# Patient Record
Sex: FEMALE | Race: WHITE | NOT HISPANIC OR LATINO | Employment: FULL TIME | URBAN - METROPOLITAN AREA
[De-identification: names, ages, dates, MRNs, and addresses within clinical notes are randomized per-mention and may not be internally consistent; named-entity substitution may affect disease eponyms.]

---

## 2017-01-10 ENCOUNTER — TELEPHONE (OUTPATIENT)
Dept: OBSTETRICS AND GYNECOLOGY | Facility: CLINIC | Age: 52
End: 2017-01-10

## 2017-01-10 RX ORDER — LEVONORGESTREL / ETHINYL ESTRADIOL AND ETHINYL ESTRADIOL 150-30(84)
KIT ORAL
Qty: 91 EACH | Refills: 0 | Status: SHIPPED | OUTPATIENT
Start: 2017-01-10 | End: 2017-02-14 | Stop reason: SDUPTHER

## 2017-01-10 NOTE — TELEPHONE ENCOUNTER
----- Message from Joann Henderson sent at 1/9/2017  1:28 PM EST -----  Patient needs a refill on her prescription Amethea birth control.  Patient phone number:  [phone] done

## 2017-02-14 ENCOUNTER — OFFICE VISIT (OUTPATIENT)
Dept: OBSTETRICS AND GYNECOLOGY | Facility: CLINIC | Age: 52
End: 2017-02-14

## 2017-02-14 VITALS
BODY MASS INDEX: 17.59 KG/M2 | HEART RATE: 77 BPM | DIASTOLIC BLOOD PRESSURE: 90 MMHG | HEIGHT: 65 IN | WEIGHT: 105.6 LBS | SYSTOLIC BLOOD PRESSURE: 157 MMHG

## 2017-02-14 DIAGNOSIS — Z12.12 SCREENING FOR RECTAL CANCER: Primary | ICD-10-CM

## 2017-02-14 DIAGNOSIS — Z12.31 VISIT FOR SCREENING MAMMOGRAM: ICD-10-CM

## 2017-02-14 DIAGNOSIS — Z30.41 ENCOUNTER FOR SURVEILLANCE OF CONTRACEPTIVE PILLS: ICD-10-CM

## 2017-02-14 DIAGNOSIS — Z01.419 WELL WOMAN EXAM WITH ROUTINE GYNECOLOGICAL EXAM: ICD-10-CM

## 2017-02-14 DIAGNOSIS — Z12.4 ROUTINE CERVICAL SMEAR: ICD-10-CM

## 2017-02-14 LAB
DEVELOPER EXPIRATION DATE: NORMAL
DEVELOPER LOT NUMBER: NORMAL
EXPIRATION DATE: NORMAL
FECAL OCCULT BLOOD SCREEN, POC: NEGATIVE
Lab: NORMAL
NEGATIVE CONTROL: NEGATIVE
POSITIVE CONTROL: POSITIVE

## 2017-02-14 PROCEDURE — G0328 FECAL BLOOD SCRN IMMUNOASSAY: HCPCS | Performed by: OBSTETRICS & GYNECOLOGY

## 2017-02-14 PROCEDURE — 99396 PREV VISIT EST AGE 40-64: CPT | Performed by: OBSTETRICS & GYNECOLOGY

## 2017-02-14 RX ORDER — LEVONORGESTREL / ETHINYL ESTRADIOL AND ETHINYL ESTRADIOL 150-30(84)
KIT ORAL
Qty: 91 EACH | Refills: 2 | Status: SHIPPED | OUTPATIENT
Start: 2017-02-14 | End: 2018-02-27 | Stop reason: SDUPTHER

## 2017-02-14 NOTE — PROGRESS NOTES
Subjective   Denise Guajardo is a 51 y.o. female     CC: Annual and pap    Last annual 2016  Last pap 2014  Last mammogram 2016  Last colonoscopy never      History of Present Illness: reports no particular gyn concerns or problems.  Is on OC's and doing well.  Recently .  Was a little early today for her mammogram and it has been rescheduled for the 1st part of March.   Dealing with issues currently related to her father's accidental death and her mother's cognitive dysfunction related to head trauma: so will schedule her screening colonoscopy after her next visit.  Will check gonadotropins at end of this extended interval pack of OC's.    The following portions of the patient's history were reviewed and updated as appropriate: allergies, current medications, past family history, past medical history, past social history, past surgical history and problem list.    Review of Systems   Constitutional: Negative for fatigue.   HENT: Negative for congestion.    Eyes: Negative for visual disturbance.   Respiratory: Negative for chest tightness and shortness of breath.    Cardiovascular: Negative for chest pain and palpitations.   Gastrointestinal: Negative for abdominal pain and blood in stool.   Endocrine: Negative for cold intolerance.   Genitourinary: Negative for difficulty urinating, dysuria, frequency, genital sores, hematuria, menstrual problem, pelvic pain, urgency, vaginal bleeding, vaginal discharge and vaginal pain.   Musculoskeletal: Negative for back pain.   Skin: Negative for color change and rash.   Neurological: Negative for headaches.   Psychiatric/Behavioral: Negative for sleep disturbance.       History reviewed. No pertinent past medical history.  Past Surgical History   Procedure Laterality Date   • Breast lumpectomy       OB History      Para Term  AB TAB SAB Ectopic Multiple Living    2 2        2        Menstrual History:  OB History       Para Term  AB TAB SAB Ectopic Multiple Living    2 2        2         No LMP recorded. Patient is postmenopausal.       History reviewed. No pertinent family history.  History   Smoking Status   • Never Smoker   Smokeless Tobacco   • Not on file     History   Alcohol Use   • Yes       Objective   Physical Exam   Constitutional: She is oriented to person, place, and time. She appears well-developed and well-nourished.   HENT:   Head: Normocephalic and atraumatic.   Right Ear: External ear normal.   Left Ear: External ear normal.   Nose: Nose normal.   Eyes: EOM are normal. Pupils are equal, round, and reactive to light.   Neck: Normal range of motion. Neck supple. No thyromegaly present.   Cardiovascular: Normal rate, regular rhythm and normal heart sounds.    No murmur heard.  Pulmonary/Chest: Effort normal and breath sounds normal. She has no wheezes. She has no rales. She exhibits no tenderness. Right breast exhibits no inverted nipple, no mass, no nipple discharge, no skin change and no tenderness. Left breast exhibits no inverted nipple, no mass, no nipple discharge, no skin change and no tenderness. There is no breast swelling.   Abdominal: Soft. Bowel sounds are normal. She exhibits no distension and no mass. There is no tenderness. Hernia confirmed negative in the right inguinal area and confirmed negative in the left inguinal area.   Genitourinary: Uterus normal. Rectal exam shows no mass, no tenderness and guaiac negative stool. No breast tenderness. Pelvic exam was performed with patient supine. There is no rash, tenderness or lesion on the right labia. There is no rash, tenderness or lesion on the left labia. Cervix exhibits no motion tenderness, no discharge and no friability. Right adnexum displays no mass and no tenderness. Left adnexum displays no mass and no tenderness. No erythema, tenderness or bleeding in the vagina. No vaginal discharge found.   Musculoskeletal: Normal range of motion. She  exhibits no edema.   Neurological: She is alert and oriented to person, place, and time.   Skin: Skin is warm and dry. No rash noted.   Psychiatric: She has a normal mood and affect. Judgment normal.   Nursing note and vitals reviewed.        Assessment/Plan   Denise was seen today for annual exam.    Diagnoses and all orders for this visit:    Screening for rectal cancer  -     POC Occult Blood Stool    Well woman exam with routine gynecological exam: normal gyn WWE  Encounter for surveillance of contraceptive pills: will check gonadotropins at end of placebo week.  -     FSH & LH; Future    Routine cervical smear  -     IGP, Aptima HPV, Rfx 16 / 18,45    Visit for screening mammogram    Other orders  -     Levonorgest-Eth Estrad 91-Day 0.15-0.03 &0.01 MG tablet; One daiy            Will continue with annual WWE's, annual mammography, SBE's, and daily calcium + D

## 2017-02-16 LAB
CYTOLOGIST CVX/VAG CYTO: NORMAL
CYTOLOGY CVX/VAG DOC THIN PREP: NORMAL
DX ICD CODE: NORMAL
HIV 1 & 2 AB SER-IMP: NORMAL
HPV I/H RISK 4 DNA CVX QL PROBE+SIG AMP: NEGATIVE
OTHER STN SPEC: NORMAL
PATH REPORT.FINAL DX SPEC: NORMAL
STAT OF ADQ CVX/VAG CYTO-IMP: NORMAL

## 2017-02-19 ENCOUNTER — RESULTS ENCOUNTER (OUTPATIENT)
Dept: OBSTETRICS AND GYNECOLOGY | Facility: CLINIC | Age: 52
End: 2017-02-19

## 2017-02-19 DIAGNOSIS — Z30.41 ENCOUNTER FOR SURVEILLANCE OF CONTRACEPTIVE PILLS: ICD-10-CM

## 2017-03-07 ENCOUNTER — APPOINTMENT (OUTPATIENT)
Dept: WOMENS IMAGING | Facility: HOSPITAL | Age: 52
End: 2017-03-07

## 2017-03-07 PROCEDURE — 77067 SCR MAMMO BI INCL CAD: CPT | Performed by: RADIOLOGY

## 2017-03-07 PROCEDURE — G0202 SCR MAMMO BI INCL CAD: HCPCS | Performed by: RADIOLOGY

## 2017-04-15 LAB
FSH SERPL-ACNC: 5.6 MIU/ML
LH SERPL-ACNC: 2.5 MIU/ML

## 2017-04-17 ENCOUNTER — TELEPHONE (OUTPATIENT)
Dept: OBSTETRICS AND GYNECOLOGY | Facility: CLINIC | Age: 52
End: 2017-04-17

## 2017-06-14 ENCOUNTER — TELEPHONE (OUTPATIENT)
Dept: OBSTETRICS AND GYNECOLOGY | Facility: CLINIC | Age: 52
End: 2017-06-14

## 2017-06-14 NOTE — TELEPHONE ENCOUNTER
----- Message from Joann Hill sent at 6/14/2017 11:50 AM EDT -----  Contact: pt called   Pt would like a call back to discuss a recommendation for a new doctor. Please call [phone].      joann     I believe she would like seeing Dr. Gay.

## 2018-02-27 ENCOUNTER — APPOINTMENT (OUTPATIENT)
Dept: WOMENS IMAGING | Facility: HOSPITAL | Age: 53
End: 2018-02-27

## 2018-02-27 ENCOUNTER — OFFICE VISIT (OUTPATIENT)
Dept: OBSTETRICS AND GYNECOLOGY | Facility: CLINIC | Age: 53
End: 2018-02-27

## 2018-02-27 ENCOUNTER — PROCEDURE VISIT (OUTPATIENT)
Dept: OBSTETRICS AND GYNECOLOGY | Facility: CLINIC | Age: 53
End: 2018-02-27

## 2018-02-27 VITALS
SYSTOLIC BLOOD PRESSURE: 134 MMHG | DIASTOLIC BLOOD PRESSURE: 84 MMHG | BODY MASS INDEX: 17.33 KG/M2 | WEIGHT: 104 LBS | HEIGHT: 65 IN | HEART RATE: 73 BPM

## 2018-02-27 DIAGNOSIS — Z01.419 ENCOUNTER FOR GYNECOLOGICAL EXAMINATION WITHOUT ABNORMAL FINDING: Primary | ICD-10-CM

## 2018-02-27 DIAGNOSIS — Z12.31 VISIT FOR SCREENING MAMMOGRAM: Primary | ICD-10-CM

## 2018-02-27 DIAGNOSIS — Z30.41 ENCOUNTER FOR SURVEILLANCE OF CONTRACEPTIVE PILLS: ICD-10-CM

## 2018-02-27 PROCEDURE — 99396 PREV VISIT EST AGE 40-64: CPT | Performed by: OBSTETRICS & GYNECOLOGY

## 2018-02-27 PROCEDURE — 77067 SCR MAMMO BI INCL CAD: CPT | Performed by: RADIOLOGY

## 2018-02-27 PROCEDURE — 77067 SCR MAMMO BI INCL CAD: CPT | Performed by: OBSTETRICS & GYNECOLOGY

## 2018-02-27 RX ORDER — LEVONORGESTREL / ETHINYL ESTRADIOL AND ETHINYL ESTRADIOL 150-30(84)
KIT ORAL
Qty: 91 EACH | Refills: 3 | Status: SHIPPED | OUTPATIENT
Start: 2018-02-27 | End: 2019-03-12

## 2018-02-27 NOTE — PROGRESS NOTES
"GYN Annual Exam     CC- Here for annual exam.     Denise Guajardo is a 52 y.o. female who presents for annual well woman exam. Periods are absent due to Menopause.     OB History      Para Term  AB Living    2 2    2    SAB TAB Ectopic Multiple Live Births                  Current contraception: post menopausal status  History of abnormal Pap smear: no  Family history of uterine, colon or ovarian cancer: yes - paternal aunt, ovarian cancer   History of abnormal mammogram: yes - negative breast biopsy   Family history of breast cancer: no  Last Pap : 2017 NL HPV-  Last Mammo: today  Last Colonoscopy: never    History reviewed. No pertinent past medical history.    Past Surgical History:   Procedure Laterality Date   • BREAST BIOPSY     • TUBAL ABDOMINAL LIGATION     • WISDOM TOOTH EXTRACTION           Current Outpatient Prescriptions:   •  Levonorgest-Eth Estrad 91-Day 0.15-0.03 &0.01 MG tablet, One daiy, Disp: 91 each, Rfl: 2    No Known Allergies    Social History   Substance Use Topics   • Smoking status: Never Smoker   • Smokeless tobacco: Never Used   • Alcohol use Yes      Comment: Socially       Family History   Problem Relation Age of Onset   • Ovarian cancer Maternal Aunt    • Breast cancer Neg Hx    • Uterine cancer Neg Hx    • Colon cancer Neg Hx    • Deep vein thrombosis Neg Hx    • Pulmonary embolism Neg Hx        Review of Systems   Constitutional: Negative for chills and fever.   Gastrointestinal: Negative for abdominal pain.   Genitourinary: Negative for dysuria, pelvic pain, vaginal bleeding and vaginal discharge.   All other systems reviewed and are negative.      /84  Pulse 73  Ht 165.1 cm (65\")  Wt 47.2 kg (104 lb)  Breastfeeding? No  BMI 17.31 kg/m2    Physical Exam   Constitutional: She is oriented to person, place, and time. She appears well-developed and well-nourished. No distress.   HENT:   Head: Normocephalic and atraumatic.   Eyes: Conjunctivae are normal.   Neck: " Normal range of motion. Neck supple. No thyromegaly present.   Cardiovascular: Normal rate and regular rhythm.    No murmur heard.  Pulmonary/Chest: Effort normal and breath sounds normal. Right breast exhibits no inverted nipple, no mass and no nipple discharge. Left breast exhibits no inverted nipple, no mass and no nipple discharge.   Abdominal: Soft. Bowel sounds are normal. She exhibits no distension. There is no tenderness.   Genitourinary: Vagina normal and uterus normal. Pelvic exam was performed with patient supine. There is no lesion on the right labia. There is no lesion on the left labia. Uterus is not enlarged (anteverted ), not fixed and not tender. Cervix exhibits no motion tenderness. Right adnexum displays no mass and no tenderness. Left adnexum displays no mass and no tenderness. No bleeding in the vagina. No vaginal discharge found.   Musculoskeletal: She exhibits no edema.   Lymphadenopathy:        Right: No inguinal adenopathy present.        Left: No inguinal adenopathy present.   Neurological: She is alert and oriented to person, place, and time.   Skin: No rash noted.   Psychiatric: She has a normal mood and affect. Her behavior is normal.          Assessment     1) GYN annual well woman exam.   2) OCP - discussed normal FSH, LH last year  Recheck next year or trial off medication (used to suppress endometriosis)      Plan     1) Breast Health - Clinical breast exam & mammogram yearly, Self breast awareness monthly  2) Pap - up to date  3) Smoking status- non-smoker   4) Colon health - screening colonoscopy recommended if not up to date  5) Bone health - Weight bearing exercise, dietary calcium recommendations and vitamin D reviewed.   6) Seat belts & Sunscreen recommended  7) Follow up prn and one year      Rashad Fierro MD   2/27/2018  8:53 AM

## 2019-03-12 ENCOUNTER — PROCEDURE VISIT (OUTPATIENT)
Dept: OBSTETRICS AND GYNECOLOGY | Facility: CLINIC | Age: 54
End: 2019-03-12

## 2019-03-12 ENCOUNTER — OFFICE VISIT (OUTPATIENT)
Dept: OBSTETRICS AND GYNECOLOGY | Facility: CLINIC | Age: 54
End: 2019-03-12

## 2019-03-12 ENCOUNTER — APPOINTMENT (OUTPATIENT)
Dept: WOMENS IMAGING | Facility: HOSPITAL | Age: 54
End: 2019-03-12

## 2019-03-12 VITALS
WEIGHT: 106 LBS | HEIGHT: 65 IN | HEART RATE: 75 BPM | SYSTOLIC BLOOD PRESSURE: 135 MMHG | DIASTOLIC BLOOD PRESSURE: 90 MMHG | BODY MASS INDEX: 17.66 KG/M2

## 2019-03-12 DIAGNOSIS — Z12.31 VISIT FOR SCREENING MAMMOGRAM: Primary | ICD-10-CM

## 2019-03-12 DIAGNOSIS — N94.89 ADNEXAL MASS: ICD-10-CM

## 2019-03-12 DIAGNOSIS — Z01.419 ENCOUNTER FOR GYNECOLOGICAL EXAMINATION WITHOUT ABNORMAL FINDING: Primary | ICD-10-CM

## 2019-03-12 DIAGNOSIS — N95.1 MENOPAUSAL SYMPTOMS: ICD-10-CM

## 2019-03-12 PROCEDURE — 77067 SCR MAMMO BI INCL CAD: CPT | Performed by: OBSTETRICS & GYNECOLOGY

## 2019-03-12 PROCEDURE — 77067 SCR MAMMO BI INCL CAD: CPT | Performed by: RADIOLOGY

## 2019-03-12 PROCEDURE — 99396 PREV VISIT EST AGE 40-64: CPT | Performed by: OBSTETRICS & GYNECOLOGY

## 2019-03-12 RX ORDER — CLOBETASOL PROPIONATE 0.46 MG/ML
SOLUTION TOPICAL
Refills: 5 | COMMUNITY
Start: 2019-03-05 | End: 2020-01-01

## 2019-03-12 RX ORDER — FLUOROURACIL 50 MG/G
CREAM TOPICAL
Refills: 0 | COMMUNITY
Start: 2019-03-05 | End: 2020-01-01

## 2019-03-12 NOTE — PROGRESS NOTES
"GYN Annual Exam     CC- Here for annual exam.     Denise Guajardo is a 53 y.o. female who presents for annual well woman exam. Periods are absent due Menopause. Pt c/o hot flashes.     OB History      Para Term  AB Living    2 2       2    SAB TAB Ectopic Molar Multiple Live Births                         Current contraception: post menopausal status  History of abnormal Pap smear: yes - no treatment required  Family history of uterine, colon or ovarian cancer: yes - aunt with ovarian cancer  History of abnormal mammogram: no  Family history of breast cancer: no  Last Pap : 2017 NL HPV neg   Last mammogram: today  Last colonoscopy: never  Last DEXA: never    History reviewed. No pertinent past medical history.    Past Surgical History:   Procedure Laterality Date   • BREAST BIOPSY     • TUBAL ABDOMINAL LIGATION     • WISDOM TOOTH EXTRACTION           Current Outpatient Medications:   •  clobetasol (TEMOVATE) 0.05 % external solution, APPLY TO AFFECTED AREAS ON THE SCALP BID PRF FLARES, Disp: , Rfl: 5  •  fluorouracil (EFUDEX) 5 % cream, APPLY TO LEFT LEG  AND LEFT SHOULDER BID FOR 3 TO 4  WEEKS AS TOLERATED, Disp: , Rfl: 0    No Known Allergies    Social History     Tobacco Use   • Smoking status: Never Smoker   • Smokeless tobacco: Never Used   Substance Use Topics   • Alcohol use: Yes     Comment: Socially   • Drug use: No       Family History   Problem Relation Age of Onset   • Ovarian cancer Maternal Aunt    • Breast cancer Neg Hx    • Uterine cancer Neg Hx    • Colon cancer Neg Hx    • Deep vein thrombosis Neg Hx    • Pulmonary embolism Neg Hx        Review of Systems   Constitutional: Negative for chills and fever.   Gastrointestinal: Negative for abdominal pain.   Genitourinary: Negative for dysuria, pelvic pain, vaginal bleeding and vaginal discharge.   All other systems reviewed and are negative.      /90   Pulse 75   Ht 165.1 cm (65\")   Wt 48.1 kg (106 lb)   Breastfeeding? No   " BMI 17.64 kg/m²     Physical Exam   Constitutional: She is oriented to person, place, and time. She appears well-developed and well-nourished. No distress. She is not obese.  HENT:   Head: Normocephalic and atraumatic.   Eyes: Conjunctivae are normal. Right eye exhibits no discharge. Left eye exhibits no discharge.   Neck: Normal range of motion. Neck supple. No thyromegaly present.   Cardiovascular: Normal rate, regular rhythm and normal heart sounds.   No murmur heard.  Pulmonary/Chest: Effort normal and breath sounds normal. No respiratory distress. Right breast exhibits no inverted nipple, no mass and no nipple discharge. Left breast exhibits no inverted nipple, no mass and no nipple discharge.   Abdominal: Soft. Bowel sounds are normal. She exhibits no distension. There is no tenderness.   Genitourinary: Rectum normal and cervix normal. Rectal exam shows no mass and anal tone normal. Pelvic exam was performed with patient supine. There is no lesion or Bartholin's cyst on the right labia. There is no lesion or Bartholin's cyst on the left labia. Uterus is anteverted. Uterus is not deviated, enlarged, fixed or exhibiting a mass.   Cervix is not parous. Cervix does not exhibit motion tenderness. Right adnexum displays a mass (3 cm ). Right adnexum displays no tenderness and no fullness. Left adnexum is non-palpable.Vagina exhibits loss of rugae. No bleeding in the vagina. No vaginal discharge found.   Musculoskeletal: Normal range of motion. She exhibits no edema.   Lymphadenopathy:     She has no cervical adenopathy.        Right: No inguinal adenopathy present.        Left: No inguinal adenopathy present.   Neurological: She is alert and oriented to person, place, and time.   Skin: Skin is warm and dry. No rash noted.   Psychiatric: She has a normal mood and affect. Her behavior is normal. Judgment and thought content normal.          Assessment     1) GYN annual well woman exam.   2) Menopausal symptoms   To  continue with OTC supplements   3) Right sided more noticeable   Check ultrasound.   4) Screen for osteoporosis  Risk factor under 127#      Plan     1) Breast Health - Clinical breast exam & mammogram yearly, Self breast awareness monthly  2) Pap - Up to date   3) Smoking status- non-smoker   4) Colon health - screening colonoscopy recommended if not up to date  5) Bone health - Weight bearing exercise, dietary calcium recommendations and vitamin D reviewed.   6) Activity recommends - Adult 150-300 min/week of multi-component physical activities that include balance training, aerobic and physical strengthening.  Disabled or ill adults should still try to fulfill these requirements, with modifications based on their conditions.   7) Follow up prn and one year      Rashad Fierro MD   3/12/2019  1:28 PM

## 2019-03-27 ENCOUNTER — PROCEDURE VISIT (OUTPATIENT)
Dept: OBSTETRICS AND GYNECOLOGY | Facility: CLINIC | Age: 54
End: 2019-03-27

## 2019-03-27 DIAGNOSIS — N94.9 ADNEXAL FULLNESS: Primary | ICD-10-CM

## 2019-03-27 PROCEDURE — 76830 TRANSVAGINAL US NON-OB: CPT | Performed by: OBSTETRICS & GYNECOLOGY

## 2019-03-29 ENCOUNTER — TELEPHONE (OUTPATIENT)
Dept: OBSTETRICS AND GYNECOLOGY | Facility: CLINIC | Age: 54
End: 2019-03-29

## 2019-03-29 NOTE — TELEPHONE ENCOUNTER
Brielle,     Her GYN ultrasound was normal. Could not see an ovarian cyst or mass to concern with. She is good to just return for annual exams.     Thanks  Dr. Fierro

## 2019-06-06 ENCOUNTER — TELEPHONE (OUTPATIENT)
Dept: OBSTETRICS AND GYNECOLOGY | Facility: CLINIC | Age: 54
End: 2019-06-06

## 2019-06-06 NOTE — TELEPHONE ENCOUNTER
Called back and we talked  Using OTC supplements to aid hot flashes without results   Discussed alternatives    Using medications known to reduce hot flashes  Considering hormone therapy   Risks of hormone therapy reviewed and currently not willing to take on those risks     Will consider over time and call back/come in if wants to discuss in more detail.     Rashad Fierro MD  6/6/2019  4:40 PM    Call returned and no answer at work and left voice mail on cell     Rashad Fierro MD  6/6/2019  4:29 PM    Pt returned your call. Pt can be reached at her work phone number [phone] or can get her at her cell at [phone].

## 2019-06-06 NOTE — TELEPHONE ENCOUNTER
Call number as requested   Not in there.   Left message to return call to office.     Rashad Fierro MD  6/6/2019  11:58 AM    Pt called, requesting a call back to discuss her recent visit. Pt states that she was seen and had dicussed her hot flashes. Pt's hot flashes are not getting better and would like to discuss the possibility of getting some sort of hormone medication that may help with this. Please advise    Pt can be reached at [phone]

## 2020-01-01 ENCOUNTER — APPOINTMENT (OUTPATIENT)
Dept: CT IMAGING | Facility: HOSPITAL | Age: 55
End: 2020-01-01

## 2020-01-01 ENCOUNTER — TELEPHONE (OUTPATIENT)
Dept: ONCOLOGY | Facility: CLINIC | Age: 55
End: 2020-01-01

## 2020-01-01 ENCOUNTER — APPOINTMENT (OUTPATIENT)
Dept: OCCUPATIONAL THERAPY | Facility: HOSPITAL | Age: 55
End: 2020-01-01

## 2020-01-01 ENCOUNTER — APPOINTMENT (OUTPATIENT)
Dept: MRI IMAGING | Facility: HOSPITAL | Age: 55
End: 2020-01-01

## 2020-01-01 ENCOUNTER — APPOINTMENT (OUTPATIENT)
Dept: PHYSICAL THERAPY | Facility: HOSPITAL | Age: 55
End: 2020-01-01

## 2020-01-01 ENCOUNTER — TELEPHONE (OUTPATIENT)
Dept: NEUROSURGERY | Facility: CLINIC | Age: 55
End: 2020-01-01

## 2020-01-01 ENCOUNTER — DOCUMENTATION (OUTPATIENT)
Dept: ONCOLOGY | Facility: CLINIC | Age: 55
End: 2020-01-01

## 2020-01-01 ENCOUNTER — LAB (OUTPATIENT)
Dept: LAB | Facility: HOSPITAL | Age: 55
End: 2020-01-01

## 2020-01-01 ENCOUNTER — HOSPITAL ENCOUNTER (OUTPATIENT)
Dept: PHYSICAL THERAPY | Facility: HOSPITAL | Age: 55
Setting detail: THERAPIES SERIES
Discharge: HOME | End: 2020-10-05

## 2020-01-01 ENCOUNTER — OFFICE VISIT (OUTPATIENT)
Dept: OBSTETRICS AND GYNECOLOGY | Facility: CLINIC | Age: 55
End: 2020-01-01

## 2020-01-01 ENCOUNTER — RADIATION ONCOLOGY WEEKLY ASSESSMENT (OUTPATIENT)
Dept: RADIATION ONCOLOGY | Facility: HOSPITAL | Age: 55
End: 2020-01-01

## 2020-01-01 ENCOUNTER — HOSPITAL ENCOUNTER (OUTPATIENT)
Dept: OCCUPATIONAL THERAPY | Facility: HOSPITAL | Age: 55
Setting detail: THERAPIES SERIES
Discharge: HOME | End: 2020-09-17

## 2020-01-01 ENCOUNTER — APPOINTMENT (OUTPATIENT)
Dept: RADIATION ONCOLOGY | Facility: HOSPITAL | Age: 55
End: 2020-01-01

## 2020-01-01 ENCOUNTER — HOSPITAL ENCOUNTER (OUTPATIENT)
Dept: PHYSICAL THERAPY | Facility: HOSPITAL | Age: 55
Setting detail: THERAPIES SERIES
Discharge: HOME | End: 2020-08-24

## 2020-01-01 ENCOUNTER — INFUSION (OUTPATIENT)
Dept: ONCOLOGY | Facility: HOSPITAL | Age: 55
End: 2020-01-01

## 2020-01-01 ENCOUNTER — HOSPITAL ENCOUNTER (OUTPATIENT)
Dept: MRI IMAGING | Facility: HOSPITAL | Age: 55
Discharge: HOME | End: 2020-10-21
Admitting: INTERNAL MEDICINE

## 2020-01-01 ENCOUNTER — HOSPITAL ENCOUNTER (OUTPATIENT)
Dept: OCCUPATIONAL THERAPY | Facility: HOSPITAL | Age: 55
Setting detail: THERAPIES SERIES
Discharge: HOME | End: 2020-08-19

## 2020-01-01 ENCOUNTER — HOSPITAL ENCOUNTER (INPATIENT)
Facility: HOSPITAL | Age: 55
LOS: 29 days | Discharge: HOME | End: 2020-08-07
Attending: PHYSICAL MEDICINE & REHABILITATION | Admitting: PHYSICAL MEDICINE & REHABILITATION

## 2020-01-01 ENCOUNTER — MEDICATION THERAPY MANAGEMENT (OUTPATIENT)
Dept: PHARMACY | Facility: HOSPITAL | Age: 55
End: 2020-01-01

## 2020-01-01 ENCOUNTER — HOSPITAL ENCOUNTER (OUTPATIENT)
Dept: OCCUPATIONAL THERAPY | Facility: HOSPITAL | Age: 55
Setting detail: THERAPIES SERIES
Discharge: HOME | End: 2020-08-13

## 2020-01-01 ENCOUNTER — OFFICE VISIT (OUTPATIENT)
Dept: ONCOLOGY | Facility: CLINIC | Age: 55
End: 2020-01-01

## 2020-01-01 ENCOUNTER — HOSPITAL ENCOUNTER (OUTPATIENT)
Dept: PHYSICAL THERAPY | Facility: HOSPITAL | Age: 55
Setting detail: THERAPIES SERIES
Discharge: HOME | End: 2020-08-17

## 2020-01-01 ENCOUNTER — PROCEDURE VISIT (OUTPATIENT)
Dept: OBSTETRICS AND GYNECOLOGY | Facility: CLINIC | Age: 55
End: 2020-01-01

## 2020-01-01 ENCOUNTER — DOCUMENTATION (OUTPATIENT)
Dept: RADIATION ONCOLOGY | Facility: HOSPITAL | Age: 55
End: 2020-01-01

## 2020-01-01 ENCOUNTER — HOSPITAL ENCOUNTER (OUTPATIENT)
Dept: PHYSICAL THERAPY | Facility: HOSPITAL | Age: 55
Setting detail: THERAPIES SERIES
Discharge: HOME | End: 2020-09-28

## 2020-01-01 ENCOUNTER — HOSPITAL ENCOUNTER (OUTPATIENT)
Dept: OCCUPATIONAL THERAPY | Facility: HOSPITAL | Age: 55
Setting detail: THERAPIES SERIES
Discharge: HOME | End: 2020-08-10

## 2020-01-01 ENCOUNTER — HOSPITAL ENCOUNTER (INPATIENT)
Facility: HOSPITAL | Age: 55
LOS: 13 days | End: 2020-07-09
Attending: HOSPITALIST | Admitting: NEUROLOGICAL SURGERY

## 2020-01-01 ENCOUNTER — HOSPITAL ENCOUNTER (OUTPATIENT)
Dept: PHYSICAL THERAPY | Facility: HOSPITAL | Age: 55
Setting detail: THERAPIES SERIES
Discharge: HOME | End: 2020-09-21

## 2020-01-01 ENCOUNTER — HOSPITAL ENCOUNTER (OUTPATIENT)
Dept: PHYSICAL THERAPY | Facility: HOSPITAL | Age: 55
Setting detail: THERAPIES SERIES
Discharge: HOME | End: 2020-09-03

## 2020-01-01 ENCOUNTER — CLINICAL SUPPORT (OUTPATIENT)
Dept: ONCOLOGY | Facility: HOSPITAL | Age: 55
End: 2020-01-01

## 2020-01-01 ENCOUNTER — HOSPITAL ENCOUNTER (OUTPATIENT)
Dept: PHYSICAL THERAPY | Facility: HOSPITAL | Age: 55
Setting detail: THERAPIES SERIES
Discharge: HOME | End: 2020-08-31

## 2020-01-01 ENCOUNTER — APPOINTMENT (OUTPATIENT)
Dept: CARDIOLOGY | Facility: HOSPITAL | Age: 55
End: 2020-01-01

## 2020-01-01 ENCOUNTER — HOSPITAL ENCOUNTER (OUTPATIENT)
Dept: PHYSICAL THERAPY | Facility: HOSPITAL | Age: 55
Setting detail: THERAPIES SERIES
Discharge: HOME | End: 2020-08-20

## 2020-01-01 ENCOUNTER — HOSPITAL ENCOUNTER (OUTPATIENT)
Dept: OCCUPATIONAL THERAPY | Facility: HOSPITAL | Age: 55
Setting detail: THERAPIES SERIES
Discharge: HOME | End: 2020-08-24

## 2020-01-01 ENCOUNTER — HOSPITAL ENCOUNTER (OUTPATIENT)
Dept: OCCUPATIONAL THERAPY | Facility: HOSPITAL | Age: 55
Setting detail: THERAPIES SERIES
Discharge: HOME | End: 2020-09-10

## 2020-01-01 ENCOUNTER — APPOINTMENT (OUTPATIENT)
Dept: WOMENS IMAGING | Facility: HOSPITAL | Age: 55
End: 2020-01-01

## 2020-01-01 ENCOUNTER — ANESTHESIA EVENT (OUTPATIENT)
Dept: PERIOP | Facility: HOSPITAL | Age: 55
End: 2020-01-01

## 2020-01-01 ENCOUNTER — HOSPITAL ENCOUNTER (OUTPATIENT)
Dept: MRI IMAGING | Facility: HOSPITAL | Age: 55
Discharge: HOME | End: 2020-09-08
Admitting: NEUROLOGICAL SURGERY

## 2020-01-01 ENCOUNTER — TELEPHONE (OUTPATIENT)
Dept: ONCOLOGY | Facility: HOSPITAL | Age: 55
End: 2020-01-01

## 2020-01-01 ENCOUNTER — HOSPITAL ENCOUNTER (OUTPATIENT)
Dept: PHYSICAL THERAPY | Facility: HOSPITAL | Age: 55
Setting detail: THERAPIES SERIES
Discharge: HOME | End: 2020-09-09

## 2020-01-01 ENCOUNTER — APPOINTMENT (OUTPATIENT)
Dept: ONCOLOGY | Facility: HOSPITAL | Age: 55
End: 2020-01-01

## 2020-01-01 ENCOUNTER — HOSPITAL ENCOUNTER (OUTPATIENT)
Dept: PHYSICAL THERAPY | Facility: HOSPITAL | Age: 55
Setting detail: THERAPIES SERIES
End: 2020-01-01

## 2020-01-01 ENCOUNTER — ANESTHESIA (OUTPATIENT)
Dept: PERIOP | Facility: HOSPITAL | Age: 55
End: 2020-01-01

## 2020-01-01 ENCOUNTER — HOSPITAL ENCOUNTER (OUTPATIENT)
Dept: PHYSICAL THERAPY | Facility: HOSPITAL | Age: 55
Setting detail: THERAPIES SERIES
Discharge: HOME | End: 2020-10-12

## 2020-01-01 ENCOUNTER — HOSPITAL ENCOUNTER (OUTPATIENT)
Dept: PHYSICAL THERAPY | Facility: HOSPITAL | Age: 55
Setting detail: THERAPIES SERIES
Discharge: HOME | End: 2020-09-14

## 2020-01-01 ENCOUNTER — APPOINTMENT (OUTPATIENT)
Dept: GENERAL RADIOLOGY | Facility: HOSPITAL | Age: 55
End: 2020-01-01

## 2020-01-01 ENCOUNTER — HOSPITAL ENCOUNTER (OUTPATIENT)
Dept: OCCUPATIONAL THERAPY | Facility: HOSPITAL | Age: 55
Setting detail: THERAPIES SERIES
Discharge: HOME | End: 2020-09-21

## 2020-01-01 ENCOUNTER — TRANSCRIBE ORDERS (OUTPATIENT)
Dept: PHYSICAL MEDICINE AND REHAB | Facility: HOSPITAL | Age: 55
End: 2020-01-01

## 2020-01-01 ENCOUNTER — OFFICE VISIT (OUTPATIENT)
Dept: NEUROSURGERY | Facility: CLINIC | Age: 55
End: 2020-01-01

## 2020-01-01 ENCOUNTER — APPOINTMENT (OUTPATIENT)
Dept: LAB | Facility: HOSPITAL | Age: 55
End: 2020-01-01

## 2020-01-01 ENCOUNTER — HOSPITAL ENCOUNTER (EMERGENCY)
Facility: HOSPITAL | Age: 55
Discharge: ANOTHER HOSPITAL | End: 2020-06-26
Admitting: EMERGENCY MEDICINE

## 2020-01-01 ENCOUNTER — HOSPITAL ENCOUNTER (OUTPATIENT)
Dept: PHYSICAL THERAPY | Facility: HOSPITAL | Age: 55
Setting detail: THERAPIES SERIES
Discharge: HOME | End: 2020-08-27

## 2020-01-01 ENCOUNTER — HOSPITAL ENCOUNTER (OUTPATIENT)
Dept: OCCUPATIONAL THERAPY | Facility: HOSPITAL | Age: 55
Setting detail: THERAPIES SERIES
Discharge: HOME | End: 2020-08-31

## 2020-01-01 VITALS
OXYGEN SATURATION: 99 % | DIASTOLIC BLOOD PRESSURE: 81 MMHG | SYSTOLIC BLOOD PRESSURE: 121 MMHG | TEMPERATURE: 97 F | HEART RATE: 72 BPM | BODY MASS INDEX: 16.75 KG/M2 | RESPIRATION RATE: 18 BRPM | HEIGHT: 65 IN | WEIGHT: 100.53 LBS

## 2020-01-01 VITALS
HEART RATE: 80 BPM | DIASTOLIC BLOOD PRESSURE: 72 MMHG | RESPIRATION RATE: 17 BRPM | WEIGHT: 108.03 LBS | BODY MASS INDEX: 18 KG/M2 | HEIGHT: 65 IN | TEMPERATURE: 98 F | SYSTOLIC BLOOD PRESSURE: 143 MMHG | OXYGEN SATURATION: 98 %

## 2020-01-01 VITALS
HEIGHT: 65 IN | HEART RATE: 94 BPM | SYSTOLIC BLOOD PRESSURE: 128 MMHG | DIASTOLIC BLOOD PRESSURE: 91 MMHG | TEMPERATURE: 98.3 F | BODY MASS INDEX: 18 KG/M2 | WEIGHT: 108.03 LBS | RESPIRATION RATE: 16 BRPM | OXYGEN SATURATION: 96 %

## 2020-01-01 VITALS
OXYGEN SATURATION: 98 % | HEIGHT: 65 IN | HEART RATE: 85 BPM | BODY MASS INDEX: 17.31 KG/M2 | TEMPERATURE: 97.9 F | DIASTOLIC BLOOD PRESSURE: 82 MMHG | RESPIRATION RATE: 19 BRPM | SYSTOLIC BLOOD PRESSURE: 117 MMHG

## 2020-01-01 VITALS
RESPIRATION RATE: 19 BRPM | OXYGEN SATURATION: 97 % | TEMPERATURE: 97.5 F | HEART RATE: 103 BPM | SYSTOLIC BLOOD PRESSURE: 122 MMHG | HEIGHT: 65 IN | BODY MASS INDEX: 17.31 KG/M2 | DIASTOLIC BLOOD PRESSURE: 87 MMHG

## 2020-01-01 VITALS
DIASTOLIC BLOOD PRESSURE: 88 MMHG | HEIGHT: 65 IN | HEART RATE: 86 BPM | TEMPERATURE: 97.7 F | OXYGEN SATURATION: 99 % | RESPIRATION RATE: 16 BRPM | WEIGHT: 99 LBS | BODY MASS INDEX: 16.5 KG/M2 | SYSTOLIC BLOOD PRESSURE: 143 MMHG

## 2020-01-01 VITALS — HEART RATE: 87 BPM | DIASTOLIC BLOOD PRESSURE: 78 MMHG | TEMPERATURE: 98.4 F | SYSTOLIC BLOOD PRESSURE: 117 MMHG

## 2020-01-01 VITALS
HEART RATE: 76 BPM | SYSTOLIC BLOOD PRESSURE: 135 MMHG | WEIGHT: 108 LBS | DIASTOLIC BLOOD PRESSURE: 90 MMHG | HEIGHT: 65 IN | BODY MASS INDEX: 17.99 KG/M2

## 2020-01-01 VITALS
RESPIRATION RATE: 19 BRPM | BODY MASS INDEX: 17.31 KG/M2 | DIASTOLIC BLOOD PRESSURE: 91 MMHG | OXYGEN SATURATION: 99 % | TEMPERATURE: 96.9 F | SYSTOLIC BLOOD PRESSURE: 124 MMHG | HEART RATE: 93 BPM | HEIGHT: 65 IN

## 2020-01-01 VITALS
BODY MASS INDEX: 17.31 KG/M2 | SYSTOLIC BLOOD PRESSURE: 123 MMHG | TEMPERATURE: 97.7 F | RESPIRATION RATE: 16 BRPM | HEART RATE: 94 BPM | OXYGEN SATURATION: 100 % | HEIGHT: 65 IN | DIASTOLIC BLOOD PRESSURE: 87 MMHG

## 2020-01-01 VITALS
HEIGHT: 65 IN | TEMPERATURE: 97.6 F | HEART RATE: 90 BPM | SYSTOLIC BLOOD PRESSURE: 111 MMHG | OXYGEN SATURATION: 99 % | DIASTOLIC BLOOD PRESSURE: 60 MMHG | RESPIRATION RATE: 16 BRPM | BODY MASS INDEX: 17.31 KG/M2

## 2020-01-01 VITALS — HEART RATE: 91 BPM | DIASTOLIC BLOOD PRESSURE: 85 MMHG | SYSTOLIC BLOOD PRESSURE: 133 MMHG

## 2020-01-01 VITALS
WEIGHT: 104 LBS | TEMPERATURE: 97 F | HEIGHT: 65 IN | DIASTOLIC BLOOD PRESSURE: 85 MMHG | OXYGEN SATURATION: 97 % | SYSTOLIC BLOOD PRESSURE: 120 MMHG | BODY MASS INDEX: 17.33 KG/M2 | RESPIRATION RATE: 19 BRPM | HEART RATE: 91 BPM

## 2020-01-01 DIAGNOSIS — G81.94 LEFT HEMIPARESIS (HCC): ICD-10-CM

## 2020-01-01 DIAGNOSIS — C71.9 GLIOBLASTOMA MULTIFORME (HCC): ICD-10-CM

## 2020-01-01 DIAGNOSIS — C71.9 GLIOBLASTOMA MULTIFORME (HCC): Primary | ICD-10-CM

## 2020-01-01 DIAGNOSIS — R26.89 FUNCTIONAL GAIT ABNORMALITY: ICD-10-CM

## 2020-01-01 DIAGNOSIS — R53.1 ACUTE LEFT-SIDED WEAKNESS: ICD-10-CM

## 2020-01-01 DIAGNOSIS — I62.9 INTRACRANIAL HEMORRHAGE (HCC): ICD-10-CM

## 2020-01-01 DIAGNOSIS — T14.8XXA HEMATOMA: ICD-10-CM

## 2020-01-01 DIAGNOSIS — R13.10 DYSPHAGIA, UNSPECIFIED TYPE: ICD-10-CM

## 2020-01-01 DIAGNOSIS — R29.898 WEAKNESS OF RIGHT ARM: ICD-10-CM

## 2020-01-01 DIAGNOSIS — B37.0 ORAL CANDIDA: ICD-10-CM

## 2020-01-01 DIAGNOSIS — R42 DIZZINESS: Primary | ICD-10-CM

## 2020-01-01 DIAGNOSIS — C71.9 GLIOBLASTOMA (HCC): Primary | ICD-10-CM

## 2020-01-01 DIAGNOSIS — R20.2 PARESTHESIA OF LEFT UPPER AND LOWER EXTREMITY: ICD-10-CM

## 2020-01-01 DIAGNOSIS — D49.6 BRAIN TUMOR (HCC): Primary | ICD-10-CM

## 2020-01-01 DIAGNOSIS — Z79.899 HIGH RISK MEDICATION USE: ICD-10-CM

## 2020-01-01 DIAGNOSIS — T38.0X5A STEROID-INDUCED HYPERGLYCEMIA: ICD-10-CM

## 2020-01-01 DIAGNOSIS — G93.6 VASOGENIC BRAIN EDEMA (HCC): ICD-10-CM

## 2020-01-01 DIAGNOSIS — T45.1X5A CHEMOTHERAPY-INDUCED NAUSEA: ICD-10-CM

## 2020-01-01 DIAGNOSIS — F32.9 REACTIVE DEPRESSION: ICD-10-CM

## 2020-01-01 DIAGNOSIS — G81.02: ICD-10-CM

## 2020-01-01 DIAGNOSIS — D49.6 BRAIN TUMOR (HCC): ICD-10-CM

## 2020-01-01 DIAGNOSIS — Z79.899 HIGH RISK MEDICATION USE: Primary | ICD-10-CM

## 2020-01-01 DIAGNOSIS — Z01.419 ENCOUNTER FOR GYNECOLOGICAL EXAMINATION WITHOUT ABNORMAL FINDING: Primary | ICD-10-CM

## 2020-01-01 DIAGNOSIS — G93.89 BRAIN MASS: ICD-10-CM

## 2020-01-01 DIAGNOSIS — Z12.31 VISIT FOR SCREENING MAMMOGRAM: Primary | ICD-10-CM

## 2020-01-01 DIAGNOSIS — R73.9 STEROID-INDUCED HYPERGLYCEMIA: ICD-10-CM

## 2020-01-01 DIAGNOSIS — R11.0 CHEMOTHERAPY-INDUCED NAUSEA: ICD-10-CM

## 2020-01-01 LAB
ABO GROUP BLD: NORMAL
ALBUMIN SERPL-MCNC: 3.5 G/DL (ref 3.5–5.2)
ALBUMIN SERPL-MCNC: 3.7 G/DL (ref 3.5–5.2)
ALBUMIN SERPL-MCNC: 3.8 G/DL (ref 3.5–5.2)
ALBUMIN SERPL-MCNC: 3.9 G/DL (ref 3.5–5.2)
ALBUMIN SERPL-MCNC: 4 G/DL (ref 3.5–5.2)
ALBUMIN SERPL-MCNC: 4.1 G/DL (ref 3.5–5.2)
ALBUMIN SERPL-MCNC: 4.1 G/DL (ref 3.5–5.2)
ALBUMIN SERPL-MCNC: 4.3 G/DL (ref 3.5–5.2)
ALBUMIN SERPL-MCNC: 4.8 G/DL (ref 3.5–5.2)
ALBUMIN/GLOB SERPL: 1.3 G/DL
ALBUMIN/GLOB SERPL: 1.8 G/DL
ALBUMIN/GLOB SERPL: 1.8 G/DL (ref 1.1–2.4)
ALBUMIN/GLOB SERPL: 2 G/DL (ref 1.1–2.4)
ALBUMIN/GLOB SERPL: 2.1 G/DL (ref 1.1–2.4)
ALBUMIN/GLOB SERPL: 2.2 G/DL (ref 1.1–2.4)
ALP SERPL-CCNC: 56 U/L (ref 38–116)
ALP SERPL-CCNC: 57 U/L (ref 38–116)
ALP SERPL-CCNC: 57 U/L (ref 38–116)
ALP SERPL-CCNC: 59 U/L (ref 38–116)
ALP SERPL-CCNC: 60 U/L (ref 38–116)
ALP SERPL-CCNC: 60 U/L (ref 38–116)
ALP SERPL-CCNC: 61 U/L (ref 38–116)
ALP SERPL-CCNC: 62 U/L (ref 38–116)
ALP SERPL-CCNC: 63 U/L (ref 38–116)
ALP SERPL-CCNC: 64 U/L (ref 38–116)
ALP SERPL-CCNC: 68 U/L (ref 38–116)
ALP SERPL-CCNC: 72 U/L (ref 39–117)
ALP SERPL-CCNC: 74 U/L (ref 39–117)
ALP SERPL-CCNC: 81 U/L (ref 39–117)
ALT SERPL W P-5'-P-CCNC: 23 U/L (ref 1–33)
ALT SERPL W P-5'-P-CCNC: 24 U/L (ref 0–33)
ALT SERPL W P-5'-P-CCNC: 27 U/L (ref 0–33)
ALT SERPL W P-5'-P-CCNC: 29 U/L (ref 0–33)
ALT SERPL W P-5'-P-CCNC: 29 U/L (ref 0–33)
ALT SERPL W P-5'-P-CCNC: 35 U/L (ref 0–33)
ALT SERPL W P-5'-P-CCNC: 48 U/L (ref 0–33)
ALT SERPL W P-5'-P-CCNC: 50 U/L (ref 0–33)
ALT SERPL W P-5'-P-CCNC: 53 U/L (ref 0–33)
ALT SERPL W P-5'-P-CCNC: 53 U/L (ref 0–33)
ALT SERPL W P-5'-P-CCNC: 61 U/L (ref 1–33)
ALT SERPL W P-5'-P-CCNC: 66 U/L (ref 0–33)
ALT SERPL W P-5'-P-CCNC: 68 U/L (ref 1–33)
ALT SERPL W P-5'-P-CCNC: 72 U/L (ref 0–33)
ANION GAP SERPL CALCULATED.3IONS-SCNC: 10 MMOL/L (ref 5–15)
ANION GAP SERPL CALCULATED.3IONS-SCNC: 10 MMOL/L (ref 5–15)
ANION GAP SERPL CALCULATED.3IONS-SCNC: 10.1 MMOL/L (ref 5–15)
ANION GAP SERPL CALCULATED.3IONS-SCNC: 10.3 MMOL/L (ref 5–15)
ANION GAP SERPL CALCULATED.3IONS-SCNC: 10.3 MMOL/L (ref 5–15)
ANION GAP SERPL CALCULATED.3IONS-SCNC: 10.4 MMOL/L (ref 5–15)
ANION GAP SERPL CALCULATED.3IONS-SCNC: 11 MMOL/L (ref 5–15)
ANION GAP SERPL CALCULATED.3IONS-SCNC: 11 MMOL/L (ref 5–15)
ANION GAP SERPL CALCULATED.3IONS-SCNC: 11.2 MMOL/L (ref 5–15)
ANION GAP SERPL CALCULATED.3IONS-SCNC: 11.3 MMOL/L (ref 5–15)
ANION GAP SERPL CALCULATED.3IONS-SCNC: 11.4 MMOL/L (ref 5–15)
ANION GAP SERPL CALCULATED.3IONS-SCNC: 11.5 MMOL/L (ref 5–15)
ANION GAP SERPL CALCULATED.3IONS-SCNC: 11.6 MMOL/L (ref 5–15)
ANION GAP SERPL CALCULATED.3IONS-SCNC: 12.4 MMOL/L (ref 5–15)
ANION GAP SERPL CALCULATED.3IONS-SCNC: 13.1 MMOL/L (ref 5–15)
ANION GAP SERPL CALCULATED.3IONS-SCNC: 13.4 MMOL/L (ref 5–15)
ANION GAP SERPL CALCULATED.3IONS-SCNC: 14.1 MMOL/L (ref 5–15)
ANION GAP SERPL CALCULATED.3IONS-SCNC: 14.1 MMOL/L (ref 5–15)
ANION GAP SERPL CALCULATED.3IONS-SCNC: 16.6 MMOL/L (ref 5–15)
ANION GAP SERPL CALCULATED.3IONS-SCNC: 4.8 MMOL/L (ref 5–15)
ANION GAP SERPL CALCULATED.3IONS-SCNC: 5.4 MMOL/L (ref 5–15)
ANION GAP SERPL CALCULATED.3IONS-SCNC: 7.7 MMOL/L (ref 5–15)
ANION GAP SERPL CALCULATED.3IONS-SCNC: 7.9 MMOL/L (ref 5–15)
ANION GAP SERPL CALCULATED.3IONS-SCNC: 8 MMOL/L (ref 5–15)
ANION GAP SERPL CALCULATED.3IONS-SCNC: 8.1 MMOL/L (ref 5–15)
ANION GAP SERPL CALCULATED.3IONS-SCNC: 8.5 MMOL/L (ref 5–15)
ANION GAP SERPL CALCULATED.3IONS-SCNC: 8.8 MMOL/L (ref 5–15)
ANION GAP SERPL CALCULATED.3IONS-SCNC: 8.9 MMOL/L (ref 5–15)
ANION GAP SERPL CALCULATED.3IONS-SCNC: 9 MMOL/L (ref 5–15)
ANION GAP SERPL CALCULATED.3IONS-SCNC: 9 MMOL/L (ref 5–15)
ANION GAP SERPL CALCULATED.3IONS-SCNC: 9.2 MMOL/L (ref 5–15)
ANION GAP SERPL CALCULATED.3IONS-SCNC: 9.7 MMOL/L (ref 5–15)
ANION GAP SERPL CALCULATED.3IONS-SCNC: 9.9 MMOL/L (ref 5–15)
ANION GAP SERPL CALCULATED.3IONS-SCNC: 9.9 MMOL/L (ref 5–15)
AORTIC DIMENSIONLESS INDEX: 0.8 (DI)
APPEARANCE CSF: ABNORMAL
APTT PPP: 23.3 SECONDS (ref 22.7–35.4)
APTT PPP: 24.3 SECONDS (ref 24–31)
AST SERPL-CCNC: 15 U/L (ref 0–32)
AST SERPL-CCNC: 16 U/L (ref 0–32)
AST SERPL-CCNC: 16 U/L (ref 0–32)
AST SERPL-CCNC: 17 U/L (ref 0–32)
AST SERPL-CCNC: 18 U/L (ref 0–32)
AST SERPL-CCNC: 19 U/L (ref 0–32)
AST SERPL-CCNC: 21 U/L (ref 0–32)
AST SERPL-CCNC: 22 U/L (ref 0–32)
AST SERPL-CCNC: 22 U/L (ref 1–32)
AST SERPL-CCNC: 23 U/L (ref 1–32)
AST SERPL-CCNC: 28 U/L (ref 0–32)
AST SERPL-CCNC: 28 U/L (ref 1–32)
BACTERIA SPEC AEROBE CULT: NO GROWTH
BACTERIA SPEC AEROBE CULT: NORMAL
BACTERIA SPEC ANAEROBE CULT: NORMAL
BACTERIA SPEC ANAEROBE CULT: NORMAL
BACTERIA UR QL AUTO: ABNORMAL /HPF
BACTERIA UR QL AUTO: ABNORMAL /HPF
BASOPHILS # BLD AUTO: 0 10*3/MM3 (ref 0–0.2)
BASOPHILS # BLD AUTO: 0.01 10*3/MM3 (ref 0–0.2)
BASOPHILS # BLD AUTO: 0.02 10*3/MM3 (ref 0–0.2)
BASOPHILS # BLD AUTO: 0.03 10*3/MM3 (ref 0–0.2)
BASOPHILS # BLD AUTO: 0.04 10*3/MM3 (ref 0–0.2)
BASOPHILS # BLD AUTO: 0.05 10*3/MM3 (ref 0–0.2)
BASOPHILS NFR BLD AUTO: 0 % (ref 0–1.5)
BASOPHILS NFR BLD AUTO: 0 % (ref 0–1.5)
BASOPHILS NFR BLD AUTO: 0.1 % (ref 0–1.5)
BASOPHILS NFR BLD AUTO: 0.2 % (ref 0–1.5)
BASOPHILS NFR BLD AUTO: 0.3 % (ref 0–1.5)
BASOPHILS NFR BLD AUTO: 0.4 % (ref 0–1.5)
BASOPHILS NFR BLD AUTO: 0.6 % (ref 0–1.5)
BH CV ECHO MEAS - AO MAX PG: 6 MMHG
BH CV ECHO MEAS - AO MEAN PG: 3 MMHG
BH CV ECHO MEAS - AO V2 MAX: 118 CM/SEC
BH CV ECHO MEAS - AO V2 VTI: 24 CM
BH CV ECHO MEAS - BSA(HAYCOCK): 1.5 M^2
BH CV ECHO MEAS - BSA: 1.5 M^2
BH CV ECHO MEAS - BZI_BMI: 17.9 KILOGRAMS/M^2
BH CV ECHO MEAS - BZI_METRIC_HEIGHT: 162.6 CM
BH CV ECHO MEAS - BZI_METRIC_WEIGHT: 47.2 KG
BH CV ECHO MEAS - EF(MOD-BP): 68 %
BH CV ECHO MEAS - IVSD: 0.9 CM
BH CV ECHO MEAS - LAT PEAK E' VEL: 11 CM/SEC
BH CV ECHO MEAS - LV MAX PG: 4 MMHG
BH CV ECHO MEAS - LV MEAN PG: 2 MMHG
BH CV ECHO MEAS - LV V1 MAX: 104 CM/SEC
BH CV ECHO MEAS - LV V1 VTI: 20 CM
BH CV ECHO MEAS - LVIDD: 3.5 CM
BH CV ECHO MEAS - LVIDS: 2.2 CM
BH CV ECHO MEAS - LVOT DIAM: 2 CM
BH CV ECHO MEAS - LVPWD: 1 CM
BH CV ECHO MEAS - MED PEAK E' VEL: 8 CM/SEC
BH CV ECHO MEAS - MV A DUR: 129 SEC
BH CV ECHO MEAS - MV A MAX VEL: 61 CM/SEC
BH CV ECHO MEAS - MV DEC TIME: 224 MSEC
BH CV ECHO MEAS - MV E MAX VEL: 65 CM/SEC
BH CV ECHO MEAS - MV E/A: 1.1
BH CV ECHO MEAS - MV MAX PG: 2 MMHG
BH CV ECHO MEAS - MV MEAN PG: 1 MMHG
BH CV ECHO MEAS - MV P1/2T: 64 MSEC
BH CV ECHO MEAS - MV V2 VTI: 17 CM
BH CV ECHO MEAS - RAP SYSTOLE: 3 MMHG
BH CV ECHO MEAS - TAPSE (>1.6): 2.5 CM2
BH CV ECHO MEASUREMENTS AVERAGE E/E' RATIO: 6.84
BH CV VAS BP RIGHT ARM: NORMAL MMHG
BH CV XLRA - RV BASE: 2.9 CM
BH CV XLRA - RV LENGTH: 5.4 CM
BH CV XLRA - RV MID: 1.8 CM
BH CV XLRA - TDI S': 13 CM/SEC
BILIRUB CONJ SERPL-MCNC: <0.2 MG/DL (ref 0–0.3)
BILIRUB INDIRECT SERPL-MCNC: ABNORMAL MG/DL
BILIRUB SERPL-MCNC: 0.4 MG/DL (ref 0.2–1.2)
BILIRUB SERPL-MCNC: 0.5 MG/DL (ref 0.2–1.2)
BILIRUB SERPL-MCNC: 0.5 MG/DL (ref 0.2–1.2)
BILIRUB SERPL-MCNC: 0.6 MG/DL (ref 0.2–1.2)
BILIRUB SERPL-MCNC: 0.6 MG/DL (ref 0–1.2)
BILIRUB SERPL-MCNC: 0.6 MG/DL (ref 0–1.2)
BILIRUB SERPL-MCNC: 0.7 MG/DL (ref 0.2–1.2)
BILIRUB SERPL-MCNC: 0.8 MG/DL (ref 0.2–1.2)
BILIRUB SERPL-MCNC: 0.9 MG/DL (ref 0.2–1.2)
BILIRUB UR QL STRIP: NEGATIVE
BILIRUB UR QL STRIP: NEGATIVE
BLD GP AB SCN SERPL QL: NEGATIVE
BUN BLD-MCNC: 20 MG/DL (ref 6–20)
BUN BLD-MCNC: 21 MG/DL (ref 6–20)
BUN BLD-MCNC: ABNORMAL MG/DL
BUN SERPL-MCNC: 17 MG/DL (ref 6–20)
BUN SERPL-MCNC: 17 MG/DL (ref 6–20)
BUN SERPL-MCNC: 18 MG/DL (ref 6–20)
BUN SERPL-MCNC: 19 MG/DL (ref 6–20)
BUN SERPL-MCNC: 20 MG/DL (ref 6–20)
BUN SERPL-MCNC: 20 MG/DL (ref 6–20)
BUN SERPL-MCNC: 22 MG/DL (ref 6–20)
BUN SERPL-MCNC: 23 MG/DL (ref 6–20)
BUN SERPL-MCNC: 23 MG/DL (ref 6–20)
BUN SERPL-MCNC: 24 MG/DL (ref 6–20)
BUN SERPL-MCNC: 25 MG/DL (ref 6–20)
BUN SERPL-MCNC: 26 MG/DL (ref 6–20)
BUN SERPL-MCNC: 26 MG/DL (ref 6–20)
BUN SERPL-MCNC: 27 MG/DL (ref 6–20)
BUN SERPL-MCNC: 28 MG/DL (ref 6–20)
BUN SERPL-MCNC: 29 MG/DL (ref 6–20)
BUN SERPL-MCNC: 29 MG/DL (ref 6–20)
BUN SERPL-MCNC: 30 MG/DL (ref 6–20)
BUN SERPL-MCNC: 30 MG/DL (ref 6–20)
BUN SERPL-MCNC: 31 MG/DL (ref 6–20)
BUN/CREAT SERPL: 27.8 (ref 7–25)
BUN/CREAT SERPL: 29 (ref 7.3–30)
BUN/CREAT SERPL: 29.8 (ref 7–25)
BUN/CREAT SERPL: 31.5 (ref 7–25)
BUN/CREAT SERPL: 32.3 (ref 7–25)
BUN/CREAT SERPL: 35.2 (ref 7–25)
BUN/CREAT SERPL: 35.3 (ref 7–25)
BUN/CREAT SERPL: 38.8 (ref 7–25)
BUN/CREAT SERPL: 40.7 (ref 7–25)
BUN/CREAT SERPL: 41.3 (ref 7–25)
BUN/CREAT SERPL: 44.9 (ref 7.3–30)
BUN/CREAT SERPL: 47.3 (ref 7–25)
BUN/CREAT SERPL: 48.4 (ref 7–25)
BUN/CREAT SERPL: 50.8 (ref 7–25)
BUN/CREAT SERPL: 50.8 (ref 7–25)
BUN/CREAT SERPL: 50.9 (ref 7–25)
BUN/CREAT SERPL: 51.7 (ref 7–25)
BUN/CREAT SERPL: 52 (ref 7.3–30)
BUN/CREAT SERPL: 52.3 (ref 7–25)
BUN/CREAT SERPL: 53.5 (ref 7–25)
BUN/CREAT SERPL: 53.7 (ref 7.3–30)
BUN/CREAT SERPL: 56 (ref 7–25)
BUN/CREAT SERPL: 58.5 (ref 7–25)
BUN/CREAT SERPL: 58.5 (ref 7–25)
BUN/CREAT SERPL: 59.2 (ref 7.3–30)
BUN/CREAT SERPL: 59.6 (ref 7–25)
BUN/CREAT SERPL: 60 (ref 7.3–30)
BUN/CREAT SERPL: 61 (ref 7.3–30)
BUN/CREAT SERPL: 64.9 (ref 7–25)
BUN/CREAT SERPL: 68.6 (ref 7.3–30)
BUN/CREAT SERPL: 69.2 (ref 7.3–30)
BUN/CREAT SERPL: 72.5 (ref 7.3–30)
BUN/CREAT SERPL: 93.3 (ref 7.3–30)
BUN/CREAT SERPL: ABNORMAL
C DIFF TOX GENS STL QL NAA+PROBE: NEGATIVE
CALCIUM SPEC-SCNC: 7.4 MG/DL (ref 8.6–10.5)
CALCIUM SPEC-SCNC: 8.6 MG/DL (ref 8.6–10.5)
CALCIUM SPEC-SCNC: 8.8 MG/DL (ref 8.5–10.2)
CALCIUM SPEC-SCNC: 8.8 MG/DL (ref 8.5–10.2)
CALCIUM SPEC-SCNC: 8.8 MG/DL (ref 8.6–10.5)
CALCIUM SPEC-SCNC: 8.8 MG/DL (ref 8.6–10.5)
CALCIUM SPEC-SCNC: 8.9 MG/DL (ref 8.5–10.2)
CALCIUM SPEC-SCNC: 8.9 MG/DL (ref 8.6–10.5)
CALCIUM SPEC-SCNC: 9 MG/DL (ref 8.5–10.2)
CALCIUM SPEC-SCNC: 9 MG/DL (ref 8.5–10.2)
CALCIUM SPEC-SCNC: 9 MG/DL (ref 8.6–10.5)
CALCIUM SPEC-SCNC: 9.1 MG/DL (ref 8.5–10.2)
CALCIUM SPEC-SCNC: 9.1 MG/DL (ref 8.5–10.2)
CALCIUM SPEC-SCNC: 9.1 MG/DL (ref 8.6–10.5)
CALCIUM SPEC-SCNC: 9.2 MG/DL (ref 8.5–10.2)
CALCIUM SPEC-SCNC: 9.2 MG/DL (ref 8.5–10.2)
CALCIUM SPEC-SCNC: 9.2 MG/DL (ref 8.6–10.5)
CALCIUM SPEC-SCNC: 9.3 MG/DL (ref 8.6–10.5)
CALCIUM SPEC-SCNC: 9.3 MG/DL (ref 8.6–10.5)
CALCIUM SPEC-SCNC: 9.4 MG/DL (ref 8.5–10.2)
CALCIUM SPEC-SCNC: 9.4 MG/DL (ref 8.6–10.5)
CALCIUM SPEC-SCNC: 9.5 MG/DL (ref 8.5–10.2)
CALCIUM SPEC-SCNC: 9.5 MG/DL (ref 8.6–10.5)
CALCIUM SPEC-SCNC: 9.6 MG/DL (ref 8.6–10.5)
CEA SERPL-MCNC: 1.53 NG/ML
CHLORIDE SERPL-SCNC: 100 MMOL/L (ref 98–107)
CHLORIDE SERPL-SCNC: 100 MMOL/L (ref 98–107)
CHLORIDE SERPL-SCNC: 101 MMOL/L (ref 98–107)
CHLORIDE SERPL-SCNC: 101 MMOL/L (ref 98–107)
CHLORIDE SERPL-SCNC: 102 MMOL/L (ref 98–107)
CHLORIDE SERPL-SCNC: 103 MMOL/L (ref 98–107)
CHLORIDE SERPL-SCNC: 109 MMOL/L (ref 98–107)
CHLORIDE SERPL-SCNC: 95 MMOL/L (ref 98–107)
CHLORIDE SERPL-SCNC: 95 MMOL/L (ref 98–107)
CHLORIDE SERPL-SCNC: 96 MMOL/L (ref 98–107)
CHLORIDE SERPL-SCNC: 97 MMOL/L (ref 98–107)
CHLORIDE SERPL-SCNC: 98 MMOL/L (ref 98–107)
CHLORIDE SERPL-SCNC: 99 MMOL/L (ref 98–107)
CLARITY UR: ABNORMAL
CLARITY UR: CLEAR
CO2 SERPL-SCNC: 21.6 MMOL/L (ref 22–29)
CO2 SERPL-SCNC: 23.9 MMOL/L (ref 22–29)
CO2 SERPL-SCNC: 24.2 MMOL/L (ref 22–29)
CO2 SERPL-SCNC: 24.4 MMOL/L (ref 22–29)
CO2 SERPL-SCNC: 25.3 MMOL/L (ref 22–29)
CO2 SERPL-SCNC: 25.3 MMOL/L (ref 22–29)
CO2 SERPL-SCNC: 26.6 MMOL/L (ref 22–29)
CO2 SERPL-SCNC: 26.7 MMOL/L (ref 22–29)
CO2 SERPL-SCNC: 26.8 MMOL/L (ref 22–29)
CO2 SERPL-SCNC: 26.9 MMOL/L (ref 22–29)
CO2 SERPL-SCNC: 27.6 MMOL/L (ref 22–29)
CO2 SERPL-SCNC: 27.6 MMOL/L (ref 22–29)
CO2 SERPL-SCNC: 27.9 MMOL/L (ref 22–29)
CO2 SERPL-SCNC: 28 MMOL/L (ref 22–29)
CO2 SERPL-SCNC: 28.7 MMOL/L (ref 22–29)
CO2 SERPL-SCNC: 28.8 MMOL/L (ref 22–29)
CO2 SERPL-SCNC: 29 MMOL/L (ref 22–29)
CO2 SERPL-SCNC: 29 MMOL/L (ref 22–29)
CO2 SERPL-SCNC: 29.1 MMOL/L (ref 22–29)
CO2 SERPL-SCNC: 29.4 MMOL/L (ref 22–29)
CO2 SERPL-SCNC: 29.9 MMOL/L (ref 22–29)
CO2 SERPL-SCNC: 30 MMOL/L (ref 22–29)
CO2 SERPL-SCNC: 30.5 MMOL/L (ref 22–29)
CO2 SERPL-SCNC: 30.5 MMOL/L (ref 22–29)
CO2 SERPL-SCNC: 30.6 MMOL/L (ref 22–29)
CO2 SERPL-SCNC: 30.7 MMOL/L (ref 22–29)
CO2 SERPL-SCNC: 30.9 MMOL/L (ref 22–29)
CO2 SERPL-SCNC: 31 MMOL/L (ref 22–29)
CO2 SERPL-SCNC: 31.1 MMOL/L (ref 22–29)
CO2 SERPL-SCNC: 31.1 MMOL/L (ref 22–29)
CO2 SERPL-SCNC: 31.2 MMOL/L (ref 22–29)
CO2 SERPL-SCNC: 33.1 MMOL/L (ref 22–29)
COLOR CSF: COLORLESS
COLOR UR: YELLOW
COLOR UR: YELLOW
CREAT BLD-MCNC: 0.62 MG/DL (ref 0.57–1)
CREAT BLD-MCNC: 0.65 MG/DL (ref 0.57–1)
CREAT SERPL-MCNC: 0.3 MG/DL (ref 0.6–1.1)
CREAT SERPL-MCNC: 0.35 MG/DL (ref 0.6–1.1)
CREAT SERPL-MCNC: 0.37 MG/DL (ref 0.57–1)
CREAT SERPL-MCNC: 0.39 MG/DL (ref 0.6–1.1)
CREAT SERPL-MCNC: 0.4 MG/DL (ref 0.6–1.1)
CREAT SERPL-MCNC: 0.4 MG/DL (ref 0.6–1.1)
CREAT SERPL-MCNC: 0.41 MG/DL (ref 0.6–1.1)
CREAT SERPL-MCNC: 0.41 MG/DL (ref 0.6–1.1)
CREAT SERPL-MCNC: 0.43 MG/DL (ref 0.57–1)
CREAT SERPL-MCNC: 0.44 MG/DL (ref 0.57–1)
CREAT SERPL-MCNC: 0.46 MG/DL (ref 0.57–1)
CREAT SERPL-MCNC: 0.47 MG/DL (ref 0.57–1)
CREAT SERPL-MCNC: 0.49 MG/DL (ref 0.57–1)
CREAT SERPL-MCNC: 0.49 MG/DL (ref 0.6–1.1)
CREAT SERPL-MCNC: 0.49 MG/DL (ref 0.6–1.1)
CREAT SERPL-MCNC: 0.5 MG/DL (ref 0.57–1)
CREAT SERPL-MCNC: 0.5 MG/DL (ref 0.6–1.1)
CREAT SERPL-MCNC: 0.51 MG/DL (ref 0.57–1)
CREAT SERPL-MCNC: 0.53 MG/DL (ref 0.57–1)
CREAT SERPL-MCNC: 0.53 MG/DL (ref 0.57–1)
CREAT SERPL-MCNC: 0.54 MG/DL (ref 0.57–1)
CREAT SERPL-MCNC: 0.55 MG/DL (ref 0.57–1)
CREAT SERPL-MCNC: 0.55 MG/DL (ref 0.57–1)
CREAT SERPL-MCNC: 0.57 MG/DL (ref 0.57–1)
CREAT SERPL-MCNC: 0.59 MG/DL (ref 0.57–1)
CREAT SERPL-MCNC: 0.6 MG/DL (ref 0.57–1)
CREAT SERPL-MCNC: 0.61 MG/DL (ref 0.57–1)
CREAT SERPL-MCNC: 0.62 MG/DL (ref 0.57–1)
CREAT SERPL-MCNC: 0.69 MG/DL (ref 0.6–1.1)
CREAT SERPL-MCNC: 0.72 MG/DL (ref 0.57–1)
CYTO UR: NORMAL
CYTOLOGIST CVX/VAG CYTO: NORMAL
CYTOLOGY CVX/VAG DOC CYTO: NORMAL
CYTOLOGY CVX/VAG DOC THIN PREP: NORMAL
DEPRECATED RDW RBC AUTO: 36.3 FL (ref 37–54)
DEPRECATED RDW RBC AUTO: 37.2 FL (ref 37–54)
DEPRECATED RDW RBC AUTO: 37.2 FL (ref 37–54)
DEPRECATED RDW RBC AUTO: 37.5 FL (ref 37–54)
DEPRECATED RDW RBC AUTO: 38 FL (ref 37–54)
DEPRECATED RDW RBC AUTO: 38.3 FL (ref 37–54)
DEPRECATED RDW RBC AUTO: 38.5 FL (ref 37–54)
DEPRECATED RDW RBC AUTO: 38.5 FL (ref 37–54)
DEPRECATED RDW RBC AUTO: 38.6 FL (ref 37–54)
DEPRECATED RDW RBC AUTO: 38.7 FL (ref 37–54)
DEPRECATED RDW RBC AUTO: 38.9 FL (ref 37–54)
DEPRECATED RDW RBC AUTO: 38.9 FL (ref 37–54)
DEPRECATED RDW RBC AUTO: 39.1 FL (ref 37–54)
DEPRECATED RDW RBC AUTO: 39.4 FL (ref 37–54)
DEPRECATED RDW RBC AUTO: 39.7 FL (ref 37–54)
DEPRECATED RDW RBC AUTO: 40 FL (ref 37–54)
DEPRECATED RDW RBC AUTO: 40.3 FL (ref 37–54)
DEPRECATED RDW RBC AUTO: 40.4 FL (ref 37–54)
DEPRECATED RDW RBC AUTO: 40.8 FL (ref 37–54)
DEPRECATED RDW RBC AUTO: 41.1 FL (ref 37–54)
DEPRECATED RDW RBC AUTO: 42.1 FL (ref 37–54)
DEPRECATED RDW RBC AUTO: 44.8 FL (ref 37–54)
DEPRECATED RDW RBC AUTO: 46.2 FL (ref 37–54)
DEPRECATED RDW RBC AUTO: 46.5 FL (ref 37–54)
DEPRECATED RDW RBC AUTO: 47.1 FL (ref 37–54)
DEPRECATED RDW RBC AUTO: 47.3 FL (ref 37–54)
DEPRECATED RDW RBC AUTO: 48.3 FL (ref 37–54)
DEPRECATED RDW RBC AUTO: 48.8 FL (ref 37–54)
DEPRECATED RDW RBC AUTO: 49.1 FL (ref 37–54)
DEPRECATED RDW RBC AUTO: 51.2 FL (ref 37–54)
DEPRECATED RDW RBC AUTO: 56 FL (ref 37–54)
DEPRECATED RDW RBC AUTO: 56.3 FL (ref 37–54)
DEPRECATED RDW RBC AUTO: 57.5 FL (ref 37–54)
DEPRECATED RDW RBC AUTO: 58.6 FL (ref 37–54)
DEPRECATED RDW RBC AUTO: 67.1 FL (ref 37–54)
DX ICD CODE: NORMAL
DX PRELIMINARY: NORMAL
EOSINOPHIL # BLD AUTO: 0 10*3/MM3 (ref 0–0.4)
EOSINOPHIL # BLD AUTO: 0.01 10*3/MM3 (ref 0–0.4)
EOSINOPHIL # BLD AUTO: 0.04 10*3/MM3 (ref 0–0.4)
EOSINOPHIL # BLD AUTO: 0.05 10*3/MM3 (ref 0–0.4)
EOSINOPHIL # BLD AUTO: 0.05 10*3/MM3 (ref 0–0.4)
EOSINOPHIL # BLD AUTO: 0.07 10*3/MM3 (ref 0–0.4)
EOSINOPHIL # BLD AUTO: 0.11 10*3/MM3 (ref 0–0.4)
EOSINOPHIL # BLD AUTO: 0.12 10*3/MM3 (ref 0–0.4)
EOSINOPHIL # BLD AUTO: 0.16 10*3/MM3 (ref 0–0.4)
EOSINOPHIL # BLD AUTO: 0.18 10*3/MM3 (ref 0–0.4)
EOSINOPHIL # BLD AUTO: 0.2 10*3/MM3 (ref 0–0.4)
EOSINOPHIL NFR BLD AUTO: 0 % (ref 0.3–6.2)
EOSINOPHIL NFR BLD AUTO: 0.1 % (ref 0.3–6.2)
EOSINOPHIL NFR BLD AUTO: 0.2 % (ref 0.3–6.2)
EOSINOPHIL NFR BLD AUTO: 0.4 % (ref 0.3–6.2)
EOSINOPHIL NFR BLD AUTO: 0.8 % (ref 0.3–6.2)
EOSINOPHIL NFR BLD AUTO: 0.9 % (ref 0.3–6.2)
EOSINOPHIL NFR BLD AUTO: 1 % (ref 0.3–6.2)
EOSINOPHIL NFR BLD AUTO: 1.4 % (ref 0.3–6.2)
EOSINOPHIL NFR BLD AUTO: 1.6 % (ref 0.3–6.2)
EOSINOPHIL NFR BLD AUTO: 2.1 % (ref 0.3–6.2)
ERYTHROCYTE [DISTWIDTH] IN BLOOD BY AUTOMATED COUNT: 11.6 % (ref 12.3–15.4)
ERYTHROCYTE [DISTWIDTH] IN BLOOD BY AUTOMATED COUNT: 11.6 % (ref 12.3–15.4)
ERYTHROCYTE [DISTWIDTH] IN BLOOD BY AUTOMATED COUNT: 11.8 % (ref 12.3–15.4)
ERYTHROCYTE [DISTWIDTH] IN BLOOD BY AUTOMATED COUNT: 11.8 % (ref 12.3–15.4)
ERYTHROCYTE [DISTWIDTH] IN BLOOD BY AUTOMATED COUNT: 11.9 % (ref 12.3–15.4)
ERYTHROCYTE [DISTWIDTH] IN BLOOD BY AUTOMATED COUNT: 11.9 % (ref 12.3–15.4)
ERYTHROCYTE [DISTWIDTH] IN BLOOD BY AUTOMATED COUNT: 12.1 % (ref 12.3–15.4)
ERYTHROCYTE [DISTWIDTH] IN BLOOD BY AUTOMATED COUNT: 12.2 % (ref 12.3–15.4)
ERYTHROCYTE [DISTWIDTH] IN BLOOD BY AUTOMATED COUNT: 12.3 % (ref 12.3–15.4)
ERYTHROCYTE [DISTWIDTH] IN BLOOD BY AUTOMATED COUNT: 12.4 % (ref 12.3–15.4)
ERYTHROCYTE [DISTWIDTH] IN BLOOD BY AUTOMATED COUNT: 12.5 % (ref 12.3–15.4)
ERYTHROCYTE [DISTWIDTH] IN BLOOD BY AUTOMATED COUNT: 12.8 % (ref 12.3–15.4)
ERYTHROCYTE [DISTWIDTH] IN BLOOD BY AUTOMATED COUNT: 12.8 % (ref 12.3–15.4)
ERYTHROCYTE [DISTWIDTH] IN BLOOD BY AUTOMATED COUNT: 12.9 % (ref 12.3–15.4)
ERYTHROCYTE [DISTWIDTH] IN BLOOD BY AUTOMATED COUNT: 13 % (ref 12.3–15.4)
ERYTHROCYTE [DISTWIDTH] IN BLOOD BY AUTOMATED COUNT: 13.2 % (ref 12.3–15.4)
ERYTHROCYTE [DISTWIDTH] IN BLOOD BY AUTOMATED COUNT: 13.6 % (ref 12.3–15.4)
ERYTHROCYTE [DISTWIDTH] IN BLOOD BY AUTOMATED COUNT: 13.7 % (ref 12.3–15.4)
ERYTHROCYTE [DISTWIDTH] IN BLOOD BY AUTOMATED COUNT: 13.7 % (ref 12.3–15.4)
ERYTHROCYTE [DISTWIDTH] IN BLOOD BY AUTOMATED COUNT: 14 % (ref 12.3–15.4)
ERYTHROCYTE [DISTWIDTH] IN BLOOD BY AUTOMATED COUNT: 14.1 % (ref 12.3–15.4)
ERYTHROCYTE [DISTWIDTH] IN BLOOD BY AUTOMATED COUNT: 14.3 % (ref 12.3–15.4)
ERYTHROCYTE [DISTWIDTH] IN BLOOD BY AUTOMATED COUNT: 14.7 % (ref 12.3–15.4)
ERYTHROCYTE [DISTWIDTH] IN BLOOD BY AUTOMATED COUNT: 14.7 % (ref 12.3–15.4)
ERYTHROCYTE [DISTWIDTH] IN BLOOD BY AUTOMATED COUNT: 15 % (ref 12.3–15.4)
ERYTHROCYTE [DISTWIDTH] IN BLOOD BY AUTOMATED COUNT: 15.1 % (ref 12.3–15.4)
ERYTHROCYTE [DISTWIDTH] IN BLOOD BY AUTOMATED COUNT: 15.1 % (ref 12.3–15.4)
ERYTHROCYTE [DISTWIDTH] IN BLOOD BY AUTOMATED COUNT: 15.4 % (ref 12.3–15.4)
ERYTHROCYTE [DISTWIDTH] IN BLOOD BY AUTOMATED COUNT: 17.9 % (ref 12.3–15.4)
GFR SERPL CREATININE-BSD FRML MDRD: 100 ML/MIN/1.73
GFR SERPL CREATININE-BSD FRML MDRD: 100 ML/MIN/1.73
GFR SERPL CREATININE-BSD FRML MDRD: 102 ML/MIN/1.73
GFR SERPL CREATININE-BSD FRML MDRD: 104 ML/MIN/1.73
GFR SERPL CREATININE-BSD FRML MDRD: 106 ML/MIN/1.73
GFR SERPL CREATININE-BSD FRML MDRD: 110 ML/MIN/1.73
GFR SERPL CREATININE-BSD FRML MDRD: 115 ML/MIN/1.73
GFR SERPL CREATININE-BSD FRML MDRD: 115 ML/MIN/1.73
GFR SERPL CREATININE-BSD FRML MDRD: 117 ML/MIN/1.73
GFR SERPL CREATININE-BSD FRML MDRD: 118 ML/MIN/1.73
GFR SERPL CREATININE-BSD FRML MDRD: 118 ML/MIN/1.73
GFR SERPL CREATININE-BSD FRML MDRD: 120 ML/MIN/1.73
GFR SERPL CREATININE-BSD FRML MDRD: 120 ML/MIN/1.73
GFR SERPL CREATININE-BSD FRML MDRD: 125 ML/MIN/1.73
GFR SERPL CREATININE-BSD FRML MDRD: 128 ML/MIN/1.73
GFR SERPL CREATININE-BSD FRML MDRD: 129 ML/MIN/1.73
GFR SERPL CREATININE-BSD FRML MDRD: 131 ML/MIN/1.73
GFR SERPL CREATININE-BSD FRML MDRD: 131 ML/MIN/1.73
GFR SERPL CREATININE-BSD FRML MDRD: 132 ML/MIN/1.73
GFR SERPL CREATININE-BSD FRML MDRD: 138 ML/MIN/1.73
GFR SERPL CREATININE-BSD FRML MDRD: 142 ML/MIN/1.73
GFR SERPL CREATININE-BSD FRML MDRD: 149 ML/MIN/1.73
GFR SERPL CREATININE-BSD FRML MDRD: 84 ML/MIN/1.73
GFR SERPL CREATININE-BSD FRML MDRD: 88 ML/MIN/1.73
GFR SERPL CREATININE-BSD FRML MDRD: 95 ML/MIN/1.73
GFR SERPL CREATININE-BSD FRML MDRD: >150 ML/MIN/1.73
GLOBULIN UR ELPH-MCNC: 1.8 GM/DL (ref 1.8–3.5)
GLOBULIN UR ELPH-MCNC: 1.9 GM/DL (ref 1.8–3.5)
GLOBULIN UR ELPH-MCNC: 2 GM/DL (ref 1.8–3.5)
GLOBULIN UR ELPH-MCNC: 2.1 GM/DL (ref 1.8–3.5)
GLOBULIN UR ELPH-MCNC: 2.2 GM/DL (ref 1.8–3.5)
GLOBULIN UR ELPH-MCNC: 2.2 GM/DL (ref 1.8–3.5)
GLOBULIN UR ELPH-MCNC: 2.3 GM/DL (ref 1.8–3.5)
GLOBULIN UR ELPH-MCNC: 2.6 GM/DL
GLOBULIN UR ELPH-MCNC: 2.8 GM/DL
GLUCOSE BLD-MCNC: 113 MG/DL (ref 65–99)
GLUCOSE BLD-MCNC: 135 MG/DL (ref 65–99)
GLUCOSE BLDC GLUCOMTR-MCNC: 100 MG/DL (ref 70–130)
GLUCOSE BLDC GLUCOMTR-MCNC: 103 MG/DL (ref 70–130)
GLUCOSE BLDC GLUCOMTR-MCNC: 103 MG/DL (ref 70–130)
GLUCOSE BLDC GLUCOMTR-MCNC: 104 MG/DL (ref 70–130)
GLUCOSE BLDC GLUCOMTR-MCNC: 104 MG/DL (ref 70–130)
GLUCOSE BLDC GLUCOMTR-MCNC: 105 MG/DL (ref 70–130)
GLUCOSE BLDC GLUCOMTR-MCNC: 107 MG/DL (ref 70–130)
GLUCOSE BLDC GLUCOMTR-MCNC: 108 MG/DL (ref 70–130)
GLUCOSE BLDC GLUCOMTR-MCNC: 109 MG/DL (ref 70–130)
GLUCOSE BLDC GLUCOMTR-MCNC: 109 MG/DL (ref 70–130)
GLUCOSE BLDC GLUCOMTR-MCNC: 110 MG/DL (ref 70–130)
GLUCOSE BLDC GLUCOMTR-MCNC: 111 MG/DL (ref 70–130)
GLUCOSE BLDC GLUCOMTR-MCNC: 113 MG/DL (ref 70–130)
GLUCOSE BLDC GLUCOMTR-MCNC: 115 MG/DL (ref 70–130)
GLUCOSE BLDC GLUCOMTR-MCNC: 116 MG/DL (ref 70–130)
GLUCOSE BLDC GLUCOMTR-MCNC: 117 MG/DL (ref 70–130)
GLUCOSE BLDC GLUCOMTR-MCNC: 118 MG/DL (ref 70–130)
GLUCOSE BLDC GLUCOMTR-MCNC: 119 MG/DL (ref 70–130)
GLUCOSE BLDC GLUCOMTR-MCNC: 120 MG/DL (ref 70–130)
GLUCOSE BLDC GLUCOMTR-MCNC: 120 MG/DL (ref 70–130)
GLUCOSE BLDC GLUCOMTR-MCNC: 122 MG/DL (ref 70–130)
GLUCOSE BLDC GLUCOMTR-MCNC: 123 MG/DL (ref 70–130)
GLUCOSE BLDC GLUCOMTR-MCNC: 124 MG/DL (ref 70–130)
GLUCOSE BLDC GLUCOMTR-MCNC: 125 MG/DL (ref 70–130)
GLUCOSE BLDC GLUCOMTR-MCNC: 126 MG/DL (ref 70–130)
GLUCOSE BLDC GLUCOMTR-MCNC: 127 MG/DL (ref 70–130)
GLUCOSE BLDC GLUCOMTR-MCNC: 128 MG/DL (ref 70–130)
GLUCOSE BLDC GLUCOMTR-MCNC: 129 MG/DL (ref 70–130)
GLUCOSE BLDC GLUCOMTR-MCNC: 129 MG/DL (ref 70–130)
GLUCOSE BLDC GLUCOMTR-MCNC: 131 MG/DL (ref 70–130)
GLUCOSE BLDC GLUCOMTR-MCNC: 131 MG/DL (ref 70–130)
GLUCOSE BLDC GLUCOMTR-MCNC: 132 MG/DL (ref 70–130)
GLUCOSE BLDC GLUCOMTR-MCNC: 133 MG/DL (ref 70–130)
GLUCOSE BLDC GLUCOMTR-MCNC: 134 MG/DL (ref 70–130)
GLUCOSE BLDC GLUCOMTR-MCNC: 135 MG/DL (ref 70–130)
GLUCOSE BLDC GLUCOMTR-MCNC: 136 MG/DL (ref 70–130)
GLUCOSE BLDC GLUCOMTR-MCNC: 139 MG/DL (ref 70–130)
GLUCOSE BLDC GLUCOMTR-MCNC: 140 MG/DL (ref 70–130)
GLUCOSE BLDC GLUCOMTR-MCNC: 141 MG/DL (ref 70–130)
GLUCOSE BLDC GLUCOMTR-MCNC: 141 MG/DL (ref 70–130)
GLUCOSE BLDC GLUCOMTR-MCNC: 142 MG/DL (ref 70–130)
GLUCOSE BLDC GLUCOMTR-MCNC: 144 MG/DL (ref 70–130)
GLUCOSE BLDC GLUCOMTR-MCNC: 144 MG/DL (ref 70–130)
GLUCOSE BLDC GLUCOMTR-MCNC: 145 MG/DL (ref 70–130)
GLUCOSE BLDC GLUCOMTR-MCNC: 148 MG/DL (ref 70–130)
GLUCOSE BLDC GLUCOMTR-MCNC: 152 MG/DL (ref 70–130)
GLUCOSE BLDC GLUCOMTR-MCNC: 153 MG/DL (ref 70–130)
GLUCOSE BLDC GLUCOMTR-MCNC: 156 MG/DL (ref 70–130)
GLUCOSE BLDC GLUCOMTR-MCNC: 157 MG/DL (ref 70–130)
GLUCOSE BLDC GLUCOMTR-MCNC: 159 MG/DL (ref 70–130)
GLUCOSE BLDC GLUCOMTR-MCNC: 159 MG/DL (ref 70–130)
GLUCOSE BLDC GLUCOMTR-MCNC: 160 MG/DL (ref 70–130)
GLUCOSE BLDC GLUCOMTR-MCNC: 163 MG/DL (ref 70–130)
GLUCOSE BLDC GLUCOMTR-MCNC: 165 MG/DL (ref 70–130)
GLUCOSE BLDC GLUCOMTR-MCNC: 179 MG/DL (ref 70–130)
GLUCOSE BLDC GLUCOMTR-MCNC: 180 MG/DL (ref 70–130)
GLUCOSE BLDC GLUCOMTR-MCNC: 187 MG/DL (ref 70–130)
GLUCOSE BLDC GLUCOMTR-MCNC: 191 MG/DL (ref 70–130)
GLUCOSE BLDC GLUCOMTR-MCNC: 50 MG/DL (ref 70–130)
GLUCOSE BLDC GLUCOMTR-MCNC: 55 MG/DL (ref 70–130)
GLUCOSE BLDC GLUCOMTR-MCNC: 91 MG/DL (ref 70–130)
GLUCOSE BLDC GLUCOMTR-MCNC: 98 MG/DL (ref 70–130)
GLUCOSE BLDC GLUCOMTR-MCNC: 99 MG/DL (ref 70–130)
GLUCOSE CSF-MCNC: 72 MG/DL (ref 40–70)
GLUCOSE SERPL-MCNC: 100 MG/DL (ref 65–99)
GLUCOSE SERPL-MCNC: 106 MG/DL (ref 65–99)
GLUCOSE SERPL-MCNC: 111 MG/DL (ref 74–124)
GLUCOSE SERPL-MCNC: 116 MG/DL (ref 74–124)
GLUCOSE SERPL-MCNC: 116 MG/DL (ref 74–124)
GLUCOSE SERPL-MCNC: 118 MG/DL (ref 65–99)
GLUCOSE SERPL-MCNC: 120 MG/DL (ref 65–99)
GLUCOSE SERPL-MCNC: 121 MG/DL (ref 74–124)
GLUCOSE SERPL-MCNC: 128 MG/DL (ref 65–99)
GLUCOSE SERPL-MCNC: 129 MG/DL (ref 74–124)
GLUCOSE SERPL-MCNC: 129 MG/DL (ref 74–124)
GLUCOSE SERPL-MCNC: 132 MG/DL (ref 65–99)
GLUCOSE SERPL-MCNC: 135 MG/DL (ref 65–99)
GLUCOSE SERPL-MCNC: 135 MG/DL (ref 74–124)
GLUCOSE SERPL-MCNC: 138 MG/DL (ref 65–99)
GLUCOSE SERPL-MCNC: 138 MG/DL (ref 65–99)
GLUCOSE SERPL-MCNC: 142 MG/DL (ref 65–99)
GLUCOSE SERPL-MCNC: 143 MG/DL (ref 65–99)
GLUCOSE SERPL-MCNC: 144 MG/DL (ref 65–99)
GLUCOSE SERPL-MCNC: 146 MG/DL (ref 74–124)
GLUCOSE SERPL-MCNC: 146 MG/DL (ref 74–124)
GLUCOSE SERPL-MCNC: 154 MG/DL (ref 65–99)
GLUCOSE SERPL-MCNC: 157 MG/DL (ref 74–124)
GLUCOSE SERPL-MCNC: 169 MG/DL (ref 65–99)
GLUCOSE SERPL-MCNC: 172 MG/DL (ref 65–99)
GLUCOSE SERPL-MCNC: 84 MG/DL (ref 65–99)
GLUCOSE SERPL-MCNC: 90 MG/DL (ref 65–99)
GLUCOSE SERPL-MCNC: 94 MG/DL (ref 74–124)
GLUCOSE UR STRIP-MCNC: NEGATIVE MG/DL
GLUCOSE UR STRIP-MCNC: NEGATIVE MG/DL
GRAM STN SPEC: NORMAL
HCT VFR BLD AUTO: 31.4 % (ref 34–46.6)
HCT VFR BLD AUTO: 34.8 % (ref 34–46.6)
HCT VFR BLD AUTO: 35.7 % (ref 34–46.6)
HCT VFR BLD AUTO: 36.1 % (ref 34–46.6)
HCT VFR BLD AUTO: 36.8 % (ref 34–46.6)
HCT VFR BLD AUTO: 36.9 % (ref 34–46.6)
HCT VFR BLD AUTO: 37.3 % (ref 34–46.6)
HCT VFR BLD AUTO: 37.3 % (ref 34–46.6)
HCT VFR BLD AUTO: 37.4 % (ref 34–46.6)
HCT VFR BLD AUTO: 38.4 % (ref 34–46.6)
HCT VFR BLD AUTO: 39.1 % (ref 34–46.6)
HCT VFR BLD AUTO: 39.5 % (ref 34–46.6)
HCT VFR BLD AUTO: 39.7 % (ref 34–46.6)
HCT VFR BLD AUTO: 40.2 % (ref 34–46.6)
HCT VFR BLD AUTO: 40.5 % (ref 34–46.6)
HCT VFR BLD AUTO: 40.8 % (ref 34–46.6)
HCT VFR BLD AUTO: 41 % (ref 34–46.6)
HCT VFR BLD AUTO: 41.4 % (ref 34–46.6)
HCT VFR BLD AUTO: 42.6 % (ref 34–46.6)
HCT VFR BLD AUTO: 42.7 % (ref 34–46.6)
HCT VFR BLD AUTO: 42.7 % (ref 34–46.6)
HCT VFR BLD AUTO: 42.8 % (ref 34–46.6)
HCT VFR BLD AUTO: 42.9 % (ref 34–46.6)
HCT VFR BLD AUTO: 43.1 % (ref 34–46.6)
HCT VFR BLD AUTO: 43.5 % (ref 34–46.6)
HCT VFR BLD AUTO: 43.5 % (ref 34–46.6)
HCT VFR BLD AUTO: 43.8 % (ref 34–46.6)
HCT VFR BLD AUTO: 44 % (ref 34–46.6)
HCT VFR BLD AUTO: 44.3 % (ref 34–46.6)
HCT VFR BLD AUTO: 44.5 % (ref 34–46.6)
HCT VFR BLD AUTO: 45 % (ref 34–46.6)
HCT VFR BLD AUTO: 45 % (ref 34–46.6)
HCT VFR BLD AUTO: 45.4 % (ref 34–46.6)
HCT VFR BLD AUTO: 45.9 % (ref 34–46.6)
HCT VFR BLD AUTO: 46.4 % (ref 34–46.6)
HGB BLD-MCNC: 11.2 G/DL (ref 12–15.9)
HGB BLD-MCNC: 12.2 G/DL (ref 12–15.9)
HGB BLD-MCNC: 12.2 G/DL (ref 12–15.9)
HGB BLD-MCNC: 12.3 G/DL (ref 12–15.9)
HGB BLD-MCNC: 13 G/DL (ref 12–15.9)
HGB BLD-MCNC: 13.1 G/DL (ref 12–15.9)
HGB BLD-MCNC: 13.2 G/DL (ref 12–15.9)
HGB BLD-MCNC: 13.2 G/DL (ref 12–15.9)
HGB BLD-MCNC: 13.3 G/DL (ref 12–15.9)
HGB BLD-MCNC: 13.5 G/DL (ref 12–15.9)
HGB BLD-MCNC: 13.9 G/DL (ref 12–15.9)
HGB BLD-MCNC: 13.9 G/DL (ref 12–15.9)
HGB BLD-MCNC: 14 G/DL (ref 12–15.9)
HGB BLD-MCNC: 14.1 G/DL (ref 12–15.9)
HGB BLD-MCNC: 14.2 G/DL (ref 12–15.9)
HGB BLD-MCNC: 14.3 G/DL (ref 12–15.9)
HGB BLD-MCNC: 14.3 G/DL (ref 12–15.9)
HGB BLD-MCNC: 14.5 G/DL (ref 12–15.9)
HGB BLD-MCNC: 14.6 G/DL (ref 12–15.9)
HGB BLD-MCNC: 14.7 G/DL (ref 12–15.9)
HGB BLD-MCNC: 14.8 G/DL (ref 12–15.9)
HGB BLD-MCNC: 14.9 G/DL (ref 12–15.9)
HGB BLD-MCNC: 15.1 G/DL (ref 12–15.9)
HGB BLD-MCNC: 15.1 G/DL (ref 12–15.9)
HGB BLD-MCNC: 15.2 G/DL (ref 12–15.9)
HGB BLD-MCNC: 15.2 G/DL (ref 12–15.9)
HGB BLD-MCNC: 15.5 G/DL (ref 12–15.9)
HGB BLD-MCNC: 15.6 G/DL (ref 12–15.9)
HGB BLD-MCNC: 15.7 G/DL (ref 12–15.9)
HGB BLD-MCNC: 15.7 G/DL (ref 12–15.9)
HGB BLD-MCNC: 15.8 G/DL (ref 12–15.9)
HGB BLD-MCNC: 16 G/DL (ref 12–15.9)
HGB BLD-MCNC: 16.1 G/DL (ref 12–15.9)
HGB UR QL STRIP.AUTO: ABNORMAL
HGB UR QL STRIP.AUTO: NEGATIVE
HIV 1 & 2 AB SER-IMP: NORMAL
HIV1+2 AB SER QL: NORMAL
HOLD SPECIMEN: NORMAL
HPV I/H RISK 1 DNA CVX QL PROBE+SIG AMP: NEGATIVE
HYALINE CASTS UR QL AUTO: ABNORMAL /LPF
HYALINE CASTS UR QL AUTO: ABNORMAL /LPF
IMM GRANULOCYTES # BLD AUTO: 0.02 10*3/MM3 (ref 0–0.05)
IMM GRANULOCYTES # BLD AUTO: 0.03 10*3/MM3 (ref 0–0.05)
IMM GRANULOCYTES # BLD AUTO: 0.04 10*3/MM3 (ref 0–0.05)
IMM GRANULOCYTES # BLD AUTO: 0.06 10*3/MM3 (ref 0–0.05)
IMM GRANULOCYTES # BLD AUTO: 0.07 10*3/MM3 (ref 0–0.05)
IMM GRANULOCYTES # BLD AUTO: 0.09 10*3/MM3 (ref 0–0.05)
IMM GRANULOCYTES # BLD AUTO: 0.1 10*3/MM3 (ref 0–0.05)
IMM GRANULOCYTES # BLD AUTO: 0.13 10*3/MM3 (ref 0–0.05)
IMM GRANULOCYTES # BLD AUTO: 0.14 10*3/MM3 (ref 0–0.05)
IMM GRANULOCYTES # BLD AUTO: 0.16 10*3/MM3 (ref 0–0.05)
IMM GRANULOCYTES # BLD AUTO: 0.21 10*3/MM3 (ref 0–0.05)
IMM GRANULOCYTES # BLD AUTO: 0.22 10*3/MM3 (ref 0–0.05)
IMM GRANULOCYTES # BLD AUTO: 0.24 10*3/MM3 (ref 0–0.05)
IMM GRANULOCYTES # BLD AUTO: 0.25 10*3/MM3 (ref 0–0.05)
IMM GRANULOCYTES # BLD AUTO: 0.25 10*3/MM3 (ref 0–0.05)
IMM GRANULOCYTES # BLD AUTO: 0.26 10*3/MM3 (ref 0–0.05)
IMM GRANULOCYTES # BLD AUTO: 0.29 10*3/MM3 (ref 0–0.05)
IMM GRANULOCYTES # BLD AUTO: 0.34 10*3/MM3 (ref 0–0.05)
IMM GRANULOCYTES # BLD AUTO: 0.34 10*3/MM3 (ref 0–0.05)
IMM GRANULOCYTES # BLD AUTO: 0.36 10*3/MM3 (ref 0–0.05)
IMM GRANULOCYTES # BLD AUTO: 0.37 10*3/MM3 (ref 0–0.05)
IMM GRANULOCYTES # BLD AUTO: 0.39 10*3/MM3 (ref 0–0.05)
IMM GRANULOCYTES # BLD AUTO: 0.41 10*3/MM3 (ref 0–0.05)
IMM GRANULOCYTES # BLD AUTO: 0.49 10*3/MM3 (ref 0–0.05)
IMM GRANULOCYTES NFR BLD AUTO: 0.2 % (ref 0–0.5)
IMM GRANULOCYTES NFR BLD AUTO: 0.3 % (ref 0–0.5)
IMM GRANULOCYTES NFR BLD AUTO: 0.4 % (ref 0–0.5)
IMM GRANULOCYTES NFR BLD AUTO: 0.4 % (ref 0–0.5)
IMM GRANULOCYTES NFR BLD AUTO: 0.5 % (ref 0–0.5)
IMM GRANULOCYTES NFR BLD AUTO: 0.6 % (ref 0–0.5)
IMM GRANULOCYTES NFR BLD AUTO: 0.7 % (ref 0–0.5)
IMM GRANULOCYTES NFR BLD AUTO: 0.7 % (ref 0–0.5)
IMM GRANULOCYTES NFR BLD AUTO: 0.8 % (ref 0–0.5)
IMM GRANULOCYTES NFR BLD AUTO: 0.8 % (ref 0–0.5)
IMM GRANULOCYTES NFR BLD AUTO: 0.9 % (ref 0–0.5)
IMM GRANULOCYTES NFR BLD AUTO: 1 % (ref 0–0.5)
IMM GRANULOCYTES NFR BLD AUTO: 1 % (ref 0–0.5)
IMM GRANULOCYTES NFR BLD AUTO: 1.1 % (ref 0–0.5)
IMM GRANULOCYTES NFR BLD AUTO: 1.2 % (ref 0–0.5)
IMM GRANULOCYTES NFR BLD AUTO: 1.4 % (ref 0–0.5)
IMM GRANULOCYTES NFR BLD AUTO: 1.6 % (ref 0–0.5)
IMM GRANULOCYTES NFR BLD AUTO: 1.6 % (ref 0–0.5)
IMM GRANULOCYTES NFR BLD AUTO: 2.6 % (ref 0–0.5)
IMM GRANULOCYTES NFR BLD AUTO: 2.6 % (ref 0–0.5)
IMM GRANULOCYTES NFR BLD AUTO: 2.9 % (ref 0–0.5)
IMM GRANULOCYTES NFR BLD AUTO: 3.8 % (ref 0–0.5)
IMM GRANULOCYTES NFR BLD AUTO: 3.9 % (ref 0–0.5)
IMM GRANULOCYTES NFR BLD AUTO: 4.1 % (ref 0–0.5)
IMM GRANULOCYTES NFR BLD AUTO: 4.1 % (ref 0–0.5)
IMM GRANULOCYTES NFR BLD AUTO: 4.5 % (ref 0–0.5)
IMM GRANULOCYTES NFR BLD AUTO: 4.7 % (ref 0–0.5)
IMM GRANULOCYTES NFR BLD AUTO: 5.7 % (ref 0–0.5)
IMM GRANULOCYTES NFR BLD AUTO: 6.3 % (ref 0–0.5)
IMM GRANULOCYTES NFR BLD AUTO: 6.6 % (ref 0–0.5)
IMM GRANULOCYTES NFR BLD AUTO: 8.4 % (ref 0–0.5)
INR PPP: 0.96 (ref 0.9–1.1)
INR PPP: 1.02 (ref 0.9–1.1)
KETONES UR QL STRIP: NEGATIVE
KETONES UR QL STRIP: NEGATIVE
LAB AP CASE REPORT: NORMAL
LEFT ATRIUM VOLUME INDEX: 17 ML/M2
LEUKOCYTE ESTERASE UR QL STRIP.AUTO: ABNORMAL
LEUKOCYTE ESTERASE UR QL STRIP.AUTO: ABNORMAL
LYMPHOCYTES # BLD AUTO: 0.2 10*3/MM3 (ref 0.7–3.1)
LYMPHOCYTES # BLD AUTO: 0.27 10*3/MM3 (ref 0.7–3.1)
LYMPHOCYTES # BLD AUTO: 0.28 10*3/MM3 (ref 0.7–3.1)
LYMPHOCYTES # BLD AUTO: 0.28 10*3/MM3 (ref 0.7–3.1)
LYMPHOCYTES # BLD AUTO: 0.29 10*3/MM3 (ref 0.7–3.1)
LYMPHOCYTES # BLD AUTO: 0.29 10*3/MM3 (ref 0.7–3.1)
LYMPHOCYTES # BLD AUTO: 0.32 10*3/MM3 (ref 0.7–3.1)
LYMPHOCYTES # BLD AUTO: 0.36 10*3/MM3 (ref 0.7–3.1)
LYMPHOCYTES # BLD AUTO: 0.42 10*3/MM3 (ref 0.7–3.1)
LYMPHOCYTES # BLD AUTO: 0.45 10*3/MM3 (ref 0.7–3.1)
LYMPHOCYTES # BLD AUTO: 0.45 10*3/MM3 (ref 0.7–3.1)
LYMPHOCYTES # BLD AUTO: 0.46 10*3/MM3 (ref 0.7–3.1)
LYMPHOCYTES # BLD AUTO: 0.47 10*3/MM3 (ref 0.7–3.1)
LYMPHOCYTES # BLD AUTO: 0.48 10*3/MM3 (ref 0.7–3.1)
LYMPHOCYTES # BLD AUTO: 0.48 10*3/MM3 (ref 0.7–3.1)
LYMPHOCYTES # BLD AUTO: 0.54 10*3/MM3 (ref 0.7–3.1)
LYMPHOCYTES # BLD AUTO: 0.59 10*3/MM3 (ref 0.7–3.1)
LYMPHOCYTES # BLD AUTO: 0.59 10*3/MM3 (ref 0.7–3.1)
LYMPHOCYTES # BLD AUTO: 0.6 10*3/MM3 (ref 0.7–3.1)
LYMPHOCYTES # BLD AUTO: 0.61 10*3/MM3 (ref 0.7–3.1)
LYMPHOCYTES # BLD AUTO: 0.63 10*3/MM3 (ref 0.7–3.1)
LYMPHOCYTES # BLD AUTO: 0.68 10*3/MM3 (ref 0.7–3.1)
LYMPHOCYTES # BLD AUTO: 0.87 10*3/MM3 (ref 0.7–3.1)
LYMPHOCYTES # BLD AUTO: 0.87 10*3/MM3 (ref 0.7–3.1)
LYMPHOCYTES # BLD AUTO: 0.89 10*3/MM3 (ref 0.7–3.1)
LYMPHOCYTES # BLD AUTO: 0.93 10*3/MM3 (ref 0.7–3.1)
LYMPHOCYTES # BLD AUTO: 0.95 10*3/MM3 (ref 0.7–3.1)
LYMPHOCYTES # BLD AUTO: 0.95 10*3/MM3 (ref 0.7–3.1)
LYMPHOCYTES # BLD AUTO: 0.99 10*3/MM3 (ref 0.7–3.1)
LYMPHOCYTES # BLD AUTO: 1.03 10*3/MM3 (ref 0.7–3.1)
LYMPHOCYTES # BLD AUTO: 1.04 10*3/MM3 (ref 0.7–3.1)
LYMPHOCYTES # BLD AUTO: 1.06 10*3/MM3 (ref 0.7–3.1)
LYMPHOCYTES # BLD AUTO: 1.09 10*3/MM3 (ref 0.7–3.1)
LYMPHOCYTES # BLD AUTO: 1.1 10*3/MM3 (ref 0.7–3.1)
LYMPHOCYTES # BLD AUTO: 1.36 10*3/MM3 (ref 0.7–3.1)
LYMPHOCYTES NFR BLD AUTO: 10.5 % (ref 19.6–45.3)
LYMPHOCYTES NFR BLD AUTO: 11 % (ref 19.6–45.3)
LYMPHOCYTES NFR BLD AUTO: 17.2 % (ref 19.6–45.3)
LYMPHOCYTES NFR BLD AUTO: 2 % (ref 19.6–45.3)
LYMPHOCYTES NFR BLD AUTO: 2.6 % (ref 19.6–45.3)
LYMPHOCYTES NFR BLD AUTO: 2.8 % (ref 19.6–45.3)
LYMPHOCYTES NFR BLD AUTO: 2.9 % (ref 19.6–45.3)
LYMPHOCYTES NFR BLD AUTO: 3.5 % (ref 19.6–45.3)
LYMPHOCYTES NFR BLD AUTO: 4 % (ref 19.6–45.3)
LYMPHOCYTES NFR BLD AUTO: 4.1 % (ref 19.6–45.3)
LYMPHOCYTES NFR BLD AUTO: 4.2 % (ref 19.6–45.3)
LYMPHOCYTES NFR BLD AUTO: 4.3 % (ref 19.6–45.3)
LYMPHOCYTES NFR BLD AUTO: 4.4 % (ref 19.6–45.3)
LYMPHOCYTES NFR BLD AUTO: 4.5 % (ref 19.6–45.3)
LYMPHOCYTES NFR BLD AUTO: 5.1 % (ref 19.6–45.3)
LYMPHOCYTES NFR BLD AUTO: 5.4 % (ref 19.6–45.3)
LYMPHOCYTES NFR BLD AUTO: 5.4 % (ref 19.6–45.3)
LYMPHOCYTES NFR BLD AUTO: 6 % (ref 19.6–45.3)
LYMPHOCYTES NFR BLD AUTO: 6.3 % (ref 19.6–45.3)
LYMPHOCYTES NFR BLD AUTO: 6.4 % (ref 19.6–45.3)
LYMPHOCYTES NFR BLD AUTO: 6.4 % (ref 19.6–45.3)
LYMPHOCYTES NFR BLD AUTO: 6.5 % (ref 19.6–45.3)
LYMPHOCYTES NFR BLD AUTO: 6.6 % (ref 19.6–45.3)
LYMPHOCYTES NFR BLD AUTO: 6.7 % (ref 19.6–45.3)
LYMPHOCYTES NFR BLD AUTO: 6.8 % (ref 19.6–45.3)
LYMPHOCYTES NFR BLD AUTO: 6.9 % (ref 19.6–45.3)
LYMPHOCYTES NFR BLD AUTO: 7.2 % (ref 19.6–45.3)
LYMPHOCYTES NFR BLD AUTO: 7.6 % (ref 19.6–45.3)
LYMPHOCYTES NFR BLD AUTO: 7.8 % (ref 19.6–45.3)
LYMPHOCYTES NFR BLD AUTO: 8 % (ref 19.6–45.3)
LYMPHOCYTES NFR BLD AUTO: 8.2 % (ref 19.6–45.3)
LYMPHOCYTES NFR BLD AUTO: 8.4 % (ref 19.6–45.3)
LYMPHOCYTES NFR BLD AUTO: 8.9 % (ref 19.6–45.3)
LYMPHOCYTES NFR BLD AUTO: 9.5 % (ref 19.6–45.3)
LYMPHOCYTES NFR BLD AUTO: 9.6 % (ref 19.6–45.3)
Lab: NORMAL
MAGNESIUM SERPL-MCNC: 2.1 MG/DL (ref 1.6–2.6)
MAGNESIUM SERPL-MCNC: 2.3 MG/DL (ref 1.6–2.6)
MAGNESIUM SERPL-MCNC: 2.4 MG/DL (ref 1.6–2.6)
MAXIMAL PREDICTED HEART RATE: 166 BPM
MCH RBC QN AUTO: 30 PG (ref 26.6–33)
MCH RBC QN AUTO: 30.2 PG (ref 26.6–33)
MCH RBC QN AUTO: 30.6 PG (ref 26.6–33)
MCH RBC QN AUTO: 30.7 PG (ref 26.6–33)
MCH RBC QN AUTO: 30.8 PG (ref 26.6–33)
MCH RBC QN AUTO: 31 PG (ref 26.6–33)
MCH RBC QN AUTO: 31.1 PG (ref 26.6–33)
MCH RBC QN AUTO: 31.1 PG (ref 26.6–33)
MCH RBC QN AUTO: 31.2 PG (ref 26.6–33)
MCH RBC QN AUTO: 31.3 PG (ref 26.6–33)
MCH RBC QN AUTO: 31.4 PG (ref 26.6–33)
MCH RBC QN AUTO: 31.4 PG (ref 26.6–33)
MCH RBC QN AUTO: 31.5 PG (ref 26.6–33)
MCH RBC QN AUTO: 31.5 PG (ref 26.6–33)
MCH RBC QN AUTO: 31.6 PG (ref 26.6–33)
MCH RBC QN AUTO: 31.8 PG (ref 26.6–33)
MCH RBC QN AUTO: 32.4 PG (ref 26.6–33)
MCH RBC QN AUTO: 32.6 PG (ref 26.6–33)
MCH RBC QN AUTO: 32.6 PG (ref 26.6–33)
MCH RBC QN AUTO: 33.2 PG (ref 26.6–33)
MCH RBC QN AUTO: 35.5 PG (ref 26.6–33)
MCH RBC QN AUTO: 35.7 PG (ref 26.6–33)
MCH RBC QN AUTO: 36.1 PG (ref 26.6–33)
MCH RBC QN AUTO: 36.1 PG (ref 26.6–33)
MCH RBC QN AUTO: 37.2 PG (ref 26.6–33)
MCHC RBC AUTO-ENTMCNC: 33.5 G/DL (ref 31.5–35.7)
MCHC RBC AUTO-ENTMCNC: 33.6 G/DL (ref 31.5–35.7)
MCHC RBC AUTO-ENTMCNC: 33.8 G/DL (ref 31.5–35.7)
MCHC RBC AUTO-ENTMCNC: 33.8 G/DL (ref 31.5–35.7)
MCHC RBC AUTO-ENTMCNC: 33.9 G/DL (ref 31.5–35.7)
MCHC RBC AUTO-ENTMCNC: 34 G/DL (ref 31.5–35.7)
MCHC RBC AUTO-ENTMCNC: 34.1 G/DL (ref 31.5–35.7)
MCHC RBC AUTO-ENTMCNC: 34.1 G/DL (ref 31.5–35.7)
MCHC RBC AUTO-ENTMCNC: 34.2 G/DL (ref 31.5–35.7)
MCHC RBC AUTO-ENTMCNC: 34.3 G/DL (ref 31.5–35.7)
MCHC RBC AUTO-ENTMCNC: 34.7 G/DL (ref 31.5–35.7)
MCHC RBC AUTO-ENTMCNC: 34.8 G/DL (ref 31.5–35.7)
MCHC RBC AUTO-ENTMCNC: 34.9 G/DL (ref 31.5–35.7)
MCHC RBC AUTO-ENTMCNC: 34.9 G/DL (ref 31.5–35.7)
MCHC RBC AUTO-ENTMCNC: 35 G/DL (ref 31.5–35.7)
MCHC RBC AUTO-ENTMCNC: 35.1 G/DL (ref 31.5–35.7)
MCHC RBC AUTO-ENTMCNC: 35.2 G/DL (ref 31.5–35.7)
MCHC RBC AUTO-ENTMCNC: 35.2 G/DL (ref 31.5–35.7)
MCHC RBC AUTO-ENTMCNC: 35.3 G/DL (ref 31.5–35.7)
MCHC RBC AUTO-ENTMCNC: 35.4 G/DL (ref 31.5–35.7)
MCHC RBC AUTO-ENTMCNC: 35.5 G/DL (ref 31.5–35.7)
MCHC RBC AUTO-ENTMCNC: 35.6 G/DL (ref 31.5–35.7)
MCHC RBC AUTO-ENTMCNC: 35.7 G/DL (ref 31.5–35.7)
MCHC RBC AUTO-ENTMCNC: 35.7 G/DL (ref 31.5–35.7)
MCHC RBC AUTO-ENTMCNC: 35.8 G/DL (ref 31.5–35.7)
MCHC RBC AUTO-ENTMCNC: 35.8 G/DL (ref 31.5–35.7)
MCHC RBC AUTO-ENTMCNC: 36 G/DL (ref 31.5–35.7)
MCV RBC AUTO: 100.5 FL (ref 79–97)
MCV RBC AUTO: 103 FL (ref 79–97)
MCV RBC AUTO: 104.3 FL (ref 79–97)
MCV RBC AUTO: 104.4 FL (ref 79–97)
MCV RBC AUTO: 105.9 FL (ref 79–97)
MCV RBC AUTO: 85.9 FL (ref 79–97)
MCV RBC AUTO: 86.6 FL (ref 79–97)
MCV RBC AUTO: 86.8 FL (ref 79–97)
MCV RBC AUTO: 87.2 FL (ref 79–97)
MCV RBC AUTO: 87.3 FL (ref 79–97)
MCV RBC AUTO: 87.4 FL (ref 79–97)
MCV RBC AUTO: 87.5 FL (ref 79–97)
MCV RBC AUTO: 87.7 FL (ref 79–97)
MCV RBC AUTO: 87.9 FL (ref 79–97)
MCV RBC AUTO: 87.9 FL (ref 79–97)
MCV RBC AUTO: 88 FL (ref 79–97)
MCV RBC AUTO: 88.1 FL (ref 79–97)
MCV RBC AUTO: 88.4 FL (ref 79–97)
MCV RBC AUTO: 88.9 FL (ref 79–97)
MCV RBC AUTO: 89.3 FL (ref 79–97)
MCV RBC AUTO: 89.3 FL (ref 79–97)
MCV RBC AUTO: 89.4 FL (ref 79–97)
MCV RBC AUTO: 92.2 FL (ref 79–97)
MCV RBC AUTO: 92.4 FL (ref 79–97)
MCV RBC AUTO: 92.5 FL (ref 79–97)
MCV RBC AUTO: 92.9 FL (ref 79–97)
MCV RBC AUTO: 93.5 FL (ref 79–97)
MCV RBC AUTO: 93.6 FL (ref 79–97)
MCV RBC AUTO: 93.9 FL (ref 79–97)
MCV RBC AUTO: 94 FL (ref 79–97)
MCV RBC AUTO: 95.8 FL (ref 79–97)
METHOD: ABNORMAL
MONOCYTES # BLD AUTO: 0.12 10*3/MM3 (ref 0.1–0.9)
MONOCYTES # BLD AUTO: 0.22 10*3/MM3 (ref 0.1–0.9)
MONOCYTES # BLD AUTO: 0.23 10*3/MM3 (ref 0.1–0.9)
MONOCYTES # BLD AUTO: 0.23 10*3/MM3 (ref 0.1–0.9)
MONOCYTES # BLD AUTO: 0.25 10*3/MM3 (ref 0.1–0.9)
MONOCYTES # BLD AUTO: 0.3 10*3/MM3 (ref 0.1–0.9)
MONOCYTES # BLD AUTO: 0.34 10*3/MM3 (ref 0.1–0.9)
MONOCYTES # BLD AUTO: 0.37 10*3/MM3 (ref 0.1–0.9)
MONOCYTES # BLD AUTO: 0.41 10*3/MM3 (ref 0.1–0.9)
MONOCYTES # BLD AUTO: 0.49 10*3/MM3 (ref 0.1–0.9)
MONOCYTES # BLD AUTO: 0.49 10*3/MM3 (ref 0.1–0.9)
MONOCYTES # BLD AUTO: 0.5 10*3/MM3 (ref 0.1–0.9)
MONOCYTES # BLD AUTO: 0.5 10*3/MM3 (ref 0.1–0.9)
MONOCYTES # BLD AUTO: 0.51 10*3/MM3 (ref 0.1–0.9)
MONOCYTES # BLD AUTO: 0.54 10*3/MM3 (ref 0.1–0.9)
MONOCYTES # BLD AUTO: 0.55 10*3/MM3 (ref 0.1–0.9)
MONOCYTES # BLD AUTO: 0.58 10*3/MM3 (ref 0.1–0.9)
MONOCYTES # BLD AUTO: 0.59 10*3/MM3 (ref 0.1–0.9)
MONOCYTES # BLD AUTO: 0.6 10*3/MM3 (ref 0.1–0.9)
MONOCYTES # BLD AUTO: 0.69 10*3/MM3 (ref 0.1–0.9)
MONOCYTES # BLD AUTO: 0.7 10*3/MM3 (ref 0.1–0.9)
MONOCYTES # BLD AUTO: 0.75 10*3/MM3 (ref 0.1–0.9)
MONOCYTES # BLD AUTO: 0.76 10*3/MM3 (ref 0.1–0.9)
MONOCYTES # BLD AUTO: 0.77 10*3/MM3 (ref 0.1–0.9)
MONOCYTES # BLD AUTO: 0.84 10*3/MM3 (ref 0.1–0.9)
MONOCYTES # BLD AUTO: 0.93 10*3/MM3 (ref 0.1–0.9)
MONOCYTES # BLD AUTO: 0.94 10*3/MM3 (ref 0.1–0.9)
MONOCYTES # BLD AUTO: 0.97 10*3/MM3 (ref 0.1–0.9)
MONOCYTES # BLD AUTO: 0.98 10*3/MM3 (ref 0.1–0.9)
MONOCYTES # BLD AUTO: 1.25 10*3/MM3 (ref 0.1–0.9)
MONOCYTES # BLD AUTO: 1.46 10*3/MM3 (ref 0.1–0.9)
MONOCYTES # BLD AUTO: 1.47 10*3/MM3 (ref 0.1–0.9)
MONOCYTES # BLD AUTO: 1.8 10*3/MM3 (ref 0.1–0.9)
MONOCYTES NFR BLD AUTO: 10.6 % (ref 5–12)
MONOCYTES NFR BLD AUTO: 11.3 % (ref 5–12)
MONOCYTES NFR BLD AUTO: 2.5 % (ref 5–12)
MONOCYTES NFR BLD AUTO: 2.7 % (ref 5–12)
MONOCYTES NFR BLD AUTO: 3.2 % (ref 5–12)
MONOCYTES NFR BLD AUTO: 3.4 % (ref 5–12)
MONOCYTES NFR BLD AUTO: 3.6 % (ref 5–12)
MONOCYTES NFR BLD AUTO: 3.7 % (ref 5–12)
MONOCYTES NFR BLD AUTO: 3.9 % (ref 5–12)
MONOCYTES NFR BLD AUTO: 4 % (ref 5–12)
MONOCYTES NFR BLD AUTO: 4 % (ref 5–12)
MONOCYTES NFR BLD AUTO: 4.1 % (ref 5–12)
MONOCYTES NFR BLD AUTO: 4.3 % (ref 5–12)
MONOCYTES NFR BLD AUTO: 4.3 % (ref 5–12)
MONOCYTES NFR BLD AUTO: 4.8 % (ref 5–12)
MONOCYTES NFR BLD AUTO: 5.2 % (ref 5–12)
MONOCYTES NFR BLD AUTO: 5.2 % (ref 5–12)
MONOCYTES NFR BLD AUTO: 5.3 % (ref 5–12)
MONOCYTES NFR BLD AUTO: 5.3 % (ref 5–12)
MONOCYTES NFR BLD AUTO: 5.4 % (ref 5–12)
MONOCYTES NFR BLD AUTO: 5.5 % (ref 5–12)
MONOCYTES NFR BLD AUTO: 5.7 % (ref 5–12)
MONOCYTES NFR BLD AUTO: 6 % (ref 5–12)
MONOCYTES NFR BLD AUTO: 6.4 % (ref 5–12)
MONOCYTES NFR BLD AUTO: 6.7 % (ref 5–12)
MONOCYTES NFR BLD AUTO: 6.9 % (ref 5–12)
MONOCYTES NFR BLD AUTO: 7.2 % (ref 5–12)
MONOCYTES NFR BLD AUTO: 7.3 % (ref 5–12)
MONOCYTES NFR BLD AUTO: 7.4 % (ref 5–12)
MONOCYTES NFR BLD AUTO: 7.5 % (ref 5–12)
MONOCYTES NFR BLD AUTO: 7.8 % (ref 5–12)
MONOCYTES NFR BLD AUTO: 7.9 % (ref 5–12)
MONOCYTES NFR BLD AUTO: 7.9 % (ref 5–12)
MONOCYTES NFR BLD AUTO: 8.2 % (ref 5–12)
MONOCYTES NFR BLD AUTO: 9.5 % (ref 5–12)
NEUTROPHILS # BLD AUTO: 4.9 10*3/MM3 (ref 1.7–7)
NEUTROPHILS # BLD AUTO: 8.05 10*3/MM3 (ref 1.7–7)
NEUTROPHILS NFR BLD AUTO: 10.14 10*3/MM3 (ref 1.7–7)
NEUTROPHILS NFR BLD AUTO: 10.35 10*3/MM3 (ref 1.7–7)
NEUTROPHILS NFR BLD AUTO: 10.52 10*3/MM3 (ref 1.7–7)
NEUTROPHILS NFR BLD AUTO: 10.93 10*3/MM3 (ref 1.7–7)
NEUTROPHILS NFR BLD AUTO: 11.04 10*3/MM3 (ref 1.7–7)
NEUTROPHILS NFR BLD AUTO: 11.42 10*3/MM3 (ref 1.7–7)
NEUTROPHILS NFR BLD AUTO: 11.81 10*3/MM3 (ref 1.7–7)
NEUTROPHILS NFR BLD AUTO: 12.08 10*3/MM3 (ref 1.7–7)
NEUTROPHILS NFR BLD AUTO: 12.99 10*3/MM3 (ref 1.7–7)
NEUTROPHILS NFR BLD AUTO: 13.25 10*3/MM3 (ref 1.7–7)
NEUTROPHILS NFR BLD AUTO: 13.39 10*3/MM3 (ref 1.7–7)
NEUTROPHILS NFR BLD AUTO: 14.1 10*3/MM3 (ref 1.7–7)
NEUTROPHILS NFR BLD AUTO: 2.38 10*3/MM3 (ref 1.7–7)
NEUTROPHILS NFR BLD AUTO: 2.71 10*3/MM3 (ref 1.7–7)
NEUTROPHILS NFR BLD AUTO: 21.68 10*3/MM3 (ref 1.7–7)
NEUTROPHILS NFR BLD AUTO: 4.23 10*3/MM3 (ref 1.7–7)
NEUTROPHILS NFR BLD AUTO: 4.78 10*3/MM3 (ref 1.7–7)
NEUTROPHILS NFR BLD AUTO: 5.38 10*3/MM3 (ref 1.7–7)
NEUTROPHILS NFR BLD AUTO: 5.74 10*3/MM3 (ref 1.7–7)
NEUTROPHILS NFR BLD AUTO: 6.15 10*3/MM3 (ref 1.7–7)
NEUTROPHILS NFR BLD AUTO: 7.37 10*3/MM3 (ref 1.7–7)
NEUTROPHILS NFR BLD AUTO: 7.48 10*3/MM3 (ref 1.7–7)
NEUTROPHILS NFR BLD AUTO: 7.59 10*3/MM3 (ref 1.7–7)
NEUTROPHILS NFR BLD AUTO: 7.72 10*3/MM3 (ref 1.7–7)
NEUTROPHILS NFR BLD AUTO: 7.9 10*3/MM3 (ref 1.7–7)
NEUTROPHILS NFR BLD AUTO: 73.9 % (ref 42.7–76)
NEUTROPHILS NFR BLD AUTO: 77.3 % (ref 42.7–76)
NEUTROPHILS NFR BLD AUTO: 78 % (ref 42.7–76)
NEUTROPHILS NFR BLD AUTO: 78.1 % (ref 42.7–76)
NEUTROPHILS NFR BLD AUTO: 79.2 % (ref 42.7–76)
NEUTROPHILS NFR BLD AUTO: 8.17 10*3/MM3 (ref 1.7–7)
NEUTROPHILS NFR BLD AUTO: 8.76 10*3/MM3 (ref 1.7–7)
NEUTROPHILS NFR BLD AUTO: 8.88 10*3/MM3 (ref 1.7–7)
NEUTROPHILS NFR BLD AUTO: 8.91 10*3/MM3 (ref 1.7–7)
NEUTROPHILS NFR BLD AUTO: 80.3 % (ref 42.7–76)
NEUTROPHILS NFR BLD AUTO: 80.3 % (ref 42.7–76)
NEUTROPHILS NFR BLD AUTO: 81.4 % (ref 42.7–76)
NEUTROPHILS NFR BLD AUTO: 81.5 % (ref 42.7–76)
NEUTROPHILS NFR BLD AUTO: 82.2 % (ref 42.7–76)
NEUTROPHILS NFR BLD AUTO: 83.3 % (ref 42.7–76)
NEUTROPHILS NFR BLD AUTO: 83.5 % (ref 42.7–76)
NEUTROPHILS NFR BLD AUTO: 83.9 % (ref 42.7–76)
NEUTROPHILS NFR BLD AUTO: 84.5 % (ref 42.7–76)
NEUTROPHILS NFR BLD AUTO: 84.5 % (ref 42.7–76)
NEUTROPHILS NFR BLD AUTO: 85.2 % (ref 42.7–76)
NEUTROPHILS NFR BLD AUTO: 85.4 % (ref 42.7–76)
NEUTROPHILS NFR BLD AUTO: 85.8 % (ref 42.7–76)
NEUTROPHILS NFR BLD AUTO: 85.8 % (ref 42.7–76)
NEUTROPHILS NFR BLD AUTO: 86 % (ref 42.7–76)
NEUTROPHILS NFR BLD AUTO: 86.2 % (ref 42.7–76)
NEUTROPHILS NFR BLD AUTO: 86.5 % (ref 42.7–76)
NEUTROPHILS NFR BLD AUTO: 87 % (ref 42.7–76)
NEUTROPHILS NFR BLD AUTO: 87 % (ref 42.7–76)
NEUTROPHILS NFR BLD AUTO: 87.1 % (ref 42.7–76)
NEUTROPHILS NFR BLD AUTO: 87.7 % (ref 42.7–76)
NEUTROPHILS NFR BLD AUTO: 88.6 % (ref 42.7–76)
NEUTROPHILS NFR BLD AUTO: 88.6 % (ref 42.7–76)
NEUTROPHILS NFR BLD AUTO: 88.9 % (ref 42.7–76)
NEUTROPHILS NFR BLD AUTO: 89.2 % (ref 42.7–76)
NEUTROPHILS NFR BLD AUTO: 89.6 % (ref 42.7–76)
NEUTROPHILS NFR BLD AUTO: 9.02 10*3/MM3 (ref 1.7–7)
NEUTROPHILS NFR BLD AUTO: 9.13 10*3/MM3 (ref 1.7–7)
NEUTROPHILS NFR BLD AUTO: 9.74 10*3/MM3 (ref 1.7–7)
NEUTROPHILS NFR BLD AUTO: 9.81 10*3/MM3 (ref 1.7–7)
NEUTROPHILS NFR BLD AUTO: 90.2 % (ref 42.7–76)
NEUTROPHILS NFR BLD AUTO: 91.8 % (ref 42.7–76)
NEUTROPHILS NFR BLD AUTO: 91.9 % (ref 42.7–76)
NEUTROPHILS NFR BLD AUTO: 92.6 % (ref 42.7–76)
NITRITE UR QL STRIP: NEGATIVE
NITRITE UR QL STRIP: NEGATIVE
NRBC BLD AUTO-RTO: 0 /100 WBC (ref 0–0.2)
NRBC BLD AUTO-RTO: 0.1 /100 WBC (ref 0–0.2)
NRBC BLD AUTO-RTO: 0.2 /100 WBC (ref 0–0.2)
NRBC BLD AUTO-RTO: 0.4 /100 WBC (ref 0–0.2)
NRBC BLD AUTO-RTO: 0.5 /100 WBC (ref 0–0.2)
NRBC BLD AUTO-RTO: 0.6 /100 WBC (ref 0–0.2)
NRBC BLD AUTO-RTO: 1 /100 WBC (ref 0–0.2)
NRBC BLD AUTO-RTO: 1.5 /100 WBC (ref 0–0.2)
NRBC BLD AUTO-RTO: 2 /100 WBC (ref 0–0.2)
NRBC BLD AUTO-RTO: 2.6 /100 WBC (ref 0–0.2)
NUC CELL # CSF MANUAL: 1 /MM3 (ref 0–5)
OTHER STN SPEC: NORMAL
PATH REPORT.ADDENDUM SPEC: NORMAL
PATH REPORT.FINAL DX SPEC: NORMAL
PATH REPORT.GROSS SPEC: NORMAL
PH UR STRIP.AUTO: 6 [PH] (ref 5–8)
PH UR STRIP.AUTO: 7 [PH] (ref 5–8)
PHOSPHATE SERPL-MCNC: 4.4 MG/DL (ref 2.5–4.5)
PLATELET # BLD AUTO: 101 10*3/MM3 (ref 140–450)
PLATELET # BLD AUTO: 107 10*3/MM3 (ref 140–450)
PLATELET # BLD AUTO: 115 10*3/MM3 (ref 140–450)
PLATELET # BLD AUTO: 116 10*3/MM3 (ref 140–450)
PLATELET # BLD AUTO: 131 10*3/MM3 (ref 140–450)
PLATELET # BLD AUTO: 132 10*3/MM3 (ref 140–450)
PLATELET # BLD AUTO: 151 10*3/MM3 (ref 140–450)
PLATELET # BLD AUTO: 165 10*3/MM3 (ref 140–450)
PLATELET # BLD AUTO: 166 10*3/MM3 (ref 140–450)
PLATELET # BLD AUTO: 183 10*3/MM3 (ref 140–450)
PLATELET # BLD AUTO: 190 10*3/MM3 (ref 140–450)
PLATELET # BLD AUTO: 212 10*3/MM3 (ref 140–450)
PLATELET # BLD AUTO: 217 10*3/MM3 (ref 140–450)
PLATELET # BLD AUTO: 222 10*3/MM3 (ref 140–450)
PLATELET # BLD AUTO: 222 10*3/MM3 (ref 140–450)
PLATELET # BLD AUTO: 228 10*3/MM3 (ref 140–450)
PLATELET # BLD AUTO: 231 10*3/MM3 (ref 140–450)
PLATELET # BLD AUTO: 232 10*3/MM3 (ref 140–450)
PLATELET # BLD AUTO: 236 10*3/MM3 (ref 140–450)
PLATELET # BLD AUTO: 244 10*3/MM3 (ref 140–450)
PLATELET # BLD AUTO: 245 10*3/MM3 (ref 140–450)
PLATELET # BLD AUTO: 248 10*3/MM3 (ref 140–450)
PLATELET # BLD AUTO: 249 10*3/MM3 (ref 140–450)
PLATELET # BLD AUTO: 255 10*3/MM3 (ref 140–450)
PLATELET # BLD AUTO: 255 10*3/MM3 (ref 140–450)
PLATELET # BLD AUTO: 256 10*3/MM3 (ref 140–450)
PLATELET # BLD AUTO: 257 10*3/MM3 (ref 140–450)
PLATELET # BLD AUTO: 260 10*3/MM3 (ref 140–450)
PLATELET # BLD AUTO: 261 10*3/MM3 (ref 140–450)
PLATELET # BLD AUTO: 263 10*3/MM3 (ref 140–450)
PLATELET # BLD AUTO: 264 10*3/MM3 (ref 140–450)
PLATELET # BLD AUTO: 273 10*3/MM3 (ref 140–450)
PLATELET # BLD AUTO: 307 10*3/MM3 (ref 140–450)
PLATELET # BLD AUTO: 331 10*3/MM3 (ref 140–450)
PLATELET # BLD AUTO: 93 10*3/MM3 (ref 140–450)
PMV BLD AUTO: 10.1 FL (ref 6–12)
PMV BLD AUTO: 10.7 FL (ref 6–12)
PMV BLD AUTO: 10.8 FL (ref 6–12)
PMV BLD AUTO: 7.6 FL (ref 6–12)
PMV BLD AUTO: 8.1 FL (ref 6–12)
PMV BLD AUTO: 8.4 FL (ref 6–12)
PMV BLD AUTO: 8.5 FL (ref 6–12)
PMV BLD AUTO: 8.6 FL (ref 6–12)
PMV BLD AUTO: 8.6 FL (ref 6–12)
PMV BLD AUTO: 8.7 FL (ref 6–12)
PMV BLD AUTO: 8.9 FL (ref 6–12)
PMV BLD AUTO: 8.9 FL (ref 6–12)
PMV BLD AUTO: 9 FL (ref 6–12)
PMV BLD AUTO: 9.1 FL (ref 6–12)
PMV BLD AUTO: 9.2 FL (ref 6–12)
PMV BLD AUTO: 9.3 FL (ref 6–12)
PMV BLD AUTO: 9.4 FL (ref 6–12)
PMV BLD AUTO: 9.4 FL (ref 6–12)
PMV BLD AUTO: 9.6 FL (ref 6–12)
PMV BLD AUTO: 9.7 FL (ref 6–12)
PMV BLD AUTO: 9.9 FL (ref 6–12)
PMV BLD AUTO: 9.9 FL (ref 6–12)
POTASSIUM BLD-SCNC: 3.9 MMOL/L (ref 3.5–5.2)
POTASSIUM BLD-SCNC: 3.9 MMOL/L (ref 3.5–5.2)
POTASSIUM SERPL-SCNC: 3.2 MMOL/L (ref 3.5–5.2)
POTASSIUM SERPL-SCNC: 3.3 MMOL/L (ref 3.5–4.7)
POTASSIUM SERPL-SCNC: 3.5 MMOL/L (ref 3.5–4.7)
POTASSIUM SERPL-SCNC: 3.7 MMOL/L (ref 3.5–5.2)
POTASSIUM SERPL-SCNC: 3.7 MMOL/L (ref 3.5–5.2)
POTASSIUM SERPL-SCNC: 3.8 MMOL/L (ref 3.5–4.7)
POTASSIUM SERPL-SCNC: 3.8 MMOL/L (ref 3.5–5.2)
POTASSIUM SERPL-SCNC: 3.8 MMOL/L (ref 3.5–5.2)
POTASSIUM SERPL-SCNC: 3.9 MMOL/L (ref 3.5–4.7)
POTASSIUM SERPL-SCNC: 3.9 MMOL/L (ref 3.5–5.2)
POTASSIUM SERPL-SCNC: 3.9 MMOL/L (ref 3.5–5.2)
POTASSIUM SERPL-SCNC: 4 MMOL/L (ref 3.5–4.7)
POTASSIUM SERPL-SCNC: 4 MMOL/L (ref 3.5–5.2)
POTASSIUM SERPL-SCNC: 4.1 MMOL/L (ref 3.5–4.7)
POTASSIUM SERPL-SCNC: 4.1 MMOL/L (ref 3.5–5.2)
POTASSIUM SERPL-SCNC: 4.1 MMOL/L (ref 3.5–5.2)
POTASSIUM SERPL-SCNC: 4.2 MMOL/L (ref 3.5–4.7)
POTASSIUM SERPL-SCNC: 4.2 MMOL/L (ref 3.5–5.2)
POTASSIUM SERPL-SCNC: 4.3 MMOL/L (ref 3.5–4.7)
POTASSIUM SERPL-SCNC: 4.3 MMOL/L (ref 3.5–5.2)
POTASSIUM SERPL-SCNC: 4.4 MMOL/L (ref 3.5–5.2)
POTASSIUM SERPL-SCNC: 4.5 MMOL/L (ref 3.5–4.7)
POTASSIUM SERPL-SCNC: 4.6 MMOL/L (ref 3.5–4.7)
POTASSIUM SERPL-SCNC: 4.8 MMOL/L (ref 3.5–4.7)
PROT CSF-MCNC: 39.1 MG/DL (ref 15–45)
PROT SERPL-MCNC: 5.7 G/DL (ref 6.3–8)
PROT SERPL-MCNC: 5.7 G/DL (ref 6.3–8)
PROT SERPL-MCNC: 5.8 G/DL (ref 6.3–8)
PROT SERPL-MCNC: 5.8 G/DL (ref 6.3–8)
PROT SERPL-MCNC: 6 G/DL (ref 6.3–8)
PROT SERPL-MCNC: 6 G/DL (ref 6.3–8)
PROT SERPL-MCNC: 6.1 G/DL (ref 6–8.5)
PROT SERPL-MCNC: 6.2 G/DL (ref 6.3–8)
PROT SERPL-MCNC: 6.3 G/DL (ref 6.3–8)
PROT SERPL-MCNC: 6.4 G/DL (ref 6.3–8)
PROT SERPL-MCNC: 6.5 G/DL (ref 6–8.5)
PROT SERPL-MCNC: 7.4 G/DL (ref 6–8.5)
PROT UR QL STRIP: NEGATIVE
PROT UR QL STRIP: NEGATIVE
PROTHROMBIN TIME: 10.1 SECONDS (ref 9.6–11.7)
PROTHROMBIN TIME: 13.3 SECONDS (ref 11.7–14.2)
RBC # BLD AUTO: 3.01 10*6/MM3 (ref 3.77–5.28)
RBC # BLD AUTO: 3.38 10*6/MM3 (ref 3.77–5.28)
RBC # BLD AUTO: 3.41 10*6/MM3 (ref 3.77–5.28)
RBC # BLD AUTO: 3.42 10*6/MM3 (ref 3.77–5.28)
RBC # BLD AUTO: 3.66 10*6/MM3 (ref 3.77–5.28)
RBC # BLD AUTO: 3.97 10*6/MM3 (ref 3.77–5.28)
RBC # BLD AUTO: 4.23 10*6/MM3 (ref 3.77–5.28)
RBC # BLD AUTO: 4.23 10*6/MM3 (ref 3.77–5.28)
RBC # BLD AUTO: 4.24 10*6/MM3 (ref 3.77–5.28)
RBC # BLD AUTO: 4.27 10*6/MM3 (ref 3.77–5.28)
RBC # BLD AUTO: 4.3 10*6/MM3 (ref 3.77–5.28)
RBC # BLD AUTO: 4.39 10*6/MM3 (ref 3.77–5.28)
RBC # BLD AUTO: 4.52 10*6/MM3 (ref 3.77–5.28)
RBC # BLD AUTO: 4.54 10*6/MM3 (ref 3.77–5.28)
RBC # BLD AUTO: 4.55 10*6/MM3 (ref 3.77–5.28)
RBC # BLD AUTO: 4.57 10*6/MM3 (ref 3.77–5.28)
RBC # BLD AUTO: 4.59 10*6/MM3 (ref 3.77–5.28)
RBC # BLD AUTO: 4.59 10*6/MM3 (ref 3.77–5.28)
RBC # BLD AUTO: 4.62 10*6/MM3 (ref 3.77–5.28)
RBC # BLD AUTO: 4.63 10*6/MM3 (ref 3.77–5.28)
RBC # BLD AUTO: 4.63 10*6/MM3 (ref 3.77–5.28)
RBC # BLD AUTO: 4.72 10*6/MM3 (ref 3.77–5.28)
RBC # BLD AUTO: 4.82 10*6/MM3 (ref 3.77–5.28)
RBC # BLD AUTO: 4.83 10*6/MM3 (ref 3.77–5.28)
RBC # BLD AUTO: 4.85 10*6/MM3 (ref 3.77–5.28)
RBC # BLD AUTO: 4.87 10*6/MM3 (ref 3.77–5.28)
RBC # BLD AUTO: 4.92 10*6/MM3 (ref 3.77–5.28)
RBC # BLD AUTO: 4.96 10*6/MM3 (ref 3.77–5.28)
RBC # BLD AUTO: 4.98 10*6/MM3 (ref 3.77–5.28)
RBC # BLD AUTO: 5.04 10*6/MM3 (ref 3.77–5.28)
RBC # BLD AUTO: 5.04 10*6/MM3 (ref 3.77–5.28)
RBC # BLD AUTO: 5.06 10*6/MM3 (ref 3.77–5.28)
RBC # BLD AUTO: 5.14 10*6/MM3 (ref 3.77–5.28)
RBC # BLD AUTO: 5.15 10*6/MM3 (ref 3.77–5.28)
RBC # BLD AUTO: 5.3 10*6/MM3 (ref 3.77–5.28)
RBC # CSF MANUAL: 59 /MM3 (ref 0–0)
RBC # UR: ABNORMAL /HPF
RBC # UR: ABNORMAL /HPF
REF LAB TEST METHOD: ABNORMAL
REF LAB TEST METHOD: ABNORMAL
RH BLD: POSITIVE
SARS-COV-2 N GENE NPH QL NAA+PROBE: NOT DETECTED
SODIUM BLD-SCNC: 136 MMOL/L (ref 136–145)
SODIUM BLD-SCNC: 141 MMOL/L (ref 136–145)
SODIUM SERPL-SCNC: 132 MMOL/L (ref 136–145)
SODIUM SERPL-SCNC: 133 MMOL/L (ref 136–145)
SODIUM SERPL-SCNC: 135 MMOL/L (ref 136–145)
SODIUM SERPL-SCNC: 135 MMOL/L (ref 136–145)
SODIUM SERPL-SCNC: 136 MMOL/L (ref 134–145)
SODIUM SERPL-SCNC: 136 MMOL/L (ref 134–145)
SODIUM SERPL-SCNC: 136 MMOL/L (ref 136–145)
SODIUM SERPL-SCNC: 137 MMOL/L (ref 134–145)
SODIUM SERPL-SCNC: 137 MMOL/L (ref 136–145)
SODIUM SERPL-SCNC: 138 MMOL/L (ref 134–145)
SODIUM SERPL-SCNC: 138 MMOL/L (ref 134–145)
SODIUM SERPL-SCNC: 138 MMOL/L (ref 136–145)
SODIUM SERPL-SCNC: 139 MMOL/L (ref 134–145)
SODIUM SERPL-SCNC: 140 MMOL/L (ref 134–145)
SODIUM SERPL-SCNC: 140 MMOL/L (ref 136–145)
SODIUM SERPL-SCNC: 141 MMOL/L (ref 134–145)
SODIUM SERPL-SCNC: 141 MMOL/L (ref 136–145)
SP GR UR STRIP: 1.01 (ref 1–1.03)
SP GR UR STRIP: 1.02 (ref 1–1.03)
SQUAMOUS #/AREA URNS HPF: ABNORMAL /HPF
SQUAMOUS #/AREA URNS HPF: ABNORMAL /HPF
STAT OF ADQ CVX/VAG CYTO-IMP: NORMAL
STRESS TARGET HR: 141 BPM
T&S EXPIRATION DATE: NORMAL
TROPONIN T SERPL-MCNC: <0.01 NG/ML (ref 0–0.03)
TSH SERPL DL<=0.05 MIU/L-ACNC: 0.89 UIU/ML (ref 0.27–4.2)
TUBE # CSF: ABNORMAL
UROBILINOGEN UR QL STRIP: ABNORMAL
UROBILINOGEN UR QL STRIP: ABNORMAL
WBC # BLD AUTO: 10 10*3/MM3 (ref 3.4–10.8)
WBC # BLD AUTO: 10.02 10*3/MM3 (ref 3.4–10.8)
WBC # BLD AUTO: 10.02 10*3/MM3 (ref 3.4–10.8)
WBC # BLD AUTO: 10.23 10*3/MM3 (ref 3.4–10.8)
WBC # BLD AUTO: 10.48 10*3/MM3 (ref 3.4–10.8)
WBC # BLD AUTO: 11.43 10*3/MM3 (ref 3.4–10.8)
WBC # BLD AUTO: 11.79 10*3/MM3 (ref 3.4–10.8)
WBC # BLD AUTO: 11.85 10*3/MM3 (ref 3.4–10.8)
WBC # BLD AUTO: 11.9 10*3/MM3 (ref 3.4–10.8)
WBC # BLD AUTO: 12.33 10*3/MM3 (ref 3.4–10.8)
WBC # BLD AUTO: 12.98 10*3/MM3 (ref 3.4–10.8)
WBC # BLD AUTO: 13.03 10*3/MM3 (ref 3.4–10.8)
WBC # BLD AUTO: 13.08 10*3/MM3 (ref 3.4–10.8)
WBC # BLD AUTO: 13.48 10*3/MM3 (ref 3.4–10.8)
WBC # BLD AUTO: 14.13 10*3/MM3 (ref 3.4–10.8)
WBC # BLD AUTO: 14.18 10*3/MM3 (ref 3.4–10.8)
WBC # BLD AUTO: 15.32 10*3/MM3 (ref 3.4–10.8)
WBC # BLD AUTO: 15.84 10*3/MM3 (ref 3.4–10.8)
WBC # BLD AUTO: 16.41 10*3/MM3 (ref 3.4–10.8)
WBC # BLD AUTO: 24.69 10*3/MM3 (ref 3.4–10.8)
WBC # BLD AUTO: 3.08 10*3/MM3 (ref 3.4–10.8)
WBC # BLD AUTO: 3.33 10*3/MM3 (ref 3.4–10.8)
WBC # BLD AUTO: 5.42 10*3/MM3 (ref 3.4–10.8)
WBC # BLD AUTO: 5.61 10*3/MM3 (ref 3.4–10.8)
WBC # BLD AUTO: 6.27 10*3/MM3 (ref 3.4–10.8)
WBC # BLD AUTO: 7.14 10*3/MM3 (ref 3.4–10.8)
WBC # BLD AUTO: 7.16 10*3/MM3 (ref 3.4–10.8)
WBC # BLD AUTO: 8.29 10*3/MM3 (ref 3.4–10.8)
WBC # BLD AUTO: 8.89 10*3/MM3 (ref 3.4–10.8)
WBC # BLD AUTO: 9.45 10*3/MM3 (ref 3.4–10.8)
WBC # BLD AUTO: 9.46 10*3/MM3 (ref 3.4–10.8)
WBC # BLD AUTO: 9.62 10*3/MM3 (ref 3.4–10.8)
WBC # BLD AUTO: 9.9 10*3/MM3 (ref 3.4–10.8)
WBC NRBC COR # BLD: 6.7 10*3/MM3 (ref 3.4–10.8)
WBC NRBC COR # BLD: 8.77 10*3/MM3 (ref 3.4–10.8)
WBC UR QL AUTO: ABNORMAL /HPF
WBC UR QL AUTO: ABNORMAL /HPF

## 2020-01-01 PROCEDURE — 88331 PATH CONSLTJ SURG 1 BLK 1SPC: CPT | Performed by: NEUROLOGICAL SURGERY

## 2020-01-01 PROCEDURE — 03HY32Z INSERTION OF MONITORING DEVICE INTO UPPER ARTERY, PERCUTANEOUS APPROACH: ICD-10-PCS | Performed by: ANESTHESIOLOGY

## 2020-01-01 PROCEDURE — 97130 THER IVNTJ EA ADDL 15 MIN: CPT

## 2020-01-01 PROCEDURE — 36415 COLL VENOUS BLD VENIPUNCTURE: CPT

## 2020-01-01 PROCEDURE — 97112 NEUROMUSCULAR REEDUCATION: CPT | Performed by: PHYSICAL THERAPIST

## 2020-01-01 PROCEDURE — 97535 SELF CARE MNGMENT TRAINING: CPT

## 2020-01-01 PROCEDURE — 99024 POSTOP FOLLOW-UP VISIT: CPT | Performed by: NEUROLOGICAL SURGERY

## 2020-01-01 PROCEDURE — 82962 GLUCOSE BLOOD TEST: CPT

## 2020-01-01 PROCEDURE — 80048 BASIC METABOLIC PNL TOTAL CA: CPT | Performed by: INTERNAL MEDICINE

## 2020-01-01 PROCEDURE — 77336 RADIATION PHYSICS CONSULT: CPT | Performed by: RADIOLOGY

## 2020-01-01 PROCEDURE — 77386 CHG INTENSITY MODULATED RADIATION TX DLVR COMPLEX: CPT | Performed by: RADIOLOGY

## 2020-01-01 PROCEDURE — 92610 EVALUATE SWALLOWING FUNCTION: CPT

## 2020-01-01 PROCEDURE — 99253 IP/OBS CNSLTJ NEW/EST LOW 45: CPT | Performed by: NEUROLOGICAL SURGERY

## 2020-01-01 PROCEDURE — 97110 THERAPEUTIC EXERCISES: CPT

## 2020-01-01 PROCEDURE — 80048 BASIC METABOLIC PNL TOTAL CA: CPT | Performed by: PHYSICAL MEDICINE & REHABILITATION

## 2020-01-01 PROCEDURE — 92526 ORAL FUNCTION THERAPY: CPT

## 2020-01-01 PROCEDURE — 85025 COMPLETE CBC W/AUTO DIFF WBC: CPT | Performed by: PHYSICAL MEDICINE & REHABILITATION

## 2020-01-01 PROCEDURE — 85610 PROTHROMBIN TIME: CPT | Performed by: NEUROLOGICAL SURGERY

## 2020-01-01 PROCEDURE — 80053 COMPREHEN METABOLIC PANEL: CPT

## 2020-01-01 PROCEDURE — 63710000001 ONDANSETRON PER 8 MG: Performed by: INTERNAL MEDICINE

## 2020-01-01 PROCEDURE — 80076 HEPATIC FUNCTION PANEL: CPT | Performed by: INTERNAL MEDICINE

## 2020-01-01 PROCEDURE — 99255 IP/OBS CONSLTJ NEW/EST HI 80: CPT | Performed by: INTERNAL MEDICINE

## 2020-01-01 PROCEDURE — 86850 RBC ANTIBODY SCREEN: CPT | Performed by: NURSE PRACTITIONER

## 2020-01-01 PROCEDURE — 25010000002 DEXAMETHASONE PER 1 MG: Performed by: NEUROLOGICAL SURGERY

## 2020-01-01 PROCEDURE — 97112 NEUROMUSCULAR REEDUCATION: CPT

## 2020-01-01 PROCEDURE — 63710000001 DEXAMETHASONE PER 0.25 MG: Performed by: INTERNAL MEDICINE

## 2020-01-01 PROCEDURE — 77386: CPT | Performed by: RADIOLOGY

## 2020-01-01 PROCEDURE — 25010000002 TEMOZOLOMIDE PER 5 MG: Performed by: INTERNAL MEDICINE

## 2020-01-01 PROCEDURE — 63710000001 INSULIN REGULAR HUMAN PER 5 UNITS: Performed by: INTERNAL MEDICINE

## 2020-01-01 PROCEDURE — 97110 THERAPEUTIC EXERCISES: CPT | Performed by: PHYSICAL THERAPIST

## 2020-01-01 PROCEDURE — 70450 CT HEAD/BRAIN W/O DYE: CPT

## 2020-01-01 PROCEDURE — 94799 UNLISTED PULMONARY SVC/PX: CPT

## 2020-01-01 PROCEDURE — 87075 CULTR BACTERIA EXCEPT BLOOD: CPT | Performed by: NEUROLOGICAL SURGERY

## 2020-01-01 PROCEDURE — 97535 SELF CARE MNGMENT TRAINING: CPT | Performed by: OCCUPATIONAL THERAPIST

## 2020-01-01 PROCEDURE — 93306 TTE W/DOPPLER COMPLETE: CPT | Performed by: INTERNAL MEDICINE

## 2020-01-01 PROCEDURE — G0463 HOSPITAL OUTPT CLINIC VISIT: HCPCS

## 2020-01-01 PROCEDURE — 25010000002 DEXAMETHASONE PER 1 MG: Performed by: INTERNAL MEDICINE

## 2020-01-01 PROCEDURE — 25010000002 PROPOFOL 10 MG/ML EMULSION: Performed by: ANESTHESIOLOGY

## 2020-01-01 PROCEDURE — 82945 GLUCOSE OTHER FLUID: CPT | Performed by: NEUROLOGICAL SURGERY

## 2020-01-01 PROCEDURE — 85025 COMPLETE CBC W/AUTO DIFF WBC: CPT | Performed by: NEUROLOGICAL SURGERY

## 2020-01-01 PROCEDURE — 97530 THERAPEUTIC ACTIVITIES: CPT

## 2020-01-01 PROCEDURE — 25010000002 GEMCITABINE 1 GM/26.3ML SOLUTION 26.3 ML VIAL: Performed by: INTERNAL MEDICINE

## 2020-01-01 PROCEDURE — 85025 COMPLETE CBC W/AUTO DIFF WBC: CPT

## 2020-01-01 PROCEDURE — 93005 ELECTROCARDIOGRAM TRACING: CPT

## 2020-01-01 PROCEDURE — 77014 CHG CT GUIDANCE RADIATION THERAPY FLDS PLACEMENT: CPT | Performed by: RADIOLOGY

## 2020-01-01 PROCEDURE — 25010000002 GADOTERIDOL PER 1 ML: Performed by: NURSE PRACTITIONER

## 2020-01-01 PROCEDURE — 80048 BASIC METABOLIC PNL TOTAL CA: CPT | Performed by: HOSPITALIST

## 2020-01-01 PROCEDURE — 92507 TX SP LANG VOICE COMM INDIV: CPT

## 2020-01-01 PROCEDURE — 74230 X-RAY XM SWLNG FUNCJ C+: CPT

## 2020-01-01 PROCEDURE — 97162 PT EVAL MOD COMPLEX 30 MIN: CPT

## 2020-01-01 PROCEDURE — 77427 RADIATION TX MANAGEMENT X5: CPT | Performed by: RADIOLOGY

## 2020-01-01 PROCEDURE — 99214 OFFICE O/P EST MOD 30 MIN: CPT | Performed by: INTERNAL MEDICINE

## 2020-01-01 PROCEDURE — 87205 SMEAR GRAM STAIN: CPT | Performed by: NEUROLOGICAL SURGERY

## 2020-01-01 PROCEDURE — 25010000002 NEOSTIGMINE PER 0.5 MG: Performed by: NURSE ANESTHETIST, CERTIFIED REGISTERED

## 2020-01-01 PROCEDURE — 99233 SBSQ HOSP IP/OBS HIGH 50: CPT | Performed by: INTERNAL MEDICINE

## 2020-01-01 PROCEDURE — 81001 URINALYSIS AUTO W/SCOPE: CPT | Performed by: NURSE PRACTITIONER

## 2020-01-01 PROCEDURE — 97129 THER IVNTJ 1ST 15 MIN: CPT

## 2020-01-01 PROCEDURE — 99232 SBSQ HOSP IP/OBS MODERATE 35: CPT | Performed by: INTERNAL MEDICINE

## 2020-01-01 PROCEDURE — 71260 CT THORAX DX C+: CPT

## 2020-01-01 PROCEDURE — 63710000001 INSULIN REGULAR HUMAN PER 5 UNITS: Performed by: PHYSICAL MEDICINE & REHABILITATION

## 2020-01-01 PROCEDURE — 25010000002 DEXAMETHASONE SODIUM PHOSPHATE 100 MG/10ML SOLUTION: Performed by: INTERNAL MEDICINE

## 2020-01-01 PROCEDURE — 80048 BASIC METABOLIC PNL TOTAL CA: CPT | Performed by: NEUROLOGICAL SURGERY

## 2020-01-01 PROCEDURE — A9579 GAD-BASE MR CONTRAST NOS,1ML: HCPCS | Performed by: NURSE PRACTITIONER

## 2020-01-01 PROCEDURE — 99231 SBSQ HOSP IP/OBS SF/LOW 25: CPT | Performed by: INTERNAL MEDICINE

## 2020-01-01 PROCEDURE — 99215 OFFICE O/P EST HI 40 MIN: CPT | Performed by: INTERNAL MEDICINE

## 2020-01-01 PROCEDURE — 61510 CRNEC TREPH EXC BRN TUM STTL: CPT | Performed by: SPECIALIST/TECHNOLOGIST, OTHER

## 2020-01-01 PROCEDURE — 69990 MICROSURGERY ADD-ON: CPT | Performed by: SPECIALIST/TECHNOLOGIST, OTHER

## 2020-01-01 PROCEDURE — 87086 URINE CULTURE/COLONY COUNT: CPT | Performed by: PHYSICAL MEDICINE & REHABILITATION

## 2020-01-01 PROCEDURE — 25010000002 IOPAMIDOL 61 % SOLUTION: Performed by: HOSPITALIST

## 2020-01-01 PROCEDURE — 25010000003 CEFAZOLIN IN DEXTROSE 2-4 GM/100ML-% SOLUTION: Performed by: ANESTHESIOLOGY

## 2020-01-01 PROCEDURE — 99396 PREV VISIT EST AGE 40-64: CPT | Performed by: OBSTETRICS & GYNECOLOGY

## 2020-01-01 PROCEDURE — 97166 OT EVAL MOD COMPLEX 45 MIN: CPT

## 2020-01-01 PROCEDURE — 96374 THER/PROPH/DIAG INJ IV PUSH: CPT

## 2020-01-01 PROCEDURE — 99231 SBSQ HOSP IP/OBS SF/LOW 25: CPT | Performed by: NEUROLOGICAL SURGERY

## 2020-01-01 PROCEDURE — 63710000001 METHYLPREDNISOLONE 4 MG TABLET THERAPY PACK 21 EACH DISP PACK: Performed by: PHYSICAL MEDICINE & REHABILITATION

## 2020-01-01 PROCEDURE — 77263 THER RADIOLOGY TX PLNG CPLX: CPT | Performed by: RADIOLOGY

## 2020-01-01 PROCEDURE — 0 DIATRIZOATE MEGLUMINE & SODIUM PER 1 ML: Performed by: HOSPITALIST

## 2020-01-01 PROCEDURE — 77067 SCR MAMMO BI INCL CAD: CPT | Performed by: OBSTETRICS & GYNECOLOGY

## 2020-01-01 PROCEDURE — 96413 CHEMO IV INFUSION 1 HR: CPT

## 2020-01-01 PROCEDURE — 85025 COMPLETE CBC W/AUTO DIFF WBC: CPT | Performed by: INTERNAL MEDICINE

## 2020-01-01 PROCEDURE — 25010000002 ONDANSETRON PER 1 MG: Performed by: NEUROLOGICAL SURGERY

## 2020-01-01 PROCEDURE — 0 IOPAMIDOL PER 1 ML: Performed by: HOSPITALIST

## 2020-01-01 PROCEDURE — 99284 EMERGENCY DEPT VISIT MOD MDM: CPT

## 2020-01-01 PROCEDURE — C1729 CATH, DRAINAGE: HCPCS | Performed by: NEUROLOGICAL SURGERY

## 2020-01-01 PROCEDURE — 25010000003 CEFTRIAXONE PER 250 MG: Performed by: INTERNAL MEDICINE

## 2020-01-01 PROCEDURE — 25010000002 FENTANYL CITRATE (PF) 100 MCG/2ML SOLUTION: Performed by: ANESTHESIOLOGY

## 2020-01-01 PROCEDURE — 63710000001 METHYLPREDNISOLONE 4 MG TABLET THERAPY PACK 21 EACH DISP PACK: Performed by: NURSE PRACTITIONER

## 2020-01-01 PROCEDURE — 92611 MOTION FLUOROSCOPY/SWALLOW: CPT

## 2020-01-01 PROCEDURE — 25010000002 GADOTERIDOL PER 1 ML: Performed by: INTERNAL MEDICINE

## 2020-01-01 PROCEDURE — A9579 GAD-BASE MR CONTRAST NOS,1ML: HCPCS | Performed by: NEUROLOGICAL SURGERY

## 2020-01-01 PROCEDURE — 80069 RENAL FUNCTION PANEL: CPT | Performed by: INTERNAL MEDICINE

## 2020-01-01 PROCEDURE — 87070 CULTURE OTHR SPECIMN AEROBIC: CPT | Performed by: NEUROLOGICAL SURGERY

## 2020-01-01 PROCEDURE — 99024 POSTOP FOLLOW-UP VISIT: CPT | Performed by: NURSE PRACTITIONER

## 2020-01-01 PROCEDURE — 77338 DESIGN MLC DEVICE FOR IMRT: CPT | Performed by: RADIOLOGY

## 2020-01-01 PROCEDURE — 86901 BLOOD TYPING SEROLOGIC RH(D): CPT | Performed by: NURSE PRACTITIONER

## 2020-01-01 PROCEDURE — C1713 ANCHOR/SCREW BN/BN,TIS/BN: HCPCS | Performed by: NEUROLOGICAL SURGERY

## 2020-01-01 PROCEDURE — 84443 ASSAY THYROID STIM HORMONE: CPT | Performed by: NURSE PRACTITIONER

## 2020-01-01 PROCEDURE — 25010000003 CEFAZOLIN IN DEXTROSE 2-4 GM/100ML-% SOLUTION: Performed by: NEUROLOGICAL SURGERY

## 2020-01-01 PROCEDURE — 25010000002 DEXAMETHASONE PER 1 MG: Performed by: HOSPITALIST

## 2020-01-01 PROCEDURE — 97112 NEUROMUSCULAR REEDUCATION: CPT | Performed by: OCCUPATIONAL THERAPIST

## 2020-01-01 PROCEDURE — 99231 SBSQ HOSP IP/OBS SF/LOW 25: CPT | Performed by: NURSE PRACTITIONER

## 2020-01-01 PROCEDURE — 97530 THERAPEUTIC ACTIVITIES: CPT | Performed by: PHYSICAL THERAPIST

## 2020-01-01 PROCEDURE — 25010000002 GADOTERIDOL PER 1 ML: Performed by: NEUROLOGICAL SURGERY

## 2020-01-01 PROCEDURE — 77300 RADIATION THERAPY DOSE PLAN: CPT | Performed by: RADIOLOGY

## 2020-01-01 PROCEDURE — 70553 MRI BRAIN STEM W/O & W/DYE: CPT

## 2020-01-01 PROCEDURE — 99214 OFFICE O/P EST MOD 30 MIN: CPT | Performed by: NURSE PRACTITIONER

## 2020-01-01 PROCEDURE — 84132 ASSAY OF SERUM POTASSIUM: CPT | Performed by: HOSPITALIST

## 2020-01-01 PROCEDURE — 87015 SPECIMEN INFECT AGNT CONCNTJ: CPT | Performed by: NEUROLOGICAL SURGERY

## 2020-01-01 PROCEDURE — A9577 INJ MULTIHANCE: HCPCS | Performed by: PHYSICAL MEDICINE & REHABILITATION

## 2020-01-01 PROCEDURE — 25010000002 LEVETIRACETAM IN NACL 0.82% 500 MG/100ML SOLUTION: Performed by: NURSE PRACTITIONER

## 2020-01-01 PROCEDURE — 25010000002 DEXAMETHASONE PER 1 MG: Performed by: ANESTHESIOLOGY

## 2020-01-01 PROCEDURE — 84157 ASSAY OF PROTEIN OTHER: CPT | Performed by: NEUROLOGICAL SURGERY

## 2020-01-01 PROCEDURE — 97129 THER IVNTJ 1ST 15 MIN: CPT | Performed by: SPEECH-LANGUAGE PATHOLOGIST

## 2020-01-01 PROCEDURE — 25010000002 VANCOMYCIN 1 G RECONSTITUTED SOLUTION 1 EACH VIAL: Performed by: NEUROLOGICAL SURGERY

## 2020-01-01 PROCEDURE — 82378 CARCINOEMBRYONIC ANTIGEN: CPT | Performed by: HOSPITALIST

## 2020-01-01 PROCEDURE — 25010000002 HYDROMORPHONE PER 4 MG: Performed by: NEUROLOGICAL SURGERY

## 2020-01-01 PROCEDURE — 25010000002 HYDROMORPHONE PER 4 MG: Performed by: NURSE ANESTHETIST, CERTIFIED REGISTERED

## 2020-01-01 PROCEDURE — 80053 COMPREHEN METABOLIC PANEL: CPT | Performed by: NURSE PRACTITIONER

## 2020-01-01 PROCEDURE — C9803 HOPD COVID-19 SPEC COLLECT: HCPCS | Performed by: NURSE PRACTITIONER

## 2020-01-01 PROCEDURE — 87635 SARS-COV-2 COVID-19 AMP PRB: CPT | Performed by: NURSE PRACTITIONER

## 2020-01-01 PROCEDURE — 85730 THROMBOPLASTIN TIME PARTIAL: CPT | Performed by: NEUROLOGICAL SURGERY

## 2020-01-01 PROCEDURE — 97163 PT EVAL HIGH COMPLEX 45 MIN: CPT

## 2020-01-01 PROCEDURE — 85610 PROTHROMBIN TIME: CPT | Performed by: NURSE PRACTITIONER

## 2020-01-01 PROCEDURE — A9579 GAD-BASE MR CONTRAST NOS,1ML: HCPCS | Performed by: INTERNAL MEDICINE

## 2020-01-01 PROCEDURE — 77063 BREAST TOMOSYNTHESIS BI: CPT | Performed by: OBSTETRICS & GYNECOLOGY

## 2020-01-01 PROCEDURE — 61510 CRNEC TREPH EXC BRN TUM STTL: CPT | Performed by: NEUROLOGICAL SURGERY

## 2020-01-01 PROCEDURE — 93306 TTE W/DOPPLER COMPLETE: CPT

## 2020-01-01 PROCEDURE — 77334 RADIATION TREATMENT AID(S): CPT | Performed by: RADIOLOGY

## 2020-01-01 PROCEDURE — 81001 URINALYSIS AUTO W/SCOPE: CPT | Performed by: PHYSICAL MEDICINE & REHABILITATION

## 2020-01-01 PROCEDURE — 83735 ASSAY OF MAGNESIUM: CPT | Performed by: HOSPITALIST

## 2020-01-01 PROCEDURE — 0 GADOBENATE DIMEGLUMINE 529 MG/ML SOLUTION: Performed by: PHYSICAL MEDICINE & REHABILITATION

## 2020-01-01 PROCEDURE — 25810000003 SODIUM CHLORIDE 0.9 % WITH KCL 20 MEQ 20-0.9 MEQ/L-% SOLUTION: Performed by: NEUROLOGICAL SURGERY

## 2020-01-01 PROCEDURE — 25010000002 MIDAZOLAM PER 1 MG: Performed by: ANESTHESIOLOGY

## 2020-01-01 PROCEDURE — 83735 ASSAY OF MAGNESIUM: CPT | Performed by: INTERNAL MEDICINE

## 2020-01-01 PROCEDURE — 88307 TISSUE EXAM BY PATHOLOGIST: CPT | Performed by: NEUROLOGICAL SURGERY

## 2020-01-01 PROCEDURE — 97116 GAIT TRAINING THERAPY: CPT | Performed by: PHYSICAL THERAPIST

## 2020-01-01 PROCEDURE — 99253 IP/OBS CNSLTJ NEW/EST LOW 45: CPT | Performed by: RADIOLOGY

## 2020-01-01 PROCEDURE — 97130 THER IVNTJ EA ADDL 15 MIN: CPT | Performed by: SPEECH-LANGUAGE PATHOLOGIST

## 2020-01-01 PROCEDURE — 70496 CT ANGIOGRAPHY HEAD: CPT

## 2020-01-01 PROCEDURE — 96375 TX/PRO/DX INJ NEW DRUG ADDON: CPT

## 2020-01-01 PROCEDURE — 77063 BREAST TOMOSYNTHESIS BI: CPT | Performed by: RADIOLOGY

## 2020-01-01 PROCEDURE — 83735 ASSAY OF MAGNESIUM: CPT | Performed by: NURSE PRACTITIONER

## 2020-01-01 PROCEDURE — 92523 SPEECH SOUND LANG COMPREHEN: CPT

## 2020-01-01 PROCEDURE — 25010000002 ONDANSETRON PER 1 MG: Performed by: HOSPITALIST

## 2020-01-01 PROCEDURE — 25010000002 DEXAMETHASONE SODIUM PHOSPHATE 10 MG/ML SOLUTION: Performed by: NURSE PRACTITIONER

## 2020-01-01 PROCEDURE — 80053 COMPREHEN METABOLIC PANEL: CPT | Performed by: INTERNAL MEDICINE

## 2020-01-01 PROCEDURE — 88334 PATH CONSLTJ SURG CYTO XM EA: CPT | Performed by: NEUROLOGICAL SURGERY

## 2020-01-01 PROCEDURE — G0432 EIA HIV-1/HIV-2 SCREEN: HCPCS | Performed by: INTERNAL MEDICINE

## 2020-01-01 PROCEDURE — 74177 CT ABD & PELVIS W/CONTRAST: CPT

## 2020-01-01 PROCEDURE — 93005 ELECTROCARDIOGRAM TRACING: CPT | Performed by: NURSE PRACTITIONER

## 2020-01-01 PROCEDURE — 85025 COMPLETE CBC W/AUTO DIFF WBC: CPT | Performed by: HOSPITALIST

## 2020-01-01 PROCEDURE — 77067 SCR MAMMO BI INCL CAD: CPT | Performed by: RADIOLOGY

## 2020-01-01 PROCEDURE — 85025 COMPLETE CBC W/AUTO DIFF WBC: CPT | Performed by: NURSE PRACTITIONER

## 2020-01-01 PROCEDURE — 99443 PR PHYS/QHP TELEPHONE EVALUATION 21-30 MIN: CPT | Performed by: NURSE PRACTITIONER

## 2020-01-01 PROCEDURE — 87040 BLOOD CULTURE FOR BACTERIA: CPT | Performed by: INTERNAL MEDICINE

## 2020-01-01 PROCEDURE — 99225 PR SBSQ OBSERVATION CARE/DAY 25 MINUTES: CPT | Performed by: RADIOLOGY

## 2020-01-01 PROCEDURE — 97166 OT EVAL MOD COMPLEX 45 MIN: CPT | Performed by: OCCUPATIONAL THERAPIST

## 2020-01-01 PROCEDURE — 77301 RADIOTHERAPY DOSE PLAN IMRT: CPT | Performed by: RADIOLOGY

## 2020-01-01 PROCEDURE — 69990 MICROSURGERY ADD-ON: CPT | Performed by: NEUROLOGICAL SURGERY

## 2020-01-01 PROCEDURE — 00B70ZX EXCISION OF CEREBRAL HEMISPHERE, OPEN APPROACH, DIAGNOSTIC: ICD-10-PCS | Performed by: NEUROLOGICAL SURGERY

## 2020-01-01 PROCEDURE — 89050 BODY FLUID CELL COUNT: CPT | Performed by: NEUROLOGICAL SURGERY

## 2020-01-01 PROCEDURE — 86900 BLOOD TYPING SEROLOGIC ABO: CPT | Performed by: NURSE PRACTITIONER

## 2020-01-01 PROCEDURE — 84484 ASSAY OF TROPONIN QUANT: CPT | Performed by: NURSE PRACTITIONER

## 2020-01-01 PROCEDURE — 87493 C DIFF AMPLIFIED PROBE: CPT | Performed by: NURSE PRACTITIONER

## 2020-01-01 PROCEDURE — 85730 THROMBOPLASTIN TIME PARTIAL: CPT | Performed by: NURSE PRACTITIONER

## 2020-01-01 DEVICE — PLT CRANI LEVELONE LEVELONE LP STR TI 2HL LNG: Type: IMPLANTABLE DEVICE | Site: BRAIN | Status: FUNCTIONAL

## 2020-01-01 DEVICE — DURAGEN® PLUS DURAL REGENERATION MATRIX, 2 IN X 2 IN (5 CM X 5 CM)
Type: IMPLANTABLE DEVICE | Site: BRAIN | Status: FUNCTIONAL
Brand: DURAGEN® PLUS

## 2020-01-01 DEVICE — FLOSEAL HEMOSTATIC MATRIX, 5ML
Type: IMPLANTABLE DEVICE | Site: BRAIN | Status: FUNCTIONAL
Brand: FLOSEAL HEMOSTATIC MATRIX

## 2020-01-01 DEVICE — ABSORBABLE HEMOSTAT (OXIDIZED REGENERATED CELLULOSE, U.S.P.)
Type: IMPLANTABLE DEVICE | Site: BRAIN | Status: FUNCTIONAL
Brand: SURGICEL FIBRILLAR

## 2020-01-01 DEVICE — SSC BONE WAX
Type: IMPLANTABLE DEVICE | Site: BRAIN | Status: FUNCTIONAL
Brand: SSC BONE WAX

## 2020-01-01 DEVICE — SCRW CRS/DRV DRL FREE MICRO TI 1.5X4MM: Type: IMPLANTABLE DEVICE | Site: CRANIAL | Status: FUNCTIONAL

## 2020-01-01 DEVICE — CATH 46420 EDM LUMBAR 80CM/OPEN TIP: Type: IMPLANTABLE DEVICE | Site: SPINE LUMBAR | Status: FUNCTIONAL

## 2020-01-01 RX ORDER — AMITRIPTYLINE HYDROCHLORIDE 50 MG/1
50 TABLET, FILM COATED ORAL NIGHTLY PRN
Status: DISCONTINUED | OUTPATIENT
Start: 2020-01-01 | End: 2020-01-01

## 2020-01-01 RX ORDER — METHYLPREDNISOLONE 4 MG/1
4 TABLET ORAL
Status: COMPLETED | OUTPATIENT
Start: 2020-01-01 | End: 2020-01-01

## 2020-01-01 RX ORDER — SODIUM CHLORIDE 9 MG/ML
250 INJECTION, SOLUTION INTRAVENOUS ONCE
Status: CANCELLED | OUTPATIENT
Start: 2020-01-01

## 2020-01-01 RX ORDER — ACETAMINOPHEN 650 MG/1
650 SUPPOSITORY RECTAL EVERY 4 HOURS PRN
Status: DISCONTINUED | OUTPATIENT
Start: 2020-01-01 | End: 2020-01-01

## 2020-01-01 RX ORDER — ACETAMINOPHEN 160 MG/5ML
650 SOLUTION ORAL EVERY 4 HOURS PRN
Status: DISCONTINUED | OUTPATIENT
Start: 2020-01-01 | End: 2020-01-01

## 2020-01-01 RX ORDER — CEFTRIAXONE 2 G/50ML
2 INJECTION, SOLUTION INTRAVENOUS EVERY 12 HOURS
Status: DISCONTINUED | OUTPATIENT
Start: 2020-01-01 | End: 2020-01-01

## 2020-01-01 RX ORDER — CLOTRIMAZOLE 10 MG/1
10 LOZENGE ORAL; TOPICAL 3 TIMES DAILY
Qty: 90 TABLET | Refills: 0 | Status: SHIPPED | OUTPATIENT
Start: 2020-01-01 | End: 2020-01-01 | Stop reason: SDUPTHER

## 2020-01-01 RX ORDER — DEXTROSE MONOHYDRATE 25 G/50ML
25 INJECTION, SOLUTION INTRAVENOUS
Status: DISCONTINUED | OUTPATIENT
Start: 2020-01-01 | End: 2020-01-01

## 2020-01-01 RX ORDER — DEXAMETHASONE 4 MG/1
4 TABLET ORAL EVERY 12 HOURS SCHEDULED
Qty: 60 TABLET | Refills: 3 | Status: SHIPPED | OUTPATIENT
Start: 2020-01-01 | End: 2021-01-01 | Stop reason: SDUPTHER

## 2020-01-01 RX ORDER — ONDANSETRON 2 MG/ML
4 INJECTION INTRAMUSCULAR; INTRAVENOUS EVERY 6 HOURS PRN
Status: DISCONTINUED | OUTPATIENT
Start: 2020-01-01 | End: 2020-01-01

## 2020-01-01 RX ORDER — TEMOZOLOMIDE 20 MG/1
20 CAPSULE ORAL DAILY
Qty: 5 CAPSULE | Refills: 6 | Status: SHIPPED | OUTPATIENT
Start: 2020-01-01 | End: 2020-01-01

## 2020-01-01 RX ORDER — DEXTROSE MONOHYDRATE 25 G/50ML
25 INJECTION, SOLUTION INTRAVENOUS
Status: CANCELLED | OUTPATIENT
Start: 2020-01-01

## 2020-01-01 RX ORDER — SULFAMETHOXAZOLE AND TRIMETHOPRIM 800; 160 MG/1; MG/1
TABLET ORAL
Qty: 15 TABLET | Refills: 6 | Status: SHIPPED | OUTPATIENT
Start: 2020-01-01 | End: 2020-01-01 | Stop reason: SDUPTHER

## 2020-01-01 RX ORDER — ACETAMINOPHEN 160 MG/5ML
650 SOLUTION ORAL EVERY 4 HOURS PRN
Status: DISCONTINUED | OUTPATIENT
Start: 2020-01-01 | End: 2020-01-01 | Stop reason: HOSPADM

## 2020-01-01 RX ORDER — LIDOCAINE HYDROCHLORIDE 10 MG/ML
0.5 INJECTION, SOLUTION EPIDURAL; INFILTRATION; INTRACAUDAL; PERINEURAL ONCE AS NEEDED
Status: DISCONTINUED | OUTPATIENT
Start: 2020-01-01 | End: 2020-01-01 | Stop reason: HOSPADM

## 2020-01-01 RX ORDER — UREA 10 %
1 LOTION (ML) TOPICAL NIGHTLY
Qty: 90 TABLET | Refills: 0 | Status: SHIPPED | OUTPATIENT
Start: 2020-01-01 | End: 2020-01-01

## 2020-01-01 RX ORDER — LEVETIRACETAM 500 MG/1
500 TABLET ORAL EVERY 12 HOURS SCHEDULED
Status: DISCONTINUED | OUTPATIENT
Start: 2020-01-01 | End: 2020-01-01 | Stop reason: HOSPADM

## 2020-01-01 RX ORDER — NYSTATIN 100000 U/G
CREAM TOPICAL EVERY 12 HOURS SCHEDULED
Status: DISCONTINUED | OUTPATIENT
Start: 2020-01-01 | End: 2020-01-01

## 2020-01-01 RX ORDER — METHYLPREDNISOLONE 4 MG/1
4 TABLET ORAL
Status: DISCONTINUED | OUTPATIENT
Start: 2020-01-01 | End: 2020-01-01 | Stop reason: HOSPADM

## 2020-01-01 RX ORDER — CEPHALEXIN 500 MG/1
500 CAPSULE ORAL EVERY 6 HOURS SCHEDULED
Status: DISCONTINUED | OUTPATIENT
Start: 2020-01-01 | End: 2020-01-01

## 2020-01-01 RX ORDER — CEFAZOLIN SODIUM 2 G/100ML
INJECTION, SOLUTION INTRAVENOUS AS NEEDED
Status: DISCONTINUED | OUTPATIENT
Start: 2020-01-01 | End: 2020-01-01 | Stop reason: SURG

## 2020-01-01 RX ORDER — METHYLPREDNISOLONE 4 MG/1
4 TABLET ORAL
Status: CANCELLED | OUTPATIENT
Start: 2020-01-01 | End: 2020-01-01

## 2020-01-01 RX ORDER — LEVETIRACETAM 100 MG/ML
500 SOLUTION ORAL EVERY 12 HOURS SCHEDULED
Status: DISCONTINUED | OUTPATIENT
Start: 2020-01-01 | End: 2020-01-01 | Stop reason: HOSPADM

## 2020-01-01 RX ORDER — LEVETIRACETAM 500 MG/1
500 TABLET ORAL EVERY 12 HOURS SCHEDULED
Qty: 180 TABLET | Refills: 0 | Status: SHIPPED | OUTPATIENT
Start: 2020-01-01 | End: 2020-01-01 | Stop reason: SDUPTHER

## 2020-01-01 RX ORDER — POTASSIUM CHLORIDE 1.5 G/1.77G
40 POWDER, FOR SOLUTION ORAL AS NEEDED
Status: CANCELLED | OUTPATIENT
Start: 2020-01-01

## 2020-01-01 RX ORDER — ONDANSETRON 4 MG/1
4 TABLET, FILM COATED ORAL EVERY 6 HOURS PRN
Status: DISCONTINUED | OUTPATIENT
Start: 2020-01-01 | End: 2020-01-01

## 2020-01-01 RX ORDER — ONDANSETRON HYDROCHLORIDE 8 MG/1
8 TABLET, FILM COATED ORAL EVERY 8 HOURS PRN
Qty: 30 TABLET | Refills: 1 | Status: SHIPPED | OUTPATIENT
Start: 2020-01-01 | End: 2021-01-01 | Stop reason: SDUPTHER

## 2020-01-01 RX ORDER — NALOXONE HCL 0.4 MG/ML
0.2 VIAL (ML) INJECTION AS NEEDED
Status: DISCONTINUED | OUTPATIENT
Start: 2020-01-01 | End: 2020-01-01 | Stop reason: HOSPADM

## 2020-01-01 RX ORDER — ONDANSETRON 4 MG/1
4 TABLET, FILM COATED ORAL EVERY 6 HOURS PRN
Status: CANCELLED | OUTPATIENT
Start: 2020-01-01

## 2020-01-01 RX ORDER — CLOTRIMAZOLE 10 MG/1
10 LOZENGE ORAL; TOPICAL 3 TIMES DAILY
Qty: 90 TABLET | Refills: 6 | Status: SHIPPED | OUTPATIENT
Start: 2020-01-01 | End: 2021-01-01

## 2020-01-01 RX ORDER — PROPOFOL 10 MG/ML
VIAL (ML) INTRAVENOUS AS NEEDED
Status: DISCONTINUED | OUTPATIENT
Start: 2020-01-01 | End: 2020-01-01 | Stop reason: SURG

## 2020-01-01 RX ORDER — ACETAMINOPHEN 325 MG/1
650 TABLET ORAL EVERY 4 HOURS PRN
Status: DISCONTINUED | OUTPATIENT
Start: 2020-01-01 | End: 2020-01-01

## 2020-01-01 RX ORDER — EPHEDRINE SULFATE 50 MG/ML
5 INJECTION, SOLUTION INTRAVENOUS ONCE AS NEEDED
Status: DISCONTINUED | OUTPATIENT
Start: 2020-01-01 | End: 2020-01-01 | Stop reason: HOSPADM

## 2020-01-01 RX ORDER — FAMOTIDINE 10 MG/ML
20 INJECTION, SOLUTION INTRAVENOUS EVERY 12 HOURS SCHEDULED
Status: CANCELLED | OUTPATIENT
Start: 2020-01-01

## 2020-01-01 RX ORDER — SULFAMETHOXAZOLE AND TRIMETHOPRIM 800; 160 MG/1; MG/1
TABLET ORAL
Qty: 15 TABLET | Refills: 6 | Status: SHIPPED | OUTPATIENT
Start: 2020-01-01 | End: 2020-01-01

## 2020-01-01 RX ORDER — POTASSIUM CHLORIDE 750 MG/1
40 CAPSULE, EXTENDED RELEASE ORAL AS NEEDED
Status: CANCELLED | OUTPATIENT
Start: 2020-01-01

## 2020-01-01 RX ORDER — POTASSIUM CHLORIDE 750 MG/1
20 TABLET, EXTENDED RELEASE ORAL DAILY
Qty: 30 TABLET | Refills: 1 | Status: SHIPPED | OUTPATIENT
Start: 2020-01-01 | End: 2020-01-01 | Stop reason: SDUPTHER

## 2020-01-01 RX ORDER — SODIUM CHLORIDE AND POTASSIUM CHLORIDE 150; 900 MG/100ML; MG/100ML
100 INJECTION, SOLUTION INTRAVENOUS CONTINUOUS
Status: DISCONTINUED | OUTPATIENT
Start: 2020-01-01 | End: 2020-01-01

## 2020-01-01 RX ORDER — OLANZAPINE 2.5 MG/1
2.5 TABLET ORAL NIGHTLY
Qty: 30 TABLET | Refills: 6 | Status: SHIPPED | OUTPATIENT
Start: 2020-01-01 | End: 2020-01-01 | Stop reason: SDUPTHER

## 2020-01-01 RX ORDER — HYDROMORPHONE HYDROCHLORIDE 1 MG/ML
0.5 INJECTION, SOLUTION INTRAMUSCULAR; INTRAVENOUS; SUBCUTANEOUS
Status: DISCONTINUED | OUTPATIENT
Start: 2020-01-01 | End: 2020-01-01

## 2020-01-01 RX ORDER — LABETALOL HYDROCHLORIDE 5 MG/ML
5 INJECTION, SOLUTION INTRAVENOUS
Status: DISCONTINUED | OUTPATIENT
Start: 2020-01-01 | End: 2020-01-01 | Stop reason: HOSPADM

## 2020-01-01 RX ORDER — HYDROCODONE BITARTRATE AND ACETAMINOPHEN 5; 325 MG/1; MG/1
1 TABLET ORAL EVERY 4 HOURS PRN
Status: CANCELLED | OUTPATIENT
Start: 2020-01-01 | End: 2020-01-01

## 2020-01-01 RX ORDER — METRONIDAZOLE 500 MG/1
500 TABLET ORAL EVERY 8 HOURS SCHEDULED
Status: DISCONTINUED | OUTPATIENT
Start: 2020-01-01 | End: 2020-01-01

## 2020-01-01 RX ORDER — SODIUM CHLORIDE 9 MG/ML
250 INJECTION, SOLUTION INTRAVENOUS ONCE
Status: CANCELLED | OUTPATIENT
Start: 2021-01-01

## 2020-01-01 RX ORDER — DEXAMETHASONE SODIUM PHOSPHATE 4 MG/ML
1 INJECTION, SOLUTION INTRA-ARTICULAR; INTRALESIONAL; INTRAMUSCULAR; INTRAVENOUS; SOFT TISSUE EVERY 6 HOURS
Status: DISCONTINUED | OUTPATIENT
Start: 2020-01-01 | End: 2020-01-01

## 2020-01-01 RX ORDER — METHYLPREDNISOLONE 4 MG/1
8 TABLET ORAL
Status: COMPLETED | OUTPATIENT
Start: 2020-01-01 | End: 2020-01-01

## 2020-01-01 RX ORDER — CEFAZOLIN SODIUM 2 G/100ML
2 INJECTION, SOLUTION INTRAVENOUS EVERY 8 HOURS
Status: COMPLETED | OUTPATIENT
Start: 2020-01-01 | End: 2020-01-01

## 2020-01-01 RX ORDER — DEXAMETHASONE SODIUM PHOSPHATE 10 MG/ML
10 INJECTION, SOLUTION INTRAMUSCULAR; INTRAVENOUS ONCE
Status: COMPLETED | OUTPATIENT
Start: 2020-01-01 | End: 2020-01-01

## 2020-01-01 RX ORDER — DEXAMETHASONE 4 MG/1
4 TABLET ORAL EVERY 12 HOURS SCHEDULED
Status: DISCONTINUED | OUTPATIENT
Start: 2020-01-01 | End: 2020-01-01 | Stop reason: HOSPADM

## 2020-01-01 RX ORDER — ONDANSETRON 4 MG/1
8 TABLET, FILM COATED ORAL EVERY 8 HOURS PRN
Status: DISCONTINUED | OUTPATIENT
Start: 2020-01-01 | End: 2020-01-01

## 2020-01-01 RX ORDER — ERGOCALCIFEROL (VITAMIN D2) 10 MCG
TABLET ORAL DAILY
COMMUNITY
End: 2021-01-01

## 2020-01-01 RX ORDER — DEXAMETHASONE SODIUM PHOSPHATE 10 MG/ML
INJECTION INTRAMUSCULAR; INTRAVENOUS AS NEEDED
Status: DISCONTINUED | OUTPATIENT
Start: 2020-01-01 | End: 2020-01-01 | Stop reason: SURG

## 2020-01-01 RX ORDER — TEMOZOLOMIDE 100 MG/1
200 CAPSULE ORAL DAILY
Qty: 10 CAPSULE | Refills: 6 | Status: SHIPPED | OUTPATIENT
Start: 2020-01-01 | End: 2020-01-01

## 2020-01-01 RX ORDER — LORAZEPAM 2 MG/ML
1 INJECTION INTRAMUSCULAR EVERY 6 HOURS PRN
Status: DISCONTINUED | OUTPATIENT
Start: 2020-01-01 | End: 2020-01-01

## 2020-01-01 RX ORDER — METHOCARBAMOL 500 MG/1
500 TABLET, FILM COATED ORAL EVERY 6 HOURS PRN
Status: DISCONTINUED | OUTPATIENT
Start: 2020-01-01 | End: 2020-01-01

## 2020-01-01 RX ORDER — AMOXICILLIN 250 MG
2 CAPSULE ORAL 2 TIMES DAILY PRN
Status: DISCONTINUED | OUTPATIENT
Start: 2020-01-01 | End: 2020-01-01 | Stop reason: HOSPADM

## 2020-01-01 RX ORDER — MIDAZOLAM HYDROCHLORIDE 1 MG/ML
1 INJECTION INTRAMUSCULAR; INTRAVENOUS
Status: DISCONTINUED | OUTPATIENT
Start: 2020-01-01 | End: 2020-01-01 | Stop reason: HOSPADM

## 2020-01-01 RX ORDER — ONDANSETRON 4 MG/1
4 TABLET, FILM COATED ORAL EVERY 6 HOURS PRN
Status: DISCONTINUED | OUTPATIENT
Start: 2020-01-01 | End: 2020-01-01 | Stop reason: HOSPADM

## 2020-01-01 RX ORDER — PROMETHAZINE HYDROCHLORIDE 25 MG/1
25 TABLET ORAL ONCE AS NEEDED
Status: DISCONTINUED | OUTPATIENT
Start: 2020-01-01 | End: 2020-01-01 | Stop reason: HOSPADM

## 2020-01-01 RX ORDER — HYDROCODONE BITARTRATE AND ACETAMINOPHEN 7.5; 325 MG/1; MG/1
1 TABLET ORAL ONCE AS NEEDED
Status: DISCONTINUED | OUTPATIENT
Start: 2020-01-01 | End: 2020-01-01 | Stop reason: HOSPADM

## 2020-01-01 RX ORDER — SODIUM CHLORIDE 9 MG/ML
250 INJECTION, SOLUTION INTRAVENOUS ONCE
Status: COMPLETED | OUTPATIENT
Start: 2020-01-01 | End: 2020-01-01

## 2020-01-01 RX ORDER — OLANZAPINE 2.5 MG/1
2.5 TABLET ORAL NIGHTLY
Qty: 30 TABLET | Refills: 6 | Status: SHIPPED | OUTPATIENT
Start: 2020-01-01 | End: 2021-01-01

## 2020-01-01 RX ORDER — SODIUM CHLORIDE 0.9 % (FLUSH) 0.9 %
10 SYRINGE (ML) INJECTION AS NEEDED
Status: DISCONTINUED | OUTPATIENT
Start: 2020-01-01 | End: 2020-01-01 | Stop reason: HOSPADM

## 2020-01-01 RX ORDER — SODIUM CHLORIDE 0.9 % (FLUSH) 0.9 %
10 SYRINGE (ML) INJECTION AS NEEDED
Status: DISCONTINUED | OUTPATIENT
Start: 2020-01-01 | End: 2020-01-01

## 2020-01-01 RX ORDER — CEPHALEXIN 500 MG/1
500 CAPSULE ORAL 2 TIMES DAILY
Status: ON HOLD | COMMUNITY
End: 2020-01-01

## 2020-01-01 RX ORDER — TEMOZOLOMIDE 100 MG/1
100 CAPSULE ORAL DAILY
Qty: 30 CAPSULE | Refills: 1 | Status: SHIPPED | OUTPATIENT
Start: 2020-01-01 | End: 2020-01-01 | Stop reason: SDUPTHER

## 2020-01-01 RX ORDER — POTASSIUM CHLORIDE 1.5 G/1.77G
40 POWDER, FOR SOLUTION ORAL AS NEEDED
Status: DISCONTINUED | OUTPATIENT
Start: 2020-01-01 | End: 2020-01-01 | Stop reason: HOSPADM

## 2020-01-01 RX ORDER — PANTOPRAZOLE SODIUM 40 MG/1
40 TABLET, DELAYED RELEASE ORAL
Status: DISCONTINUED | OUTPATIENT
Start: 2020-01-01 | End: 2020-01-01 | Stop reason: HOSPADM

## 2020-01-01 RX ORDER — HYDROMORPHONE HYDROCHLORIDE 1 MG/ML
0.5 INJECTION, SOLUTION INTRAMUSCULAR; INTRAVENOUS; SUBCUTANEOUS
Status: DISCONTINUED | OUTPATIENT
Start: 2020-01-01 | End: 2020-01-01 | Stop reason: HOSPADM

## 2020-01-01 RX ORDER — FAMOTIDINE 10 MG/ML
20 INJECTION, SOLUTION INTRAVENOUS EVERY 12 HOURS SCHEDULED
Status: DISCONTINUED | OUTPATIENT
Start: 2020-01-01 | End: 2020-01-01 | Stop reason: HOSPADM

## 2020-01-01 RX ORDER — ACETAMINOPHEN 325 MG/1
650 TABLET ORAL ONCE AS NEEDED
Status: DISCONTINUED | OUTPATIENT
Start: 2020-01-01 | End: 2020-01-01 | Stop reason: HOSPADM

## 2020-01-01 RX ORDER — POTASSIUM CHLORIDE 750 MG/1
20 TABLET, EXTENDED RELEASE ORAL DAILY
Qty: 30 TABLET | Refills: 1 | Status: SHIPPED | OUTPATIENT
Start: 2020-01-01 | End: 2021-01-01

## 2020-01-01 RX ORDER — SODIUM CHLORIDE 0.9 % (FLUSH) 0.9 %
10 SYRINGE (ML) INJECTION EVERY 12 HOURS SCHEDULED
Status: CANCELLED | OUTPATIENT
Start: 2020-01-01

## 2020-01-01 RX ORDER — NYSTATIN 100000 U/G
CREAM TOPICAL 2 TIMES DAILY PRN
Status: DISCONTINUED | OUTPATIENT
Start: 2020-01-01 | End: 2020-01-01

## 2020-01-01 RX ORDER — AMOXICILLIN 250 MG
2 CAPSULE ORAL NIGHTLY
Status: DISCONTINUED | OUTPATIENT
Start: 2020-01-01 | End: 2020-01-01

## 2020-01-01 RX ORDER — LEVETIRACETAM 500 MG/1
500 TABLET ORAL EVERY 12 HOURS SCHEDULED
Qty: 180 TABLET | Refills: 3 | Status: SHIPPED | OUTPATIENT
Start: 2020-01-01 | End: 2020-01-01 | Stop reason: SDUPTHER

## 2020-01-01 RX ORDER — AMOXICILLIN 250 MG
2 CAPSULE ORAL 2 TIMES DAILY PRN
Start: 2020-01-01 | End: 2020-01-01

## 2020-01-01 RX ORDER — POTASSIUM CHLORIDE 750 MG/1
40 CAPSULE, EXTENDED RELEASE ORAL AS NEEDED
Status: DISCONTINUED | OUTPATIENT
Start: 2020-01-01 | End: 2020-01-01 | Stop reason: HOSPADM

## 2020-01-01 RX ORDER — FAMOTIDINE 40 MG/1
40 TABLET, FILM COATED ORAL 2 TIMES DAILY
Qty: 60 TABLET | Refills: 3 | Status: SHIPPED | OUTPATIENT
Start: 2020-01-01

## 2020-01-01 RX ORDER — SODIUM CHLORIDE 9 MG/ML
INJECTION, SOLUTION INTRAVENOUS AS NEEDED
Status: DISCONTINUED | OUTPATIENT
Start: 2020-01-01 | End: 2020-01-01 | Stop reason: HOSPADM

## 2020-01-01 RX ORDER — HYDRALAZINE HYDROCHLORIDE 20 MG/ML
5 INJECTION INTRAMUSCULAR; INTRAVENOUS
Status: DISCONTINUED | OUTPATIENT
Start: 2020-01-01 | End: 2020-01-01 | Stop reason: HOSPADM

## 2020-01-01 RX ORDER — ACETAMINOPHEN 650 MG/1
650 SUPPOSITORY RECTAL EVERY 4 HOURS PRN
Status: DISCONTINUED | OUTPATIENT
Start: 2020-01-01 | End: 2020-01-01 | Stop reason: HOSPADM

## 2020-01-01 RX ORDER — PROMETHAZINE HYDROCHLORIDE 25 MG/1
25 SUPPOSITORY RECTAL ONCE AS NEEDED
Status: DISCONTINUED | OUTPATIENT
Start: 2020-01-01 | End: 2020-01-01 | Stop reason: HOSPADM

## 2020-01-01 RX ORDER — ESMOLOL HYDROCHLORIDE 10 MG/ML
INJECTION INTRAVENOUS AS NEEDED
Status: DISCONTINUED | OUTPATIENT
Start: 2020-01-01 | End: 2020-01-01 | Stop reason: SURG

## 2020-01-01 RX ORDER — SODIUM CHLORIDE 0.9 % (FLUSH) 0.9 %
10 SYRINGE (ML) INJECTION AS NEEDED
Status: CANCELLED | OUTPATIENT
Start: 2020-01-01

## 2020-01-01 RX ORDER — POTASSIUM CHLORIDE 7.45 MG/ML
10 INJECTION INTRAVENOUS
Status: DISCONTINUED | OUTPATIENT
Start: 2020-01-01 | End: 2020-01-01

## 2020-01-01 RX ORDER — TEMOZOLOMIDE 5 MG/1
10 CAPSULE ORAL DAILY
Qty: 60 CAPSULE | Refills: 1 | Status: SHIPPED | OUTPATIENT
Start: 2020-01-01 | End: 2020-01-01 | Stop reason: SDUPTHER

## 2020-01-01 RX ORDER — ROCURONIUM BROMIDE 10 MG/ML
INJECTION, SOLUTION INTRAVENOUS AS NEEDED
Status: DISCONTINUED | OUTPATIENT
Start: 2020-01-01 | End: 2020-01-01 | Stop reason: SURG

## 2020-01-01 RX ORDER — CLOTRIMAZOLE 10 MG/1
10 LOZENGE ORAL; TOPICAL 3 TIMES DAILY
Status: DISCONTINUED | OUTPATIENT
Start: 2020-01-01 | End: 2020-01-01 | Stop reason: HOSPADM

## 2020-01-01 RX ORDER — ONDANSETRON 2 MG/ML
4 INJECTION INTRAMUSCULAR; INTRAVENOUS ONCE AS NEEDED
Status: DISCONTINUED | OUTPATIENT
Start: 2020-01-01 | End: 2020-01-01 | Stop reason: HOSPADM

## 2020-01-01 RX ORDER — DEXTROSE MONOHYDRATE 25 G/50ML
25 INJECTION, SOLUTION INTRAVENOUS
Status: DISCONTINUED | OUTPATIENT
Start: 2020-01-01 | End: 2020-01-01 | Stop reason: HOSPADM

## 2020-01-01 RX ORDER — FLUMAZENIL 0.1 MG/ML
0.2 INJECTION INTRAVENOUS AS NEEDED
Status: DISCONTINUED | OUTPATIENT
Start: 2020-01-01 | End: 2020-01-01 | Stop reason: HOSPADM

## 2020-01-01 RX ORDER — LEVETIRACETAM 500 MG/1
500 TABLET ORAL 2 TIMES DAILY
Status: DISCONTINUED | OUTPATIENT
Start: 2020-01-01 | End: 2020-01-01

## 2020-01-01 RX ORDER — FENTANYL CITRATE 50 UG/ML
INJECTION, SOLUTION INTRAMUSCULAR; INTRAVENOUS AS NEEDED
Status: DISCONTINUED | OUTPATIENT
Start: 2020-01-01 | End: 2020-01-01 | Stop reason: SURG

## 2020-01-01 RX ORDER — TEMOZOLOMIDE 5 MG/1
5 CAPSULE ORAL DAILY
Qty: 5 CAPSULE | Refills: 6 | Status: SHIPPED | OUTPATIENT
Start: 2020-01-01 | End: 2020-01-01

## 2020-01-01 RX ORDER — SODIUM CHLORIDE 0.9 % (FLUSH) 0.9 %
3 SYRINGE (ML) INJECTION EVERY 12 HOURS SCHEDULED
Status: DISCONTINUED | OUTPATIENT
Start: 2020-01-01 | End: 2020-01-01 | Stop reason: HOSPADM

## 2020-01-01 RX ORDER — FAMOTIDINE 20 MG/1
20 TABLET, FILM COATED ORAL 2 TIMES DAILY
Status: DISCONTINUED | OUTPATIENT
Start: 2020-01-01 | End: 2020-01-01

## 2020-01-01 RX ORDER — ACETAMINOPHEN 325 MG/1
650 TABLET ORAL EVERY 4 HOURS PRN
Status: DISCONTINUED | OUTPATIENT
Start: 2020-01-01 | End: 2020-01-01 | Stop reason: HOSPADM

## 2020-01-01 RX ORDER — DEXAMETHASONE 4 MG/1
4 TABLET ORAL EVERY 8 HOURS SCHEDULED
Status: DISCONTINUED | OUTPATIENT
Start: 2020-01-01 | End: 2020-01-01

## 2020-01-01 RX ORDER — PANTOPRAZOLE SODIUM 40 MG/1
40 TABLET, DELAYED RELEASE ORAL DAILY
Qty: 90 TABLET | Refills: 3 | Status: SHIPPED | OUTPATIENT
Start: 2020-01-01 | End: 2020-01-01 | Stop reason: SDUPTHER

## 2020-01-01 RX ORDER — OXYCODONE AND ACETAMINOPHEN 7.5; 325 MG/1; MG/1
1 TABLET ORAL ONCE AS NEEDED
Status: DISCONTINUED | OUTPATIENT
Start: 2020-01-01 | End: 2020-01-01 | Stop reason: HOSPADM

## 2020-01-01 RX ORDER — DEXAMETHASONE SODIUM PHOSPHATE 4 MG/ML
4 INJECTION, SOLUTION INTRA-ARTICULAR; INTRALESIONAL; INTRAMUSCULAR; INTRAVENOUS; SOFT TISSUE ONCE
Status: COMPLETED | OUTPATIENT
Start: 2020-01-01 | End: 2020-01-01

## 2020-01-01 RX ORDER — SODIUM CHLORIDE 0.9 % (FLUSH) 0.9 %
3-10 SYRINGE (ML) INJECTION AS NEEDED
Status: DISCONTINUED | OUTPATIENT
Start: 2020-01-01 | End: 2020-01-01 | Stop reason: HOSPADM

## 2020-01-01 RX ORDER — ONDANSETRON 4 MG/1
8 TABLET, FILM COATED ORAL EVERY 8 HOURS PRN
Status: DISCONTINUED | OUTPATIENT
Start: 2020-01-01 | End: 2020-01-01 | Stop reason: HOSPADM

## 2020-01-01 RX ORDER — HYDROCODONE BITARTRATE AND ACETAMINOPHEN 5; 325 MG/1; MG/1
1 TABLET ORAL EVERY 4 HOURS PRN
Status: DISCONTINUED | OUTPATIENT
Start: 2020-01-01 | End: 2020-01-01

## 2020-01-01 RX ORDER — ACETAMINOPHEN 325 MG/1
650 TABLET ORAL EVERY 4 HOURS PRN
Status: CANCELLED | OUTPATIENT
Start: 2020-01-01

## 2020-01-01 RX ORDER — SODIUM CHLORIDE, SODIUM ACETATE ANHYDROUS, SODIUM GLUCONATE, POTASSIUM CHLORIDE, AND MAGNESIUM CHLORIDE 526; 222; 502; 37; 30 MG/100ML; MG/100ML; MG/100ML; MG/100ML; MG/100ML
IRRIGANT IRRIGATION AS NEEDED
Status: DISCONTINUED | OUTPATIENT
Start: 2020-01-01 | End: 2020-01-01 | Stop reason: HOSPADM

## 2020-01-01 RX ORDER — AMITRIPTYLINE HYDROCHLORIDE 50 MG/1
50 TABLET, FILM COATED ORAL NIGHTLY PRN
Status: CANCELLED | OUTPATIENT
Start: 2020-01-01

## 2020-01-01 RX ORDER — LIDOCAINE HYDROCHLORIDE 20 MG/ML
INJECTION, SOLUTION INFILTRATION; PERINEURAL AS NEEDED
Status: DISCONTINUED | OUTPATIENT
Start: 2020-01-01 | End: 2020-01-01 | Stop reason: SURG

## 2020-01-01 RX ORDER — ACETAMINOPHEN 160 MG/5ML
650 SOLUTION ORAL EVERY 4 HOURS PRN
Status: CANCELLED | OUTPATIENT
Start: 2020-01-01

## 2020-01-01 RX ORDER — UREA 10 %
1 LOTION (ML) TOPICAL NIGHTLY
Status: DISCONTINUED | OUTPATIENT
Start: 2020-01-01 | End: 2020-01-01 | Stop reason: HOSPADM

## 2020-01-01 RX ORDER — POLYETHYLENE GLYCOL 3350 17 G/17G
17 POWDER, FOR SOLUTION ORAL DAILY PRN
Start: 2020-01-01 | End: 2020-01-01

## 2020-01-01 RX ORDER — SULFAMETHOXAZOLE AND TRIMETHOPRIM 800; 160 MG/1; MG/1
1 TABLET ORAL 3 TIMES WEEKLY
Status: DISCONTINUED | OUTPATIENT
Start: 2020-01-01 | End: 2020-01-01 | Stop reason: HOSPADM

## 2020-01-01 RX ORDER — NICOTINE POLACRILEX 4 MG
15 LOZENGE BUCCAL
Status: DISCONTINUED | OUTPATIENT
Start: 2020-01-01 | End: 2020-01-01

## 2020-01-01 RX ORDER — SULFAMETHOXAZOLE AND TRIMETHOPRIM 800; 160 MG/1; MG/1
TABLET ORAL
Qty: 15 TABLET | Refills: 1 | Status: SHIPPED | OUTPATIENT
Start: 2020-01-01 | End: 2020-01-01 | Stop reason: SDUPTHER

## 2020-01-01 RX ORDER — ONDANSETRON HYDROCHLORIDE 8 MG/1
8 TABLET, FILM COATED ORAL EVERY 8 HOURS PRN
Qty: 30 TABLET | Refills: 1 | Status: SHIPPED | OUTPATIENT
Start: 2020-01-01 | End: 2020-01-01 | Stop reason: SDUPTHER

## 2020-01-01 RX ORDER — SODIUM CHLORIDE 0.9 % (FLUSH) 0.9 %
10 SYRINGE (ML) INJECTION EVERY 12 HOURS SCHEDULED
Status: DISCONTINUED | OUTPATIENT
Start: 2020-01-01 | End: 2020-01-01 | Stop reason: HOSPADM

## 2020-01-01 RX ORDER — CLOTRIMAZOLE 10 MG/1
10 LOZENGE ORAL; TOPICAL 3 TIMES DAILY
Qty: 90 TABLET | Refills: 6 | Status: SHIPPED | OUTPATIENT
Start: 2020-01-01 | End: 2020-01-01 | Stop reason: SDUPTHER

## 2020-01-01 RX ORDER — DIPHENHYDRAMINE HCL 25 MG
25 CAPSULE ORAL
Status: DISCONTINUED | OUTPATIENT
Start: 2020-01-01 | End: 2020-01-01 | Stop reason: HOSPADM

## 2020-01-01 RX ORDER — LEVETIRACETAM 100 MG/ML
500 SOLUTION ORAL EVERY 12 HOURS SCHEDULED
Status: DISCONTINUED | OUTPATIENT
Start: 2020-01-01 | End: 2020-01-01

## 2020-01-01 RX ORDER — ONDANSETRON 2 MG/ML
4 INJECTION INTRAMUSCULAR; INTRAVENOUS EVERY 6 HOURS PRN
Status: CANCELLED | OUTPATIENT
Start: 2020-01-01

## 2020-01-01 RX ORDER — TEMOZOLOMIDE 5 MG/1
10 CAPSULE ORAL NIGHTLY
Status: DISCONTINUED | OUTPATIENT
Start: 2020-01-01 | End: 2020-01-01 | Stop reason: HOSPADM

## 2020-01-01 RX ORDER — NICOTINE POLACRILEX 4 MG
15 LOZENGE BUCCAL
Status: CANCELLED | OUTPATIENT
Start: 2020-01-01

## 2020-01-01 RX ORDER — NALOXONE HCL 0.4 MG/ML
0.4 VIAL (ML) INJECTION
Status: CANCELLED | OUTPATIENT
Start: 2020-01-01

## 2020-01-01 RX ORDER — DEXAMETHASONE SODIUM PHOSPHATE 4 MG/ML
4 INJECTION, SOLUTION INTRA-ARTICULAR; INTRALESIONAL; INTRAMUSCULAR; INTRAVENOUS; SOFT TISSUE EVERY 6 HOURS
Status: DISCONTINUED | OUTPATIENT
Start: 2020-01-01 | End: 2020-01-01

## 2020-01-01 RX ORDER — FENTANYL CITRATE 50 UG/ML
50 INJECTION, SOLUTION INTRAMUSCULAR; INTRAVENOUS
Status: DISCONTINUED | OUTPATIENT
Start: 2020-01-01 | End: 2020-01-01 | Stop reason: HOSPADM

## 2020-01-01 RX ORDER — HYDROMORPHONE HCL 110MG/55ML
PATIENT CONTROLLED ANALGESIA SYRINGE INTRAVENOUS AS NEEDED
Status: DISCONTINUED | OUTPATIENT
Start: 2020-01-01 | End: 2020-01-01 | Stop reason: SURG

## 2020-01-01 RX ORDER — GLYCOPYRROLATE 0.2 MG/ML
INJECTION INTRAMUSCULAR; INTRAVENOUS AS NEEDED
Status: DISCONTINUED | OUTPATIENT
Start: 2020-01-01 | End: 2020-01-01 | Stop reason: SURG

## 2020-01-01 RX ORDER — ONDANSETRON 2 MG/ML
4 INJECTION INTRAMUSCULAR; INTRAVENOUS EVERY 6 HOURS PRN
Status: DISCONTINUED | OUTPATIENT
Start: 2020-01-01 | End: 2020-01-01 | Stop reason: HOSPADM

## 2020-01-01 RX ORDER — NICOTINE POLACRILEX 4 MG
15 LOZENGE BUCCAL
Status: DISCONTINUED | OUTPATIENT
Start: 2020-01-01 | End: 2020-01-01 | Stop reason: HOSPADM

## 2020-01-01 RX ORDER — LEVETIRACETAM 5 MG/ML
500 INJECTION INTRAVASCULAR EVERY 12 HOURS SCHEDULED
Status: DISCONTINUED | OUTPATIENT
Start: 2020-01-01 | End: 2020-01-01

## 2020-01-01 RX ORDER — NALOXONE HCL 0.4 MG/ML
0.4 VIAL (ML) INJECTION
Status: DISCONTINUED | OUTPATIENT
Start: 2020-01-01 | End: 2020-01-01 | Stop reason: HOSPADM

## 2020-01-01 RX ORDER — LEVETIRACETAM 500 MG/1
500 TABLET ORAL EVERY 12 HOURS SCHEDULED
Qty: 180 TABLET | Refills: 3 | Status: SHIPPED | OUTPATIENT
Start: 2020-01-01

## 2020-01-01 RX ORDER — METHYLPREDNISOLONE 4 MG/1
4 TABLET ORAL
Status: ACTIVE | OUTPATIENT
Start: 2020-01-01 | End: 2020-01-01

## 2020-01-01 RX ORDER — TEMOZOLOMIDE 5 MG/1
CAPSULE ORAL
Qty: 24 CAPSULE | OUTPATIENT
Start: 2020-01-01

## 2020-01-01 RX ORDER — DEXAMETHASONE SODIUM PHOSPHATE 4 MG/ML
2 INJECTION, SOLUTION INTRA-ARTICULAR; INTRALESIONAL; INTRAMUSCULAR; INTRAVENOUS; SOFT TISSUE EVERY 6 HOURS
Status: DISCONTINUED | OUTPATIENT
Start: 2020-01-01 | End: 2020-01-01

## 2020-01-01 RX ORDER — POTASSIUM CHLORIDE 750 MG/1
40 CAPSULE, EXTENDED RELEASE ORAL AS NEEDED
Status: DISCONTINUED | OUTPATIENT
Start: 2020-01-01 | End: 2020-01-01

## 2020-01-01 RX ORDER — PANTOPRAZOLE SODIUM 40 MG/1
40 TABLET, DELAYED RELEASE ORAL DAILY
Qty: 90 TABLET | Refills: 3 | Status: SHIPPED | OUTPATIENT
Start: 2020-01-01 | End: 2020-01-01

## 2020-01-01 RX ORDER — POTASSIUM CHLORIDE 1.5 G/1.77G
40 POWDER, FOR SOLUTION ORAL AS NEEDED
Status: DISCONTINUED | OUTPATIENT
Start: 2020-01-01 | End: 2020-01-01

## 2020-01-01 RX ORDER — SODIUM CHLORIDE 0.9 % (FLUSH) 0.9 %
10 SYRINGE (ML) INJECTION EVERY 12 HOURS SCHEDULED
Status: DISCONTINUED | OUTPATIENT
Start: 2020-01-01 | End: 2020-01-01

## 2020-01-01 RX ORDER — POLYETHYLENE GLYCOL 3350 17 G/17G
17 POWDER, FOR SOLUTION ORAL ONCE
Status: COMPLETED | OUTPATIENT
Start: 2020-01-01 | End: 2020-01-01

## 2020-01-01 RX ORDER — HYDROMORPHONE HYDROCHLORIDE 1 MG/ML
0.5 INJECTION, SOLUTION INTRAMUSCULAR; INTRAVENOUS; SUBCUTANEOUS
Status: CANCELLED | OUTPATIENT
Start: 2020-01-01 | End: 2020-01-01

## 2020-01-01 RX ORDER — DEXMEDETOMIDINE HYDROCHLORIDE 100 UG/ML
INJECTION, SOLUTION INTRAVENOUS AS NEEDED
Status: DISCONTINUED | OUTPATIENT
Start: 2020-01-01 | End: 2020-01-01 | Stop reason: SURG

## 2020-01-01 RX ORDER — SODIUM CHLORIDE, SODIUM LACTATE, POTASSIUM CHLORIDE, CALCIUM CHLORIDE 600; 310; 30; 20 MG/100ML; MG/100ML; MG/100ML; MG/100ML
9 INJECTION, SOLUTION INTRAVENOUS CONTINUOUS
Status: DISCONTINUED | OUTPATIENT
Start: 2020-01-01 | End: 2020-01-01

## 2020-01-01 RX ORDER — TEMOZOLOMIDE 100 MG/1
CAPSULE ORAL
Qty: 12 CAPSULE | OUTPATIENT
Start: 2020-01-01

## 2020-01-01 RX ORDER — PANTOPRAZOLE SODIUM 40 MG/1
40 TABLET, DELAYED RELEASE ORAL DAILY
Qty: 90 TABLET | Refills: 0 | Status: SHIPPED | OUTPATIENT
Start: 2020-01-01 | End: 2020-01-01 | Stop reason: SDUPTHER

## 2020-01-01 RX ORDER — POTASSIUM CHLORIDE 7.45 MG/ML
10 INJECTION INTRAVENOUS
Status: CANCELLED | OUTPATIENT
Start: 2020-01-01

## 2020-01-01 RX ORDER — PROMETHAZINE HYDROCHLORIDE 25 MG/ML
6.25 INJECTION, SOLUTION INTRAMUSCULAR; INTRAVENOUS
Status: DISCONTINUED | OUTPATIENT
Start: 2020-01-01 | End: 2020-01-01 | Stop reason: HOSPADM

## 2020-01-01 RX ORDER — FAMOTIDINE 10 MG/ML
20 INJECTION, SOLUTION INTRAVENOUS EVERY 12 HOURS SCHEDULED
Status: DISCONTINUED | OUTPATIENT
Start: 2020-01-01 | End: 2020-01-01

## 2020-01-01 RX ORDER — UREA 10 %
2 LOTION (ML) TOPICAL NIGHTLY
Status: COMPLETED | OUTPATIENT
Start: 2020-01-01 | End: 2020-01-01

## 2020-01-01 RX ORDER — DEXAMETHASONE 4 MG/1
4 TABLET ORAL EVERY 6 HOURS SCHEDULED
Status: DISCONTINUED | OUTPATIENT
Start: 2020-01-01 | End: 2020-01-01

## 2020-01-01 RX ORDER — METHOCARBAMOL 500 MG/1
500 TABLET, FILM COATED ORAL EVERY 6 HOURS PRN
Status: CANCELLED | OUTPATIENT
Start: 2020-01-01

## 2020-01-01 RX ORDER — AMITRIPTYLINE HYDROCHLORIDE 50 MG/1
50 TABLET, FILM COATED ORAL NIGHTLY PRN
Status: DISCONTINUED | OUTPATIENT
Start: 2020-01-01 | End: 2020-01-01 | Stop reason: HOSPADM

## 2020-01-01 RX ORDER — DEXAMETHASONE 4 MG/1
4 TABLET ORAL EVERY 12 HOURS SCHEDULED
Qty: 60 TABLET | Refills: 1 | Status: SHIPPED | OUTPATIENT
Start: 2020-01-01 | End: 2020-01-01 | Stop reason: SDUPTHER

## 2020-01-01 RX ORDER — NALOXONE HCL 0.4 MG/ML
0.4 VIAL (ML) INJECTION
Status: DISCONTINUED | OUTPATIENT
Start: 2020-01-01 | End: 2020-01-01

## 2020-01-01 RX ORDER — TEMOZOLOMIDE 100 MG/1
100 CAPSULE ORAL NIGHTLY
Status: DISCONTINUED | OUTPATIENT
Start: 2020-01-01 | End: 2020-01-01 | Stop reason: HOSPADM

## 2020-01-01 RX ORDER — DEXAMETHASONE 2 MG/1
2 TABLET ORAL EVERY 6 HOURS SCHEDULED
Status: DISCONTINUED | OUTPATIENT
Start: 2020-01-01 | End: 2020-01-01

## 2020-01-01 RX ORDER — ACETAMINOPHEN 650 MG/1
650 SUPPOSITORY RECTAL EVERY 4 HOURS PRN
Status: CANCELLED | OUTPATIENT
Start: 2020-01-01

## 2020-01-01 RX ORDER — HYDROXYZINE PAMOATE 25 MG/1
25 CAPSULE ORAL 3 TIMES DAILY PRN
Status: DISCONTINUED | OUTPATIENT
Start: 2020-01-01 | End: 2020-01-01

## 2020-01-01 RX ORDER — METHOCARBAMOL 500 MG/1
500 TABLET, FILM COATED ORAL EVERY 6 HOURS PRN
Status: DISCONTINUED | OUTPATIENT
Start: 2020-01-01 | End: 2020-01-01 | Stop reason: HOSPADM

## 2020-01-01 RX ORDER — HYDROCODONE BITARTRATE AND ACETAMINOPHEN 5; 325 MG/1; MG/1
TABLET ORAL
COMMUNITY
Start: 2020-01-01 | End: 2020-01-01

## 2020-01-01 RX ORDER — DIPHENHYDRAMINE HYDROCHLORIDE 50 MG/ML
12.5 INJECTION INTRAMUSCULAR; INTRAVENOUS
Status: DISCONTINUED | OUTPATIENT
Start: 2020-01-01 | End: 2020-01-01 | Stop reason: HOSPADM

## 2020-01-01 RX ORDER — FAMOTIDINE 10 MG/ML
20 INJECTION, SOLUTION INTRAVENOUS ONCE
Status: COMPLETED | OUTPATIENT
Start: 2020-01-01 | End: 2020-01-01

## 2020-01-01 RX ORDER — DEXAMETHASONE 4 MG/1
4 TABLET ORAL EVERY 12 HOURS SCHEDULED
Qty: 60 TABLET | Refills: 3 | Status: SHIPPED | OUTPATIENT
Start: 2020-01-01 | End: 2020-01-01 | Stop reason: SDUPTHER

## 2020-01-01 RX ORDER — PROMETHAZINE HYDROCHLORIDE 25 MG/ML
12.5 INJECTION, SOLUTION INTRAMUSCULAR; INTRAVENOUS ONCE AS NEEDED
Status: DISCONTINUED | OUTPATIENT
Start: 2020-01-01 | End: 2020-01-01 | Stop reason: HOSPADM

## 2020-01-01 RX ORDER — KETOCONAZOLE 20 MG/G
CREAM TOPICAL 2 TIMES DAILY
Qty: 60 G | Refills: 3 | Status: SHIPPED | OUTPATIENT
Start: 2020-01-01 | End: 2021-01-01

## 2020-01-01 RX ORDER — LORAZEPAM 1 MG/1
1 TABLET ORAL EVERY 6 HOURS PRN
Status: DISCONTINUED | OUTPATIENT
Start: 2020-01-01 | End: 2020-01-01

## 2020-01-01 RX ORDER — LEVETIRACETAM 100 MG/ML
500 SOLUTION ORAL EVERY 12 HOURS SCHEDULED
Status: CANCELLED | OUTPATIENT
Start: 2020-01-01

## 2020-01-01 RX ORDER — HYDROCODONE BITARTRATE AND ACETAMINOPHEN 5; 325 MG/1; MG/1
1 TABLET ORAL EVERY 4 HOURS PRN
Status: DISCONTINUED | OUTPATIENT
Start: 2020-01-01 | End: 2020-01-01 | Stop reason: HOSPADM

## 2020-01-01 RX ORDER — POTASSIUM CHLORIDE 7.45 MG/ML
10 INJECTION INTRAVENOUS
Status: DISCONTINUED | OUTPATIENT
Start: 2020-01-01 | End: 2020-01-01 | Stop reason: HOSPADM

## 2020-01-01 RX ADMIN — FAMOTIDINE 20 MG: 20 TABLET, FILM COATED ORAL at 08:58

## 2020-01-01 RX ADMIN — CLOTRIMAZOLE 10 MG: 10 LOZENGE ORAL; TOPICAL at 16:04

## 2020-01-01 RX ADMIN — SODIUM CHLORIDE 5 MG/HR: 9 INJECTION, SOLUTION INTRAVENOUS at 18:44

## 2020-01-01 RX ADMIN — LORAZEPAM 1 MG: 1 TABLET ORAL at 11:47

## 2020-01-01 RX ADMIN — METRONIDAZOLE 500 MG: 500 INJECTION, SOLUTION INTRAVENOUS at 22:21

## 2020-01-01 RX ADMIN — CEFAZOLIN SODIUM 2 G: 2 INJECTION, SOLUTION INTRAVENOUS at 09:21

## 2020-01-01 RX ADMIN — DEXAMETHASONE SODIUM PHOSPHATE 1 MG: 4 INJECTION, SOLUTION INTRAMUSCULAR; INTRAVENOUS at 04:20

## 2020-01-01 RX ADMIN — LEVETIRACETAM 500 MG: 5 INJECTION INTRAVENOUS at 20:36

## 2020-01-01 RX ADMIN — SODIUM CHLORIDE 10 MG/HR: 9 INJECTION, SOLUTION INTRAVENOUS at 23:59

## 2020-01-01 RX ADMIN — ONDANSETRON HYDROCHLORIDE 4 MG: 2 SOLUTION INTRAMUSCULAR; INTRAVENOUS at 23:23

## 2020-01-01 RX ADMIN — LEVETIRACETAM 500 MG: 100 SOLUTION ORAL at 21:57

## 2020-01-01 RX ADMIN — TEMOZOLOMIDE 100 MG: 100 CAPSULE ORAL at 21:00

## 2020-01-01 RX ADMIN — DEXAMETHASONE 4 MG: 4 TABLET ORAL at 14:34

## 2020-01-01 RX ADMIN — LORAZEPAM 1 MG: 1 TABLET ORAL at 09:24

## 2020-01-01 RX ADMIN — CLOTRIMAZOLE 10 MG: 10 LOZENGE ORAL; TOPICAL at 08:37

## 2020-01-01 RX ADMIN — DEXAMETHASONE 4 MG: 4 TABLET ORAL at 17:27

## 2020-01-01 RX ADMIN — NYSTATIN: 100000 CREAM TOPICAL at 08:48

## 2020-01-01 RX ADMIN — ACETAMINOPHEN 650 MG: 325 TABLET, FILM COATED ORAL at 17:15

## 2020-01-01 RX ADMIN — DEXAMETHASONE SODIUM PHOSPHATE 1 MG: 4 INJECTION, SOLUTION INTRAMUSCULAR; INTRAVENOUS at 21:57

## 2020-01-01 RX ADMIN — TEMOZOLOMIDE 10 MG: 5 CAPSULE ORAL at 21:07

## 2020-01-01 RX ADMIN — SODIUM CHLORIDE, PRESERVATIVE FREE 10 ML: 5 INJECTION INTRAVENOUS at 20:13

## 2020-01-01 RX ADMIN — DEXAMETHASONE SODIUM PHOSPHATE 4 MG: 4 INJECTION, SOLUTION INTRAMUSCULAR; INTRAVENOUS at 11:48

## 2020-01-01 RX ADMIN — GADOTERIDOL 10 ML: 279.3 INJECTION, SOLUTION INTRAVENOUS at 12:00

## 2020-01-01 RX ADMIN — INSULIN HUMAN 2 UNITS: 100 INJECTION, SOLUTION PARENTERAL at 00:30

## 2020-01-01 RX ADMIN — Medication 2 MG: at 20:05

## 2020-01-01 RX ADMIN — METRONIDAZOLE 500 MG: 500 INJECTION, SOLUTION INTRAVENOUS at 05:15

## 2020-01-01 RX ADMIN — NYSTATIN 500000 UNITS: 500000 SUSPENSION ORAL at 22:29

## 2020-01-01 RX ADMIN — ACETAMINOPHEN 650 MG: 325 TABLET, FILM COATED ORAL at 07:27

## 2020-01-01 RX ADMIN — NYSTATIN 500000 UNITS: 500000 SUSPENSION ORAL at 19:52

## 2020-01-01 RX ADMIN — CLOTRIMAZOLE 10 MG: 10 LOZENGE ORAL; TOPICAL at 16:16

## 2020-01-01 RX ADMIN — CLOTRIMAZOLE 10 MG: 10 LOZENGE ORAL; TOPICAL at 20:57

## 2020-01-01 RX ADMIN — LEVETIRACETAM 500 MG: 100 SOLUTION ORAL at 20:24

## 2020-01-01 RX ADMIN — NYSTATIN 500000 UNITS: 500000 SUSPENSION ORAL at 12:38

## 2020-01-01 RX ADMIN — LEVETIRACETAM 500 MG: 100 SOLUTION ORAL at 09:10

## 2020-01-01 RX ADMIN — DEXAMETHASONE 4 MG: 4 TABLET ORAL at 19:52

## 2020-01-01 RX ADMIN — HYDROCODONE BITARTRATE AND ACETAMINOPHEN 1 TABLET: 5; 325 TABLET ORAL at 09:11

## 2020-01-01 RX ADMIN — LEVETIRACETAM 500 MG: 100 SOLUTION ORAL at 08:14

## 2020-01-01 RX ADMIN — Medication 2 MG: at 19:50

## 2020-01-01 RX ADMIN — DEXAMETHASONE 4 MG: 4 TABLET ORAL at 00:29

## 2020-01-01 RX ADMIN — DEXAMETHASONE 4 MG: 4 TABLET ORAL at 07:17

## 2020-01-01 RX ADMIN — FAMOTIDINE 20 MG: 10 INJECTION INTRAVENOUS at 09:13

## 2020-01-01 RX ADMIN — CLOTRIMAZOLE 10 MG: 10 LOZENGE ORAL; TOPICAL at 20:19

## 2020-01-01 RX ADMIN — FAMOTIDINE 20 MG: 20 TABLET, FILM COATED ORAL at 08:14

## 2020-01-01 RX ADMIN — Medication 2 MG: at 22:30

## 2020-01-01 RX ADMIN — GLYCOPYRROLATE 0.4 MG: 0.2 INJECTION INTRAMUSCULAR; INTRAVENOUS at 12:41

## 2020-01-01 RX ADMIN — ACETAMINOPHEN 650 MG: 325 TABLET, FILM COATED ORAL at 22:06

## 2020-01-01 RX ADMIN — DEXAMETHASONE 4 MG: 4 TABLET ORAL at 16:54

## 2020-01-01 RX ADMIN — DEXAMETHASONE SODIUM PHOSPHATE 4 MG: 4 INJECTION, SOLUTION INTRAMUSCULAR; INTRAVENOUS at 06:12

## 2020-01-01 RX ADMIN — TEMOZOLOMIDE 10 MG: 5 CAPSULE ORAL at 22:16

## 2020-01-01 RX ADMIN — DEXAMETHASONE 4 MG: 4 TABLET ORAL at 17:55

## 2020-01-01 RX ADMIN — AMITRIPTYLINE HYDROCHLORIDE 50 MG: 50 TABLET, FILM COATED ORAL at 20:42

## 2020-01-01 RX ADMIN — DEXAMETHASONE 4 MG: 4 TABLET ORAL at 06:21

## 2020-01-01 RX ADMIN — METHYLPREDNISOLONE 4 MG: 4 TABLET ORAL at 18:36

## 2020-01-01 RX ADMIN — ACETAMINOPHEN ORAL SOLUTION 650 MG: 325 SOLUTION ORAL at 09:40

## 2020-01-01 RX ADMIN — DEXAMETHASONE SODIUM PHOSPHATE 1 MG: 4 INJECTION, SOLUTION INTRAMUSCULAR; INTRAVENOUS at 13:54

## 2020-01-01 RX ADMIN — NEOSTIGMINE METHYLSULFATE 2 MG: 1 INJECTION INTRAMUSCULAR; INTRAVENOUS; SUBCUTANEOUS at 12:41

## 2020-01-01 RX ADMIN — NYSTATIN: 100000 CREAM TOPICAL at 22:04

## 2020-01-01 RX ADMIN — POTASSIUM CHLORIDE 40 MEQ: 1.5 POWDER, FOR SOLUTION ORAL at 06:05

## 2020-01-01 RX ADMIN — SODIUM CHLORIDE, PRESERVATIVE FREE 10 ML: 5 INJECTION INTRAVENOUS at 21:42

## 2020-01-01 RX ADMIN — TEMOZOLOMIDE 100 MG: 100 CAPSULE ORAL at 21:40

## 2020-01-01 RX ADMIN — NYSTATIN 1 APPLICATION: 100000 CREAM TOPICAL at 08:59

## 2020-01-01 RX ADMIN — NYSTATIN: 100000 CREAM TOPICAL at 20:00

## 2020-01-01 RX ADMIN — FAMOTIDINE 20 MG: 20 TABLET, FILM COATED ORAL at 21:37

## 2020-01-01 RX ADMIN — FAMOTIDINE 20 MG: 10 INJECTION INTRAVENOUS at 20:43

## 2020-01-01 RX ADMIN — METRONIDAZOLE 500 MG: 500 INJECTION, SOLUTION INTRAVENOUS at 15:00

## 2020-01-01 RX ADMIN — LEVETIRACETAM 500 MG: 500 TABLET, FILM COATED ORAL at 22:10

## 2020-01-01 RX ADMIN — FAMOTIDINE 20 MG: 20 TABLET, FILM COATED ORAL at 09:11

## 2020-01-01 RX ADMIN — DEXAMETHASONE 4 MG: 4 TABLET ORAL at 23:54

## 2020-01-01 RX ADMIN — NYSTATIN 500000 UNITS: 500000 SUSPENSION ORAL at 14:29

## 2020-01-01 RX ADMIN — CEFTRIAXONE SODIUM 2 G: 2 INJECTION, SOLUTION INTRAVENOUS at 14:20

## 2020-01-01 RX ADMIN — SODIUM CHLORIDE 900 MG: 900 INJECTION, SOLUTION INTRAVENOUS at 09:22

## 2020-01-01 RX ADMIN — LEVETIRACETAM 500 MG: 500 TABLET, FILM COATED ORAL at 21:56

## 2020-01-01 RX ADMIN — CLOTRIMAZOLE 10 MG: 10 LOZENGE ORAL; TOPICAL at 10:27

## 2020-01-01 RX ADMIN — CLOTRIMAZOLE 10 MG: 10 LOZENGE ORAL; TOPICAL at 21:12

## 2020-01-01 RX ADMIN — CLOTRIMAZOLE 10 MG: 10 LOZENGE ORAL; TOPICAL at 16:54

## 2020-01-01 RX ADMIN — DEXAMETHASONE 4 MG: 4 TABLET ORAL at 11:44

## 2020-01-01 RX ADMIN — DEXAMETHASONE 4 MG: 4 TABLET ORAL at 06:10

## 2020-01-01 RX ADMIN — NYSTATIN 500000 UNITS: 500000 SUSPENSION ORAL at 18:30

## 2020-01-01 RX ADMIN — DEXAMETHASONE 4 MG: 4 TABLET ORAL at 17:35

## 2020-01-01 RX ADMIN — ONDANSETRON HYDROCHLORIDE 8 MG: 4 TABLET, FILM COATED ORAL at 21:05

## 2020-01-01 RX ADMIN — SODIUM CHLORIDE, PRESERVATIVE FREE 10 ML: 5 INJECTION INTRAVENOUS at 20:36

## 2020-01-01 RX ADMIN — MIDAZOLAM 1 MG: 1 INJECTION INTRAMUSCULAR; INTRAVENOUS at 08:53

## 2020-01-01 RX ADMIN — DEXAMETHASONE SODIUM PHOSPHATE 4 MG: 10 INJECTION INTRAMUSCULAR; INTRAVENOUS at 10:28

## 2020-01-01 RX ADMIN — ROCURONIUM BROMIDE 10 MG: 10 INJECTION INTRAVENOUS at 11:15

## 2020-01-01 RX ADMIN — FAMOTIDINE 20 MG: 10 INJECTION INTRAVENOUS at 21:57

## 2020-01-01 RX ADMIN — Medication 2 MG: at 22:34

## 2020-01-01 RX ADMIN — NYSTATIN 500000 UNITS: 500000 SUSPENSION ORAL at 09:23

## 2020-01-01 RX ADMIN — METRONIDAZOLE 500 MG: 500 INJECTION, SOLUTION INTRAVENOUS at 06:24

## 2020-01-01 RX ADMIN — CLOTRIMAZOLE 10 MG: 10 LOZENGE ORAL; TOPICAL at 08:43

## 2020-01-01 RX ADMIN — SODIUM CHLORIDE 250 ML: 9 INJECTION, SOLUTION INTRAVENOUS at 10:16

## 2020-01-01 RX ADMIN — CEFTRIAXONE SODIUM 2 G: 2 INJECTION, SOLUTION INTRAVENOUS at 15:01

## 2020-01-01 RX ADMIN — LEVETIRACETAM 500 MG: 500 TABLET, FILM COATED ORAL at 21:38

## 2020-01-01 RX ADMIN — ONDANSETRON HYDROCHLORIDE 8 MG: 4 TABLET, FILM COATED ORAL at 20:09

## 2020-01-01 RX ADMIN — CLOTRIMAZOLE 10 MG: 10 LOZENGE ORAL; TOPICAL at 20:41

## 2020-01-01 RX ADMIN — LEVETIRACETAM 500 MG: 500 TABLET, FILM COATED ORAL at 07:34

## 2020-01-01 RX ADMIN — FAMOTIDINE 20 MG: 20 TABLET, FILM COATED ORAL at 08:40

## 2020-01-01 RX ADMIN — ONDANSETRON HYDROCHLORIDE 8 MG: 4 TABLET, FILM COATED ORAL at 21:37

## 2020-01-01 RX ADMIN — METRONIDAZOLE 500 MG: 500 TABLET ORAL at 21:42

## 2020-01-01 RX ADMIN — DEXAMETHASONE 4 MG: 4 TABLET ORAL at 05:26

## 2020-01-01 RX ADMIN — DEXAMETHASONE 4 MG: 4 TABLET ORAL at 17:22

## 2020-01-01 RX ADMIN — TEMOZOLOMIDE 10 MG: 5 CAPSULE ORAL at 21:57

## 2020-01-01 RX ADMIN — ESMOLOL HYDROCHLORIDE 40 MG: 10 INJECTION, SOLUTION INTRAVENOUS at 10:37

## 2020-01-01 RX ADMIN — DEXAMETHASONE SODIUM PHOSPHATE 1 MG: 4 INJECTION, SOLUTION INTRAMUSCULAR; INTRAVENOUS at 09:02

## 2020-01-01 RX ADMIN — CEFAZOLIN SODIUM 2 G: 2 INJECTION, SOLUTION INTRAVENOUS at 01:24

## 2020-01-01 RX ADMIN — LEVETIRACETAM 500 MG: 500 TABLET, FILM COATED ORAL at 08:03

## 2020-01-01 RX ADMIN — SODIUM CHLORIDE 10 MG/HR: 9 INJECTION, SOLUTION INTRAVENOUS at 04:19

## 2020-01-01 RX ADMIN — FAMOTIDINE 20 MG: 20 TABLET, FILM COATED ORAL at 22:10

## 2020-01-01 RX ADMIN — CLOTRIMAZOLE 10 MG: 10 LOZENGE ORAL; TOPICAL at 20:50

## 2020-01-01 RX ADMIN — DEXAMETHASONE 4 MG: 4 TABLET ORAL at 17:30

## 2020-01-01 RX ADMIN — CLOTRIMAZOLE 10 MG: 10 LOZENGE ORAL; TOPICAL at 19:50

## 2020-01-01 RX ADMIN — SULFAMETHOXAZOLE AND TRIMETHOPRIM 160 MG: 800; 160 TABLET ORAL at 08:36

## 2020-01-01 RX ADMIN — FAMOTIDINE 20 MG: 20 TABLET, FILM COATED ORAL at 20:47

## 2020-01-01 RX ADMIN — DEXAMETHASONE 4 MG: 4 TABLET ORAL at 06:02

## 2020-01-01 RX ADMIN — DEXAMETHASONE SODIUM PHOSPHATE 2 MG: 4 INJECTION, SOLUTION INTRAMUSCULAR; INTRAVENOUS at 13:15

## 2020-01-01 RX ADMIN — METHOCARBAMOL TABLETS 500 MG: 500 TABLET, COATED ORAL at 12:34

## 2020-01-01 RX ADMIN — DEXAMETHASONE 4 MG: 4 TABLET ORAL at 11:43

## 2020-01-01 RX ADMIN — SODIUM CHLORIDE 5 MG/HR: 9 INJECTION, SOLUTION INTRAVENOUS at 13:31

## 2020-01-01 RX ADMIN — FAMOTIDINE 20 MG: 20 TABLET, FILM COATED ORAL at 09:23

## 2020-01-01 RX ADMIN — CEFTRIAXONE SODIUM 2 G: 2 INJECTION, SOLUTION INTRAVENOUS at 13:15

## 2020-01-01 RX ADMIN — CLOTRIMAZOLE 10 MG: 10 LOZENGE ORAL; TOPICAL at 21:55

## 2020-01-01 RX ADMIN — DEXAMETHASONE SODIUM PHOSPHATE 2 MG: 4 INJECTION, SOLUTION INTRAMUSCULAR; INTRAVENOUS at 01:24

## 2020-01-01 RX ADMIN — LEVETIRACETAM 500 MG: 500 TABLET, FILM COATED ORAL at 07:40

## 2020-01-01 RX ADMIN — FAMOTIDINE 20 MG: 20 TABLET, FILM COATED ORAL at 09:10

## 2020-01-01 RX ADMIN — FAMOTIDINE 20 MG: 20 TABLET, FILM COATED ORAL at 20:50

## 2020-01-01 RX ADMIN — GADOTERIDOL 10 ML: 279.3 INJECTION, SOLUTION INTRAVENOUS at 11:27

## 2020-01-01 RX ADMIN — CLOTRIMAZOLE 10 MG: 10 LOZENGE ORAL; TOPICAL at 08:58

## 2020-01-01 RX ADMIN — NYSTATIN 500000 UNITS: 500000 SUSPENSION ORAL at 12:25

## 2020-01-01 RX ADMIN — FAMOTIDINE 20 MG: 20 TABLET, FILM COATED ORAL at 20:22

## 2020-01-01 RX ADMIN — FAMOTIDINE 20 MG: 20 TABLET, FILM COATED ORAL at 19:50

## 2020-01-01 RX ADMIN — NYSTATIN: 100000 CREAM TOPICAL at 09:24

## 2020-01-01 RX ADMIN — DEXAMETHASONE SODIUM PHOSPHATE 2 MG: 4 INJECTION, SOLUTION INTRAMUSCULAR; INTRAVENOUS at 18:44

## 2020-01-01 RX ADMIN — Medication 1 MG: at 21:37

## 2020-01-01 RX ADMIN — NYSTATIN: 100000 CREAM TOPICAL at 20:19

## 2020-01-01 RX ADMIN — BARIUM SULFATE 50 ML: 400 SUSPENSION ORAL at 09:05

## 2020-01-01 RX ADMIN — CLOTRIMAZOLE 10 MG: 10 LOZENGE ORAL; TOPICAL at 22:02

## 2020-01-01 RX ADMIN — DOCUSATE SODIUM 50MG AND SENNOSIDES 8.6MG 2 TABLET: 8.6; 5 TABLET, FILM COATED ORAL at 19:52

## 2020-01-01 RX ADMIN — CLOTRIMAZOLE 10 MG: 10 LOZENGE ORAL; TOPICAL at 08:33

## 2020-01-01 RX ADMIN — METHYLPREDNISOLONE 4 MG: 4 TABLET ORAL at 11:55

## 2020-01-01 RX ADMIN — NYSTATIN: 100000 CREAM TOPICAL at 08:38

## 2020-01-01 RX ADMIN — CEFTRIAXONE SODIUM 2 G: 2 INJECTION, SOLUTION INTRAVENOUS at 02:09

## 2020-01-01 RX ADMIN — SODIUM CHLORIDE, PRESERVATIVE FREE 10 ML: 5 INJECTION INTRAVENOUS at 09:16

## 2020-01-01 RX ADMIN — DEXAMETHASONE 4 MG: 4 TABLET ORAL at 17:32

## 2020-01-01 RX ADMIN — DEXAMETHASONE 2 MG: 2 TABLET ORAL at 17:45

## 2020-01-01 RX ADMIN — CEFAZOLIN SODIUM 2 G: 2 INJECTION, SOLUTION INTRAVENOUS at 17:16

## 2020-01-01 RX ADMIN — LEVETIRACETAM 500 MG: 500 TABLET, FILM COATED ORAL at 07:58

## 2020-01-01 RX ADMIN — SODIUM CHLORIDE, PRESERVATIVE FREE 10 ML: 5 INJECTION INTRAVENOUS at 08:30

## 2020-01-01 RX ADMIN — LEVETIRACETAM 500 MG: 100 SOLUTION ORAL at 22:51

## 2020-01-01 RX ADMIN — FAMOTIDINE 20 MG: 20 TABLET, FILM COATED ORAL at 21:55

## 2020-01-01 RX ADMIN — FAMOTIDINE 20 MG: 10 INJECTION INTRAVENOUS at 09:43

## 2020-01-01 RX ADMIN — TEMOZOLOMIDE 100 MG: 100 CAPSULE ORAL at 22:10

## 2020-01-01 RX ADMIN — SULFAMETHOXAZOLE AND TRIMETHOPRIM 160 MG: 800; 160 TABLET ORAL at 07:54

## 2020-01-01 RX ADMIN — TEMOZOLOMIDE 100 MG: 100 CAPSULE ORAL at 22:50

## 2020-01-01 RX ADMIN — LEVETIRACETAM 500 MG: 100 SOLUTION ORAL at 22:03

## 2020-01-01 RX ADMIN — LEVETIRACETAM 500 MG: 100 SOLUTION ORAL at 22:34

## 2020-01-01 RX ADMIN — CEFTRIAXONE SODIUM 2 G: 2 INJECTION, SOLUTION INTRAVENOUS at 01:33

## 2020-01-01 RX ADMIN — DEXAMETHASONE SODIUM PHOSPHATE 10 MG: 10 INJECTION, SOLUTION INTRAMUSCULAR; INTRAVENOUS at 12:48

## 2020-01-01 RX ADMIN — DEXAMETHASONE 4 MG: 4 TABLET ORAL at 05:41

## 2020-01-01 RX ADMIN — NYSTATIN 500000 UNITS: 500000 SUSPENSION ORAL at 18:01

## 2020-01-01 RX ADMIN — IOPAMIDOL 95 ML: 755 INJECTION, SOLUTION INTRAVENOUS at 12:15

## 2020-01-01 RX ADMIN — CLOTRIMAZOLE 10 MG: 10 LOZENGE ORAL; TOPICAL at 08:21

## 2020-01-01 RX ADMIN — DEXAMETHASONE SODIUM PHOSPHATE 4 MG: 4 INJECTION, SOLUTION INTRAMUSCULAR; INTRAVENOUS at 17:55

## 2020-01-01 RX ADMIN — LEVETIRACETAM 500 MG: 500 TABLET, FILM COATED ORAL at 20:41

## 2020-01-01 RX ADMIN — LEVETIRACETAM 500 MG: 500 TABLET, FILM COATED ORAL at 08:33

## 2020-01-01 RX ADMIN — LEVETIRACETAM 500 MG: 100 SOLUTION ORAL at 07:30

## 2020-01-01 RX ADMIN — Medication 2 MG: at 21:32

## 2020-01-01 RX ADMIN — DEXAMETHASONE 4 MG: 4 TABLET ORAL at 06:08

## 2020-01-01 RX ADMIN — CLOTRIMAZOLE 10 MG: 10 LOZENGE ORAL; TOPICAL at 16:05

## 2020-01-01 RX ADMIN — SODIUM CHLORIDE, PRESERVATIVE FREE 10 ML: 5 INJECTION INTRAVENOUS at 21:57

## 2020-01-01 RX ADMIN — DEXAMETHASONE SODIUM PHOSPHATE 2 MG: 4 INJECTION, SOLUTION INTRAMUSCULAR; INTRAVENOUS at 19:32

## 2020-01-01 RX ADMIN — ONDANSETRON HYDROCHLORIDE 8 MG: 4 TABLET, FILM COATED ORAL at 20:44

## 2020-01-01 RX ADMIN — CLOTRIMAZOLE 10 MG: 10 LOZENGE ORAL; TOPICAL at 08:48

## 2020-01-01 RX ADMIN — FAMOTIDINE 20 MG: 20 TABLET, FILM COATED ORAL at 09:22

## 2020-01-01 RX ADMIN — METHYLPREDNISOLONE 4 MG: 4 TABLET ORAL at 18:27

## 2020-01-01 RX ADMIN — SODIUM CHLORIDE 4 MG/HR: 9 INJECTION, SOLUTION INTRAVENOUS at 18:17

## 2020-01-01 RX ADMIN — POLYETHYLENE GLYCOL 3350 17 G: 17 POWDER, FOR SOLUTION ORAL at 17:52

## 2020-01-01 RX ADMIN — DEXAMETHASONE 4 MG: 4 TABLET ORAL at 05:44

## 2020-01-01 RX ADMIN — METRONIDAZOLE 500 MG: 500 INJECTION, SOLUTION INTRAVENOUS at 13:15

## 2020-01-01 RX ADMIN — INSULIN HUMAN 2 UNITS: 100 INJECTION, SOLUTION PARENTERAL at 11:36

## 2020-01-01 RX ADMIN — FAMOTIDINE 20 MG: 20 TABLET, FILM COATED ORAL at 08:33

## 2020-01-01 RX ADMIN — FAMOTIDINE 20 MG: 20 TABLET, FILM COATED ORAL at 20:34

## 2020-01-01 RX ADMIN — LEVETIRACETAM 500 MG: 100 SOLUTION ORAL at 20:46

## 2020-01-01 RX ADMIN — DEXAMETHASONE 4 MG: 4 TABLET ORAL at 18:01

## 2020-01-01 RX ADMIN — NYSTATIN: 100000 CREAM TOPICAL at 21:33

## 2020-01-01 RX ADMIN — FAMOTIDINE 20 MG: 20 TABLET, FILM COATED ORAL at 08:22

## 2020-01-01 RX ADMIN — CLOTRIMAZOLE 10 MG: 10 LOZENGE ORAL; TOPICAL at 16:48

## 2020-01-01 RX ADMIN — LEVETIRACETAM 500 MG: 100 SOLUTION ORAL at 22:59

## 2020-01-01 RX ADMIN — INSULIN HUMAN 2 UNITS: 100 INJECTION, SOLUTION PARENTERAL at 17:26

## 2020-01-01 RX ADMIN — SODIUM CHLORIDE, PRESERVATIVE FREE 10 ML: 5 INJECTION INTRAVENOUS at 08:47

## 2020-01-01 RX ADMIN — PROPOFOL 150 MG: 10 INJECTION, EMULSION INTRAVENOUS at 10:17

## 2020-01-01 RX ADMIN — LEVETIRACETAM 500 MG: 500 TABLET, FILM COATED ORAL at 22:02

## 2020-01-01 RX ADMIN — FAMOTIDINE 20 MG: 20 TABLET, FILM COATED ORAL at 21:56

## 2020-01-01 RX ADMIN — CLOTRIMAZOLE 10 MG: 10 LOZENGE ORAL; TOPICAL at 15:58

## 2020-01-01 RX ADMIN — SODIUM CHLORIDE, PRESERVATIVE FREE 10 ML: 5 INJECTION INTRAVENOUS at 20:07

## 2020-01-01 RX ADMIN — LEVETIRACETAM 500 MG: 100 SOLUTION ORAL at 09:22

## 2020-01-01 RX ADMIN — TEMOZOLOMIDE 100 MG: 100 CAPSULE ORAL at 22:11

## 2020-01-01 RX ADMIN — Medication 1 MG: at 22:10

## 2020-01-01 RX ADMIN — CEFAZOLIN SODIUM 2 G: 2 INJECTION, SOLUTION INTRAVENOUS at 01:16

## 2020-01-01 RX ADMIN — DEXAMETHASONE 4 MG: 4 TABLET ORAL at 17:42

## 2020-01-01 RX ADMIN — LEVETIRACETAM 500 MG: 500 TABLET, FILM COATED ORAL at 20:34

## 2020-01-01 RX ADMIN — INSULIN HUMAN 2 UNITS: 100 INJECTION, SOLUTION PARENTERAL at 12:34

## 2020-01-01 RX ADMIN — ONDANSETRON HYDROCHLORIDE 8 MG: 4 TABLET, FILM COATED ORAL at 20:35

## 2020-01-01 RX ADMIN — TEMOZOLOMIDE 10 MG: 5 CAPSULE ORAL at 22:11

## 2020-01-01 RX ADMIN — SODIUM CHLORIDE, PRESERVATIVE FREE 10 ML: 5 INJECTION INTRAVENOUS at 22:30

## 2020-01-01 RX ADMIN — DEXAMETHASONE 4 MG: 4 TABLET ORAL at 06:26

## 2020-01-01 RX ADMIN — ROCURONIUM BROMIDE 5 MG: 10 INJECTION INTRAVENOUS at 12:22

## 2020-01-01 RX ADMIN — ACETAMINOPHEN 650 MG: 325 TABLET, FILM COATED ORAL at 04:20

## 2020-01-01 RX ADMIN — DEXAMETHASONE 4 MG: 4 TABLET ORAL at 05:56

## 2020-01-01 RX ADMIN — CLOTRIMAZOLE 10 MG: 10 LOZENGE ORAL; TOPICAL at 07:35

## 2020-01-01 RX ADMIN — FAMOTIDINE 20 MG: 20 TABLET, FILM COATED ORAL at 21:10

## 2020-01-01 RX ADMIN — TEMOZOLOMIDE 10 MG: 5 CAPSULE ORAL at 21:43

## 2020-01-01 RX ADMIN — DEXAMETHASONE 4 MG: 4 TABLET ORAL at 05:47

## 2020-01-01 RX ADMIN — FAMOTIDINE 20 MG: 20 TABLET, FILM COATED ORAL at 20:57

## 2020-01-01 RX ADMIN — METHYLPREDNISOLONE 4 MG: 4 TABLET ORAL at 06:54

## 2020-01-01 RX ADMIN — METHYLPREDNISOLONE 8 MG: 4 TABLET ORAL at 12:34

## 2020-01-01 RX ADMIN — TEMOZOLOMIDE 100 MG: 100 CAPSULE ORAL at 21:06

## 2020-01-01 RX ADMIN — HYDROCODONE BITARTRATE AND ACETAMINOPHEN 1 TABLET: 5; 325 TABLET ORAL at 22:26

## 2020-01-01 RX ADMIN — SODIUM CHLORIDE, PRESERVATIVE FREE 10 ML: 5 INJECTION INTRAVENOUS at 20:42

## 2020-01-01 RX ADMIN — FAMOTIDINE 20 MG: 20 TABLET, FILM COATED ORAL at 22:30

## 2020-01-01 RX ADMIN — LEVETIRACETAM 500 MG: 100 SOLUTION ORAL at 19:50

## 2020-01-01 RX ADMIN — Medication 1 MG: at 21:31

## 2020-01-01 RX ADMIN — NYSTATIN: 100000 CREAM TOPICAL at 20:05

## 2020-01-01 RX ADMIN — DOCUSATE SODIUM 50MG AND SENNOSIDES 8.6MG 2 TABLET: 8.6; 5 TABLET, FILM COATED ORAL at 20:24

## 2020-01-01 RX ADMIN — NYSTATIN 500000 UNITS: 500000 SUSPENSION ORAL at 17:26

## 2020-01-01 RX ADMIN — ACETAMINOPHEN ORAL SOLUTION 650 MG: 325 SOLUTION ORAL at 20:24

## 2020-01-01 RX ADMIN — FENTANYL CITRATE 50 MCG: 50 INJECTION INTRAMUSCULAR; INTRAVENOUS at 10:36

## 2020-01-01 RX ADMIN — TEMOZOLOMIDE 10 MG: 5 CAPSULE ORAL at 22:53

## 2020-01-01 RX ADMIN — POTASSIUM CHLORIDE AND SODIUM CHLORIDE 100 ML/HR: 900; 150 INJECTION, SOLUTION INTRAVENOUS at 06:23

## 2020-01-01 RX ADMIN — NYSTATIN: 100000 CREAM TOPICAL at 22:33

## 2020-01-01 RX ADMIN — CLOTRIMAZOLE 10 MG: 10 LOZENGE ORAL; TOPICAL at 20:22

## 2020-01-01 RX ADMIN — TEMOZOLOMIDE 10 MG: 5 CAPSULE ORAL at 22:05

## 2020-01-01 RX ADMIN — NYSTATIN: 100000 CREAM TOPICAL at 10:06

## 2020-01-01 RX ADMIN — LEVETIRACETAM 500 MG: 100 SOLUTION ORAL at 09:23

## 2020-01-01 RX ADMIN — FAMOTIDINE 20 MG: 20 TABLET, FILM COATED ORAL at 20:05

## 2020-01-01 RX ADMIN — CLOTRIMAZOLE 10 MG: 10 LOZENGE ORAL; TOPICAL at 21:56

## 2020-01-01 RX ADMIN — LEVETIRACETAM 500 MG: 500 TABLET, FILM COATED ORAL at 08:40

## 2020-01-01 RX ADMIN — METHYLPREDNISOLONE 4 MG: 4 TABLET ORAL at 12:34

## 2020-01-01 RX ADMIN — DEXAMETHASONE SODIUM PHOSPHATE 2 MG: 4 INJECTION, SOLUTION INTRAMUSCULAR; INTRAVENOUS at 08:17

## 2020-01-01 RX ADMIN — SODIUM CHLORIDE, PRESERVATIVE FREE 10 ML: 5 INJECTION INTRAVENOUS at 09:03

## 2020-01-01 RX ADMIN — TEMOZOLOMIDE 100 MG: 100 CAPSULE ORAL at 22:27

## 2020-01-01 RX ADMIN — SODIUM CHLORIDE, POTASSIUM CHLORIDE, SODIUM LACTATE AND CALCIUM CHLORIDE 9 ML/HR: 600; 310; 30; 20 INJECTION, SOLUTION INTRAVENOUS at 08:16

## 2020-01-01 RX ADMIN — POTASSIUM CHLORIDE AND SODIUM CHLORIDE 100 ML/HR: 900; 150 INJECTION, SOLUTION INTRAVENOUS at 17:45

## 2020-01-01 RX ADMIN — SODIUM CHLORIDE, PRESERVATIVE FREE 10 ML: 5 INJECTION INTRAVENOUS at 08:00

## 2020-01-01 RX ADMIN — LEVETIRACETAM 500 MG: 100 SOLUTION ORAL at 21:38

## 2020-01-01 RX ADMIN — INSULIN HUMAN 2 UNITS: 100 INJECTION, SOLUTION PARENTERAL at 00:53

## 2020-01-01 RX ADMIN — FAMOTIDINE 20 MG: 20 TABLET, FILM COATED ORAL at 21:31

## 2020-01-01 RX ADMIN — LEVETIRACETAM 500 MG: 100 SOLUTION ORAL at 22:43

## 2020-01-01 RX ADMIN — LEVETIRACETAM 500 MG: 100 SOLUTION ORAL at 09:11

## 2020-01-01 RX ADMIN — CEFTRIAXONE SODIUM 2 G: 2 INJECTION, SOLUTION INTRAVENOUS at 14:55

## 2020-01-01 RX ADMIN — FENTANYL CITRATE 50 MCG: 50 INJECTION INTRAMUSCULAR; INTRAVENOUS at 10:15

## 2020-01-01 RX ADMIN — METHYLPREDNISOLONE 4 MG: 4 TABLET ORAL at 17:20

## 2020-01-01 RX ADMIN — SODIUM CHLORIDE, POTASSIUM CHLORIDE, SODIUM LACTATE AND CALCIUM CHLORIDE: 600; 310; 30; 20 INJECTION, SOLUTION INTRAVENOUS at 12:27

## 2020-01-01 RX ADMIN — CLOTRIMAZOLE 10 MG: 10 LOZENGE ORAL; TOPICAL at 09:01

## 2020-01-01 RX ADMIN — Medication 1 MG: at 20:46

## 2020-01-01 RX ADMIN — SODIUM CHLORIDE 5 MG/HR: 9 INJECTION, SOLUTION INTRAVENOUS at 19:20

## 2020-01-01 RX ADMIN — ACETAMINOPHEN 650 MG: 325 TABLET, FILM COATED ORAL at 09:24

## 2020-01-01 RX ADMIN — NYSTATIN: 100000 CREAM TOPICAL at 07:58

## 2020-01-01 RX ADMIN — CLOTRIMAZOLE 10 MG: 10 LOZENGE ORAL; TOPICAL at 07:54

## 2020-01-01 RX ADMIN — FAMOTIDINE 20 MG: 20 TABLET, FILM COATED ORAL at 07:47

## 2020-01-01 RX ADMIN — CEFAZOLIN SODIUM 2 G: 2 INJECTION, SOLUTION INTRAVENOUS at 10:35

## 2020-01-01 RX ADMIN — CLOTRIMAZOLE 10 MG: 10 LOZENGE ORAL; TOPICAL at 21:32

## 2020-01-01 RX ADMIN — DEXAMETHASONE SODIUM PHOSPHATE 1 MG: 4 INJECTION, SOLUTION INTRAMUSCULAR; INTRAVENOUS at 00:53

## 2020-01-01 RX ADMIN — METHYLPREDNISOLONE 4 MG: 4 TABLET ORAL at 07:12

## 2020-01-01 RX ADMIN — FAMOTIDINE 20 MG: 20 TABLET, FILM COATED ORAL at 20:19

## 2020-01-01 RX ADMIN — DEXAMETHASONE 4 MG: 4 TABLET ORAL at 16:48

## 2020-01-01 RX ADMIN — CLOTRIMAZOLE 10 MG: 10 LOZENGE ORAL; TOPICAL at 15:21

## 2020-01-01 RX ADMIN — CLOTRIMAZOLE 10 MG: 10 LOZENGE ORAL; TOPICAL at 17:27

## 2020-01-01 RX ADMIN — LORAZEPAM 1 MG: 1 TABLET ORAL at 16:36

## 2020-01-01 RX ADMIN — LEVETIRACETAM 500 MG: 500 TABLET, FILM COATED ORAL at 08:22

## 2020-01-01 RX ADMIN — Medication 1 MG: at 20:41

## 2020-01-01 RX ADMIN — FAMOTIDINE 20 MG: 10 INJECTION INTRAVENOUS at 08:53

## 2020-01-01 RX ADMIN — CLOTRIMAZOLE 10 MG: 10 LOZENGE ORAL; TOPICAL at 07:40

## 2020-01-01 RX ADMIN — DEXAMETHASONE SODIUM PHOSPHATE 4 MG: 4 INJECTION, SOLUTION INTRAMUSCULAR; INTRAVENOUS at 12:36

## 2020-01-01 RX ADMIN — SODIUM CHLORIDE, PRESERVATIVE FREE 10 ML: 5 INJECTION INTRAVENOUS at 23:43

## 2020-01-01 RX ADMIN — Medication 1 MG: at 20:34

## 2020-01-01 RX ADMIN — NYSTATIN 1 APPLICATION: 100000 CREAM TOPICAL at 09:23

## 2020-01-01 RX ADMIN — NYSTATIN: 100000 CREAM TOPICAL at 21:31

## 2020-01-01 RX ADMIN — IOPAMIDOL 85 ML: 612 INJECTION, SOLUTION INTRAVENOUS at 13:00

## 2020-01-01 RX ADMIN — DEXAMETHASONE 4 MG: 4 TABLET ORAL at 20:05

## 2020-01-01 RX ADMIN — PANTOPRAZOLE SODIUM 40 MG: 40 TABLET, DELAYED RELEASE ORAL at 12:25

## 2020-01-01 RX ADMIN — METHYLPREDNISOLONE 4 MG: 4 TABLET ORAL at 08:22

## 2020-01-01 RX ADMIN — METHYLPREDNISOLONE 4 MG: 4 TABLET ORAL at 05:44

## 2020-01-01 RX ADMIN — LEVETIRACETAM 500 MG: 500 TABLET, FILM COATED ORAL at 08:43

## 2020-01-01 RX ADMIN — ONDANSETRON HYDROCHLORIDE 8 MG: 4 TABLET, FILM COATED ORAL at 20:08

## 2020-01-01 RX ADMIN — TEMOZOLOMIDE 100 MG: 100 CAPSULE ORAL at 22:00

## 2020-01-01 RX ADMIN — LEVETIRACETAM 500 MG: 100 SOLUTION ORAL at 20:22

## 2020-01-01 RX ADMIN — FAMOTIDINE 20 MG: 20 TABLET, FILM COATED ORAL at 23:05

## 2020-01-01 RX ADMIN — DEXAMETHASONE 4 MG: 4 TABLET ORAL at 17:08

## 2020-01-01 RX ADMIN — Medication 1 MG: at 21:12

## 2020-01-01 RX ADMIN — LEVETIRACETAM 500 MG: 500 TABLET, FILM COATED ORAL at 08:21

## 2020-01-01 RX ADMIN — HYDROMORPHONE HYDROCHLORIDE 0.2 MG: 2 INJECTION, SOLUTION INTRAMUSCULAR; INTRAVENOUS; SUBCUTANEOUS at 12:27

## 2020-01-01 RX ADMIN — CLOTRIMAZOLE 10 MG: 10 LOZENGE ORAL; TOPICAL at 21:41

## 2020-01-01 RX ADMIN — NYSTATIN: 100000 CREAM TOPICAL at 22:53

## 2020-01-01 RX ADMIN — DEXAMETHASONE 4 MG: 4 TABLET ORAL at 06:13

## 2020-01-01 RX ADMIN — LEVETIRACETAM 500 MG: 500 TABLET, FILM COATED ORAL at 08:07

## 2020-01-01 RX ADMIN — LEVETIRACETAM 500 MG: 500 TABLET, FILM COATED ORAL at 21:31

## 2020-01-01 RX ADMIN — TEMOZOLOMIDE 10 MG: 5 CAPSULE ORAL at 22:00

## 2020-01-01 RX ADMIN — ACETAMINOPHEN 650 MG: 325 TABLET, FILM COATED ORAL at 21:57

## 2020-01-01 RX ADMIN — DEXAMETHASONE 4 MG: 4 TABLET ORAL at 06:32

## 2020-01-01 RX ADMIN — FAMOTIDINE 20 MG: 20 TABLET, FILM COATED ORAL at 22:02

## 2020-01-01 RX ADMIN — FAMOTIDINE 20 MG: 20 TABLET, FILM COATED ORAL at 20:41

## 2020-01-01 RX ADMIN — DEXAMETHASONE 4 MG: 4 TABLET ORAL at 13:25

## 2020-01-01 RX ADMIN — SODIUM CHLORIDE, PRESERVATIVE FREE 10 ML: 5 INJECTION INTRAVENOUS at 08:17

## 2020-01-01 RX ADMIN — DEXAMETHASONE SODIUM PHOSPHATE 1 MG: 4 INJECTION, SOLUTION INTRAMUSCULAR; INTRAVENOUS at 22:17

## 2020-01-01 RX ADMIN — CLOTRIMAZOLE 10 MG: 10 LOZENGE ORAL; TOPICAL at 17:25

## 2020-01-01 RX ADMIN — LEVETIRACETAM 500 MG: 100 SOLUTION ORAL at 08:38

## 2020-01-01 RX ADMIN — LEVETIRACETAM 500 MG: 100 SOLUTION ORAL at 20:01

## 2020-01-01 RX ADMIN — CLOTRIMAZOLE 10 MG: 10 LOZENGE ORAL; TOPICAL at 22:11

## 2020-01-01 RX ADMIN — INSULIN HUMAN 2 UNITS: 100 INJECTION, SOLUTION PARENTERAL at 11:24

## 2020-01-01 RX ADMIN — LEVETIRACETAM 500 MG: 100 SOLUTION ORAL at 19:52

## 2020-01-01 RX ADMIN — DEXAMETHASONE 4 MG: 4 TABLET ORAL at 05:59

## 2020-01-01 RX ADMIN — TEMOZOLOMIDE 100 MG: 100 CAPSULE ORAL at 22:02

## 2020-01-01 RX ADMIN — Medication 1 MG: at 01:11

## 2020-01-01 RX ADMIN — NYSTATIN: 100000 CREAM TOPICAL at 20:23

## 2020-01-01 RX ADMIN — SODIUM CHLORIDE, PRESERVATIVE FREE 10 ML: 5 INJECTION INTRAVENOUS at 21:09

## 2020-01-01 RX ADMIN — TEMOZOLOMIDE 100 MG: 100 CAPSULE ORAL at 21:56

## 2020-01-01 RX ADMIN — CLOTRIMAZOLE 10 MG: 10 LOZENGE ORAL; TOPICAL at 18:01

## 2020-01-01 RX ADMIN — SODIUM CHLORIDE, PRESERVATIVE FREE 10 ML: 5 INJECTION INTRAVENOUS at 20:01

## 2020-01-01 RX ADMIN — FAMOTIDINE 20 MG: 20 TABLET, FILM COATED ORAL at 21:38

## 2020-01-01 RX ADMIN — LIDOCAINE HYDROCHLORIDE 60 MG: 20 INJECTION, SOLUTION INFILTRATION; PERINEURAL at 10:17

## 2020-01-01 RX ADMIN — HYDROCODONE BITARTRATE AND ACETAMINOPHEN 1 TABLET: 5; 325 TABLET ORAL at 10:04

## 2020-01-01 RX ADMIN — LEVETIRACETAM 500 MG: 100 SOLUTION ORAL at 08:58

## 2020-01-01 RX ADMIN — DEXAMETHASONE SODIUM PHOSPHATE 4 MG: 4 INJECTION, SOLUTION INTRAMUSCULAR; INTRAVENOUS at 23:28

## 2020-01-01 RX ADMIN — FAMOTIDINE 20 MG: 20 TABLET, FILM COATED ORAL at 07:30

## 2020-01-01 RX ADMIN — INSULIN HUMAN 2 UNITS: 100 INJECTION, SOLUTION PARENTERAL at 21:01

## 2020-01-01 RX ADMIN — LEVETIRACETAM 500 MG: 100 SOLUTION ORAL at 20:05

## 2020-01-01 RX ADMIN — SODIUM CHLORIDE, PRESERVATIVE FREE 10 ML: 5 INJECTION INTRAVENOUS at 22:19

## 2020-01-01 RX ADMIN — FAMOTIDINE 20 MG: 20 TABLET, FILM COATED ORAL at 22:29

## 2020-01-01 RX ADMIN — ONDANSETRON HYDROCHLORIDE 8 MG: 4 TABLET, FILM COATED ORAL at 21:32

## 2020-01-01 RX ADMIN — LEVETIRACETAM 500 MG: 5 INJECTION INTRAVENOUS at 08:47

## 2020-01-01 RX ADMIN — CLOTRIMAZOLE 10 MG: 10 LOZENGE ORAL; TOPICAL at 17:06

## 2020-01-01 RX ADMIN — NYSTATIN 500000 UNITS: 500000 SUSPENSION ORAL at 09:22

## 2020-01-01 RX ADMIN — CLOTRIMAZOLE 10 MG: 10 LOZENGE ORAL; TOPICAL at 08:07

## 2020-01-01 RX ADMIN — CLOTRIMAZOLE 10 MG: 10 LOZENGE ORAL; TOPICAL at 21:02

## 2020-01-01 RX ADMIN — ONDANSETRON HYDROCHLORIDE 8 MG: 4 TABLET, FILM COATED ORAL at 21:31

## 2020-01-01 RX ADMIN — TEMOZOLOMIDE 110 MG: 100 CAPSULE ORAL at 21:15

## 2020-01-01 RX ADMIN — SODIUM CHLORIDE, PRESERVATIVE FREE 10 ML: 5 INJECTION INTRAVENOUS at 08:23

## 2020-01-01 RX ADMIN — CLOTRIMAZOLE 10 MG: 10 LOZENGE ORAL; TOPICAL at 15:00

## 2020-01-01 RX ADMIN — DEXAMETHASONE SODIUM PHOSPHATE 1 MG: 4 INJECTION, SOLUTION INTRAMUSCULAR; INTRAVENOUS at 15:00

## 2020-01-01 RX ADMIN — LEVETIRACETAM 500 MG: 100 SOLUTION ORAL at 12:22

## 2020-01-01 RX ADMIN — DEXAMETHASONE SODIUM PHOSPHATE 1 MG: 4 INJECTION, SOLUTION INTRAMUSCULAR; INTRAVENOUS at 06:34

## 2020-01-01 RX ADMIN — LEVETIRACETAM 500 MG: 5 INJECTION INTRAVENOUS at 08:33

## 2020-01-01 RX ADMIN — SODIUM CHLORIDE 10 MG/HR: 9 INJECTION, SOLUTION INTRAVENOUS at 09:21

## 2020-01-01 RX ADMIN — LEVETIRACETAM 500 MG: 100 SOLUTION ORAL at 09:40

## 2020-01-01 RX ADMIN — DIATRIZOATE MEGLUMINE AND DIATRIZOATE SODIUM 30 ML: 600; 100 SOLUTION ORAL; RECTAL at 09:24

## 2020-01-01 RX ADMIN — INSULIN HUMAN 2 UNITS: 100 INJECTION, SOLUTION PARENTERAL at 17:54

## 2020-01-01 RX ADMIN — FAMOTIDINE 20 MG: 20 TABLET, FILM COATED ORAL at 08:03

## 2020-01-01 RX ADMIN — TEMOZOLOMIDE 10 MG: 5 CAPSULE ORAL at 21:23

## 2020-01-01 RX ADMIN — LEVETIRACETAM 500 MG: 100 SOLUTION ORAL at 20:43

## 2020-01-01 RX ADMIN — TEMOZOLOMIDE 10 MG: 5 CAPSULE ORAL at 22:28

## 2020-01-01 RX ADMIN — LEVETIRACETAM 500 MG: 500 TABLET, FILM COATED ORAL at 20:57

## 2020-01-01 RX ADMIN — LEVETIRACETAM 500 MG: 100 SOLUTION ORAL at 09:12

## 2020-01-01 RX ADMIN — CLOTRIMAZOLE 10 MG: 10 LOZENGE ORAL; TOPICAL at 20:47

## 2020-01-01 RX ADMIN — LEVETIRACETAM 500 MG: 100 SOLUTION ORAL at 08:20

## 2020-01-01 RX ADMIN — DEXAMETHASONE 4 MG: 4 TABLET ORAL at 17:57

## 2020-01-01 RX ADMIN — LEVETIRACETAM 500 MG: 500 TABLET, FILM COATED ORAL at 07:47

## 2020-01-01 RX ADMIN — INSULIN HUMAN 2 UNITS: 100 INJECTION, SOLUTION PARENTERAL at 12:11

## 2020-01-01 RX ADMIN — CEFAZOLIN SODIUM 2 G: 2 INJECTION, SOLUTION INTRAVENOUS at 09:39

## 2020-01-01 RX ADMIN — SODIUM CHLORIDE 250 ML: 9 INJECTION, SOLUTION INTRAVENOUS at 12:36

## 2020-01-01 RX ADMIN — DEXAMETHASONE 4 MG: 4 TABLET ORAL at 05:57

## 2020-01-01 RX ADMIN — FAMOTIDINE 20 MG: 20 TABLET, FILM COATED ORAL at 09:01

## 2020-01-01 RX ADMIN — LEVETIRACETAM 500 MG: 100 SOLUTION ORAL at 09:06

## 2020-01-01 RX ADMIN — Medication 1 MG: at 19:59

## 2020-01-01 RX ADMIN — NYSTATIN 500000 UNITS: 500000 SUSPENSION ORAL at 20:05

## 2020-01-01 RX ADMIN — LEVETIRACETAM 500 MG: 500 TABLET, FILM COATED ORAL at 21:32

## 2020-01-01 RX ADMIN — HYDROMORPHONE HYDROCHLORIDE 0.3 MG: 2 INJECTION, SOLUTION INTRAMUSCULAR; INTRAVENOUS; SUBCUTANEOUS at 12:19

## 2020-01-01 RX ADMIN — TEMOZOLOMIDE 10 MG: 5 CAPSULE ORAL at 20:59

## 2020-01-01 RX ADMIN — DEXAMETHASONE 4 MG: 4 TABLET ORAL at 06:22

## 2020-01-01 RX ADMIN — POTASSIUM CHLORIDE 40 MEQ: 1.5 POWDER, FOR SOLUTION ORAL at 11:37

## 2020-01-01 RX ADMIN — LEVETIRACETAM 500 MG: 100 SOLUTION ORAL at 22:29

## 2020-01-01 RX ADMIN — SODIUM CHLORIDE 250 ML: 9 INJECTION, SOLUTION INTRAVENOUS at 09:06

## 2020-01-01 RX ADMIN — FAMOTIDINE 20 MG: 20 TABLET, FILM COATED ORAL at 07:39

## 2020-01-01 RX ADMIN — NYSTATIN 500000 UNITS: 500000 SUSPENSION ORAL at 22:51

## 2020-01-01 RX ADMIN — CLOTRIMAZOLE 10 MG: 10 LOZENGE ORAL; TOPICAL at 15:59

## 2020-01-01 RX ADMIN — SODIUM CHLORIDE, PRESERVATIVE FREE 10 ML: 5 INJECTION INTRAVENOUS at 08:43

## 2020-01-01 RX ADMIN — NYSTATIN: 100000 CREAM TOPICAL at 08:15

## 2020-01-01 RX ADMIN — CLOTRIMAZOLE 10 MG: 10 LOZENGE ORAL; TOPICAL at 17:30

## 2020-01-01 RX ADMIN — METRONIDAZOLE 500 MG: 500 TABLET ORAL at 14:35

## 2020-01-01 RX ADMIN — LEVETIRACETAM 500 MG: 100 SOLUTION ORAL at 20:02

## 2020-01-01 RX ADMIN — Medication 1 MG: at 20:50

## 2020-01-01 RX ADMIN — ACETAMINOPHEN 650 MG: 325 TABLET, FILM COATED ORAL at 06:54

## 2020-01-01 RX ADMIN — FAMOTIDINE 20 MG: 20 TABLET, FILM COATED ORAL at 07:34

## 2020-01-01 RX ADMIN — FAMOTIDINE 20 MG: 20 TABLET, FILM COATED ORAL at 21:02

## 2020-01-01 RX ADMIN — Medication 1 MG: at 21:55

## 2020-01-01 RX ADMIN — Medication 2 MG: at 22:03

## 2020-01-01 RX ADMIN — CLOTRIMAZOLE 10 MG: 10 LOZENGE ORAL; TOPICAL at 09:19

## 2020-01-01 RX ADMIN — NYSTATIN 500000 UNITS: 500000 SUSPENSION ORAL at 09:34

## 2020-01-01 RX ADMIN — SODIUM CHLORIDE 10 MG/HR: 9 INJECTION, SOLUTION INTRAVENOUS at 16:10

## 2020-01-01 RX ADMIN — HYDROMORPHONE HYDROCHLORIDE 0.5 MG: 1 INJECTION, SOLUTION INTRAMUSCULAR; INTRAVENOUS; SUBCUTANEOUS at 18:22

## 2020-01-01 RX ADMIN — SODIUM CHLORIDE 900 MG: 900 INJECTION, SOLUTION INTRAVENOUS at 10:36

## 2020-01-01 RX ADMIN — FAMOTIDINE 20 MG: 20 TABLET, FILM COATED ORAL at 21:12

## 2020-01-01 RX ADMIN — TEMOZOLOMIDE 10 MG: 5 CAPSULE ORAL at 22:09

## 2020-01-01 RX ADMIN — NYSTATIN 1 APPLICATION: 100000 CREAM TOPICAL at 20:19

## 2020-01-01 RX ADMIN — LEVETIRACETAM 500 MG: 100 SOLUTION ORAL at 11:09

## 2020-01-01 RX ADMIN — FAMOTIDINE 20 MG: 20 TABLET, FILM COATED ORAL at 08:36

## 2020-01-01 RX ADMIN — SODIUM CHLORIDE 900 MG: 900 INJECTION, SOLUTION INTRAVENOUS at 12:52

## 2020-01-01 RX ADMIN — METHYLPREDNISOLONE 4 MG: 4 TABLET ORAL at 09:23

## 2020-01-01 RX ADMIN — SODIUM CHLORIDE, PRESERVATIVE FREE 10 ML: 5 INJECTION INTRAVENOUS at 09:36

## 2020-01-01 RX ADMIN — FAMOTIDINE 20 MG: 20 TABLET, FILM COATED ORAL at 08:43

## 2020-01-01 RX ADMIN — CLOTRIMAZOLE 10 MG: 10 LOZENGE ORAL; TOPICAL at 20:34

## 2020-01-01 RX ADMIN — FAMOTIDINE 20 MG: 20 TABLET, FILM COATED ORAL at 19:52

## 2020-01-01 RX ADMIN — FAMOTIDINE 20 MG: 20 TABLET, FILM COATED ORAL at 20:24

## 2020-01-01 RX ADMIN — LEVETIRACETAM 500 MG: 500 TABLET, FILM COATED ORAL at 08:36

## 2020-01-01 RX ADMIN — DOCUSATE SODIUM 50MG AND SENNOSIDES 8.6MG 2 TABLET: 8.6; 5 TABLET, FILM COATED ORAL at 22:34

## 2020-01-01 RX ADMIN — CLOTRIMAZOLE 10 MG: 10 LOZENGE ORAL; TOPICAL at 21:37

## 2020-01-01 RX ADMIN — LEVETIRACETAM 500 MG: 500 TABLET, FILM COATED ORAL at 09:19

## 2020-01-01 RX ADMIN — SODIUM CHLORIDE 5 MG/HR: 9 INJECTION, SOLUTION INTRAVENOUS at 08:33

## 2020-01-01 RX ADMIN — SODIUM CHLORIDE 5 MG/HR: 9 INJECTION, SOLUTION INTRAVENOUS at 13:10

## 2020-01-01 RX ADMIN — LEVETIRACETAM 500 MG: 100 SOLUTION ORAL at 09:34

## 2020-01-01 RX ADMIN — DEXAMETHASONE 4 MG: 4 TABLET ORAL at 17:43

## 2020-01-01 RX ADMIN — LEVETIRACETAM 500 MG: 500 TABLET, FILM COATED ORAL at 21:37

## 2020-01-01 RX ADMIN — NYSTATIN: 100000 CREAM TOPICAL at 07:31

## 2020-01-01 RX ADMIN — GADOTERIDOL 15 ML: 279.3 INJECTION, SOLUTION INTRAVENOUS at 09:24

## 2020-01-01 RX ADMIN — FAMOTIDINE 20 MG: 20 TABLET, FILM COATED ORAL at 08:38

## 2020-01-01 RX ADMIN — CLOTRIMAZOLE 10 MG: 10 LOZENGE ORAL; TOPICAL at 08:38

## 2020-01-01 RX ADMIN — LEVETIRACETAM 500 MG: 500 TABLET, FILM COATED ORAL at 21:02

## 2020-01-01 RX ADMIN — FAMOTIDINE 20 MG: 20 TABLET, FILM COATED ORAL at 22:06

## 2020-01-01 RX ADMIN — LEVETIRACETAM 500 MG: 500 TABLET, FILM COATED ORAL at 21:54

## 2020-01-01 RX ADMIN — DEXAMETHASONE 4 MG: 4 TABLET ORAL at 07:01

## 2020-01-01 RX ADMIN — METHYLPREDNISOLONE 8 MG: 4 TABLET ORAL at 20:13

## 2020-01-01 RX ADMIN — DEXAMETHASONE SODIUM PHOSPHATE 1 MG: 4 INJECTION, SOLUTION INTRAMUSCULAR; INTRAVENOUS at 09:40

## 2020-01-01 RX ADMIN — Medication 1 MG: at 20:19

## 2020-01-01 RX ADMIN — SODIUM CHLORIDE, PRESERVATIVE FREE 10 ML: 5 INJECTION INTRAVENOUS at 22:20

## 2020-01-01 RX ADMIN — METHYLPREDNISOLONE 8 MG: 4 TABLET ORAL at 20:43

## 2020-01-01 RX ADMIN — ONDANSETRON HYDROCHLORIDE 8 MG: 4 TABLET, FILM COATED ORAL at 19:58

## 2020-01-01 RX ADMIN — DEXAMETHASONE SODIUM PHOSPHATE 2 MG: 4 INJECTION, SOLUTION INTRAMUSCULAR; INTRAVENOUS at 00:30

## 2020-01-01 RX ADMIN — DEXAMETHASONE SODIUM PHOSPHATE 12 MG: 10 INJECTION, SOLUTION INTRAMUSCULAR; INTRAVENOUS at 10:16

## 2020-01-01 RX ADMIN — LEVETIRACETAM 500 MG: 500 TABLET, FILM COATED ORAL at 21:12

## 2020-01-01 RX ADMIN — ACETAMINOPHEN 650 MG: 325 TABLET, FILM COATED ORAL at 08:29

## 2020-01-01 RX ADMIN — LEVETIRACETAM 500 MG: 500 TABLET, FILM COATED ORAL at 09:01

## 2020-01-01 RX ADMIN — SODIUM CHLORIDE, PRESERVATIVE FREE 10 ML: 5 INJECTION INTRAVENOUS at 23:28

## 2020-01-01 RX ADMIN — SODIUM CHLORIDE, PRESERVATIVE FREE 10 ML: 5 INJECTION INTRAVENOUS at 21:38

## 2020-01-01 RX ADMIN — Medication 2 MG: at 20:22

## 2020-01-01 RX ADMIN — ONDANSETRON HYDROCHLORIDE 8 MG: 4 TABLET, FILM COATED ORAL at 22:01

## 2020-01-01 RX ADMIN — CLOTRIMAZOLE 10 MG: 10 LOZENGE ORAL; TOPICAL at 07:48

## 2020-01-01 RX ADMIN — ONDANSETRON HYDROCHLORIDE 8 MG: 4 TABLET, FILM COATED ORAL at 21:12

## 2020-01-01 RX ADMIN — NYSTATIN: 100000 CREAM TOPICAL at 09:19

## 2020-01-01 RX ADMIN — CLOTRIMAZOLE 10 MG: 10 LOZENGE ORAL; TOPICAL at 07:58

## 2020-01-01 RX ADMIN — ONDANSETRON HYDROCHLORIDE 8 MG: 4 TABLET, FILM COATED ORAL at 21:58

## 2020-01-01 RX ADMIN — BARIUM SULFATE 4 ML: 980 POWDER, FOR SUSPENSION ORAL at 09:05

## 2020-01-01 RX ADMIN — FAMOTIDINE 20 MG: 20 TABLET, FILM COATED ORAL at 22:34

## 2020-01-01 RX ADMIN — DEXMEDETOMIDINE HYDROCHLORIDE 8 MCG: 100 INJECTION, SOLUTION, CONCENTRATE INTRAVENOUS at 12:25

## 2020-01-01 RX ADMIN — DEXAMETHASONE 4 MG: 4 TABLET ORAL at 06:30

## 2020-01-01 RX ADMIN — ROCURONIUM BROMIDE 40 MG: 10 INJECTION INTRAVENOUS at 10:18

## 2020-01-01 RX ADMIN — CLOTRIMAZOLE 10 MG: 10 LOZENGE ORAL; TOPICAL at 08:40

## 2020-01-01 RX ADMIN — Medication 2 MG: at 22:51

## 2020-01-01 RX ADMIN — SULFAMETHOXAZOLE AND TRIMETHOPRIM 160 MG: 800; 160 TABLET ORAL at 17:35

## 2020-01-01 RX ADMIN — CLOTRIMAZOLE 10 MG: 10 LOZENGE ORAL; TOPICAL at 17:42

## 2020-01-01 RX ADMIN — FAMOTIDINE 20 MG: 20 TABLET, FILM COATED ORAL at 21:32

## 2020-01-01 RX ADMIN — DEXAMETHASONE SODIUM PHOSPHATE 12 MG: 10 INJECTION, SOLUTION INTRAMUSCULAR; INTRAVENOUS at 09:06

## 2020-01-01 RX ADMIN — NYSTATIN 1 APPLICATION: 100000 CREAM TOPICAL at 08:33

## 2020-01-01 RX ADMIN — LEVETIRACETAM 500 MG: 100 SOLUTION ORAL at 08:48

## 2020-01-01 RX ADMIN — BARIUM SULFATE 55 ML: 0.81 POWDER, FOR SUSPENSION ORAL at 09:04

## 2020-01-01 RX ADMIN — METRONIDAZOLE 500 MG: 500 TABLET ORAL at 06:05

## 2020-01-01 RX ADMIN — METRONIDAZOLE 500 MG: 500 INJECTION, SOLUTION INTRAVENOUS at 14:55

## 2020-01-01 RX ADMIN — LEVETIRACETAM 500 MG: 100 SOLUTION ORAL at 08:16

## 2020-01-01 RX ADMIN — METHYLPREDNISOLONE 4 MG: 4 TABLET ORAL at 22:29

## 2020-01-01 RX ADMIN — NYSTATIN 1 APPLICATION: 100000 CREAM TOPICAL at 20:47

## 2020-01-01 RX ADMIN — NYSTATIN 500000 UNITS: 500000 SUSPENSION ORAL at 12:08

## 2020-01-01 RX ADMIN — SODIUM CHLORIDE, PRESERVATIVE FREE 10 ML: 5 INJECTION INTRAVENOUS at 09:09

## 2020-01-01 RX ADMIN — LEVETIRACETAM 500 MG: 100 SOLUTION ORAL at 20:13

## 2020-01-01 RX ADMIN — Medication 2 MG: at 19:52

## 2020-01-01 RX ADMIN — DEXAMETHASONE SODIUM PHOSPHATE 4 MG: 4 INJECTION, SOLUTION INTRAMUSCULAR; INTRAVENOUS at 11:47

## 2020-01-01 RX ADMIN — DEXAMETHASONE SODIUM PHOSPHATE 2 MG: 4 INJECTION, SOLUTION INTRAMUSCULAR; INTRAVENOUS at 14:55

## 2020-01-01 RX ADMIN — NYSTATIN 500000 UNITS: 500000 SUSPENSION ORAL at 19:34

## 2020-01-01 RX ADMIN — NYSTATIN: 100000 CREAM TOPICAL at 19:54

## 2020-01-01 RX ADMIN — HYDROMORPHONE HYDROCHLORIDE 0.2 MG: 2 INJECTION, SOLUTION INTRAMUSCULAR; INTRAVENOUS; SUBCUTANEOUS at 11:42

## 2020-01-01 RX ADMIN — NYSTATIN: 100000 CREAM TOPICAL at 20:25

## 2020-01-01 RX ADMIN — LEVETIRACETAM 500 MG: 100 SOLUTION ORAL at 21:10

## 2020-01-01 RX ADMIN — SODIUM CHLORIDE, PRESERVATIVE FREE 10 ML: 5 INJECTION INTRAVENOUS at 09:51

## 2020-01-01 RX ADMIN — SODIUM CHLORIDE 15 MG/HR: 9 INJECTION, SOLUTION INTRAVENOUS at 01:34

## 2020-01-01 RX ADMIN — HYDROCODONE BITARTRATE AND ACETAMINOPHEN 1 TABLET: 5; 325 TABLET ORAL at 08:34

## 2020-01-01 RX ADMIN — METHYLPREDNISOLONE 4 MG: 4 TABLET ORAL at 21:09

## 2020-01-01 RX ADMIN — FAMOTIDINE 20 MG: 20 TABLET, FILM COATED ORAL at 07:58

## 2020-01-01 RX ADMIN — DEXAMETHASONE SODIUM PHOSPHATE 2 MG: 4 INJECTION, SOLUTION INTRAMUSCULAR; INTRAVENOUS at 08:33

## 2020-01-01 RX ADMIN — DEXAMETHASONE 4 MG: 4 TABLET ORAL at 17:25

## 2020-01-01 RX ADMIN — NYSTATIN: 100000 CREAM TOPICAL at 20:42

## 2020-01-01 RX ADMIN — FAMOTIDINE 20 MG: 20 TABLET, FILM COATED ORAL at 22:20

## 2020-01-01 RX ADMIN — FAMOTIDINE 20 MG: 20 TABLET, FILM COATED ORAL at 08:07

## 2020-01-01 RX ADMIN — METHYLPREDNISOLONE 4 MG: 4 TABLET ORAL at 12:55

## 2020-01-01 RX ADMIN — ACETAMINOPHEN 650 MG: 325 TABLET, FILM COATED ORAL at 14:35

## 2020-01-01 RX ADMIN — POLYETHYLENE GLYCOL 3350 17 G: 17 POWDER, FOR SOLUTION ORAL at 15:59

## 2020-01-01 RX ADMIN — DEXAMETHASONE 4 MG: 4 TABLET ORAL at 06:27

## 2020-01-01 RX ADMIN — FAMOTIDINE 20 MG: 20 TABLET, FILM COATED ORAL at 08:48

## 2020-01-01 RX ADMIN — SODIUM CHLORIDE 7.5 MG/HR: 9 INJECTION, SOLUTION INTRAVENOUS at 11:47

## 2020-01-01 RX ADMIN — ROCURONIUM BROMIDE 10 MG: 10 INJECTION INTRAVENOUS at 11:42

## 2020-01-01 RX ADMIN — METHYLPREDNISOLONE 4 MG: 4 TABLET ORAL at 14:40

## 2020-01-01 RX ADMIN — CLOTRIMAZOLE 10 MG: 10 LOZENGE ORAL; TOPICAL at 08:05

## 2020-01-01 RX ADMIN — METRONIDAZOLE 500 MG: 500 INJECTION, SOLUTION INTRAVENOUS at 22:11

## 2020-01-01 RX ADMIN — DEXAMETHASONE SODIUM PHOSPHATE 4 MG: 4 INJECTION, SOLUTION INTRAMUSCULAR; INTRAVENOUS at 05:16

## 2020-01-01 RX ADMIN — DEXMEDETOMIDINE HYDROCHLORIDE 12 MCG: 100 INJECTION, SOLUTION, CONCENTRATE INTRAVENOUS at 11:48

## 2020-01-01 RX ADMIN — CLOTRIMAZOLE 10 MG: 10 LOZENGE ORAL; TOPICAL at 21:38

## 2020-01-01 RX ADMIN — DEXAMETHASONE SODIUM PHOSPHATE 4 MG: 4 INJECTION, SOLUTION INTRAMUSCULAR; INTRAVENOUS at 17:18

## 2020-01-01 RX ADMIN — HYDROMORPHONE HYDROCHLORIDE 0.3 MG: 2 INJECTION, SOLUTION INTRAMUSCULAR; INTRAVENOUS; SUBCUTANEOUS at 11:50

## 2020-01-01 RX ADMIN — DEXAMETHASONE SODIUM PHOSPHATE 2 MG: 4 INJECTION, SOLUTION INTRAMUSCULAR; INTRAVENOUS at 14:26

## 2020-01-01 RX ADMIN — SODIUM CHLORIDE, PRESERVATIVE FREE 10 ML: 5 INJECTION INTRAVENOUS at 09:42

## 2020-01-01 RX ADMIN — ESMOLOL HYDROCHLORIDE 20 MG: 10 INJECTION, SOLUTION INTRAVENOUS at 11:50

## 2020-01-01 RX ADMIN — METHYLPREDNISOLONE 4 MG: 4 TABLET ORAL at 18:14

## 2020-01-01 RX ADMIN — LORAZEPAM 1 MG: 1 TABLET ORAL at 22:29

## 2020-01-01 RX ADMIN — FAMOTIDINE 20 MG: 10 INJECTION INTRAVENOUS at 11:09

## 2020-01-01 RX ADMIN — CEFTRIAXONE SODIUM 2 G: 2 INJECTION, SOLUTION INTRAVENOUS at 02:08

## 2020-01-01 RX ADMIN — ACETAMINOPHEN ORAL SOLUTION 650 MG: 325 SOLUTION ORAL at 21:10

## 2020-01-01 RX ADMIN — LEVETIRACETAM 500 MG: 500 TABLET, FILM COATED ORAL at 20:50

## 2020-01-01 RX ADMIN — LEVETIRACETAM 500 MG: 100 SOLUTION ORAL at 08:22

## 2020-01-01 RX ADMIN — NYSTATIN 500000 UNITS: 500000 SUSPENSION ORAL at 12:23

## 2020-01-01 RX ADMIN — NYSTATIN 500000 UNITS: 500000 SUSPENSION ORAL at 08:14

## 2020-01-01 RX ADMIN — INSULIN HUMAN 2 UNITS: 100 INJECTION, SOLUTION PARENTERAL at 18:30

## 2020-01-01 RX ADMIN — FAMOTIDINE 20 MG: 20 TABLET, FILM COATED ORAL at 09:19

## 2020-01-01 RX ADMIN — TEMOZOLOMIDE 10 MG: 5 CAPSULE ORAL at 22:10

## 2020-01-01 RX ADMIN — DEXAMETHASONE 4 MG: 4 TABLET ORAL at 23:47

## 2020-01-01 RX ADMIN — DEXAMETHASONE SODIUM PHOSPHATE 1 MG: 4 INJECTION, SOLUTION INTRAMUSCULAR; INTRAVENOUS at 18:52

## 2020-01-01 RX ADMIN — DEXAMETHASONE 4 MG: 4 TABLET ORAL at 17:59

## 2020-01-01 RX ADMIN — FAMOTIDINE 20 MG: 20 TABLET, FILM COATED ORAL at 08:21

## 2020-01-01 RX ADMIN — TEMOZOLOMIDE 100 MG: 100 CAPSULE ORAL at 22:08

## 2020-01-01 RX ADMIN — DEXAMETHASONE SODIUM PHOSPHATE 4 MG: 4 INJECTION, SOLUTION INTRAMUSCULAR; INTRAVENOUS at 23:43

## 2020-01-01 RX ADMIN — Medication 1 MG: at 23:35

## 2020-01-01 RX ADMIN — DEXAMETHASONE SODIUM PHOSPHATE 2 MG: 4 INJECTION, SOLUTION INTRAMUSCULAR; INTRAVENOUS at 01:15

## 2020-01-01 RX ADMIN — GADOBENATE DIMEGLUMINE 10 ML: 529 INJECTION, SOLUTION INTRAVENOUS at 17:36

## 2020-01-01 RX ADMIN — LEVETIRACETAM 500 MG: 500 TABLET, FILM COATED ORAL at 20:19

## 2020-01-01 RX ADMIN — DEXAMETHASONE SODIUM PHOSPHATE 1 MG: 4 INJECTION, SOLUTION INTRAMUSCULAR; INTRAVENOUS at 04:34

## 2020-01-01 RX ADMIN — FAMOTIDINE 20 MG: 20 TABLET, FILM COATED ORAL at 09:34

## 2020-01-01 RX ADMIN — Medication 2 MG: at 20:24

## 2020-01-01 RX ADMIN — DEXAMETHASONE 4 MG: 4 TABLET ORAL at 17:44

## 2020-01-01 RX ADMIN — CLOTRIMAZOLE 10 MG: 10 LOZENGE ORAL; TOPICAL at 17:22

## 2020-01-01 RX ADMIN — ONDANSETRON HYDROCHLORIDE 4 MG: 2 SOLUTION INTRAMUSCULAR; INTRAVENOUS at 12:36

## 2020-01-01 RX ADMIN — NYSTATIN: 100000 CREAM TOPICAL at 08:20

## 2020-01-01 RX ADMIN — SODIUM CHLORIDE, PRESERVATIVE FREE 10 ML: 5 INJECTION INTRAVENOUS at 23:47

## 2020-01-01 RX ADMIN — INSULIN HUMAN 2 UNITS: 100 INJECTION, SOLUTION PARENTERAL at 13:52

## 2020-01-01 RX ADMIN — DEXAMETHASONE SODIUM PHOSPHATE 2 MG: 4 INJECTION, SOLUTION INTRAMUSCULAR; INTRAVENOUS at 06:32

## 2020-01-01 RX ADMIN — FAMOTIDINE 20 MG: 10 INJECTION INTRAVENOUS at 08:22

## 2020-01-01 RX ADMIN — DEXAMETHASONE 4 MG: 4 TABLET ORAL at 05:28

## 2020-01-01 RX ADMIN — FAMOTIDINE 20 MG: 10 INJECTION INTRAVENOUS at 22:14

## 2020-01-01 RX ADMIN — HYDROMORPHONE HYDROCHLORIDE 0.5 MG: 1 INJECTION, SOLUTION INTRAMUSCULAR; INTRAVENOUS; SUBCUTANEOUS at 23:23

## 2020-01-01 RX ADMIN — TEMOZOLOMIDE 100 MG: 100 CAPSULE ORAL at 21:21

## 2020-01-01 RX ADMIN — FAMOTIDINE 20 MG: 20 TABLET, FILM COATED ORAL at 08:20

## 2020-01-01 RX ADMIN — FAMOTIDINE 20 MG: 20 TABLET, FILM COATED ORAL at 07:54

## 2020-01-01 RX ADMIN — DEXAMETHASONE 4 MG: 4 TABLET ORAL at 17:54

## 2020-01-01 RX ADMIN — CLOTRIMAZOLE 10 MG: 10 LOZENGE ORAL; TOPICAL at 17:35

## 2020-01-01 RX ADMIN — LEVETIRACETAM 500 MG: 500 TABLET, FILM COATED ORAL at 07:54

## 2020-01-01 RX ADMIN — FAMOTIDINE 20 MG: 20 TABLET, FILM COATED ORAL at 09:06

## 2020-01-01 RX ADMIN — DEXAMETHASONE 4 MG: 4 TABLET ORAL at 18:24

## 2020-01-01 RX ADMIN — PROPOFOL 50 MG: 10 INJECTION, EMULSION INTRAVENOUS at 11:48

## 2020-01-01 RX ADMIN — CLOTRIMAZOLE 10 MG: 10 LOZENGE ORAL; TOPICAL at 16:51

## 2020-01-01 RX ADMIN — CLOTRIMAZOLE 10 MG: 10 LOZENGE ORAL; TOPICAL at 07:31

## 2020-01-01 RX ADMIN — CEFAZOLIN SODIUM 2 G: 2 INJECTION, SOLUTION INTRAVENOUS at 17:45

## 2020-01-01 RX ADMIN — ONDANSETRON HYDROCHLORIDE 8 MG: 4 TABLET, FILM COATED ORAL at 21:33

## 2020-06-09 NOTE — PROGRESS NOTES
"GYN Annual Exam     CC- Here for annual exam.     Denise Guajardo is a 54 y.o. female who presents for annual well woman exam. Periods are absent due to Menopause.     OB History        2    Para   2    Term                AB        Living   2       SAB        TAB        Ectopic        Molar        Multiple        Live Births                    Current contraception: post menopausal status  History of abnormal Pap smear: yes - no treatment required  Family history of uterine, colon or ovarian cancer: yes - aunt with ovarian cancer  History of abnormal mammogram: no  Family history of breast cancer: no  Last Pap : 2017 NL HPV neg  Last mammogram: today  Last colonoscopy: never  Last DEXA: never  Parental Hip Fracture: No    No past medical history on file.    Past Surgical History:   Procedure Laterality Date   • BREAST BIOPSY     • TUBAL ABDOMINAL LIGATION     • WISDOM TOOTH EXTRACTION         No current outpatient medications on file.    No Known Allergies    Social History     Tobacco Use   • Smoking status: Never Smoker   • Smokeless tobacco: Never Used   Substance Use Topics   • Alcohol use: Yes     Comment: Socially   • Drug use: No       Family History   Problem Relation Age of Onset   • Ovarian cancer Maternal Aunt    • Breast cancer Neg Hx    • Uterine cancer Neg Hx    • Colon cancer Neg Hx    • Deep vein thrombosis Neg Hx    • Pulmonary embolism Neg Hx        Review of Systems   Constitutional: Negative for chills, fever and unexpected weight loss.   Gastrointestinal: Negative for abdominal pain.   Genitourinary: Negative for dysuria, pelvic pain, vaginal bleeding and vaginal discharge.   All other systems reviewed and are negative.      /90   Pulse 76   Ht 165.1 cm (65\")   Wt 49 kg (108 lb)   Breastfeeding No   BMI 17.97 kg/m²     Physical Exam   Constitutional: She is oriented to person, place, and time. She appears well-developed and well-nourished. No distress. She is not " obese.  HENT:   Head: Normocephalic and atraumatic.   Eyes: Conjunctivae are normal. Right eye exhibits no discharge. Left eye exhibits no discharge.   Neck: Normal range of motion. Neck supple. No thyromegaly present.   Cardiovascular: Normal rate, regular rhythm and normal heart sounds.   No murmur heard.  Pulmonary/Chest: Effort normal and breath sounds normal. No respiratory distress. Right breast exhibits no inverted nipple, no mass and no nipple discharge. Left breast exhibits no inverted nipple, no mass and no nipple discharge.   Abdominal: Soft. Bowel sounds are normal. She exhibits no distension. There is no tenderness.   Genitourinary: Rectum normal and cervix normal. Rectal exam shows no mass and anal tone normal. Pelvic exam was performed with patient supine. There is no lesion or Bartholin's cyst on the right labia. There is no lesion or Bartholin's cyst on the left labia. Uterus is anteverted. Uterus is not deviated, enlarged, fixed or exhibiting a mass. Cervix does not exhibit motion tenderness or friability. Right adnexum is non-palpable.Left adnexum is non-palpable.Vagina exhibits loss of rugae. No bleeding in the vagina. No vaginal discharge found.   Musculoskeletal: Normal range of motion. She exhibits no edema.   Lymphadenopathy:     She has no cervical adenopathy.        Right: No inguinal adenopathy present.        Left: No inguinal adenopathy present.   Neurological: She is alert and oriented to person, place, and time.   Skin: Skin is warm and dry. No rash noted.   Psychiatric: She has a normal mood and affect. Her behavior is normal. Judgment and thought content normal.          Assessment     1) GYN annual well woman exam.        Plan     1) Breast Health - Clinical breast exam & mammogram and ACS, Self breast awareness monthly  2) Pap - updated today   3) Smoking status- non-smoker   4) Colon health - screening colonoscopy recommended   5) Bone health - Weight bearing exercise, dietary  calcium recommendations and vitamin D reviewed.   FRAX 12% and 2.6%   6) Activity recommends - Adult 150-300 min/week of multi-component physical activities that include balance training, aerobic and physical strengthening.    Avoidance of distracted driving - texts/phone calls while driving.   7) Follow up prn and one year      Rashad Fierro MD   6/9/2020  14:48

## 2020-06-26 PROBLEM — D49.6 BRAIN TUMOR (HCC): Status: ACTIVE | Noted: 2020-01-01

## 2020-06-26 NOTE — ED NOTES
Called [facility] Hospitalist group for [place] about transferring the patient. No doctor was given from the hospitalist group, because [facility] answering service does not have a list of the doctors.     Kathleen Tay  06/26/20 1249

## 2020-06-26 NOTE — ED PROVIDER NOTES
Subjective   Patient is a 54-year-old white female with history of vertigo who presents today with complaints of dizziness, feeling off balance, left arm and leg paresthesia.  She reports her symptoms started 1 week ago.  She reports her symptoms are worse when she stands or moves around.  She states she feels off balance.  She does not report a room spinning.  She states she has a history of vertigo but this feels different.  She states over the last couple of days she has developed some intermittent numbness and tingling to her left arm and leg.  She denies any weakness in any of her extremities.  She does report a mild frontal headache.  She denies any visual changes or speech difficulties.  She denies thunderclap onset.  States it is not the worst headache of her life.  She denies any sinus congestion or drainage.  She denies any fever or chills.  Denies any chest pain palpitations or shortness of breath.  She denies any nausea or vomiting.  Patient initially presented to the urgent care center but was transferred here to the ED for further evaluation.          Review of Systems   Constitutional: Negative for chills and fever.   HENT: Negative for congestion, postnasal drip and sinus pressure.    Eyes: Negative for visual disturbance.   Respiratory: Negative for shortness of breath.    Cardiovascular: Negative for chest pain and palpitations.   Gastrointestinal: Negative for nausea and vomiting.   Genitourinary: Negative for dysuria, frequency and urgency.   Musculoskeletal: Positive for neck pain. Negative for back pain and neck stiffness.   Skin: Negative for rash.   Neurological: Positive for dizziness, numbness and headaches. Negative for tremors, syncope, facial asymmetry, speech difficulty, weakness and light-headedness.       Past Medical History:   Diagnosis Date   • Vertigo        No Known Allergies    Past Surgical History:   Procedure Laterality Date   • BREAST BIOPSY     • DENTAL PROCEDURE     •  TUBAL ABDOMINAL LIGATION     • WISDOM TOOTH EXTRACTION         Family History   Problem Relation Age of Onset   • Ovarian cancer Maternal Aunt    • Breast cancer Neg Hx    • Uterine cancer Neg Hx    • Colon cancer Neg Hx    • Deep vein thrombosis Neg Hx    • Pulmonary embolism Neg Hx        Social History     Socioeconomic History   • Marital status:      Spouse name: Not on file   • Number of children: Not on file   • Years of education: Not on file   • Highest education level: Not on file   Tobacco Use   • Smoking status: Never Smoker   • Smokeless tobacco: Never Used   Substance and Sexual Activity   • Alcohol use: Yes     Comment: Socially   • Drug use: No   • Sexual activity: Yes     Partners: Male     Birth control/protection: Post-menopausal           Objective   Physical Exam  Vital signs and triage nurse note reviewed.  Constitutional: Awake, alert; well-developed and well-nourished. No acute distress is noted.  HEENT: Normocephalic, atraumatic; pupils are PERRL with intact EOM; oropharynx is pink and moist without exudate or erythema.  No drooling or pooling of oral secretions.  Neck: Supple, full range of motion without pain; no cervical lymphadenopathy. Normal phonation.  Cardiovascular: Regular rate and rhythm, normal S1-S2.  No murmur noted.  Pulmonary: Respiratory effort regular nonlabored, breath sounds clear to auscultation all fields.  Abdomen: Soft, nontender, nondistended with normoactive bowel sounds; no rebound or guarding.  Musculoskeletal: Independent range of motion of all extremities with no palpable tenderness or edema.  Neuro: Alert oriented x3, speech is clear and appropriate, GCS 15.  No focal sensorimotor deficits noted. No facial asymmetry. Muscle strength 5/5 bilateral.  Sensation intact bilaterally. DTRs are 2+ bilaterally.  Cranial nerves 2-12 grossly intact. Normal coordination.  No gaze, deviation, or nystagmus.  Follows commands.  Skin: Flesh tone, warm, dry, intact; no  erythematous or petechial rash or lesion.    Procedures           ED Course  ED Course as of Jun 26 1327   Fri Jun 26, 2020   1236 MRI Brain With & Without Contrast [JG]   1236 MRI Brain With & Without Contrast [JG]   1310 MCH: 30.6 [MD]      ED Course User Index  [JG] Parish Wiggins MD  [MD] Jordyn Mitchell APRN      Labs Reviewed   COMPREHENSIVE METABOLIC PANEL - Abnormal; Notable for the following components:       Result Value    Glucose 113 (*)     All other components within normal limits    Narrative:     GFR Normal >60  Chronic Kidney Disease <60  Kidney Failure <15     URINALYSIS W/ CULTURE IF INDICATED - Abnormal; Notable for the following components:    Leuk Esterase, UA Small (1+) (*)     All other components within normal limits   CBC WITH AUTO DIFFERENTIAL - Abnormal; Notable for the following components:    Lymphocyte % 17.2 (*)     All other components within normal limits   URINALYSIS, MICROSCOPIC ONLY - Abnormal; Notable for the following components:    WBC, UA 3-5 (*)     Squamous Epithelial Cells, UA 3-6 (*)     All other components within normal limits   TROPONIN (IN-HOUSE) - Normal    Narrative:     Troponin T Reference Range:  <= 0.03 ng/mL-   Negative for AMI  >0.03 ng/mL-     Abnormal for myocardial necrosis.  Clinicians would have to utilize clinical acumen, EKG, Troponin and serial changes to determine if it is an Acute Myocardial Infarction or myocardial injury due to an underlying chronic condition.       Results may be falsely decreased if patient taking Biotin.     PROTIME-INR - Normal   APTT - Normal   MAGNESIUM - Normal   TSH - Normal   BUN - Normal   CBC AND DIFFERENTIAL    Narrative:     The following orders were created for panel order CBC & Differential.  Procedure                               Abnormality         Status                     ---------                               -----------         ------                     CBC Auto Differential[[id number]]        Abnormal             Final result                 Please view results for these tests on the individual orders.   EXTRA TUBES    Narrative:     The following orders were created for panel order Extra Tubes.  Procedure                               Abnormality         Status                     ---------                               -----------         ------                     [facility][[id number]]                                   Final result                 Please view results for these tests on the individual orders.   [facility]     Mri Brain With & Without Contrast    Result Date: 6/26/2020   IMPRESSION:  1.. There are two separate peripherally enhancing masses within the brain, the largest extending 2.6 cm craniocaudally from the right lateral thalamic margin into the right side of the midbrain and right side of the chilango. The second lesion measures about 7 mm located within the central chilango on the right. There is surrounding the vasogenic edema. FINDINGS are worrisome for metastatic disease of unknown primary origin. Primary glioma not excluded. Abscesses are thought less likely, internal signal characteristics. Neurosurgical consultation is advised.  Electronically Signed By-Dr. Kourtney Collier MD On:6/26/2020 12:04 PM This report was finalized on [id number] by Dr. Kourtney Collier MD.    Medications   sodium chloride 0.9 % flush 10 mL (has no administration in time range)   gadoteridol (PROHANCE) injection 15 mL (10 mL Intravenous Given 6/26/20 1127)   dexamethasone sodium phosphate injection 10 mg (10 mg Intravenous Given 6/26/20 1248)                                            MDM  Number of Diagnoses or Management Options  Brain mass:   Dizziness:   Paresthesia of left upper and lower extremity:   Diagnosis management comments: Comorbidities: Vertigo  Differentials: CVA, ICH, tumor/mass, MS, cervical radiculopathy, lumbar radiculopathy, vertigo;this list is not all inclusive and does not constitute the  entirety of considered causes  Discussion with provider:  Radiology interpretation: X-rays reviewed by me and interpreted by radiologist: As above  Lab interpretation: Labs viewed by me significant for: As above    Patient is placed on continuous cardiac monitor.  She had IV established.  She had labs, EKG and MRI of the brain obtained.    Upon review of MRI, patient was discussed with on-call neurosurgeon Dr. Licona's assistant.  Recommends 1 dose of IV Decadron and transfer to [facility] for further evaluation/consultation.  He would like the patient admitted to the hospitalist group at [facility].  The patient was then discussed with Dr. Wu, hospitalist at [facility], who agreed to admit.    Patient was given Decadron 10 mg IV.    Diagnosis and treatment plan discussed with patient.  Patient agreeable to plan.     Patient is transferred to [facility] by EMS for further neurosurgical evaluation/consultation.             Amount and/or Complexity of Data Reviewed  Clinical lab tests: reviewed and ordered  Tests in the radiology section of CPT®: reviewed and ordered  Tests in the medicine section of CPT®: reviewed    Patient Progress  Patient progress: stable      Final diagnoses:   Dizziness   Paresthesia of left upper and lower extremity   Brain mass            Jordyn Mitchell, APRN  06/26/20 0965

## 2020-07-01 PROBLEM — I62.9 INTRACRANIAL HEMORRHAGE (HCC): Status: ACTIVE | Noted: 2020-01-01

## 2020-07-01 PROBLEM — G06.0 BRAIN ABSCESS: Status: ACTIVE | Noted: 2020-01-01

## 2020-07-01 NOTE — BRIEF OP NOTE
CRANIOTOMY FOR TUMOR STEREOTACTIC  Progress Note    Denise Guajardo  7/1/2020    Pre-op Diagnosis:   Brain tumor (CMS/HCC) [D49.6]       Post-Op Diagnosis Codes:     * Brain tumor (CMS/HCC) [D49.6]    Procedure/CPT® Codes:      Procedure(s):  Right-sided craniotomy for excisional biopsy and, placement of lumbar drain    Surgeon(s):  Shai Licona MD    Anesthesia: General    Staff:   Circulator: Lisa Brewer RN; Eryn Bowers RN  Scrub Person: Diana Morales  Assistant: Ramandeep Kennedy CSA  Orientee: Carina Acosta RN    Estimated Blood Loss: 50 mL    Urine Voided: 500 mL    Specimens:                Specimens     ID Source Type Tests Collected By Collected At Frozen?      1 Lumbar Puncture Cerebrospinal Fluid · GLUCOSE, CSF  · PROTEIN, CSF  · CELL COUNT WITH DIFFERENTIAL, CSF  · CULTURE, CSF   Shai Licoan MD 7/1/20 1048      Description: CSF FOR GLUCOSE, PROTEIN, CELL COUNT, AND CULTURE    2 Brain Wound · ANAEROBIC CULTURE  · WOUND CULTURE   Shai Licona MD 7/1/20 1159      Description: RIGHT BRAINSTEM MASS CULTURE    3 Brain Wound · ANAEROBIC CULTURE  · WOUND CULTURE   Shai Licona MD 7/1/20 1209      Description: RIGHT BRAINSTEM MASS CULTURE #2    A Brain Tissue · TISSUE PATHOLOGY EXAM   Shai Licona MD 7/1/20 1158 Yes     Description: RIGHT BRAINSTEM MASS FOR FROZEN     B Brain Tissue · TISSUE PATHOLOGY EXAM   Shai Licona MD 7/1/20 1212 No     Description: RIGHT BRAINSTEM MASS                Drains:   Closed/Suction Drain Right Scalp Bulb (Active)       Urethral Catheter Silicone 12 Fr. (Active)       Lumbar Drain (Active)       Findings: Abnormal tissue in the cerebral peduncle appear to be more consistent with abscess than metastatic tumor.    Complications: None      Shai Licona MD     Date: 7/1/2020  Time: 12:53

## 2020-07-01 NOTE — OP NOTE
Preoperative diagnosis: Mass in the right cerebral peduncle with mild left hemiparesis    Postoperative diagnosis: Same    Procedure performed: Right temporal craniotomy with excisional biopsy of mass in the right cerebral peduncle and placement of a lumbar drain    Surgeon: Shai Licona M.D.    Asst.: Ramandeep Kennedy CFA who was instrumental in helping with hemostasis, visualization of neural structures and retraction of neural structures    Estimated blood loss, crystalloid, colloid, blood: Please see anesthesia record    Material to lab:   The biopsy was sent for frozen section and then some further material was removed as well as the suction material and sent for permanent section in formalin.  We also sent 2 separate cultures.    Drains: 1 drain in the subtemporal space    Complications: None    Indications for the procedure: This is a patient who presented with some left-sided symptoms.  Imaging showed abnormalities as previously discussed and we elected to proceed with an excisional biopsy of the larger lesion.    Operative summary:  The patient was brought into the operating room and placed under general endotracheal anesthesia using intravenous and inhalational agents.  The patient was then positioned on the operating table in the left lateral decubitus position.  All pressure points were padded including peripheral points of entrapment.  Her back was then prepped with ChloraPrep and then a 14-gauge Touhy needle was placed into the subarachnoid space and then the lumbar drain catheter was placed into the subarachnoid space with good return of CSF.  I did not suture the drain in place because I only intended to leave it for the duration of the case.  20 cc of CSF were drained out through the needle and another 60 out to the drain.  The drain was then turned off.  Once this was done the patient was secured in the Holland headrest in reference to the Stealth navigation system.  The right temporal region was  shaved free of hair and then the navigation was used to plan out the incision.  At first I had planned to make a curvilinear incision but then I felt that just doing a fairly linear incision just in front of the ear would be sufficient to allow a craniotomy large enough to get to the abnormality.  This turned out to be true.  This was injected with 10 cc of 1% Xylocaine with epinephrine and then she was draped with Ioban, towels, half sheets and a split sheet.  An incision was made in the above location.  This carried down to the temporalis fascia.  It was not necessary to take the superficial temporal artery.  The temporalis fascia and temporalis muscle were opened and stripped off of the outer table of the skull all the way down to the zygomatic arch.  Once this was done a bur hole was placed just above the zygomatic arch and then a round bone flap was created and set aside.  The dura was then opened hinging it inferiorly and held back with a traction suture.  Following this the remainder the operation was done under the high-power magnification the operating microscope using microsurgical technique microsurgical instrumentation.  The CSF removal had allowed significant brain relaxation and we were able to elevate the inferior temporal lobe off of the floor of the temporal fossa back to the tentorial notch.  Using navigation system we are able to isolate the area where the abnormality was located although it really did not appear to be that abnormal on the surface which I expected it to be.  Ultimately the surface was coagulated of very small vessels several larger vessels were left alone.    Once this was done the area in question was incised with a 11 blade and then this immediately revealed abnormal tissue.  The tissue was biopsied and sent for frozen section which returned necrosis and inflammatory tissue more consistent with an abscess there was no evidence of any metastatic or cancerous tissue.  I then  dissected a bit further up within the confines of the tumor itself in order to open it up further and this did reveal what it appeared to be purulent material.  Cultures were sent after the initial incision and then after the secondary incision.  I then proceeded to remove as much of the abnormal tissue as I felt I could without traumatizing the cerebral peduncle any further.  Once this was done bleeding was controlled with the bipolar cautery thrombin Gelfoam and FloSeal and then the cavity was packed with thrombin and Gelfoam.  The area was copiously irrigated with Phisiosol solution and then the retractor was removed.  The dura was tacked back into place and then DuraGen was used to coat the dura.  Adherus was used to seal it and then the bone flap was back into place using the KLS system a drain was placed in the subtemporal space and then the incision and muscle were closed in layers.  The patient was taken to the recovery room in stable condition.  There were no apparent complications and the sponge, instrument and needle counts were correct at the end of the procedure.

## 2020-07-01 NOTE — PLAN OF CARE
Pt s/p crani for brain abscess. Left sided weakness, facial droop, dysphagia. BP to stay less than 120, cardene gtt hanging. IVF continued. SLE in the AM. Medicated for pain with IV pain medication. GEGE with small amount of sanguinous drainage. IV abx continued. CT in the AM. No acute s/s of distress. Will continue to monitor.      Problem: Patient Care Overview  Goal: Plan of Care Review  Outcome: Ongoing (interventions implemented as appropriate)  Goal: Individualization and Mutuality  Outcome: Ongoing (interventions implemented as appropriate)  Goal: Discharge Needs Assessment  Outcome: Ongoing (interventions implemented as appropriate)  Goal: Interprofessional Rounds/Family Conf  Outcome: Ongoing (interventions implemented as appropriate)     Problem: Pain, Acute (Adult)  Goal: Identify Related Risk Factors and Signs and Symptoms  Outcome: Ongoing (interventions implemented as appropriate)  Goal: Acceptable Pain Control/Comfort Level  Outcome: Ongoing (interventions implemented as appropriate)     Problem: Craniotomy/Craniectomy/Cranioplasty (Adult)  Goal: Signs and Symptoms of Listed Potential Problems Will be Absent, Minimized or Managed (Craniotomy/Craniectomy/Cranioplasty)  Outcome: Ongoing (interventions implemented as appropriate)  Goal: Anesthesia/Sedation Recovery  Outcome: Ongoing (interventions implemented as appropriate)     Problem: Infection, Risk/Actual (Adult)  Goal: Identify Related Risk Factors and Signs and Symptoms  Outcome: Ongoing (interventions implemented as appropriate)  Goal: Infection Prevention/Resolution  Outcome: Ongoing (interventions implemented as appropriate)     Problem: Skin Injury Risk (Adult)  Goal: Identify Related Risk Factors and Signs and Symptoms  Outcome: Ongoing (interventions implemented as appropriate)  Goal: Skin Health and Integrity  Outcome: Ongoing (interventions implemented as appropriate)     Problem: Fall Risk (Adult)  Goal: Identify Related Risk Factors and  Signs and Symptoms  Outcome: Ongoing (interventions implemented as appropriate)  Goal: Absence of Fall  Outcome: Ongoing (interventions implemented as appropriate)

## 2020-07-01 NOTE — CONSULTS
[facility]  [phone]  Chandler Betancourt MD      Subjective   LOS: 5 days     Thank you for this consultation.  54-year-old female with new onset left leg tingling and mild headache.  CT/MRI showed brain lesions which could be metastatic versus brain abscesses.  She is now status post craniotomy by Dr. Olson.  Preoperatively outside of the tingling and numbness no other neurological symptoms.  Postoperatively left-sided weakness and left facial palsy.  Postoperative CT head shows a small bleed.  Patient denies any medical problems previously.  She is not on any medications.  She has had extensive dental work 3 years ago for infections.  Proximately 7 to 10 days ago she had a tooth extraction for broken tooth but not for an infection.  Recent echo has not shown any vegetations though only transthoracic.  No PFO was reported.  ID is following the case.   at the bedside assists with history.  He denies any fever chills or rigors.  Patient is a never smoker.    Denise IRELAND Bennett  reports that she drinks alcohol.,  reports that she has never smoked. She has never used smokeless tobacco.     Past Hx:  has a past medical history of Vertigo.  Surg Hx:  has a past surgical history that includes Breast biopsy; [place] tooth extraction; Tubal ligation; and Dental surgery.  FH: family history includes Ovarian cancer in her maternal aunt.  SH:  reports that she has never smoked. She has never used smokeless tobacco. She reports that she drinks alcohol. She reports that she does not use drugs.    Medications Prior to Admission   Medication Sig Dispense Refill Last Dose   • Calcium Carbonate (Caltrate 600) 1500 (600 Ca) MG tablet Take 600 mg by mouth Daily.   6/26/2020 at Unknown time   • cephalexin (KEFLEX) 500 MG capsule Take 500 mg by mouth 2 (Two) Times a Day.   6/26/2020 at Unknown time   • Nutritional Supplements (ESTROVEN PM PO) Take  by mouth.   6/26/2020 at Unknown time   • Omega-3 Fatty Acids (FISH OIL)  1000 MG capsule capsule Take  by mouth Daily With Breakfast.   2020 at Unknown time   • meclizine (ANTIVERT) 12.5 MG tablet Take 12.5 mg by mouth 3 (Three) Times a Day As Needed for Dizziness.        No Known Allergies    Review of Systems   Constitutional: Negative for chills and fever.   HENT: Negative for congestion, postnasal drip and trouble swallowing.    Respiratory: Negative for cough, shortness of breath and wheezing.    Cardiovascular: Negative for chest pain and leg swelling.   Gastrointestinal: Negative for abdominal pain, diarrhea, nausea and vomiting.   Endocrine: Negative for cold intolerance and heat intolerance.   Genitourinary: Negative for frequency and urgency.   Musculoskeletal: Negative for arthralgias and back pain.   Skin: Negative for pallor and rash.   Neurological: Positive for facial asymmetry (new), weakness (focal left new), numbness (LLE) and headaches (mild). Negative for seizures.   Psychiatric/Behavioral: Negative for confusion. The patient is not nervous/anxious.      Vital Signs past 24hrs  BP range: BP: (106-172)/() 116/66  Pulse range: Heart Rate:  [50-85] 85  Resp rate range: Resp:  [12-16] 16  Temp range: Temp (24hrs), Av °F (36.7 °C), Min:97.6 °F (36.4 °C), Max:98.3 °F (36.8 °C)    Oxygen range: SpO2:  [97 %-100 %] 98 %; Flow (L/min):  [2-4] 2;   Device (Oxygen Therapy): room air  41.5 kg (91 lb 7.9 oz); Body mass index is 15.24 kg/m².  I/O this shift:  In: 1100 [I.V.:1100]  Out: 610 [Urine:500; Other:60; Blood:50]    Adult female laying in bed no acute distress.  Obvious left facial palsy.  She is normal built and nutrition.  Pupils equal and reactive to light.  Left facial droop.  JVP not elevated trachea midline thyroid not enlarged.  Lungs reveal bilateral air entry clear to auscultation no rales rhonchi wheeze.  Percussion note resonant chest expansion equal no chest wall deformity or tenderness.  Heart examination S1-S2 present rhythm regular no murmurs.   No edema lower extremities.  Abdomen is soft nontender bowel sounds present no liver spleen enlargement.  No peripheral cyanosis clubbing.  Left hemiplegia noted.    Results Review:    I have reviewed the laboratory and imaging data from current admission. My annotations are as noted in assessment and plan.    Medication Review:  I have reviewed the current MAR. My annotations are as noted in assessment and plan.    Plan   PCCM Problems  Craniotomy for resection brain lesions, 7/1/2020  Postoperative small hemorrhage with left hemiparesis  Recent dental work.        Plan of Treatment  Await results of brain biopsies.    Postoperative small hemorrhage.  Goal blood pressure less than 120 therefore on Cardene.  Neurosurgery is aware.    Keep n.p.o. for now and closely monitor neuro status.    Antibiotics as per ID for suspected CNS infection.    Part of this note may be an electronic transcription/translation of spoken language to printed text using the Dragon Dictation System.

## 2020-07-01 NOTE — ANESTHESIA PROCEDURE NOTES
Arterial Line      Patient reassessed immediately prior to procedure    Patient location during procedure: OR  Start time: 7/1/2020 10:22 AM  Stop Time:7/1/2020 10:26 AM       Line placed for hemodynamic monitoring.  Performed By   Anesthesiologist: Shay Kumar MD  Preanesthetic Checklist  Completed: patient identified, site marked, surgical consent, pre-op evaluation, timeout performed, IV checked, risks and benefits discussed and monitors and equipment checked  Arterial Line Prep   Sterile Tech: cap, mask, gloves and sterile barriers  Prep: ChloraPrep  Patient monitoring: blood pressure monitoring, continuous pulse oximetry and EKG  Arterial Line Procedure   Laterality:left  Location:  radial artery  Catheter size: 20 G   Guidance: landmark technique and palpation technique  Number of attempts: 1  Successful placement: yes  Post Assessment   Dressing Type: occlusive dressing applied, secured with tape and wrist guard applied.   Complications no  Circ/Move/Sens Assessment: unchanged.   Patient Tolerance: patient tolerated the procedure well with no apparent complications

## 2020-07-01 NOTE — ANESTHESIA PROCEDURE NOTES
Airway  Urgency: elective    Date/Time: 7/1/2020 10:20 AM  Airway not difficult    General Information and Staff    Patient location during procedure: OR  Anesthesiologist: Shay Kumar MD    Indications and Patient Condition  Indications for airway management: airway protection    Preoxygenated: yes  Mask difficulty assessment: 1 - vent by mask    Final Airway Details  Final airway type: endotracheal airway      Successful airway: ETT  Cuffed: yes   Successful intubation technique: direct laryngoscopy  Endotracheal tube insertion site: oral  Blade: Fatimah  Blade size: 3  ETT size (mm): 7.0  Cormack-Lehane Classification: grade I - full view of glottis  Placement verified by: chest auscultation and capnometry   Measured from: lips  ETT/EBT  to lips (cm): 21  Number of attempts at approach: 1  Assessment: lips, teeth, and gum same as pre-op and atraumatic intubation

## 2020-07-01 NOTE — PROGRESS NOTES
"Adult Nutrition  Assessment/PES    Patient Name:  Denise Guajardo  YOB: 1965  MRN: [id number]  Admit Date:  6/26/2020    Assessment Date:  7/1/2020    Comments:  Nutrition follow up.  Eating well, 100% x 2 meals.  NPO for OR today for R sided craniotomy for excisional biopsy and possible placement of lumbar drain.  Weakness has improved some per chart.    RD to continue to follow.    Reason for Assessment     Row Name 07/01/20 1012          Reason for Assessment    Reason For Assessment  follow-up protocol         Nutrition/Diet History     Row Name 07/01/20 1012          Nutrition/Diet History    Typical Food/Fluid Intake  eating well, 100% x 2 meals per chart PO data         Anthropometrics     Row Name 07/01/20 1013 07/01/20 0500       Anthropometrics    Height  165 cm (64.96\")  --    Weight  --  41.5 kg (91 lb 7.9 oz)       Admit Weight    Admit Weight  -- 91# 7/1  --       Ideal Body Weight (IBW)    Ideal Body Weight (IBW) (kg)  57.2  --       Usual Body Weight (UBW)    Weight Loss Time Frame  104# 2 days ago, 92# yesterday  --       Body Mass Index (BMI)    BMI Assessment  BMI less than 16: protein-energy malnutrition grade III 15.19  --        Labs/Tests/Procedures/Meds     Row Name 07/01/20 1014          Labs/Procedures/Meds    Lab Results Reviewed  reviewed, pertinent     Lab Results Comments  Gluc, BUN        Diagnostic Tests/Procedures    Diagnostic Test/Procedure Reviewed  reviewed, pertinent     Diagnostic Test/Procedures Comments  R sided craniotomy today for excisional biopsy and possible placement of lumbar drain        Medications    Pertinent Medications Reviewed  reviewed, pertinent     Pertinent Medications Comments  decadron, IVFs         Physical Findings     Row Name 07/01/20 1015          Physical Findings    Overall Physical Appearance  underweight     Skin  -- B=21, intact         Estimated/Assessed Needs     Row Name 07/01/20 1013          Calculation Measurements    Height  " "165 cm (64.96\")         Nutrition Prescription Ordered     Row Name 07/01/20 1015          Nutrition Prescription PO    Current PO Diet  NPO;Sips/ice chips         Evaluation of Received Nutrient/Fluid Intake     Row Name 07/01/20 1015          PO Evaluation    Number of Meals  2     % PO Intake  100         Problem/Interventions:    Intervention Goal     Row Name 07/01/20 1015          Intervention Goal    General  Maintain nutrition;Reduce/improve symptoms;Disease management/therapy     PO  Initiate feeding     Weight  Appropriate weight gain         Nutrition Intervention     Row Name 07/01/20 1015          Nutrition Intervention    RD/Tech Action  Care plan reviewd;Follow Tx progress;Await begin PO         Education/Evaluation     Row Name 07/01/20 1015          Education    Education  Will Instruct as appropriate        Monitor/Evaluation    Monitor  Per protocol;I&O;Pertinent labs;Weight;Symptoms     Education Follow-up  Reinforce PRN           Electronically signed by:  Tara Eubanks RD  07/01/20 10:16  "

## 2020-07-01 NOTE — PAYOR COMM NOTE
"Denise Guajardo (54 y.o. Female)     PLEASE SEE ATTACHED CONTINUED STAY REVIEW.   REF#[id number]    PLEASE CALL   OR  [phone] WITH INPT AUTH AND DAYS APPROVED.    THANK YOU    JOYCE AGUSTIN LPN CCP        Date of Birth Social Security Number Address Home Phone MRN    1965  [address]  [place] [address] [phone] [id number]    [employer] Mar[employer] Status          T[employer]        Admission Date Admission Type Admitting Provider Attending Provider Department, Room/Bed    6/26/20 Urgent Bill Wu MD Broaddus, Emmett J., MD [facility] MAIN OR, [facility] Main OR/MAIN OR    Discharge Date Discharge Disposition Discharge Destination                       Attending Provider:  Pranay Williamson MD    Allergies:  No Known Allergies    Isolation:  None   Infection:  None   Code Status:  CPR    Ht:  165 cm (64.96\")   Wt:  41.5 kg (91 lb 7.9 oz)    Admission Cmt:  None   Principal Problem:  Brain tumor (CMS/HCC) [D49.6]                 Active Insurance as of 6/26/2020     Primary Coverage     Payor Plan Insurance Group Employer/Plan Group    ANTHEM BLUE CROSS ANTHEM BLUE CROSS BLUE SHIELD PPO [id number]     Payor Plan Address Payor Plan Phone Number Payor Plan Fax Number Effective Dates    [address] [phone]  5/1/2020 - None Entered    [place] [address]       Subscriber Name Subscriber Birth Date Member ID       DENISE GUAJARDO 1965 [id number]                 Emergency Contacts      (Rel.) Home Phone Work Phone Mobile Phone    Malick Guajardo (Spouse) [phone] -- [phone]    Shay Henry (Son) [phone] -- --    Jhonatan Henry (Son) [phone] -- --            Oxygen Therapy (last day)     Date/Time   SpO2   Device (Oxygen Therapy)   Flow (L/min)   Oxygen Concentration (%)   ETCO2 (mmHg)    07/01/20 0846   99   room air   --   --   --    07/01/20 0812   99   room air   --   --   --    07/01/20 0709   99   room air   --   --   " "--    20 0300   97   room air   --   --   --    20 0055   --   room air   --   --   --    20 2320   100   room air   --   --   --    20   --   room air   --   --   --    20 1953   100   room air   --   --   --    20 1432   --   room air   --   --   --    20 1335   99   room air   --   --   --    20 0911   --   room air   --   --   --    20 0721   99   room air   --   --   --    20 0314   99   room air   --   --   --    20 0032   99   room air   --   --   --    20 0015   --   room air   --   --   --              Intake & Output (last day)        0701 -  0700  0701 -  0700    P.O. 840     I.V. (mL/kg)  100 (2.4)    Total Intake(mL/kg) 840 (20.2) 100 (2.4)    Other  60    Total Output  60    Net +840 +40          Urine Unmeasured Occurrence 10 x     Stool Unmeasured Occurrence 4 x         Operative/Procedure Notes (last 24 hours) (Notes from 20 1150 through 20 115)    No notes of this type exist for this encounter.            Physician Progress Notes (last 24 hours) (Notes from 20 1150 through 20 115)      Pranay Williamson MD at 20 1808          DAILY PROGRESS NOTE  [facility]    Patient Identification:  Name: Denise Guajardo  Age: 54 y.o.  Sex: female  :  1965  MRN: [id number]         Primary Care Physician: Paz Mera MD      Subjective  No new complaints.    Objective:  General Appearance:  Comfortable, well-appearing, in no acute distress and not in pain.    Vital signs: (most recent): Blood pressure 135/80, pulse 71, temperature 97.8 °F (36.6 °C), temperature source Oral, resp. rate 16, height 165 cm (64.96\"), weight 42 kg (92 lb 9.5 oz), SpO2 99 %, not currently breastfeeding.    Lungs:  Normal effort and normal respiratory rate.  Breath sounds clear to auscultation.    Heart: Normal rate.  Regular rhythm.    Extremities: There is no dependent edema.  "   Neurological: Patient is alert and oriented to person, place and time.  (Gait presently normal).    Skin:  Warm and dry.                Vital signs in last 24 hours:  Temp:  [97.5 °F (36.4 °C)-98.1 °F (36.7 °C)] 97.8 °F (36.6 °C)  Heart Rate:  [55-83] 71  Resp:  [14-18] 16  BP: (119-152)/(80-96) 135/80    Intake/Output:    Intake/Output Summary (Last 24 hours) at 6/30/2020 1808  Last data filed at 6/30/2020 1300  Gross per 24 hour   Intake 480 ml   Output --   Net 480 ml         Results from last 7 days   Lab Units 06/27/20  0534 06/26/20  1053   WBC 10*3/mm3 8.77 6.70   HEMOGLOBIN g/dL 14.5 15.2   PLATELETS 10*3/mm3 222 212     Results from last 7 days   Lab Units 06/27/20  0534 06/26/20  1053   SODIUM mmol/L 136 141   POTASSIUM mmol/L 3.9 3.9   CHLORIDE mmol/L 101 102   CO2 mmol/L 21.6* 28.0   BUN mg/dL 21* 20   CREATININE mg/dL 0.65 0.62   GLUCOSE mg/dL 135* 113*   Estimated Creatinine Clearance: 65.6 mL/min (by C-G formula based on SCr of 0.65 mg/dL).  Results from last 7 days   Lab Units 06/27/20  0534 06/26/20  1053   CALCIUM mg/dL 9.5 9.6   ALBUMIN g/dL  --  4.80   MAGNESIUM mg/dL  --  2.1     Results from last 7 days   Lab Units 06/26/20  1053   ALBUMIN g/dL 4.80   BILIRUBIN mg/dL 0.8   ALK PHOS U/L 72   AST (SGOT) U/L 22   ALT (SGPT) U/L 23       Assessment:  Brain tumor (CMS/HCC): Etiology unknown.  Work-up in progress.  Infectious disease, neurosurgical input greatly appreciated.      Plan:  OR tomorrow     Pranay Williamson MD  6/30/2020  18:08      Electronically signed by Pranay Williamson MD at 06/30/20 1809     Shai Licona MD at 06/30/20 1206          [facility] NEUROSURGERY PROGRESS NOTE    PATIENT IDENTIFICATION:   Name:  Denise Guajardo      MRN:  [id number]     54 y.o.  female               CC\: multiple brain masses      Subjective     Interval History: States she feels very anxious about surgery.  Denies any headache.  Feels that her weakness is better.    ROS:  HEENT: No headache, no  vision changes  GI: No nausea, vomiting, no swallow difficulties  Neuro: Left-sided weakness better    Objective     Vital signs in last 24 hours:  Temp:  [97.5 °F (36.4 °C)-98.1 °F (36.7 °C)] 97.5 °F (36.4 °C)  Heart Rate:  [55-83] 62  Resp:  [14-18] 16  BP: (119-136)/(83-95) 136/93      Intake/Output this shift:  I/O this shift:  In: 240 [P.O.:240]  Out: -       Intake/Output last 3 shifts:  I/O last 3 completed shifts:  In: 930 [P.O.:930]  Out: -     LABS:  Blood cx- NGTD  HIV- non-reactive    IMAGING STUDIES:  No new imaging    I personally viewed and interpreted the patient's chart.    Meds reviewed/changed: Yes    Current Facility-Administered Medications:   •  acetaminophen (TYLENOL) tablet 650 mg, 650 mg, Oral, Q4H PRN, 650 mg at 06/28/20 0727 **OR** acetaminophen (TYLENOL) 160 MG/5ML solution 650 mg, 650 mg, Oral, Q4H PRN **OR** acetaminophen (TYLENOL) suppository 650 mg, 650 mg, Rectal, Q4H PRN, Bill Wu MD  •  dexamethasone (DECADRON) tablet 4 mg, 4 mg, Oral, Q6H, Shay Horner MD, 4 mg at 06/30/20 1144  •  diatrizoate meglumine-sodium (GASTROGRAFIN) 66-10 % solution 30 mL, 30 mL, Oral, Once, Bill Wu MD  •  LORazepam (ATIVAN) injection 1 mg, 1 mg, Intravenous, Q6H PRN, Bill Wu MD  •  LORazepam (ATIVAN) tablet 1 mg, 1 mg, Oral, Q6H PRN, Bill Wu MD, 1 mg at 06/28/20 2229  •  ondansetron (ZOFRAN) tablet 4 mg, 4 mg, Oral, Q6H PRN **OR** ondansetron (ZOFRAN) injection 4 mg, 4 mg, Intravenous, Q6H PRN, Bill Wu MD  •  sodium chloride 0.9 % flush 10 mL, 10 mL, Intravenous, Q12H, Bill Wu MD, 10 mL at 06/30/20 0909  •  sodium chloride 0.9 % flush 10 mL, 10 mL, Intravenous, PRN, Bill Wu MD      Physical Exam:    General:   Awake, alert, oriented x3. Speech clear with no aphasia; does appear somewhat anxious    CN III IV VI: Extraocular movements are full , PERRL   CN VII: Facial movements are symmetric. No weakness.    Motor: No drift   Station and Gait: Normal gait  "and station.    Coordination: Finger-to-nose. Rhomberg negative.  Extremities:   No calf swelling    Assessment/Plan     ASSESSMENT:      Brain tumor (CMS/HCC)      PLAN: Patient feels that weakness is better.  She is anticipating excisional biopsy of right sided mass.  She is quite anxious and has many questions regarding surgery and postop expectations. She states that her questions were answered to her satisfaction and had no questions at the time of my departure.  She denies any significant headache.  She has no drift on exam today.  No focal neurologic deficits.  Will obtain COVID test, type and screen, n.p.o. after midnight.  Offered to give her some melatonin to help with sleep, but she declined.    This part is from Dr. Licona.  I talked to the patient and her  and son yesterday afternoon.  I again reviewed the risk and complications of surgery including the chance of a left hemiparesis.  Other risk such as infection, bleeding, CSF leak, anesthesia and other neurologic deficits are possible but much lower.  She does request to proceed.    I discussed the patients findings and my recommendations with patient, nursing staff and Dr. Licona       LOS: 4 days       Joy Bautista, APRN  6/30/2020  12:06    \"Dictated utilizing Dragon dictation\".        Electronically signed by Shai Licona MD at 07/01/20 0713          Consult Notes (last 24 hours) (Notes from 06/30/20 1150 through 07/01/20 1150)      Godfrey Urrutia at 07/01/20 0609        Responded to reabel for prayer before surgery, pt present with , we prayed together    Electronically signed by Godfrey Urrutia at 07/01/20 0764       "

## 2020-07-01 NOTE — ANESTHESIA POSTPROCEDURE EVALUATION
Patient: Denise Guajardo    Procedure Summary     Date:  07/01/20 Room / Location:  [facility] OR 21 / [facility] MAIN OR    Anesthesia Start:  1010 Anesthesia Stop:  1322    Procedure:  Right-sided craniotomy for excisional biopsy and, placement of lumbar drain (Right Head) Diagnosis:       Brain tumor (CMS/HCC)      (Brain tumor (CMS/HCC) [D49.6])    Surgeon:  Shai Licona MD Provider:  Shay Kumar MD    Anesthesia Type:  general ASA Status:  2          Anesthesia Type: general    Vitals  Vitals Value Taken Time   /62 7/1/2020  1:50 PM   Temp 36.4 °C (97.6 °F) 7/1/2020  1:16 PM   Pulse 69 7/1/2020  1:53 PM   Resp 16 7/1/2020  1:45 PM   SpO2 100 % 7/1/2020  1:53 PM   Vitals shown include unvalidated device data.        Post Anesthesia Care and Evaluation    Patient location during evaluation: PACU  Pain management: adequate  Airway patency: patent  Anesthetic complications: No anesthetic complications    Cardiovascular status: acceptable  Respiratory status: acceptable  Hydration status: acceptable    Comments: --------------------            07/01/20               1350     --------------------   BP:       106/62     Pulse:      67       Resp:                Temp:                SpO2:      100%     --------------------

## 2020-07-01 NOTE — PROGRESS NOTES
ID note for brain lesions    Subjective: Seen in recovery after I&D and evacuation of a brain abscess today.  She has a right-sided drain in place and is going to be going to the ICU.  She denies any significant pain.  No fevers or chills or night sweats.    Objective: Afebrile, no acute distress, pulmonary effort normal, slow to respond but eventually does so appropriately.  She has a right sided cranial incision which appears clean and dry as well as a right sided CNS drain    6/29 BCx NGTD  7/1 CSF culture no growth to date  7/1 operative brain cultures 2/2 no growth    Assessment and plan   1.  Brain abscess    Operative note reviewed and findings are more consistent with bacterial brain abscess than malignancy which is great.  I will start her on ceftriaxone 2 g IV every 12 hours and metronidazole 500 mg IV every 8 hours.  Follow-up pending cultures.  Likely 6 weeks IV antibiotics.  Favors odontogenic source.    May be candidate for 16 S PCR testing if cultures noninformative

## 2020-07-01 NOTE — ANESTHESIA PREPROCEDURE EVALUATION
Anesthesia Evaluation     Patient summary reviewed and Nursing notes reviewed                Airway   Mallampati: II  Dental      Pulmonary - negative pulmonary ROS   Cardiovascular - negative cardio ROS    ECG reviewed  Rhythm: regular  Rate: normal        Neuro/Psych  (+) dizziness/light headedness,     GI/Hepatic/Renal/Endo - negative ROS     Musculoskeletal (-) negative ROS    Abdominal    Substance History - negative use     OB/GYN negative ob/gyn ROS         Other                        Anesthesia Plan    ASA 2     general   ( art line optional so will not place pre op but only if needed intra op)  intravenous induction     Anesthetic plan, all risks, benefits, and alternatives have been provided, discussed and informed consent has been obtained with: patient.

## 2020-07-01 NOTE — PLAN OF CARE
Problem: Patient Care Overview  Goal: Plan of Care Review  Outcome: Ongoing (interventions implemented as appropriate)  Flowsheets (Taken 7/1/2020 2766)  Progress: no change  Plan of Care Reviewed With: patient  Outcome Summary: Pt has been npo except sips with med past mn as ordered for right - sided craniotomy for excisional biopsy and possible placement of lumbar drain, signed informed consent on the chart , vss, no distress noticed or reported, I will continue to monitor  Goal: Individualization and Mutuality  Outcome: Ongoing (interventions implemented as appropriate)  Goal: Discharge Needs Assessment  Outcome: Ongoing (interventions implemented as appropriate)  Goal: Interprofessional Rounds/Family Conf  Outcome: Ongoing (interventions implemented as appropriate)     Problem: Pain, Acute (Adult)  Goal: Identify Related Risk Factors and Signs and Symptoms  Outcome: Ongoing (interventions implemented as appropriate)  Goal: Acceptable Pain Control/Comfort Level  Outcome: Ongoing (interventions implemented as appropriate)     Problem: Craniotomy/Craniectomy/Cranioplasty (Adult)  Goal: Signs and Symptoms of Listed Potential Problems Will be Absent, Minimized or Managed (Craniotomy/Craniectomy/Cranioplasty)  Outcome: Ongoing (interventions implemented as appropriate)  Goal: Anesthesia/Sedation Recovery  Outcome: Ongoing (interventions implemented as appropriate)     Problem: Infection, Risk/Actual (Adult)  Goal: Identify Related Risk Factors and Signs and Symptoms  Outcome: Ongoing (interventions implemented as appropriate)  Goal: Infection Prevention/Resolution  Outcome: Ongoing (interventions implemented as appropriate)

## 2020-07-01 NOTE — PROGRESS NOTES
"DAILY PROGRESS NOTE  [facility]    Patient Identification:  Name: Denise Guajardo  Age: 54 y.o.  Sex: female  :  1965  MRN: [id number]         Primary Care Physician: Paz Mera MD      Subjective  Patient sleeping on entering the room.  On awakening she complains of a headache.    Objective:  General Appearance:  Comfortable, well-appearing, in no acute distress and not in pain (Patient sleeping comfortably on entering the room.).    Vital signs: (most recent): Blood pressure 110/60, pulse 98, temperature 97.6 °F (36.4 °C), temperature source Oral, resp. rate 16, height 165 cm (64.96\"), weight 41.5 kg (91 lb 7.9 oz), SpO2 99 %, not currently breastfeeding.    Neurological: (Left facial droop present.).    Skin:  Warm and dry.                Vital signs in last 24 hours:  Temp:  [97.6 °F (36.4 °C)-98.3 °F (36.8 °C)] 97.6 °F (36.4 °C)  Heart Rate:  [50-98] 98  Resp:  [12-16] 16  BP: (106-172)/() 110/60  Arterial Line BP: ()/(43-72) 117/54    Intake/Output:    Intake/Output Summary (Last 24 hours) at 2020 1816  Last data filed at 2020 1250  Gross per 24 hour   Intake 1100 ml   Output 610 ml   Net 490 ml         Results from last 7 days   Lab Units 20  0534 20  1053   WBC 10*3/mm3 24.69* 8.77 6.70   HEMOGLOBIN g/dL 14.3 14.5 15.2   PLATELETS 10*3/mm3 331 222 212     Results from last 7 days   Lab Units 20  0534 20  1053   SODIUM mmol/L 137 136 141   POTASSIUM mmol/L 3.7 3.9 3.9   CHLORIDE mmol/L 99 101 102   CO2 mmol/L 23.9 21.6* 28.0   BUN mg/dL 20 21* 20   CREATININE mg/dL 0.72 0.65 0.62   GLUCOSE mg/dL 172* 135* 113*   Estimated Creatinine Clearance: 58.5 mL/min (by C-G formula based on SCr of 0.72 mg/dL).  Results from last 7 days   Lab Units 20  1656 20  0534 20  1053   CALCIUM mg/dL 8.9 9.5 9.6   ALBUMIN g/dL  --   --  4.80   MAGNESIUM mg/dL  --   --  2.1     Results from last 7 days   Lab Units " 06/26/20  1053   ALBUMIN g/dL 4.80   BILIRUBIN mg/dL 0.8   ALK PHOS U/L 72   AST (SGOT) U/L 22   ALT (SGPT) U/L 23       Assessment:    Brain abscess    Intracranial hemorrhage (CMS/HCC)      Plan:  Infectious disease, neurosurgical management greatly appreciated.  Will follow peripherally while in ICU.    Pranay Williamson MD  7/1/2020  18:16

## 2020-07-02 NOTE — PAYOR COMM NOTE
"Denise Guajardo (54 y.o. Female)   ATTN: CONTINUED STAY CLINICALS FOR REVIEW REF# [id number]  PLEASE REPLY, UR DEPT: ELA CRENSHAW RN, [phone],  [phone]        Date of Birth Social Security Number Address Home Phone MRN    1965  [id number] [facility] [place] [facility] [phone] [id number]    Nondenominational Marital Status          Orthodoxy        Admission Date Admission Type Admitting Provider Attending Provider Department, Room/Bed    6/26/20 Urgent Bill Wu MD Masden, Troy Andrew, MD [facility] INTENSIVE CARE, I374/1    Discharge Date Discharge Disposition Discharge Destination                       Attending Provider:  Jd Regan MD    Allergies:  No Known Allergies    Isolation:  None   Infection:  None   Code Status:  CPR    Ht:  165 cm (64.96\")   Wt:  49 kg (108 lb 0.4 oz)    Admission Cmt:  None   Principal Problem:  Brain abscess [G06.0]                 Active Insurance as of 6/26/2020     Primary Coverage     Payor Plan Insurance Group Employer/Plan Group    [employer]HEM BLUE CROSS AN[employer]M BLUE CROSS BLUE SHIELD PPO [id number]     Payor Plan Address Payor Plan Phone Number Payor Plan Fax Number Effective Dates    [address] [address] [phone]  5/1/2020 - None Entered    [place] [address]       Subscriber Name Subscriber Birth Date Member ID       DENISE GUAJARDO 1965 [id number]                 Emergency Contacts      (Rel.) Home Phone Work Phone Mobile Phone    BennettMalick (Spouse) [phone] -- [phone]    Shay Henry (Son) [phone] -- --    Jhonatan Henry (Son) [phone] -- --              Facility-Administered Medications as of 7/2/2020   Medication Dose Route Frequency Provider Last Rate Last Dose   • acetaminophen (TYLENOL) tablet 650 mg  650 mg Oral Q4H PRN Shai Licona MD   650 mg at 06/28/20 0727    Or   • acetaminophen (TYLENOL) 160 MG/5ML solution 650 mg  650 mg Oral Q4H PRN Shai Licona MD        Or "   • acetaminophen (TYLENOL) suppository 650 mg  650 mg Rectal Q4H PRN Shai Licona MD       • ceFAZolin in dextrose (ANCEF) IVPB solution 2 g  2 g Intravenous Q8H Shai Licona MD   2 g at 07/02/20 0921   • cefTRIAXone (ROCEPHIN) IVPB 2 g/50ml dextrose (premix)  2 g Intravenous Q12H Yosvany Arroyo  mL/hr at 07/02/20 0209 2 g at 07/02/20 0209   • dexamethasone (DECADRON) injection 2 mg  2 mg Intravenous Q6H Juan Abad MD   2 mg at 07/02/20 0632   • [COMPLETED] dexamethasone sodium phosphate injection 10 mg  10 mg Intravenous Once Jordyn Mitchell APRN   10 mg at 06/26/20 1248   • [COMPLETED] diatrizoate meglumine-sodium (GASTROGRAFIN) 66-10 % solution 30 mL  30 mL Oral Once Bill Wu MD   30 mL at 06/27/20 0924   • [COMPLETED] famotidine (PEPCID) injection 20 mg  20 mg Intravenous Once Ramyundo Clayton MD   20 mg at 07/01/20 0853   • [COMPLETED] gadoteridol (PROHANCE) injection 15 mL  15 mL Intravenous Once in imaging Jordyn Mitchell APRN   10 mL at 06/26/20 1127   • HYDROcodone-acetaminophen (NORCO) 5-325 MG per tablet 1 tablet  1 tablet Oral Q4H PRN Shai Licona MD       • HYDROmorphone (DILAUDID) injection 0.5 mg  0.5 mg Intravenous Q2H PRN Shai Licona MD   0.5 mg at 07/01/20 2323    And   • naloxone (NARCAN) injection 0.4 mg  0.4 mg Intravenous Q5 Min PRN Shai Licona MD       • [COMPLETED] iopamidol (ISOVUE-300) 61 % injection 100 mL  100 mL Intravenous Once in imaging Pranay Williamson MD   85 mL at 06/27/20 1300   • [COMPLETED] iopamidol (ISOVUE-370) 76 % injection 100 mL  100 mL Intravenous Once in imaging Pranay Williamson MD   95 mL at 06/28/20 1215   • levETIRAcetam in NaCl 0.82% (KEPPRA) IVPB 500 mg  500 mg Intravenous Q12H Joy Bautista APRN   500 mg at 07/02/20 0847   • metroNIDAZOLE (FLAGYL) 500 mg/100mL IVPB  500 mg Intravenous Q8H Yosvany Arroyo MD   500 mg at 07/02/20 0624   • niCARdipine (CARDENE) 25 mg in 250 mL NS  (0.1 mg/mL) infusion kit  5-15 mg/hr Intravenous Titrated Shai Licona  mL/hr at 07/02/20 0921 10 mg/hr at 07/02/20 0921   • ondansetron (ZOFRAN) tablet 4 mg  4 mg Oral Q6H PRN Shai Licona MD        Or   • ondansetron (ZOFRAN) injection 4 mg  4 mg Intravenous Q6H PRN Shai Licona MD   4 mg at 07/01/20 2323   • ondansetron (ZOFRAN) tablet 4 mg  4 mg Oral Q6H PRN Shai Licona MD        Or   • ondansetron (ZOFRAN) injection 4 mg  4 mg Intravenous Q6H PRN Shai Licona MD       • sodium chloride 0.9 % flush 10 mL  10 mL Intravenous Q12H Shai Licona MD   10 mL at 07/02/20 0847   • sodium chloride 0.9 % flush 10 mL  10 mL Intravenous PRN Shai Licona MD         Orders (last 48 hrs)      Start     Ordered    07/03/20 0500  CT Head Without Contrast  1 Time Imaging      07/02/20 0834    07/02/20 1200  POC Glucose Finger TID AC  3 Times Daily Before Meals      07/02/20 1125    07/02/20 0900  levETIRAcetam (KEPPRA) tablet 500 mg  2 Times Daily,   Status:  Discontinued      07/02/20 0747    07/02/20 0900  levETIRAcetam in NaCl 0.82% (KEPPRA) IVPB 500 mg  Every 12 Hours Scheduled      07/02/20 0750    07/02/20 0900  PT Consult: Eval & Treat Post Surgery Care  Once      07/02/20 0859    07/02/20 0900  OT Consult: Eval & Treat  Once      07/02/20 0859    07/02/20 0859  Remove Drains / Tubes  Once     Comments:  Redress incision and drain site    07/02/20 0859    07/02/20 0834  Remove Drains / Tubes  Once,   Status:  Canceled     Comments:  GEGE- place firm dry dressing    07/02/20 0834    07/02/20 0600  Protime-INR  Morning Draw      07/01/20 1638    07/02/20 0600  aPTT  Morning Draw      07/01/20 1638    07/02/20 0600  CT Head Without Contrast  1 Time Imaging      07/01/20 1638    07/02/20 0600  CBC Auto Differential  PROCEDURE ONCE      07/02/20 0002    07/02/20 0100  dexamethasone (DECADRON) injection 2 mg  Every 6 Hours      07/02/20 0032    07/01/20 1800  dexamethasone (DECADRON) tablet  2 mg  Every 6 Hours Scheduled,   Status:  Discontinued      07/01/20 1638    07/01/20 1800  Incentive Spirometry  Every 2 Hours While Awake      07/01/20 1638    07/01/20 1800  ceFAZolin in dextrose (ANCEF) IVPB solution 2 g  Every 8 Hours      07/01/20 1638    07/01/20 1730  sodium chloride 0.9 % with KCl 20 mEq/L infusion  Continuous,   Status:  Discontinued      07/01/20 1638    07/01/20 1700  Vital Signs and Neurochecks  Every Hour      07/01/20 1638    07/01/20 1642  NPO Diet  Diet Effective Now     Comments:  Should remain in effect until a complete SLP evaluation occurs and a superceding MD order is received.    07/01/20 1641    07/01/20 1642  SLP Consult: Eval & Treat RN Dysphagia Screen Failed  Once      07/01/20 1641    07/01/20 1641  NPO Diet  Diet Effective Now,   Status:  Canceled     Comments:  Should remain in effect until a complete SLP evaluation occurs and a superceding MD order is received.    07/01/20 1641    07/01/20 1641  SLP Consult: Eval & Treat RN Dysphagia Screen Failed  Once      07/01/20 1641    07/01/20 1639  CBC & Differential  Daily      07/01/20 1638    07/01/20 1639  Basic Metabolic Panel  Daily      07/01/20 1638    07/01/20 1639  CBC Auto Differential  PROCEDURE ONCE      07/01/20 1638    07/01/20 1638  Assess Need for Indwelling Urinary Catheter - Follow Removal Protocol  Continuous     Comments:  Indwelling Urinary Catheter Removal Criteria  Discontinue Indwelling Urinary Catheter Unless One of the Following is Present:  Urinary Retention or Obstruction  Chronic Urinary Catheter Use  End of Life  Critical Illness with Strict I/O   Tract or Abdominal Surgery  Stage 3/4 Sacral / Perineal Wound  Required Activity Restriction: Trauma  Required Activity Restriction: Spine Surgery  If Patient is Being Followed by Urology Contact Them PRIOR to Removal  Do Not Remove Indwelling Urinary Catheter Order is Present with a CLINICAL REASON to Maintain the Catheter. Provider is Required to  Include a Clinical Reason to Maintain a Urinary Catheter    Patient Admitted With Indwelling Urinary Catheter (Not Placed at [facility] Facility)  Assess for Continued Need & Document Medical Necessity  If Infection is Suspected, Contact the Provider        07/01/20 1637    07/01/20 1638  Urinary Catheter Care  Every Shift      07/01/20 1637    07/01/20 1638  Code Status and Medical Interventions:  Continuous      07/01/20 1638    07/01/20 1638  Cardiac Monitoring  Continuous      07/01/20 1638    07/01/20 1638  Arterial line monitoring for blood pressure management.  Every Shift      07/01/20 1638    07/01/20 1638  Head of bed  Until Discontinued      07/01/20 1638    07/01/20 1638  Empty drain  Every Shift      07/01/20 1638    07/01/20 1638  Oxygen Therapy- Nasal Cannula; Titrate for SPO2: equal to or greater than, 92%, per policy  Continuous      07/01/20 1638    07/01/20 1638  Continuous Pulse Oximetry  Continuous      07/01/20 1638    07/01/20 1638  Inpatient Consult to Intensivist  Once     Specialty:  Intensive Care  Provider:  Chandler Betancourt MD    07/01/20 1638    07/01/20 1638  HYDROmorphone (DILAUDID) injection 0.5 mg  Every 2 Hours PRN      07/01/20 1638    07/01/20 1638  naloxone (NARCAN) injection 0.4 mg  Every 5 Minutes PRN      07/01/20 1638    07/01/20 1638  ondansetron (ZOFRAN) tablet 4 mg  Every 6 Hours PRN      07/01/20 1638    07/01/20 1638  ondansetron (ZOFRAN) injection 4 mg  Every 6 Hours PRN      07/01/20 1638    07/01/20 1638  Place Sequential Compression Device  Once      07/01/20 1638    07/01/20 1638  Maintain Sequential Compression Device  Continuous      07/01/20 1638    07/01/20 1638  Diet Clear Liquid  Diet Effective Now,   Status:  Canceled      07/01/20 1638    07/01/20 1638  Advance diet as tolerated  Until Discontinued      07/01/20 1638    07/01/20 1637  HYDROcodone-acetaminophen (NORCO) 5-325 MG per tablet 1 tablet  Every 4 Hours PRN      07/01/20 1638    07/01/20 1632  POC Glucose  Once  Once      07/01/20 1619    07/01/20 1553  CT Head Without Contrast  1 Time Imaging      07/01/20 1555    07/01/20 1412  cefTRIAXone (ROCEPHIN) IVPB 2 g/50ml dextrose (premix)  Every 12 Hours      07/01/20 1410    07/01/20 1412  metroNIDAZOLE (FLAGYL) 500 mg/100mL IVPB  Every 8 Hours      07/01/20 1410    07/01/20 1329  niCARdipine (CARDENE) 25 mg in 250 mL NS (0.1 mg/mL) infusion kit  Titrated      07/01/20 1327    07/01/20 1326  niCARdipine (CARDENE) 50 mg in sodium chloride 0.9 % 250 mL (0.2 mg/mL) IVPB  Titrated,   Status:  Discontinued      07/01/20 1324    07/01/20 1306  Vital signs every 5 minutes for 15 minutes, every 15 minutes thereafter.  Once,   Status:  Canceled      07/01/20 1305    07/01/20 1306  Call Anesthesiologist for additional IV Fluid bolus for Hypotension/Tachycardia  Until Discontinued,   Status:  Canceled      07/01/20 1305    07/01/20 1306  Notify Anesthesia of Any Acute Changes in Patient Condition  Until Discontinued,   Status:  Canceled      07/01/20 1305    07/01/20 1306  Notify Anesthesia for Unrelieved Pain  Until Discontinued,   Status:  Canceled      07/01/20 1305    07/01/20 1306  Pulse Oximetry, Continuous  Continuous,   Status:  Canceled      07/01/20 1305    07/01/20 1306  POC Glucose Once  Once,   Status:  Canceled     Comments:  Post op glucose check on all diabetic patients...Notify Anesthesia if blood sugar is less than 80 mg/dL or greater than 220 mg/dL.      07/01/20 1305    07/01/20 1306  Once DC criteria to floor met, follow surgeon's orders.  Until Discontinued,   Status:  Canceled      07/01/20 1305    07/01/20 1306  Discharge patient from PACU when discharge criteria is met.  Until Discontinued,   Status:  Canceled      07/01/20 1305    07/01/20 1305  acetaminophen (TYLENOL) tablet 650 mg  Once As Needed,   Status:  Discontinued      07/01/20 1305    07/01/20 1305  acetaminophen (TYLENOL) suppository 650 mg  Once As Needed,   Status:  Discontinued      07/01/20  1305    07/01/20 1305  promethazine (PHENERGAN) injection 6.25 mg  Every 10 Minutes PRN,   Status:  Discontinued      07/01/20 1305    07/01/20 1305  promethazine (PHENERGAN) injection 12.5 mg  Once As Needed,   Status:  Discontinued      07/01/20 1305    07/01/20 1305  promethazine (PHENERGAN) suppository 25 mg  Once As Needed,   Status:  Discontinued      07/01/20 1305    07/01/20 1305  promethazine (PHENERGAN) tablet 25 mg  Once As Needed,   Status:  Discontinued      07/01/20 1305    07/01/20 1305  HYDROcodone-acetaminophen (NORCO) 7.5-325 MG per tablet 1 tablet  Once As Needed,   Status:  Discontinued      07/01/20 1305    07/01/20 1305  HYDROmorphone (DILAUDID) injection 0.5 mg  Every 5 Minutes PRN,   Status:  Discontinued      07/01/20 1305    07/01/20 1305  oxyCODONE-acetaminophen (PERCOCET) 7.5-325 MG per tablet 1 tablet  Once As Needed,   Status:  Discontinued      07/01/20 1305    07/01/20 1305  fentaNYL citrate (PF) (SUBLIMAZE) injection 50 mcg  Every 5 Minutes PRN,   Status:  Discontinued      07/01/20 1305    07/01/20 1305  labetalol (NORMODYNE,TRANDATE) injection 5 mg  Every 5 Minutes PRN,   Status:  Discontinued      07/01/20 1305    07/01/20 1305  hydrALAZINE (APRESOLINE) injection 5 mg  Every 10 Minutes PRN,   Status:  Discontinued      07/01/20 1305    07/01/20 1305  ePHEDrine injection 5 mg  Once As Needed,   Status:  Discontinued      07/01/20 1305    07/01/20 1305  naloxone (NARCAN) injection 0.2 mg  As Needed,   Status:  Discontinued      07/01/20 1305    07/01/20 1305  flumazenil (ROMAZICON) injection 0.2 mg  As Needed,   Status:  Discontinued      07/01/20 1305    07/01/20 1305  ondansetron (ZOFRAN) injection 4 mg  Once As Needed,   Status:  Discontinued      07/01/20 1305    07/01/20 1305  diphenhydrAMINE (BENADRYL) injection 12.5 mg  Every 15 Minutes PRN,   Status:  Discontinued      07/01/20 1305    07/01/20 1305  diphenhydrAMINE (BENADRYL) capsule 25 mg  Every 30 Minutes PRN,   Status:   Discontinued      07/01/20 1305    07/01/20 1305  Oxygen Therapy- Nasal Cannula; Titrate for SPO2: per policy  Continuous PRN,   Status:  Canceled      07/01/20 1305    07/01/20 1210  Anaerobic Culture - Wound, Brain      07/01/20 1210    07/01/20 1210  Wound Culture - Wound, Brain      07/01/20 1210    07/01/20 1200  Anaerobic Culture - Wound, Brain      07/01/20 1200    07/01/20 1200  Wound Culture - Wound, Brain      07/01/20 1200    07/01/20 1159  Tissue Pathology Exam      07/01/20 1159    07/01/20 1150  Physiosol Irrigation solution  As Needed,   Status:  Discontinued      07/01/20 1150    07/01/20 1149  gelatin absorbable (GELFOAM) sponge  As Needed,   Status:  Discontinued      07/01/20 1149    07/01/20 1146  sodium chloride 1,000 mL with vancomycin 1,000 mg irrigation  As Needed,   Status:  Discontinued      07/01/20 1148    07/01/20 1145  thrombin (THROMBIN-JMI) spray kit  As Needed,   Status:  Discontinued      07/01/20 1146    07/01/20 1139  Insert Indwelling Urinary Catheter  Once,   Status:  Canceled      07/01/20 1138    07/01/20 1139  sodium chloride 0.9 % solution  As Needed,   Status:  Discontinued      07/01/20 1139    07/01/20 1116  sodium chloride 30 mL, bupivacaine-EPINEPHrine PF 30 mL mixture  As Needed,   Status:  Discontinued      07/01/20 1149    07/01/20 1058  Cell Count, CSF - Cerebrospinal Fluid, Lumbar Puncture  PROCEDURE ONCE      07/01/20 1057    07/01/20 1057  Glucose, CSF - Cerebrospinal Fluid, Spine, Lumbar      07/01/20 1057    07/01/20 1057  Protein, CSF - Cerebrospinal Fluid, Spine, Lumbar      07/01/20 1057    07/01/20 1057  Cell Count With Differential, CSF      07/01/20 1057    07/01/20 1057  Culture, CSF - Cerebrospinal Fluid, Spine, Lumbar      07/01/20 1057    07/01/20 0900  sodium chloride 0.9 % flush 3 mL  Every 12 Hours Scheduled,   Status:  Discontinued      07/01/20 0842    07/01/20 0844  lactated ringers infusion  Continuous,   Status:  Discontinued      07/01/20  0842    07/01/20 0844  famotidine (PEPCID) injection 20 mg  Once      07/01/20 0842    07/01/20 0843  POC Urine Pregnancy Test  Once,   Status:  Canceled      07/01/20 0842    07/01/20 0843  Insert Peripheral IV  Once,   Status:  Canceled      07/01/20 0842    07/01/20 0843  Saline Lock & Maintain IV Access  Continuous,   Status:  Canceled      07/01/20 0842    07/01/20 0843  May take Beta Blocker from home with sip of water.  Once,   Status:  Canceled     Comments:  Only applicable to patients on home Beta Blocker    07/01/20 0842    07/01/20 0843  Pulse Oximetry, Continuous  Continuous,   Status:  Canceled      07/01/20 0842    07/01/20 0843  POC Glucose Once  Once,   Status:  Canceled     Comments:  For all diabetic patients. Notify Anesthesiologist for blood sugar greater than 180 or less than 60..      07/01/20 0842    07/01/20 0842  Vital Signs - Per Anesthesia Protocol  As Needed,   Status:  Canceled      07/01/20 0842    07/01/20 0842  Oxygen Therapy- Nasal Cannula; Titrate for SPO2: Per Policy  Continuous PRN,   Status:  Canceled      07/01/20 0842    07/01/20 0842  Pulse Oximetry, Continuous  Continuous PRN,   Status:  Canceled      07/01/20 0842    07/01/20 0842  POC Glucose PRN  As Needed,   Status:  Canceled     Comments:  For all diabetic patients. Notify Anesthesiologist for blood sugar > 180.      07/01/20 0842    07/01/20 0842  sodium chloride 0.9 % flush 3-10 mL  As Needed,   Status:  Discontinued      07/01/20 0842    07/01/20 0842  lidocaine PF 1% (XYLOCAINE) injection 0.5 mL  Once As Needed,   Status:  Discontinued      07/01/20 0842    07/01/20 0842  fentaNYL citrate (PF) (SUBLIMAZE) injection 50 mcg  Every 10 Minutes PRN,   Status:  Discontinued      07/01/20 0842    07/01/20 0842  midazolam (VERSED) injection 1 mg  Every 10 Minutes PRN,   Status:  Discontinued      07/01/20 0842    07/01/20 0001  NPO Diet  Diet Effective Midnight,   Status:  Canceled      06/29/20 0734    07/01/20 0001  NPO  Diet NPO Except: Sips With Meds  Diet Effective Midnight,   Status:  Canceled      06/30/20 1230    06/30/20 1922  Patient May Shower  Once,   Status:  Canceled      06/30/20 1921    06/30/20 1616  Obtain Informed Consent  Once      06/30/20 1615    06/30/20 1231  COVID PRE-OP / PRE-PROCEDURE SCREENING ORDER (NO ISOLATION) - Swab, Nasopharynx  Once      06/30/20 1230    06/30/20 1231  COVID-19,BH RAIN IN-HOUSE, NP SWAB IN TRANSPORT MEDIA 8-12 HR TAT - Swab, Nasopharynx  PROCEDURE ONCE      06/30/20 1230    06/30/20 1210  Type & Screen  Once      06/30/20 1230    06/28/20 1800  dexamethasone (DECADRON) tablet 4 mg  Every 6 Hours Scheduled,   Status:  Discontinued      06/28/20 1324    06/26/20 2100  sodium chloride 0.9 % flush 10 mL  Every 12 Hours Scheduled      06/26/20 1608    06/26/20 1700  diatrizoate meglumine-sodium (GASTROGRAFIN) 66-10 % solution 30 mL  Once,   Status:  Discontinued      06/26/20 1612    06/26/20 1609  LORazepam (ATIVAN) tablet 1 mg  Every 6 Hours PRN,   Status:  Discontinued      06/26/20 1609    06/26/20 1609  LORazepam (ATIVAN) injection 1 mg  Every 6 Hours PRN,   Status:  Discontinued      06/26/20 1609    06/26/20 1608  ondansetron (ZOFRAN) tablet 4 mg  Every 6 Hours PRN      06/26/20 1608    06/26/20 1608  ondansetron (ZOFRAN) injection 4 mg  Every 6 Hours PRN      06/26/20 1608    06/26/20 1607  acetaminophen (TYLENOL) tablet 650 mg  Every 4 Hours PRN      06/26/20 1608    06/26/20 1607  acetaminophen (TYLENOL) 160 MG/5ML solution 650 mg  Every 4 Hours PRN      06/26/20 1608    06/26/20 1607  acetaminophen (TYLENOL) suppository 650 mg  Every 4 Hours PRN      06/26/20 1608    06/26/20 1604  sodium chloride 0.9 % flush 10 mL  As Needed      06/26/20 1608                 Operative/Procedure Notes (last 48 hours) (Notes from 06/30/20 1156 through 07/02/20 1156)      Shai Licona MD at 07/01/20 1116          CRANIOTOMY FOR TUMOR STEREOTACTIC  Progress Note    Denise IRELAND  Bennett  7/1/2020    Pre-op Diagnosis:   Brain tumor (CMS/HCC) [D49.6]       Post-Op Diagnosis Codes:     * Brain tumor (CMS/HCC) [D49.6]    Procedure/CPT® Codes:      Procedure(s):  Right-sided craniotomy for excisional biopsy and, placement of lumbar drain    Surgeon(s):  Shai Licona MD    Anesthesia: General    Staff:   Circulator: Lisa Brewer RN; Eryn Bowers RN  Scrub Person: Diana Morales  Assistant: Ramandeep Kennedy CSA  Orientee: Carina Acosta RN    Estimated Blood Loss: 50 mL    Urine Voided: 500 mL    Specimens:                Specimens     ID Source Type Tests Collected By Collected At Frozen?      1 Lumbar Puncture Cerebrospinal Fluid · GLUCOSE, CSF  · PROTEIN, CSF  · CELL COUNT WITH DIFFERENTIAL, CSF  · CULTURE, CSF   Shai Licona MD 7/1/20 1048      Description: CSF FOR GLUCOSE, PROTEIN, CELL COUNT, AND CULTURE    2 Brain Wound · ANAEROBIC CULTURE  · WOUND CULTURE   Shai Licona MD 7/1/20 1159      Description: RIGHT BRAINSTEM MASS CULTURE    3 Brain Wound · ANAEROBIC CULTURE  · WOUND CULTURE   Shai Licona MD 7/1/20 1209      Description: RIGHT BRAINSTEM MASS CULTURE #2    A Brain Tissue · TISSUE PATHOLOGY EXAM   Shai Licona MD 7/1/20 1158 Yes     Description: RIGHT BRAINSTEM MASS FOR FROZEN     B Brain Tissue · TISSUE PATHOLOGY EXAM   Shai Licona MD 7/1/20 1212 No     Description: RIGHT BRAINSTEM MASS                Drains:   Closed/Suction Drain Right Scalp Bulb (Active)       Urethral Catheter Silicone 12 Fr. (Active)       Lumbar Drain (Active)       Findings: Abnormal tissue in the cerebral peduncle appear to be more consistent with abscess than metastatic tumor.    Complications: None      Shai Licona MD     Date: 7/1/2020  Time: 12:53        Electronically signed by Shai Licona MD at 07/01/20 1254     Shai Licona MD at 07/01/20 1116        Preoperative diagnosis: Mass in the right cerebral peduncle with mild  left hemiparesis    Postoperative diagnosis: Same    Procedure performed: Right temporal craniotomy with excisional biopsy of mass in the right cerebral peduncle and placement of a lumbar drain    Surgeon: Shai Licona M.D.    Asst.: Ramandeep Kennedy CFA who was instrumental in helping with hemostasis, visualization of neural structures and retraction of neural structures    Estimated blood loss, crystalloid, colloid, blood: Please see anesthesia record    Material to lab:   The biopsy was sent for frozen section and then some further material was removed as well as the suction material and sent for permanent section in formalin.  We also sent 2 separate cultures.    Drains: 1 drain in the subtemporal space    Complications: None    Indications for the procedure: This is a patient who presented with some left-sided symptoms.  Imaging showed abnormalities as previously discussed and we elected to proceed with an excisional biopsy of the larger lesion.    Operative summary:  The patient was brought into the operating room and placed under general endotracheal anesthesia using intravenous and inhalational agents.  The patient was then positioned on the operating table in the left lateral decubitus position.  All pressure points were padded including peripheral points of entrapment.  Her back was then prepped with ChloraPrep and then a 14-gauge Touhy needle was placed into the subarachnoid space and then the lumbar drain catheter was placed into the subarachnoid space with good return of CSF.  I did not suture the drain in place because I only intended to leave it for the duration of the case.  20 cc of CSF were drained out through the needle and another 60 out to the drain.  The drain was then turned off.  Once this was done the patient was secured in the [place] headrest in reference to the Stealth navigation system.  The right temporal region was shaved free of hair and then the navigation was used to plan out the  incision.  At first I had planned to make a curvilinear incision but then I felt that just doing a fairly linear incision just in front of the ear would be sufficient to allow a craniotomy large enough to get to the abnormality.  This turned out to be true.  This was injected with 10 cc of 1% Xylocaine with epinephrine and then she was draped with Ioban, towels, half sheets and a split sheet.  An incision was made in the above location.  This carried down to the temporalis fascia.  It was not necessary to take the superficial temporal artery.  The temporalis fascia and temporalis muscle were opened and stripped off of the outer table of the skull all the way down to the zygomatic arch.  Once this was done a bur hole was placed just above the zygomatic arch and then a round bone flap was created and set aside.  The dura was then opened hinging it inferiorly and held back with a traction suture.  Following this the remainder the operation was done under the high-power magnification the operating microscope using microsurgical technique microsurgical instrumentation.  The CSF removal had allowed significant brain relaxation and we were able to elevate the inferior temporal lobe off of the floor of the temporal fossa back to the tentorial notch.  Using navigation system we are able to isolate the area where the abnormality was located although it really did not appear to be that abnormal on the surface which I expected it to be.  Ultimately the surface was coagulated of very small vessels several larger vessels were left alone.    Once this was done the area in question was incised with a 11 blade and then this immediately revealed abnormal tissue.  The tissue was biopsied and sent for frozen section which returned necrosis and inflammatory tissue more consistent with an abscess there was no evidence of any metastatic or cancerous tissue.  I then dissected a bit further up within the confines of the tumor itself in  order to open it up further and this did reveal what it appeared to be purulent material.  Cultures were sent after the initial incision and then after the secondary incision.  I then proceeded to remove as much of the abnormal tissue as I felt I could without traumatizing the cerebral peduncle any further.  Once this was done bleeding was controlled with the bipolar cautery thrombin Gelfoam and FloSeal and then the cavity was packed with thrombin and Gelfoam.  The area was copiously irrigated with Phisiosol solution and then the retractor was removed.  The dura was tacked back into place and then DuraGen was used to coat the dura.  Adherus was used to seal it and then the bone flap was back into place using the KLS system a drain was placed in the subtemporal space and then the incision and muscle were closed in layers.  The patient was taken to the recovery room in stable condition.  There were no apparent complications and the sponge, instrument and needle counts were correct at the end of the procedure.     Electronically signed by Shai Licona MD at 07/01/20 1303          Physician Progress Notes (last 48 hours) (Notes from 06/30/20 1157 through 07/02/20 1157)      Yosvany Arroyo MD at 07/02/20 1033        ID note for brain abscess  Subjective: She is little drowsy today.  Discussed with nursing and has been stable.  No fevers.  Tolerating the antibiotics okay.  CT scan did show intracranial hemorrhage for which hopefully will resolve with just supportive care  Objective: Sickly she is not quite her perky self today.  She is not having fevers.  Her abdomen is soft and nontender skin is warm and dry.  She has a right-sided cranial drain in place    Microbiology  6/29 BCx NGTD  7/1 CSF culture no growth to date  7/1 operative brain cultures 2/2 no growth    Assessment and plan  1.  Brain abscess status post drainage 7/1/2020  2.  Intracranial hemorrhage    Operative cultures so far no growth to  date.  Discussed with microbiology lab and we can hold them for 7 days.  Plan 6 weeks of IV ceftriaxone 2 g IV every 12 hours and metronidazole 500 mg every 8 hours.  We are currently given the metronidazole IV but this can be switched over to p.o. when she is tolerating p.o.  If her microbiology cultures remain negative then I think sending it for PCR testing would be beneficial    Okay for PICC line from my perspective    Electronically signed by Yosvany Arroyo MD at 07/02/20 1038     Shai Licona MD at 07/02/20 0846             LOS: 6 days   Patient Care Team:  Paz Mera MD as PCP - General (Family Medicine)    Chief Complaint: Postop craniotomy    Subjective     This patient has developed a fairly significant left hemiparesis postoperatively.  She has a little movement in her leg but not much movement in her arm or her face.    Interval History:     History taken from: patient chart family    Objective      She has decreased movement on the left.  Her incision looks okay and she has had minimal output from her drain.    Vital Signs  Temp:  [97.6 °F (36.4 °C)-100 °F (37.8 °C)] 100 °F (37.8 °C)  Heart Rate:  [] 117  Resp:  [16] 16  BP: ()/(59-86) 136/78  Arterial Line BP: ()/(43-88) 106/68       Results Review:     I reviewed the patient's new clinical results.  I reviewed her CT done this morning.  This shows some bleeding in the area of resection which is stable from the postoperative scan.  There is a little area of hemorrhage in the right temporal lobe as well which is of no significance.      Assessment/Plan       Brain abscess    Intracranial hemorrhage (CMS/HCC)      I told the patient about the CT and I also talked to her  on the phone.  I think that most of the weakness is due to bleeding into the operative bed of the abscess.  This is not entirely unexpected as I was concerned about doing too much coagulation during surgery and causing a stroke in the  "brainstem.  I do believe that most of the weakness is due to pressure and as the blood clot reabsorbs I think she will regain movement on the left side.  I do not know whether it will be complete or not but I am quite confident she will improve.  I do think we should check another CT tomorrow but I think we can get her drain out.      Shai Licona MD  20  08:46          Electronically signed by Shai Licona MD at 20 0901     Pranay Williamson MD at 20 1816          DAILY PROGRESS NOTE  [facility]    Patient Identification:  Name: Denise Guajardo  Age: 54 y.o.  Sex: female  :  1965  MRN: [id number]         Primary Care Physician: Paz Mera MD      Subjective  Patient sleeping on entering the room.  On awakening she complains of a headache.    Objective:  General Appearance:  Comfortable, well-appearing, in no acute distress and not in pain (Patient sleeping comfortably on entering the room.).    Vital signs: (most recent): Blood pressure 110/60, pulse 98, temperature 97.6 °F (36.4 °C), temperature source Oral, resp. rate 16, height 165 cm (64.96\"), weight 41.5 kg (91 lb 7.9 oz), SpO2 99 %, not currently breastfeeding.    Neurological: (Left facial droop present.).    Skin:  Warm and dry.                Vital signs in last 24 hours:  Temp:  [97.6 °F (36.4 °C)-98.3 °F (36.8 °C)] 97.6 °F (36.4 °C)  Heart Rate:  [50-98] 98  Resp:  [12-16] 16  BP: (106-172)/() 110/60  Arterial Line BP: ()/(43-72) 117/54    Intake/Output:    Intake/Output Summary (Last 24 hours) at 2020  Last data filed at 2020 1250  Gross per 24 hour   Intake 1100 ml   Output 610 ml   Net 490 ml         Results from last 7 days   Lab Units 20  1656 20  0534 20  1053   WBC 10*3/mm3 24.69* 8.77 6.70   HEMOGLOBIN g/dL 14.3 14.5 15.2   PLATELETS 10*3/mm3 331 222 212     Results from last 7 days   Lab Units 20  1656 20  0534 20  1053   SODIUM " mmol/L 137 136 141   POTASSIUM mmol/L 3.7 3.9 3.9   CHLORIDE mmol/L 99 101 102   CO2 mmol/L 23.9 21.6* 28.0   BUN mg/dL 20 21* 20   CREATININE mg/dL 0.72 0.65 0.62   GLUCOSE mg/dL 172* 135* 113*   Estimated Creatinine Clearance: 58.5 mL/min (by C-G formula based on SCr of 0.72 mg/dL).  Results from last 7 days   Lab Units 07/01/20  1656 06/27/20  0534 06/26/20  1053   CALCIUM mg/dL 8.9 9.5 9.6   ALBUMIN g/dL  --   --  4.80   MAGNESIUM mg/dL  --   --  2.1     Results from last 7 days   Lab Units 06/26/20  1053   ALBUMIN g/dL 4.80   BILIRUBIN mg/dL 0.8   ALK PHOS U/L 72   AST (SGOT) U/L 22   ALT (SGPT) U/L 23       Assessment:    Brain abscess    Intracranial hemorrhage (CMS/HCC)      Plan:  Infectious disease, neurosurgical management greatly appreciated.  Will follow peripherally while in ICU.    Pranay Williamson MD  7/1/2020  18:16      Electronically signed by Pranay Williamson MD at 07/01/20 1819     Yosvany Arroyo MD at 07/01/20 1337        ID note for brain lesions    Subjective: Seen in recovery after I&D and evacuation of a brain abscess today.  She has a right-sided drain in place and is going to be going to the ICU.  She denies any significant pain.  No fevers or chills or night sweats.    Objective: Afebrile, no acute distress, pulmonary effort normal, slow to respond but eventually does so appropriately.  She has a right sided cranial incision which appears clean and dry as well as a right sided CNS drain    6/29 BCx NGTD  7/1 CSF culture no growth to date  7/1 operative brain cultures 2/2 no growth    Assessment and plan   1.  Brain abscess    Operative note reviewed and findings are more consistent with bacterial brain abscess than malignancy which is great.  I will start her on ceftriaxone 2 g IV every 12 hours and metronidazole 500 mg IV every 8 hours.  Follow-up pending cultures.  Likely 6 weeks IV antibiotics.  Favors odontogenic source.    May be candidate for 16 S PCR testing if  "cultures noninformative    Electronically signed by Yosvany Arroyo MD at 20 1412     Pranay Williamson MD at 20 1808          DAILY PROGRESS NOTE  [facility]    Patient Identification:  Name: Denise Guajardo  Age: 54 y.o.  Sex: female  :  1965  MRN: [id number]         Primary Care Physician: Paz Mera MD      Subjective  No new complaints.    Objective:  General Appearance:  Comfortable, well-appearing, in no acute distress and not in pain.    Vital signs: (most recent): Blood pressure 135/80, pulse 71, temperature 97.8 °F (36.6 °C), temperature source Oral, resp. rate 16, height 165 cm (64.96\"), weight 42 kg (92 lb 9.5 oz), SpO2 99 %, not currently breastfeeding.    Lungs:  Normal effort and normal respiratory rate.  Breath sounds clear to auscultation.    Heart: Normal rate.  Regular rhythm.    Extremities: There is no dependent edema.    Neurological: Patient is alert and oriented to person, place and time.  (Gait presently normal).    Skin:  Warm and dry.                Vital signs in last 24 hours:  Temp:  [97.5 °F (36.4 °C)-98.1 °F (36.7 °C)] 97.8 °F (36.6 °C)  Heart Rate:  [55-83] 71  Resp:  [14-18] 16  BP: (119-152)/(80-96) 135/80    Intake/Output:    Intake/Output Summary (Last 24 hours) at 2020 1808  Last data filed at 2020 1300  Gross per 24 hour   Intake 480 ml   Output --   Net 480 ml         Results from last 7 days   Lab Units 20  0534 20  1053   WBC 10*3/mm3 8.77 6.70   HEMOGLOBIN g/dL 14.5 15.2   PLATELETS 10*3/mm3 222 212     Results from last 7 days   Lab Units 20  0534 20  1053   SODIUM mmol/L 136 141   POTASSIUM mmol/L 3.9 3.9   CHLORIDE mmol/L 101 102   CO2 mmol/L 21.6* 28.0   BUN mg/dL 21* 20   CREATININE mg/dL 0.65 0.62   GLUCOSE mg/dL 135* 113*   Estimated Creatinine Clearance: 65.6 mL/min (by C-G formula based on SCr of 0.65 mg/dL).  Results from last 7 days   Lab Units 20  0534 20  1053 "   CALCIUM mg/dL 9.5 9.6   ALBUMIN g/dL  --  4.80   MAGNESIUM mg/dL  --  2.1     Results from last 7 days   Lab Units 06/26/20  1053   ALBUMIN g/dL 4.80   BILIRUBIN mg/dL 0.8   ALK PHOS U/L 72   AST (SGOT) U/L 22   ALT (SGPT) U/L 23       Assessment:  Brain tumor (CMS/HCC): Etiology unknown.  Work-up in progress.  Infectious disease, neurosurgical input greatly appreciated.      Plan:  OR tomorrow     Pranay Williamson MD  6/30/2020  18:08      Electronically signed by Pranay Williamson MD at 06/30/20 1019     LivanShai belle MD at 06/30/20 1206          [facility] NEUROSURGERY PROGRESS NOTE    PATIENT IDENTIFICATION:   Name:  Denise Guajardo      MRN:  [id number]     54 y.o.  female               CC\: multiple brain masses      Subjective     Interval History: States she feels very anxious about surgery.  Denies any headache.  Feels that her weakness is better.    ROS:  HEENT: No headache, no vision changes  GI: No nausea, vomiting, no swallow difficulties  Neuro: Left-sided weakness better    Objective     Vital signs in last 24 hours:  Temp:  [97.5 °F (36.4 °C)-98.1 °F (36.7 °C)] 97.5 °F (36.4 °C)  Heart Rate:  [55-83] 62  Resp:  [14-18] 16  BP: (119-136)/(83-95) 136/93      Intake/Output this shift:  I/O this shift:  In: 240 [P.O.:240]  Out: -       Intake/Output last 3 shifts:  I/O last 3 completed shifts:  In: 930 [P.O.:930]  Out: -     LABS:  Blood cx- NGTD  HIV- non-reactive    IMAGING STUDIES:  No new imaging    I personally viewed and interpreted the patient's chart.    Meds reviewed/changed: Yes    Current Facility-Administered Medications:   •  acetaminophen (TYLENOL) tablet 650 mg, 650 mg, Oral, Q4H PRN, 650 mg at 06/28/20 0727 **OR** acetaminophen (TYLENOL) 160 MG/5ML solution 650 mg, 650 mg, Oral, Q4H PRN **OR** acetaminophen (TYLENOL) suppository 650 mg, 650 mg, Rectal, Q4H PRN, Bill Wu MD  •  dexamethasone (DECADRON) tablet 4 mg, 4 mg, Oral, Q6H, Shay Horner MD, 4 mg at 06/30/20 1144  •   diatrizoate meglumine-sodium (GASTROGRAFIN) 66-10 % solution 30 mL, 30 mL, Oral, Once, Bill Wu MD  •  LORazepam (ATIVAN) injection 1 mg, 1 mg, Intravenous, Q6H PRN, Bill Wu MD  •  LORazepam (ATIVAN) tablet 1 mg, 1 mg, Oral, Q6H PRN, Bill Wu MD, 1 mg at 06/28/20 2229  •  ondansetron (ZOFRAN) tablet 4 mg, 4 mg, Oral, Q6H PRN **OR** ondansetron (ZOFRAN) injection 4 mg, 4 mg, Intravenous, Q6H PRN, Bill Wu MD  •  sodium chloride 0.9 % flush 10 mL, 10 mL, Intravenous, Q12H, Bill Wu MD, 10 mL at 06/30/20 0909  •  sodium chloride 0.9 % flush 10 mL, 10 mL, Intravenous, PRN, Bill Wu MD      Physical Exam:    General:   Awake, alert, oriented x3. Speech clear with no aphasia; does appear somewhat anxious    CN III IV VI: Extraocular movements are full , PERRL   CN VII: Facial movements are symmetric. No weakness.    Motor: No drift   Station and Gait: Normal gait and station.    Coordination: Finger-to-nose. Rhomberg negative.  Extremities:   No calf swelling    Assessment/Plan     ASSESSMENT:      Brain tumor (CMS/HCC)      PLAN: Patient feels that weakness is better.  She is anticipating excisional biopsy of right sided mass.  She is quite anxious and has many questions regarding surgery and postop expectations. She states that her questions were answered to her satisfaction and had no questions at the time of my departure.  She denies any significant headache.  She has no drift on exam today.  No focal neurologic deficits.  Will obtain COVID test, type and screen, n.p.o. after midnight.  Offered to give her some melatonin to help with sleep, but she declined.    This part is from Dr. Licona.  I talked to the patient and her  and son yesterday afternoon.  I again reviewed the risk and complications of surgery including the chance of a left hemiparesis.  Other risk such as infection, bleeding, CSF leak, anesthesia and other neurologic deficits are possible but much lower.  She  "does request to proceed.    I discussed the patients findings and my recommendations with patient, nursing staff and Dr. Licona       LOS: 4 days       Joy Bautista, APRN  6/30/2020  12:06    \"Dictated utilizing Dragon dictation\".        Electronically signed by Shai Licona MD at 07/01/20 0713          Consult Notes (last 48 hours) (Notes from 06/30/20 1157 through 07/02/20 1157)      Chandler Betancourt MD at 07/01/20 1651      Consult Orders    1. Inpatient Consult to Intensivist [[id number]] ordered by Shai Licona MD at 07/01/20 1306                    [facility]  [phone]  Chandler Betancourt MD      Subjective   LOS: 5 days     Thank you for this consultation.  54-year-old female with new onset left leg tingling and mild headache.  CT/MRI showed brain lesions which could be metastatic versus brain abscesses.  She is now status post craniotomy by Dr. Olson.  Preoperatively outside of the tingling and numbness no other neurological symptoms.  Postoperatively left-sided weakness and left facial palsy.  Postoperative CT head shows a small bleed.  Patient denies any medical problems previously.  She is not on any medications.  She has had extensive dental work 3 years ago for infections.  Proximately 7 to 10 days ago she had a tooth extraction for broken tooth but not for an infection.  Recent echo has not shown any vegetations though only transthoracic.  No PFO was reported.  ID is following the case.   at the bedside assists with history.  He denies any fever chills or rigors.  Patient is a never smoker.    Denise Guajardo  reports that she drinks alcohol.,  reports that she has never smoked. She has never used smokeless tobacco.  Objective   Past Hx:  has a past medical history of Vertigo.  Surg Hx:  has a past surgical history that includes Breast biopsy; [place] tooth extraction; Tubal ligation; and Dental surgery.  FH: family history includes Ovarian cancer in her maternal aunt.  SH:  " reports that she has never smoked. She has never used smokeless tobacco. She reports that she drinks alcohol. She reports that she does not use drugs.    Medications Prior to Admission   Medication Sig Dispense Refill Last Dose   • Calcium Carbonate (Caltrate 600) 1500 (600 Ca) MG tablet Take 600 mg by mouth Daily.   2020 at Unknown time   • cephalexin (KEFLEX) 500 MG capsule Take 500 mg by mouth 2 (Two) Times a Day.   2020 at Unknown time   • Nutritional Supplements (ESTROVEN PM PO) Take  by mouth.   2020 at Unknown time   • Omega-3 Fatty Acids (FISH OIL) 1000 MG capsule capsule Take  by mouth Daily With Breakfast.   2020 at Unknown time   • meclizine (ANTIVERT) 12.5 MG tablet Take 12.5 mg by mouth 3 (Three) Times a Day As Needed for Dizziness.        No Known Allergies    Review of Systems   Constitutional: Negative for chills and fever.   HENT: Negative for congestion, postnasal drip and trouble swallowing.    Respiratory: Negative for cough, shortness of breath and wheezing.    Cardiovascular: Negative for chest pain and leg swelling.   Gastrointestinal: Negative for abdominal pain, diarrhea, nausea and vomiting.   Endocrine: Negative for cold intolerance and heat intolerance.   Genitourinary: Negative for frequency and urgency.   Musculoskeletal: Negative for arthralgias and back pain.   Skin: Negative for pallor and rash.   Neurological: Positive for facial asymmetry (new), weakness (focal left new), numbness (LLE) and headaches (mild). Negative for seizures.   Psychiatric/Behavioral: Negative for confusion. The patient is not nervous/anxious.      Vital Signs past 24hrs  BP range: BP: (106-172)/() 116/66  Pulse range: Heart Rate:  [50-85] 85  Resp rate range: Resp:  [12-16] 16  Temp range: Temp (24hrs), Av °F (36.7 °C), Min:97.6 °F (36.4 °C), Max:98.3 °F (36.8 °C)    Oxygen range: SpO2:  [97 %-100 %] 98 %; Flow (L/min):  [2-4] 2;   Device (Oxygen Therapy): room air  41.5 kg (91  lb 7.9 oz); Body mass index is 15.24 kg/m².  I/O this shift:  In: 1100 [I.V.:1100]  Out: 610 [Urine:500; Other:60; Blood:50]    Adult female laying in bed no acute distress.  Obvious left facial palsy.  She is normal built and nutrition.  Pupils equal and reactive to light.  Left facial droop.  JVP not elevated trachea midline thyroid not enlarged.  Lungs reveal bilateral air entry clear to auscultation no rales rhonchi wheeze.  Percussion note resonant chest expansion equal no chest wall deformity or tenderness.  Heart examination S1-S2 present rhythm regular no murmurs.  No edema lower extremities.  Abdomen is soft nontender bowel sounds present no liver spleen enlargement.  No peripheral cyanosis clubbing.  Left hemiplegia noted.    Results Review:    I have reviewed the laboratory and imaging data from current admission. My annotations are as noted in assessment and plan.    Medication Review:  I have reviewed the current MAR. My annotations are as noted in assessment and plan.    Plan   PCCM Problems  Craniotomy for resection brain lesions, 7/1/2020  Postoperative small hemorrhage with left hemiparesis  Recent dental work.        Plan of Treatment  Await results of brain biopsies.    Postoperative small hemorrhage.  Goal blood pressure less than 120 therefore on Cardene.  Neurosurgery is aware.    Keep n.p.o. for now and closely monitor neuro status.    Antibiotics as per ID for suspected CNS infection.    Part of this note may be an electronic transcription/translation of spoken language to printed text using the Dragon Dictation System.      Electronically signed by Chandler Betancourt MD at 07/01/20 6000     Godfrey Urrutia at 07/01/20 0690        Responded to req for prayer before surgery, pt present with , we prayed together    Electronically signed by Godfrey Urrutia at 07/01/20 8634

## 2020-07-02 NOTE — PLAN OF CARE
Problem: Patient Care Overview  Goal: Plan of Care Review  Outcome: Ongoing (interventions implemented as appropriate)  Flowsheets (Taken 7/2/2020 9108)  Plan of Care Reviewed With: patient  Outcome Summary: Clinical swallow eval completed. Pt given small ice chips with ~4 swallows noted with each. Swallow appeared delayed and weak, suspect pharyngeal residuals. Do not feel pt is ready for PO diet. ST to follow for improvement in alert status and strength. Recommend NPO. Cortrak ordered for nutrition and meds. Pt safe for small ice chips sparingly after oral care when pt is fully alert. ST to follow for reeval. Recommend acute inpatient rehab at [place].

## 2020-07-02 NOTE — PLAN OF CARE
Problem: Patient Care Overview  Goal: Plan of Care Review  Outcome: Ongoing (interventions implemented as appropriate)  Flowsheets (Taken 7/2/2020 1203)  Outcome Summary: pt is s/p R crani for removal of abscess, presents with L hemiparesis, inc tone L side, impaired sitting balance, impaired functional mobility and activity tolerance, she will benefit from PT to address, anticipate that pt will be a good acute rehab candidate, pt was independent prior to admission, very active, lives with spouse     ..Patient was wearing a face mask during this therapy encounter. Therapist used appropriate personal protective equipment including mask and gloves.  Mask used was standard procedure mask. Appropriate PPE was worn during the entire therapy session. Hand hygiene was completed before and after therapy session. Patient is not in enhanced droplet precautions.

## 2020-07-02 NOTE — THERAPY EVALUATION
Acute Care - Occupational Therapy Initial Evaluation  [facility]     Patient Name: Denise Guajardo  : 1965  MRN: [id number]  Today's Date: 2020             Admit Date: 2020       ICD-10-CM ICD-9-CM   1. Brain tumor (CMS/HCC) D49.6 239.6     Patient Active Problem List   Diagnosis   • Brain abscess   • Intracranial hemorrhage (CMS/HCC)     Past Medical History:   Diagnosis Date   • Vertigo      Past Surgical History:   Procedure Laterality Date   • BREAST BIOPSY     • DENTAL PROCEDURE     • TUBAL ABDOMINAL LIGATION     • WISDOM TOOTH EXTRACTION            OT ASSESSMENT FLOWSHEET (last 12 hours)      Occupational Therapy Evaluation     Row Name 20 1617                   OT Evaluation Time/Intention    Subjective Information  complains of;pain  -RB        Document Type  evaluation  -RB        Mode of Treatment  individual therapy;occupational therapy  -RB        Patient Effort  good  -RB        Comment  Pt with slurred speech and difficult to understand. LUE a-line.   -RB           General Information    Patient Profile Reviewed?  yes  -RB        Patient Observations  alert;cooperative;agree to therapy  -RB        Prior Level of Function  independent: Pt works full time and ran [employer]  -RB        Equipment Currently Used at Home  none  -RB        Barriers to Rehab  medically complex  -RB           Relationship/Environment    Lives With  spouse  -RB        Family Caregiver if Needed  spouse  -RB           Resource/Environmental Concerns    Current Living Arrangements  home/apartment/condo  -RB        Resource/Environmental Concerns  none  -RB           Cognitive Assessment/Intervention- PT/OT    Orientation Status (Cognition)  person;situation;oriented to  -RB        Cognitive Assessment/Intervention Comment  Lethargic, difficult to understand, slurred speech.  -RB           Safety Issues, Functional Mobility    Safety Issues Affecting Function (Mobility)  problem solving;safety precaution  awareness;safety precautions follow-through/compliance;sequencing abilities  -RB        Impairments Affecting Function (Mobility)  balance;cognition;coordination;endurance/activity tolerance;grasp;motor control;motor planning;muscle tone abnormal;pain;postural/trunk control;range of motion (ROM);sensation/sensory awareness;strength;visual/perceptual  -RB           Bed Mobility Assessment/Treatment    Bed Mobility Assessment/Treatment  bed mobility (all) activities  -RB        [place] Level (Bed Mobility)  moderate assist (50% patient effort);2 person assist  -RB        Bed Mobility, Safety Issues  decreased use of arms for pushing/pulling;decreased use of legs for bridging/pushing;impaired trunk control for bed mobility  -RB        Assistive Device (Bed Mobility)  bed rails;draw sheet  -RB           ADL Assessment/Intervention    BADL Assessment/Intervention  grooming  -RB           Grooming Assessment/Training    [place] Level (Grooming)  oral care regimen;set up  -RB        Grooming Position  supine  -RB        Comment (Grooming)  Use of RUE  -RB           BADL Safety/Performance    Impairments, BADL Safety/Performance  balance;cognition;endurance/activity tolerance;grasp/prehension;coordination;visual/perceptual;trunk/postural control;strength;motor control;motor planning;muscle tone abnormality;pain;range of motion;sensory awareness  -RB        Cognitive Impairments, BADL Safety/Performance  attention;insight into deficits/self awareness;judgment;problem solving/reasoning;safety precaution awareness;safety precaution follow-through;sequencing abilities  -RB        Skilled BADL Treatment/Intervention  adaptive equipment training;BADL process/adaptation training;cognitive/safety deficit modifications;compensatory training;energy conservation;environmental modifications;hand-over-hand training/cues;jakub/one-hand technique;sensory/visual deficit compensatory training  -RB        Progress in BADL Status   level of supervision required  -RB           General ROM    GENERAL ROM COMMENTS  RUE WFL. LUE no active ROM. Flexor tone developing.  -RB           MMT (Manual Muscle Testing)    General MMT Comments  RUE 4+/5, 0/5 LUE  -RB           Motor Assessment/Interventions    Additional Documentation  Therapeutic Exercise (Group);Therapeutic Exercise Interventions (Group)  -RB           Therapeutic Exercise    Upper Extremity (Therapeutic Exercise)  bicep curl, left;tricep extension, left  -RB        Upper Extremity Range of Motion (Therapeutic Exercise)  shoulder flexion/extension, left;shoulder abduction/adduction, left;shoulder horizontal abduction/adduction, left;shoulder internal/external rotation, left;elbow flexion/extension, left;forearm supination/pronation, left;wrist flexion/extension, left  -RB        Hand (Therapeutic Exercise)  thumb finger opposition, left;finger flexion/extension, left  -RB        Exercise Type (Therapeutic Exercise)  PROM (passive range of motion)  -RB        Position (Therapeutic Exercise)  supine  -RB        Sets/Reps (Therapeutic Exercise)  1 X 10  -RB        Expected Outcome (Therapeutic Exercise)  facilitate normal movement patterns;increase active range of motion  -RB           Static Sitting Balance    Level of [place] (Unsupported Sitting, Static Balance)  moderate assist, 50 to 74% patient effort;maximal assist, 25 to 49% patient effort  -RB        Sitting Position (Unsupported Sitting, Static Balance)  sitting on edge of bed  -RB        Time Able to Maintain Position (Unsupported Sitting, Static Balance)  more than 5 minutes  -RB        Comment (Unsupported Sitting, Static Balance)  Mod A to begin. Pt required cues to lean to R side to adjust balance to midline. Pt highly fatigued after x3 min and Max A provided for sitting balance.  -RB           Sensory Assessment/Intervention    Sensory General Assessment  light touch sensation deficits identified LUE/LLE  -RB            Positioning and Restraints    Pre-Treatment Position  in bed  -RB        Post Treatment Position  bed  -RB        In Bed  notified nsg;supine;call light within reach;encouraged to call for assist;exit alarm on;LUE elevated  -RB           Pain Scale: Numbers Pre/Post-Treatment    Pain Scale: Numbers, Pretreatment  5/10  -RB        Pain Scale: Numbers, Post-Treatment  5/10  -RB        Pain Location  head  -RB        Pre/Post Treatment Pain Comment  RN aware of headache  -RB           Wound 07/01/20 1156 Right head Incision    Wound - Properties Group Date first assessed: 07/01/20  -KB Time first assessed: 1156  -KB Present on Hospital Admission: N  -KB Side: Right  -KB Location: head  -KB Primary Wound Type: Incision  -KB       Wound 07/01/20 1116 back Incision    Wound - Properties Group Date first assessed: 07/01/20  -SL Time first assessed: 1116  -SL Present on Hospital Admission: N  -SL Location: back  -SL Primary Wound Type: Incision  -SL       Coping    Observed Emotional State  accepting;calm;cooperative  -RB        Verbalized Emotional State  acceptance  -RB           Plan of Care Review    Plan of Care Reviewed With  patient;spouse  -RB           Clinical Impression (OT)    Criteria for Skilled Therapeutic Interventions Met (OT Eval)  yes;treatment indicated  -RB        Rehab Potential (OT Eval)  good, to achieve stated therapy goals  -RB        Therapy Frequency (OT Eval)  5 times/wk  -RB        Care Plan Review (OT)  patient/other agree to care plan  -RB        Anticipated Discharge Disposition (OT)  inpatient rehabilitation facility  -RB           Vital Signs    Pre SpO2 (%)  95  -RB        Intra SpO2 (%)  94  -RB        Post SpO2 (%)  96  -RB           Planned OT Interventions    Planned Therapy Interventions (OT Eval)  activity tolerance training;adaptive equipment training;BADL retraining;cognitive/visual perception retraining;edema control/reduction;functional balance retraining;IADL  retraining;transfer/mobility retraining;strengthening exercise;ROM/therapeutic exercise;patient/caregiver education/training;passive ROM/stretching;occupation/activity based interventions;neuromuscular control/coordination retraining;orthotic fabrication/fitting/training;manual therapy/joint mobilization  -RB           OT Goals    Bed Mobility Goal Selection (OT)  bed mobility, OT goal 1  -RB        Transfer Goal Selection (OT)  transfer, OT goal 1  -RB        Dressing Goal Selection (OT)  dressing, OT goal 1  -RB        Toileting Goal Selection (OT)  toileting, OT goal 1  -RB        Grooming Goal Selection (OT)  grooming, OT goal 1  -RB        ROM Goal Selection (OT)  ROM, OT goal 1  -RB        Additional Documentation  Grooming Goal Selection (OT) (Row);ROM Goal Selection (OT) (Row)  -RB           Bed Mobility Goal 1 (OT)    Activity/Assistive Device (Bed Mobility Goal 1, OT)  bed mobility activities, all  -RB        [place] Level/Cues Needed (Bed Mobility Goal 1, OT)  minimum assist (75% or more patient effort)  -RB        Time Frame (Bed Mobility Goal 1, OT)  short term goal (STG);2 weeks  -RB        Progress/Outcomes (Bed Mobility Goal 1, OT)  continuing progress toward goal  -RB           Transfer Goal 1 (OT)    Activity/Assistive Device (Transfer Goal 1, OT)  transfers, all  -RB        [place] Level/Cues Needed (Transfer Goal 1, OT)  moderate assist (50-74% patient effort)  -RB        Time Frame (Transfer Goal 1, OT)  short term goal (STG);2 weeks  -RB        Progress/Outcome (Transfer Goal 1, OT)  continuing progress toward goal  -RB           Dressing Goal 1 (OT)    Activity/Assistive Device (Dressing Goal 1, OT)  dressing skills, all  -RB        [place]/Cues Needed (Dressing Goal 1, OT)  minimum assist (75% or more patient effort)  -RB        Time Frame (Dressing Goal 1, OT)  short term goal (STG);2 weeks  -RB        Progress/Outcome (Dressing Goal 1, OT)  continuing progress toward goal   -RB           Toileting Goal 1 (OT)    Activity/Device (Toileting Goal 1, OT)  toileting skills, all  -RB        [place] Level/Cues Needed (Toileting Goal 1, OT)  moderate assist (50-74% patient effort)  -RB        Time Frame (Toileting Goal 1, OT)  short term goal (STG);2 weeks  -RB        Progress/Outcome (Toileting Goal 1, OT)  continuing progress toward goal  -RB           Grooming Goal 1 (OT)    Activity/Device (Grooming Goal 1, OT)  grooming skills, all  -RB        [place] (Grooming Goal 1, OT)  set-up required  -RB        Time Frame (Grooming Goal 1, OT)  short term goal (STG);2 weeks  -RB        Progress/Outcome (Grooming Goal 1, OT)  continuing progress toward goal  -RB           ROM Goal 1 (OT)    ROM Goal 1 (OT)  Pt and caregiver to demo AROM/AAROM/PROM exercises and positioning to increase pt's ROM and functional use of extremity.   -RB        Time Frame (ROM Goal 1, OT)  short term goal (STG);2 weeks  -RB        Progress/Outcome (ROM Goal 1, OT)  continuing progress toward goal  -RB          User Key  (r) = Recorded By, (t) = Taken By, (c) = Cosigned By    Initials Name Effective Dates    KB Carina Acosta, SARMAD 06/16/16 -     Eryn Alaniz RN 06/16/16 -     Cathi Riggs, THAO 04/02/20 -          Occupational Therapy Education                 Title: PT OT SLP Therapies (In Progress)     Topic: Occupational Therapy (Not Started)     Point: ADL training (Not Started)     Description:   Instruct learner(s) on proper safety adaptation and remediation techniques during self care or transfers.   Instruct in proper use of assistive devices.              Learner Progress:   Not documented in this visit.          Point: Home exercise program (Not Started)     Description:   Instruct learner(s) on appropriate technique for monitoring, assisting and/or progressing therapeutic exercises/activities.              Learner Progress:   Not documented in this visit.          Point:  Precautions (Not Started)     Description:   Instruct learner(s) on prescribed precautions during self-care and functional transfers.              Learner Progress:   Not documented in this visit.          Point: Body mechanics (Not Started)     Description:   Instruct learner(s) on proper positioning and spine alignment during self-care, functional mobility activities and/or exercises.              Learner Progress:   Not documented in this visit.                              OT Recommendation and Plan  Outcome Summary/Treatment Plan (OT)  Anticipated Discharge Disposition (OT): inpatient rehabilitation facility  Planned Therapy Interventions (OT Eval): activity tolerance training, adaptive equipment training, BADL retraining, cognitive/visual perception retraining, edema control/reduction, functional balance retraining, IADL retraining, transfer/mobility retraining, strengthening exercise, ROM/therapeutic exercise, patient/caregiver education/training, passive ROM/stretching, occupation/activity based interventions, neuromuscular control/coordination retraining, orthotic fabrication/fitting/training, manual therapy/joint mobilization  Therapy Frequency (OT Eval): 5 times/wk  Plan of Care Review  Plan of Care Reviewed With: patient, spouse  Plan of Care Reviewed With: patient, spouse    Outcome Measures     Row Name 07/02/20 1500             How much help from another is currently needed...    Putting on and taking off regular lower body clothing?  1  -RB      Bathing (including washing, rinsing, and drying)  1  -RB      Toileting (which includes using toilet bed pan or urinal)  1  -RB      Putting on and taking off regular upper body clothing  2  -RB      Taking care of personal grooming (such as brushing teeth)  2  -RB      Eating meals  2  -RB      AM-PAC 6 Clicks Score (OT)  9  -RB         Modified [place] Scale    Modified [place] Scale  5 - Severe disability.  Bedridden, incontinent, and requiring constant  nursing care and attention.  -RB         Functional Assessment    Outcome Measure Options  AM-PAC 6 Clicks Daily Activity (OT)  -RB        User Key  (r) = Recorded By, (t) = Taken By, (c) = Cosigned By    Initials Name Provider Type    Cathi Riggs OT Occupational Therapist          Time Calculation:   Time Calculation- OT     Row Name 07/02/20 1511             Time Calculation- OT    OT Start Time  1430  -RB      OT Stop Time  1500  -RB      OT Time Calculation (min)  30 min  -RB      Total Timed Code Minutes- OT  23 minute(s)  -RB      OT Received On  07/02/20  -RB      OT - Next Appointment  07/03/20  -RB      OT Goal Re-Cert Due Date  07/16/20  -RB        User Key  (r) = Recorded By, (t) = Taken By, (c) = Cosigned By    Initials Name Provider Type    Cathi Riggs OT Occupational Therapist        Therapy Charges for Today     Code Description Service Date Service Provider Modifiers Qty    67464125056 HC OT EVAL MOD COMPLEXITY 2 7/2/2020 Cathi Harris OT GO 1    52908440235 HC OT SELF CARE/MGMT/TRAIN EA 15 MIN 7/2/2020 Cathi Harris OT GO 1    47216519939 HC OT THERAPEUTIC ACT EA 15 MIN 7/2/2020 Cathi Harris OT GO 1               Cathi Harris OT  7/2/2020

## 2020-07-02 NOTE — THERAPY EVALUATION
Acute Care - Speech Language Pathology   Swallow Initial Evaluation [facility]     Patient Name: Denise Gaujardo  : 1965  MRN: [id number]  Today's Date: 2020               Admit Date: 2020    Visit Dx:     ICD-10-CM ICD-9-CM   1. Brain tumor (CMS/HCC) D49.6 239.6     Patient Active Problem List   Diagnosis   • Brain abscess   • Intracranial hemorrhage (CMS/HCC)     Past Medical History:   Diagnosis Date   • Vertigo      Past Surgical History:   Procedure Laterality Date   • BREAST BIOPSY     • DENTAL PROCEDURE     • TUBAL ABDOMINAL LIGATION     • WISDOM TOOTH EXTRACTION          SWALLOW EVALUATION (last 72 hours)      SLP Adult Swallow Evaluation     Row Name 20 1300                   Rehab Evaluation    Document Type  evaluation  -AW        Subjective Information  complains of;pain  -AW        Patient Observations  cooperative;agree to therapy;lethargic  -AW        Patient/Family Observations  Pt upright in bed, son present.  -AW        Patient Effort  adequate  -AW        Symptoms Noted During/After Treatment  fatigue  -AW           General Information    Patient Profile Reviewed  yes  -AW        Pertinent History Of Current Problem  Pt s/p crani on  for tumor (vs infection) with small hemorrhage and L jakub post op.  -AW        Current Method of Nutrition  NPO  -AW        Precautions/Limitations, Hearing  WFL;for purposes of eval  -AW        Prior Level of Function-Communication  WFL;other (see comments) worked FT in office  -AW        Prior Level of Function-Swallowing  no diet consistency restrictions  -AW        Plans/Goals Discussed with  patient;family;agreed upon  -AW        Barriers to Rehab  none identified  -AW        Patient's Goals for Discharge  patient did not state  -AW        Family Goals for Discharge  patient able to return to all previous activities/roles  -AW           Pain Assessment    Additional Documentation  Pain Scale: Numbers Pre/Post-Treatment (Group)  -AW            Pain Scale: Numbers Pre/Post-Treatment    Pain Scale: Numbers, Pretreatment  8/10  -AW        Pain Scale: Numbers, Post-Treatment  8/10  -AW        Pain Location  head  -AW        Pre/Post Treatment Pain Comment  RN aware  -AW           Oral Motor and Function    Dentition Assessment  natural, present and adequate  -AW        Mucosal Quality  moist, healthy;sticky  -AW        Volitional Swallow  unable to elicit  -AW        Volitional Cough  unable to elicit  -AW           Oral Musculature and Cranial Nerve Assessment    Oral Labial or Buccal Impairment, Detail, Cranial Nerve VII (Facial):  left labial droop  -AW        Lingual Impairment, Detail. Cranial Nerves IX, XII (Glossopharyngeal and Hypoglossal)  reduced lingual ROM;reduced strength  -AW        Vocal Impairment, Detail. Cranial Nerve X (Vagus)  vocal quality abnormality (see comments);impaired throat clear/cough (see comments);other (see comments) voice weak  -AW           General Eating/Swallowing Observations    Respiratory Support Currently in Use  room air  -AW        Eating/Swallowing Skills  fed by SLP  -AW        Positioning During Eating  upright in bed  -AW        Utensils Used  spoon  -AW        Consistencies Trialed  -- ice chips  -AW        Pre SpO2 (%)  96  -AW        Post SpO2 (%)  95  -AW           Clinical Swallow Eval    Pharyngeal Phase  suspected pharyngeal impairment  -AW        Clinical Swallow Evaluation Summary  Pt given small ice chips with ~4 swallows noted with each. Swallow appeared delayed and weak, suspect pharyngeal residuals. Do not feel pt is ready for PO diet. ST to follow for improvement in alert status and strength. Recommend NPO. Cortrak ordered for nutrition and meds. Pt safe for small ice chips sparingly after oral care when pt is fully alert.  -AW           Clinical Impression    SLP Swallowing Diagnosis  suspected pharyngeal dysfunction  -AW        Functional Impact  risk of aspiration/pneumonia  -AW         Rehab Potential/Prognosis, Swallowing  good, to achieve stated therapy goals  -AW        Swallow Criteria for Skilled Therapeutic Interventions Met  demonstrates skilled criteria  -AW           Recommendations    Therapy Frequency (Swallow)  PRN  -AW        Predicted Duration Therapy Intervention (Days)  until discharge  -AW        SLP Diet Recommendation  NPO;ice chips between meals after oral care, with supervision;temporary alternate methods of nutrition/hydration  -AW        Recommended Diagnostics  reassess via clinical swallow evaluation  -AW        Recommended Precautions and Strategies  upright posture during/after eating  -AW        SLP Rec. for Method of Medication Administration  meds via alternate route  -AW        Monitor for Signs of Aspiration  yes  -AW        Anticipated Dischage Disposition (SLP)  unknown;anticipate therapy at next level of care  -AW           Swallow Goals (SLP)    Oral Nutrition/Hydration Goal Selection (SLP)  oral nutrition/hydration, SLP goal 1  -AW           Oral Nutrition/Hydration Goal 1 (SLP)    Oral Nutrition/Hydration Goal 1, SLP  Pt will safely tolerate the least restrictive PO diet.  -AW        Time Frame (Oral Nutrition/Hydration Goal 1, SLP)  by discharge  -AW          User Key  (r) = Recorded By, (t) = Taken By, (c) = Cosigned By    Initials Name Effective Dates    Yarely Martinez, MS CCC-SLP 06/08/18 -           EDUCATION  The patient has been educated in the following areas:   Dysphagia (Swallowing Impairment) Oral Care/Hydration NPO rationale.    SLP Recommendation and Plan  SLP Swallowing Diagnosis: suspected pharyngeal dysfunction  SLP Diet Recommendation: NPO, ice chips between meals after oral care, with supervision, temporary alternate methods of nutrition/hydration  Recommended Precautions and Strategies: upright posture during/after eating  SLP Rec. for Method of Medication Administration: meds via alternate route     Monitor for Signs of Aspiration:  yes  Recommended Diagnostics: reassess via clinical swallow evaluation  Swallow Criteria for Skilled Therapeutic Interventions Met: demonstrates skilled criteria  Anticipated Dischage Disposition (SLP): unknown, anticipate therapy at next level of care  Rehab Potential/Prognosis, Swallowing: good, to achieve stated therapy goals  Therapy Frequency (Swallow): PRN  Predicted Duration Therapy Intervention (Days): until discharge       Plan of Care Reviewed With: patient  Outcome Summary: Clinical swallow eval completed. Pt given small ice chips with ~4 swallows noted with each. Swallow appeared delayed and weak, suspect pharyngeal residuals. Do not feel pt is ready for PO diet. ST to follow for improvement in alert status and strength. Recommend NPO. Cortrak ordered for nutrition and meds. Pt safe for small ice chips sparingly after oral care when pt is fully alert.    SLP GOALS     Row Name 07/02/20 1300             Oral Nutrition/Hydration Goal 1 (SLP)    Oral Nutrition/Hydration Goal 1, SLP  Pt will safely tolerate the least restrictive PO diet.  -AW      Time Frame (Oral Nutrition/Hydration Goal 1, SLP)  by discharge  -AW        User Key  (r) = Recorded By, (t) = Taken By, (c) = Cosigned By    Initials Name Provider Type    Yarely Martinez MS CCC-SLP Speech and Language Pathologist           SLP Outcome Measures (last 72 hours)      SLP Outcome Measures     Row Name 07/02/20 1400             SLP Outcome Measures    Outcome Measure Used?  Adult NOMS  -AW         Adult FCM Scores    FCM Chosen  Swallowing  -AW      Swallowing FCM Score  1  -AW        User Key  (r) = Recorded By, (t) = Taken By, (c) = Cosigned By    Initials Name Effective Dates    Yarely Martinez MS CCC-SLP 06/08/18 -            Time Calculation:   Time Calculation- SLP     Row Name 07/02/20 1407             Time Calculation- SLP    SLP Start Time  1300  -AW      SLP Received On  07/02/20  -AW        User Key  (r) = Recorded By, (t) = Taken By, (c) =  Cosigned By    Initials Name Provider Type    AW Yarely Ruiz, MS CCC-SLP Speech and Language Pathologist          Therapy Charges for Today     Code Description Service Date Service Provider Modifiers Qty    86575143579 HC ST EVAL ORAL PHARYNG SWALLOW 3 7/2/2020 Yarely Ruiz, MS CCC-SLP GN 1               Yarely Ruiz MS CCC-SLP  7/2/2020

## 2020-07-02 NOTE — CONSULTS
Adult Nutrition  Assessment/PES    Patient Name:  Denise Guajardo  YOB: 1965  MRN: [id number]  Admit Date:  6/26/2020    Assessment Date:  7/2/2020    Comments:  Nutrition Consult for TF. S/P Craniotomy for resection brain lesions. L hemiparesis.  Glucoses up with decadron. Per MD, will likely not be on it much longer.      Awaiting swallow eval.  If fails, will place cortak and start pt on Fibersource HN - Goal is 55cc/hr.  Free water 30cc q 4 hrs.     Will follow clinical course, and support nutritionally.    Reason for Assessment     Row Name 07/02/20 1220          Reason for Assessment    Reason For Assessment  physician consult;TF/PN     Diagnosis  surgery/postoperative complications;neurologic conditions 7/1 Craniotomy for resection brain lesions, small hemorrhage post op, L hemiparesis         Nutrition/Diet History     Row Name 07/02/20 1222          Nutrition/Diet History    Factors Affecting Nutritional Intake  difficulty/impaired swallowing;chewing difficulties/inability to chew food recent dental work         Anthropometrics     Row Name 07/02/20 0452          Anthropometrics    Weight  49 kg (108 lb 0.4 oz)         Labs/Tests/Procedures/Meds     Row Name 07/02/20 1222          Labs/Procedures/Meds    Lab Results Reviewed  reviewed, pertinent     Lab Results Comments  Glu, Na+, wbc        Diagnostic Tests/Procedures    Diagnostic Test/Procedure Reviewed  reviewed, pertinent     Diagnostic Test/Procedures Comments  SLP eval pending        Medications    Pertinent Medications Reviewed  reviewed, pertinent     Pertinent Medications Comments  Abx, rocephin, keppra, flagyl, cardene         Physical Findings     Row Name 07/02/20 1223          Physical Findings    Overall Physical Appearance  underweight, L hemiparesis     Tubes  other (see comments) drain     Skin  other (see comments);surgical incision incisions. B=15           Nutrition Prescription Ordered     Row Name 07/02/20 6940           Nutrition Prescription PO    Current PO Diet  NPO         Problem/Interventions:    Intervention Goal     Row Name 07/02/20 1226          Intervention Goal    General  Maintain nutrition;Nutrition support treatment;Reduce/improve symptoms;Meet nutritional needs for age/condition;Disease management/therapy;Improved nutrition related lab(s)     TF/PN  Inititiate TF/PN;Tolerate TF at goal     Transition  TF to PO     Weight  Appropriate weight gain         Nutrition Intervention     Row Name 07/02/20 1226          Nutrition Intervention    RD/Tech Action  Care plan reviewd;Follow Tx progress;Recommend/ordered     Recommended/Ordered  EN         Nutrition Prescription     Row Name 07/02/20 1226          Nutrition Prescription EN    Enteral Prescription  Enteral begin/change;Enteral to supply     Product  Fibersource HN     TF Delivery Method  Continuous     Continuous TF Goal Rate (mL/hr)  55 mL/hr     Water flush (mL)   30 mL     Water Flush Frequency  Every 4 hours     New EN Prescription Ordered?  -- pending swallow eval.        EN to Supply    Kcal/Day  1584 Kcal/Day     Protein (gm/day)  71.28 gm/day     Meet Estimated Kcal Need (%)  100 %     Meet Estimated Protein Need (%)  125 %     TF Free H2O (mL)  1069.2 mL     Total Free H2O (mL/day)  1249 mL/day         Education/Evaluation     Row Name 07/02/20 1228          Education    Education  Will Instruct as appropriate        Monitor/Evaluation    Monitor  Skin status;Per protocol;I&O;Symptoms;Pertinent labs;TF delivery/tolerance;Weight           Electronically signed by:  Jaqueline Haas RD  07/02/20 12:33

## 2020-07-02 NOTE — PROGRESS NOTES
ID note for brain abscess  Subjective: She is little drowsy today.  Discussed with nursing and has been stable.  No fevers.  Tolerating the antibiotics okay.  CT scan did show intracranial hemorrhage for which hopefully will resolve with just supportive care  Objective: Sickly she is not quite her perky self today.  She is not having fevers.  Her abdomen is soft and nontender skin is warm and dry.  She has a right-sided cranial drain in place    Microbiology  6/29 BCx NGTD  7/1 CSF culture no growth to date  7/1 operative brain cultures 2/2 no growth    Assessment and plan  1.  Brain abscess status post drainage 7/1/2020  2.  Intracranial hemorrhage    Operative cultures so far no growth to date.  Discussed with microbiology lab and we can hold them for 7 days.  Plan 6 weeks of IV ceftriaxone 2 g IV every 12 hours and metronidazole 500 mg every 8 hours.  We are currently given the metronidazole IV but this can be switched over to p.o. when she is tolerating p.o.  If her microbiology cultures remain negative then I think sending it for PCR testing would be beneficial    Okay for PICC line from my perspective

## 2020-07-02 NOTE — PLAN OF CARE
Problem: Patient Care Overview  Goal: Plan of Care Review  Note:   Pt presents to [facility] 2/2 to craniotomy for resection brain lesions and a postoperative small hemorrhage with left hemiparesis (s/p 7/1/2020). Prior to admission, pt was completely independent with ADL's, mobility, worked full time and ran [employer]. Unfortunately, pt s/p brain surgery and has residual left hemiparesis limiting her independence with ADL's, mobility and impaired activity tolerance. Pt transferred to seated EOB with Mod A x2. Once sitting at EOB, pt with a strong L lateral lean requiring Mod-Max A to remain at midline. Pt engaged in a dynamic balance reaching activity requiring the same level of assist. Pt with LUE flexor tone developing and OT provided exercises and education to pt and her spouse. Pt is highly motivated to improve her functional performance and OT recommends a d/c to acute rehab when medically appropriate.     Patient was not wearing a face mask during this therapy encounter. Therapist used appropriate personal protective equipment including mask, eye cover and gloves.  Mask used was standard procedure mask. Appropriate PPE was worn during the entire therapy session. Hand hygiene was completed before and after therapy session. Patient is not in enhanced droplet precautions.

## 2020-07-02 NOTE — PROGRESS NOTES
LOS: 6 days   Patient Care Team:  Paz Mera MD as PCP - General (Family Medicine)    Chief Complaint: Postop craniotomy    Subjective     This patient has developed a fairly significant left hemiparesis postoperatively.  She has a little movement in her leg but not much movement in her arm or her face.    Interval History:     History taken from: patient chart family    Objective      She has decreased movement on the left.  Her incision looks okay and she has had minimal output from her drain.    Vital Signs  Temp:  [97.6 °F (36.4 °C)-100 °F (37.8 °C)] 100 °F (37.8 °C)  Heart Rate:  [] 117  Resp:  [16] 16  BP: ()/(59-86) 136/78  Arterial Line BP: ()/(43-88) 106/68       Results Review:     I reviewed the patient's new clinical results.  I reviewed her CT done this morning.  This shows some bleeding in the area of resection which is stable from the postoperative scan.  There is a little area of hemorrhage in the right temporal lobe as well which is of no significance.      Assessment/Plan       Brain abscess    Intracranial hemorrhage (CMS/HCC)      I told the patient about the CT and I also talked to her  on the phone.  I think that most of the weakness is due to bleeding into the operative bed of the abscess.  This is not entirely unexpected as I was concerned about doing too much coagulation during surgery and causing a stroke in the brainstem.  I do believe that most of the weakness is due to pressure and as the blood clot reabsorbs I think she will regain movement on the left side.  I do not know whether it will be complete or not but I am quite confident she will improve.  I do think we should check another CT tomorrow but I think we can get her drain out.      Shai Licona MD  07/02/20  08:46

## 2020-07-02 NOTE — THERAPY EVALUATION
Patient Name: Denise Guajardo  : 1965    MRN: [id number]                              Today's Date: 2020       Admit Date: 2020    Visit Dx:     ICD-10-CM ICD-9-CM   1. Brain tumor (CMS/HCC) D49.6 239.6     Patient Active Problem List   Diagnosis   • Brain abscess   • Intracranial hemorrhage (CMS/HCC)     Past Medical History:   Diagnosis Date   • Vertigo      Past Surgical History:   Procedure Laterality Date   • BREAST BIOPSY     • DENTAL PROCEDURE     • TUBAL ABDOMINAL LIGATION     • WISDOM TOOTH EXTRACTION       General Information     Row Name 20 1155          PT Evaluation Time/Intention    Document Type  evaluation  -PC     Mode of Treatment  physical therapy  -PC     Row Name 20 1155          General Information    Patient Profile Reviewed?  yes  -PC     Prior Level of Function  independent:  -PC     Existing Precautions/Restrictions  fall  -PC     Barriers to Rehab  medically complex;impaired sensation  -PC     Row Name 20 1155          Relationship/Environment    Lives With  spouse  -PC     Row Name 20 1155          Resource/Environmental Concerns    Current Living Arrangements  home/apartment/condo  -PC     Row Name 20 1155          Cognitive Assessment/Intervention- PT/OT    Orientation Status (Cognition)  unable/difficult to assess  -PC     Cognitive Assessment/Intervention Comment  pt lethargic, flat affect, did not verbalize  -PC     Row Name 20 1155          Safety Issues, Functional Mobility    Safety Issues Affecting Function (Mobility)  insight into deficits/self awareness  -PC     Impairments Affecting Function (Mobility)  motor control;motor planning;postural/trunk control;strength;endurance/activity tolerance;coordination;cognition;balance;sensation/sensory awareness  -PC       User Key  (r) = Recorded By, (t) = Taken By, (c) = Cosigned By    Initials Name Provider Type    PC Elham Forde PT Physical Therapist        Mobility     Row Name  07/02/20 1156          Bed Mobility Assessment/Treatment    Bed Mobility Assessment/Treatment  supine-sit;sit-supine  -PC     Supine-Sit [place] (Bed Mobility)  maximum assist (25% patient effort);2 person assist  -PC     Sit-Supine [place] (Bed Mobility)  2 person assist;maximum assist (25% patient effort)  -PC     Row Name 07/02/20 1156          Transfer Assessment/Treatment    Comment (Transfers)  not appropriate to attempt due to weakness and poor sitting balance, lethargy  -       User Key  (r) = Recorded By, (t) = Taken By, (c) = Cosigned By    Initials Name Provider Type    PC Elham Forde, PT Physical Therapist        Obj/Interventions     Row Name 07/02/20 1157          General ROM    GENERAL ROM COMMENTS  RUE and RLE WFL AROM, L UE and LLE PROM WFL  -PC     Row Name 07/02/20 1157          MMT (Manual Muscle Testing)    General MMT Comments  RUE and RLE WNL, L UE and LLE no active movement, inc tone present  -     Row Name 07/02/20 1157          Therapeutic Exercise    Comment (Therapeutic Exercise)  PROM performed L UE and LLE working on reducing tone  -     Row Name 07/02/20 1157          Static Sitting Balance    Level of [place] (Unsupported Sitting, Static Balance)  moderate assist, 50 to 74% patient effort;2 person assist  -PC     Sitting Position (Unsupported Sitting, Static Balance)  sitting on edge of bed  -     Time Able to Maintain Position (Unsupported Sitting, Static Balance)  3 to 4 minutes  -     Comment (Unsupported Sitting, Static Balance)  pt pushes strongly to the left  -     Row Name 07/02/20 1157          Sensory Assessment/Intervention    Sensory General Assessment  -- difficult to assess due to lethargy and dec communication  -PC       User Key  (r) = Recorded By, (t) = Taken By, (c) = Cosigned By    Initials Name Provider Type    PC Elham Forde PT Physical Therapist        Goals/Plan     Row Name 07/02/20 1207          Bed Mobility Goal 1 (PT)     Activity/Assistive Device (Bed Mobility Goal 1, PT)  sit to supine/supine to sit  -PC     [place] Level/Cues Needed (Bed Mobility Goal 1, PT)  moderate assist (50-74% patient effort)  -PC     Time Frame (Bed Mobility Goal 1, PT)  1 week  -PC     Row Name 07/02/20 1209          Transfer Goal 1 (PT)    Activity/Assistive Device (Transfer Goal 1, PT)  sit-to-stand/stand-to-sit;bed-to-chair/chair-to-bed  -PC     [place] Level/Cues Needed (Transfer Goal 1, PT)  moderate assist (50-74% patient effort)  -PC     Time Frame (Transfer Goal 1, PT)  1 week  -PC     Row Name 07/02/20 1204          Gait Training Goal 1 (PT)    Activity/Assistive Device (Gait Training Goal 1, PT)  gait (walking locomotion);assistive device use  -PC     [place] Level (Gait Training Goal 1, PT)  moderate assist (50-74% patient effort);2 person assist  -PC     Distance (Gait Goal 1, PT)  15 ft  -PC     Time Frame (Gait Training Goal 1, PT)  1 week  -PC       User Key  (r) = Recorded By, (t) = Taken By, (c) = Cosigned By    Initials Name Provider Type    PC Elham Forde PT Physical Therapist        Clinical Impression     Row Name 07/02/20 9780          Pain Assessment    Additional Documentation  Pain Scale: Numbers Pre/Post-Treatment (Group)  -     Row Name 07/02/20 120          Pain Scale: Numbers Pre/Post-Treatment    Pre/Post Treatment Pain Comment  pt appeared to be in some discomfort with incision site but was not able to express verbally  -     Row Name 07/02/20 1631          Plan of Care Review    Plan of Care Reviewed With  patient  -PC     Outcome Summary  pt is s/p R crani for removal of abscess, presents with L hemiparesis, inc tone L side, impaired sitting balance, impaired functional mobility and activity tolerance, she will benefit from PT to address, anticipate that pt will be a good acute rehab candidate, pt was independent prior to admission, very active, lives with spouse  -     Row Name 07/02/20 8766           Physical Therapy Clinical Impression    Criteria for Skilled Interventions Met (PT Clinical Impression)  yes;treatment indicated  -PC     Rehab Potential (PT Clinical Summary)  fair, will monitor progress closely  -PC     Row Name 07/02/20 1204          Positioning and Restraints    Pre-Treatment Position  in bed  -PC     Post Treatment Position  bed  -PC     In Bed  supine;call light within reach;encouraged to call for assist;exit alarm on  -PC       User Key  (r) = Recorded By, (t) = Taken By, (c) = Cosigned By    Initials Name Provider Type    PC Elham Forde PT Physical Therapist        Outcome Measures     Row Name 07/02/20 1208          How much help from another person do you currently need...    Turning from your back to your side while in flat bed without using bedrails?  2  -PC     Moving from lying on back to sitting on the side of a flat bed without bedrails?  2  -PC     Moving to and from a bed to a chair (including a wheelchair)?  1  -PC     Standing up from a chair using your arms (e.g., wheelchair, bedside chair)?  1  -PC     Climbing 3-5 steps with a railing?  1  -PC     To walk in hospital room?  1  -PC     AM-PAC 6 Clicks Score (PT)  8  -PC     Row Name 07/02/20 1208          Modified [place] Scale    Modified [place] Scale  5 - Severe disability.  Bedridden, incontinent, and requiring constant nursing care and attention.  -PC     Row Name 07/02/20 1208          Functional Assessment    Outcome Measure Options  AM-PAC 6 Clicks Basic Mobility (PT);Modified [place]  -PC       User Key  (r) = Recorded By, (t) = Taken By, (c) = Cosigned By    Initials Name Provider Type    Elham Ames PT Physical Therapist        Physical Therapy Education                 Title: PT OT SLP Therapies (In Progress)     Topic: Physical Therapy (In Progress)     Point: Mobility training (In Progress)     Description:   Instruct learner(s) on safety and technique for assisting patient out of bed, chair or  wheelchair.  Instruct in the proper use of assistive devices, such as walker, crutches, cane or brace.              Patient Friendly Description:   It's important to get you on your feet again, but we need to do so in a way that is safe for you. Falling has serious consequences, and your personal safety is the most important thing of all.        When it's time to get out of bed, one of us or a family member will sit next to you on the bed to give you support.     If your doctor or nurse tells you to use a walker, crutches, a cane, or a brace, be sure you use it every time you get out of bed, even if you think you don't need it.    Learning Progress Summary           Patient Acceptance, E,D, NR by  at 7/2/2020 1208                   Point: Home exercise program (In Progress)     Description:   Instruct learner(s) on appropriate technique for monitoring, assisting and/or progressing patient with therapeutic exercises and activities.              Learning Progress Summary           Patient Acceptance, E,D, NR by PC at 7/2/2020 1208                   Point: Body mechanics (In Progress)     Description:   Instruct learner(s) on proper positioning and spine alignment for patient and/or caregiver during mobility tasks and/or exercises.              Learning Progress Summary           Patient Acceptance, E,D, NR by PC at 7/2/2020 1208                   Point: Precautions (In Progress)     Description:   Instruct learner(s) on prescribed precautions during mobility and gait tasks              Learning Progress Summary           Patient Acceptance, E,D, NR by PC at 7/2/2020 1208                               User Key     Initials Effective Dates Name Provider Type Discipline     04/03/18 -  Elham Forde, PT Physical Therapist PT              PT Recommendation and Plan  Planned Therapy Interventions (PT Eval): balance training, bed mobility training, gait training, home exercise program, strengthening, motor  coordination training, postural re-education, neuromuscular re-education, ROM (range of motion), stretching  Outcome Summary/Treatment Plan (PT)  Anticipated Discharge Disposition (PT): inpatient rehabilitation facility  Plan of Care Reviewed With: patient  Outcome Summary: pt is s/p R crani for removal of abscess, presents with L hemiparesis, inc tone L side, impaired sitting balance, impaired functional mobility and activity tolerance, she will benefit from PT to address, anticipate that pt will be a good acute rehab candidate, pt was independent prior to admission, very active, lives with spouse     Time Calculation:   PT Charges     Row Name 07/02/20 1209             Time Calculation    Start Time  1126  -PC      Stop Time  1146  -PC      Time Calculation (min)  20 min  -PC      PT Received On  07/02/20  -PC      PT - Next Appointment  07/03/20  -PC      PT Goal Re-Cert Due Date  07/09/20  -PC        User Key  (r) = Recorded By, (t) = Taken By, (c) = Cosigned By    Initials Name Provider Type    PC Elham Forde, PT Physical Therapist        Therapy Charges for Today     Code Description Service Date Service Provider Modifiers Qty    33838075071 HC PT EVAL HIGH COMPLEXITY 2 7/2/2020 Elham Forde, PT GP 1    64838547877 HC PT THER PROC EA 15 MIN 7/2/2020 Elham Forde, PT GP 1    65756225716 HC PT THER SUPP EA 15 MIN 7/2/2020 Elham Forde, PT GP 1          PT G-Codes  Outcome Measure Options: AM-PAC 6 Clicks Basic Mobility (PT), Modified Ji  AM-PAC 6 Clicks Score (PT): 8  Modified [place] Scale: 5 - Severe disability.  Bedridden, incontinent, and requiring constant nursing care and attention.    Elham Forde PT  7/2/2020

## 2020-07-02 NOTE — PLAN OF CARE
Pt is unable to move left arm and leg but is able to move the right side successfully. She has left sided facial drooping. BP has been kept under 120 as ordered by . CT scan this morning showed a 3mm midline shift and right temporal hemorrhage and Talita Werner was informed of this change. GEGE drain had minimal output. Will continue to monitor pt for new neurological changes.    Problem: Pain, Acute (Adult)  Goal: Identify Related Risk Factors and Signs and Symptoms  Outcome: Ongoing (interventions implemented as appropriate)     Problem: Craniotomy/Craniectomy/Cranioplasty (Adult)  Goal: Signs and Symptoms of Listed Potential Problems Will be Absent, Minimized or Managed (Craniotomy/Craniectomy/Cranioplasty)  Outcome: Ongoing (interventions implemented as appropriate)     Problem: Craniotomy/Craniectomy/Cranioplasty (Adult)  Goal: Anesthesia/Sedation Recovery  Outcome: Ongoing (interventions implemented as appropriate)     Problem: Infection, Risk/Actual (Adult)  Goal: Identify Related Risk Factors and Signs and Symptoms  Outcome: Ongoing (interventions implemented as appropriate)     Problem: Skin Injury Risk (Adult)  Goal: Identify Related Risk Factors and Signs and Symptoms  Outcome: Ongoing (interventions implemented as appropriate)     Problem: Skin Injury Risk (Adult)  Goal: Skin Health and Integrity  Outcome: Ongoing (interventions implemented as appropriate)     Problem: Fall Risk (Adult)  Goal: Identify Related Risk Factors and Signs and Symptoms  Outcome: Ongoing (interventions implemented as appropriate)     Problem: Fall Risk (Adult)  Goal: Absence of Fall  Outcome: Ongoing (interventions implemented as appropriate)

## 2020-07-02 NOTE — PROGRESS NOTES
[facility]  [phone]  Chandler Betancourt MD    Subjective:  LOS: 6    She is sleepy this morning. She has no substantial change in her focal weakness. Denies further HA or pain.    Objective   Vital Signs past 24hrs    Temp range: Temp (24hrs), Av.8 °F (37.1 °C), Min:97.6 °F (36.4 °C), Max:100 °F (37.8 °C)    BP range: BP: ()/(59-86) 130/77  Pulse range: Heart Rate:  [] 105  Resp rate range: Resp:  [16] 16    Device (Oxygen Therapy): room airFlow (L/min):  [2-4] 2  Oxygen range:SpO2:  [95 %-100 %] 97 %      49 kg (108 lb 0.4 oz); Body mass index is 18 kg/m².    Intake/Output Summary (Last 24 hours) at 2020 1221  Last data filed at 2020 0452  Gross per 24 hour   Intake 3423 ml   Output 1135 ml   Net 2288 ml       Physical Exam   HENT:   Head: Normocephalic.   Eyes: Pupils are equal, round, and reactive to light.   Cardiovascular: Normal rate and regular rhythm.   No murmur heard.  Pulmonary/Chest: Effort normal. No respiratory distress. She has no wheezes. She has no rales.   Abdominal: Soft. Bowel sounds are normal. She exhibits no mass. There is no tenderness.   Musculoskeletal: She exhibits no edema.   Neurological:   Left facial droop  Left hemiparesis   Skin: No rash noted.     Results Review:    I have reviewed the laboratory and imaging data since the last note by [facility] physician.  My annotations are noted in assessment and plan.    Medication Review:  I have reviewed the current MAR.  My annotations are noted in assessment and plan.      niCARdipine 5-15 mg/hr Last Rate: 10 mg/hr (20 0921)     Plan   PCCM Problems  Craniotomy for resection brain lesions, 2020  Postoperative small hemorrhage with left hemiparesis  Recent dental work.    Plan of Treatment  Speech eval then decide on cortrack vs oral diet. Suspect she needs enteral feeds.    Note NS input. Continue to monitor. Also on dexamethasone - monitor BG.    Appreciate ID input ain adjusting abx.    Continue to  monitor in ICU.    Chandler Betancourt MD  07/02/20  12:21    Communication:  Please EPIC MESSAGE ME on In-Patient Care of this patient.     BETWEEN THE HOURS OF 7 AM AND 4:30 PM ONLY AND NOT AFTER 7/3/20.    Part of this note may be an electronic transcription/translation of spoken language to printed text using the Dragon Dictation System.

## 2020-07-03 NOTE — PLAN OF CARE
Problem: Patient Care Overview  Goal: Plan of Care Review  Note:   Pt seen this afternoon post SLP session. Upon arrival to room, pt supine in bed with son at bedside. Pt pleasant and motivated to engage in OT session. Pt completed transfer to EOB with Max A x2. Once sitting at EOB, pt initially with a strong L lateral lean requiring Max A. Pt able to fluctuate from Min A-Mod A after several minutes. Pt completed dynamic sitting exercises with therapist and her son involved as pt motivated by her family members. Pt with improved LUE movement - AAROM shoulder flexion, elbow flexion/extension, supination/pronation. No wrist or digit movement seen this afternoon. Continued flexor tone present throughout extremity. Pt stood on x2 occasions. Pt with good initiation of standing and Min A x2 required. Pt with a strong L lateral lean requiring Mod A x2 to remain standing x2-3 minutes on each attempt. L knee buckling. Reaching with RUE during dynamic standing continued. This OT is pleased with pt's progress compared to yesterday. OT recommends continued skilled inpatient OT services with a d/c to Acute Rehab.    Patient was not wearing a face mask during this therapy encounter. Therapist used appropriate personal protective equipment including mask and gloves.  Mask used was standard procedure mask. Appropriate PPE was worn during the entire therapy session. Hand hygiene was completed before and after therapy session. Patient is not in enhanced droplet precautions.

## 2020-07-03 NOTE — THERAPY TREATMENT NOTE
Acute Care - Occupational Therapy Treatment Note  [facility]     Patient Name: Denise Guajardo  : 1965  MRN: [id number]  Today's Date: 7/3/2020             Admit Date: 2020       ICD-10-CM ICD-9-CM   1. Brain tumor (CMS/HCC) D49.6 239.6     Patient Active Problem List   Diagnosis   • Brain abscess   • Intracranial hemorrhage (CMS/HCC)     Past Medical History:   Diagnosis Date   • Vertigo      Past Surgical History:   Procedure Laterality Date   • BREAST BIOPSY     • DENTAL PROCEDURE     • TUBAL ABDOMINAL LIGATION     • WISDOM TOOTH EXTRACTION         Therapy Treatment    Rehabilitation Treatment Summary     Row Name 20 1504             Treatment Time/Intention    Discipline  occupational therapist  -RB      Document Type  therapy note (daily note)  -RB      Subjective Information  no complaints  -RB      Mode of Treatment  individual therapy  -RB      Patient Effort  adequate  -RB      Existing Precautions/Restrictions  fall  -RB      Recorded by [RB] Cathi Harris OT 20 1512      Row Name 20 1504             Cognitive Assessment/Intervention- PT/OT    Orientation Status (Cognition)  oriented x 3  -RB      Cognitive Assessment/Intervention Comment  Lethargic. Difficult to understand.   -RB      Recorded by [RB] Cathi Harris OT 20 1512      Row Name 20 1504             Safety Issues, Functional Mobility    Safety Issues Affecting Function (Mobility)  insight into deficits/self awareness;positioning of assistive device;judgment;problem solving;safety precaution awareness;safety precautions follow-through/compliance;sequencing abilities  -RB      Impairments Affecting Function (Mobility)  balance;cognition;coordination;endurance/activity tolerance;motor control;grasp;motor planning;strength;postural/trunk control;range of motion (ROM);visual/perceptual  -RB      Recorded by [RB] Cathi Harris OT 20 1512      Row Name 20 1504             Bed  Mobility Assessment/Treatment    Bed Mobility Assessment/Treatment  bed mobility (all) activities  -RB      [place] Level (Bed Mobility)  moderate assist (50% patient effort);2 person assist;maximum assist (25% patient effort)  -RB      Assistive Device (Bed Mobility)  bed rails;draw sheet;head of bed elevated  -RB      Recorded by [RB] Cathi Harris, OT 07/03/20 1512      Row Name 07/03/20 1504             Transfer Assessment/Treatment    Transfer Assessment/Treatment  sit-stand transfer;stand-sit transfer  -RB      Comment (Transfers)  Not appropriate at this time   -RB      Recorded by [RB] Cathi Harris, OT 07/03/20 1512      Row Name 07/03/20 1504             Sit-Stand Transfer    Sit-Stand [place] (Transfers)  minimum assist (75% patient effort);2 person assist Mod-Max A required after several seconds due to weakness.  -RB      Recorded by [RB] Cathi Harris, OT 07/03/20 1512      Row Name 07/03/20 1504             Stand-Sit Transfer    Stand-Sit [place] (Transfers)  2 person assist;minimum assist (75% patient effort) Mod-Max A required after several seconds due to weakness.  -RB      Recorded by [RB] Cathi Harris, OT 07/03/20 1512      Row Name 07/03/20 1504             BADL Safety/Performance    Impairments, BADL Safety/Performance  balance;cognition;endurance/activity tolerance;grasp/prehension;coordination;motor control;motor planning;visual/perceptual;trunk/postural control;strength;range of motion  -RB      Cognitive Impairments, BADL Safety/Performance  attention;awareness, need for assistance;insight into deficits/self awareness;judgment;problem solving/reasoning;safety precaution awareness;safety precaution follow-through;sequencing abilities  -RB      Skilled BADL Treatment/Intervention  adaptive equipment training;BADL process/adaptation training;cognitive/safety deficit modifications;energy conservation;compensatory training;environmental modifications  -RB       Progress in BADL Status  level of supervision required  -RB      Recorded by [RB] Cathi Harris OT 07/03/20 1512      Row Name 07/03/20 1504             Therapeutic Exercise    Upper Extremity (Therapeutic Exercise)  bicep curl, left;tricep extension, left  -RB      Upper Extremity Range of Motion (Therapeutic Exercise)  shoulder flexion/extension, left;shoulder abduction/adduction, left;shoulder horizontal abduction/adduction, left;shoulder internal/external rotation, left;elbow flexion/extension, left;forearm supination/pronation, left;wrist flexion/extension, left  -RB      Hand (Therapeutic Exercise)  finger flexion/extension, left;thumb finger opposition, left;hand , left  -RB      Exercise Type (Therapeutic Exercise)  AAROM (active assistive range of motion)  -RB      Position (Therapeutic Exercise)  supine  -RB      Sets/Reps (Therapeutic Exercise)  1 x 10  -RB      Expected Outcome (Therapeutic Exercise)  facilitate normal movement patterns  -RB      Comment (Therapeutic Exercise)  Improved AAROM - AAROM provided to achieve full ROM. No distal movement.  -RB      Recorded by [RB] Cathi Harris OT 07/03/20 1512      Row Name 07/03/20 1504             Static Sitting Balance    Level of [place] (Unsupported Sitting, Static Balance)  minimal assist, 75% patient effort;maximal assist, 25 to 49% patient effort;moderate assist, 50 to 74% patient effort  -RB      Sitting Position (Unsupported Sitting, Static Balance)  sitting on edge of bed  -RB      Time Able to Maintain Position (Unsupported Sitting, Static Balance)  more than 5 minutes  -RB      Comment (Unsupported Sitting, Static Balance)  Cues to transition to midline. L lateral lean.  -RB      Recorded by [RB] Cathi Harris OT 07/03/20 1512      Row Name 07/03/20 1504             Positioning and Restraints    Pre-Treatment Position  in bed  -RB      Post Treatment Position  bed  -RB      In Bed  notified nsg;supine;call light within  reach;encouraged to call for assist;exit alarm on  -RB      Recorded by [RB] Cathi Harris, OT 07/03/20 1512      Row Name 07/03/20 1504             Pain Scale: Numbers Pre/Post-Treatment    Pain Scale: Numbers, Pretreatment  0/10 - no pain  -RB      Pain Scale: Numbers, Post-Treatment  0/10 - no pain  -RB      Recorded by [RB] Cathi Harris, OT 07/03/20 1512      Row Name                Wound 07/01/20 1156 Right head Incision    Wound - Properties Group Date first assessed: 07/01/20 [KB] Time first assessed: 1156 [KB] Present on Hospital Admission: N [KB] Side: Right [KB] Location: head [KB] Primary Wound Type: Incision [KB] Recorded by:  [KB] Carina Acosta RN 07/01/20 1156    Row Name                Wound 07/01/20 1116 back Incision    Wound - Properties Group Date first assessed: 07/01/20 [SL] Time first assessed: 1116 [SL] Present on Hospital Admission: N [SL] Location: back [SL] Primary Wound Type: Incision [SL] Recorded by:  [SL] Eryn Bowers RN 07/01/20 1313    Row Name 07/03/20 1504             Plan of Care Review    Plan of Care Reviewed With  patient  -RB      Outcome Summary  Improved participation in OT.  -RB      Recorded by [RB] Cathi Harris, OT 07/03/20 1512      Row Name 07/03/20 1504             Outcome Summary/Treatment Plan (OT)    Daily Summary of Progress (OT)  progress towards functional goals is fair  -RB      Anticipated Discharge Disposition (OT)  inpatient rehabilitation facility  -RB      Recorded by [RB] Cathi Harris, OT 07/03/20 1512        User Key  (r) = Recorded By, (t) = Taken By, (c) = Cosigned By    Initials Name Effective Dates Discipline    KB Carina Acosta RN 06/16/16 -  Nurse    Eryn Alaniz RN 06/16/16 -  Nurse    Cathi Riggs, THAO 04/02/20 -  OT        Wound 07/01/20 1156 Right head Incision (Active)   Dressing Appearance dry;intact;no drainage 7/3/2020 12:03 PM   Closure Liquid skin adhesive 7/3/2020 12:03 PM    Base clean;dry 7/3/2020 12:03 PM   Periwound intact;dry 7/3/2020 12:03 PM   Periwound Temperature warm 7/2/2020  4:00 PM   Periwound Skin Turgor soft 7/2/2020  4:00 PM   Drainage Amount none 7/3/2020 12:03 PM       Wound 07/01/20 1116 back Incision (Active)   Dressing Appearance dry;intact;no drainage 7/3/2020 12:03 PM   Closure SUNNI 7/2/2020  4:00 PM   Base dressing in place, unable to visualize 7/3/2020 12:03 PM   Periwound intact;dry 7/3/2020 12:03 PM   Drainage Amount none 7/3/2020 12:03 PM   Dressing Care, Wound gauze 7/2/2020  8:00 PM       Occupational Therapy Education                 Title: PT OT SLP Therapies (In Progress)     Topic: Occupational Therapy (Not Started)     Point: ADL training (Not Started)     Description:   Instruct learner(s) on proper safety adaptation and remediation techniques during self care or transfers.   Instruct in proper use of assistive devices.              Learner Progress:   Not documented in this visit.          Point: Home exercise program (Not Started)     Description:   Instruct learner(s) on appropriate technique for monitoring, assisting and/or progressing therapeutic exercises/activities.              Learner Progress:   Not documented in this visit.          Point: Precautions (Not Started)     Description:   Instruct learner(s) on prescribed precautions during self-care and functional transfers.              Learner Progress:   Not documented in this visit.          Point: Body mechanics (Not Started)     Description:   Instruct learner(s) on proper positioning and spine alignment during self-care, functional mobility activities and/or exercises.              Learner Progress:   Not documented in this visit.                            OT Recommendation and Plan  Outcome Summary/Treatment Plan (OT)  Daily Summary of Progress (OT): progress towards functional goals is fair  Anticipated Discharge Disposition (OT): inpatient rehabilitation facility  Planned Therapy  Interventions (OT Eval): activity tolerance training, adaptive equipment training, BADL retraining, cognitive/visual perception retraining, edema control/reduction, functional balance retraining, IADL retraining, transfer/mobility retraining, strengthening exercise, ROM/therapeutic exercise, patient/caregiver education/training, passive ROM/stretching, occupation/activity based interventions, neuromuscular control/coordination retraining, orthotic fabrication/fitting/training, manual therapy/joint mobilization  Therapy Frequency (OT Eval): 5 times/wk  Daily Summary of Progress (OT): progress towards functional goals is fair  Plan of Care Review  Plan of Care Reviewed With: patient  Plan of Care Reviewed With: patient  Outcome Summary: Improved participation in OT.  Outcome Measures     Row Name 07/02/20 1500             How much help from another is currently needed...    Putting on and taking off regular lower body clothing?  1  -RB      Bathing (including washing, rinsing, and drying)  1  -RB      Toileting (which includes using toilet bed pan or urinal)  1  -RB      Putting on and taking off regular upper body clothing  2  -RB      Taking care of personal grooming (such as brushing teeth)  2  -RB      Eating meals  2  -RB      AM-PAC 6 Clicks Score (OT)  9  -RB         Modified Ji Scale    Modified Ji Scale  5 - Severe disability.  Bedridden, incontinent, and requiring constant nursing care and attention.  -RB         Functional Assessment    Outcome Measure Options  AM-PAC 6 Clicks Daily Activity (OT)  -RB        User Key  (r) = Recorded By, (t) = Taken By, (c) = Cosigned By    Initials Name Provider Type    Cathi Riggs OT Occupational Therapist           Time Calculation:   Time Calculation- OT     Row Name 07/03/20 1503             Time Calculation- OT    OT Start Time  1408  -RB      OT Stop Time  1446  -RB      OT Time Calculation (min)  38 min  -RB      OT - Next Appointment  07/04/20  -RB         User Key  (r) = Recorded By, (t) = Taken By, (c) = Cosigned By    Initials Name Provider Type    RB Cathi Harris OT Occupational Therapist        Therapy Charges for Today     Code Description Service Date Service Provider Modifiers Qty    86772874060 HC OT EVAL MOD COMPLEXITY 2 7/2/2020 Cathi Harris OT GO 1    02520458372 HC OT SELF CARE/MGMT/TRAIN EA 15 MIN 7/2/2020 Cathi Harris OT GO 1    84981787822 HC OT THERAPEUTIC ACT EA 15 MIN 7/2/2020 Cathi Harris OT GO 1    35253709939 HC OT NEUROMUSC RE EDUCATION EA 15 MIN 7/3/2020 Cathi Harris OT GO 2    46268666719 HC OT THERAPEUTIC ACT EA 15 MIN 7/3/2020 Cathi Harris OT GO 1               Cathi Harris OT  7/3/2020

## 2020-07-03 NOTE — PLAN OF CARE
Patient neurologically stable. Worked with PT/OT. Plan for overflow to telemetry tomorrow. Cardene for SBP<120.     Problem: Patient Care Overview  Goal: Plan of Care Review  Outcome: Ongoing (interventions implemented as appropriate)     Problem: Patient Care Overview  Goal: Individualization and Mutuality  Outcome: Ongoing (interventions implemented as appropriate)     Problem: Patient Care Overview  Goal: Interprofessional Rounds/Family Conf  Outcome: Ongoing (interventions implemented as appropriate)     Problem: Pain, Acute (Adult)  Goal: Identify Related Risk Factors and Signs and Symptoms  Outcome: Ongoing (interventions implemented as appropriate)     Problem: Pain, Acute (Adult)  Goal: Acceptable Pain Control/Comfort Level  Outcome: Ongoing (interventions implemented as appropriate)     Problem: Craniotomy/Craniectomy/Cranioplasty (Adult)  Goal: Signs and Symptoms of Listed Potential Problems Will be Absent, Minimized or Managed (Craniotomy/Craniectomy/Cranioplasty)  Outcome: Ongoing (interventions implemented as appropriate)     Problem: Infection, Risk/Actual (Adult)  Goal: Identify Related Risk Factors and Signs and Symptoms  Outcome: Ongoing (interventions implemented as appropriate)     Problem: Infection, Risk/Actual (Adult)  Goal: Infection Prevention/Resolution  Outcome: Ongoing (interventions implemented as appropriate)     Problem: Skin Injury Risk (Adult)  Goal: Identify Related Risk Factors and Signs and Symptoms  Outcome: Ongoing (interventions implemented as appropriate)     Problem: Skin Injury Risk (Adult)  Goal: Skin Health and Integrity  Outcome: Ongoing (interventions implemented as appropriate)     Problem: Fall Risk (Adult)  Goal: Identify Related Risk Factors and Signs and Symptoms  Outcome: Ongoing (interventions implemented as appropriate)     Problem: Fall Risk (Adult)  Goal: Absence of Fall  Outcome: Ongoing (interventions implemented as appropriate)

## 2020-07-03 NOTE — PROGRESS NOTES
Postoperative day 2  Covering for Dr. Licona  Status post right subtemporal brain biopsy  Vitals:    07/03/20 0645 07/03/20 0700 07/03/20 0800 07/03/20 0900   BP: 121/65 114/65 128/71 138/81   Pulse: 78 78 91 86   Resp:       Temp:  98.1 °F (36.7 °C)     TempSrc:  Oral     SpO2: 97% 98% 98% 97%   Weight:       Height:       Left-sided hemiplegia as noted previously  Otherwise awake and alert with some slurred speech  Moving the right side well  PERRLA  Obeys commands on the right probably  CT HEAD WITHOUT CONTRAST     HISTORY: Postop     COMPARISON: 07/02/2020     TECHNIQUE: Axial CT imaging was obtained through the brain. No IV  contrast was administered.     FINDINGS:  Patient is status post right temporal craniotomy. The previously  identified hemorrhage within the right cerebellar peduncle is unchanged,  measuring up to 2.3 x 1.2 cm. Additional hemorrhage is noted within the  right temporal lobe. This also does not appear significantly changed.  There is extensive pneumocephalus. It has decreased when compared to the  prior examination. There is midline shift of 3 mm. This is stable when  compared to prior exam. No new areas of hemorrhage are seen. There is a  small amount of subdural hemorrhage, including some layering over the  right tentorium. Tiny focus of hemorrhage is also noted within the chilango,  again, not significantly changed. Partial opacification of the right  mastoid air cells is again noted.     IMPRESSION:  Slight interval decrease in pneumocephalus. Otherwise, no significant  change.     Radiation dose reduction techniques were utilized, including automated  exposure control and exposure modulation based on body size.     WBC   Date Value Ref Range Status   07/03/2020 12.33 (H) 3.40 - 10.80 10*3/mm3 Final     RBC   Date Value Ref Range Status   07/03/2020 4.55 3.77 - 5.28 10*6/mm3 Final     Hemoglobin   Date Value Ref Range Status   07/03/2020 14.0 12.0 - 15.9 g/dL Final     Hematocrit   Date Value  Ref Range Status   07/03/2020 39.1 34.0 - 46.6 % Final     MCV   Date Value Ref Range Status   07/03/2020 85.9 79.0 - 97.0 fL Final     MCH   Date Value Ref Range Status   07/03/2020 30.8 26.6 - 33.0 pg Final     MCHC   Date Value Ref Range Status   07/03/2020 35.8 (H) 31.5 - 35.7 g/dL Final     RDW   Date Value Ref Range Status   07/03/2020 11.6 (L) 12.3 - 15.4 % Final     RDW-SD   Date Value Ref Range Status   07/03/2020 36.3 (L) 37.0 - 54.0 fl Final     MPV   Date Value Ref Range Status   07/03/2020 9.2 6.0 - 12.0 fL Final     Platelets   Date Value Ref Range Status   07/03/2020 231 140 - 450 10*3/mm3 Final     Neutrophil %   Date Value Ref Range Status   07/03/2020 92.6 (H) 42.7 - 76.0 % Final     Lymphocyte %   Date Value Ref Range Status   07/03/2020 2.6 (L) 19.6 - 45.3 % Final     Monocyte %   Date Value Ref Range Status   07/03/2020 4.0 (L) 5.0 - 12.0 % Final     Eosinophil %   Date Value Ref Range Status   07/03/2020 0.0 (L) 0.3 - 6.2 % Final     Basophil %   Date Value Ref Range Status   07/03/2020 0.1 0.0 - 1.5 % Final     Immature Grans %   Date Value Ref Range Status   07/03/2020 0.7 (H) 0.0 - 0.5 % Final     Neutrophils, Absolute   Date Value Ref Range Status   07/03/2020 11.42 (H) 1.70 - 7.00 10*3/mm3 Final     Lymphocytes, Absolute   Date Value Ref Range Status   07/03/2020 0.32 (L) 0.70 - 3.10 10*3/mm3 Final     Monocytes, Absolute   Date Value Ref Range Status   07/03/2020 0.49 0.10 - 0.90 10*3/mm3 Final     Eosinophils, Absolute   Date Value Ref Range Status   07/03/2020 0.00 0.00 - 0.40 10*3/mm3 Final     Basophils, Absolute   Date Value Ref Range Status   07/03/2020 0.01 0.00 - 0.20 10*3/mm3 Final     Immature Grans, Absolute   Date Value Ref Range Status   07/03/2020 0.09 (H) 0.00 - 0.05 10*3/mm3 Final     nRBC   Date Value Ref Range Status   07/03/2020 0.0 0.0 - 0.2 /100 WBC Final     Lab Results   Component Value Date    GLUCOSE 169 (H) 07/03/2020    BUN 19 07/03/2020    CREATININE 0.49 (L)  07/03/2020    EGFRIFNONA 132 07/03/2020    BCR 38.8 (H) 07/03/2020    K 4.0 07/03/2020    CO2 25.3 07/03/2020    CALCIUM 9.0 07/03/2020    ALBUMIN 4.80 06/26/2020    AST 22 06/26/2020    ALT 23 06/26/2020     The pathology report indicated malignant neoplasm  The patient has not yet aware  Dr. Licona will discuss with her tomorrow  The patient's  is aware  Incision and dressing looks fine  PT working with this patient  Will need rehab

## 2020-07-03 NOTE — PROGRESS NOTES
[facility]  [phone]  Chandler Betancourt MD    Subjective:  LOS: 7    Patient is somewhat lethargic.  However no distress.  No reported pain.  No changes in neuro status.    Objective   Vital Signs past 24hrs    Temp range: Temp (24hrs), Av.6 °F (37 °C), Min:98.1 °F (36.7 °C), Max:99.2 °F (37.3 °C)    BP range: BP: ()/(61-86) 114/68  Pulse range: Heart Rate:  [] 93  Resp rate range: Resp:  [16] 16    Device (Oxygen Therapy): room air   Oxygen range:SpO2:  [96 %-99 %] 97 %      49 kg (108 lb 0.4 oz); Body mass index is 18 kg/m².    Intake/Output Summary (Last 24 hours) at 7/3/2020 1232  Last data filed at 7/3/2020 0513  Gross per 24 hour   Intake 3158 ml   Output 4075 ml   Net -917 ml       Physical Exam   HENT:   Head: Normocephalic.   Eyes: Pupils are equal, round, and reactive to light.   Cardiovascular: Normal rate and regular rhythm.   No murmur heard.  Pulmonary/Chest: Effort normal. No respiratory distress. She has no wheezes. She has no rales.   Abdominal: Soft. Bowel sounds are normal. She exhibits no mass. There is no tenderness.   Musculoskeletal: She exhibits no edema.   Neurological:   Left facial droop  Left hemiparesis   Skin: No rash noted.     Results Review:    I have reviewed the laboratory and imaging data since the last note by [facility] physician.  My annotations are noted in assessment and plan.    Medication Review:  I have reviewed the current MAR.  My annotations are noted in assessment and plan.      niCARdipine 5-15 mg/hr Last Rate: 7.5 mg/hr (20 1147)     Plan   PCCM Problems  Craniotomy for resection brain lesions, 2020  Postoperative small hemorrhage with left hemiparesis  Recent dental work.    Plan of Treatment  Results of pathology report suggesting neoplasm.  Await input by oncology and further decision regarding treatment.    Note NS input. Continue to monitor. Also on dexamethasone - monitor BG.    Appreciate ID input in adjusting abx.  Discussed  with them today.  Plan to reduce antibiotics once diagnosis of cancer is confirmed.    Continue to monitor in ICU.    Chandler Betancourt MD  07/03/20  12:32    Communication:  Please EPIC MESSAGE ME on In-Patient Care of this patient.     BETWEEN THE HOURS OF 7 AM AND 4:30 PM ONLY AND NOT AFTER 7/3/20.    Part of this note may be an electronic transcription/translation of spoken language to printed text using the Dragon Dictation System.

## 2020-07-03 NOTE — PLAN OF CARE
Problem: Patient Care Overview  Goal: Plan of Care Review  Flowsheets (Taken 7/3/2020 8146)  Outcome Summary: Improved activity tolerance, sitting balance and partiicpation during PT today.  Bed mobility w/ maximal assist, cues for L sided neglect.  Performed sitting balance exercises, LE exercises in sitting and supine.  Increased tone in LLE.  Currently recommend Dc to acute rehab.        ..Patient was not wearing a face mask during this therapy encounter. Therapist used appropriate personal protective equipment including mask and gloves.  Mask used was standard procedure mask. Appropriate PPE was worn during the entire therapy session. Hand hygiene was completed before and after therapy session. Patient is not in enhanced droplet precautions.

## 2020-07-03 NOTE — THERAPY TREATMENT NOTE
Patient Name: Denise Guajardo  : 1965    MRN: [id number]                              Today's Date: 7/3/2020       Admit Date: 2020    Visit Dx:     ICD-10-CM ICD-9-CM   1. Brain tumor (CMS/HCC) D49.6 239.6     Patient Active Problem List   Diagnosis   • Brain abscess   • Intracranial hemorrhage (CMS/HCC)     Past Medical History:   Diagnosis Date   • Vertigo      Past Surgical History:   Procedure Laterality Date   • BREAST BIOPSY     • DENTAL PROCEDURE     • TUBAL ABDOMINAL LIGATION     • WISDOM TOOTH EXTRACTION       General Information     Row Name 20 1550          PT Evaluation Time/Intention    Document Type  therapy note (daily note)  -AR     Mode of Treatment  physical therapy  -AR     Row Name 20 1550          General Information    Patient Profile Reviewed?  yes  -AR     Existing Precautions/Restrictions  fall  -AR     Row Name 20 1550          Cognitive Assessment/Intervention- PT/OT    Orientation Status (Cognition)  oriented to;person;place  -AR     Cognitive Assessment/Intervention Comment  lethargic but awake w/ eyes open throughout PT today,   -AR     Row Name 20 1558          Safety Issues, Functional Mobility    Impairments Affecting Function (Mobility)  balance;cognition;coordination;endurance/activity tolerance;motor control;grasp;motor planning;strength;postural/trunk control;range of motion (ROM);visual/perceptual  -AR       User Key  (r) = Recorded By, (t) = Taken By, (c) = Cosigned By    Initials Name Provider Type    AR Ceci Amador, PT Physical Therapist        Mobility     Row Name 20 1551          Bed Mobility Assessment/Treatment    Bed Mobility Assessment/Treatment  bed mobility (all) activities  -AR     Supine-Sit [place] (Bed Mobility)  maximum assist (25% patient effort);moderate assist (50% patient effort);2 person assist  -AR     Sit-Supine [place] (Bed Mobility)  maximum assist (25% patient effort);2 person assist  -AR      Assistive Device (Bed Mobility)  bed rails;draw sheet;head of bed elevated  -AR     Row Name 07/03/20 1551          Transfer Assessment/Treatment    Comment (Transfers)  not appropriate to attempt today   -AR       User Key  (r) = Recorded By, (t) = Taken By, (c) = Cosigned By    Initials Name Provider Type    AR Ceci Amador, PT Physical Therapist        Obj/Interventions     Row Name 07/03/20 1551          Therapeutic Exercise    Comment (Therapeutic Exercise)  L PROM knee flexion/ext, heel slides, hip abd.  R LAQ AROM 10x  -AR     Row Name 07/03/20 1551          Static Sitting Balance    Level of Branch (Unsupported Sitting, Static Balance)  moderate assist, 50 to 74% patient effort  -AR     Sitting Position (Unsupported Sitting, Static Balance)  sitting on edge of bed  -AR     Time Able to Maintain Position (Unsupported Sitting, Static Balance)  more than 5 minutes  -AR     Row Name 07/03/20 1551          Dynamic Sitting Balance    Level of [place], Reaches Outside Midline (Sitting, Dynamic Balance)  moderate assist, 50 to 74% patient effort;maximal assist, 25 to 49% patient effort  -AR     Sitting Position, Reaches Outside Midline (Sitting, Dynamic Balance)  sitting on edge of bed  -AR     Comment, Reaches Outside Midline (Sitting, Dynamic Balance)  leaning on alternating UE, cues for head direction  -AR       User Key  (r) = Recorded By, (t) = Taken By, (c) = Cosigned By    Initials Name Provider Type    AR Ceci Amador, PT Physical Therapist        Goals/Plan    No documentation.       Clinical Impression     [facility] Name 07/03/20 9554          Pain Assessment    Additional Documentation  Pain Scale: Numbers Pre/Post-Treatment (Group)  -AR     Row Name 07/03/20 4960          Pain Scale: Numbers Pre/Post-Treatment    Pain Scale: Numbers, Pretreatment  0/10 - no pain  -AR     Pain Scale: Numbers, Post-Treatment  0/10 - no pain  -AR     Pre/Post Treatment Pain Comment  does not appear in any  pain/distress;   -AR     Pain Intervention(s)  Repositioned  -AR     Row Name 07/03/20 1553          Plan of Care Review    Plan of Care Reviewed With  patient  -AR     Outcome Summary  Improved activity tolerance, sitting balance and partiicpation during PT today.  Bed mobility w/ maximal assist, cues for L sided neglect.  Performed sitting balance exercises, LE exercises in sitting and supine.  Increased tone in LLE.  Currently recommend Dc to acute rehab.     -AR     Row Name 07/03/20 1555          Physical Therapy Clinical Impression    Criteria for Skilled Interventions Met (PT Clinical Impression)  yes  -AR     Rehab Potential (PT Clinical Summary)  good, to achieve stated therapy goals  -AR     Row Name 07/03/20 1551          Vital Signs    O2 Delivery Pre Treatment  room air  -AR     O2 Delivery Intra Treatment  room air  -AR     O2 Delivery Post Treatment  room air  -AR     Row Name 07/03/20 1559          Positioning and Restraints    Pre-Treatment Position  in bed  -AR     Post Treatment Position  bed  -AR     In Bed  notified nsg;supine;call light within reach;encouraged to call for assist;exit alarm on;with family/caregiver  -AR       User Key  (r) = Recorded By, (t) = Taken By, (c) = Cosigned By    Initials Name Provider Type    Ceci Mcallister, PT Physical Therapist        Outcome Measures     Row Name 07/03/20 6458          How much help from another person do you currently need...    Turning from your back to your side while in flat bed without using bedrails?  2  -AR     Moving from lying on back to sitting on the side of a flat bed without bedrails?  2  -AR     Moving to and from a bed to a chair (including a wheelchair)?  1  -AR     Standing up from a chair using your arms (e.g., wheelchair, bedside chair)?  1  -AR     Climbing 3-5 steps with a railing?  1  -AR     To walk in hospital room?  1  -AR     AM-PAC 6 Clicks Score (PT)  8  -AR     Row Name 07/03/20 7056          Functional Assessment     Outcome Measure Options  AM-PAC 6 Clicks Basic Mobility (PT)  -AR       User Key  (r) = Recorded By, (t) = Taken By, (c) = Cosigned By    Initials Name Provider Type    Ceci Mcallister PT Physical Therapist        Physical Therapy Education                 Title: PT OT SLP Therapies (In Progress)     Topic: Physical Therapy (In Progress)     Point: Mobility training (In Progress)     Description:   Instruct learner(s) on safety and technique for assisting patient out of bed, chair or wheelchair.  Instruct in the proper use of assistive devices, such as walker, crutches, cane or brace.              Patient Friendly Description:   It's important to get you on your feet again, but we need to do so in a way that is safe for you. Falling has serious consequences, and your personal safety is the most important thing of all.        When it's time to get out of bed, one of us or a family member will sit next to you on the bed to give you support.     If your doctor or nurse tells you to use a walker, crutches, a cane, or a brace, be sure you use it every time you get out of bed, even if you think you don't need it.    Learning Progress Summary           Patient Acceptance, E, NR by AR at 7/3/2020 1557    Acceptance, E,D, NR by PC at 7/2/2020 1208                   Point: Home exercise program (In Progress)     Description:   Instruct learner(s) on appropriate technique for monitoring, assisting and/or progressing patient with therapeutic exercises and activities.              Learning Progress Summary           Patient Acceptance, E, NR by AR at 7/3/2020 1557    Acceptance, E,D, NR by PC at 7/2/2020 1208                   Point: Body mechanics (In Progress)     Description:   Instruct learner(s) on proper positioning and spine alignment for patient and/or caregiver during mobility tasks and/or exercises.              Learning Progress Summary           Patient Acceptance, E, NR by AR at 7/3/2020 1557    Acceptance,  E,D, NR by PC at 7/2/2020 1208                   Point: Precautions (In Progress)     Description:   Instruct learner(s) on prescribed precautions during mobility and gait tasks              Learning Progress Summary           Patient Acceptance, E, NR by AR at 7/3/2020 1557    Acceptance, E,D, NR by PC at 7/2/2020 1208                               User Key     Initials Effective Dates Name Provider Type Discipline     04/03/18 -  Elham Forde, PT Physical Therapist PT    AR 04/03/18 -  Ceci Amador PT Physical Therapist PT              PT Recommendation and Plan     Outcome Summary/Treatment Plan (PT)  Anticipated Discharge Disposition (PT): inpatient rehabilitation facility  Plan of Care Reviewed With: patient  Outcome Summary: Improved activity tolerance, sitting balance and partiicpation during PT today.  Bed mobility w/ maximal assist, cues for L sided neglect.  Performed sitting balance exercises, LE exercises in sitting and supine.  Increased tone in LLE.  Currently recommend Dc to acute rehab.        Time Calculation:   PT Charges     Row Name 07/03/20 1549             Time Calculation    Start Time  1519  -AR      Stop Time  1545  -AR      Time Calculation (min)  26 min  -AR      PT Received On  07/03/20  -AR      PT - Next Appointment  07/04/20  -AR        User Key  (r) = Recorded By, (t) = Taken By, (c) = Cosigned By    Initials Name Provider Type    AR Ceci Amador PT Physical Therapist        Therapy Charges for Today     Code Description Service Date Service Provider Modifiers Qty    95024810998 HC PT NEUROMUSC RE EDUCATION EA 15 MIN 7/3/2020 Ceci Amador, PT GP 1    99015385348 HC PT THER PROC EA 15 MIN 7/3/2020 Ceci Amador, PT GP 1          PT G-Codes  Outcome Measure Options: AM-PAC 6 Clicks Basic Mobility (PT)  AM-PAC 6 Clicks Score (PT): 8  AM-PAC 6 Clicks Score (OT): 9  Modified Ji Scale: 5 - Severe disability.  Bedridden, incontinent, and requiring constant nursing  care and attention.    Ceci Amador, PT  7/3/2020

## 2020-07-03 NOTE — PAYOR COMM NOTE
"Denise Guajardo (54 y.o. Female)       ATTN: CONTINUED STAY CLINICALS FOR NURSE REVIEW   REF#[id number]  PLEASE REPLY- UR HANNAT- ELA CRENSHAW RN-  [phone],  [phone]        Date of Birth Social Security Number Address Home Phone MRN    1965  [address] [facility] [place] [facility] [phone] [id number]    Roman [employer] Marital Status          Rastafarian        Admission Date Admission Type Admitting Provider Attending Provider Department, Room/Bed    6/26/20 Urgent Bill Wu MD Mas[place], Troy Andrew, MD [facility] INTENSIVE CARE, I374/1    Discharge Date Discharge Disposition Discharge Destination                       Attending Provider:  Jd Regan MD    Allergies:  No Known Allergies    Isolation:  None   Infection:  None   Code Status:  CPR    Ht:  165 cm (64.96\")   Wt:  49 kg (108 lb 0.4 oz)    Admission Cmt:  None   Principal Problem:  Brain abscess [G06.0]                 Active Insurance as of 6/26/2020     Primary Coverage     Payor Plan Insurance Group Employer/Plan Group    [employer]HEM BLUE CROSS ANTHEM BLUE CROSS BLUE SHIELD PPO [id number]     Payor Plan Address Payor Plan Phone Number Payor Plan Fax Number Effective Dates    [address] [phone]  5/1/2020 - None Entered    [place] [address]       Subscriber Name Subscriber Birth Date Member ID       DENISE GUAJARDO 1965 [id number]                 Emergency Contacts      (Rel.) Home Phone Work Phone Mobile Phone    BennettMalick (Spouse) [phone] -- [phone]    Shay Henry (Son) [phone] -- --    Jhonatan Henry (Son) [phone] -- --               Physician Progress Notes (last 24 hours) (Notes from 07/02/20 1409 through 07/03/20 1409)      Chandler Betancourt MD at 07/03/20 1232          [facility]  [phone]  Chandler Betancourt MD    Subjective:  LOS: 7    Patient is somewhat lethargic.  However no distress.  No reported pain.  No changes in neuro " status.    Objective   Vital Signs past 24hrs    Temp range: Temp (24hrs), Av.6 °F (37 °C), Min:98.1 °F (36.7 °C), Max:99.2 °F (37.3 °C)    BP range: BP: ()/(61-86) 114/68  Pulse range: Heart Rate:  [] 93  Resp rate range: Resp:  [16] 16    Device (Oxygen Therapy): room air   Oxygen range:SpO2:  [96 %-99 %] 97 %      49 kg (108 lb 0.4 oz); Body mass index is 18 kg/m².    Intake/Output Summary (Last 24 hours) at 7/3/2020 1232  Last data filed at 7/3/2020 0513  Gross per 24 hour   Intake 3158 ml   Output 4075 ml   Net -917 ml       Physical Exam   HENT:   Head: Normocephalic.   Eyes: Pupils are equal, round, and reactive to light.   Cardiovascular: Normal rate and regular rhythm.   No murmur heard.  Pulmonary/Chest: Effort normal. No respiratory distress. She has no wheezes. She has no rales.   Abdominal: Soft. Bowel sounds are normal. She exhibits no mass. There is no tenderness.   Musculoskeletal: She exhibits no edema.   Neurological:   Left facial droop  Left hemiparesis   Skin: No rash noted.     Results Review:    I have reviewed the laboratory and imaging data since the last note by [facility] physician.  My annotations are noted in assessment and plan.    Medication Review:  I have reviewed the current MAR.  My annotations are noted in assessment and plan.      niCARdipine 5-15 mg/hr Last Rate: 7.5 mg/hr (20 1147)     Plan   PCCM Problems  Craniotomy for resection brain lesions, 2020  Postoperative small hemorrhage with left hemiparesis  Recent dental work.    Plan of Treatment  Results of pathology report suggesting neoplasm.  Await input by oncology and further decision regarding treatment.    Note NS input. Continue to monitor. Also on dexamethasone - monitor BG.    Appreciate ID input in adjusting abx.  Discussed with them today.  Plan to reduce antibiotics once diagnosis of cancer is confirmed.    Continue to monitor in ICU.    Chandler Betancourt MD  20  12:32    Communication:  Please  EPIC MESSAGE ME on In-Patient Care of this patient.     BETWEEN THE HOURS OF 7 AM AND 4:30 PM ONLY AND NOT AFTER 7/3/20.    Part of this note may be an electronic transcription/translation of spoken language to printed text using the Dragon Dictation System.      Electronically signed by Chandler Betancourt MD at 07/03/20 1234     Rickey Frausto MD at 07/03/20 1030          Postoperative day 2  Covering for Dr. Licona  Status post right subtemporal brain biopsy  Vitals:    07/03/20 0645 07/03/20 0700 07/03/20 0800 07/03/20 0900   BP: 121/65 114/65 128/71 138/81   Pulse: 78 78 91 86   Resp:       Temp:  98.1 °F (36.7 °C)     TempSrc:  Oral     SpO2: 97% 98% 98% 97%   Weight:       Height:       Left-sided hemiplegia as noted previously  Otherwise awake and alert with some slurred speech  Moving the right side well  PERRLA  Obeys commands on the right probably  CT HEAD WITHOUT CONTRAST     HISTORY: Postop     COMPARISON: 07/02/2020     TECHNIQUE: Axial CT imaging was obtained through the brain. No IV  contrast was administered.     FINDINGS:  Patient is status post right temporal craniotomy. The previously  identified hemorrhage within the right cerebellar peduncle is unchanged,  measuring up to 2.3 x 1.2 cm. Additional hemorrhage is noted within the  right temporal lobe. This also does not appear significantly changed.  There is extensive pneumocephalus. It has decreased when compared to the  prior examination. There is midline shift of 3 mm. This is stable when  compared to prior exam. No new areas of hemorrhage are seen. There is a  small amount of subdural hemorrhage, including some layering over the  right tentorium. Tiny focus of hemorrhage is also noted within the chilango,  again, not significantly changed. Partial opacification of the right  mastoid air cells is again noted.     IMPRESSION:  Slight interval decrease in pneumocephalus. Otherwise, no significant  change.     Radiation dose reduction techniques were  utilized, including automated  exposure control and exposure modulation based on body size.     WBC   Date Value Ref Range Status   07/03/2020 12.33 (H) 3.40 - 10.80 10*3/mm3 Final     RBC   Date Value Ref Range Status   07/03/2020 4.55 3.77 - 5.28 10*6/mm3 Final     Hemoglobin   Date Value Ref Range Status   07/03/2020 14.0 12.0 - 15.9 g/dL Final     Hematocrit   Date Value Ref Range Status   07/03/2020 39.1 34.0 - 46.6 % Final     MCV   Date Value Ref Range Status   07/03/2020 85.9 79.0 - 97.0 fL Final     MCH   Date Value Ref Range Status   07/03/2020 30.8 26.6 - 33.0 pg Final     MCHC   Date Value Ref Range Status   07/03/2020 35.8 (H) 31.5 - 35.7 g/dL Final     RDW   Date Value Ref Range Status   07/03/2020 11.6 (L) 12.3 - 15.4 % Final     RDW-SD   Date Value Ref Range Status   07/03/2020 36.3 (L) 37.0 - 54.0 fl Final     MPV   Date Value Ref Range Status   07/03/2020 9.2 6.0 - 12.0 fL Final     Platelets   Date Value Ref Range Status   07/03/2020 231 140 - 450 10*3/mm3 Final     Neutrophil %   Date Value Ref Range Status   07/03/2020 92.6 (H) 42.7 - 76.0 % Final     Lymphocyte %   Date Value Ref Range Status   07/03/2020 2.6 (L) 19.6 - 45.3 % Final     Monocyte %   Date Value Ref Range Status   07/03/2020 4.0 (L) 5.0 - 12.0 % Final     Eosinophil %   Date Value Ref Range Status   07/03/2020 0.0 (L) 0.3 - 6.2 % Final     Basophil %   Date Value Ref Range Status   07/03/2020 0.1 0.0 - 1.5 % Final     Immature Grans %   Date Value Ref Range Status   07/03/2020 0.7 (H) 0.0 - 0.5 % Final     Neutrophils, Absolute   Date Value Ref Range Status   07/03/2020 11.42 (H) 1.70 - 7.00 10*3/mm3 Final     Lymphocytes, Absolute   Date Value Ref Range Status   07/03/2020 0.32 (L) 0.70 - 3.10 10*3/mm3 Final     Monocytes, Absolute   Date Value Ref Range Status   07/03/2020 0.49 0.10 - 0.90 10*3/mm3 Final     Eosinophils, Absolute   Date Value Ref Range Status   07/03/2020 0.00 0.00 - 0.40 10*3/mm3 Final     Basophils, Absolute    Date Value Ref Range Status   07/03/2020 0.01 0.00 - 0.20 10*3/mm3 Final     Immature Grans, Absolute   Date Value Ref Range Status   07/03/2020 0.09 (H) 0.00 - 0.05 10*3/mm3 Final     nRBC   Date Value Ref Range Status   07/03/2020 0.0 0.0 - 0.2 /100 WBC Final     Lab Results   Component Value Date    GLUCOSE 169 (H) 07/03/2020    BUN 19 07/03/2020    CREATININE 0.49 (L) 07/03/2020    EGFRIFNONA 132 07/03/2020    BCR 38.8 (H) 07/03/2020    K 4.0 07/03/2020    CO2 25.3 07/03/2020    CALCIUM 9.0 07/03/2020    ALBUMIN 4.80 06/26/2020    AST 22 06/26/2020    ALT 23 06/26/2020     The pathology report indicated malignant neoplasm  The patient has not yet aware  Dr. Licona will discuss with her tomorrow  The patient's  is aware  Incision and dressing looks fine  PT working with this patient  Will need rehab    Electronically signed by Rickey Frausto MD at 07/03/20 1033       Consult Notes (last 24 hours) (Notes from 07/02/20 1409 through 07/03/20 1409)    No notes of this type exist for this encounter.

## 2020-07-03 NOTE — PLAN OF CARE
Problem: Patient Care Overview  Goal: Plan of Care Review  Outcome: Ongoing (interventions implemented as appropriate)  Flowsheets (Taken 7/3/2020 9225)  Progress: improving  Plan of Care Reviewed With: patient  Note:   No significant changes overnight, vitals stable throughout the shift. Cardene gtt currently off, patient tolerating. Neuro status unchanged however does appear to have some trace movement back in her left toes and some muscle tone back in her left arm, still does not wiggle fingers on left side. Remains alert and oriented with significantly slurred speech due to significant facial droop. AM CT completed, awaiting results. Tube feeds infusing. AM labs stable. 375 out from estrada catheter. Will continue to monitor. AMARJIT Guadarrama RN

## 2020-07-03 NOTE — PLAN OF CARE
Problem: Patient Care Overview  Goal: Plan of Care Review  Flowsheets (Taken 7/3/2020 0077)  Plan of Care Reviewed With: patient  Outcome Summary: Swallow re-eval completed due to improved alertness. Severe oropharyngeal dysphagia persists. Severe L oral/facial weakness. Speech severely dysarthric- unable to write coherently to communicate; voice hypophonic. No s/s of aspiration with single ice chips. Overt, weak cough noted immediately post-swallow with 1/2 tsp of thin liquid. Further trials abandoned/deemed unsafe due to gross oral weakness and weak cough. REC NPO, cortrak for meds and nutrition. Needs frequent oral care and ok fo rice chips in moderation with supervision. Encouraged oral movement, vocal projection during speech, and effortful swallows with ice chips and secretions. SLP to re-eval Mon.

## 2020-07-03 NOTE — THERAPY RE-EVALUATION
Acute Care - Speech Language Pathology   Swallow Re-Evaluation [facility]     Patient Name: Denise Guajardo  : 1965  MRN: [id number]  Today's Date: 7/3/2020               Admit Date: 2020    Visit Dx:     ICD-10-CM ICD-9-CM   1. Brain tumor (CMS/HCC) D49.6 239.6     Patient Active Problem List   Diagnosis   • Brain abscess   • Intracranial hemorrhage (CMS/HCC)     Past Medical History:   Diagnosis Date   • Vertigo      Past Surgical History:   Procedure Laterality Date   • BREAST BIOPSY     • DENTAL PROCEDURE     • TUBAL ABDOMINAL LIGATION     • WISDOM TOOTH EXTRACTION          SWALLOW EVALUATION (last 72 hours)      SLP Adult Swallow Evaluation     Row Name 20 1400 20 1300                Rehab Evaluation    Document Type  re-evaluation  -CP  evaluation  -AW       Subjective Information  no complaints  -CP  complains of;pain  -AW       Patient Observations  alert;cooperative  -CP  cooperative;agree to therapy;lethargic  -AW       Patient/Family Observations  --  Pt upright in bed, son present.  -AW       Patient Effort  adequate  -CP  adequate  -AW       Symptoms Noted During/After Treatment  none  -CP  fatigue  -AW          General Information    Patient Profile Reviewed  yes  -CP  yes  -AW       Pertinent History Of Current Problem  Pathology results indicate malignant neoplasm. Pt and family not aware- MURRAY to discuss with patient .  -CP  Pt s/p crani on  for tumor (vs infection) with small hemorrhage and L jakub post op.  -AW       Current Method of Nutrition  NPO;nasogastric feedings  -CP  NPO  -AW       Precautions/Limitations, Hearing  WFL;for purposes of eval  -CP  WFL;for purposes of eval  -AW       Prior Level of Function-Communication  WFL  -CP  WFL;other (see comments) worked FT in office  -AW       Prior Level of Function-Swallowing  no diet consistency restrictions  -CP  no diet consistency restrictions  -AW       Plans/Goals Discussed with  patient;family;agreed upon  -CP   patient;family;agreed upon  -AW       Barriers to Rehab  medically complex  -CP  none identified  -AW       Patient's Goals for Discharge  return to PO diet  -CP  patient did not state  -AW       Family Goals for Discharge  --  patient able to return to all previous activities/roles  -AW          Pain Assessment    Additional Documentation  --  Pain Scale: Numbers Pre/Post-Treatment (Group)  -AW          Pain Scale: Numbers Pre/Post-Treatment    Pain Scale: Numbers, Pretreatment  -- no report of pain  -CP  8/10  -AW       Pain Scale: Numbers, Post-Treatment  --  8/10  -AW       Pain Location  --  head  -AW       Pre/Post Treatment Pain Comment  --  RN aware  -AW          Oral Motor and Function    Dentition Assessment  --  natural, present and adequate  -AW       Mucosal Quality  sticky  -CP  moist, healthy;sticky  -AW       Volitional Swallow  weak  -CP  unable to elicit  -AW       Volitional Cough  weak  -CP  unable to elicit  -AW          Oral Musculature and Cranial Nerve Assessment    Oral Labial or Buccal Impairment, Detail, Cranial Nerve VII (Facial):  --  left labial droop  -AW       Lingual Impairment, Detail. Cranial Nerves IX, XII (Glossopharyngeal and Hypoglossal)  --  reduced lingual ROM;reduced strength  -AW       Vocal Impairment, Detail. Cranial Nerve X (Vagus)  --  vocal quality abnormality (see comments);impaired throat clear/cough (see comments);other (see comments) voice weak  -AW          General Eating/Swallowing Observations    Respiratory Support Currently in Use  --  room air  -AW       Eating/Swallowing Skills  --  fed by SLP  -AW       Positioning During Eating  --  upright in bed  -AW       Utensils Used  --  spoon  -AW       Consistencies Trialed  --  -- ice chips  -AW       Pre SpO2 (%)  --  96  -AW       Post SpO2 (%)  --  95  -AW          Clinical Swallow Eval    Pharyngeal Phase  suspected pharyngeal impairment  -CP  suspected pharyngeal impairment  -AW       Clinical Swallow  Evaluation Summary  Severe oropharyngeal dysphagia persists. Severe L oral/facial weakness. Speech severely dysarthric- unable to write coherently to communicate; voice hypophonic. No s/s of aspiration with single ice chips. Overt, weak cough noted immediately post-swallow with 1/2 tsp of thin liquid. Further trials abandoned/deemed unsafe due to gross oral weakness and weak cough. REC NPO, cortrak for meds and nutrition. Needs frequent oral care and ok fo rice chips in moderation with supervision. Encouraged oral movement, vocal projection during speech, and effortful swallows with ice chips and secretions. SLP to re-eval Mon.   -CP  Pt given small ice chips with ~4 swallows noted with each. Swallow appeared delayed and weak, suspect pharyngeal residuals. Do not feel pt is ready for PO diet. ST to follow for improvement in alert status and strength. Recommend NPO. Cortrak ordered for nutrition and meds. Pt safe for small ice chips sparingly after oral care when pt is fully alert.  -AW          Clinical Impression    SLP Swallowing Diagnosis  suspected pharyngeal dysfunction  -CP  suspected pharyngeal dysfunction  -AW       Functional Impact  risk of aspiration/pneumonia  -CP  risk of aspiration/pneumonia  -AW       Rehab Potential/Prognosis, Swallowing  good, to achieve stated therapy goals  -CP  good, to achieve stated therapy goals  -AW       Swallow Criteria for Skilled Therapeutic Interventions Met  demonstrates skilled criteria  -CP  demonstrates skilled criteria  -AW          Recommendations    Therapy Frequency (Swallow)  PRN  -CP  PRN  -AW       Predicted Duration Therapy Intervention (Days)  until discharge  -CP  until discharge  -AW       SLP Diet Recommendation  NPO;ice chips between meals after oral care, with supervision  -CP  NPO;ice chips between meals after oral care, with supervision;temporary alternate methods of nutrition/hydration  -AW       Recommended Diagnostics  reassess via clinical swallow  evaluation  -CP  reassess via clinical swallow evaluation  -AW       Recommended Precautions and Strategies  --  upright posture during/after eating  -AW       SLP Rec. for Method of Medication Administration  meds via alternate route  -CP  meds via alternate route  -AW       Monitor for Signs of Aspiration  yes  -CP  yes  -AW       Anticipated Dischage Disposition (SLP)  anticipate therapy at next level of care  -CP  unknown;anticipate therapy at next level of care  -AW          Swallow Goals (SLP)    Oral Nutrition/Hydration Goal Selection (SLP)  --  oral nutrition/hydration, SLP goal 1  -AW          Oral Nutrition/Hydration Goal 1 (SLP)    Oral Nutrition/Hydration Goal 1, SLP  --  Pt will safely tolerate the least restrictive PO diet.  -AW       Time Frame (Oral Nutrition/Hydration Goal 1, SLP)  --  by discharge  -AW         User Key  (r) = Recorded By, (t) = Taken By, (c) = Cosigned By    Initials Name Effective Dates    CP Cesia Garcia, MS CCC-SLP 06/08/18 -     AW Yarely Ruiz, MS CCC-SLP 06/08/18 -           EDUCATION  The patient has been educated in the following areas:   Dysphagia (Swallowing Impairment) Oral Care/Hydration NPO rationale.    SLP Recommendation and Plan  SLP Swallowing Diagnosis: suspected pharyngeal dysfunction  SLP Diet Recommendation: NPO, ice chips between meals after oral care, with supervision     SLP Rec. for Method of Medication Administration: meds via alternate route     Monitor for Signs of Aspiration: yes  Recommended Diagnostics: reassess via clinical swallow evaluation  Swallow Criteria for Skilled Therapeutic Interventions Met: demonstrates skilled criteria  Anticipated Dischage Disposition (SLP): anticipate therapy at next level of care  Rehab Potential/Prognosis, Swallowing: good, to achieve stated therapy goals  Therapy Frequency (Swallow): PRN  Predicted Duration Therapy Intervention (Days): until discharge       Plan of Care Reviewed With: patient  Outcome Summary:  Swallow re-eval completed due to improved alertness. Severe oropharyngeal dysphagia persists. Severe L oral/facial weakness. Speech severely dysarthric- unable to write coherently to communicate; voice hypophonic. No s/s of aspiration with single ice chips. Overt, weak cough noted immediately post-swallow with 1/2 tsp of thin liquid. Further trials abandoned/deemed unsafe due to gross oral weakness and weak cough. REC NPO, cortrak for meds and nutrition. Needs frequent oral care and ok fo rice chips in moderation with supervision. Encouraged oral movement, vocal projection during speech, and effortful swallows with ice chips and secretions. SLP to re-eval Mon.    SLP GOALS     Row Name 07/02/20 1300             Oral Nutrition/Hydration Goal 1 (SLP)    Oral Nutrition/Hydration Goal 1, SLP  Pt will safely tolerate the least restrictive PO diet.  -AW      Time Frame (Oral Nutrition/Hydration Goal 1, SLP)  by discharge  -AW        User Key  (r) = Recorded By, (t) = Taken By, (c) = Cosigned By    Initials Name Provider Type    AW Yarely Ruiz, MS CCC-SLP Speech and Language Pathologist           SLP Outcome Measures (last 72 hours)      SLP Outcome Measures     Row Name 07/03/20 1400 07/02/20 1400          SLP Outcome Measures    Outcome Measure Used?  Adult NOMS  -CP  Adult NOMS  -AW        Adult FCM Scores    FCM Chosen  Swallowing  -CP  Swallowing  -AW     Swallowing FCM Score  1  -CP  1  -AW       User Key  (r) = Recorded By, (t) = Taken By, (c) = Cosigned By    Initials Name Effective Dates    Cesia Landeros MS CCC-SLP 06/08/18 -     Yarely Martinez MS CCC-SLP 06/08/18 -            Time Calculation:   Time Calculation- SLP     Row Name 07/03/20 1443             Time Calculation- SLP    SLP Start Time  1345  -CP      SLP Stop Time  1445  -CP      SLP Time Calculation (min)  60 min  -CP      SLP Received On  07/03/20  -CP        User Key  (r) = Recorded By, (t) = Taken By, (c) = Cosigned By    Initials Name  Provider Type    CP Cesia Garcia MS CCC-SLP Speech and Language Pathologist          Therapy Charges for Today     Code Description Service Date Service Provider Modifiers Qty    [id number] HC ST TREATMENT SWALLOW 4 7/3/2020 Cesia Garcia MS CCC-SLP GN 1               MS RUFINO Gaxiola  7/3/2020

## 2020-07-04 NOTE — NURSING NOTE
Inpatient Acute Rehab    Received referral for acute inpatient rehab.  Met patient with son in room and explained the rehab program and criteria.  The son stated that his step father would be able to provide 24 hour assistance to his mother at D/C.  I called the patient's  to confirm the D/C plan but was unable to reach him.  Will continue to follow and complete evaluation. -NAZIA Vee, Rehab Admission RN

## 2020-07-04 NOTE — PLAN OF CARE
Problem: Patient Care Overview  Goal: Plan of Care Review  Outcome: Ongoing (interventions implemented as appropriate)  Flowsheets (Taken 7/4/2020 9620)  Progress: no change  Plan of Care Reviewed With: patient  Note:   No significant changes in pt condition overnight, has been able to be off cardene gtt at night when asleep but during the day when awake requires gtt at 5 mg/hr to maintain SBP < 120. Otherwise, neuro status unchanged. Tube feeds at goal rate, pt tolerating. Denies headache. Will continue to monitor. AMARJIT Guadarrama RN

## 2020-07-04 NOTE — PROGRESS NOTES
LOS: 8 days   Patient Care Team:  Paz Mera MD as PCP - General (Family Medicine)    Chief Complaint: Postop brainstem tumor    Subjective     This patient continues with pretty significant left-sided weakness.    Interval History:     History taken from: patient chart    Objective      She has no movement on the left side including a left facial droop.    Vital Signs  Temp:  [97.7 °F (36.5 °C)-98.7 °F (37.1 °C)] 98.3 °F (36.8 °C)  Heart Rate:  [] 64  Resp:  [17-18] 18  BP: (106-147)/() 133/86       Results Review:     I reviewed the patient's new clinical results.  I reviewed her most recent CT scan which still shows some blood extending up into the top part of the tumor.      Assessment/Plan       Brain abscess    Intracranial hemorrhage (CMS/HCC)      I again explained to both the patient and her  about the change in the pathology report.  I told him that I still think she will get some improvement from the hemiparesis once the blood resolves.  We will go ahead and get rehab to see her.      Shai Licona MD  07/04/20  14:42

## 2020-07-04 NOTE — PLAN OF CARE
A+0x4.  PT getting patient OOB.  L side showing some movement especially w/ stimuli.  Hem/onc met with family at bedside.  Awaiting results of K+ replacement.  BAR came to see this patient and Dr. Morris to evaluate Monday.  Continue to monitor and progress towards goals as tolerated.

## 2020-07-04 NOTE — THERAPY TREATMENT NOTE
Patient Name: Denise Guajardo  : 1965    MRN: [id number]                              Today's Date: 2020       Admit Date: 2020    Visit Dx:     ICD-10-CM ICD-9-CM   1. Brain tumor (CMS/HCC) D49.6 239.6     Patient Active Problem List   Diagnosis   • Brain abscess   • Intracranial hemorrhage (CMS/HCC)     Past Medical History:   Diagnosis Date   • Vertigo      Past Surgical History:   Procedure Laterality Date   • BREAST BIOPSY     • DENTAL PROCEDURE     • TUBAL ABDOMINAL LIGATION     • WISDOM TOOTH EXTRACTION       General Information     Row Name 20 1409          PT Evaluation Time/Intention    Document Type  therapy note (daily note)  -EE     Mode of Treatment  physical therapy  -EE     Row Name 20 1409          General Information    Existing Precautions/Restrictions  fall  -EE     Barriers to Rehab  medically complex  -EE     Row Name 20 1409          Cognitive Assessment/Intervention- PT/OT    Orientation Status (Cognition)  oriented to;person;place  -EE     Row Name 20 1409          Safety Issues, Functional Mobility    Safety Issues Affecting Function (Mobility)  insight into deficits/self awareness;judgment;safety precaution awareness;sequencing abilities  -EE     Impairments Affecting Function (Mobility)  balance;cognition;coordination;endurance/activity tolerance;motor control;muscle tone abnormal;postural/trunk control;sensation/sensory awareness;strength;visual/perceptual  -EE     Comment, Safety Issues/Impairments (Mobility)  L neglect  -EE       User Key  (r) = Recorded By, (t) = Taken By, (c) = Cosigned By    Initials Name Provider Type    EE Marylin Hill, PT Physical Therapist        Mobility     Row Name 20 1411          Bed Mobility Assessment/Treatment    Supine-Sit [place] (Bed Mobility)  moderate assist (50% patient effort);maximum assist (25% patient effort);2 person assist;verbal cues  -EE     Sit-Supine [place] (Bed Mobility)  maximum  assist (25% patient effort);2 person assist;verbal cues  -EE     Assistive Device (Bed Mobility)  bed rails;head of bed elevated;draw sheet  -EE     Row Name 07/04/20 1410          Transfer Assessment/Treatment    Comment (Transfers)  sit to stand x2 from EOB; vc's and tc's required for setup and upright posture. Blocking L knee to prevent buckling. Mod A required to correct L lean and orient to midline.   -EE     Row Name 07/04/20 1410          Sit-Stand Transfer    Sit-Stand [place] (Transfers)  minimum assist (75% patient effort);moderate assist (50% patient effort);2 person assist;verbal cues HHA  -EE     Row Name 07/04/20 1410          Gait/Stairs Assessment/Training    Comment (Gait/Stairs)  not appropriate to attempt at this time  -EE       User Key  (r) = Recorded By, (t) = Taken By, (c) = Cosigned By    Initials Name Provider Type    EE Marylin Hill PT Physical Therapist        Obj/Interventions     Row Name 07/04/20 1411          Therapeutic Exercise    Lower Extremity (Therapeutic Exercise)  LAQ (long arc quad), right;SLR (straight leg raise), right;heel slides, bilateral  -EE     Lower Extremity Range of Motion (Therapeutic Exercise)  hip abduction/adduction, bilateral;ankle dorsiflexion/plantar flexion, bilateral  -EE     Exercise Type (Therapeutic Exercise)  AROM (active range of motion);AAROM (active assistive range of motion);PROM (passive range of motion)  -EE     Position (Therapeutic Exercise)  supine  -EE     Sets/Reps (Therapeutic Exercise)  1/15  -EE     Comment (Therapeutic Exercise)  Performed all R LE exercises independently. All L LE PROM except pt able to assist somewhat with L hip adduction.   -EE     Row Name 07/04/20 1411          Static Sitting Balance    Level of [place] (Unsupported Sitting, Static Balance)  moderate assist, 50 to 74% patient effort;contact guard assist  -EE     Sitting Position (Unsupported Sitting, Static Balance)  sitting on edge of bed  -EE     Time  Able to Maintain Position (Unsupported Sitting, Static Balance)  more than 5 minutes  -EE     Comment (Unsupported Sitting, Static Balance)  Level of assistance required fluctuating. Initially requiring mod A with L lean. Able to correct with vc's and tc's. Sat with CGA for ~15 sec. More assist required again as pt fatigued.   -EE     Row Name 07/04/20 1411          Static Standing Balance    Level of [place] (Supported Standing, Static Balance)  moderate assist, 50 to 74% patient effort;2 person assist HHA  -EE     Time Able to Maintain Position (Supported Standing, Static Balance)  15 to 30 seconds x2 trials  -EE     Comment (Supported Standing, Static Balance)  L lean with forward flexed posture. Vc's and tc's for cervical extension and upright posture. Blocking L knee to prevent buckling.   -EE       User Key  (r) = Recorded By, (t) = Taken By, (c) = Cosigned By    Initials Name Provider Type    EE Marylin Hill, PT Physical Therapist        Goals/Plan    No documentation.       Clinical Impression     Row Name 07/04/20 1414          Pain Assessment    Additional Documentation  Pain Scale: Numbers Pre/Post-Treatment (Group)  -EE     Row Name 07/04/20 1414          Pain Scale: Numbers Pre/Post-Treatment    Pain Scale: Numbers, Pretreatment  0/10 - no pain  -EE     Pain Scale: Numbers, Post-Treatment  0/10 - no pain  -EE     Row Name 07/04/20 1410          Plan of Care Review    Plan of Care Reviewed With  patient  -EE     Progress  improving  -EE     Outcome Summary  Pt continues to demonstrate steady progress with mobility. Demonstrates improved sitting balance again today and tolerated increased activity. Able to stand EOB x2 trials with assist x2 for safety. Continues to require cues to scan L and attend to L side. No new PT concerns. Pt continues to be good candidate for acute rehab and will benefit from PT to address strength, coordination, and balance deficits.   -EE     Row Name 07/04/20 1419           Positioning and Restraints    Pre-Treatment Position  in bed  -EE     Post Treatment Position  bed  -EE     In Bed  fowlers;call light within reach;encouraged to call for assist;with family/caregiver;SCD pump applied;LUE elevated  -EE       User Key  (r) = Recorded By, (t) = Taken By, (c) = Cosigned By    Initials Name Provider Type    Marylin Degroot PT Physical Therapist        Outcome Measures     Row Name 07/04/20 1416          How much help from another person do you currently need...    Turning from your back to your side while in flat bed without using bedrails?  2  -EE     Moving from lying on back to sitting on the side of a flat bed without bedrails?  2  -EE     Moving to and from a bed to a chair (including a wheelchair)?  1  -EE     Standing up from a chair using your arms (e.g., wheelchair, bedside chair)?  2  -EE     Climbing 3-5 steps with a railing?  1  -EE     To walk in hospital room?  1  -EE     AM-PAC 6 Clicks Score (PT)  9  -EE     Row Name 07/04/20 1416          Functional Assessment    Outcome Measure Options  AM-PAC 6 Clicks Basic Mobility (PT)  -EE       User Key  (r) = Recorded By, (t) = Taken By, (c) = Cosigned By    Initials Name Provider Type    Marylin Degroot PT Physical Therapist        Physical Therapy Education                 Title: PT OT SLP Therapies (In Progress)     Topic: Physical Therapy (In Progress)     Point: Mobility training (In Progress)     Description:   Instruct learner(s) on safety and technique for assisting patient out of bed, chair or wheelchair.  Instruct in the proper use of assistive devices, such as walker, crutches, cane or brace.              Patient Friendly Description:   It's important to get you on your feet again, but we need to do so in a way that is safe for you. Falling has serious consequences, and your personal safety is the most important thing of all.        When it's time to get out of bed, one of us or a family member will sit next to you on  the bed to give you support.     If your doctor or nurse tells you to use a walker, crutches, a cane, or a brace, be sure you use it every time you get out of bed, even if you think you don't need it.    Learning Progress Summary           Patient Acceptance, E,D, NR by EE at 7/4/2020 1416    Acceptance, E, NR by AR at 7/3/2020 1557    Acceptance, E,D, NR by PC at 7/2/2020 1208                   Point: Home exercise program (In Progress)     Description:   Instruct learner(s) on appropriate technique for monitoring, assisting and/or progressing patient with therapeutic exercises and activities.              Learning Progress Summary           Patient Acceptance, E,D, NR by EE at 7/4/2020 1416    Acceptance, E, NR by AR at 7/3/2020 1557    Acceptance, E,D, NR by PC at 7/2/2020 1208                   Point: Body mechanics (In Progress)     Description:   Instruct learner(s) on proper positioning and spine alignment for patient and/or caregiver during mobility tasks and/or exercises.              Learning Progress Summary           Patient Acceptance, E,D, NR by EE at 7/4/2020 1416    Acceptance, E, NR by AR at 7/3/2020 1557    Acceptance, E,D, NR by PC at 7/2/2020 1208                   Point: Precautions (In Progress)     Description:   Instruct learner(s) on prescribed precautions during mobility and gait tasks              Learning Progress Summary           Patient Acceptance, E,D, NR by EE at 7/4/2020 1416    Acceptance, E, NR by AR at 7/3/2020 1557    Acceptance, E,D, NR by PC at 7/2/2020 1208                               User Key     Initials Effective Dates Name Provider Type Discipline    PC 04/03/18 -  Elham Forde, PT Physical Therapist PT    EE 04/03/18 -  Marylin Hill, PT Physical Therapist PT    AR 04/03/18 -  Ceci Amador PT Physical Therapist PT              PT Recommendation and Plan     Outcome Summary/Treatment Plan (PT)  Anticipated Discharge Disposition (PT): inpatient rehabilitation  facility  Plan of Care Reviewed With: patient  Progress: improving  Outcome Summary: Pt continues to demonstrate steady progress with mobility. Demonstrates improved sitting balance again today and tolerated increased activity. Able to stand EOB x2 trials with assist x2 for safety. Continues to require cues to scan L and attend to L side. No new PT concerns. Pt continues to be good candidate for acute rehab and will benefit from PT to address strength, coordination, and balance deficits.      Time Calculation:   PT Charges     Row Name 07/04/20 1417             Time Calculation    Start Time  1145  -EE      Stop Time  1214  -EE      Time Calculation (min)  29 min  -EE      PT Received On  07/04/20  -EE      PT - Next Appointment  07/05/20  -EE         Time Calculation- PT    Total Timed Code Minutes- PT  29 minute(s)  -EE        User Key  (r) = Recorded By, (t) = Taken By, (c) = Cosigned By    Initials Name Provider Type    EE Marylin Hill, PT Physical Therapist        Therapy Charges for Today     Code Description Service Date Service Provider Modifiers Qty    43384261506 HC PT NEUROMUSC RE EDUCATION EA 15 MIN 7/4/2020 Marylin Hill, PT GP 1    97933207673 HC PT THER PROC EA 15 MIN 7/4/2020 Marylin Hill, PT GP 1    38608214791 HC PT THER SUPP EA 15 MIN 7/4/2020 Marylin Hill, PT GP 2          PT G-Codes  Outcome Measure Options: AM-PAC 6 Clicks Basic Mobility (PT)  AM-PAC 6 Clicks Score (PT): 9  AM-PAC 6 Clicks Score (OT): 9  Modified Ji Scale: 5 - Severe disability.  Bedridden, incontinent, and requiring constant nursing care and attention.     Patient was not wearing a face mask during this therapy encounter. Therapist used appropriate personal protective equipment including mask and gloves.  Therapist also wearing face shield secondary to pt not wearing face mask. Mask used was standard procedure mask. Appropriate PPE was worn during the entire therapy session. Hand hygiene was completed before and after  therapy session. Patient is not in enhanced droplet precautions.       Marylin Hill, PT  7/4/2020

## 2020-07-04 NOTE — PLAN OF CARE
Problem: Patient Care Overview  Goal: Plan of Care Review  Flowsheets (Taken 7/4/2020 6613)  Outcome Summary: Pt continues to demonstrate steady progress with mobility. Demonstrates improved sitting balance again today and tolerated increased activity. Able to stand EOB x2 trials with assist x2 for safety. Continues to require cues to scan L and attend to L side. No new PT concerns. Pt continues to be good candidate for acute rehab and will benefit from PT to address strength, coordination, and balance deficits.

## 2020-07-04 NOTE — PROGRESS NOTES
[facility] INPATIENT PROGRESS NOTE         [facility] INTENSIVE CARE    2020      PATIENT IDENTIFICATION:  Name: Denise Guajardo ADMIT: 2020   : 1965  PCP: Paz Mera MD    MRN: [id number] LOS: 8 days   AGE/SEX: 54 y.o. female  ROOM: [facility]                     LOS 8    Reason for visit: Follow-up ICU medical management post craniotomy      SUBJECTIVE:      Resting comfortably.  Persistent left hemiparesis.  Tolerating tube feeds.  Denies shortness of breath, cough or worsening pain.    I am seeing the patient for the first time today.  All patient problems are new to me.      Objective   OBJECTIVE:    Vital Sign Min/Max for last 24 hours  Temp  Min: 97.7 °F (36.5 °C)  Max: 98.7 °F (37.1 °C)   BP  Min: 106/69  Max: 132/88   Pulse  Min: 62  Max: 111   Resp  Min: 17  Max: 18   SpO2  Min: 95 %  Max: 99 %   No data recorded   No data recorded                         Body mass index is 18 kg/m².    Intake/Output Summary (Last 24 hours) at 2020 1015  Last data filed at 2020 0835  Gross per 24 hour   Intake 3246.09 ml   Output 1875 ml   Net 1371.09 ml         Exam:  GEN:  No distress, appears stated age.  Persistent left hemiparesis  EYES:   PERRL, anicteric sclera  ENT:    External ears/nose normal, OP clear  NECK:  No adenopathy, midline trachea  LUNGS: Normal chest on inspection, palpation and auscultation  CV:  Normal S1S2, without murmur  ABD:  Non tender, non distended, no hepatosplenomegaly, +BS  EXT:  No edema, cyanosis or clubbing    Scheduled meds:    cefTRIAXone 2 g Intravenous Q12H   dexamethasone 2 mg Intravenous Q6H   insulin regular 0-7 Units Subcutaneous Q6H   levETIRAcetam 500 mg Nasogastric Q12H   metroNIDAZOLE 500 mg Nasogastric Q8H   sodium chloride 10 mL Intravenous Q12H     IV meds:                        niCARdipine 5-15 mg/hr Last Rate: Stopped (20 7376)     Data Review:  Results from last 7 days   Lab Units 20  0432 20  0355 20  0410  07/01/20  1656   SODIUM mmol/L 138 138 133* 137   POTASSIUM mmol/L 3.2* 4.0 3.9 3.7   CHLORIDE mmol/L 109* 103 100 99   CO2 mmol/L 24.2 25.3 25.3 23.9   BUN mg/dL 24* 19 17 20   CREATININE mg/dL 0.37* 0.49* 0.54* 0.72   GLUCOSE mg/dL 143* 169* 138* 172*   CALCIUM mg/dL 7.4* 9.0 8.6 8.9         Estimated Creatinine Clearance: 134.5 mL/min (A) (by C-G formula based on SCr of 0.37 mg/dL (L)).  Results from last 7 days   Lab Units 07/04/20  0432 07/03/20  0355 07/02/20  0410 07/01/20  1656   WBC 10*3/mm3 14.13* 12.33* 15.32* 24.69*   HEMOGLOBIN g/dL 13.5 14.0 13.3 14.3   PLATELETS 10*3/mm3 236 231 245 331     Results from last 7 days   Lab Units 07/02/20  0410   INR  1.02                       CT head 7/3/2020 reviewed    Microbiology reviewed  )        Active Hospital Problems    Diagnosis  POA   • **Brain abscess [G06.0]  Yes   • Intracranial hemorrhage (CMS/HCC) [I62.9]  No      Resolved Hospital Problems   No resolved problems to display.       Assessment   ASSESSMENT:  Craniotomy for resection brain lesions, 7/1/2020  Postoperative small hemorrhage with left hemiparesis  Recent dental work.    PLAN:  PT/ST/OT  Antibiotics per infectious disease recommendations.  Pathology indicates malignant neoplasm.  Awaiting oncology input.  Mechanical DVT prophylaxis       Finn Toro MD  Pulmonary and Critical Care Medicine  [facility], [facility]  7/4/2020    10:15

## 2020-07-04 NOTE — PROGRESS NOTES
"[facility] INPATIENT PROGRESS NOTE    Length of Stay:  8 days    CHIEF COMPLAINT/REASON FOR VISIT:  Brain lesions on MRI.  Patient underwent craniotomy and biopsy 7/1/2020.    SUBJECTIVE: She is feeling anxious.  Patient has left hemiparesis.  She was transferred out of the ICU earlier today.  We were asked to see her again for review of her pathology which shows a \"malignant cellular neoplasm\".  The pathologist commented that they believe this to be a primary brain lesion but they have forwarded a biopsy material to the [facility] for further work-up.  No further immunoperoxidase stains are being performed here.    ROS:  Currently unobtainable from the patient.  She is awake and responds to verbal stimuli.  She has dense left hemiparesis      OBJECTIVE:  Vitals:    07/04/20 1030 07/04/20 1100 07/04/20 1105 07/04/20 1200   BP: 136/85 125/83  (!) 147/106   BP Location:       Patient Position:       Pulse: 80 73  87   Resp:       Temp:   97.8 °F (36.6 °C)    TempSrc:   Oral    SpO2: 98% 97%  98%   Weight:       Height:             PHYSICAL EXAMINATION:  Very limited physical exam.  Patient is postop craniotomy and has left hemiparesis and facial droop.  She has a Dobbhoff feeding tube in place.    DIAGNOSTIC DATA:  Results Review:     I reviewed the patient's new clinical results.    Results from last 7 days   Lab Units 07/04/20  0432   WBC 10*3/mm3 14.13*   HEMOGLOBIN g/dL 13.5   HEMATOCRIT % 38.4   PLATELETS 10*3/mm3 236     Lab Results   Component Value Date    NEUTROABS 12.99 (H) 07/04/2020     Results from last 7 days   Lab Units 07/04/20  0432   SODIUM mmol/L 138   POTASSIUM mmol/L 3.2*   CHLORIDE mmol/L 109*   CO2 mmol/L 24.2   BUN mg/dL 24*   CREATININE mg/dL 0.37*   GLUCOSE mg/dL 143*   CALCIUM mg/dL 7.4*     Results from last 7 days   Lab Units 07/02/20  0410   INR  1.02   APTT seconds 23.3          PATHOLOGY 7/1/2020  Preliminary Diagnosis   1. Brain, Right Brainstem Biopsy:        "         A. Necrotic debris and rare degenerated cells with focal acute inflammation.     Comment: There does appear to be some slight increased cellularity around some vessels but the cells appear degenerated. These cells were not seen on the original frozen which was almost all necrotic.     ross/jse      2. Brain, Brainstem, Biopsy:                A. Malignant cellular neoplasm.           COMMENT:The neoplasm histologically appears to be a primary brain lesion but we are not going to perform immunohistochemical stains at this time but rather send the case out to the [facility] for full work-up. The case was discussed with Dr. Licona on 7/2/2020.       Ross/kds        IMAGING:    MRI brain showed at 2.6 cm mass from the right lateral thalamic margin to the midbrain and right chilango as wellas s 7 mm lesion in the central chilango on the right.  Surrounding vasogenic edema present.     CT chest abdomen and pelvis negative for malignancy.    Assessment/Plan   ASSESSMENT/PLAN:  This is a 54 y.o. female with:     1. Brain lesions  ? She presented with left leg paresthesias and dizziness ongoing for a couple of weeks  ? MRI brain 6/26/2020 showed at 2.6 cm mass from the right lateral thalamic margin to the midbrain and right chilango as wellas s 7 mm lesion in the central chilango on the right.  Surrounding vasogenic edema present.  · Neurosurgery following  · She is on IV dexamethasone  · CT C/A/P negative for malignancy  · Mammogram on 6/9/2020 BIRADS category 1  · Dr. Arroyo with the infectious disease service has evaluated her as well.  · CT angiogram of the head ordered by Dr. Licona with neurosurgery.  · Craniotomy with biopsy 7/1/2020 with pathology as noted above showing malignant neoplasm.  Further studies will be performed at [facility] pathology department.  · Postop intracranial hemorrhage  2. H/o benign breast biopsy many years ago.  3. H/o veritigo     RECOMMENDATIONS/PLAN:   1.  We will of course  have to wait for final pathology results from the [facility] prior to making any definitive recommendations regarding treatment options.  2.  Patient had been seen earlier in the admission for the initial consult by Dr. Shay Horner.  He had spoken with the patient and her family at length regarding the possibility that this could be a malignancy.  They had indicated that if malignancy was proven they would like to go to a tertiary care center for another opinion which is of course quite reasonable.  3.  We understand that there is a plan to transfer the patient to the inpatient rehab center here at [facility] early next week.  We will continue to follow.         Kamron Gautam MD

## 2020-07-04 NOTE — PROGRESS NOTES
Pathology now consistent with malignancy.  There is remain negative to date.  Discussed with Dr. Gautam.  Will discontinue empiric antibiotics.    ID will sign off.  Please do not hesitate to call us with further questions or concerns

## 2020-07-05 PROBLEM — G06.0 BRAIN ABSCESS: Status: RESOLVED | Noted: 2020-01-01 | Resolved: 2020-01-01

## 2020-07-05 NOTE — THERAPY TREATMENT NOTE
Patient Name: Denise Guajardo  : 1965    MRN: [id number]                              Today's Date: 2020       Admit Date: 2020    Visit Dx:     ICD-10-CM ICD-9-CM   1. Brain tumor (CMS/HCC) D49.6 239.6     Patient Active Problem List   Diagnosis   • Brain tumor (CMS/HCC)   • Intracranial hemorrhage (CMS/HCC)     Past Medical History:   Diagnosis Date   • Vertigo      Past Surgical History:   Procedure Laterality Date   • BREAST BIOPSY     • DENTAL PROCEDURE     • TUBAL ABDOMINAL LIGATION     • WISDOM TOOTH EXTRACTION       General Information     Row Name 20 1055          PT Evaluation Time/Intention    Document Type  therapy note (daily note)  -     Mode of Treatment  individual therapy;physical therapy  -     Row Name 20 1055          General Information    Existing Precautions/Restrictions  fall  -     Row Name 20 1055          Cognitive Assessment/Intervention- PT/OT    Orientation Status (Cognition)  oriented to;person;place  -     Row Name 20 1055          Safety Issues, Functional Mobility    Safety Issues Affecting Function (Mobility)  insight into deficits/self awareness;safety precaution awareness  -     Impairments Affecting Function (Mobility)  balance;cognition;endurance/activity tolerance;muscle tone abnormal;motor control  -     Comment, Safety Issues/Impairments (Mobility)  left neglect  -       User Key  (r) = Recorded By, (t) = Taken By, (c) = Cosigned By    Initials Name Provider Type     Prerna Garcia PTA Physical Therapy Assistant        Mobility     Row Name 20 1056          Bed Mobility Assessment/Treatment    Supine-Sit [place] (Bed Mobility)  moderate assist (50% patient effort);2 person assist  -     Sit-Supine [place] (Bed Mobility)  minimum assist (75% patient effort)  -     Assistive Device (Bed Mobility)  bed rails;head of bed elevated  -     Comment (Bed Mobility)  pt required assistance with getting left LE  back into bed.   -[facility] Name 07/05/20 1056          Transfer Assessment/Treatment    Comment (Transfers)  sit<>stand X3, intially ModAX2 then MinAX2 for the last 2 stands. verbal cues to correct posture, head up. Left knee blocked to keep from buckling.   -[facility] Name 07/05/20 1056          Sit-Stand Transfer    Sit-Stand [place] (Transfers)  moderate assist (50% patient effort);minimum assist (75% patient effort);2 person assist;verbal cues  -     Assistive Device (Sit-Stand Transfers)  -- HHAX2  -[facility] Name 07/05/20 1056          Gait/Stairs Assessment/Training    Comment (Gait/Stairs)  Not  appropriate to attempt at this time.  -       User Key  (r) = Recorded By, (t) = Taken By, (c) = Cosigned By    Initials Name Provider Type     Prerna Garcia PTA Physical Therapy Assistant        Obj/Interventions     [facility] Name 07/05/20 1232          Therapeutic Exercise    Lower Extremity (Therapeutic Exercise)  LAQ (long arc quad), bilateral;heel slides, bilateral;quad sets, right;SLR (straight leg raise), right  -     Lower Extremity Range of Motion (Therapeutic Exercise)  hip abduction/adduction, bilateral;ankle dorsiflexion/plantar flexion, bilateral  -     Exercise Type (Therapeutic Exercise)  AROM (active range of motion);PROM (passive range of motion) PROM on left LE, AROM of RLE  -     Position (Therapeutic Exercise)  seated;supine  -     Sets/Reps (Therapeutic Exercise)  X15  -[facility] Name 07/05/20 1232          Static Sitting Balance    Level of [place] (Unsupported Sitting, Static Balance)  moderate assist, 50 to 74% patient effort;contact guard assist  -     Sitting Position (Unsupported Sitting, Static Balance)  sitting on edge of bed  -     Time Able to Maintain Position (Unsupported Sitting, Static Balance)  4 to 5 minutes  -     Comment (Unsupported Sitting, Static Balance)  pt fluctuates on assistance level. Intially ModA with left lean. Verbal and tactile cues  to correct. Pt then able to sit with CGA.   -     Row Name 07/05/20 1232          Static Standing Balance    Level of [place] (Supported Standing, Static Balance)  moderate assist, 50 to 74% patient effort;2 person assist  -     Time Able to Maintain Position (Supported Standing, Static Balance)  15 to 30 seconds  -     Assistive Device Utilized (Supported Standing, Static Balance)  -- HHAX2  -     Comment (Supported Standing, Static Balance)  Left lateral lean. Cues to correct  posture. Left knee blocked  -       User Key  (r) = Recorded By, (t) = Taken By, (c) = Cosigned By    Initials Name Provider Type     Prerna Garcia PTA Physical Therapy Assistant        Goals/Plan    No documentation.       Clinical Impression     Row Name 07/05/20 1236          Plan of Care Review    Plan of Care Reviewed With  patient  -     Progress  improving  -     Outcome Summary  Pt tolerated treatment with no complaints. Pt is slowly progressing with mobility. Pt required ModAX2/[place]olores with bed mobility with verbal and tactile cues. Pt fluctuates with sitting balance requiring intially ModA then CGA. Pt performed 3 STS transfers with MOdAX2/MinAX2. Pt required verbal and tactile cues to correct left lateral lean. Pt able to maintain standing for at least 15 seconds each time. Pt working on correcting posture. Will continue to progress pt as tolerated.   -     Row Name 07/05/20 1236          Positioning and Restraints    Pre-Treatment Position  in bed  -     Post Treatment Position  bed  -     In Bed  supine;call light within reach;encouraged to call for assist;exit alarm on;with family/caregiver  -       User Key  (r) = Recorded By, (t) = Taken By, (c) = Cosigned By    Initials Name Provider Type     Prerna Garcia PTA Physical Therapy Assistant        Outcome Measures    No documentation.       Physical Therapy Education                 Title: PT OT SLP Therapies (In Progress)     Topic: Physical  Therapy (In Progress)     Point: Mobility training (In Progress)     Description:   Instruct learner(s) on safety and technique for assisting patient out of bed, chair or wheelchair.  Instruct in the proper use of assistive devices, such as walker, crutches, cane or brace.              Patient Friendly Description:   It's important to get you on your feet again, but we need to do so in a way that is safe for you. Falling has serious consequences, and your personal safety is the most important thing of all.        When it's time to get out of bed, one of us or a family member will sit next to you on the bed to give you support.     If your doctor or nurse tells you to use a walker, crutches, a cane, or a brace, be sure you use it every time you get out of bed, even if you think you don't need it.    Learning Progress Summary           Patient Acceptance, E,D, NR by EH at 7/5/2020 1240    Acceptance, E,D, NR by EE at 7/4/2020 1416    Acceptance, E, NR by AR at 7/3/2020 1557    Acceptance, E,D, NR by PC at 7/2/2020 1208                   Point: Home exercise program (In Progress)     Description:   Instruct learner(s) on appropriate technique for monitoring, assisting and/or progressing patient with therapeutic exercises and activities.              Learning Progress Summary           Patient Acceptance, E,D, NR by EH at 7/5/2020 1240    Acceptance, E,D, NR by EE at 7/4/2020 1416    Acceptance, E, NR by AR at 7/3/2020 1557    Acceptance, E,D, NR by PC at 7/2/2020 1208                   Point: Body mechanics (In Progress)     Description:   Instruct learner(s) on proper positioning and spine alignment for patient and/or caregiver during mobility tasks and/or exercises.              Learning Progress Summary           Patient Acceptance, E,D, NR by EH at 7/5/2020 1240    Acceptance, E,D, NR by EE at 7/4/2020 1416    Acceptance, E, NR by AR at 7/3/2020 1557    Acceptance, E,D, NR by PC at 7/2/2020 1208                    Point: Precautions (In Progress)     Description:   Instruct learner(s) on prescribed precautions during mobility and gait tasks              Learning Progress Summary           Patient Acceptance, E,D, NR by  at 7/5/2020 1240    Acceptance, E,D, NR by  at 7/4/2020 1416    Acceptance, E, NR by AR at 7/3/2020 1557    Acceptance, E,D, NR by  at 7/2/2020 1208                               User Key     Initials Effective Dates Name Provider Type Discipline    PC 04/03/18 -  Elham Forde, PT Physical Therapist PT     04/03/18 -  Marylin Hill, PT Physical Therapist PT    AR 04/03/18 -  Ceci Amador, PT Physical Therapist PT     08/19/18 -  Prerna Garcia PTA Physical Therapy Assistant PT              PT Recommendation and Plan     Plan of Care Reviewed With: patient  Progress: improving  Outcome Summary: Pt tolerated treatment with no complaints. Pt is slowly progressing with mobility. Pt required ModAX2/[place] with bed mobility with verbal and tactile cues. Pt fluctuates with sitting balance requiring intially ModA then CGA. Pt performed 3 STS transfers with MOdAX2/MinAX2. Pt required verbal and tactile cues to correct left lateral lean. Pt able to maintain standing for at least 15 seconds each time. Pt working on correcting posture. Will continue to progress pt as tolerated.      Time Calculation:   PT Charges     Row Name 07/05/20 1054             Time Calculation    Start Time  1016  -      Stop Time  1039  -      Time Calculation (min)  23 min  -      PT Received On  07/05/20  -      PT - Next Appointment  07/06/20  -         Time Calculation- PT    Total Timed Code Minutes- PT  23 minute(s)  -        User Key  (r) = Recorded By, (t) = Taken By, (c) = Cosigned By    Initials Name Provider Type     Prerna Garcia PTA Physical Therapy Assistant        Therapy Charges for Today     Code Description Service Date Service Provider Modifiers Qty    84476002313  PT THERAPEUTIC ACT EA 15 MIN  7/5/2020 Prerna Garcia PTA GP 1    10558569465 HC PT THER PROC EA 15 MIN 7/5/2020 Prerna Garcia PTA GP 1    41244577346 HC PT THER SUPP EA 15 MIN 7/5/2020 Prerna Garcia PTA GP 1          PT G-Codes  Outcome Measure Options: AM-PAC 6 Clicks Basic Mobility (PT)  AM-PAC 6 Clicks Score (PT): 9  AM-PAC 6 Clicks Score (OT): 9  Modified [place] Scale: 5 - Severe disability.  Bedridden, incontinent, and requiring constant nursing care and attention.    Prerna Garcia PTA  7/5/2020

## 2020-07-05 NOTE — PROGRESS NOTES
[facility] NEUROSURGERY PROGRESS NOTE    PATIENT IDENTIFICATION:   Name:  Denise Guajardo      MRN:  [id number]     54 y.o.  female               CC: POD 4 right temporal craniotomy for excisional biopsy      Subjective     Interval History:     ROS:  HEENT: Right jaw pain, no vision changes  GI: No nausea, vomiting;swallow difficulties  Neuro: Left-sided plegia    Objective     Vital signs in last 24 hours:  Temp:  [97.3 °F (36.3 °C)-98.7 °F (37.1 °C)] 98.7 °F (37.1 °C)  Heart Rate:  [56-87] 64  Resp:  [14-18] 16  BP: (133-147)/() 138/91      Intake/Output this shift:  I/O this shift:  In: 1289 [Other:120; NG/GT:1169]  Out: -       Intake/Output last 3 shifts:  I/O last 3 completed shifts:  In: 2515.1 [I.V.:361.1; Other:330; NG/GT:1824]  Out: 3200 [Urine:3200]    LABS:  Results from last 7 days   Lab Units 07/05/20  0436 07/04/20  0432 07/03/20  0355   WBC 10*3/mm3 11.90* 14.13* 12.33*   HEMOGLOBIN g/dL 14.1 13.5 14.0   HEMATOCRIT % 40.2 38.4 39.1   PLATELETS 10*3/mm3 244 236 231     Results from last 7 days   Lab Units 07/05/20  0436 07/04/20  1945 07/04/20  0432 07/03/20  0355   SODIUM mmol/L 140  --  138 138   POTASSIUM mmol/L 4.4 4.4 3.2* 4.0   CHLORIDE mmol/L 101  --  109* 103   CO2 mmol/L 30.0*  --  24.2 25.3   BUN mg/dL 26*  --  24* 19   CREATININE mg/dL 0.55*  --  0.37* 0.49*   CALCIUM mg/dL 9.1  --  7.4* 9.0   GLUCOSE mg/dL 144*  --  143* 169*     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    Meds reviewed/changed: Yes    Current Facility-Administered Medications:   •  acetaminophen (TYLENOL) tablet 650 mg, 650 mg, Oral, Q4H PRN, 650 mg at 07/04/20 1435 **OR** acetaminophen (TYLENOL) 160 MG/5ML solution 650 mg, 650 mg, Oral, Q4H PRN **OR** acetaminophen (TYLENOL) suppository  650 mg, 650 mg, Rectal, Q4H PRN, Finn Toro MD  •  dexamethasone (DECADRON) injection 1 mg, 1 mg, Intravenous, Q6H, Shai Licona MD, 1 mg at 07/05/20 0634  •  dextrose (D50W) 25 g/ 50mL Intravenous Solution 25 g, 25 g, Intravenous, Q15 Min PRN, Finn Toro MD  •  dextrose (GLUTOSE) oral gel 15 g, 15 g, Oral, Q15 Min PRN, Finn Toro MD  •  glucagon (human recombinant) (GLUCAGEN DIAGNOSTIC) injection 1 mg, 1 mg, Subcutaneous, Q15 Min PRN, Finn Toro MD  •  HYDROcodone-acetaminophen (NORCO) 5-325 MG per tablet 1 tablet, 1 tablet, Oral, Q4H PRN, Finn Toro MD, 1 tablet at 07/05/20 1004  •  HYDROmorphone (DILAUDID) injection 0.5 mg, 0.5 mg, Intravenous, Q2H PRN, 0.5 mg at 07/01/20 2323 **AND** naloxone (NARCAN) injection 0.4 mg, 0.4 mg, Intravenous, Q5 Min PRN, Finn Toro MD  •  insulin regular (humuLIN R,novoLIN R) injection 0-7 Units, 0-7 Units, Subcutaneous, Q6H, Finn Toro MD, 2 Units at 07/05/20 0053  •  levETIRAcetam (KEPPRA) 100 MG/ML solution 500 mg, 500 mg, Nasogastric, Q12H, Finn Toro MD, 500 mg at 07/04/20 2138  •  ondansetron (ZOFRAN) tablet 4 mg, 4 mg, Oral, Q6H PRN **OR** ondansetron (ZOFRAN) injection 4 mg, 4 mg, Intravenous, Q6H PRN, Finn Toro MD, 4 mg at 07/01/20 2323  •  ondansetron (ZOFRAN) tablet 4 mg, 4 mg, Oral, Q6H PRN **OR** ondansetron (ZOFRAN) injection 4 mg, 4 mg, Intravenous, Q6H PRN, Finn Toro MD  •  potassium chloride (MICRO-K) CR capsule 40 mEq, 40 mEq, Oral, PRN **OR** potassium chloride (KLOR-CON) packet 40 mEq, 40 mEq, Oral, PRN, 40 mEq at 07/04/20 1137 **OR** potassium chloride 10 mEq in 100 mL IVPB, 10 mEq, Intravenous, Q1H PRN, Finn Toro MD  •  sodium chloride 0.9 % flush 10 mL, 10 mL, Intravenous, Q12H, Finn Toro MD, 10 mL at 07/05/20 0951  •  sodium chloride 0.9 % flush 10 mL, 10 mL, Intravenous, PRN, Finn Toro MD      Physical  Exam:    General:   Awake, alert, oriented x3. Speech hypophonic  HEENT:   Right frontal incision well approximated with no redness drainage, minimal swelling.  GEGE drain site clean dry intact dressing    CN: Extraocular movements are full , right pupil 3 mm and brisk, left pupil 4 mm and sluggish;Severe left facial weakness, particularly lower face.  Able to close eyes; tongue deviation to the left.  Able to blow out cheeks.    Motor: No movement left upper extremity, trace movement but not antigravity left lower extremity.  Normal strength right side  Sensation: Able to feel painful stimulus left side   Coordination: Finger-to-nose with mild ataxia on right, unable to assess left.  Extremities:   No calf swelling or tenderness    Assessment/Plan     ASSESSMENT:      Brain tumor (CMS/HCC)    Brain abscess    Intracranial hemorrhage (CMS/HCC)      PLAN: Patient continues with left-sided plegia.  She does have some movement to the left lower extremity but not antigravity.  She is verbalizing but she is very hypophonic.  Unable to swallow safely so has core track with feeds.  She is participatory in all aspects of therapy.  She is aware that current pathology reveals tumor but final pathology pending.  It is being sent  to [facility] for full work up.  She and her  are aware that this may take a couple of weeks for final results.  Her  is anxious for answers as to her prognosis and what he needs to be doing for the future.  I explained to him that we best for him to live in the moment and help her get through recovery from the surgery and to keep her spirits up.  She has a good motivation and we want to keep her focused on that.  [facility] has been consulted.  From neurosurgical standpoint, she may be ready for discharge to rehab in the next couple of days.  We will try some muscle relaxer and ice for her jaw pain.    I discussed the patients findings and my recommendations with  "patient, nursing staff and Dr. Licona       LOS: 9 days       Joy Bautista, APRN  7/5/2020  11:13    \"Dictated utilizing Dragon dictation\".      "

## 2020-07-05 NOTE — PROGRESS NOTES
We are following peripherally until further information is available on the biopsy specimen.  If patient is transferred to the acute rehab at [facility] we will continue to follow after transfer as well.    Plan to check again Monday

## 2020-07-05 NOTE — PLAN OF CARE
Problem: Patient Care Overview  Goal: Plan of Care Review  Outcome: Ongoing (interventions implemented as appropriate)  Flowsheets  Taken 7/5/2020 0623  Progress: no change  Taken 7/4/2020 2131  Plan of Care Reviewed With: patient  Note:   No acute changes noted overnight. VSS. Bernabe catheter remains in place for urinary retention. Turning Q2hr. Tolerating tube feedings well.

## 2020-07-05 NOTE — PLAN OF CARE
Alert and oriented. Voice whisper, soft or mouths words. Left facial droop, right eyelid droop, and left facial droop. Cortrak feeding tube for nutrition. Orally suctioned x3 today, oral care, and offered a few ice chips. Complaints of pain to right side of head into jaw. Medicated x1 with prn pain meds. No further complaints of pain. Family at bedside during visiting hours.

## 2020-07-05 NOTE — PROGRESS NOTES
Name: Denise Guajardo ADMIT: 2020   : 1965  PCP: Paz Mera MD    MRN: [id number] LOS: 9 days   AGE/SEX: 54 y.o. female  ROOM: [facility]     Subjective   Subjective   This is a follow-up note patient is new to me.  Patient seen and examined chart reviewed discussed with nursing staff.  As of last evening noted.  Transferred out from the intensive care unit last evening to the floor.  Patient underwent craniotomy and resection of brain lesions on 2020 has had small hemorrhage with left hemiparesis initially was treated with empiric antibiotics for possible brain abscess pathology however consistent with malignancy.    Patient continues to have left-sided hemiparesis.  She is resting comfortably and is tolerating her tube feeds..    Review of Systems   Denies chest pain shortness of breath cough  Objective   Objective   Vital Signs  Temp:  [97.3 °F (36.3 °C)-98.7 °F (37.1 °C)] 98.6 °F (37 °C)  Heart Rate:  [56-87] 56  Resp:  [14-18] 16  BP: (117-147)/() 137/89  SpO2:  [97 %-99 %] 98 %  on   ;   Device (Oxygen Therapy): room air  Body mass index is 18 kg/m².  Physical Exam  General frail thin female lying in bed in no acute distress  HEENT  bandage in place from recent craniotomy, pupils are equal round reactive to light, sclerae clear, oropharynx is clear  Neck trachea is midline without JVD or adenopathy  Lungs few scattered rhonchi but overall good air exchange  Cardiovascular regular rate and rhythm with normal S1 and S2 do not appreciate murmur gallop or rub.  Distal pulses are 2+  Abdomen is flat and tender do not appreciate hepatosplenomegaly bowel sounds present  Extremities without edema or rash.  Well-perfused  Neurologic left-sided hemiparesis  Results Review:       I reviewed the patient's new clinical results.  Results from last 7 days   Lab Units 20  0436 20  0432 20  0355 20  0410   WBC 10*3/mm3 11.90* 14.13* 12.33* 15.32*   HEMOGLOBIN g/dL 14.1 13.5 14.0  13.3   PLATELETS 10*3/mm3 244 236 231 245     Results from last 7 days   Lab Units 07/05/20 0436 07/04/20 1945 07/04/20 0432 07/03/20 0355 07/02/20  0410   SODIUM mmol/L 140  --  138 138 133*   POTASSIUM mmol/L 4.4 4.4 3.2* 4.0 3.9   CHLORIDE mmol/L 101  --  109* 103 100   CO2 mmol/L 30.0*  --  24.2 25.3 25.3   BUN mg/dL 26*  --  24* 19 17   CREATININE mg/dL 0.55*  --  0.37* 0.49* 0.54*   GLUCOSE mg/dL 144*  --  143* 169* 138*   Estimated Creatinine Clearance: 90.5 mL/min (A) (by C-G formula based on SCr of 0.55 mg/dL (L)).    Results from last 7 days   Lab Units 07/05/20 0436 07/04/20 1945 07/04/20 0432 07/03/20 0355 07/02/20  0410   CALCIUM mg/dL 9.1  --  7.4* 9.0 8.6   MAGNESIUM mg/dL  --  2.3  --   --   --        Glucose   Date/Time Value Ref Range Status   07/04/2020 2205 163 (H) 70 - 130 mg/dL Final   07/04/2020 1758 141 (H) 70 - 130 mg/dL Final   07/04/2020 1107 156 (H) 70 - 130 mg/dL Final   07/03/2020 2347 179 (H) 70 - 130 mg/dL Final   07/03/2020 1752 165 (H) 70 - 130 mg/dL Final   07/03/2020 1156 187 (H) 70 - 130 mg/dL Final       CT Head Without Contrast  Narrative: CT HEAD WITHOUT CONTRAST     HISTORY: Postop     COMPARISON: 07/02/2020     TECHNIQUE: Axial CT imaging was obtained through the brain. No IV  contrast was administered.     FINDINGS:  Patient is status post right temporal craniotomy. The previously  identified hemorrhage within the right cerebellar peduncle is unchanged,  measuring up to 2.3 x 1.2 cm. Additional hemorrhage is noted within the  right temporal lobe. This also does not appear significantly changed.  There is extensive pneumocephalus. It has decreased when compared to the  prior examination. There is midline shift of 3 mm. This is stable when  compared to prior exam. No new areas of hemorrhage are seen. There is a  small amount of subdural hemorrhage, including some layering over the  right tentorium. Tiny focus of hemorrhage is also noted within the chilango,  again, not  significantly changed. Partial opacification of the right  mastoid air cells is again noted.     Impression: Slight interval decrease in pneumocephalus. Otherwise, no significant  change.     Radiation dose reduction techniques were utilized, including automated  exposure control and exposure modulation based on body size.     This report was finalized on 7/3/2020 4:01 AM by Dr. Jennifer Walton M.D.           dexamethasone 1 mg Intravenous Q6H   insulin regular 0-7 Units Subcutaneous Q6H   levETIRAcetam 500 mg Nasogastric Q12H   sodium chloride 10 mL Intravenous Q12H      NPO Diet       Assessment/Plan     Active Hospital Problems    Diagnosis  POA   • **Brain tumor (CMS/HCC) [D49.6]  Yes   • Brain abscess [G06.0]  Yes   • Intracranial hemorrhage (CMS/HCC) [I62.9]  No      Resolved Hospital Problems   No resolved problems to display.       54 y.o. female admitted with Brain tumor (CMS/HCC).    · Brain lesions patient underwent right temporal craniotomy with excisional biopsy of mass in the right cerebral pedicle.  Brain biopsy reveals malignant cellular neoplasm. . Antibiotics have been discontinued, infectious disease has signed off.  Oncology is following along with the neurosurgical service.  Patient is receiving dexamethasone 1 mg IV every 6 Keppra 500 mg IV every 12.  · Hyperglycemia secondary to intravenous dexamethasone has been covered with sliding scale insulin.  ·   ·         ySCDs for DVT prophylaxis.  · Full code.  · Discussed with patient and nursing staff.  · Anticipate discharge TBD.  timing yet to be determined.      Joseph Bryan MD  [facility]  07/05/20  07:39    Patient was placed in face mask on first look.  I wore protective equipment throughout this patient encounter including a face mask and gloves.  Hand hygiene was performed before donning protective equipment and after removal when leaving the room.

## 2020-07-05 NOTE — PLAN OF CARE
Problem: Patient Care Overview  Goal: Plan of Care Review  Outcome: Ongoing (interventions implemented as appropriate)  Flowsheets (Taken 7/5/2020 1236)  Progress: improving  Plan of Care Reviewed With: patient  Outcome Summary: Pt tolerated treatment with no complaints. Pt is slowly progressing with mobility. Pt required ModAX2/[place]ol[place] with bed mobility with verbal and tactile cues. Pt fluctuates with sitting balance requiring intially ModA then CGA. Pt performed 3 STS transfers with MOdAX2/MinAX2. Pt required verbal and tactile cues to correct left lateral lean. Pt able to maintain standing for at least 15 seconds each time. Pt working on correcting posture. Will continue to progress pt as tolerated.    ..Patient wearing a face mask during this therapy encounter. Therapist used appropriate personal protective equipment including mask and gloves. Appropriate PPE was worn during the entire therapy session. Hand hygiene was completed before and after therapy session. Patient is not in enhanced droplet precautions.

## 2020-07-05 NOTE — PROGRESS NOTES
"DAILY PROGRESS NOTE  [facility]    Patient Identification:  Name: Denise Guajardo  Age: 54 y.o.  Sex: female  :  1965  MRN: [id number]         Primary Care Physician: Paz Mera MD    Subjective:  Interval History: She is weak.    Objective:    Scheduled Meds:    dexamethasone 1 mg Intravenous Q6H   insulin regular 0-7 Units Subcutaneous Q6H   levETIRAcetam 500 mg Nasogastric Q12H   sodium chloride 10 mL Intravenous Q12H     Continuous Infusions:     Vital signs in last 24 hours:  Temp:  [97.3 °F (36.3 °C)-98.7 °F (37.1 °C)] 98.7 °F (37.1 °C)  Heart Rate:  [56-71] 64  Resp:  [14-18] 16  BP: (133-140)/(86-91) 138/91    Intake/Output:    Intake/Output Summary (Last 24 hours) at 2020 1215  Last data filed at 2020 0951  Gross per 24 hour   Intake 2136 ml   Output 2000 ml   Net 136 ml       Exam:  /91 (BP Location: Right arm, Patient Position: Lying)   Pulse 64   Temp 98.7 °F (37.1 °C) (Oral)   Resp 16   Ht 165 cm (64.96\")   Wt 49 kg (108 lb 0.4 oz)   SpO2 98%   BMI 18.00 kg/m²     General Appearance:    Alert, cooperative, no distress   Head:    Normocephalic, without obvious abnormality, atraumatic   Eyes:       Throat:   Lips, tongue, gums normal   Neck:   Supple, symmetrical, trachea midline, no JVD   Lungs:     Clear to auscultation bilaterally, respirations unlabored   Chest Wall:    No tenderness or deformity    Heart:    Regular rate and rhythm, S1 and S2 normal, no murmur,no  Rub or gallop   Abdomen:     Soft, nontender, bowel sounds active, no masses, no organomegaly    Extremities:   Extremities normal, atraumatic, no cyanosis or edema   Pulses:      Skin:   Skin is warm and dry,  no rashes or palpable lesions   Neurologic:  Left hemiparesis      Lab Results (last 72 hours)     Procedure Component Value Units Date/Time    POC Glucose Once [[id number]]  (Abnormal) Collected:  20 1129    Specimen:  Blood Updated:  20 1132     Glucose 132 mg/dL     Wound " Culture - Wound, Brain [[id number]] Collected:  07/01/20 1209    Specimen:  Wound from Brain Updated:  07/05/20 0955     Wound Culture No growth at 4 days     Gram Stain No WBCs or organisms seen    Wound Culture - Wound, Brain [[id number]] Collected:  07/01/20 1159    Specimen:  Wound from Brain Updated:  07/05/20 0954     Wound Culture No growth at 4 days     Gram Stain Occasional WBCs seen      No organisms seen    Basic Metabolic Panel [[id number]]  (Abnormal) Collected:  07/05/20 0436    Specimen:  Blood Updated:  07/05/20 0610     Glucose 144 mg/dL      BUN 26 mg/dL      Creatinine 0.55 mg/dL      Sodium 140 mmol/L      Potassium 4.4 mmol/L      Comment: Specimen hemolyzed.  Results may be affected.        Chloride 101 mmol/L      CO2 30.0 mmol/L      Calcium 9.1 mg/dL      eGFR Non African Amer 115 mL/min/1.73      BUN/Creatinine Ratio 47.3     Anion Gap 9.0 mmol/L     Narrative:       GFR Normal >60  Chronic Kidney Disease <60  Kidney Failure <15      CBC & Differential [[id number]] Collected:  07/05/20 0436    Specimen:  Blood Updated:  07/05/20 0544    Narrative:       The following orders were created for panel order CBC & Differential.  Procedure                               Abnormality         Status                     ---------                               -----------         ------                     CBC Auto Differential[[id number]]        Abnormal            Final result                 Please view results for these tests on the individual orders.    CBC Auto Differential [[id number]]  (Abnormal) Collected:  07/05/20 0436    Specimen:  Blood Updated:  07/05/20 0544     WBC 11.90 10*3/mm3      RBC 4.52 10*6/mm3      Hemoglobin 14.1 g/dL      Hematocrit 40.2 %      MCV 88.9 fL      MCH 31.2 pg      MCHC 35.1 g/dL      RDW 11.6 %      RDW-SD 37.5 fl      MPV 9.4 fL      Platelets 244 10*3/mm3      Neutrophil % 87.0 %      Lymphocyte % 4.0 %      Monocyte % 7.8 %      Eosinophil % 0.0 %      Basophil %  0.1 %      Immature Grans % 1.1 %      Neutrophils, Absolute 10.35 10*3/mm3      Lymphocytes, Absolute 0.48 10*3/mm3      Monocytes, Absolute 0.93 10*3/mm3      Eosinophils, Absolute 0.00 10*3/mm3      Basophils, Absolute 0.01 10*3/mm3      Immature Grans, Absolute 0.13 10*3/mm3      nRBC 0.0 /100 WBC     POC Glucose Once [[id number]]  (Abnormal) Collected:  07/04/20 2205    Specimen:  Blood Updated:  07/04/20 2207     Glucose 163 mg/dL     Potassium [[id number]]  (Normal) Collected:  07/04/20 1945    Specimen:  Blood Updated:  07/04/20 2043     Potassium 4.4 mmol/L     Magnesium [[id number]]  (Normal) Collected:  07/04/20 1945    Specimen:  Blood Updated:  07/04/20 2028     Magnesium 2.3 mg/dL     POC Glucose Once [[id number]]  (Abnormal) Collected:  07/04/20 1758    Specimen:  Blood Updated:  07/04/20 1801     Glucose 141 mg/dL     POC Glucose Once [[id number]]  (Abnormal) Collected:  07/04/20 1107    Specimen:  Blood Updated:  07/04/20 1111     Glucose 156 mg/dL     Anaerobic Culture - Wound, Brain [[id number]] Collected:  07/01/20 1159    Specimen:  Wound from Brain Updated:  07/04/20 0939     Anaerobic Culture No anaerobes isolated at 3 days    Anaerobic Culture - Wound, Brain [[id number]] Collected:  07/01/20 1209    Specimen:  Wound from Brain Updated:  07/04/20 0939     Anaerobic Culture No anaerobes isolated at 3 days    Culture, CSF - Cerebrospinal Fluid, Spine, Lumbar [[id number]] Collected:  07/01/20 1048    Specimen:  Cerebrospinal Fluid from Spine, Lumbar Updated:  07/04/20 0927     CSF Culture No growth at 3 days     Gram Stain No WBCs or organisms seen    Blood Culture - Blood, Arm, Right [[id number]] Collected:  06/29/20 0516    Specimen:  Blood from Arm, Right Updated:  07/04/20 0545     Blood Culture No growth at 5 days    Blood Culture - Blood, Arm, Left [[id number]] Collected:  06/29/20 0520    Specimen:  Blood from Arm, Left Updated:  07/04/20 0545     Blood Culture No growth at 5 days    Basic Metabolic  Panel [[id number]]  (Abnormal) Collected:  07/04/20 0432    Specimen:  Blood Updated:  07/04/20 0513     Glucose 143 mg/dL      BUN 24 mg/dL      Creatinine 0.37 mg/dL      Sodium 138 mmol/L      Potassium 3.2 mmol/L      Chloride 109 mmol/L      CO2 24.2 mmol/L      Calcium 7.4 mg/dL      eGFR Non African Amer >150 mL/min/1.73      BUN/Creatinine Ratio 64.9     Anion Gap 4.8 mmol/L     Narrative:       GFR Normal >60  Chronic Kidney Disease <60  Kidney Failure <15      CBC & Differential [[id number]] Collected:  07/04/20 0432    Specimen:  Blood Updated:  07/04/20 0443    Narrative:       The following orders were created for panel order CBC & Differential.  Procedure                               Abnormality         Status                     ---------                               -----------         ------                     CBC Auto Differential[[id number]]        Abnormal            Final result                 Please view results for these tests on the individual orders.    CBC Auto Differential [[id number]]  (Abnormal) Collected:  07/04/20 0432    Specimen:  Blood Updated:  07/04/20 0443     WBC 14.13 10*3/mm3      RBC 4.30 10*6/mm3      Hemoglobin 13.5 g/dL      Hematocrit 38.4 %      MCV 89.3 fL      MCH 31.4 pg      MCHC 35.2 g/dL      RDW 12.1 %      RDW-SD 39.4 fl      MPV 9.0 fL      Platelets 236 10*3/mm3      Neutrophil % 91.9 %      Lymphocyte % 2.0 %      Monocyte % 5.3 %      Eosinophil % 0.0 %      Basophil % 0.1 %      Immature Grans % 0.7 %      Neutrophils, Absolute 12.99 10*3/mm3      Lymphocytes, Absolute 0.28 10*3/mm3      Monocytes, Absolute 0.75 10*3/mm3      Eosinophils, Absolute 0.00 10*3/mm3      Basophils, Absolute 0.01 10*3/mm3      Immature Grans, Absolute 0.10 10*3/mm3      nRBC 0.0 /100 WBC     POC Glucose Once [[id number]]  (Abnormal) Collected:  07/03/20 2347    Specimen:  Blood Updated:  07/03/20 2349     Glucose 179 mg/dL     POC Glucose Once [[id number]]  (Abnormal) Collected:   07/03/20 1752    Specimen:  Blood Updated:  07/03/20 1758     Glucose 165 mg/dL     POC Glucose Once [[id number]]  (Abnormal) Collected:  07/03/20 1156    Specimen:  Blood Updated:  07/03/20 1206     Glucose 187 mg/dL     Basic Metabolic Panel [[id number]]  (Abnormal) Collected:  07/03/20 0355    Specimen:  Blood Updated:  07/03/20 0445     Glucose 169 mg/dL      BUN 19 mg/dL      Creatinine 0.49 mg/dL      Sodium 138 mmol/L      Potassium 4.0 mmol/L      Chloride 103 mmol/L      CO2 25.3 mmol/L      Calcium 9.0 mg/dL      eGFR Non African Amer 132 mL/min/1.73      BUN/Creatinine Ratio 38.8     Anion Gap 9.7 mmol/L     Narrative:       GFR Normal >60  Chronic Kidney Disease <60  Kidney Failure <15      CBC & Differential [[id number]] Collected:  07/03/20 0355    Specimen:  Blood Updated:  07/03/20 0421    Narrative:       The following orders were created for panel order CBC & Differential.  Procedure                               Abnormality         Status                     ---------                               -----------         ------                     CBC Auto Differential[[id number]]        Abnormal            Final result                 Please view results for these tests on the individual orders.    CBC Auto Differential [[id number]]  (Abnormal) Collected:  07/03/20 0355    Specimen:  Blood Updated:  07/03/20 0421     WBC 12.33 10*3/mm3      RBC 4.55 10*6/mm3      Hemoglobin 14.0 g/dL      Hematocrit 39.1 %      MCV 85.9 fL      MCH 30.8 pg      MCHC 35.8 g/dL      RDW 11.6 %      RDW-SD 36.3 fl      MPV 9.2 fL      Platelets 231 10*3/mm3      Neutrophil % 92.6 %      Lymphocyte % 2.6 %      Monocyte % 4.0 %      Eosinophil % 0.0 %      Basophil % 0.1 %      Immature Grans % 0.7 %      Neutrophils, Absolute 11.42 10*3/mm3      Lymphocytes, Absolute 0.32 10*3/mm3      Monocytes, Absolute 0.49 10*3/mm3      Eosinophils, Absolute 0.00 10*3/mm3      Basophils, Absolute 0.01 10*3/mm3      Immature Grans,  Absolute 0.09 10*3/mm3      nRBC 0.0 /100 WBC         Data Review:  Results from last 7 days   Lab Units 07/05/20  0436 07/04/20  1945 07/04/20 0432 07/03/20  0355   SODIUM mmol/L 140  --  138 138   POTASSIUM mmol/L 4.4 4.4 3.2* 4.0   CHLORIDE mmol/L 101  --  109* 103   CO2 mmol/L 30.0*  --  24.2 25.3   BUN mg/dL 26*  --  24* 19   CREATININE mg/dL 0.55*  --  0.37* 0.49*   GLUCOSE mg/dL 144*  --  143* 169*   CALCIUM mg/dL 9.1  --  7.4* 9.0     Results from last 7 days   Lab Units 07/05/20 0436 07/04/20 0432 07/03/20  0355   WBC 10*3/mm3 11.90* 14.13* 12.33*   HEMOGLOBIN g/dL 14.1 13.5 14.0   HEMATOCRIT % 40.2 38.4 39.1   PLATELETS 10*3/mm3 244 236 231             Lab Results   Lab Value Date/Time    TROPONINT <0.010 06/26/2020 1053               Invalid input(s): PROT, LABALBU          Glucose   Date/Time Value Ref Range Status   07/05/2020 1129 132 (H) 70 - 130 mg/dL Final   07/04/2020 2205 163 (H) 70 - 130 mg/dL Final   07/04/2020 1758 141 (H) 70 - 130 mg/dL Final   07/04/2020 1107 156 (H) 70 - 130 mg/dL Final   07/03/2020 2347 179 (H) 70 - 130 mg/dL Final   07/03/2020 1752 165 (H) 70 - 130 mg/dL Final   07/03/2020 1156 187 (H) 70 - 130 mg/dL Final     Results from last 7 days   Lab Units 07/02/20  0410   INR  1.02       Past Medical History:   Diagnosis Date   • Vertigo        Assessment:  Active Hospital Problems    Diagnosis  POA   • **Brain tumor (CMS/HCC) [D49.6]  Yes   • Intracranial hemorrhage (CMS/HCC) [I62.9]  No      Resolved Hospital Problems    Diagnosis Date Resolved POA   • Brain abscess [G06.0] 07/05/2020 Yes       Plan:  Continue with tube feedings and support.  Continue with Decadron and Keppra.  Await results of path report.  Planning to go to acute rehab.  Cultures continue to be negative and antibiotics stopped and likely not brain abscess.  Bill Wu MD  7/5/2020  12:15

## 2020-07-05 NOTE — PROGRESS NOTES
[facility] INPATIENT PROGRESS NOTE         [address]    2020      PATIENT IDENTIFICATION:  Name: Denise Guajardo ADMIT: 2020   : 1965  PCP: Paz Mera MD    MRN: [id number] LOS: 9 days   AGE/SEX: 54 y.o. female  ROOM: [facility]                     LOS 9    Reason for visit: Follow-up ICU medical management post craniotomy      SUBJECTIVE:      Doing well out of the intensive care unit.  No new pulmonary issues.      Objective   OBJECTIVE:    Vital Sign Min/Max for last 24 hours  Temp  Min: 97.3 °F (36.3 °C)  Max: 98.7 °F (37.1 °C)   BP  Min: 133/86  Max: 147/106   Pulse  Min: 56  Max: 87   Resp  Min: 14  Max: 18   SpO2  Min: 98 %  Max: 99 %   No data recorded   No data recorded                         Body mass index is 18 kg/m².    Intake/Output Summary (Last 24 hours) at 2020 1100  Last data filed at 2020 0951  Gross per 24 hour   Intake 2325 ml   Output 2175 ml   Net 150 ml         Exam:  GEN:  No distress, appears stated age.  Persistent left hemiparesis  EYES:   PERRL, anicteric sclera  ENT:    External ears/nose normal, OP clear  NECK:  No adenopathy, midline trachea  LUNGS: Normal chest on inspection, palpation and auscultation  CV:  Normal S1S2, without murmur  ABD:  Non tender, non distended, no hepatosplenomegaly, +BS  EXT:  No edema, cyanosis or clubbing    Scheduled meds:      dexamethasone 1 mg Intravenous Q6H   insulin regular 0-7 Units Subcutaneous Q6H   levETIRAcetam 500 mg Nasogastric Q12H   sodium chloride 10 mL Intravenous Q12H     IV meds:                         Data Review:  Results from last 7 days   Lab Units 20  0436 20  1945 20  0432 20  0355 20  0410 20  1656   SODIUM mmol/L 140  --  138 138 133* 137   POTASSIUM mmol/L 4.4 4.4 3.2* 4.0 3.9 3.7   CHLORIDE mmol/L 101  --  109* 103 100 99   CO2 mmol/L 30.0*  --  24.2 25.3 25.3 23.9   BUN mg/dL 26*  --  24* 19 17 20   CREATININE mg/dL 0.55*  --  0.37* 0.49* 0.54*  0.72   GLUCOSE mg/dL 144*  --  143* 169* 138* 172*   CALCIUM mg/dL 9.1  --  7.4* 9.0 8.6 8.9         Estimated Creatinine Clearance: 90.5 mL/min (A) (by C-G formula based on SCr of 0.55 mg/dL (L)).  Results from last 7 days   Lab Units 07/05/20  0436 07/04/20  0432 07/03/20  0355 07/02/20  0410 07/01/20  1656   WBC 10*3/mm3 11.90* 14.13* 12.33* 15.32* 24.69*   HEMOGLOBIN g/dL 14.1 13.5 14.0 13.3 14.3   PLATELETS 10*3/mm3 244 236 231 245 331     Results from last 7 days   Lab Units 07/02/20  0410   INR  1.02                       CT head 7/3/2020 reviewed    Microbiology reviewed  )        Active Hospital Problems    Diagnosis  POA   • **Brain tumor (CMS/HCC) [D49.6]  Yes   • Brain abscess [G06.0]  Yes   • Intracranial hemorrhage (CMS/HCC) [I62.9]  No      Resolved Hospital Problems   No resolved problems to display.       Assessment   ASSESSMENT:  Craniotomy for resection brain lesions, 7/1/2020  Postoperative small hemorrhage with left hemiparesis  Recent dental work.    PLAN:  PT/ST/OT  Antibiotics discontinued.  Pathology indicates malignant neoplasm.  Oncology following.  On Decadron and Keppra.  Mechanical DVT prophylaxis   Respiratory status currently stable.  We will see as needed.  Please call with questions.      Finn Toro MD  Pulmonary and Critical Care Medicine  [facility], [facility]  7/5/2020    11:00

## 2020-07-06 PROBLEM — C71.9 MALIGNANT BRAIN TUMOR (HCC): Status: ACTIVE | Noted: 2020-01-01

## 2020-07-06 PROBLEM — R13.10 DYSPHAGIA: Status: ACTIVE | Noted: 2020-01-01

## 2020-07-06 PROBLEM — R53.1 ACUTE LEFT-SIDED WEAKNESS: Status: ACTIVE | Noted: 2020-01-01

## 2020-07-06 NOTE — PAYOR COMM NOTE
"Denise Guajardo (54 y.o. Female)                   ATTENTION;   ;CONTINUED STAY CLINICALS CASE REF [id number], REPLY TO UR DEPT                  [phone] OR CALL          Date of Birth Social Security Number Address Home Phone MRN    1965  [id number] [facility] [place] [address] [phone] [id number]    Judaism Marital Status          Jainism        Admission Date Admission Type Admitting Provider Attending Provider Department, Room/Bed    6/26/20 Urgent Bill Wu MD Masden, Troy Andrew, MD [address], P599/1    Discharge Date Discharge Disposition Discharge Destination                       Attending Provider:  Jd Regan MD    Allergies:  No Known Allergies    Isolation:  None   Infection:  None   Code Status:  CPR    Ht:  165 cm (64.96\")   Wt:  49 kg (108 lb 0.4 oz)    Admission Cmt:  None   Principal Problem:  Malignant brain tumor (CMS/HCC) [C71.9]                 Active Insurance as of 6/26/2020     Primary Coverage     Payor Plan Insurance Group Employer/Plan Group    ANTHEM BLUE CROSS ANTHEM BLUE CROSS BLUE SHIELD PPO [id number]     Payor Plan Address Payor Plan Phone Number Payor Plan Fax Number Effective Dates    [id number] [phone]  5/1/2020 - None Entered    [place] [address]       Subscriber Name Subscriber Birth Date Member ID       DENISE GUAJARDO 1965 [id number]                 Emergency Contacts      (Rel.) Home Phone Work Phone Mobile Phone    Malick Guajardo (Spouse) [phone] -- [phone]    Shay Henry (Son) [phone] -- --    Jhonatan Henry (Son) [phone] -- --            Vital Signs (last 3 days)     Date/Time   Temp   Temp src   Pulse   Resp   BP   Patient Position   SpO2    07/06/20 1346   97.6 (36.4)   Oral   72   18   (!) 141/103   Lying   99    07/06/20 0900   97.8 (36.6)   Oral   76   16   130/98   Lying   98    07/06/20 0438   98.4 (36.9)   Oral   70   16   124/89   Lying  "  96    07/06/20 0038   98.5 (36.9)   Oral   66   17   116/77   Lying   97    07/05/20 1900   98.9 (37.2)   Oral   70   16   141/93   Lying   97    07/05/20 1740   98.8 (37.1)   Oral   72   16   137/97   Lying   97    07/05/20 1415   98.7 (37.1)   Oral   66   17   137/92   Lying   98    07/05/20 0900   98.7 (37.1)   Oral   64   16   138/91   Lying   98    07/05/20 0646   98.6 (37)   Oral   --   16   137/89   Lying   --    07/04/20 2129   97.3 (36.3)   Oral   56   14   140/91   Lying   --    07/04/20 1618   97.4 (36.3)   Oral   71   18   134/90   Lying   98    07/04/20 1314   98.3 (36.8)   Oral   64   18   133/86   Lying   99    07/04/20 1200   --   --   87   --   (!) 147/106   --   98    07/04/20 1105   97.8 (36.6)   Oral   --   --   --   --   --    07/04/20 1100   --   --   73   --   125/83   --   97    07/04/20 1030   --   --   80   --   136/85   --   98    07/04/20 1000   --   --   66   --   125/73   --   97    07/04/20 0930   --   --   62   --   119/69   --   97    07/04/20 0900   --   --   64   --   117/74   --   98    07/04/20 0830   --   --   77   18   126/81   Lying   98    07/04/20 0800   --   --   79   --   126/81   --   98    07/04/20 0746   98.7 (37.1)   Oral   --   --   --   --   --    07/04/20 0730   --   --   80   --   132/88   --   98    07/04/20 0701   --   --   75   --   --   --   98    07/04/20 0700   --   --   --   --   113/72   --   --    07/04/20 0630   --   --   75   --   123/72   --   98    07/04/20 0600   --   --   70   --   122/72   --   99    07/04/20 0530   --   --   79   --   129/83   --   98    07/04/20 0500   --   --   80   --   128/78   --   97    07/04/20 0432   --   --   81   --   127/81   --   97    07/04/20 0420   98.4 (36.9)   Oral   --   --   --   --   --    07/04/20 0400   --   --   76   --   129/78   --   98    07/04/20 0300   --   --   79   --   125/81   --   98    07/04/20 0200   --   --   65   --   122/66   --   98    07/04/20 0130   --   --   70   --   120/65   --   97     07/04/20 0100   --   --   63   --   115/70   --   98    07/04/20 0030   --   --   75   --   112/69   --   95    07/04/20 0000   --   --   92   --   127/88   --   98    07/03/20 2345   97.7 (36.5)   Oral   71   --   108/67   --   97       Lines, Drains & Airways    Active LDAs     Name:   Placement date:   Placement time:   Site:   Days:    Peripheral IV 07/03/20 2000 Right Forearm   07/03/20 2000    Forearm   2    NG/OG Tube Nasogastric 10 Fr Left nostril   07/02/20    1400    Left nostril   4    External Urinary Catheter   07/06/20    0948    --   less than 1                  Orders (last 72 hrs)      Start     Ordered    07/06/20 1346  Fibersource HN ((Jevity 1.2)); Tube Feeding Type: Continuous; Continuous Tube Feeding Start Rate (mL/hr): 20; Then Advance Rate By (mL/hr): 15; Every __ Hours: 8; To Goal Rate of (mL/hr): 55  Diet Effective Now      07/06/20 1345    07/06/20 1119  POC Glucose Once  Once      07/06/20 1113    07/06/20 1045  famotidine (PEPCID) injection 20 mg  Every 12 Hours Scheduled      07/06/20 0959    07/06/20 0959  Discontinue Indwelling Urinary Catheter  Once      07/06/20 0959    07/06/20 0959  Notify Provider if Bladder Distention Continues  Until Discontinued      07/06/20 0959    07/06/20 0959  Consult Pharmacist For Review of Medications That May Cause Urinary Retention - RN To Place Order for Consult it Needed  Continuous      07/06/20 0959    07/06/20 0959  Bladder Scan  Every Shift     Comments:  In and out catheterization for any volumes greater than 350 cc.  If patient requires greater than 2 in and out catheterizations please replace Bernabe catheter    07/06/20 0959    07/06/20 0600  CBC Auto Differential  PROCEDURE ONCE      07/06/20 0001    07/06/20 0536  POC Glucose Once  Once      07/06/20 0534    07/05/20 2220  POC Glucose Once  Once      07/05/20 2218    07/05/20 1741  POC Glucose Once  Once      07/05/20 1739    07/05/20 1238  methocarbamol (ROBAXIN) tablet 500 mg  Every 6  Hours PRN      07/05/20 1239    07/05/20 1133  POC Glucose Once  Once      07/05/20 1129    07/05/20 1127  Splint Application  Once     Comments:  L'nard splint to left leg    07/05/20 1126    07/05/20 0600  CBC Auto Differential  PROCEDURE ONCE      07/05/20 0001    07/04/20 2208  POC Glucose Once  Once      07/04/20 2205    07/04/20 1935  Potassium  Once      07/04/20 1935    07/04/20 1935  Magnesium  Once      07/04/20 1935    07/04/20 1930  dexamethasone (DECADRON) injection 1 mg  Every 6 Hours      07/04/20 1445    07/04/20 1802  POC Glucose Once  Once      07/04/20 1758    07/04/20 1600  Potassium  Timed,   Status:  Canceled      07/04/20 1144    07/04/20 1600  Magnesium  Timed,   Status:  Canceled      07/04/20 1144    07/04/20 1200  Neuro Checks  Every 4 Hours      07/04/20 1034    07/04/20 1111  POC Glucose Once  Once      07/04/20 1107    07/04/20 1040  Transfer Patient  Once      07/04/20 1039    07/04/20 1032  Maintain SBP<160  Nursing Communication  Continuous     Comments:  Maintain SBP<160    07/04/20 1034    07/04/20 1031  Inpatient Rehab Admission Consult  Once     Provider:  (Not yet assigned)    07/04/20 1034    07/04/20 0600  CBC Auto Differential  PROCEDURE ONCE      07/04/20 0001    07/04/20 0556  Patient Currently On Electrolyte Replacement Protocol - Please Refer to MAR for Protocol Details  Misc Nursing Order (Specify)  Daily     Comments:  Patient Currently On Electrolyte Replacement Protocol - Please Refer to MAR for Protocol Details    07/04/20 0555    07/04/20 0554  potassium chloride (MICRO-K) CR capsule 40 mEq  As Needed      07/04/20 0555    07/04/20 0554  potassium chloride (KLOR-CON) packet 40 mEq  As Needed      07/04/20 0555    07/04/20 0554  potassium chloride 10 mEq in 100 mL IVPB  Every 1 Hour PRN      07/04/20 0555    07/04/20 0418  Hematology & Oncology Inpatient Consult  Once     Specialty:  Hematology and Oncology  Provider:  Shay Horner MD    07/04/20 0418    07/03/20  2350  POC Glucose Once  Once      07/03/20 2347    07/03/20 2200  metroNIDAZOLE (FLAGYL) tablet 500 mg  Every 8 Hours Scheduled,   Status:  Discontinued      07/03/20 1328    07/03/20 2200  metroNIDAZOLE (FLAGYL) tablet 500 mg  Every 8 Hours Scheduled,   Status:  Discontinued      07/03/20 1329    07/03/20 2100  levETIRAcetam (KEPPRA) 100 MG/ML solution 500 mg  Every 12 Hours Scheduled      07/03/20 1328    07/03/20 1800  POC Glucose Q6H  Every 6 Hours      07/03/20 1321    07/03/20 1759  POC Glucose Once  Once      07/03/20 1752    07/03/20 1415  insulin regular (humuLIN R,novoLIN R) injection 0-7 Units  Every 6 Hours Scheduled      07/03/20 1321    07/03/20 1321  dextrose (GLUTOSE) oral gel 15 g  Every 15 Minutes PRN      07/03/20 1321    07/03/20 1321  dextrose (D50W) 25 g/ 50mL Intravenous Solution 25 g  Every 15 Minutes PRN      07/03/20 1321    07/03/20 1321  glucagon (human recombinant) (GLUCAGEN DIAGNOSTIC) injection 1 mg  Every 15 Minutes PRN      07/03/20 1321    07/02/20 1200  POC Glucose Finger TID AC  3 Times Daily Before Meals,   Status:  Canceled      07/02/20 1125    07/02/20 0100  dexamethasone (DECADRON) injection 2 mg  Every 6 Hours,   Status:  Discontinued      07/02/20 0032    07/01/20 1639  CBC & Differential  Daily      07/01/20 1638    07/01/20 1639  Basic Metabolic Panel  Daily      07/01/20 1638    07/01/20 1638  HYDROmorphone (DILAUDID) injection 0.5 mg  Every 2 Hours PRN      07/01/20 1638    07/01/20 1638  naloxone (NARCAN) injection 0.4 mg  Every 5 Minutes PRN      07/01/20 1638    07/01/20 1638  ondansetron (ZOFRAN) tablet 4 mg  Every 6 Hours PRN      07/01/20 1638    07/01/20 1638  ondansetron (ZOFRAN) injection 4 mg  Every 6 Hours PRN      07/01/20 1638    07/01/20 1637  HYDROcodone-acetaminophen (NORCO) 5-325 MG per tablet 1 tablet  Every 4 Hours PRN      07/01/20 1638    07/01/20 1412  cefTRIAXone (ROCEPHIN) IVPB 2 g/50ml dextrose (premix)  Every 12 Hours,   Status:  Discontinued       07/01/20 1410    07/01/20 1329  niCARdipine (CARDENE) 25 mg in 250 mL NS (0.1 mg/mL) infusion kit  Titrated,   Status:  Discontinued      07/01/20 1327    06/26/20 2100  sodium chloride 0.9 % flush 10 mL  Every 12 Hours Scheduled      06/26/20 1608    06/26/20 1608  ondansetron (ZOFRAN) tablet 4 mg  Every 6 Hours PRN      06/26/20 1608    06/26/20 1608  ondansetron (ZOFRAN) injection 4 mg  Every 6 Hours PRN      06/26/20 1608    06/26/20 1607  acetaminophen (TYLENOL) tablet 650 mg  Every 4 Hours PRN      06/26/20 1608    06/26/20 1607  acetaminophen (TYLENOL) 160 MG/5ML solution 650 mg  Every 4 Hours PRN      06/26/20 1608    06/26/20 1607  acetaminophen (TYLENOL) suppository 650 mg  Every 4 Hours PRN      06/26/20 1608    06/26/20 1604  sodium chloride 0.9 % flush 10 mL  As Needed      06/26/20 1608    Unscheduled  Finn and Document Tube Depth (in cm)  As Needed      07/02/20 1232    Unscheduled  Up With Assistance  As Needed      07/02/20 1302    Unscheduled  Magnesium  As Needed      07/04/20 0555    Unscheduled  Potassium  As Needed      07/04/20 0555    Unscheduled  Apply Ice To Affected Area  As Needed     Comments:  Right mandible    07/05/20 1239    Unscheduled  Bladder Scan if Patient Unable to Void 4-6 Hours After Catheter Removal  As Needed      07/06/20 0959    Unscheduled  If Bladder Scan Volume is Less Than 500mL & Patient is Without Symptoms of Bladder Discomfort / Distention Monitor Every 1-2 Hours for Spontaneous Void  As Needed      07/06/20 0959    Unscheduled  Straight Cath Every 4-6 Hours As Needed If Patient is Unable to Void After 4-6 Hours, Bladder Scan Volume is Greater Than 500mL & Patient Has Symptoms of Bladder Discomfort / Distention  As Needed      07/06/20 0959    Unscheduled  Schedule / Prompt Voiding For Patients With Urinary Incontinence  As Needed      07/06/20 0959                   Physician Progress Notes       Aby Dover, APRN at 07/06/20 1001     Attestation signed by  Shai Liocna MD at 07/06/20 1122    I have reviewed this documentation and agree.                    Postop   LOS: 10 days   Patient Care Team:  Paz Mera MD as PCP - General (Family Medicine)    Chief Complaint: Postop right temporal craniotomy    Subjective      at bedside, no new complaints.  Awaiting rehab consult.    Interval History:     History taken from: patient chart family RN    Objective      Awake and alert, answers questions but speech hypophonic  E OEM intact  Left facial weakness  Tongue deviates to left  Left arm flaccid  Minimal movement with left dorsiflexion  Right cranial incision well approximated, no redness, drainage, slight swelling    Vital Signs  Temp:  [97.8 °F (36.6 °C)-98.9 °F (37.2 °C)] 97.8 °F (36.6 °C)  Heart Rate:  [66-76] 76  Resp:  [16-17] 16  BP: (116-141)/(77-98) 130/98       Results Review:     I reviewed the patient's new clinical results.    .  Results from last 7 days   Lab Units 07/06/20  0551 07/05/20  0436 07/04/20  0432   WBC 10*3/mm3 8.89 11.90* 14.13*   HEMOGLOBIN g/dL 15.1 14.1 13.5   HEMATOCRIT % 42.7 40.2 38.4   PLATELETS 10*3/mm3 249 244 236     .  Results from last 7 days   Lab Units 07/06/20  0551 07/05/20  0436 07/04/20  1945 07/04/20  0432   SODIUM mmol/L 136 140  --  138   POTASSIUM mmol/L 4.2 4.4 4.4 3.2*   CHLORIDE mmol/L 97* 101  --  109*   CO2 mmol/L 29.0 30.0*  --  24.2   BUN mg/dL 28* 26*  --  24*   CREATININE mg/dL 0.50* 0.55*  --  0.37*   CALCIUM mg/dL 8.8 9.1  --  7.4*   GLUCOSE mg/dL 135* 144*  --  143*         Assessment/Plan       Malignant brain tumor (CMS/HCC)    Intracranial hemorrhage (CMS/HCC)    Dysphagia    Acute left-sided weakness      Postop day #5 right temporal craniotomy for excisional biopsy.  Final path pending, was sent to [place].  Await rehab evaluation.   is very hopeful that she will be a candidate for acute rehab as she was very active prior to hospital admission and feels strongly she will be  "motivated to push herself in rehab and capable of working at least 3 hours a day.      Aby Dover, APRN  20  11:10    Electronically signed by Shai Licona MD at 20 1122     Jd Regan MD at 20 0906              DAILY PROGRESS NOTE  [facility]    Patient Identification:  Name: Denise Guajardo  Age: 54 y.o.  Sex: female  :  1965  MRN: [id number]         Primary Care Physician: Paz Mera MD    Subjective:  Interval History: The patient herself is not voicing any new complaints.  She is alert and oriented x3 and has some difficulties with speech at time.  Her affect is a bit flat as opposed to her baseline.  She denies any problems breathing chest pain or current confusion    Objective: Discussed case with RN but more so with spouse.  Discussed extensively with spouse outside the room as well.  Patient is postoperative appearing with left-sided flaccid paralysis but otherwise appears clinically stable at this point    Scheduled Meds:    dexamethasone 1 mg Intravenous Q6H   insulin regular 0-7 Units Subcutaneous Q6H   levETIRAcetam 500 mg Nasogastric Q12H   sodium chloride 10 mL Intravenous Q12H     Continuous Infusions:     Vital signs in last 24 hours:  Temp:  [98.4 °F (36.9 °C)-98.9 °F (37.2 °C)] 98.4 °F (36.9 °C)  Heart Rate:  [66-72] 70  Resp:  [16-17] 16  BP: (116-141)/(77-97) 124/89    Intake/Output:    Intake/Output Summary (Last 24 hours) at 2020 0910  Last data filed at 2020 0429  Gross per 24 hour   Intake 2334 ml   Output 850 ml   Net 1484 ml       Exam:  /89 (BP Location: Right arm, Patient Position: Lying)   Pulse 70   Temp 98.4 °F (36.9 °C) (Oral)   Resp 16   Ht 165 cm (64.96\")   Wt 49 kg (108 lb 0.4 oz)   SpO2 96%   BMI 18.00 kg/m²      General Appearance:    Alert, cooperative, no distress, AAOx3                          Head:  Status post craniotomy and scars look clean and dry                           eyes:    " PERRLA, conjunctivae/corneas clear, EOM's intact, both eyes                         Throat:   Oral mucosa pink and moist -core track                           Neck:   No JVD                         Lungs:    Clear to auscultation bilaterally, respirations unlabored                          Heart:    Regular rate and rhythm, S1 and S2 normal                  Abdomen:     Soft, nontender, bowel sounds active, no masses, no organomegaly                  Extremities:   No cyanosis or edema                        Pulses:   Pulses palpable in all extremities                            Skin:   Skin is warm and dry                  Neurologic:   CNII-XII intact though left side a little weaker with tongue deviation, flaccid left-sided paralysis     Data Review:  Labs in chart were reviewed.    Assessment:  Active Hospital Problems    Diagnosis  POA   • **Malignant brain tumor (CMS/HCC) [C71.9]  Unknown   • Dysphagia [R13.10]  Unknown   • Acute left-sided weakness [R53.1]  Unknown   • Intracranial hemorrhage (CMS/HCC) [I62.9]  No      Resolved Hospital Problems    Diagnosis Date Resolved POA   • Brain abscess [G06.0] 07/05/2020 Yes       Plan:    Brain mass with persistent left-sided hemiplegia/dysphasia/hypophonia   -Initial pathology consistent with neoplasm and final pathology pending as sent to outside pathology office   -Antibiotics discontinued -afebrile -ID has also signed off   -Decadron/Keppra/add Pepcid for GI prophylaxis   -Stable from respiratory standpoint and pulmonology has signed off   -Oncology now going to follow more peripherally    Mild hyperglycemia secondary to steroids - tempted to remove sliding scale here in the next day or 2 as her requirements are minimal at best and there is no past history of diabetes but will continue for now and observe    SCDs for DVT prophylaxis      Disposition pending CCP/placement as well as final okay from [facility] -hopefully there should be no issue with this patient  "transitioning over to our acute rehab as that would be clinically best for this patient    >30m spent counseling patient but more so with time spent with  outside of the room discussing the case and answering all of his questions    Jd Regan MD  2020  09:10  '    Electronically signed by Jd Regan MD at 20 1004     Kamron Gautam MD at 20 1305        We are following peripherally until further information is available on the biopsy specimen.  If patient is transferred to the acute rehab at [facility] we will continue to follow after transfer as well.    Plan to check again Monday    Electronically signed by Kamron Gautam MD at 20 1307     Bill Wu MD at 20 1210          DAILY PROGRESS NOTE  [facility]    Patient Identification:  Name: Denise Guajardo  Age: 54 y.o.  Sex: female  :  1965  MRN: [id number]         Primary Care Physician: Paz Mera MD    Subjective:  Interval History: She is weak.    Objective:    Scheduled Meds:    dexamethasone 1 mg Intravenous Q6H   insulin regular 0-7 Units Subcutaneous Q6H   levETIRAcetam 500 mg Nasogastric Q12H   sodium chloride 10 mL Intravenous Q12H     Continuous Infusions:     Vital signs in last 24 hours:  Temp:  [97.3 °F (36.3 °C)-98.7 °F (37.1 °C)] 98.7 °F (37.1 °C)  Heart Rate:  [56-71] 64  Resp:  [14-18] 16  BP: (133-140)/(86-91) 138/91    Intake/Output:    Intake/Output Summary (Last 24 hours) at 2020 1215  Last data filed at 2020 0951  Gross per 24 hour   Intake 2136 ml   Output 2000 ml   Net 136 ml       Exam:  /91 (BP Location: Right arm, Patient Position: Lying)   Pulse 64   Temp 98.7 °F (37.1 °C) (Oral)   Resp 16   Ht 165 cm (64.96\")   Wt 49 kg (108 lb 0.4 oz)   SpO2 98%   BMI 18.00 kg/m²      General Appearance:    Alert, cooperative, no distress   Head:    Normocephalic, without obvious abnormality, atraumatic   Eyes:       Throat:   Lips, tongue, " gums normal   Neck:   Supple, symmetrical, trachea midline, no JVD   Lungs:     Clear to auscultation bilaterally, respirations unlabored   Chest Wall:    No tenderness or deformity    Heart:    Regular rate and rhythm, S1 and S2 normal, no murmur,no  Rub or gallop   Abdomen:     Soft, nontender, bowel sounds active, no masses, no organomegaly    Extremities:   Extremities normal, atraumatic, no cyanosis or edema   Pulses:      Skin:   Skin is warm and dry,  no rashes or palpable lesions   Neurologic:  Left hemiparesis      Lab Results (last 72 hours)     Procedure Component Value Units Date/Time    POC Glucose Once [[id number]]  (Abnormal) Collected:  07/05/20 1129    Specimen:  Blood Updated:  07/05/20 1132     Glucose 132 mg/dL     Wound Culture - Wound, Brain [[id number]] Collected:  07/01/20 1209    Specimen:  Wound from Brain Updated:  07/05/20 0955     Wound Culture No growth at 4 days     Gram Stain No WBCs or organisms seen    Wound Culture - Wound, Brain [[id number]] Collected:  07/01/20 1159    Specimen:  Wound from Brain Updated:  07/05/20 0954     Wound Culture No growth at 4 days     Gram Stain Occasional WBCs seen      No organisms seen    Basic Metabolic Panel [[id number]]  (Abnormal) Collected:  07/05/20 0436    Specimen:  Blood Updated:  07/05/20 0610     Glucose 144 mg/dL      BUN 26 mg/dL      Creatinine 0.55 mg/dL      Sodium 140 mmol/L      Potassium 4.4 mmol/L      Comment: Specimen hemolyzed.  Results may be affected.        Chloride 101 mmol/L      CO2 30.0 mmol/L      Calcium 9.1 mg/dL      eGFR Non African Amer 115 mL/min/1.73      BUN/Creatinine Ratio 47.3     Anion Gap 9.0 mmol/L     Narrative:       GFR Normal >60  Chronic Kidney Disease <60  Kidney Failure <15      CBC & Differential [[id number]] Collected:  07/05/20 0436    Specimen:  Blood Updated:  07/05/20 0544    Narrative:       The following orders were created for panel order CBC & Differential.  Procedure                                Abnormality         Status                     ---------                               -----------         ------                     CBC Auto Differential[[id number]]        Abnormal            Final result                 Please view results for these tests on the individual orders.    CBC Auto Differential [[id number]]  (Abnormal) Collected:  07/05/20 0436    Specimen:  Blood Updated:  07/05/20 0544     WBC 11.90 10*3/mm3      RBC 4.52 10*6/mm3      Hemoglobin 14.1 g/dL      Hematocrit 40.2 %      MCV 88.9 fL      MCH 31.2 pg      MCHC 35.1 g/dL      RDW 11.6 %      RDW-SD 37.5 fl      MPV 9.4 fL      Platelets 244 10*3/mm3      Neutrophil % 87.0 %      Lymphocyte % 4.0 %      Monocyte % 7.8 %      Eosinophil % 0.0 %      Basophil % 0.1 %      Immature Grans % 1.1 %      Neutrophils, Absolute 10.35 10*3/mm3      Lymphocytes, Absolute 0.48 10*3/mm3      Monocytes, Absolute 0.93 10*3/mm3      Eosinophils, Absolute 0.00 10*3/mm3      Basophils, Absolute 0.01 10*3/mm3      Immature Grans, Absolute 0.13 10*3/mm3      nRBC 0.0 /100 WBC     POC Glucose Once [[id number]]  (Abnormal) Collected:  07/04/20 2205    Specimen:  Blood Updated:  07/04/20 2207     Glucose 163 mg/dL     Potassium [[id number]]  (Normal) Collected:  07/04/20 1945    Specimen:  Blood Updated:  07/04/20 2043     Potassium 4.4 mmol/L     Magnesium [[id number]]  (Normal) Collected:  07/04/20 1945    Specimen:  Blood Updated:  07/04/20 2028     Magnesium 2.3 mg/dL     POC Glucose Once [[id number]]  (Abnormal) Collected:  07/04/20 1758    Specimen:  Blood Updated:  07/04/20 1801     Glucose 141 mg/dL     POC Glucose Once [[id number]]  (Abnormal) Collected:  07/04/20 1107    Specimen:  Blood Updated:  07/04/20 1111     Glucose 156 mg/dL     Anaerobic Culture - Wound, Brain [[id number]] Collected:  07/01/20 1159    Specimen:  Wound from Brain Updated:  07/04/20 0939     Anaerobic Culture No anaerobes isolated at 3 days    Anaerobic Culture - Wound, Brain  [[id number]] Collected:  07/01/20 1209    Specimen:  Wound from Brain Updated:  07/04/20 0939     Anaerobic Culture No anaerobes isolated at 3 days    Culture, CSF - Cerebrospinal Fluid, Spine, Lumbar [[id number]] Collected:  07/01/20 1048    Specimen:  Cerebrospinal Fluid from Spine, Lumbar Updated:  07/04/20 0927     CSF Culture No growth at 3 days     Gram Stain No WBCs or organisms seen    Blood Culture - Blood, Arm, Right [[id number]] Collected:  06/29/20 0516    Specimen:  Blood from Arm, Right Updated:  07/04/20 0545     Blood Culture No growth at 5 days    Blood Culture - Blood, Arm, Left [[id number]] Collected:  06/29/20 0520    Specimen:  Blood from Arm, Left Updated:  07/04/20 0545     Blood Culture No growth at 5 days    Basic Metabolic Panel [[id number]]  (Abnormal) Collected:  07/04/20 0432    Specimen:  Blood Updated:  07/04/20 0513     Glucose 143 mg/dL      BUN 24 mg/dL      Creatinine 0.37 mg/dL      Sodium 138 mmol/L      Potassium 3.2 mmol/L      Chloride 109 mmol/L      CO2 24.2 mmol/L      Calcium 7.4 mg/dL      eGFR Non African Amer >150 mL/min/1.73      BUN/Creatinine Ratio 64.9     Anion Gap 4.8 mmol/L     Narrative:       GFR Normal >60  Chronic Kidney Disease <60  Kidney Failure <15      CBC & Differential [[id number]] Collected:  07/04/20 0432    Specimen:  Blood Updated:  07/04/20 0443    Narrative:       The following orders were created for panel order CBC & Differential.  Procedure                               Abnormality         Status                     ---------                               -----------         ------                     CBC Auto Differential[[id number]]        Abnormal            Final result                 Please view results for these tests on the individual orders.    CBC Auto Differential [[id number]]  (Abnormal) Collected:  07/04/20 0432    Specimen:  Blood Updated:  07/04/20 0443     WBC 14.13 10*3/mm3      RBC 4.30 10*6/mm3      Hemoglobin 13.5 g/dL       Hematocrit 38.4 %      MCV 89.3 fL      MCH 31.4 pg      MCHC 35.2 g/dL      RDW 12.1 %      RDW-SD 39.4 fl      MPV 9.0 fL      Platelets 236 10*3/mm3      Neutrophil % 91.9 %      Lymphocyte % 2.0 %      Monocyte % 5.3 %      Eosinophil % 0.0 %      Basophil % 0.1 %      Immature Grans % 0.7 %      Neutrophils, Absolute 12.99 10*3/mm3      Lymphocytes, Absolute 0.28 10*3/mm3      Monocytes, Absolute 0.75 10*3/mm3      Eosinophils, Absolute 0.00 10*3/mm3      Basophils, Absolute 0.01 10*3/mm3      Immature Grans, Absolute 0.10 10*3/mm3      nRBC 0.0 /100 WBC     POC Glucose Once [[id number]]  (Abnormal) Collected:  07/03/20 2347    Specimen:  Blood Updated:  07/03/20 2349     Glucose 179 mg/dL     POC Glucose Once [[id number]]  (Abnormal) Collected:  07/03/20 1752    Specimen:  Blood Updated:  07/03/20 1758     Glucose 165 mg/dL     POC Glucose Once [[id number]]  (Abnormal) Collected:  07/03/20 1156    Specimen:  Blood Updated:  07/03/20 1206     Glucose 187 mg/dL     Basic Metabolic Panel [[id number]]  (Abnormal) Collected:  07/03/20 0355    Specimen:  Blood Updated:  07/03/20 0445     Glucose 169 mg/dL      BUN 19 mg/dL      Creatinine 0.49 mg/dL      Sodium 138 mmol/L      Potassium 4.0 mmol/L      Chloride 103 mmol/L      CO2 25.3 mmol/L      Calcium 9.0 mg/dL      eGFR Non African Amer 132 mL/min/1.73      BUN/Creatinine Ratio 38.8     Anion Gap 9.7 mmol/L     Narrative:       GFR Normal >60  Chronic Kidney Disease <60  Kidney Failure <15      CBC & Differential [[id number]] Collected:  07/03/20 0355    Specimen:  Blood Updated:  07/03/20 0421    Narrative:       The following orders were created for panel order CBC & Differential.  Procedure                               Abnormality         Status                     ---------                               -----------         ------                     CBC Auto Differential[[id number]]        Abnormal            Final result                 Please view results for  these tests on the individual orders.    CBC Auto Differential [[id number]]  (Abnormal) Collected:  07/03/20 0355    Specimen:  Blood Updated:  07/03/20 0421     WBC 12.33 10*3/mm3      RBC 4.55 10*6/mm3      Hemoglobin 14.0 g/dL      Hematocrit 39.1 %      MCV 85.9 fL      MCH 30.8 pg      MCHC 35.8 g/dL      RDW 11.6 %      RDW-SD 36.3 fl      MPV 9.2 fL      Platelets 231 10*3/mm3      Neutrophil % 92.6 %      Lymphocyte % 2.6 %      Monocyte % 4.0 %      Eosinophil % 0.0 %      Basophil % 0.1 %      Immature Grans % 0.7 %      Neutrophils, Absolute 11.42 10*3/mm3      Lymphocytes, Absolute 0.32 10*3/mm3      Monocytes, Absolute 0.49 10*3/mm3      Eosinophils, Absolute 0.00 10*3/mm3      Basophils, Absolute 0.01 10*3/mm3      Immature Grans, Absolute 0.09 10*3/mm3      nRBC 0.0 /100 WBC         Data Review:  Results from last 7 days   Lab Units 07/05/20 0436 07/04/20  1945 07/04/20 0432 07/03/20 0355   SODIUM mmol/L 140  --  138 138   POTASSIUM mmol/L 4.4 4.4 3.2* 4.0   CHLORIDE mmol/L 101  --  109* 103   CO2 mmol/L 30.0*  --  24.2 25.3   BUN mg/dL 26*  --  24* 19   CREATININE mg/dL 0.55*  --  0.37* 0.49*   GLUCOSE mg/dL 144*  --  143* 169*   CALCIUM mg/dL 9.1  --  7.4* 9.0     Results from last 7 days   Lab Units 07/05/20  0436 07/04/20  0432 07/03/20 0355   WBC 10*3/mm3 11.90* 14.13* 12.33*   HEMOGLOBIN g/dL 14.1 13.5 14.0   HEMATOCRIT % 40.2 38.4 39.1   PLATELETS 10*3/mm3 244 236 231             Lab Results   Lab Value Date/Time    TROPONINT <0.010 06/26/2020 1053               Invalid input(s): PROT, LABALBU          Glucose   Date/Time Value Ref Range Status   07/05/2020 1129 132 (H) 70 - 130 mg/dL Final   07/04/2020 2205 163 (H) 70 - 130 mg/dL Final   07/04/2020 1758 141 (H) 70 - 130 mg/dL Final   07/04/2020 1107 156 (H) 70 - 130 mg/dL Final   07/03/2020 2347 179 (H) 70 - 130 mg/dL Final   07/03/2020 1752 165 (H) 70 - 130 mg/dL Final   07/03/2020 1156 187 (H) 70 - 130 mg/dL Final     Results from last 7  days   Lab Units 07/02/20  0410   INR  1.02       Past Medical History:   Diagnosis Date   • Vertigo        Assessment:  Active Hospital Problems    Diagnosis  POA   • **Brain tumor (CMS/HCC) [D49.6]  Yes   • Intracranial hemorrhage (CMS/HCC) [I62.9]  No      Resolved Hospital Problems    Diagnosis Date Resolved POA   • Brain abscess [G06.0] 07/05/2020 Yes       Plan:  Continue with tube feedings and support.  Continue with Decadron and Keppra.  Await results of path report.  Planning to go to acute rehab.  Cultures continue to be negative and antibiotics stopped and likely not brain abscess.  Bill Wu MD  7/5/2020  12:15      Electronically signed by Bill Wu MD at 07/05/20 1215     Joy Bautista APRN at 07/05/20 1113     Attestation signed by Rickey Frausto MD at 07/05/20 1536    I have reviewed this documentation and agree.                      [facility] NEUROSURGERY PROGRESS NOTE    PATIENT IDENTIFICATION:   Name:  Denise Guajardo      MRN:  [id number]     54 y.o.  female               CC: POD 4 right temporal craniotomy for excisional biopsy      Subjective     Interval History:     ROS:  HEENT: Right jaw pain, no vision changes  GI: No nausea, vomiting;swallow difficulties  Neuro: Left-sided plegia    Objective     Vital signs in last 24 hours:  Temp:  [97.3 °F (36.3 °C)-98.7 °F (37.1 °C)] 98.7 °F (37.1 °C)  Heart Rate:  [56-87] 64  Resp:  [14-18] 16  BP: (133-147)/() 138/91      Intake/Output this shift:  I/O this shift:  In: 1289 [Other:120; NG/GT:1169]  Out: -       Intake/Output last 3 shifts:  I/O last 3 completed shifts:  In: 2515.1 [I.V.:361.1; Other:330; NG/GT:1824]  Out: 3200 [Urine:3200]    LABS:  Results from last 7 days   Lab Units 07/05/20  0436 07/04/20  0432 07/03/20  0355   WBC 10*3/mm3 11.90* 14.13* 12.33*   HEMOGLOBIN g/dL 14.1 13.5 14.0   HEMATOCRIT % 40.2 38.4 39.1   PLATELETS 10*3/mm3 244 236 231     Results from last 7 days   Lab Units 07/05/20  0436 07/04/20  7276  07/04/20  0432 07/03/20  0355   SODIUM mmol/L 140  --  138 138   POTASSIUM mmol/L 4.4 4.4 3.2* 4.0   CHLORIDE mmol/L 101  --  109* 103   CO2 mmol/L 30.0*  --  24.2 25.3   BUN mg/dL 26*  --  24* 19   CREATININE mg/dL 0.55*  --  0.37* 0.49*   CALCIUM mg/dL 9.1  --  7.4* 9.0   GLUCOSE mg/dL 144*  --  143* 169*     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    Meds reviewed/changed: Yes    Current Facility-Administered Medications:   •  acetaminophen (TYLENOL) tablet 650 mg, 650 mg, Oral, Q4H PRN, 650 mg at 07/04/20 1435 **OR** acetaminophen (TYLENOL) 160 MG/5ML solution 650 mg, 650 mg, Oral, Q4H PRN **OR** acetaminophen (TYLENOL) suppository 650 mg, 650 mg, Rectal, Q4H PRN, Finn Toro MD  •  dexamethasone (DECADRON) injection 1 mg, 1 mg, Intravenous, Q6H, Shai Licona MD, 1 mg at 07/05/20 0634  •  dextrose (D50W) 25 g/ 50mL Intravenous Solution 25 g, 25 g, Intravenous, Q15 Min PRN, Finn Toro MD  •  dextrose (GLUTOSE) oral gel 15 g, 15 g, Oral, Q15 Min PRN, Finn Toro MD  •  glucagon (human recombinant) (GLUCAGEN DIAGNOSTIC) injection 1 mg, 1 mg, Subcutaneous, Q15 Min PRN, Finn Toro MD  •  HYDROcodone-acetaminophen (NORCO) 5-325 MG per tablet 1 tablet, 1 tablet, Oral, Q4H PRN, Finn Toro MD, 1 tablet at 07/05/20 1004  •  HYDROmorphone (DILAUDID) injection 0.5 mg, 0.5 mg, Intravenous, Q2H PRN, 0.5 mg at 07/01/20 5003 **AND** naloxone (NARCAN) injection 0.4 mg, 0.4 mg, Intravenous, Q5 Min PRN, Finn Toro MD  •  insulin regular (humuLIN R,novoLIN R) injection 0-7 Units, 0-7 Units, Subcutaneous, Q6H, Finn oTro MD, 2 Units at 07/05/20 0053  •  levETIRAcetam (KEPPRA) 100 MG/ML solution 500 mg, 500 mg, Nasogastric, Q12H,  Finn Toro MD, 500 mg at 07/04/20 2138  •  ondansetron (ZOFRAN) tablet 4 mg, 4 mg, Oral, Q6H PRN **OR** ondansetron (ZOFRAN) injection 4 mg, 4 mg, Intravenous, Q6H PRN, Finn Toro MD, 4 mg at 07/01/20 2323  •  ondansetron (ZOFRAN) tablet 4 mg, 4 mg, Oral, Q6H PRN **OR** ondansetron (ZOFRAN) injection 4 mg, 4 mg, Intravenous, Q6H PRN, Finn Toro MD  •  potassium chloride (MICRO-K) CR capsule 40 mEq, 40 mEq, Oral, PRN **OR** potassium chloride (KLOR-CON) packet 40 mEq, 40 mEq, Oral, PRN, 40 mEq at 07/04/20 1137 **OR** potassium chloride 10 mEq in 100 mL IVPB, 10 mEq, Intravenous, Q1H PRN, Finn Toro MD  •  sodium chloride 0.9 % flush 10 mL, 10 mL, Intravenous, Q12H, Finn Toro MD, 10 mL at 07/05/20 0951  •  sodium chloride 0.9 % flush 10 mL, 10 mL, Intravenous, PRN, Finn Toro MD      Physical Exam:    General:   Awake, alert, oriented x3. Speech hypophonic  HEENT:   Right frontal incision well approximated with no redness drainage, minimal swelling.  GEGE drain site clean dry intact dressing    CN: Extraocular movements are full , right pupil 3 mm and brisk, left pupil 4 mm and sluggish;Severe left facial weakness, particularly lower face.  Able to close eyes; tongue deviation to the left.  Able to blow out cheeks.    Motor: No movement left upper extremity, trace movement but not antigravity left lower extremity.  Normal strength right side  Sensation: Able to feel painful stimulus left side   Coordination: Finger-to-nose with mild ataxia on right, unable to assess left.  Extremities:   No calf swelling or tenderness    Assessment/Plan     ASSESSMENT:      Brain tumor (CMS/HCC)    Brain abscess    Intracranial hemorrhage (CMS/HCC)      PLAN: Patient continues with left-sided plegia.  She does have some movement to the left lower extremity but not antigravity.  She is verbalizing but she is very hypophonic.  Unable to swallow safely so has core track with  "feeds.  She is participatory in all aspects of therapy.  She is aware that current pathology reveals tumor but final pathology pending.  It is being sent  to [facility] for full work up.  She and her  are aware that this may take a couple of weeks for final results.  Her  is anxious for answers as to her prognosis and what he needs to be doing for the future.  I explained to him that we best for him to live in the moment and help her get through recovery from the surgery and to keep her spirits up.  She has a good motivation and we want to keep her focused on that.  [facility] acute rehab has been consulted.  From neurosurgical standpoint, she may be ready for discharge to rehab in the next couple of days.  We will try some muscle relaxer and ice for her jaw pain.    I discussed the patients findings and my recommendations with patient, nursing staff and Dr. Licona       LOS: 9 days       Joy Bautista, APRN  2020  11:13    \"Dictated utilizing Dragon dictation\".        Electronically signed by Rickey Frausto MD at 20 1536     Finn Toro MD at 20 1100              [facility] INPATIENT PROGRESS NOTE         [address]    2020      PATIENT IDENTIFICATION:  Name: Denise Guajardo ADMIT: 2020   : 1965  PCP: Paz Mera MD    MRN: [id number] LOS: 9 days   AGE/SEX: 54 y.o. female  ROOM: [facility]                     LOS 9    Reason for visit: Follow-up ICU medical management post craniotomy      SUBJECTIVE:      Doing well out of the intensive care unit.  No new pulmonary issues.      Objective   OBJECTIVE:    Vital Sign Min/Max for last 24 hours  Temp  Min: 97.3 °F (36.3 °C)  Max: 98.7 °F (37.1 °C)   BP  Min: 133/86  Max: 147/106   Pulse  Min: 56  Max: 87   Resp  Min: 14  Max: 18   SpO2  Min: 98 %  Max: 99 %   No data recorded   No data recorded                         Body mass index is 18 kg/m².    Intake/Output Summary (Last 24 hours) " at 7/5/2020 1100  Last data filed at 7/5/2020 0951  Gross per 24 hour   Intake 2325 ml   Output 2175 ml   Net 150 ml         Exam:  GEN:  No distress, appears stated age.  Persistent left hemiparesis  EYES:   PERRL, anicteric sclera  ENT:    External ears/nose normal, OP clear  NECK:  No adenopathy, midline trachea  LUNGS: Normal chest on inspection, palpation and auscultation  CV:  Normal S1S2, without murmur  ABD:  Non tender, non distended, no hepatosplenomegaly, +BS  EXT:  No edema, cyanosis or clubbing    Scheduled meds:      dexamethasone 1 mg Intravenous Q6H   insulin regular 0-7 Units Subcutaneous Q6H   levETIRAcetam 500 mg Nasogastric Q12H   sodium chloride 10 mL Intravenous Q12H     IV meds:                         Data Review:  Results from last 7 days   Lab Units 07/05/20 0436 07/04/20 1945 07/04/20 0432 07/03/20 0355 07/02/20 0410 07/01/20  1656   SODIUM mmol/L 140  --  138 138 133* 137   POTASSIUM mmol/L 4.4 4.4 3.2* 4.0 3.9 3.7   CHLORIDE mmol/L 101  --  109* 103 100 99   CO2 mmol/L 30.0*  --  24.2 25.3 25.3 23.9   BUN mg/dL 26*  --  24* 19 17 20   CREATININE mg/dL 0.55*  --  0.37* 0.49* 0.54* 0.72   GLUCOSE mg/dL 144*  --  143* 169* 138* 172*   CALCIUM mg/dL 9.1  --  7.4* 9.0 8.6 8.9         Estimated Creatinine Clearance: 90.5 mL/min (A) (by C-G formula based on SCr of 0.55 mg/dL (L)).  Results from last 7 days   Lab Units 07/05/20 0436 07/04/20 0432 07/03/20 0355 07/02/20 0410 07/01/20  1656   WBC 10*3/mm3 11.90* 14.13* 12.33* 15.32* 24.69*   HEMOGLOBIN g/dL 14.1 13.5 14.0 13.3 14.3   PLATELETS 10*3/mm3 244 236 231 245 331     Results from last 7 days   Lab Units 07/02/20  0410   INR  1.02                       CT head 7/3/2020 reviewed    Microbiology reviewed  )       Active Hospital Problems    Diagnosis  POA   • **Brain tumor (CMS/HCC) [D49.6]  Yes   • Brain abscess [G06.0]  Yes   • Intracranial hemorrhage (CMS/HCC) [I62.9]  No      Resolved Hospital Problems   No resolved problems to  display.       Assessment   ASSESSMENT:  Craniotomy for resection brain lesions, 2020  Postoperative small hemorrhage with left hemiparesis  Recent dental work.    PLAN:  PT/ST/OT  Antibiotics discontinued.  Pathology indicates malignant neoplasm.  Oncology following.  On Decadron and Keppra.  Mechanical DVT prophylaxis   Respiratory status currently stable.  We will see as needed.  Please call with questions.      Finn Toro MD  Pulmonary and Critical Care Medicine  [facility], [facility]  2020    11:00       Electronically signed by Finn Toro MD at 20 1102     Joseph Bryan MD at 20 0739              Name: Denise Guajardo ADMIT: 2020   : 1965  PCP: Paz Mera MD    MRN: [id number] LOS: 9 days   AGE/SEX: 54 y.o. female  ROOM: [facility]     Subjective   Subjective   This is a follow-up note patient is new to me.  Patient seen and examined chart reviewed discussed with nursing staff.  As of last evening noted.  Transferred out from the intensive care unit last evening to the floor.  Patient underwent craniotomy and resection of brain lesions on 2020 has had small hemorrhage with left hemiparesis initially was treated with empiric antibiotics for possible brain abscess pathology however consistent with malignancy.    Patient continues to have left-sided hemiparesis.  She is resting comfortably and is tolerating her tube feeds..    Review of Systems  Denies chest pain shortness of breath cough  Objective   Objective   Vital Signs  Temp:  [97.3 °F (36.3 °C)-98.7 °F (37.1 °C)] 98.6 °F (37 °C)  Heart Rate:  [56-87] 56  Resp:  [14-18] 16  BP: (117-147)/() 137/89  SpO2:  [97 %-99 %] 98 %  on   ;   Device (Oxygen Therapy): room air  Body mass index is 18 kg/m².  Physical Exam  General frail thin female lying in bed in no acute distress  HEENT  bandage in place from recent craniotomy, pupils are equal round reactive to light, sclerae clear, oropharynx is  clear  Neck trachea is midline without JVD or adenopathy  Lungs few scattered rhonchi but overall good air exchange  Cardiovascular regular rate and rhythm with normal S1 and S2 do not appreciate murmur gallop or rub.  Distal pulses are 2+  Abdomen is flat and tender do not appreciate hepatosplenomegaly bowel sounds present  Extremities without edema or rash.  Well-perfused  Neurologic left-sided hemiparesis  Results Review:       I reviewed the patient's new clinical results.  Results from last 7 days   Lab Units 07/05/20 0436 07/04/20 0432 07/03/20 0355 07/02/20  0410   WBC 10*3/mm3 11.90* 14.13* 12.33* 15.32*   HEMOGLOBIN g/dL 14.1 13.5 14.0 13.3   PLATELETS 10*3/mm3 244 236 231 245     Results from last 7 days   Lab Units 07/05/20 0436 07/04/20 1945 07/04/20 0432 07/03/20 0355 07/02/20 0410   SODIUM mmol/L 140  --  138 138 133*   POTASSIUM mmol/L 4.4 4.4 3.2* 4.0 3.9   CHLORIDE mmol/L 101  --  109* 103 100   CO2 mmol/L 30.0*  --  24.2 25.3 25.3   BUN mg/dL 26*  --  24* 19 17   CREATININE mg/dL 0.55*  --  0.37* 0.49* 0.54*   GLUCOSE mg/dL 144*  --  143* 169* 138*   Estimated Creatinine Clearance: 90.5 mL/min (A) (by C-G formula based on SCr of 0.55 mg/dL (L)).    Results from last 7 days   Lab Units 07/05/20 0436 07/04/20 1945 07/04/20 0432 07/03/20 0355 07/02/20 0410   CALCIUM mg/dL 9.1  --  7.4* 9.0 8.6   MAGNESIUM mg/dL  --  2.3  --   --   --        Glucose   Date/Time Value Ref Range Status   07/04/2020 2205 163 (H) 70 - 130 mg/dL Final   07/04/2020 1758 141 (H) 70 - 130 mg/dL Final   07/04/2020 1107 156 (H) 70 - 130 mg/dL Final   07/03/2020 2347 179 (H) 70 - 130 mg/dL Final   07/03/2020 1752 165 (H) 70 - 130 mg/dL Final   07/03/2020 1156 187 (H) 70 - 130 mg/dL Final       CT Head Without Contrast  Narrative: CT HEAD WITHOUT CONTRAST     HISTORY: Postop     COMPARISON: 07/02/2020     TECHNIQUE: Axial CT imaging was obtained through the brain. No IV  contrast was administered.      FINDINGS:  Patient is status post right temporal craniotomy. The previously  identified hemorrhage within the right cerebellar peduncle is unchanged,  measuring up to 2.3 x 1.2 cm. Additional hemorrhage is noted within the  right temporal lobe. This also does not appear significantly changed.  There is extensive pneumocephalus. It has decreased when compared to the  prior examination. There is midline shift of 3 mm. This is stable when  compared to prior exam. No new areas of hemorrhage are seen. There is a  small amount of subdural hemorrhage, including some layering over the  right tentorium. Tiny focus of hemorrhage is also noted within the chilango,  again, not significantly changed. Partial opacification of the right  mastoid air cells is again noted.     Impression: Slight interval decrease in pneumocephalus. Otherwise, no significant  change.     Radiation dose reduction techniques were utilized, including automated  exposure control and exposure modulation based on body size.     This report was finalized on 7/3/2020 4:01 AM by Dr. Jennifer Walton M.D.           dexamethasone 1 mg Intravenous Q6H   insulin regular 0-7 Units Subcutaneous Q6H   levETIRAcetam 500 mg Nasogastric Q12H   sodium chloride 10 mL Intravenous Q12H      NPO Diet      Assessment/Plan     Active Hospital Problems    Diagnosis  POA   • **Brain tumor (CMS/HCC) [D49.6]  Yes   • Brain abscess [G06.0]  Yes   • Intracranial hemorrhage (CMS/HCC) [I62.9]  No      Resolved Hospital Problems   No resolved problems to display.       54 y.o. female admitted with Brain tumor (CMS/HCC).    · Brain lesions patient underwent right temporal craniotomy with excisional biopsy of mass in the right cerebral pedicle.  Brain biopsy reveals malignant cellular neoplasm. . Antibiotics have been discontinued, infectious disease has signed off.  Oncology is following along with the neurosurgical service.  Patient is receiving dexamethasone 1 mg IV every 6 Keppra  500 mg IV every 12.  · Hyperglycemia secondary to intravenous dexamethasone has been covered with sliding scale insulin.  ·   ·         ySCDs for DVT prophylaxis.  · Full code.  · Discussed with patient and nursing staff.  · Anticipate discharge TBD.  timing yet to be determined.      Joseph Bryan MD  [facility]  07/05/20  07:39    Patient was placed in face mask on first look.  I wore protective equipment throughout this patient encounter including a face mask and gloves.  Hand hygiene was performed before donning protective equipment and after removal when leaving the room.          Electronically signed by Joseph Bryan MD at 07/05/20 0757     Sri Horner MD at 07/04/20 1806        Pathology now consistent with malignancy.  There is remain negative to date.  Discussed with Dr. Gautam.  Will discontinue empiric antibiotics.    ID will sign off.  Please do not hesitate to call us with further questions or concerns    Electronically signed by Sri Horner MD at 07/04/20 1807     Shai Licona MD at 07/04/20 1442             LOS: 8 days   Patient Care Team:  Paz Mera MD as PCP - General (Family Medicine)    Chief Complaint: Postop brainstem tumor    Subjective     This patient continues with pretty significant left-sided weakness.    Interval History:     History taken from: patient chart    Objective      She has no movement on the left side including a left facial droop.    Vital Signs  Temp:  [97.7 °F (36.5 °C)-98.7 °F (37.1 °C)] 98.3 °F (36.8 °C)  Heart Rate:  [] 64  Resp:  [17-18] 18  BP: (106-147)/() 133/86       Results Review:     I reviewed the patient's new clinical results.  I reviewed her most recent CT scan which still shows some blood extending up into the top part of the tumor.      Assessment/Plan       Brain abscess    Intracranial hemorrhage (CMS/HCC)      I again explained to both the patient and her  about the change in the pathology  "report.  I told him that I still think she will get some improvement from the hemiparesis once the blood resolves.  We will go ahead and get rehab to see her.      Shai Licona MD  07/04/20  14:42          Electronically signed by Shai Licona MD at 07/04/20 1444     Kamron Gautam MD at 07/04/20 1305          [facility] INPATIENT PROGRESS NOTE    Length of Stay:  8 days    CHIEF COMPLAINT/REASON FOR VISIT:  Brain lesions on MRI.  Patient underwent craniotomy and biopsy 7/1/2020.    SUBJECTIVE: She is feeling anxious.  Patient has left hemiparesis.  She was transferred out of the ICU earlier today.  We were asked to see her again for review of her pathology which shows a \"malignant cellular neoplasm\".  The pathologist commented that they believe this to be a primary brain lesion but they have forwarded a biopsy material to the [facility] for further work-up.  No further immunoperoxidase stains are being performed here.    ROS:  Currently unobtainable from the patient.  She is awake and responds to verbal stimuli.  She has dense left hemiparesis      OBJECTIVE:  Vitals:    07/04/20 1030 07/04/20 1100 07/04/20 1105 07/04/20 1200   BP: 136/85 125/83  (!) 147/106   BP Location:       Patient Position:       Pulse: 80 73  87   Resp:       Temp:   97.8 °F (36.6 °C)    TempSrc:   Oral    SpO2: 98% 97%  98%   Weight:       Height:             PHYSICAL EXAMINATION:  Very limited physical exam.  Patient is postop craniotomy and has left hemiparesis and facial droop.  She has a Dobbhoff feeding tube in place.    DIAGNOSTIC DATA:  Results Review:     I reviewed the patient's new clinical results.    Results from last 7 days   Lab Units 07/04/20  0432   WBC 10*3/mm3 14.13*   HEMOGLOBIN g/dL 13.5   HEMATOCRIT % 38.4   PLATELETS 10*3/mm3 236     Lab Results   Component Value Date    NEUTROABS 12.99 (H) 07/04/2020     Results from last 7 days   Lab Units 07/04/20  0432   SODIUM mmol/L 138 "   POTASSIUM mmol/L 3.2*   CHLORIDE mmol/L 109*   CO2 mmol/L 24.2   BUN mg/dL 24*   CREATININE mg/dL 0.37*   GLUCOSE mg/dL 143*   CALCIUM mg/dL 7.4*     Results from last 7 days   Lab Units 07/02/20  0410   INR  1.02   APTT seconds 23.3          PATHOLOGY 7/1/2020  Preliminary Diagnosis   1. Brain, Right Brainstem Biopsy:                A. Necrotic debris and rare degenerated cells with focal acute inflammation.     Comment: There does appear to be some slight increased cellularity around some vessels but the cells appear degenerated. These cells were not seen on the original frozen which was almost all necrotic.     ross/jse      2. Brain, Brainstem, Biopsy:                A. Malignant cellular neoplasm.           COMMENT:The neoplasm histologically appears to be a primary brain lesion but we are not going to perform immunohistochemical stains at this time but rather send the case out to the [facility] for full work-up. The case was discussed with Dr. Licona on 7/2/2020.       Ross/kds         IMAGING:    MRI brain showed at 2.6 cm mass from the right lateral thalamic margin to the midbrain and right chilango as wellas s 7 mm lesion in the central chilango on the right.  Surrounding vasogenic edema present.     CT chest abdomen and pelvis negative for malignancy.    Assessment/Plan   ASSESSMENT/PLAN:  This is a 54 y.o. female with:     1. Brain lesions  ? She presented with left leg paresthesias and dizziness ongoing for a couple of weeks  ? MRI brain 6/26/2020 showed at 2.6 cm mass from the right lateral thalamic margin to the midbrain and right chilango as wellas s 7 mm lesion in the central chilango on the right.  Surrounding vasogenic edema present.  · Neurosurgery following  · She is on IV dexamethasone  · CT C/A/P negative for malignancy  · Mammogram on 6/9/2020 BIRADS category 1  · Dr. Arroyo with the infectious disease service has evaluated her as well.  · CT angiogram of the head ordered by Dr. Licona with  neurosurgery.  · Craniotomy with biopsy 2020 with pathology as noted above showing malignant neoplasm.  Further studies will be performed at [facility] pathology department.  · Postop intracranial hemorrhage  2. H/o benign breast biopsy many years ago.  3. H/o veritigo     RECOMMENDATIONS/PLAN:   1.  We will of course have to wait for final pathology results from the [facility] prior to making any definitive recommendations regarding treatment options.  2.  Patient had been seen earlier in the admission for the initial consult by Dr. Shay Honrer.  He had spoken with the patient and her family at length regarding the possibility that this could be a malignancy.  They had indicated that if malignancy was proven they would like to go to a tertiary care center for another opinion which is of course quite reasonable.  3.  We understand that there is a plan to transfer the patient to the inpatient rehab center here at [facility] next week.  We will continue to follow.        Kamron Gautam MD    Electronically signed by Kamron Gautam MD at 20 1330     Finn Toro MD at 20 0760              [facility] INPATIENT PROGRESS NOTE         [facility] INTENSIVE CARE    2020      PATIENT IDENTIFICATION:  Name: Denise Guajardo ADMIT: 2020   : 1965  PCP: Paz Mera MD    MRN: [id number] LOS: 8 days   AGE/SEX: 54 y.o. female  ROOM: [facility]                     LOS 8    Reason for visit: Follow-up ICU medical management post craniotomy      SUBJECTIVE:      Resting comfortably.  Persistent left hemiparesis.  Tolerating tube feeds.  Denies shortness of breath, cough or worsening pain.    I am seeing the patient for the first time today.  All patient problems are new to me.      Objective   OBJECTIVE:    Vital Sign Min/Max for last 24 hours  Temp  Min: 97.7 °F (36.5 °C)  Max: 98.7 °F (37.1 °C)   BP  Min: 106/69  Max: 132/88   Pulse  Min: 62  Max: 111    Resp  Min: 17  Max: 18   SpO2  Min: 95 %  Max: 99 %   No data recorded   No data recorded                         Body mass index is 18 kg/m².    Intake/Output Summary (Last 24 hours) at 7/4/2020 1015  Last data filed at 7/4/2020 0835  Gross per 24 hour   Intake 3246.09 ml   Output 1875 ml   Net 1371.09 ml         Exam:  GEN:  No distress, appears stated age.  Persistent left hemiparesis  EYES:   PERRL, anicteric sclera  ENT:    External ears/nose normal, OP clear  NECK:  No adenopathy, midline trachea  LUNGS: Normal chest on inspection, palpation and auscultation  CV:  Normal S1S2, without murmur  ABD:  Non tender, non distended, no hepatosplenomegaly, +BS  EXT:  No edema, cyanosis or clubbing    Scheduled meds:    cefTRIAXone 2 g Intravenous Q12H   dexamethasone 2 mg Intravenous Q6H   insulin regular 0-7 Units Subcutaneous Q6H   levETIRAcetam 500 mg Nasogastric Q12H   metroNIDAZOLE 500 mg Nasogastric Q8H   sodium chloride 10 mL Intravenous Q12H     IV meds:                        niCARdipine 5-15 mg/hr Last Rate: Stopped (07/03/20 2236)     Data Review:  Results from last 7 days   Lab Units 07/04/20 0432 07/03/20  0355 07/02/20 0410 07/01/20  1656   SODIUM mmol/L 138 138 133* 137   POTASSIUM mmol/L 3.2* 4.0 3.9 3.7   CHLORIDE mmol/L 109* 103 100 99   CO2 mmol/L 24.2 25.3 25.3 23.9   BUN mg/dL 24* 19 17 20   CREATININE mg/dL 0.37* 0.49* 0.54* 0.72   GLUCOSE mg/dL 143* 169* 138* 172*   CALCIUM mg/dL 7.4* 9.0 8.6 8.9         Estimated Creatinine Clearance: 134.5 mL/min (A) (by C-G formula based on SCr of 0.37 mg/dL (L)).  Results from last 7 days   Lab Units 07/04/20  0432 07/03/20  0355 07/02/20 0410 07/01/20  1656   WBC 10*3/mm3 14.13* 12.33* 15.32* 24.69*   HEMOGLOBIN g/dL 13.5 14.0 13.3 14.3   PLATELETS 10*3/mm3 236 231 245 331     Results from last 7 days   Lab Units 07/02/20  0410   INR  1.02                       CT head 7/3/2020 reviewed    Microbiology reviewed  )       Active Hospital Problems     Diagnosis  POA   • **Brain abscess [G06.0]  Yes   • Intracranial hemorrhage (CMS/HCC) [I62.9]  No      Resolved Hospital Problems   No resolved problems to display.       Assessment   ASSESSMENT:  Craniotomy for resection brain lesions, 7/1/2020  Postoperative small hemorrhage with left hemiparesis  Recent dental work.    PLAN:  PT/ST/OT  Antibiotics per infectious disease recommendations.  Pathology indicates malignant neoplasm.  Awaiting oncology input.  Mechanical DVT prophylaxis       Finn Toro MD  Pulmonary and Critical Care Medicine  [facility]  7/4/2020    10:15       Electronically signed by Finn Toro MD at 07/04/20 1021

## 2020-07-06 NOTE — PLAN OF CARE
Problem: Patient Care Overview  Goal: Plan of Care Review  7/6/2020 1513 by Prerna Garcia PTA  Outcome: Ongoing (interventions implemented as appropriate)  Flowsheets (Taken 7/6/2020 1506)  Progress: improving  Plan of Care Reviewed With: patient  Outcome Summary: Pt tolerated treatment with no complaints. Pt is progressing well with mobility. Pt required MinAX2 with bed mobility. Pt worked on sitting balance in which pt fluctuates requiring ModA intially 2/2 pt leaning hard to left side. With verbal and tactile cues, pt's sitting balance improved to min/CGA.  Pt performed 3 sit<>stand transfers. Pt required CGAX2/Dolores. Pt mainly using RLE to assist. Pt able to straighten left LE on last stand but foot is inverted. Pt improved standing balance and able to tolerate for 30 seconds each time. Pt still had slight lean to left side but able to correct with verbal cueing. Pt able to take 4 shuffling side steps toward HOB with the help of therapist foot to assist with advancing pt's left foot. Will continue to progress pt as tolerated.    ..Patient wearing a face mask during this therapy encounter. Therapist used appropriate personal protective equipment including mask and gloves. Appropriate PPE was worn during the entire therapy session. Hand hygiene was completed before and after therapy session. Patient is not in enhanced droplet precautions.

## 2020-07-06 NOTE — PROGRESS NOTES
Adult Nutrition  Assessment/PES    Patient Name:  Denise Guajardo  YOB: 1965  MRN: [id number]  Admit Date:  6/26/2020    Assessment Date:  7/6/2020    Comments:  TF follow up: Tolerating TF on Fibersource @ 55ml/hr with 30ml water flush q4h. BG levels 129-153. POD5 R temporal crani for biopsy. Awaiting rehab eval for possible d/c plan. Increased water flushes to 85ml q4h to meet needs. Will continue to monitor.     Reason for Assessment     Row Name 07/06/20 1331          Reason for Assessment    Reason For Assessment  follow-up protocol;TF/PN         Nutrition/Diet History     Row Name 07/06/20 1331          Nutrition/Diet History    Typical Food/Fluid Intake  Tolerating TF on Fibersource @ 55ml/hr with 30ml water flush q4h. BG levels 129-153. POD5 R temp. crani for biopsy. Awaiting rehab eval for possible d/c plan.      Factors Affecting Nutritional Intake  impaired cognitive status/motor control;difficulty/impaired swallowing           Labs/Tests/Procedures/Meds     Row Name 07/06/20 1332          Labs/Procedures/Meds    Lab Results Reviewed  reviewed     Lab Results Comments  Glu 129-153, Cl 97, BUN 28, Cr 0.5        Diagnostic Tests/Procedures    Diagnostic Test/Procedure Reviewed  reviewed        Medications    Pertinent Medications Reviewed  reviewed, pertinent     Pertinent Medications Comments  IV decadron, pepcid, humulin, keppra         Physical Findings     Row Name 07/06/20 1333          Physical Findings    Overall Physical Appearance  underweight;hemiplegia L jakub     Gastrointestinal  feeding tube     Tubes  nasogastric tube     Skin  other (see comments);surgical incision R head and back incisions, B=14           Nutrition Prescription Ordered     Row Name 07/06/20 1340          Nutrition Prescription PO    Current PO Diet  NPO        Nutrition Prescription EN    Enteral Route  NG     Product  Fibersource HN (Jevity 1.2)     TF Delivery Method  Continuous     Continuous TF Goal Rate  (mL/hr)  55 mL/hr     Continuous TF Current Rate (mL/hr)  55 mL/hr     Water flush (mL)   30 mL     Water Flush Frequency  Every 4 hours         Evaluation of Received Nutrient/Fluid Intake     Row Name 07/06/20 1340          Calories Evaluation    Enteral Calories (kcal)  1584     % of Kcal Needs  96        Protein Evaluation    Enteral Protein (gm)  71     % of Protein Needs  93        Intake Assessment    Energy/Calorie Requirement Assessment  meeting needs     Protein Requirement Assessment  meeting needs     Fluid Requirement Assessment  not meeting needs     Tolerance  tolerating        Fluid Intake Evaluation    Enteral (Free Water) Fluid (mL)  1069     Free Water Flush Fluid (mL)  180     Total Free Water Intake (mL)  1249 mL        EN Evaluation    TF Changes  Increase free water     HOB  Greater than or equal to 30 degress         Problem/Interventions:    Intervention Goal     Row Name 07/06/20 1341          Intervention Goal    General  Maintain nutrition;Nutrition support treatment;Improved nutrition related lab(s);Meet nutritional needs for age/condition;Disease management/therapy     TF/PN  Tolerate TF at goal;Deliver estimated need (%)     Deliver % of Estimated Need  100 %     Weight  Maintain weight         Nutrition Intervention     Row Name 07/06/20 1342          Nutrition Intervention    RD/Tech Action  Care plan reviewd;Follow Tx progress;Recommend/ordered     Recommended/Ordered  EN         Nutrition Prescription     Row Name 07/06/20 1342          Nutrition Prescription EN    Enteral Prescription  Enteral begin/change     Enteral Route  NG     Water flush (mL)   85 mL     Water Flush Frequency  Every 4 hours     New EN Prescription Ordered?  Yes         Education/Evaluation     Row Name 07/06/20 1342          Education    Education  Will Instruct as appropriate        Monitor/Evaluation    Monitor  Per protocol;Pertinent labs;TF delivery/tolerance;Weight;Skin status;Symptoms     Education  Follow-up  Reinforce PRN           Electronically signed by:  Mirian Miller MS,RD,LD  07/06/20 13:43

## 2020-07-06 NOTE — THERAPY TREATMENT NOTE
Acute Care - Occupational Therapy Progress Note  [facility]     Patient Name: Denise Guajardo  : 1965  MRN: [id number]  Today's Date: 2020             Admit Date: 2020       ICD-10-CM ICD-9-CM   1. Brain tumor (CMS/HCC) D49.6 239.6     Patient Active Problem List   Diagnosis   • Malignant brain tumor (CMS/HCC)   • Intracranial hemorrhage (CMS/HCC)   • Dysphagia   • Acute left-sided weakness     Past Medical History:   Diagnosis Date   • Vertigo      Past Surgical History:   Procedure Laterality Date   • BREAST BIOPSY     • DENTAL PROCEDURE     • TUBAL ABDOMINAL LIGATION     • WISDOM TOOTH EXTRACTION         Therapy Treatment    Rehabilitation Treatment Summary     Row Name 20 1458             Treatment Time/Intention    Discipline  occupational therapist  -LE      Document Type  therapy note (daily note)  -LE      Mode of Treatment  individual therapy;occupational therapy  -LE      Patient/Family Observations  fowlers.  son and  present  -LE      Care Plan Review  care plan/treatment goals reviewed  -LE      Care Plan Review, Other Participant(s)  son;spouse  -LE      Patient Effort  good  -LE      Existing Precautions/Restrictions  fall NG tube, incision R temple  -LE      Treatment Considerations/Comments  Dr. Licona sees pt during OT.  increase time with all tasks, mulitple lines  -LE      Recorded by [LE] Talita Yousif, OTR 20 1600      Row Name 20 1458             Vital Signs    Pre SpO2 (%)  99  -LE      O2 Delivery Pre Treatment  room air  -LE      O2 Delivery Intra Treatment  room air  -LE      Post SpO2 (%)  99  -LE      O2 Delivery Post Treatment  room air  -LE      Pre Patient Position  Supine  -LE      Intra Patient Position  Sitting  -LE      Post Patient Position  Supine  -LE      Activity Duration  -- fatigue with sitting EOB  -LE      Recorded by [LE] Talita Yousif, OTR 20 1600      Row Name 20 1458             Cognitive Assessment/Intervention     Additional Documentation  Cognitive Assessment/Intervention (Group)  -LE      Recorded by [LE] Talita Yousif, OTR 07/06/20 1600      Row Name 07/06/20 1458             Cognitive Assessment/Intervention- PT/OT    Orientation Status (Cognition)  oriented to;person  -LE      Follows Commands (Cognition)  follows one step commands;25-49% accuracy;increased processing time needed;delayed response/completion;initiation impaired  -LE      Cognitive Assessment/Intervention Comment  mouths words, thanks OT for working with patient today  -LE      Recorded by [LE] Talita Yousif, OTR 07/06/20 1600      Row Name 07/06/20 1458             Bed Mobility Assessment/Treatment    Bed Mobility Assessment/Treatment  rolling left;rolling right;scooting/bridging  -LE      Rolling Left Stokes (Bed Mobility)  moderate assist (50% patient effort);set up;supervision;verbal cues  -LE      Rolling Right Stokes (Bed Mobility)  maximum assist (25% patient effort);set up;supervision;verbal cues  -LE      Scooting/Bridging Stokes (Bed Mobility)  dependent (less than 25% patient effort);2 person assist  -LE      Supine-Sit Stokes (Bed Mobility)  maximum assist (25% patient effort)  -LE      Sit-Supine Stokes (Bed Mobility)  maximum assist (25% patient effort)  -LE      Bed Mobility, Safety Issues  decreased use of arms for pushing/pulling;decreased use of legs for bridging/pushing;impaired trunk control for bed mobility  -LE      Assistive Device (Bed Mobility)  bed rails;draw sheet;head of bed elevated  -LE      Comment (Bed Mobility)  OT ed pt to hook L leg with R leg to help bring to EOB.  RN assists with scooting pt up in bed and rolling to sidelyi  -LE      Recorded by [LE] Talita Yousif, OTR 07/06/20 1600      Row Name 07/06/20 1458             Functional Mobility    Functional Mobility- Ind. Level  not tested  -LE      Recorded by [LE] Talita Yousif, OTR 07/06/20 1600      Row Name 07/06/20 1458              Sit-Stand Transfer    Sit-Stand [place] (Transfers)  not tested  -LE      Recorded by [LE] Talita Yousif, OTR 07/06/20 1600      Row Name 07/06/20 1458             ADL Assessment/Intervention    BADL Assessment/Intervention  toileting;feeding;grooming;lower body dressing;upper body dressing;bathing  -LE      Recorded by [LE] Talita Yousif, OTR 07/06/20 1600      Row Name 07/06/20 1458             Grooming Assessment/Training    [place]ia Level (Grooming)  oral care regimen;set up;supervision;verbal cues;minimum assist (75% patient effort) swab mouth with toothette and apply chapstick  -LE      Grooming Position  sitting up in bed  -LE      Recorded by [LE] Taltia Yousif, OTR 07/06/20 1600      Row Name 07/06/20 1458             Toileting Assessment/Training    [place] Level (Toileting)  dependent (less than 25% patient effort) purwick  -LE      Recorded by [LE] Talita Yousif, OTR 07/06/20 1600      Row Name 07/06/20 1458             General ROM    GENERAL ROM COMMENTS  -- no AROM L UE  -LE      Recorded by [LE] Talita Yousif, OTR 07/06/20 1600      Row Name 07/06/20 1458             Static Sitting Balance    Level of Columbia (Unsupported Sitting, Static Balance)  maximal assist, 25 to 49% patient effort;moderate assist, 50 to 74% patient effort  -LE      Sitting Position (Unsupported Sitting, Static Balance)  sitting on edge of bed  -LE      Time Able to Maintain Position (Unsupported Sitting, Static Balance)  more than 5 minutes  -LE      Comment (Unsupported Sitting, Static Balance)  improves with cueing, several weight bear onto R UE with improved return to midline.  Pt pushes to L side.  Tolerates OT holding R hand to keep from pushing and at times will hold R hand in lap.  pt son stands behind OT for target for midline alignment.  cues/assist trunk activation and posture  -LE      Recorded by [LE] Talita Yousif, OTR 07/06/20 1600      Row Name 07/06/20 1458             Positioning and Restraints     "Pre-Treatment Position  in bed  -LE      Post Treatment Position  bed  -LE      In Bed  side lying left;call light within reach;encouraged to call for assist;exit alarm on;with family/caregiver;SCD pump applied;pillow between legs;L multipodus  -LE      Recorded by [LE] Talita Yousif, OTR 07/06/20 1600      Row Name 07/06/20 1458             Pain Assessment    Additional Documentation  Pain Scale: Word Pre/Post-Treatment (Group)  -LE      Recorded by [LE] Talita Yousif OTR 07/06/20 1600      Row Name 07/06/20 1458             Pain Scale: Word Pre/Post-Treatment    Pain: Word Scale, Pretreatment  2 - mild pain  -LE      Pain: Word Scale, Post-Treatment  2 - mild pain  -LE      Pre/Post Treatment Pain Comment  reaches to incision, states \"not too bad\"  -LE      Recorded by [LE] Talita Yousif, OTR 07/06/20 1600      Row Name                Wound 07/01/20 1156 Right head Incision    Wound - Properties Group Date first assessed: 07/01/20 [KB] Time first assessed: 1156 [KB] Present on Hospital Admission: N [KB] Side: Right [KB] Location: head [KB] Primary Wound Type: Incision [KB] Recorded by:  [KB] Carina Acosta RN 07/01/20 1156    Row Name                Wound 07/01/20 1116 back Incision    Wound - Properties Group Date first assessed: 07/01/20 [SL] Time first assessed: 1116 [SL] Present on Hospital Admission: N [SL] Location: back [SL] Primary Wound Type: Incision [SL] Recorded by:  [SL] Eryn Bowers RN 07/01/20 1313    Row Name 07/06/20 1458             Coping    Observed Emotional State  -- some smiles today  -LE      Recorded by [LE] Talita Yousif OTR 07/06/20 1600      Row Name 07/06/20 1458             Plan of Care Review    Plan of Care Reviewed With  patient  -LE      Outcome Summary  Pt with increased tolerance to sitting EOB, able to follow commands and work in sitting balance with adjustments to posture.    -LE      Recorded by [LE] Talita Yousif OTLUZ MARIA 07/06/20 1600        User Key  (r) = " Recorded By, (t) = Taken By, (c) = Cosigned By    Initials Name Effective Dates Discipline    CLIFTON Benja Talita, OTR 06/08/18 -  OT    Carina Yang, RN 06/16/16 -  Nurse    Eryn Alaniz RN 06/16/16 -  Nurse        Wound 07/01/20 1156 Right head Incision (Active)   Dressing Appearance dry;intact;no drainage 7/6/2020  9:00 AM   Closure SUNNI 7/6/2020  9:00 AM   Base dressing in place, unable to visualize 7/6/2020  9:00 AM   Drainage Amount none 7/6/2020  9:00 AM   Dressing Care, Wound gauze, dry;transparent film 7/5/2020 10:13 PM       Occupational Therapy Education                 Title: PT OT SLP Therapies (In Progress)     Topic: Occupational Therapy (Not Started)     Point: ADL training (Not Started)     Description:   Instruct learner(s) on proper safety adaptation and remediation techniques during self care or transfers.   Instruct in proper use of assistive devices.              Learner Progress:   Not documented in this visit.          Point: Home exercise program (Not Started)     Description:   Instruct learner(s) on appropriate technique for monitoring, assisting and/or progressing therapeutic exercises/activities.              Learner Progress:   Not documented in this visit.          Point: Precautions (Not Started)     Description:   Instruct learner(s) on prescribed precautions during self-care and functional transfers.              Learner Progress:   Not documented in this visit.          Point: Body mechanics (Not Started)     Description:   Instruct learner(s) on proper positioning and spine alignment during self-care, functional mobility activities and/or exercises.              Learner Progress:   Not documented in this visit.                            OT Recommendation and Plan     Plan of Care Review  Plan of Care Reviewed With: patient, spouse, son  Plan of Care Reviewed With: patient, spouse, son  Outcome Summary: Pt with increased tolerance to sitting EOB, able to follow  commands and work in sitting balance with adjustments to posture.    Outcome Measures     Row Name 07/06/20 1600             How much help from another is currently needed...    Putting on and taking off regular lower body clothing?  1  -LE      Bathing (including washing, rinsing, and drying)  1  -LE      Toileting (which includes using toilet bed pan or urinal)  1  -LE      Putting on and taking off regular upper body clothing  2  -LE      Taking care of personal grooming (such as brushing teeth)  2  -LE      Eating meals  1  -LE      AM-PAC 6 Clicks Score (OT)  8  -LE         Functional Assessment    Outcome Measure Options  AM-PAC 6 Clicks Daily Activity (OT)  -LE        User Key  (r) = Recorded By, (t) = Taken By, (c) = Cosigned By    Initials Name Provider Type    Talita Wolfe OTR Occupational Therapist           Time Calculation:   Time Calculation- OT     Row Name 07/06/20 1602             Time Calculation- OT    OT Start Time  1428  -LE      OT Stop Time  1458  -LE      OT Time Calculation (min)  30 min  -LE      Total Timed Code Minutes- OT  30 minute(s)  -LE      OT Received On  07/06/20  -LE      OT - Next Appointment  07/07/20  -LE        User Key  (r) = Recorded By, (t) = Taken By, (c) = Cosigned By    Initials Name Provider Type    Talita Wolfe OTR Occupational Therapist        Therapy Charges for Today     Code Description Service Date Service Provider Modifiers Qty    [id number]  OT NEUROMUSC RE EDUCATION EA 15 MIN 7/6/2020 Talita Yousif OTR GO 2               MARCIAL Boone  7/6/2020

## 2020-07-06 NOTE — PLAN OF CARE
Problem: Patient Care Overview  Goal: Plan of Care Review  Outcome: Ongoing (interventions implemented as appropriate)  Flowsheets (Taken 7/6/2020 1600)  Plan of Care Reviewed With: patient;spouse;son  Note:   Pt with increased tolerance to sitting EOB, able to follow commands and work in sitting balance with adjustments to posture.         Patient did not tolerate wearing a face mask during this therapy encounter. Son and spouse both wore masks. Therapist used appropriate personal protective equipment including surgical mask., glasses and gloves during the entire therapy session. Hand hygiene was completed before and after therapy session. Patient is not in enhanced droplet precautions.

## 2020-07-06 NOTE — PROGRESS NOTES
Postop   LOS: 10 days   Patient Care Team:  Paz Mera MD as PCP - General (Family Medicine)    Chief Complaint: Postop right temporal craniotomy    Subjective      at bedside, no new complaints.  Awaiting rehab consult.    Interval History:     History taken from: patient chart family RN    Objective      Awake and alert, answers questions but speech hypophonic  E OEM intact  Left facial weakness  Tongue deviates to left  Left arm flaccid  Minimal movement with left dorsiflexion  Right cranial incision well approximated, no redness, drainage, slight swelling    Vital Signs  Temp:  [97.8 °F (36.6 °C)-98.9 °F (37.2 °C)] 97.8 °F (36.6 °C)  Heart Rate:  [66-76] 76  Resp:  [16-17] 16  BP: (116-141)/(77-98) 130/98       Results Review:     I reviewed the patient's new clinical results.    .  Results from last 7 days   Lab Units 07/06/20  0551 07/05/20 0436 07/04/20 0432   WBC 10*3/mm3 8.89 11.90* 14.13*   HEMOGLOBIN g/dL 15.1 14.1 13.5   HEMATOCRIT % 42.7 40.2 38.4   PLATELETS 10*3/mm3 249 244 236     .  Results from last 7 days   Lab Units 07/06/20  0551 07/05/20 0436 07/04/20  1945 07/04/20  0432   SODIUM mmol/L 136 140  --  138   POTASSIUM mmol/L 4.2 4.4 4.4 3.2*   CHLORIDE mmol/L 97* 101  --  109*   CO2 mmol/L 29.0 30.0*  --  24.2   BUN mg/dL 28* 26*  --  24*   CREATININE mg/dL 0.50* 0.55*  --  0.37*   CALCIUM mg/dL 8.8 9.1  --  7.4*   GLUCOSE mg/dL 135* 144*  --  143*         Assessment/Plan       Malignant brain tumor (CMS/HCC)    Intracranial hemorrhage (CMS/HCC)    Dysphagia    Acute left-sided weakness      Postop day #5 right temporal craniotomy for excisional biopsy.  Final path pending, was sent to [place].  Await rehab evaluation.   is very hopeful that she will be a candidate for acute rehab as she was very active prior to hospital admission and feels strongly she will be motivated to push herself in rehab and capable of working at least 3 hours a day.      Aby Dover,  TIMO  07/06/20  11:10

## 2020-07-06 NOTE — THERAPY RE-EVALUATION
Acute Care - Speech Language Pathology   Swallow Re-Evaluation [facility]     Patient Name: Denise Guajardo  : 1965  MRN: [id number]  Today's Date: 2020               Admit Date: 2020    Visit Dx:     ICD-10-CM ICD-9-CM   1. Brain tumor (CMS/HCC) D49.6 239.6     Patient Active Problem List   Diagnosis   • Malignant brain tumor (CMS/HCC)   • Intracranial hemorrhage (CMS/HCC)   • Dysphagia   • Acute left-sided weakness     Past Medical History:   Diagnosis Date   • Vertigo      Past Surgical History:   Procedure Laterality Date   • BREAST BIOPSY     • DENTAL PROCEDURE     • TUBAL ABDOMINAL LIGATION     • WISDOM TOOTH EXTRACTION          SWALLOW EVALUATION (last 72 hours)      SLP Adult Swallow Evaluation     Row Name 20 1600                   Rehab Evaluation    Document Type  re-evaluation  -AW        Subjective Information  no complaints  -AW        Patient Observations  lethargic;agree to therapy;cooperative  -AW        Patient/Family Observations  Pt's son and  present.  -AW        Patient Effort  good  -AW        Symptoms Noted During/After Treatment  fatigue  -AW           General Information    Patient Profile Reviewed  yes  -AW        Pertinent History Of Current Problem  Pt s/p crani on  for tumor (malignant neoplasm) with small hemorrhage and L jakub post op.  -AW        Current Method of Nutrition  NPO;nasogastric feedings  -AW        Prior Level of Function-Swallowing  no diet consistency restrictions  -AW        Plans/Goals Discussed with  patient;spouse/S.O.;family;agreed upon  -AW        Barriers to Rehab  medically complex  -AW        Patient's Goals for Discharge  return to PO diet  -AW        Family Goals for Discharge  patient able to return to PO diet  -AW           Pain Assessment    Additional Documentation  Pain Scale: Numbers Pre/Post-Treatment (Group)  -AW           Pain Scale: Numbers Pre/Post-Treatment    Pain Scale: Numbers, Pretreatment  0/10 - no pain  -AW         Pain Scale: Numbers, Post-Treatment  0/10 - no pain  -AW           Oral Motor and Function    Dentition Assessment  natural, present and adequate  -AW        Secretion Management  requires suctioning to control secretions  -AW        Volitional Swallow  weak;delayed  -AW        Volitional Cough  weak  -AW           Oral Musculature and Cranial Nerve Assessment    Oral Labial or Buccal Impairment, Detail, Cranial Nerve VII (Facial):  left labial droop  -AW        Lingual Impairment, Detail. Cranial Nerves IX, XII (Glossopharyngeal and Hypoglossal)  reduced strength left  -AW           General Eating/Swallowing Observations    Respiratory Support Currently in Use  room air  -AW        Eating/Swallowing Skills  fed by SLP  -AW        Positioning During Eating  upright in bed  -AW        Utensils Used  spoon  -AW        Consistencies Trialed  -- ice  -AW           Clinical Swallow Eval    Pharyngeal Phase  suspected pharyngeal impairment  -AW        Clinical Swallow Evaluation Summary  Pt aphonic. With max cues, pt had slight voicing. Pt given ice chips. Swallow delayed with reduced laryngeal elevation. Multiple swallows with small ice chip (3-4). Pt not safe for PO diet. Education completed with family and pt. Instructed to encourage voicing and provided a few exercises. ST to follow, therapy needed at [place].  -AW           Clinical Impression    SLP Swallowing Diagnosis  suspected pharyngeal dysfunction  -AW        Functional Impact  risk of aspiration/pneumonia  -AW        Rehab Potential/Prognosis, Swallowing  good, to achieve stated therapy goals  -AW        Swallow Criteria for Skilled Therapeutic Interventions Met  demonstrates skilled criteria  -AW           Recommendations    Therapy Frequency (Swallow)  PRN  -AW        Predicted Duration Therapy Intervention (Days)  until discharge  -AW        SLP Diet Recommendation  NPO;ice chips between meals after oral care, with supervision  -AW        Recommended  Diagnostics  reassess via clinical swallow evaluation  -AW        Recommended Precautions and Strategies  upright posture during/after eating  -AW        SLP Rec. for Method of Medication Administration  meds via alternate route  -AW        Monitor for Signs of Aspiration  yes  -AW        Anticipated Dischage Disposition (SLP)  unknown;anticipate therapy at next level of care  -AW          User Key  (r) = Recorded By, (t) = Taken By, (c) = Cosigned By    Initials Name Effective Dates    JESÚS JosephYarely, MS CCC-SLP 06/08/18 -           EDUCATION  The patient has been educated in the following areas:   Dysphagia (Swallowing Impairment) Oral Care/Hydration NPO rationale.    SLP Recommendation and Plan  SLP Swallowing Diagnosis: suspected pharyngeal dysfunction  SLP Diet Recommendation: NPO, ice chips between meals after oral care, with supervision  Recommended Precautions and Strategies: upright posture during/after eating  SLP Rec. for Method of Medication Administration: meds via alternate route     Monitor for Signs of Aspiration: yes  Recommended Diagnostics: reassess via clinical swallow evaluation  Swallow Criteria for Skilled Therapeutic Interventions Met: demonstrates skilled criteria  Anticipated Dischage Disposition (SLP): unknown, anticipate therapy at next level of care  Rehab Potential/Prognosis, Swallowing: good, to achieve stated therapy goals  Therapy Frequency (Swallow): PRN  Predicted Duration Therapy Intervention (Days): until discharge       Plan of Care Reviewed With: patient, spouse, son  Outcome Summary: Swallow reevaluated. Pt aphonic. With max cues, pt had slight voicing. Pt given ice chips. Swallow delayed with reduced laryngeal elevation. Multiple swallows with small ice chip (3-4). Pt not safe for PO diet. Education completed with family and pt. Instructed to encourage voicing and provided a few exercises. ST to follow, therapy needed at [place].         SLP Outcome Measures (last 72 hours)       SLP Outcome Measures     Row Name 07/06/20 1600             SLP Outcome Measures    Outcome Measure Used?  Adult NOMS  -AW         Adult FCM Scores    FCM Chosen  Swallowing  -AW      Swallowing FCM Score  1  -AW        User Key  (r) = Recorded By, (t) = Taken By, (c) = Cosigned By    Initials Name Effective Dates    Yarely Martinez MS CCC-SLP 06/08/18 -            Time Calculation:   Time Calculation- SLP     Row Name 07/06/20 1613             Time Calculation- SLP    SLP Start Time  1500  -AW      SLP Received On  07/06/20  -AW        User Key  (r) = Recorded By, (t) = Taken By, (c) = Cosigned By    Initials Name Provider Type    Yarely Martinez MS CCC-SLP Speech and Language Pathologist          Therapy Charges for Today     Code Description Service Date Service Provider Modifiers Qty    66752093475  ST TREATMENT SWALLOW 3 7/6/2020 Yarely Ruzi MS CCC-SLP GN 1               Yarely Ruiz MS CCC-SLP  7/6/2020

## 2020-07-06 NOTE — PLAN OF CARE
Problem: Patient Care Overview  Goal: Plan of Care Review  Outcome: Ongoing (interventions implemented as appropriate)  Flowsheets (Taken 7/6/2020 1612)  Plan of Care Reviewed With: patient; spouse; son  Outcome Summary: Swallow reevaluated. Pt aphonic. With max cues, pt had slight voicing. Pt given ice chips. Swallow delayed with reduced laryngeal elevation. Multiple swallows with small ice chip (3-4). Pt not safe for PO diet. Education completed with family and pt. Instructed to encourage voicing and provided a few exercises. ST to follow, therapy needed at [place].

## 2020-07-06 NOTE — THERAPY TREATMENT NOTE
Patient Name: Denise Guajardo  : 1965    MRN: [id number]                              Today's Date: 2020       Admit Date: 2020    Visit Dx:     ICD-10-CM ICD-9-CM   1. Brain tumor (CMS/HCC) D49.6 239.6     Patient Active Problem List   Diagnosis   • Malignant brain tumor (CMS/HCC)   • Intracranial hemorrhage (CMS/HCC)   • Dysphagia   • Acute left-sided weakness     Past Medical History:   Diagnosis Date   • Vertigo      Past Surgical History:   Procedure Laterality Date   • BREAST BIOPSY     • DENTAL PROCEDURE     • TUBAL ABDOMINAL LIGATION     • WISDOM TOOTH EXTRACTION       General Information     Row Name 20 1454          PT Evaluation Time/Intention    Document Type  therapy note (daily note)  -     Mode of Treatment  individual therapy;physical therapy  -     Row Name 20 1454          General Information    Existing Precautions/Restrictions  fall  -     Row Name 20 1454          Cognitive Assessment/Intervention- PT/OT    Orientation Status (Cognition)  oriented to;person;place  -     Row Name 20 1454          Safety Issues, Functional Mobility    Safety Issues Affecting Function (Mobility)  awareness of need for assistance;insight into deficits/self awareness  -     Impairments Affecting Function (Mobility)  balance;cognition;endurance/activity tolerance;muscle tone abnormal;motor control  -     Comment, Safety Issues/Impairments (Mobility)  left neglect  -       User Key  (r) = Recorded By, (t) = Taken By, (c) = Cosigned By    Initials Name Provider Type     Prerna Garcia PTA Physical Therapy Assistant        Mobility     Row Name 20 1456          Bed Mobility Assessment/Treatment    Supine-Sit [place] (Bed Mobility)  minimum assist (75% patient effort);2 person assist;verbal cues;nonverbal cues (demo/gesture)  -     Sit-Supine [place] (Bed Mobility)  minimum assist (75% patient effort);verbal cues;nonverbal cues (demo/gesture)  requiring assist with left LE  -     Assistive Device (Bed Mobility)  bed rails;head of bed elevated  -     Row Name 07/06/20 1456          Transfer Assessment/Treatment    Comment (Transfers)  Sit<>stand transfers. Pt mainly using right LE to help stand. Pt's left knee blocked to keep from buckling.   -     Row Name 07/06/20 1456          Sit-Stand Transfer    Sit-Stand [place] (Transfers)  contact guard;2 person assist;minimum assist (75% patient effort)  -     Assistive Device (Sit-Stand Transfers)  -- HHAX2  -     Row Name 07/06/20 1456          Gait/Stairs Assessment/Training    [place] Level (Gait)  minimum assist (75% patient effort);2 person assist  -     Assistive Device (Gait)  -- HHAX2  -     Distance in Feet (Gait)  pt took 4 shuffling side steps with assist of PTA's foot to help scoot pt's left foot over.   -       User Key  (r) = Recorded By, (t) = Taken By, (c) = Cosigned By    Initials Name Provider Type     Prerna Garcia PTA Physical Therapy Assistant        Obj/Interventions     Row Name 07/06/20 1500          Therapeutic Exercise    Upper Extremity Range of Motion (Therapeutic Exercise)  shoulder flexion/extension, left;shoulder abduction/adduction, left;elbow flexion/extension, left  -     Lower Extremity (Therapeutic Exercise)  heel slides, bilateral  -     Lower Extremity Range of Motion (Therapeutic Exercise)  hip abduction/adduction, bilateral;ankle dorsiflexion/plantar flexion, bilateral  -     Exercise Type (Therapeutic Exercise)  AROM (active range of motion);PROM (passive range of motion) AROM of RLE, PROM of LLE/LUE  -     Position (Therapeutic Exercise)  supine  -     Sets/Reps (Therapeutic Exercise)  X15  -     Row Name 07/06/20 1500          Static Sitting Balance    Level of [place] (Unsupported Sitting, Static Balance)  moderate assist, 50 to 74% patient effort;minimal assist, 75% patient effort;contact guard assist  -     Sitting  Position (Unsupported Sitting, Static Balance)  sitting on edge of bed  -     Time Able to Maintain Position (Unsupported Sitting, Static Balance)  more than 5 minutes  -     Comment (Unsupported Sitting, Static Balance)  pt fluctuates on assistance level. Intially ModA with hard left lean. Verbal and tactile cues to correct. Pt then Dolores then CGA for most of the time. Pt required cues to correct sitting balance each time as pt took a seated break after standing.   -     Row Name 07/06/20 1500          Static Standing Balance    Level of [place] (Supported Standing, Static Balance)  minimal assist, 75% patient effort;2 person assist  -     Time Able to Maintain Position (Supported Standing, Static Balance)  15 to 30 seconds  -     Assistive Device Utilized (Supported Standing, Static Balance)  -- HHAX2  -     Comment (Supported Standing, Static Balance)  Pt improved with standing balance. Pt would only slightly lean to left side and able to assist with correcting. Cues to keep head up. Pt on last stand able to straighten left LE, foot is inverted.  -       User Key  (r) = Recorded By, (t) = Taken By, (c) = Cosigned By    Initials Name Provider Type     Prerna Garcia PTA Physical Therapy Assistant        Goals/Plan    No documentation.       Clinical Impression     Row Name 07/06/20 1506          Plan of Care Review    Plan of Care Reviewed With  patient  -     Progress  improving  -     Outcome Summary  Pt tolerated treatment with no complaints. Pt is progressing well with mobility. Pt required MinAX2 with bed mobility. Pt worked on sitting balance in which pt fluctuates requiring ModA intially 2/2 pt leaning hard to left side. With verbal and tactile cues, pt's sitting balance improved to min/CGA.  Pt performed 3 sit<>stand transfers. Pt required CGAX2/Dolores. Pt mainly using RLE to assist. Pt able to straighten left LE on last stand but foot is inverted. Pt improved standing balance and  able to tolerate for 30 seconds each time. Pt still had slight lean to left side but able to correct with verbal cueing. Will continue to progress pt as tolerated.   -     Row Name 07/06/20 1506          Positioning and Restraints    Pre-Treatment Position  in bed HOB elevated  -EH     Post Treatment Position  bed  -EH     In Bed  supine;call light within reach;encouraged to call for assist;exit alarm on;with family/caregiver  -       User Key  (r) = Recorded By, (t) = Taken By, (c) = Cosigned By    Initials Name Provider Type     Prerna Garcia PTA Physical Therapy Assistant        Outcome Measures     Row Name 07/06/20 1511          How much help from another person do you currently need...    Turning from your back to your side while in flat bed without using bedrails?  3  -EH     Moving from lying on back to sitting on the side of a flat bed without bedrails?  2  -EH     Moving to and from a bed to a chair (including a wheelchair)?  1  -EH     Standing up from a chair using your arms (e.g., wheelchair, bedside chair)?  3  -EH     Climbing 3-5 steps with a railing?  1  -EH     To walk in hospital room?  1  -EH     AM-PAC 6 Clicks Score (PT)  11  -     Row Name 07/06/20 1511          Modified [place] Scale    Modified [place] Scale  5 - Severe disability.  Bedridden, incontinent, and requiring constant nursing care and attention.  -       User Key  (r) = Recorded By, (t) = Taken By, (c) = Cosigned By    Initials Name Provider Type    Prerna Camargo PTA Physical Therapy Assistant        Physical Therapy Education                 Title: PT OT SLP Therapies (In Progress)     Topic: Physical Therapy (In Progress)     Point: Mobility training (In Progress)     Description:   Instruct learner(s) on safety and technique for assisting patient out of bed, chair or wheelchair.  Instruct in the proper use of assistive devices, such as walker, crutches, cane or brace.              Patient Friendly Description:    It's important to get you on your feet again, but we need to do so in a way that is safe for you. Falling has serious consequences, and your personal safety is the most important thing of all.        When it's time to get out of bed, one of us or a family member will sit next to you on the bed to give you support.     If your doctor or nurse tells you to use a walker, crutches, a cane, or a brace, be sure you use it every time you get out of bed, even if you think you don't need it.    Learning Progress Summary           Patient Acceptance, E,D, NR by EH at 7/6/2020 1511    Acceptance, E,D, NR by EH at 7/5/2020 1240    Acceptance, E,D, NR by EE at 7/4/2020 1416    Acceptance, E, NR by AR at 7/3/2020 1557    Acceptance, E,D, NR by PC at 7/2/2020 1208                   Point: Home exercise program (In Progress)     Description:   Instruct learner(s) on appropriate technique for monitoring, assisting and/or progressing patient with therapeutic exercises and activities.              Learning Progress Summary           Patient Acceptance, E,D, NR by EH at 7/6/2020 1511    Acceptance, E,D, NR by EH at 7/5/2020 1240    Acceptance, E,D, NR by EE at 7/4/2020 1416    Acceptance, E, NR by AR at 7/3/2020 1557    Acceptance, E,D, NR by PC at 7/2/2020 1208                   Point: Body mechanics (In Progress)     Description:   Instruct learner(s) on proper positioning and spine alignment for patient and/or caregiver during mobility tasks and/or exercises.              Learning Progress Summary           Patient Acceptance, E,D, NR by EH at 7/6/2020 1511    Acceptance, E,D, NR by EH at 7/5/2020 1240    Acceptance, E,D, NR by EE at 7/4/2020 1416    Acceptance, E, NR by AR at 7/3/2020 1557    Acceptance, E,D, NR by PC at 7/2/2020 1208                   Point: Precautions (In Progress)     Description:   Instruct learner(s) on prescribed precautions during mobility and gait tasks              Learning Progress Summary            Patient Acceptance, E,D, NR by  at 7/6/2020 1511    Acceptance, E,D, NR by  at 7/5/2020 1240    Acceptance, E,D, NR by  at 7/4/2020 1416    Acceptance, E, NR by AR at 7/3/2020 1557    Acceptance, E,D, NR by  at 7/2/2020 1208                               User Key     Initials Effective Dates Name Provider Type Discipline    PC 04/03/18 -  Elham Forde, PT Physical Therapist PT     04/03/18 -  Marylin Hill, PT Physical Therapist PT    AR 04/03/18 -  Ceci Amador, PT Physical Therapist PT     08/19/18 -  Prerna Garcia PTA Physical Therapy Assistant PT              PT Recommendation and Plan     Plan of Care Reviewed With: patient  Progress: improving  Outcome Summary: Pt tolerated treatment with no complaints. Pt is progressing well with mobility. Pt required MinAX2 with bed mobility. Pt worked on sitting balance in which pt fluctuates requiring ModA intially 2/2 pt leaning hard to left side. With verbal and tactile cues, pt's sitting balance improved to min/CGA.  Pt performed 3 sit<>stand transfers. Pt required CGAX2/Dolores. Pt mainly using RLE to assist. Pt able to straighten left LE on last stand but foot is inverted. Pt improved standing balance and able to tolerate for 30 seconds each time. Pt still had slight lean to left side but able to correct with verbal cueing. Will continue to progress pt as tolerated.      Time Calculation:   PT Charges     Row Name 07/06/20 1454             Time Calculation    Start Time  1254  -      Stop Time  1323  -      Time Calculation (min)  29 min  -      PT Received On  07/06/20  -      PT - Next Appointment  07/07/20  -         Time Calculation- PT    Total Timed Code Minutes- PT  29 minute(s)  -        User Key  (r) = Recorded By, (t) = Taken By, (c) = Cosigned By    Initials Name Provider Type     Prerna Garcia PTA Physical Therapy Assistant        Therapy Charges for Today     Code Description Service Date Service Provider Modifiers Qty     36666900935  PT THERAPEUTIC ACT EA 15 MIN 7/5/2020 Prerna Garcia, PTA GP 1    89884076353 HC PT THER PROC EA 15 MIN 7/5/2020 Prerna Garcia, PTA GP 1    34894388286 HC PT THER SUPP EA 15 MIN 7/5/2020 Prerna Garcia, PTA GP 1    21954966239 HC PT THER PROC EA 15 MIN 7/6/2020 Prerna Garcia, PTA GP 1    40338263800 HC PT THERAPEUTIC ACT EA 15 MIN 7/6/2020 Prerna Garcia, PTA GP 1    82367445889 HC PT THER SUPP EA 15 MIN 7/6/2020 Prerna Garcia, PTA GP 1          PT G-Codes  Outcome Measure Options: AM-PAC 6 Clicks Basic Mobility (PT)  AM-PAC 6 Clicks Score (PT): 11  AM-PAC 6 Clicks Score (OT): 9  Modified [place] Scale: 5 - Severe disability.  Bedridden, incontinent, and requiring constant nursing care and attention.    Prerna Garcia PTA  7/6/2020

## 2020-07-06 NOTE — PLAN OF CARE
Problem: Patient Care Overview  Goal: Plan of Care Review  Outcome: Ongoing (interventions implemented as appropriate)  Flowsheets (Taken 7/6/2020 1506)  Progress: improving  Plan of Care Reviewed With: patient  Outcome Summary: Pt tolerated treatment with no complaints. Pt is progressing well with mobility. Pt required MinAX2 with bed mobility. Pt worked on sitting balance in which pt fluctuates requiring ModA intially 2/2 pt leaning hard to left side. With verbal and tactile cues, pt's sitting balance improved to min/CGA.  Pt performed 3 sit<>stand transfers. Pt required CGAX2/Dolores. Pt mainly using RLE to assist. Pt able to straighten left LE on last stand but foot is inverted. Pt improved standing balance and able to tolerate for 30 seconds each time. Pt still had slight lean to left side but able to correct with verbal cueing. Pt took 4 shuffling side steps toward HOB with assistance of therapist foot to help advance pt's left foot. Will continue to progress pt as tolerated.

## 2020-07-07 NOTE — PLAN OF CARE
Flaccid to left side, left facial droop, Right eye lid droop. Voice soft, whisper at times but mostly mouthing words. A&O. Ice pack to face and head for complaints of pain. F/C d/c due to leaking, bladder scan and I&O cath due to not urinating.  at bedside most of shift.

## 2020-07-07 NOTE — PROGRESS NOTES
Continued Stay Note  [facility]     Patient Name: Denise Guajardo  MRN: [id number]  Today's Date: 7/7/2020    Admit Date: 6/26/2020    Discharge Plan     Row Name 07/07/20 1308       Plan    Plan  [facility], precert pending    Provided Post Acute Provider List?  Yes    Post Acute Provider List  Inpatient Rehab;Home Health;Nursing Home    Provided Post Acute Provider Quality & Resource List?  Yes    Post Acute Provider Quality and Resource List  Home Health;Inpatient Rehab    Delivered To  Support Person    Support Person  Malick Guajardo  [phone]    Method of Delivery  In person    Plan Comments  Received call from Pete with Acute Rehab, she states they will initiate acute rehab precert today.  Met with patient, son and Malick Guajardo  [phone] at bedside, patient granted me permission to speak to her while family remained in the room.  Informed them that [facility] is initiating precert.   voiced concerns about cost of long-term care for his wife, gave him a copy or [facility] and pointed out Medicaid number, how to apply for disability and ElderLaw, to provide them with additional guidance.  Also, encourage family to reach out to her employer to find out about short and long-term disability coverage she may have.  Will continue to monitor for new or changing discharge needs.        Discharge Codes    No documentation.             Essie العلي RN

## 2020-07-07 NOTE — PLAN OF CARE
Problem: Patient Care Overview  Goal: Plan of Care Review  Outcome: Ongoing (interventions implemented as appropriate)  Flowsheets (Taken 7/7/2020 1046)  Progress: improving  Plan of Care Reviewed With: patient  Outcome Summary: Pt tolerated treatment well this date. Required mod A x2 for bed mobility mostly d/t balance deficits w/ transition. Required min A x2 to stand 4x total, though pt still leaning towards L side. Assist was needed to slide L foot over when side stepping towards HOB. Pt very motivated and performed several tasks this visit, though very fatigued by end. Patient was wearing a face mask during this therapy encounter. Therapist used appropriate personal protective equipment including mask and gloves. Mask used was standard procedure mask. Appropriate PPE was worn during the entire therapy session. Hand hygiene was completed before and after therapy session. Patient is not in enhanced droplet precautions.

## 2020-07-07 NOTE — PROGRESS NOTES
"    DAILY PROGRESS NOTE  [facility]    Patient Identification:  Name: Denise Guajardo  Age: 54 y.o.  Sex: female  :  1965  MRN: [id number]         Primary Care Physician: Paz Mera MD    Subjective:  Interval History: No new complaints.  Denies chest pain shortness of breath no issues with nausea vomiting or confusion    Objective: Spouse at bedside and answered all questions.  Patient looks clinically unchanged versus yesterday    Scheduled Meds:  dexamethasone 1 mg Intravenous Q6H   famotidine 20 mg Intravenous Q12H   insulin regular 0-7 Units Subcutaneous Q6H   levETIRAcetam 500 mg Nasogastric Q12H   sodium chloride 10 mL Intravenous Q12H     Continuous Infusions:     Vital signs in last 24 hours:  Temp:  [96.6 °F (35.9 °C)-97.6 °F (36.4 °C)] 96.6 °F (35.9 °C)  Heart Rate:  [62-79] 62  Resp:  [14-18] 14  BP: (132-143)/() 132/85    Intake/Output:    Intake/Output Summary (Last 24 hours) at 2020 0923  Last data filed at 2020 0415  Gross per 24 hour   Intake 1384 ml   Output 1550 ml   Net -166 ml       Exam:  /85 (BP Location: Right arm, Patient Position: Lying)   Pulse 62   Temp 96.6 °F (35.9 °C) (Oral)   Resp 14   Ht 165 cm (64.96\")   Wt 49 kg (108 lb 0.4 oz)   SpO2 97%   BMI 18.00 kg/m²     General Appearance:    Alert, cooperative, no distress, AAOx3                          Head:  Scalp incision clean and dry status post craniotomy                        Throat:   Oral mucosa pink and moist, core track functioning properly                           Neck:   No JVD                         Lungs:    Clear to auscultation bilaterally, respirations unlabored                          Heart:    Regular rate and rhythm, S1 and S2 normal                  Abdomen:     Soft, nontender, bowel sounds active                 Extremities:   No cyanosis or edema                        Pulses:   Pulses palpable in lower extremities                   Neurologic: Left sided " flaccid hemiparesis with left-sided facial droop     Data Review:  Labs in chart were reviewed.    Assessment:  Active Hospital Problems    Diagnosis  POA   • **Malignant brain tumor (CMS/HCC) [C71.9]  Unknown   • Dysphagia [R13.10]  Unknown   • Acute left-sided weakness [R53.1]  Unknown   • Intracranial hemorrhage (CMS/HCC) [I62.9]  No      Resolved Hospital Problems    Diagnosis Date Resolved POA   • Brain abscess [G06.0] 07/05/2020 Yes       Plan:    Brain mass with persistent left-sided hemiplegia/dysphagia/hypophonia/droop              -Initial pathology consistent with neoplasm and final pathology pending as sent to outside pathology office in [place]              -Antibiotics discontinued -afebrile -ID has also signed off              -Decadron/Keppra/Pepcid               -Stable from respiratory standpoint and pulmonology has signed off              -Oncology now going to follow more peripherally     Mild hyperglycemia secondary to steroids - tempted to remove sliding scale here in the next day or 2 as her requirements are minimal at best and there is no past history of diabetes but will continue for now and observe    Urinary retention not surprising.  Patient did not successfully complete voiding trial so Bernabe will be replaced and I think that should further be trialed again at rehab once activity levels improve     SCDs for DVT prophylaxis        Disposition pending CCP/placement as well as final okay from [facility] -hopefully there should be no issue with this patient transitioning over to our acute rehab as that would be clinically best for this patient       Jd Regan MD  7/7/2020  09:23

## 2020-07-07 NOTE — PROGRESS NOTES
[facility] NEUROSURGERY PROGRESS NOTE    PATIENT IDENTIFICATION:   Name:  Denise Guajardo      MRN:  [id number]     54 y.o.  female               CC: POD 6 right temporal craniotomy for excisional biopsy      Subjective     Interval History: failed repeat swallow eval. Making some progress with PT-stand and shuffle.  Denies headache or jaw pain.    ROS:  HEENT:  no vision changes  GI: No nausea, vomiting;swallow difficulties  Neuro: Left-sided plegia    Objective     Vital signs in last 24 hours:  Temp:  [96.6 °F (35.9 °C)-97.6 °F (36.4 °C)] 96.6 °F (35.9 °C)  Heart Rate:  [62-79] 62  Resp:  [14-18] 14  BP: (132-143)/() 132/85      Intake/Output this shift:  No intake/output data recorded.      Intake/Output last 3 shifts:  I/O last 3 completed shifts:  In: 2102 [Other:425; NG/GT:1677]  Out: 2200 [Urine:2200]    LABS:  Results from last 7 days   Lab Units 07/07/20  0556 07/06/20  0551 07/05/20  0436   WBC 10*3/mm3 11.43* 8.89 11.90*   HEMOGLOBIN g/dL 15.5 15.1 14.1   HEMATOCRIT % 44.5 42.7 40.2   PLATELETS 10*3/mm3 260 249 244     Results from last 7 days   Lab Units 07/07/20  0556 07/06/20  0551 07/05/20  0436   SODIUM mmol/L 135* 136 140   POTASSIUM mmol/L 4.3 4.2 4.4   CHLORIDE mmol/L 96* 97* 101   CO2 mmol/L 30.9* 29.0 30.0*   BUN mg/dL 28* 28* 26*   CREATININE mg/dL 0.55* 0.50* 0.55*   CALCIUM mg/dL 9.3 8.8 9.1   GLUCOSE mg/dL 142* 135* 144*     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    Meds reviewed/changed: Yes    Current Facility-Administered Medications:   •  acetaminophen (TYLENOL) tablet 650 mg, 650 mg, Oral, Q4H PRN, 650 mg at 07/07/20 0420 **OR** acetaminophen (TYLENOL) 160 MG/5ML solution 650 mg, 650 mg, Oral, Q4H PRN, 650 mg at 07/07/20 0940 **OR**  acetaminophen (TYLENOL) suppository 650 mg, 650 mg, Rectal, Q4H PRN, Finn Toro MD  •  dexamethasone (DECADRON) injection 1 mg, 1 mg, Intravenous, Q6H, Shai Licona MD, 1 mg at 07/07/20 0940  •  dextrose (D50W) 25 g/ 50mL Intravenous Solution 25 g, 25 g, Intravenous, Q15 Min PRN, Finn Toro MD  •  dextrose (GLUTOSE) oral gel 15 g, 15 g, Oral, Q15 Min PRN, Finn Toro MD  •  famotidine (PEPCID) injection 20 mg, 20 mg, Intravenous, Q12H, Jd Regan MD, 20 mg at 07/07/20 0943  •  glucagon (human recombinant) (GLUCAGEN DIAGNOSTIC) injection 1 mg, 1 mg, Subcutaneous, Q15 Min PRN, Finn Toro MD  •  HYDROcodone-acetaminophen (NORCO) 5-325 MG per tablet 1 tablet, 1 tablet, Oral, Q4H PRN, Finn Toro MD, 1 tablet at 07/05/20 2226  •  HYDROmorphone (DILAUDID) injection 0.5 mg, 0.5 mg, Intravenous, Q2H PRN, 0.5 mg at 07/01/20 2323 **AND** naloxone (NARCAN) injection 0.4 mg, 0.4 mg, Intravenous, Q5 Min PRN, Finn Toro MD  •  insulin regular (humuLIN R,novoLIN R) injection 0-7 Units, 0-7 Units, Subcutaneous, Q6H, Finn Toro MD, 2 Units at 07/06/20 1124  •  levETIRAcetam (KEPPRA) 100 MG/ML solution 500 mg, 500 mg, Nasogastric, Q12H, Finn Toro MD, 500 mg at 07/07/20 0940  •  methocarbamol (ROBAXIN) tablet 500 mg, 500 mg, Oral, Q6H PRN, Joy Bautista APRN  •  ondansetron (ZOFRAN) tablet 4 mg, 4 mg, Oral, Q6H PRN **OR** ondansetron (ZOFRAN) injection 4 mg, 4 mg, Intravenous, Q6H PRN, Finn Toro MD, 4 mg at 07/01/20 9173  •  ondansetron (ZOFRAN) tablet 4 mg, 4 mg, Oral, Q6H PRN **OR** ondansetron (ZOFRAN) injection 4 mg, 4 mg, Intravenous, Q6H PRN, Finn Toro MD  •  potassium chloride (MICRO-K) CR capsule 40 mEq, 40 mEq, Oral, PRN **OR** potassium chloride (KLOR-CON) packet 40 mEq, 40 mEq, Oral, PRN, 40 mEq at 07/04/20 1137 **OR** potassium chloride 10 mEq in 100 mL IVPB, 10 mEq, Intravenous, Q1H PRN, Finn Toro  MD [place]  •  sodium chloride 0.9 % flush 10 mL, 10 mL, Intravenous, Q12H, Finn Toro MD, 10 mL at 07/07/20 0942  •  sodium chloride 0.9 % flush 10 mL, 10 mL, Intravenous, PRN, Finn Toro MD      Physical Exam:    General:   Awake, alert, oriented x3. Speech     hypophonic.  Flat affect.  HEENT:   Right frontal incision well     approximated with no redness     drainage, minimal swelling.    CN: Extraocular movements are full , PERRLA sluggish;Severe left facial weakness, particularly lower face.  Able to close eyes; tongue deviation to the left.  Able to blow out cheeks.    Motor: No movement left upper extremity, trace movement but not antigravity left lower extremity.  Normal strength right side  Sensation: Able to feel touch left side   Coordination: Finger-to-nose with mild ataxia on right, unable to assess left.  Extremities:   No calf swelling or tenderness    Assessment/Plan     ASSESSMENT:      Malignant brain tumor (CMS/HCC)    Intracranial hemorrhage (CMS/HCC)    Dysphagia    Acute left-sided weakness      PLAN: No neurologic changes since last visit, but did fairly well with physical therapy standing and taking shuffling steps.  Patient continues with left-sided plegia.  She does have some movement to the left lower extremity but not antigravity.  She is verbalizing but she is very hypophonic.  Unable to swallow safely so has core track with feeds.  She is participatory in all aspects of therapy.  Still awaiting final pathology from [facility].  She and her  are aware that this may take a couple of weeks for final results.  Awaiting BAR eval.  Oncology following peripherally for final pathology.  Will transition her from IV steroid to Medrol Dosepak.  Okay for discharge to rehab at any time.  We will follow there.  May need to consider something for depression.  Will defer to primary service or PM&R. We will follow peripherally until path returns.    I discussed the  "patients findings and my recommendations with patient, family, nursing staff and Dr. Licona       LOS: 11 days       Joy Bautista, APRN  7/7/2020  09:58    \"Dictated utilizing Dragon dictation\".      "

## 2020-07-07 NOTE — CONSULTS
"This was a follow up visit since patient had surgery.  Patient struggles to communicate; however, I was able to have prayer with her.   shared about his struggles seeing his wife in this condition.  There is a lot of grief and disappointment and now questioning if they had made the right decision.  There is the feeling of wanting to know what God is doing.  The \"why\" question is there.  Why does this happen to good people and not bad.  I encourage Malick and his wife to take one day at a time.  He wants honest information, so they can make an honest and clear decision.    "

## 2020-07-07 NOTE — PLAN OF CARE
Complaints of pain to right side of face this am, medicated with prn tylenol and Robaxin for unrelieved pain. Voiced pain had improved after robaxin. Retaining urine, F/C replaced per orders. Left side remains flaccid with some movement to left quadricept this morning. Tolerating tube feeding. Awaiting rehab.

## 2020-07-07 NOTE — THERAPY TREATMENT NOTE
Patient Name: Denise Guajardo  : 1965    MRN: [id number]                              Today's Date: 2020       Admit Date: 2020    Visit Dx:     ICD-10-CM ICD-9-CM   1. Brain tumor (CMS/HCC) D49.6 239.6     Patient Active Problem List   Diagnosis   • Malignant brain tumor (CMS/HCC)   • Intracranial hemorrhage (CMS/HCC)   • Dysphagia   • Acute left-sided weakness     Past Medical History:   Diagnosis Date   • Vertigo      Past Surgical History:   Procedure Laterality Date   • BREAST BIOPSY     • DENTAL PROCEDURE     • TUBAL ABDOMINAL LIGATION     • WISDOM TOOTH EXTRACTION       General Information     Row Name 20 1038          PT Evaluation Time/Intention    Document Type  therapy note (daily note)  -     Mode of Treatment  physical therapy  -     Row Name 20 1038          General Information    Existing Precautions/Restrictions  fall  -     Row Name 20 1038          Cognitive Assessment/Intervention- PT/OT    Orientation Status (Cognition)  oriented to;person;place  -       User Key  (r) = Recorded By, (t) = Taken By, (c) = Cosigned By    Initials Name Provider Type     Paula Palafox PTA Physical Therapy Assistant        Mobility     Row Name 20 1039          Bed Mobility Assessment/Treatment    Bed Mobility Assessment/Treatment  supine-sit;sit-supine  -     Supine-Sit Roosevelt (Bed Mobility)  minimum assist (75% patient effort);moderate assist (50% patient effort);2 person assist  -     Sit-Supine Roosevelt (Bed Mobility)  minimum assist (75% patient effort);moderate assist (50% patient effort);2 person assist  -     Assistive Device (Bed Mobility)  bed rails;head of bed elevated  -     Comment (Bed Mobility)  instructed to hook R LE under L LE  -     Row Name 20 1039          Transfer Assessment/Treatment    Comment (Transfers)  stood 4x; leaning towards L side  -     Row Name 20 1039          Sit-Stand Transfer    Sit-Stand  Catarina (Transfers)  minimum assist (75% patient effort);2 person assist  -     Assistive Device (Sit-Stand Transfers)  -- HHA  -     Row Name 07/07/20 1039          Gait/Stairs Assessment/Training    Comment (Gait/Stairs)  side steps towards HOB, assist to slide L LE over and block knee  -       User Key  (r) = Recorded By, (t) = Taken By, (c) = Cosigned By    Initials Name Provider Type    Paula Jones PTA Physical Therapy Assistant        Obj/Interventions     Row Name 07/07/20 1041          Therapeutic Exercise    Lower Extremity (Therapeutic Exercise)  LAQ (long arc quad), right;marching while seated;gastroc stretch, left  -     Lower Extremity Range of Motion (Therapeutic Exercise)  ankle dorsiflexion/plantar flexion, right  -     Exercise Type (Therapeutic Exercise)  AROM (active range of motion);PROM (passive range of motion) PROM for L gastroc stretch  -     Position (Therapeutic Exercise)  seated  -     Sets/Reps (Therapeutic Exercise)  10 reps  -     Row Name 07/07/20 1041          Static Sitting Balance    Level of Catarina (Unsupported Sitting, Static Balance)  minimal assist, 75% patient effort  -     Sitting Position (Unsupported Sitting, Static Balance)  sitting on edge of bed  -     Time Able to Maintain Position (Unsupported Sitting, Static Balance)  more than 5 minutes  -     Comment (Unsupported Sitting, Static Balance)  leaning towards L, then posterior once fatigued  -     Row Name 07/07/20 1041          Dynamic Sitting Balance    Level of Catarina, Reaches Outside Midline (Sitting, Dynamic Balance)  moderate assist, 50 to 74% patient effort  -     Sitting Position, Reaches Outside Midline (Sitting, Dynamic Balance)  sitting on edge of bed  -     Comment, Reaches Outside Midline (Sitting, Dynamic Balance)  leaning on elbows to each side  -     Row Name 07/07/20 1041          Static Standing Balance    Level of Catarina (Supported  "Standing, Static Balance)  minimal assist, 75% patient effort;moderate assist, 50 to 74% patient effort;2 person assist  -     Time Able to Maintain Position (Supported Standing, Static Balance)  3 to 4 minutes  -     Assistive Device Utilized (Supported Standing, Static Balance)  -- HHA  -     Comment (Supported Standing, Static Balance)  leaning towards L side, cues to attempt correcting  -       User Key  (r) = Recorded By, (t) = Taken By, (c) = Cosigned By    Initials Name Provider Type    Paula Jones PTA Physical Therapy Assistant        Goals/Plan    No documentation.       Clinical Impression     Row Name 07/07/20 1044          Pain Assessment    Additional Documentation  Pain Scale: Numbers Pre/Post-Treatment (Group)  -[facility] Name 07/07/20 1044          Pain Scale: Numbers Pre/Post-Treatment    Pain Scale: Numbers, Pretreatment  0/10 - no pain  -SM     Pain Scale: Numbers, Post-Treatment  0/10 - no pain  -SM     Pre/Post Treatment Pain Comment  reports \"pressure\" on R side of face; RN notified  -[facility] Name 07/07/20 1044          Positioning and Restraints    Pre-Treatment Position  in bed  -     Post Treatment Position  bed  -SM     In Bed  supine;call light within reach;encouraged to call for assist;exit alarm on;with family/caregiver;notified nsg  -       User Key  (r) = Recorded By, (t) = Taken By, (c) = Cosigned By    Initials Name Provider Type    Paula Jones PTA Physical Therapy Assistant        Outcome Measures     Row Name 07/07/20 1045          How much help from another person do you currently need...    Turning from your back to your side while in flat bed without using bedrails?  3  -SM     Moving from lying on back to sitting on the side of a flat bed without bedrails?  2  -SM     Moving to and from a bed to a chair (including a wheelchair)?  2  -SM     Standing up from a chair using your arms (e.g., wheelchair, bedside chair)?  2  -SM     Climbing " 3-5 steps with a railing?  1  -SM     To walk in hospital room?  1  -SM     AM-PAC 6 Clicks Score (PT)  11  -     Row Name 07/07/20 1045          Functional Assessment    Outcome Measure Options  AM-PAC 6 Clicks Basic Mobility (PT)  -       User Key  (r) = Recorded By, (t) = Taken By, (c) = Cosigned By    Initials Name Provider Type    Paula Jones, EDEN Physical Therapy Assistant        Physical Therapy Education                 Title: PT OT SLP Therapies (In Progress)     Topic: Physical Therapy (In Progress)     Point: Mobility training (In Progress)     Description:   Instruct learner(s) on safety and technique for assisting patient out of bed, chair or wheelchair.  Instruct in the proper use of assistive devices, such as walker, crutches, cane or brace.              Patient Friendly Description:   It's important to get you on your feet again, but we need to do so in a way that is safe for you. Falling has serious consequences, and your personal safety is the most important thing of all.        When it's time to get out of bed, one of us or a family member will sit next to you on the bed to give you support.     If your doctor or nurse tells you to use a walker, crutches, a cane, or a brace, be sure you use it every time you get out of bed, even if you think you don't need it.    Learning Progress Summary           Patient Acceptance, E,D, NR by  at 7/7/2020 1046    Acceptance, E,D, NR by  at 7/6/2020 1511    Acceptance, E,D, NR by EH at 7/5/2020 1240    Acceptance, E,D, NR by EE at 7/4/2020 1416    Acceptance, E, NR by AR at 7/3/2020 1557    Acceptance, E,D, NR by PC at 7/2/2020 1208                   Point: Home exercise program (In Progress)     Description:   Instruct learner(s) on appropriate technique for monitoring, assisting and/or progressing patient with therapeutic exercises and activities.              Learning Progress Summary           Patient Acceptance, E,D, NR by  at 7/7/2020  1046    Acceptance, E,D, NR by  at 7/6/2020 1511    Acceptance, E,D, NR by  at 7/5/2020 1240    Acceptance, E,D, NR by  at 7/4/2020 1416    Acceptance, E, NR by AR at 7/3/2020 1557    Acceptance, E,D, NR by PC at 7/2/2020 1208                   Point: Body mechanics (In Progress)     Description:   Instruct learner(s) on proper positioning and spine alignment for patient and/or caregiver during mobility tasks and/or exercises.              Learning Progress Summary           Patient Acceptance, E,D, NR by  at 7/7/2020 1046    Acceptance, E,D, NR by  at 7/6/2020 1511    Acceptance, E,D, NR by  at 7/5/2020 1240    Acceptance, E,D, NR by EE at 7/4/2020 1416    Acceptance, E, NR by AR at 7/3/2020 1557    Acceptance, E,D, NR by PC at 7/2/2020 1208                   Point: Precautions (In Progress)     Description:   Instruct learner(s) on prescribed precautions during mobility and gait tasks              Learning Progress Summary           Patient Acceptance, E,D, NR by  at 7/7/2020 1046    Acceptance, E,D, NR by  at 7/6/2020 1511    Acceptance, E,D, NR by  at 7/5/2020 1240    Acceptance, E,D, NR by  at 7/4/2020 1416    Acceptance, E, NR by AR at 7/3/2020 1557    Acceptance, E,D, NR by PC at 7/2/2020 1208                               User Key     Initials Effective Dates Name Provider Type Discipline     04/03/18 -  Elham Forde, PT Physical Therapist PT     04/03/18 -  Marylin Hill, PT Physical Therapist PT    AR 04/03/18 -  Ceci Amador PT Physical Therapist PT     03/07/18 -  Paula Palafox PTA Physical Therapy Assistant PT     08/19/18 -  Prerna Garcia PTA Physical Therapy Assistant PT              PT Recommendation and Plan     Outcome Summary/Treatment Plan (PT)  Anticipated Discharge Disposition (PT): inpatient rehabilitation facility  Plan of Care Reviewed With: patient  Progress: improving  Outcome Summary: Pt tolerated treatment well this date. Required mod A x2 for  bed mobility mostly d/t balance deficits w/ transition. Required min A x2 to stand 4x total, though pt still leaning towards L side. Assist was needed to slide L foot over when side stepping towards HOB. Pt very motivated and performed several tasks this visit, though very fatigued by end. Patient was wearing a face mask during this therapy encounter. Therapist used appropriate personal protective equipment including mask and gloves. Mask used was standard procedure mask. Appropriate PPE was worn during the entire therapy session. Hand hygiene was completed before and after therapy session. Patient is not in enhanced droplet precautions.     Time Calculation:   PT Charges     Row Name 07/07/20 1051             Time Calculation    Start Time  0905  -      Stop Time  0933  -      Time Calculation (min)  28 min  -      PT Received On  07/07/20  -      PT - Next Appointment  07/08/20  -        User Key  (r) = Recorded By, (t) = Taken By, (c) = Cosigned By    Initials Name Provider Type    Paula Jones PTA Physical Therapy Assistant        Therapy Charges for Today     Code Description Service Date Service Provider Modifiers Qty    97330093980 HC PT THER PROC EA 15 MIN 7/7/2020 Paula Palafox PTA GP 1    05474013208 HC PT THER SUPP EA 15 MIN 7/7/2020 Paula Palafox PTA GP 2    37310557950 HC PT THERAPEUTIC ACT EA 15 MIN 7/7/2020 Paula Palafox PTA GP 1          PT G-Codes  Outcome Measure Options: AM-PAC 6 Clicks Basic Mobility (PT)  AM-PAC 6 Clicks Score (PT): 11  AM-PAC 6 Clicks Score (OT): 8  Modified Kern Scale: 5 - Severe disability.  Bedridden, incontinent, and requiring constant nursing care and attention.    Paula Palafox PTA  7/7/2020

## 2020-07-08 NOTE — NURSING NOTE
Inpatient Acute Rehab    Today [facility] was called to determine whether or not the patient would be approved for inpatient acute rehab.  [facility] stated that they had not received clinicals although clinicals were sent yesterday and a confirmation report was printed stating that the fax had gone through.  Clinicals were faxed again today and [facility] stated they had received the fax. Will continue to follow. TOSIN Vee, Rehab Admission RN

## 2020-07-08 NOTE — PLAN OF CARE
Problem: Patient Care Overview  Goal: Plan of Care Review  Outcome: Ongoing (interventions implemented as appropriate)  Flowsheets (Taken 7/8/2020 7041)  Progress: no change  Plan of Care Reviewed With: patient  Outcome Summary: Pt tolerating PT today although very quiet. Pt req max A / mod A x 2 for bed mobility. Pt attempting to assist herself whenever able. Pt req CGA x 2 for STS x 3 and took a few shuffling steps to HOB w/ assist to slide L foot and wt shift L. PT will prog as pt justyna.     Patient was wearing a face mask during this therapy encounter. Therapist used appropriate personal protective equipment including mask and gloves.  Mask used was standard procedure mask. Appropriate PPE was worn during the entire therapy session. Hand hygiene was completed before and after therapy session. Patient is not in enhanced droplet precautions.

## 2020-07-08 NOTE — PROGRESS NOTES
LOS: 12 days   Patient Care Team:  Paz Mera MD as PCP - General (Family Medicine)    Chief Complaint: Postop craniotomy    Subjective     This patient continues with weakness on her left side.  It does seem to be improving however.    Interval History:     History taken from: patient chart family    Objective      She still has weakness on the left side    Vital Signs  Temp:  [97.6 °F (36.4 °C)-98.9 °F (37.2 °C)] 98 °F (36.7 °C)  Heart Rate:  [68-81] 81  Resp:  [16] 16  BP: (124-137)/(90-94) 124/91       Results Review:     I reviewed the patient's new clinical results.  The pathology reveals a glioblastoma.      Assessment/Plan       Malignant brain tumor (CMS/HCC)    Intracranial hemorrhage (CMS/HCC)    Dysphagia    Acute left-sided weakness      I had a long discussion with the patient's  the patient and her two sons.  I explained the pathology.  I explained that this is a very poor prognosis at this point unless we can get her into some type of experimental protocol.  We will need to wait at least 2 or 3 weeks before we can do anything at all but in the meantime we will consult radiation oncology and also start her on some antidepressants..      Shai Licona MD  07/08/20  15:31

## 2020-07-08 NOTE — PLAN OF CARE
A+0x4.  PT getting patient up to edge of bed x2.  Q2 turn.  Dr. Licona met w/ family to discuss pathology.  Rad/onc consult made.  Multiple liquid bowel movements.  C.Diff negative.  VSS.  Waiting on precert for BAR.  Continue to monitor and progress towards goals as tolerated.

## 2020-07-08 NOTE — PROGRESS NOTES
REASON FOR FOLLOWUP/CHIEF COMPLAINT:  Glioblastoma    HISTORY OF PRESENT ILLNESS:   Last night she learned of the new diagnosis of glioblastoma.  Her family learned of this as well.  She is planning to go to rehab today.  Left side is very weak still.  A great deal of difficulty trying to verbalize.  Son and  do most of the talking.    Past Medical History, Past Surgical History, Social History, Family History have been reviewed and are without significant changes except as mentioned.    Review of Systems   Review of Systems   Constitutional: Negative for unexpected weight change.   HENT: Negative for nosebleeds and trouble swallowing.    Respiratory: Negative for shortness of breath and wheezing.    Cardiovascular: Negative for chest pain and palpitations.   Gastrointestinal: Negative for constipation, diarrhea and nausea.   Genitourinary: Negative for dysuria and hematuria.   Musculoskeletal: Negative for arthralgias and myalgias.   Skin: Negative for rash and wound.   Neurological: Negative for seizures and syncope.   Hematological: Negative for adenopathy. Does not bruise/bleed easily.   Psychiatric/Behavioral: Negative for confusion.       Medications:  The current medication list was reviewed in the EMR    ALLERGIES:  No Known Allergies           Vitals:    07/07/20 1951 07/07/20 2342 07/08/20 0430 07/08/20 0833   BP: 137/92 132/90 132/90 124/91   BP Location: Right arm Right arm Right arm Right arm   Patient Position: Lying Lying Lying Lying   Pulse: 75 71 68 81   Resp: 16 16 16 16   Temp: 98.9 °F (37.2 °C) 97.6 °F (36.4 °C) 98 °F (36.7 °C)    TempSrc: Oral Oral Oral    SpO2: 97% 97% 97% 97%   Weight:       Height:         Physical Exam    CONSTITUTIONAL:  Vital signs reviewed.  No distress, looks comfortable.  EYES:  Conjunctiva and lids unremarkable.  PERRLA  EARS,NOSE,MOUTH,THROAT:  Ears and nose appear unremarkable.  Lips, teeth, gums appear unremarkable.  RESPIRATORY:  Normal respiratory  effort.  Lungs clear to auscultation bilaterally.  CARDIOVASCULAR:  Normal S1, S2.  No murmurs rubs or gallops.  No significant lower extremity edema.  GASTROINTESTINAL: Abdomen appears unremarkable.  Nontender.  No hepatomegaly.  No splenomegaly.  NEURO: cranial nerves 2-12 grossly intact.  Weakness left leg and left arm  MUSCULOSKELETAL:  Unremarkable digits/nails.  No cyanosis or clubbing.  SKIN:  Warm.  No rashes.  PSYCHIATRIC: Cannot assess because patient essentially nonverbal (very limited communication)       RECENT LABS:  WBC   Date Value Ref Range Status   07/08/2020 14.18 (H) 3.40 - 10.80 10*3/mm3 Final   07/07/2020 11.43 (H) 3.40 - 10.80 10*3/mm3 Final   07/06/2020 8.89 3.40 - 10.80 10*3/mm3 Final     Hemoglobin   Date Value Ref Range Status   07/08/2020 16.0 (H) 12.0 - 15.9 g/dL Final   07/07/2020 15.5 12.0 - 15.9 g/dL Final   07/06/2020 15.1 12.0 - 15.9 g/dL Final     Platelets   Date Value Ref Range Status   07/08/2020 264 140 - 450 10*3/mm3 Final   07/07/2020 260 140 - 450 10*3/mm3 Final   07/06/2020 249 140 - 450 10*3/mm3 Final       ASSESSMENT/PLAN:  Denise Guajardo P599/1     *Glioblastoma (WHO grade 4).    · Diagnosis per right brainstem biopsy 7/1/2020.  · Await IDH 1-2 mutation analysis by PCR.  · Await MGMT promoter methylation by PCR  · Regardless of pending studies, as per NCCN guidelines, plan concurrent Temodar with radiation, then adjuvant Temodar   · (clinical benefit of Temodar is likely to be lower in patients who do not have MGMT promoter methylation)  · (If MGMT methylated, then 46% 2-year OS versus about 14%.  Median OS 21.7 months versus 12.7 months.)  · If IGH 1-2 mutated, then significantly improved prognosis.  This does not affect treatment choice).  · Alternating electric field therapy is only an option for supratentorial disease.  Therefore, this is not an option due to the location of this malignancy  · The study that showed benefit for concurrent Temodar XRT followed by  adjuvant Temodar began XRT/Temodar at a median of 5 weeks after tissue diagnosis but some patients began as early as 2 weeks after tissue diagnosis.    · Temodar dosing would be 75 mg/m² daily x42 days (concurrent with XRT which is typically administered in 30 fractions.  Therefore, Temodar given over the weekends as well) Temodar maximum up to 49 days, as long as ANC at least 1500, PLT at least 100.  · Maintenance phase begins 4 weeks after completion of XRT.  · Cycle 1 is 150 mg/m² daily D1-5 of a 28-day cycle  · Cycle 2-6 May increase to 200 mg/m² daily D1-5, repeat every 28 days    *leukocytosis, suspect due to steroids.      *Goals of care  Dr. Licona talked to patient and family about enrolling in a clinical trial due to the poor prognosis of glioblastoma.  I reviewed the NIH website for clinical trial options and I had our research department do the same as well.  They also specifically checked with local facilities.  I explained we found no clear local trial options other than one trial at .  I gave the sons the contact number if they want to explore this.  However, the best trial I found was a phase 3 trial with the closest location and at [facility].  This randomizes patients to the standard of care with Temodar and XRT versus Temodar and XRT plus the study drug.  I printed out this material and gave it to the family.  However, I explained with this diagnosis as bad as patients typically feel with it, I personally recommend receiving standard therapy locally instead of traveling multiple times seeking out a trial.  Family seems to favor staying locally, keeping quality of life at the forefront of goals.  · They live 5 minutes from [facility] and therefore would like ongoing cancer care through this facility if they do not decide to travel for a clinical trial.  I explained I think getting care closer to home at [facility] would be very reasonable considering this is standard therapy (Temodar with  XRT).  · However, if she is going to be in rehab for several weeks, it might be best if we start radiation and Temodar here at [facility] and upon discharge she can transition to the team at [facility].  · I told patient, 2 sons, and , our practice would check on her once or twice per [facility] to [facility] improvement and see if she wants us to start therapy while here.  Again, upon discharge, she would like medical oncology care at [facility] which I think is very reasonable and appropriate.    Plan  Our practice will check on her once or twice a week while in rehab to assess how she is progressing and to determine if we will need to order Temodar for her (if she is going to start Temodar with XRT while in rehab)    50 minutes.  Over half that time counseling regarding prognosis and treatment options.

## 2020-07-08 NOTE — PLAN OF CARE
Problem: Patient Care Overview  Goal: Plan of Care Review  Outcome: Ongoing (interventions implemented as appropriate)  Flowsheets (Taken 7/8/2020 0484)  Outcome Summary: Alert and oriented. Pt mouths/whispers responses. VSS. RA. Cortrak in place. Tolerating tube feed at goal rate. No complaints of pain. Donald boot to left foot. Bernabe in place. Multiple soft BM's - Ramandeep GREENWOOD notified - instructed to obtain cdiff sample if pt goes again. If negative, will address on dayshift.  at bedside. Will continue to monitor.

## 2020-07-08 NOTE — PROGRESS NOTES
[facility] HOSPITALIST    PROGRESS NOTE    Name:  Denise Guajardo   Age:  54 y.o.  Sex:  female  :  1965  MRN:  [id number]   Visit Number:  [id number]  Admission Date:  2020  Date Of Service:  20  Primary Care Physician:  Paz Mera MD     LOS: 12 days :  Patient Care Team:  Paz Mera MD as PCP - General (Family Medicine)  Carlota Peacock MD as Consulting Physician (Radiation Oncology):    History taken from:     patient    Chief Complaint:      Left-sided hemiplegia    Subjective     Interval History:     Patient seen and examined today.  Reviewed history and physical, lab work, x-rays, chart, consult, op note.    Patient is a 54-year-old with no significant past medical history who had dizziness for the past couple of weeks and went to [facility].  She had MRI of the brain which showed 2 lesions consistent with metastatic disease with unknown primary.  She was given Decadron and transferred here on 2020.  CT of chest abdomen pelvis did not note any tumors as it was suspected that the patient may have metastatic disease.  Infectious disease and pulmonology were consulted as it was thought that the patient may have brain abscesses.  All cultures were negative however including CSF and blood.    Patient was taken for craniotomy on 2020.  Excisional biopsy of the mass in the right cerebral on call was done.  Pathology showed glioblastoma.  Patient had bleeding after the surgery.  She also has left-sided hemiplegia, dysphagia and dysphonia.  She is unable to ambulate.  She has NG tube in with supplemental feeding.  PT and OT are working with the patient.  Patient will need extensive rehab.    Radiation oncology has been consulted by neurosurgery.  Spoke to the son at length, answered all questions.  Patient has poor historian at this point.  She is unable to answer any questions with any clarity.        Review of Systems:     Unable to obtain due to  the patient's current dysphonia.    Objective     Vital Signs:    Temp:  [97.6 °F (36.4 °C)-98.9 °F (37.2 °C)] 98.3 °F (36.8 °C)  Heart Rate:  [68-93] 93  Resp:  [16] 16  BP: (124-137)/(90-94) 129/93    Physical Exam:  General: Alert and awake.  Does follow some directions.  Heart: Regular rate and rhythm without murmur rub or thrill  Lungs: Clear to auscultation bilaterally without use of accessory muscles respiration  Abdomen: Soft/nontender/nondistended.  Neuro: Left-sided hemiplegia noted.       Results Review:      I reviewed the patient's new clinical results.    Labs:    Lab Results (last 24 hours)     Procedure Component Value Units Date/Time    POC Glucose Once [[id number]]  (Normal) Collected:  07/08/20 1207    Specimen:  Blood Updated:  07/08/20 1218     Glucose 127 mg/dL     Tissue Pathology Exam [[id number]] Collected:  07/01/20 1158    Specimen:  Tissue from Brain, Tissue from Brain Updated:  07/08/20 1205     Case Report --     Surgical Pathology Report                         Case: [id number]                                  Authorizing Provider:  Shai Licona MD       Collected:           07/01/2020 11:58 AM          Ordering Location:     [facility]  Received:            07/01/2020 12:06 PM                                 MAIN OR                                                                      Pathologist:           Vini Jones MD                                                         Specimens:   1) - Brain, RIGHT BRAINSTEM MASS FOR FROZEN                                                         2) - Brain, RIGHT BRAINSTEM MASS                                                            Final Diagnosis --     1-2.  Brain, Right Brainstem, Biopsy:     A.  Glioblastoma, IDH-1, ([id number], WHO grade 4)    1.  IDH (R13H) expression by immunohistochemistry negative.     2.  IDH 1-2 mutation analysis by PCR:  Pending.    3.  ATRX expression by immunohistochemistry:  "Preserved.    4.  P53 expression by immunohistochemistry:  Positive.      5.  MGMT promotor methylation by PCR:  Pending.      ross/brb            Preliminary Diagnosis --     1. Brain, Right Brainstem Biopsy:    A. Necrotic debris and rare degenerated cells with focal acute inflammation.    Comment: There does appear to be some slight increased cellularity around some vessels but the cells appear degenerated. These cells were not seen on the original frozen which was almost all necrotic.    ross/jse     2. Brain, Brainstem, Biopsy:    A. Malignant cellular neoplasm.        COMMENT:The neoplasm histologically appears to be a primary brain lesion but we are not going to perform immunohistochemical stains at this time but rather send the case out to the [facility] for full work-up. The case was discussed with Dr. Licona on 7/2/2020.       Ross/kds     Ross/kds       Intraoperative Consultation --     FS Dx 1AFS ( 1 block and 1 Touch print): Necrosis and acute inflammation consistent with abscess.  Per Dr. Jones, [facility].  The results are called to Dr. Licona at 12:21 p.m.  MARCELINA/O/ROSS/candy        Gross Description --     1. Received fresh for frozen section labeled with the patient's name and designated \"right brainstem mass for frozen section\".  The specimen consists of two pink tan tissue pieces measuring 0.5 x 0.3 x 0.2 cm in total aggregate dimension.  A portion of the fresh tissue is used for squash prep analysis.  The remaining tissue is submitted en toto for frozen section analysis and designated 1AFS.  The frozen section remnant is submitted en toto in cassette 1AFS. HT/O/ROSS/jse     2. Received in formalin labeled \"right brain stem mass\" is a Vacutainer which is sealed at both ends with no formalin reaching the tissue. The Vacutainer contains a 1.0 x 0.8 x 0.4 cm aggregate of tan-red friable tissue fragments and clotted blood.  Also received in the same container is a portion of " Kenroy gauze which is exposed to the formalin fixation. The Kenroy gauze contains a 0.3 x 0.3 x 0.1 cm aggregate of tan-pink friable tissue fragments. The specimen is entirely submitted as follows:    2A - tissue within Vacutainer   2B - tissue on Kenroy gauze    Jap/jania/laci/kds       Microscopic Description --     Performed, incorporated in diagnosis.       Clostridium Difficile Toxin - Stool, Per Rectum [[id number]] Collected:  07/08/20 0906    Specimen:  Stool from Per Rectum Updated:  07/08/20 1100    Narrative:       The following orders were created for panel order Clostridium Difficile Toxin - Stool, Per Rectum.  Procedure                               Abnormality         Status                     ---------                               -----------         ------                     Clostridium Difficile To...[[id number]]  Normal              Final result                 Please view results for these tests on the individual orders.    Clostridium Difficile Toxin, PCR - Stool, Per Rectum [[id number]]  (Normal) Collected:  07/08/20 0906    Specimen:  Stool from Per Rectum Updated:  07/08/20 1100     C. Difficile Toxins by PCR Negative    Basic Metabolic Panel [[id number]]  (Abnormal) Collected:  07/08/20 0613    Specimen:  Blood Updated:  07/08/20 0709     Glucose 128 mg/dL      BUN 31 mg/dL      Creatinine 0.53 mg/dL      Sodium 136 mmol/L      Potassium 3.8 mmol/L      Chloride 97 mmol/L      CO2 30.0 mmol/L      Calcium 9.2 mg/dL      eGFR Non African Amer 120 mL/min/1.73      BUN/Creatinine Ratio 58.5     Anion Gap 9.0 mmol/L     Narrative:       GFR Normal >60  Chronic Kidney Disease <60  Kidney Failure <15      CBC & Differential [[id number]] Collected:  07/08/20 0613    Specimen:  Blood Updated:  07/08/20 0652    Narrative:       The following orders were created for panel order CBC & Differential.  Procedure                               Abnormality         Status                     ---------                                -----------         ------                     CBC Auto Differential[[id number]]        Abnormal            Final result                 Please view results for these tests on the individual orders.    CBC Auto Differential [[id number]]  (Abnormal) Collected:  07/08/20 0613    Specimen:  Blood Updated:  07/08/20 0652     WBC 14.18 10*3/mm3      RBC 5.30 10*6/mm3      Hemoglobin 16.0 g/dL      Hematocrit 45.9 %      MCV 86.6 fL      MCH 30.2 pg      MCHC 34.9 g/dL      RDW 11.8 %      RDW-SD 37.2 fl      MPV 9.7 fL      Platelets 264 10*3/mm3      Neutrophil % 83.3 %      Lymphocyte % 9.6 %      Monocyte % 5.4 %      Eosinophil % 0.4 %      Basophil % 0.2 %      Immature Grans % 1.1 %      Neutrophils, Absolute 11.81 10*3/mm3      Lymphocytes, Absolute 1.36 10*3/mm3      Monocytes, Absolute 0.77 10*3/mm3      Eosinophils, Absolute 0.05 10*3/mm3      Basophils, Absolute 0.03 10*3/mm3      Immature Grans, Absolute 0.16 10*3/mm3      nRBC 0.0 /100 WBC     Wound Culture - Wound, Brain [[id number]] Collected:  07/01/20 1209    Specimen:  Wound from Brain Updated:  07/08/20 0630     Wound Culture No growth at 1 week     Gram Stain No WBCs or organisms seen    Wound Culture - Wound, Brain [[id number]] Collected:  07/01/20 1159    Specimen:  Wound from Brain Updated:  07/08/20 0629     Wound Culture No growth at 1 week     Gram Stain Occasional WBCs seen      No organisms seen    POC Glucose Once [[id number]]  (Abnormal) Collected:  07/08/20 0531    Specimen:  Blood Updated:  07/08/20 0533     Glucose 131 mg/dL     POC Glucose Once [[id number]]  (Abnormal) Collected:  07/07/20 2343    Specimen:  Blood Updated:  07/07/20 2345     Glucose 144 mg/dL     POC Glucose Once [[id number]]  (Normal) Collected:  07/07/20 1804    Specimen:  Blood Updated:  07/07/20 1806     Glucose 120 mg/dL            Radiology:    Imaging Results (Last 24 Hours)     ** No results found for the last 24 hours. **          Medication Review:        famotidine 20 mg Intravenous Q12H   insulin regular 0-7 Units Subcutaneous Q6H   levETIRAcetam 500 mg Nasogastric Q12H   methylPREDNISolone 4 mg Oral TID Around Food   [START ON 7/9/2020] methylPREDNISolone 4 mg Oral 4x Daily Taper   [START ON 7/10/2020] methylPREDNISolone 4 mg Oral TID Around Food   [START ON 7/11/2020] methylPREDNISolone 4 mg Oral Before Breakfast   [START ON 7/11/2020] methylPREDNISolone 4 mg Oral Tonight   [START ON 7/12/2020] methylPREDNISolone 4 mg Oral Before Breakfast   methylPREDNISolone 8 mg Oral Tonight   sodium chloride 10 mL Intravenous Q12H            Assessment/Plan     Problem List Items Addressed This Visit        Nervous and Auditory    * (Principal) Malignant brain tumor (CMS/HCC) - Primary          1.  Glioblastoma of the right cerebral peduncle status post craniotomy with excision of the mass  2.  Intracranial hemorrhage with adjacent edema after surgery  3.  Left-sided hemiplegia/dysphasia/hypophonia  4.  Mild hyperglycemia likely secondary to steroids.  5.  Urinary retention      Plan:    She will need rehab.  Currently has NG tube in place for feeding.  PT and OT as well as speech therapy to work with her.  Radiation oncology has been consulted by neurosurgery.  Check lab work in the a.m.  Continue to monitor blood sugar.  Await placement in rehab.  Patient has poor prognosis.  Further recommendations will depend on clinical course.    Shorty Simon DO  07/08/20  16:28

## 2020-07-08 NOTE — PLAN OF CARE
Problem: Patient Care Overview  Goal: Plan of Care Review  Flowsheets (Taken 7/8/2020 8405)  Progress: improving  Plan of Care Reviewed With: patient  Outcome Summary: pt completed PROM L UE, still no movement in LUE. Pt performed bed mobility w. mod to max A. Sat EOB mod A w. lean to the L. washed face and hands w. mod A. UBB min A, LBB max/dep. LBD dep. pt cont to benefit from OT to incr ADL, strength, balance, and tsf.

## 2020-07-08 NOTE — THERAPY TREATMENT NOTE
Acute Care - Occupational Therapy Treatment Note  [facility]     Patient Name: Denise Guajardo  : 1965  MRN: [id number]  Today's Date: 2020             Admit Date: 2020       ICD-10-CM ICD-9-CM   1. Brain tumor (CMS/HCC) D49.6 239.6     Patient Active Problem List   Diagnosis   • Malignant brain tumor (CMS/HCC)   • Intracranial hemorrhage (CMS/HCC)   • Dysphagia   • Acute left-sided weakness     Past Medical History:   Diagnosis Date   • Vertigo      Past Surgical History:   Procedure Laterality Date   • BREAST BIOPSY     • DENTAL PROCEDURE     • TUBAL ABDOMINAL LIGATION     • WISDOM TOOTH EXTRACTION         Therapy Treatment    Rehabilitation Treatment Summary     Row Name 20 1255             Treatment Time/Intention    Discipline  occupational therapist  -      Document Type  therapy note (daily note)  -      Subjective Information  no complaints  -      Mode of Treatment  individual therapy;occupational therapy  -      Patient/Family Observations  pt supine in bed. son present in room.  -KP      Patient Effort  good  -KP      Comment  pt states yes and no to questions, isn't talking much during session even though questions presented to her. OT wore mask, gloves, performed hand hygiene and wore protective eye wear. pt did not wear mask during session. Pt completing bathing tasks  -      Existing Precautions/Restrictions  fall  -      Recorded by [] Gracie Cain, OTR 20 1302      Row Name 20 1255             Cognitive Assessment/Intervention- PT/OT    Orientation Status (Cognition)  person;place;oriented to  -KP      Follows Commands (Cognition)  follows one step commands;75-90% accuracy;verbal cues/prompting required  -      Cognitive Assessment/Intervention Comment  says some words, mostly yes no. mouths some words too  -      Recorded by [] Gracie Cain, OTR 20 1302      Row Name 20 1255             Bed Mobility  Assessment/Treatment    Supine-Sit [place] (Bed Mobility)  set up;maximum assist (25% patient effort);moderate assist (50% patient effort)  -      Sit-Supine [place] (Bed Mobility)  maximum assist (25% patient effort)  -      Recorded by [] Gracie Cain, OTR 07/08/20 1302      Row Name 07/08/20 1255             Functional Mobility    Functional Mobility- Comment  NT  -      Recorded by [] Gracie Cain, OTR 07/08/20 1302      Row Name 07/08/20 1255             Bathing Assessment/Intervention    Bathing [place] Level  bathing skills;lower body;dependent (less than 25% patient effort);upper body;moderate assist (50% patient effort);set up;verbal cues  -      Bathing Position  sitting up in bed  -      Comment (Bathing)  attempted EOB but pt unable to sit on own EOB, w. lean to L, layed down to complete bathing skills  -      Recorded by [] Gracie Cain, OTR 07/08/20 1302      Row Name 07/08/20 1255             Upper Body Dressing Assessment/Training    Upper Body Dressing [place] Level  upper body dressing skills;doff;don;pajama/robe;set up;maximum assist (25% patient effort)  -      Upper Body Dressing Position  sitting up in bed  -      Comment (Upper Body Dressing)  jakub technique  -      Recorded by [] Gracie Cain, OTR 07/08/20 1302      Row Name 07/08/20 1255             Lower Body Dressing Assessment/Training    Lower Body Dressing [place] Level  lower body dressing skills;doff;don;socks;set up;dependent (less than 25% patient effort)  -      Lower Body Dressing Position  sitting up in bed  -      Recorded by [] Gracie Cain, OTR 07/08/20 1302      Row Name 07/08/20 1255             Grooming Assessment/Training    [place] Level (Grooming)  grooming skills;wash face, hands;set up;moderate assist (50% patient effort)  -      Grooming Position  sitting up in bed  -      Comment (Grooming)  R UE to  wash face  -KP      Recorded by [KP] Gracie Cain OTR 07/08/20 1302      Row Name 07/08/20 1251             Toileting Assessment/Training    Comment (Toileting)  already complete in am earlier w. nsg aide A  -KP      Recorded by [KP] Gracie Cain OTR 07/08/20 1302      Row Name 07/08/20 1251             Therapeutic Exercise    Upper Extremity (Therapeutic Exercise)  bicep curl, left;tricep extension, left  -KP      Upper Extremity Range of Motion (Therapeutic Exercise)  shoulder flexion/extension, left;shoulder abduction/adduction, left;forearm supination/pronation, left;wrist flexion/extension, left  -KP      Exercise Type (Therapeutic Exercise)  PROM (passive range of motion)  -KP      Position (Therapeutic Exercise)  supine  -KP      Sets/Reps (Therapeutic Exercise)  10x2  -KP      Expected Outcome (Therapeutic Exercise)  improve neuromuscular control;improve motor control;facilitate normal movement patterns  -KP      Recorded by [] Gracie Cain OTR 07/08/20 1302      Row Name 07/08/20 1254             Static Sitting Balance    Level of [place] (Unsupported Sitting, Static Balance)  moderate assist, 50 to 74% patient effort  -KP      Sitting Position (Unsupported Sitting, Static Balance)  sitting on edge of bed  -KP      Time Able to Maintain Position (Unsupported Sitting, Static Balance)  30 to 45 seconds  -KP      Comment (Unsupported Sitting, Static Balance)  leans L  -KP      Recorded by [KP] Gracie Cain OTR 07/08/20 1302      Row Name 07/08/20 1256             Positioning and Restraints    Pre-Treatment Position  in bed  -KP      Post Treatment Position  bed  -KP      In Bed  supine;call light within reach;exit alarm on;encouraged to call for assist;with family/caregiver;SCD pump applied;LUE elevated;LLE elevated pillow under L LE and L UE. L foot gray boot on  -KP      Recorded by [] Gracie Cain OTR 07/08/20 1302      Row Name 07/08/20 1254              Pain Scale: Numbers Pre/Post-Treatment    Pain Scale: Numbers, Pretreatment  0/10 - no pain  -KP      Pain Scale: Numbers, Post-Treatment  0/10 - no pain  -KP      Recorded by [KP] Gracie Cain, OTR 07/08/20 1302      Row Name                Wound 07/01/20 1156 Right head Incision    Wound - Properties Group Date first assessed: 07/01/20 [KB] Time first assessed: 1156 [KB] Present on Hospital Admission: N [KB] Side: Right [KB] Location: head [KB] Primary Wound Type: Incision [KB] Recorded by:  [KB] Carina Acosta RN 07/01/20 1156    Row Name                Wound 07/01/20 1116 back Incision    Wound - Properties Group Date first assessed: 07/01/20 [SL] Time first assessed: 1116 [SL] Present on Hospital Admission: N [SL] Location: back [SL] Primary Wound Type: Incision [SL] Recorded by:  [SL] Eryn Bowers RN 07/01/20 1313    Row Name 07/08/20 1255             Plan of Care Review    Plan of Care Reviewed With  patient  -KP      Progress  improving  -KP      Outcome Summary  pt completed PROM L UE, still no movement in LUE. Pt performed bed mobility w. mod to max A. Sat EOB mod A w. lean to the L. washed face and hands w. mod A. UBB min A, LBB max/dep. LBD dep. pt cont to benefit from OT to incr ADL, strength, balance, and tsf.  -KP      Recorded by [KP] Gracie Cain, OTR 07/08/20 1302      Row Name 07/08/20 1255             Outcome Summary/Treatment Plan (OT)    Daily Summary of Progress (OT)  progress toward functional goals is gradual  -KP      Anticipated Discharge Disposition (OT)  inpatient rehabilitation facility  -      Recorded by [KP] Gracie Cain, OTR 07/08/20 1302        User Key  (r) = Recorded By, (t) = Taken By, (c) = Cosigned By    Initials Name Effective Dates Discipline     Gracie Cain, OTR 06/08/18 -  OT    Carina Yang RN 06/16/16 -  Nurse    Eryn Alaniz RN 06/16/16 -  Nurse        Wound 07/01/20  1156 Right head Incision (Active)   Dressing Appearance open to air 7/8/2020  8:30 AM   Closure Liquid skin adhesive;Approximated 7/8/2020  8:30 AM   Base dry;clean 7/8/2020  8:30 AM   Periwound intact;dry;ecchymotic;swelling 7/8/2020  8:30 AM   Periwound Temperature warm 7/8/2020  8:30 AM   Periwound Skin Turgor soft 7/8/2020  8:30 AM   Drainage Amount none 7/8/2020  8:30 AM   Dressing Care, Wound open to air 7/8/2020  8:30 AM       Wound 07/01/20 1116 back Incision (Active)   Dressing Appearance dry;intact;no drainage 7/8/2020  8:30 AM   Closure SUNNI 7/8/2020  8:30 AM   Base dressing in place, unable to visualize 7/8/2020  8:30 AM   Periwound intact;dry 7/8/2020  8:30 AM   Periwound Temperature warm 7/8/2020  8:30 AM   Periwound Skin Turgor soft 7/8/2020  8:30 AM   Drainage Amount none 7/8/2020  8:30 AM   Dressing Care, Wound gauze;transparent film 7/8/2020  8:30 AM       Occupational Therapy Education                 Title: PT OT SLP Therapies (In Progress)     Topic: Occupational Therapy (Not Started)     Point: ADL training (Not Started)     Description:   Instruct learner(s) on proper safety adaptation and remediation techniques during self care or transfers.   Instruct in proper use of assistive devices.              Learner Progress:   Not documented in this visit.          Point: Home exercise program (Not Started)     Description:   Instruct learner(s) on appropriate technique for monitoring, assisting and/or progressing therapeutic exercises/activities.              Learner Progress:   Not documented in this visit.          Point: Precautions (Not Started)     Description:   Instruct learner(s) on prescribed precautions during self-care and functional transfers.              Learner Progress:   Not documented in this visit.          Point: Body mechanics (Not Started)     Description:   Instruct learner(s) on proper positioning and spine alignment during self-care, functional mobility activities and/or  exercises.              Learner Progress:   Not documented in this visit.                            OT Recommendation and Plan  Outcome Summary/Treatment Plan (OT)  Daily Summary of Progress (OT): progress toward functional goals is gradual  Anticipated Discharge Disposition (OT): inpatient rehabilitation facility  Daily Summary of Progress (OT): progress toward functional goals is gradual  Plan of Care Review  Plan of Care Reviewed With: patient  Plan of Care Reviewed With: patient  Outcome Summary: pt completed PROM L UE, still no movement in LUE. Pt performed bed mobility w. mod to max A. Sat EOB mod A w. lean to the L. washed face and hands w. mod A. UBB min A, LBB max/dep. LBD dep. pt cont to benefit from OT to incr ADL, strength, balance, and tsf.  Outcome Measures     Row Name 07/08/20 1302 07/06/20 1600          How much help from another is currently needed...    Putting on and taking off regular lower body clothing?  1  -KP  1  -LE     Bathing (including washing, rinsing, and drying)  2  -KP  1  -LE     Toileting (which includes using toilet bed pan or urinal)  1  -  1  -LE     Putting on and taking off regular upper body clothing  2  -KP  2  -LE     Taking care of personal grooming (such as brushing teeth)  2  -KP  2  -LE     Eating meals  1  -KP  1  -LE     AM-PAC 6 Clicks Score (OT)  9  -KP  8  -LE        Functional Assessment    Outcome Measure Options  AM-PAC 6 Clicks Daily Activity (OT)  -KP  AM-PAC 6 Clicks Daily Activity (OT)  -LE       User Key  (r) = Recorded By, (t) = Taken By, (c) = Cosigned By    Initials Name Provider Type    Talita Wolfe, OTR Occupational Therapist     Gracie Cain, OTR Occupational Therapist           Time Calculation:   Time Calculation- OT     Row Name 07/08/20 1302             Time Calculation- OT    OT Start Time  1051  -      OT Stop Time  1135  -      OT Time Calculation (min)  44 min  -      Total Timed Code Minutes- OT  44 minute(s)  -       OT Received On  07/08/20  -      OT - Next Appointment  07/09/20  -        User Key  (r) = Recorded By, (t) = Taken By, (c) = Cosigned By    Initials Name Provider Type    Gracie Jorgensen OTR Occupational Therapist        Therapy Charges for Today     Code Description Service Date Service Provider Modifiers Qty    [id number] HC OT SELF CARE/MGMT/TRAIN EA 15 MIN 7/8/2020 Gracie Cain OTR GO 2    [id number]  OT NEUROMUSC RE EDUCATION EA 15 MIN 7/8/2020 Gracie Cain OTR GO 1               MARCIAL Quispe  7/8/2020   no cold intolerance/no heat intolerance

## 2020-07-08 NOTE — THERAPY TREATMENT NOTE
Patient Name: Denise Guajardo  : 1965    MRN: [id number]                              Today's Date: 2020       Admit Date: 2020    Visit Dx:     ICD-10-CM ICD-9-CM   1. Brain tumor (CMS/HCC) D49.6 239.6     Patient Active Problem List   Diagnosis   • Malignant brain tumor (CMS/HCC)   • Intracranial hemorrhage (CMS/HCC)   • Dysphagia   • Acute left-sided weakness     Past Medical History:   Diagnosis Date   • Vertigo      Past Surgical History:   Procedure Laterality Date   • BREAST BIOPSY     • DENTAL PROCEDURE     • TUBAL ABDOMINAL LIGATION     • WISDOM TOOTH EXTRACTION       General Information     Row Name 20 1437          PT Evaluation Time/Intention    Document Type  therapy note (daily note)  -PH     Mode of Treatment  physical therapy  -PH     Row Name 20 143          Safety Issues, Functional Mobility    Impairments Affecting Function (Mobility)  balance;endurance/activity tolerance;strength;muscle tone abnormal;postural/trunk control  -PH       User Key  (r) = Recorded By, (t) = Taken By, (c) = Cosigned By    Initials Name Provider Type    PH Sara Franco, PTA Physical Therapy Assistant        Mobility     Row Name 20 1437          Bed Mobility Assessment/Treatment    Bed Mobility Assessment/Treatment  supine-sit;sit-supine  -PH     Supine-Sit [place] (Bed Mobility)  moderate assist (50% patient effort);2 person assist;verbal cues;nonverbal cues (demo/gesture);maximum assist (25% patient effort)  -PH     Sit-Supine [place] (Bed Mobility)  moderate assist (50% patient effort);maximum assist (25% patient effort);2 person assist;verbal cues;nonverbal cues (demo/gesture)  -PH     Assistive Device (Bed Mobility)  bed rails;head of bed elevated  -PH     Comment (Bed Mobility)  Pt max A for L lean for static sitting at EOB for 4-5 min then able to correct to CGA x 1 for a few min  -PH     Row Name 20 143          Transfer Assessment/Treatment    Comment  (Transfers)  STS x 3;   -PH     Row Name 07/08/20 1437          Sit-Stand Transfer    Sit-Stand [place] (Transfers)  contact guard;2 person assist;verbal cues;nonverbal cues (demo/gesture)  -PH     Assistive Device (Sit-Stand Transfers)  other (see comments) HHA x 2  -PH     Row Name 07/08/20 1437          Gait/Stairs Assessment/Training    [place] Level (Gait)  moderate assist (50% patient effort);2 person assist;verbal cues;nonverbal cues (demo/gesture)  -PH     Assistive Device (Gait)  other (see comments) HHA x 2  -PH     Distance in Feet (Gait)  3 side steps to HOB w/ pt assist to slide pt's L foot.   -PH     Comment (Gait/Stairs)  Pt cued for upright posture / forward gaze. Pt's son helpful and encouraging. Pt assisted to wt shift toward L side. L UE supported.   -PH       User Key  (r) = Recorded By, (t) = Taken By, (c) = Cosigned By    Initials Name Provider Type    PH Saar Franco PTA Physical Therapy Assistant        Obj/Interventions     Row Name 07/08/20 1441          Therapeutic Exercise    Lower Extremity (Therapeutic Exercise)  LAQ (long arc quad), bilateral  -PH     Lower Extremity Range of Motion (Therapeutic Exercise)  ankle dorsiflexion/plantar flexion, bilateral  -PH     Exercise Type (Therapeutic Exercise)  AAROM (active assistive range of motion);PROM (passive range of motion)  -PH     Position (Therapeutic Exercise)  seated  -PH     Sets/Reps (Therapeutic Exercise)  1/10  -PH     Row Name 07/08/20 1441          Static Sitting Balance    Level of [place] (Unsupported Sitting, Static Balance)  maximal assist, 25 to 49% patient effort;contact guard assist  -PH     Sitting Position (Unsupported Sitting, Static Balance)  sitting on edge of bed  -PH     Time Able to Maintain Position (Unsupported Sitting, Static Balance)  more than 5 minutes  -PH     Comment (Unsupported Sitting, Static Balance)  L lean which pt was able to correct for a few minutes; cues for upright  posture/ forward gaze  -PH     Row Name 07/08/20 1441          Static Standing Balance    Level of [place] (Supported Standing, Static Balance)  minimal assist, 75% patient effort;2 person assist;moderate assist, 50 to 74% patient effort  -PH     Assistive Device Utilized (Supported Standing, Static Balance)  other (see comments) HHA x 2  -PH     Comment (Supported Standing, Static Balance)  L lean w/ cues to correct  -PH       User Key  (r) = Recorded By, (t) = Taken By, (c) = Cosigned By    Initials Name Provider Type     Sara Franco PTA Physical Therapy Assistant        Goals/Plan    No documentation.       Clinical Impression     Row Name 07/08/20 1443          Pain Scale: Numbers Pre/Post-Treatment    Pain Scale: Numbers, Pretreatment  0/10 - no pain  -PH     Pain Scale: Numbers, Post-Treatment  0/10 - no pain  -PH     Row Name 07/08/20 1443          Plan of Care Review    Plan of Care Reviewed With  patient  -PH     Progress  no change  -PH     Outcome Summary  Pt tolerating PT today although very quiet. Pt req max A / mod A x 2 for bed mobility. Pt attempting to assist herself whenever able. Pt req CGA x 2 for STS x 3 and took a few shuffling steps to HOB w/ assist to slide L foot and wt shift L. PT will prog as pt justyna.   -PH     Row Name 07/08/20 1443          Vital Signs    O2 Delivery Pre Treatment  room air  -PH     O2 Delivery Intra Treatment  room air  -PH     O2 Delivery Post Treatment  room air  -PH     Row Name 07/08/20 1443          Positioning and Restraints    Pre-Treatment Position  in bed  -PH     Post Treatment Position  bed  -PH     In Bed  fowlers;call light within reach;encouraged to call for assist;exit alarm on;SCD pump applied;L waffle boot  -PH       User Key  (r) = Recorded By, (t) = Taken By, (c) = Cosigned By    Initials Name Provider Type    PH Sara Franco PTA Physical Therapy Assistant        Outcome Measures     Row Name 07/08/20 1446          How  much help from another person do you currently need...    Turning from your back to your side while in flat bed without using bedrails?  2  -PH     Moving from lying on back to sitting on the side of a flat bed without bedrails?  2  -PH     Moving to and from a bed to a chair (including a wheelchair)?  2  -PH     Standing up from a chair using your arms (e.g., wheelchair, bedside chair)?  2  -PH     Climbing 3-5 steps with a railing?  1  -PH     To walk in hospital room?  1  -PH     AM-PAC 6 Clicks Score (PT)  10  -PH     Row Name 07/08/20 1446          Functional Assessment    Outcome Measure Options  AM-PAC 6 Clicks Basic Mobility (PT)  -PH       User Key  (r) = Recorded By, (t) = Taken By, (c) = Cosigned By    Initials Name Provider Type    Sara Castellanos PTA Physical Therapy Assistant        Physical Therapy Education                 Title: PT OT SLP Therapies (In Progress)     Topic: Physical Therapy (In Progress)     Point: Mobility training (In Progress)     Description:   Instruct learner(s) on safety and technique for assisting patient out of bed, chair or wheelchair.  Instruct in the proper use of assistive devices, such as walker, crutches, cane or brace.              Patient Friendly Description:   It's important to get you on your feet again, but we need to do so in a way that is safe for you. Falling has serious consequences, and your personal safety is the most important thing of all.        When it's time to get out of bed, one of us or a family member will sit next to you on the bed to give you support.     If your doctor or nurse tells you to use a walker, crutches, a cane, or a brace, be sure you use it every time you get out of bed, even if you think you don't need it.    Learning Progress Summary           Patient Acceptance, E, NR by  at 7/8/2020 1447    Acceptance, E,D, NR by  at 7/7/2020 1046    Acceptance, E,D, NR by  at 7/6/2020 1511    Acceptance, E,D, NR by  at  7/5/2020 1240    Acceptance, E,D, NR by EE at 7/4/2020 1416    Acceptance, E, NR by AR at 7/3/2020 1557    Acceptance, E,D, NR by PC at 7/2/2020 1208                   Point: Home exercise program (In Progress)     Description:   Instruct learner(s) on appropriate technique for monitoring, assisting and/or progressing patient with therapeutic exercises and activities.              Learning Progress Summary           Patient Acceptance, E, NR by PH at 7/8/2020 1447    Acceptance, E,D, NR by SM at 7/7/2020 1046    Acceptance, E,D, NR by EH at 7/6/2020 1511    Acceptance, E,D, NR by EH at 7/5/2020 1240    Acceptance, E,D, NR by EE at 7/4/2020 1416    Acceptance, E, NR by AR at 7/3/2020 1557    Acceptance, E,D, NR by PC at 7/2/2020 1208                   Point: Body mechanics (In Progress)     Description:   Instruct learner(s) on proper positioning and spine alignment for patient and/or caregiver during mobility tasks and/or exercises.              Learning Progress Summary           Patient Acceptance, E, NR by PH at 7/8/2020 1447    Acceptance, E,D, NR by SM at 7/7/2020 1046    Acceptance, E,D, NR by EH at 7/6/2020 1511    Acceptance, E,D, NR by  at 7/5/2020 1240    Acceptance, E,D, NR by EE at 7/4/2020 1416    Acceptance, E, NR by AR at 7/3/2020 1557    Acceptance, E,D, NR by PC at 7/2/2020 1208                   Point: Precautions (In Progress)     Description:   Instruct learner(s) on prescribed precautions during mobility and gait tasks              Learning Progress Summary           Patient Acceptance, E, NR by PH at 7/8/2020 1447    Acceptance, E,D, NR by SM at 7/7/2020 1046    Acceptance, E,D, NR by EH at 7/6/2020 1511    Acceptance, E,D, NR by EH at 7/5/2020 1240    Acceptance, E,D, NR by EE at 7/4/2020 1416    Acceptance, E, NR by AR at 7/3/2020 1557    Acceptance, E,D, NR by PC at 7/2/2020 1208                               User Key     Initials Effective Dates Name Provider Type Discipline    PC  04/03/18 -  Elham Forde, PT Physical Therapist PT    EE 04/03/18 -  Marylin Hill, PT Physical Therapist PT    AR 04/03/18 -  Ceci Amador, PT Physical Therapist PT    SM 03/07/18 -  Paula Palafox, PTA Physical Therapy Assistant PT     08/19/18 -  Prerna Garcia, PTA Physical Therapy Assistant PT    PH 08/20/19 -  Sara Franco PTA Physical Therapy Assistant PT              PT Recommendation and Plan     Outcome Summary/Treatment Plan (PT)  Anticipated Discharge Disposition (PT): inpatient rehabilitation facility  Plan of Care Reviewed With: patient  Progress: no change  Outcome Summary: Pt tolerating PT today although very quiet. Pt req max A / mod A x 2 for bed mobility. Pt attempting to assist herself whenever able. Pt req CGA x 2 for STS x 3 and took a few shuffling steps to HOB w/ assist to slide L foot and wt shift L. PT will prog as pt justyna.      Time Calculation:   PT Charges     Row Name 07/08/20 1448             Time Calculation    Start Time  1404  -PH      Stop Time  1427  -PH      Time Calculation (min)  23 min  -PH      PT Received On  07/08/20  -PH      PT - Next Appointment  07/09/20  -PH        User Key  (r) = Recorded By, (t) = Taken By, (c) = Cosigned By    Initials Name Provider Type    PH Sara Franco, [place] Physical Therapy Assistant        Therapy Charges for Today     Code Description Service Date Service Provider Modifiers Qty    46441161553 HC PT THER PROC EA 15 MIN 7/8/2020 Sara Franco PTA GP 2    82748040239 HC PT THER SUPP EA 15 MIN 7/8/2020 Sara Franco PTA GP 2          PT G-Codes  Outcome Measure Options: AM-PAC 6 Clicks Basic Mobility (PT)  AM-PAC 6 Clicks Score (PT): 10  AM-PAC 6 Clicks Score (OT): 9  Modified Ji Scale: 5 - Severe disability.  Bedridden, incontinent, and requiring constant nursing care and attention.    Sara Franco PTA  7/8/2020

## 2020-07-09 PROBLEM — G81.90 HEMIPLEGIA (HCC): Status: ACTIVE | Noted: 2020-01-01

## 2020-07-09 PROBLEM — D49.6 BRAIN TUMOR (HCC): Status: ACTIVE | Noted: 2020-01-01

## 2020-07-09 NOTE — THERAPY TREATMENT NOTE
Acute Care - Speech Language Pathology   Swallow Treatment Note [facility]     Patient Name: Denise Guajardo  : 1965  MRN: [id number]  Today's Date: 2020               Admit Date: 2020    Visit Dx:      ICD-10-CM ICD-9-CM   1. Brain tumor (CMS/HCC) D49.6 239.6     Patient Active Problem List   Diagnosis   • Glioblastoma multiforme (CMS/HCC)   • Intracranial hemorrhage (CMS/HCC)   • Dysphagia   • Acute left-sided weakness       Therapy Treatment  Rehabilitation Treatment Summary     Row Name 20 1500             Treatment Time/Intention    Discipline  speech language pathologist  -AW      Document Type  therapy note (daily note)  -AW      Subjective Information  no complaints  -AW      Mode of Treatment  speech-language pathology  -AW      Patient/Family Observations  Pt upright in bed, son present.  -AW      Care Plan Review  evaluation/treatment results reviewed;care plan/treatment goals reviewed;risks/benefits reviewed;current/potential barriers reviewed;patient/other agree to care plan  -AW      Care Plan Review, Other Participant(s)  son  -AW      Recorded by [AW] Yarely Ruiz, MS CCC-SLP 20 1521      Row Name                Wound 20 1156 Right head Incision    Wound - Properties Group Date first assessed: 20 [KB] Time first assessed: 1156 [KB] Present on Hospital Admission: N [KB] Side: Right [KB] Location: head [KB] Primary Wound Type: Incision [KB] Recorded by:  [KB] Carina Acosta RN 20 1156    Row Name                Wound 20 1116 back Incision    Wound - Properties Group Date first assessed: 20 [SL] Time first assessed: 1116 [SL] Present on Hospital Admission: N [SL] Location: back [SL] Primary Wound Type: Incision [SL] Recorded by:  [SL] Eryn Bowers RN 20 1313      User Key  (r) = Recorded By, (t) = Taken By, (c) = Cosigned By    Initials Name Effective Dates Discipline    AW Yarely Ruiz, MS CCC-SLP 18 -  SLP    [place]  Carina Acosta, RN 06/16/16 -  Nurse    SL Eryn Bowers RN 06/16/16 -  Nurse          Outcome Summary         SLP GOALS     Row Name 07/09/20 1500             Oral Nutrition/Hydration Goal 1 (SLP)    Oral Nutrition/Hydration Goal 1, SLP  Pt tolerated small ice chips much better this date taking ~5 during session with double swallow noted, no other s/s. Pt chewed ice chip prior to swallowing. Pt coughed with teaspoon trial of water. Pt appears to be managing her saliva better as suction was not used this date. Pt aphonic. With max cues from SLP and her son and multiple trials, pt able to achieve the slightest voicing.   -AW         Labial Strengthening Goal 1 (SLP)    Activity (Labial Strengthening Goal 1, SLP)  increase labial tone  -AW      Increase Labial Tone  labial resistance exercises  -AW      [place]/Accuracy (Labial Strengthening Goal 1, SLP)  with minimal cues (75-90% accuracy)  -AW      Time Frame (Labial Strengthening Goal 1, SLP)  by discharge  -AW      Progress/Outcomes (Labial Strengthening Goal 1, SLP)  goal ongoing max cues; no movement on L  -AW         Lingual Strengthening Goal 1 (SLP)    Activity (Lingual Strengthening Goal 1, SLP)  increase tongue back strength  -AW      Increase Tongue Back Strength  lingual movement exercises;lingual resistance exercises  -AW      [place]/Accuracy (Lingual Strengthening Goal 1, SLP)  with minimal cues (75-90% accuracy)  -AW      Time Frame (Lingual Strengthening Goal 1, SLP)  by discharge  -AW      Progress/Outcomes (Lingual Strengthening Goal 1, SLP)  goal ongoing;other (see comments) max cues to improve accuracy  -AW         Pharyngeal Strengthening Exercise Goal 1 (SLP)    Activity (Pharyngeal Strengthening Goal 1, SLP)  increase timing;increase squeeze/positive pressure generation  -AW      Increase Timing  supraglottic swallow;hard effortful swallow;jovan  -AW      Increase Squeeze/Positive Pressure Generation  falsetto;hard  effortful swallow;chin tuck against resistance (CTAR)  -AW      [place]/Accuracy (Pharyngeal Strengthening Goal 1, SLP)  with minimal cues (75-90% accuracy)  -AW      Time Frame (Pharyngeal Strengthening Goal 1, SLP)  by discharge  -AW      Progress/Outcomes (Pharyngeal Strengthening Goal 1, SLP)  goal ongoing  -AW        User Key  (r) = Recorded By, (t) = Taken By, (c) = Cosigned By    Initials Name Provider Type    Yarely Martinez MS CCC-SLP Speech and Language Pathologist          EDUCATION  The patient has been educated in the following areas:   Dysphagia (Swallowing Impairment) Oral Care/Hydration NPO rationale.    SLP Recommendation and Plan                            SLP Outcome Measures (last 72 hours)      SLP Outcome Measures     Row Name 07/06/20 1600             SLP Outcome Measures    Outcome Measure Used?  Adult NOMS  -AW         Adult FCM Scores    FCM Chosen  Swallowing  -AW      Swallowing FCM Score  1  -AW        User Key  (r) = Recorded By, (t) = Taken By, (c) = Cosigned By    Initials Name Effective Dates    Yarely Martinez MS CCC-SLP 06/08/18 -              Time Calculation:   Time Calculation- SLP     Row Name 07/09/20 1519             Time Calculation- SLP    SLP Start Time  1100  -AW      SLP Received On  07/09/20  -AW        User Key  (r) = Recorded By, (t) = Taken By, (c) = Cosigned By    Initials Name Provider Type    Yarely Martinez MS CCC-SLP Speech and Language Pathologist          Therapy Charges for Today     Code Description Service Date Service Provider Modifiers Qty    [id number]  ST TREATMENT SWALLOW 3 7/9/2020 Yarely Ruiz MS CCC-SLP GN 1                 Yarely Ruiz MS CCC-JENNIFER  7/9/2020

## 2020-07-09 NOTE — PROGRESS NOTES
Case Management Discharge Note      Final Note: Discharged to [facility] Acute rehab    Provided Post Acute Provider List?: Yes  Post Acute Provider List: Inpatient Rehab, Home Health, Nursing Home  Provided Post Acute Provider Quality & Resource List?: Yes  Post Acute Provider Quality and Resource List: Home Health, Inpatient Rehab  Delivered To: Support Person  Support Person: Malick hassan [phone]  Method of Delivery: In person    Destination      No service has been selected for the patient.      Durable Medical Equipment      No service has been selected for the patient.      Dialysis/Infusion      No service has been selected for the patient.      Home Medical Care      No service has been selected for the patient.      Therapy      No service has been selected for the patient.      Community Resources      No service has been selected for the patient.             Final Discharge Disposition Code: 62 - inpatient rehab facility

## 2020-07-09 NOTE — PLAN OF CARE
A+0x4.  Transfer to [facility] tonight.  Waiting on transport, report called.  IV to stay in place for IV meds tonight.  ST re-eval today.  To keep coretrack in at [facility] and hopefully progress towards PO intake.  VSS.  Continue to monitor and progress towards goals as tolerated.

## 2020-07-09 NOTE — NURSING NOTE
[facility] Acute Inpt Rehab Note     Received auth approval for acute inpt rehab stay.  Have left voicemail with CCP, and spoke with RN, Brielle.  We can admit patient to rehab today if medically ready.    Thanks,  Alta Palafox RN   Rehab Admission Nurse   [phone]

## 2020-07-09 NOTE — CONSULTS
DIAGNOSIS and REASON FOR CONSULTATION: Glioblastoma, multifocal  -  for advice and recommendations for this diagnosis    Referring Provider:  Bill Wu MD    HISTORY OF PRESENT ILLNESS:  The patient is a 54 y.o. year old female who presented with complaints of dizziness and left sided paresis. MRI of the brain on June 26, 2020 showed two enhancing masses - 2.6 cm from right lateral thalamic margin into right side of midbrain and chilango and 7 mm in central chilango also on the right. Surrounding vasogenic edema was also noted.     She completed a workup looking for primary disease with a CT of the chest, abdomen and pelvis on June 27, 2020 which showed no further evidence of disease. Therefore she went to surgery on July 1, 2020 and underwent a right temporal craniotomy with excisional biopsy of the right peduncle mass and placement of a lumbar drain. The pathology was initially read as malignant and sent to [facility] for further review. They returned a final diagnosis of Glioblastoma.     Postop, she did experience increased paresis and hypophonia. CT head on post op evening did show area of hemorrhage with mild adjacent edema. CT Head on July 2, 2020 showed stable hemorrhage in right peduncle and righ tpons with new hemorrhage in right temporal lobe, possibly intraventricular with 3 mm midline shift. CT Head on July 3, 2020 showed stable right peduncle and right temporal lobe hemorrhage, improvement in pneumocephalus and stable midline shift.      I was asked to see the patient at the request of the referring provider noted above for advice and recommendations regarding this diagnosis.  Clinically she has continued to have significant hypophonia and left sided paresis. According to her PT notes, she is now able to stand with assistance and do some shuffled walking.  Today, she is accompanied by her son and does minimal talking. She asked one question with hand movements. She states pain in jaw with hand  movement.  Objective       Past Medical History: she  has a past medical history of Vertigo.    Past Surgical History:  she has a past surgical history that includes Breast biopsy; [place] tooth extraction; Tubal ligation; and Dental surgery.    Meds:    Current Facility-Administered Medications:   •  acetaminophen (TYLENOL) tablet 650 mg, 650 mg, Oral, Q4H PRN, 650 mg at 07/09/20 0924 **OR** acetaminophen (TYLENOL) 160 MG/5ML solution 650 mg, 650 mg, Oral, Q4H PRN, 650 mg at 07/07/20 0940 **OR** acetaminophen (TYLENOL) suppository 650 mg, 650 mg, Rectal, Q4H PRN, Finn Toro MD  •  amitriptyline (ELAVIL) tablet 50 mg, 50 mg, Oral, Nightly PRN, Aby Dover, APRN, 50 mg at 07/08/20 2042  •  dextrose (D50W) 25 g/ 50mL Intravenous Solution 25 g, 25 g, Intravenous, Q15 Min PRN, Finn Toro MD  •  dextrose (GLUTOSE) oral gel 15 g, 15 g, Oral, Q15 Min PRN, Finn Toro MD  •  famotidine (PEPCID) injection 20 mg, 20 mg, Intravenous, Q12H, Jd Regan MD, 20 mg at 07/09/20 0913  •  glucagon (human recombinant) (GLUCAGEN DIAGNOSTIC) injection 1 mg, 1 mg, Subcutaneous, Q15 Min PRN, Finn Toro MD  •  HYDROcodone-acetaminophen (NORCO) 5-325 MG per tablet 1 tablet, 1 tablet, Oral, Q4H PRN, Finn Toro MD, 1 tablet at 07/05/20 2226  •  HYDROmorphone (DILAUDID) injection 0.5 mg, 0.5 mg, Intravenous, Q2H PRN, 0.5 mg at 07/01/20 2323 **AND** naloxone (NARCAN) injection 0.4 mg, 0.4 mg, Intravenous, Q5 Min PRN, Finn Toro MD  •  insulin regular (humuLIN R,novoLIN R) injection 0-7 Units, 0-7 Units, Subcutaneous, Q6H, Finn Toro MD, 2 Units at 07/07/20 1234  •  levETIRAcetam (KEPPRA) 100 MG/ML solution 500 mg, 500 mg, Nasogastric, Q12H, Finn Toro MD, 500 mg at 07/09/20 0912  •  methocarbamol (ROBAXIN) tablet 500 mg, 500 mg, Oral, Q6H PRN, Joy Bautista, APRN, 500 mg at 07/07/20 1234  •  methylPREDNISolone (MEDROL (PRAVIN)) tablet 4 mg, 4 mg, Oral,  4x Daily Taper, Joy Bautista APRN, 4 mg at 07/09/20 0654  •  [START ON 7/10/2020] methylPREDNISolone (MEDROL (PRAVIN)) tablet 4 mg, 4 mg, Oral, TID Around Food, Joy Bautista APRN  •  [START ON 7/11/2020] methylPREDNISolone (MEDROL (PRAVIN)) tablet 4 mg, 4 mg, Oral, Before Breakfast, Joy Bautista APRN  •  [START ON 7/11/2020] methylPREDNISolone (MEDROL (PRAVIN)) tablet 4 mg, 4 mg, Oral, Tonight, Joy Bautista APRN  •  [START ON 7/12/2020] methylPREDNISolone (MEDROL (PRAVIN)) tablet 4 mg, 4 mg, Oral, Before Breakfast, Joy Bautista APRN  •  ondansetron (ZOFRAN) tablet 4 mg, 4 mg, Oral, Q6H PRN **OR** ondansetron (ZOFRAN) injection 4 mg, 4 mg, Intravenous, Q6H PRN, Finn Toro MD, 4 mg at 07/01/20 2323  •  ondansetron (ZOFRAN) tablet 4 mg, 4 mg, Oral, Q6H PRN **OR** ondansetron (ZOFRAN) injection 4 mg, 4 mg, Intravenous, Q6H PRN, Finn Toro MD  •  potassium chloride (MICRO-K) CR capsule 40 mEq, 40 mEq, Oral, PRN **OR** potassium chloride (KLOR-CON) packet 40 mEq, 40 mEq, Oral, PRN, 40 mEq at 07/04/20 1137 **OR** potassium chloride 10 mEq in 100 mL IVPB, 10 mEq, Intravenous, Q1H PRN, Finn Toro MD  •  sodium chloride 0.9 % flush 10 mL, 10 mL, Intravenous, Q12H, Finn Toro MD, 10 mL at 07/08/20 2042  •  sodium chloride 0.9 % flush 10 mL, 10 mL, Intravenous, PRN, Finn Toro MD    Allergies:  No Known Allergies    Family History:  her family history includes Ovarian cancer in her maternal aunt.    Social History:  she  reports that she has never smoked. She has never used smokeless tobacco. She reports that she drinks alcohol. She reports that she does not use drugs.    Pertinent Findings on   Review of Systems   Constitutional: Positive for appetite change and fatigue. Negative for chills, diaphoresis, fever and unexpected weight change.   HENT:   Negative for hearing loss, lump/mass, mouth sores, nosebleeds, sore throat, tinnitus, trouble swallowing and  voice change.    Eyes: Negative for eye problems and icterus.   Respiratory: Negative for chest tightness, cough, hemoptysis, shortness of breath and wheezing.    Cardiovascular: Negative for chest pain, leg swelling and palpitations.   Gastrointestinal: Negative for abdominal distention, abdominal pain, blood in stool, constipation, diarrhea, nausea, rectal pain and vomiting.   Endocrine: Negative for hot flashes.   Genitourinary: Negative for bladder incontinence, difficulty urinating, dyspareunia, dysuria, frequency, hematuria, menstrual problem, nocturia, pelvic pain, vaginal bleeding and vaginal discharge.    Musculoskeletal: Positive for gait problem. Negative for arthralgias, back pain, flank pain, myalgias, neck pain and neck stiffness.   Skin: Negative for itching, rash and wound.   Neurological: Positive for extremity weakness, gait problem and headaches. Negative for dizziness, light-headedness, numbness, seizures and speech difficulty.   Hematological: Negative for adenopathy. Does not bruise/bleed easily.   Psychiatric/Behavioral: Positive for decreased concentration. Negative for confusion, depression, sleep disturbance and suicidal ideas. The patient is not nervous/anxious.         Subjective         Vitals:    07/08/20 2032 07/09/20 0105 07/09/20 0552 07/09/20 0930   BP: 127/88 129/97 119/93 131/97   BP Location: Right arm Right arm Right arm Right arm   Patient Position: Lying Lying Lying Lying   Pulse:  90 82 93   Resp: 18 18 16 16   Temp: 98.2 °F (36.8 °C)  96.9 °F (36.1 °C) 98.1 °F (36.7 °C)   TempSrc: Oral  Axillary Oral   SpO2: 97% 98% 97% 97%   Weight:       Height:           Performance Status: (3) Capable of limited self-care, confined to bed or chair > 50% of waking hours    Pertinent Findings on  Physical Exam   Constitutional: She appears well-developed and well-nourished. She is active and cooperative. No distress.   HENT:   Head: Normocephalic and atraumatic.   Nose: Nose normal.    Mouth/Throat: Mucous membranes are normal. Normal dentition.   Eyes: Conjunctivae and EOM are normal.   Neck: Normal range of motion.   Pulmonary/Chest: Effort normal.   Abdominal: Normal appearance. There is no hepatosplenomegaly.     Vascular Status -  Her right foot exhibits no edema. Her left foot exhibits no edema.  Neurological:   Appears somnolent; no movement of left arm and leg though right arm and leg without abnormality.     Skin: Skin is warm and dry.   Psychiatric: She has a normal mood and affect. Her behavior is normal. Judgment and thought content normal.   :         Assessment: Glioblastoma, multifocal    Plan:   We reviewed the pathology report again and discussed in generalities a standard course of postop care involving probable Temodar and postop radiation to both sites. We talked thru the logistics of the daily treatment to 60 Gy in 30 treatments over 6 weeks. We talked thru the acute and long term side effects and probable follow up MRI schedule.    They live [place] minutes from [facility] and would also like to consider consultation at an outside facility. They will think thru those logistics over the next couple of weeks. Additionally, appears she will go to Rehab today and we will need to assess her progress there as well.  I will check on patient in about 2 weeks and see if they wish to pursue treatment here or elsewhere. I encouraged her son to let us know if we could help in any way with the second opinion prior to that.     Objective   I spent greater than 55 minutes ([id number]) on the unit with this consult and of that time 40 minutes  in counseling and coordination of care, including my review of imaging and pathology; indications, goals, logistics, alternatives and risks - both common and rare - for my recommendations as well as surveillance and potential outcomes.

## 2020-07-09 NOTE — PROGRESS NOTES
[facility] NEUROSURGERY PROGRESS NOTE    PATIENT IDENTIFICATION:   Name:  Denise Guajardo      MRN:  [id number]     54 y.o.  female               CC: POD 8 right temporal craniotomy for excisional biopsy      Subjective     Interval History: Denies headache but complains of some right facial pain that is pressure sensation.  No visual changes.  Slept some after about 3 AM this morning.  Bernabe replaced 2 days ago due to retention.  Working with physical therapy and Occupational Therapy.  Approved for rehab today    ROS:  HEENT:  no vision changes  GI: swallow difficulties  Neuro: Left-sided plegia    Objective     Vital signs in last 24 hours:  Temp:  [96.9 °F (36.1 °C)-98.3 °F (36.8 °C)] 96.9 °F (36.1 °C)  Heart Rate:  [82-93] 82  Resp:  [16-18] 16  BP: (119-129)/(88-97) 119/93      Intake/Output this shift:  No intake/output data recorded.      Intake/Output last 3 shifts:  I/O last 3 completed shifts:  In: 2251 [Other:497; NG/GT:1754]  Out: 1700 [Urine:1700]    LABS:  Results from last 7 days   Lab Units 07/09/20  0533 07/08/20  0613 07/07/20  0556   WBC 10*3/mm3 16.41* 14.18* 11.43*   HEMOGLOBIN g/dL 16.1* 16.0* 15.5   HEMATOCRIT % 45.0 45.9 44.5   PLATELETS 10*3/mm3 263 264 260     Results from last 7 days   Lab Units 07/09/20  0533 07/08/20  0613 07/07/20  0556   SODIUM mmol/L 137 136 135*   POTASSIUM mmol/L 3.7 3.8 4.3   CHLORIDE mmol/L 98 97* 96*   CO2 mmol/L 29.1* 30.0* 30.9*   BUN mg/dL 31* 31* 28*   CREATININE mg/dL 0.60 0.53* 0.55*   CALCIUM mg/dL 9.2 9.2 9.3   GLUCOSE mg/dL 138* 128* 142*     Path- GBM    Blood cx 6/29- NG- final  Operative brain cultures 7/1 - NG    c-diff- neg    IMAGING STUDIES:  No new imaging    I personally viewed and interpreted the patient's chart.    Meds reviewed/changed: Yes    Current Facility-Administered Medications:   •  acetaminophen (TYLENOL) tablet 650 mg, 650 mg, Oral, Q4H PRN, 650 mg at 07/09/20 0924 **OR** acetaminophen (TYLENOL) 160 MG/5ML solution 650 mg, 650 mg, Oral,  Q4H PRN, 650 mg at 07/07/20 0940 **OR** acetaminophen (TYLENOL) suppository 650 mg, 650 mg, Rectal, Q4H PRN, Finn Toro MD  •  amitriptyline (ELAVIL) tablet 50 mg, 50 mg, Oral, Nightly PRN, Aby Dover R, APRN, 50 mg at 07/08/20 2042  •  dextrose (D50W) 25 g/ 50mL Intravenous Solution 25 g, 25 g, Intravenous, Q15 Min PRN, Finn Toro MD  •  dextrose (GLUTOSE) oral gel 15 g, 15 g, Oral, Q15 Min PRN, Finn Toro MD  •  famotidine (PEPCID) injection 20 mg, 20 mg, Intravenous, Q12H, Jd Regan MD, 20 mg at 07/09/20 0913  •  glucagon (human recombinant) (GLUCAGEN DIAGNOSTIC) injection 1 mg, 1 mg, Subcutaneous, Q15 Min PRN, Finn Toro MD  •  HYDROcodone-acetaminophen (NORCO) 5-325 MG per tablet 1 tablet, 1 tablet, Oral, Q4H PRN, Finn Toro MD, 1 tablet at 07/05/20 2226  •  HYDROmorphone (DILAUDID) injection 0.5 mg, 0.5 mg, Intravenous, Q2H PRN, 0.5 mg at 07/01/20 2323 **AND** naloxone (NARCAN) injection 0.4 mg, 0.4 mg, Intravenous, Q5 Min PRN, Finn Toro MD  •  insulin regular (humuLIN R,novoLIN R) injection 0-7 Units, 0-7 Units, Subcutaneous, Q6H, Finn Toro MD, 2 Units at 07/07/20 1234  •  levETIRAcetam (KEPPRA) 100 MG/ML solution 500 mg, 500 mg, Nasogastric, Q12H, Finn Toro MD, 500 mg at 07/09/20 0912  •  methocarbamol (ROBAXIN) tablet 500 mg, 500 mg, Oral, Q6H PRN, Joy Bautista, APRN, 500 mg at 07/07/20 1234  •  methylPREDNISolone (MEDROL (PRAVIN)) tablet 4 mg, 4 mg, Oral, 4x Daily Taper, Joy Bautista APRN, 4 mg at 07/09/20 0654  •  [START ON 7/10/2020] methylPREDNISolone (MEDROL (PRAVIN)) tablet 4 mg, 4 mg, Oral, TID Around Food, Joy Bautista APRN  •  [START ON 7/11/2020] methylPREDNISolone (MEDROL (PRAVIN)) tablet 4 mg, 4 mg, Oral, Before Breakfast, Joy Bautista APRN  •  [START ON 7/11/2020] methylPREDNISolone (MEDROL (PRAVIN)) tablet 4 mg, 4 mg, Oral, Tonight, Joy Bautista APRN  •  [START ON 7/12/2020]  methylPREDNISolone (MEDROL (PRAVIN)) tablet 4 mg, 4 mg, Oral, Before Breakfast, Joy Bautista, APRN  •  ondansetron (ZOFRAN) tablet 4 mg, 4 mg, Oral, Q6H PRN **OR** ondansetron (ZOFRAN) injection 4 mg, 4 mg, Intravenous, Q6H PRN, Finn Toro MD, 4 mg at 07/01/20 2323  •  ondansetron (ZOFRAN) tablet 4 mg, 4 mg, Oral, Q6H PRN **OR** ondansetron (ZOFRAN) injection 4 mg, 4 mg, Intravenous, Q6H PRN, Finn Toro MD  •  potassium chloride (MICRO-K) CR capsule 40 mEq, 40 mEq, Oral, PRN **OR** potassium chloride (KLOR-CON) packet 40 mEq, 40 mEq, Oral, PRN, 40 mEq at 07/04/20 1137 **OR** potassium chloride 10 mEq in 100 mL IVPB, 10 mEq, Intravenous, Q1H PRN, Finn Toro MD  •  sodium chloride 0.9 % flush 10 mL, 10 mL, Intravenous, Q12H, Finn Toro MD, 10 mL at 07/08/20 2042  •  sodium chloride 0.9 % flush 10 mL, 10 mL, Intravenous, PRN, Finn Toro MD      Physical Exam:    General:   Awake, alert, oriented x3. Speech     hypophonic.  Affect slightly better today but still quite flat  HEENT:   Right frontal incision well     approximated with no redness     drainage, minimal swelling.    CN: Extraocular movements are full , PERRLA sluggish;  left facial weakness, particularly lower face-some improvement today with movement.  Able to close eyes; tongue deviation to the left.  Able to blow out cheeks.  Has some peripheral right facial weakness at the eyebrow likely due to surgical intervention    Motor: No movement left upper extremity, trace movement but not antigravity left lower extremity.  Normal strength right side  Sensation: Able to feel touch left side   Coordination: Finger-to-nose INtact on right, unable to assess left.  Extremities:   No calf swelling or tenderness    Assessment/Plan     ASSESSMENT:      Malignant brain tumor (CMS/HCC)    Intracranial hemorrhage (CMS/HCC)    Dysphagia    Acute left-sided weakness      PLAN: Neurologically stable.  Continues with leFT  "plegia but is able to stand with physical therapy.  She is hypophonic but verbalizing.  Remains with nasogastric tube and Bernabe.  She is aware of pathology showing GBM.  Awaiting medical and radiation oncology thoughts now the pathology in.  Started Elavil as needed for sleep last night as family was concerned about oversedation.  She did get some sleep but not till about 3 AM.  May need to consider more routine antidepressant but will defer to PM&R.  Insurance approved acute rehab.  Hopefully transition there today. We will follow over in rehab.    I discussed the patients findings and my recommendations with patient, family, nursing staff and Dr. Licona       LOS: 13 days       Joy Bautista, APRN  7/9/2020  09:49    \"Dictated utilizing Dragon dictation\".      "

## 2020-07-09 NOTE — PROGRESS NOTES
Continued Stay Note  [facility]     Patient Name: Denise Guajardo  MRN: [id number]  Today's Date: 7/9/2020    Admit Date: 6/26/2020    Discharge Plan     Row Name 07/09/20 0957       Plan    Plan  [facility]    Plan Comments  April from [facility] states recieved acute inpatient rehab approval and can accept today if medically ready.   Informed patient and  Malick.          Discharge Codes    No documentation.             Essie العلي RN

## 2020-07-09 NOTE — PAYOR COMM NOTE
"Denise Guajardo (54 y.o. Female)                     ATTENTION;   CLINICALS CASE REF # [id number], STILL AWAITING PEER TO PEER REVIEW, FOR                 7/6/2020 ,  ADDITIONAL CLINICALS FOR 7/7 , 7/8,  REPLY TO [facility] DEPT,  [phone]         Date of Birth Social Security Number Address Home Phone MRN    1965  [address] [place] [address] [phone] [id number]    Anabaptism Marital Status          Quaker        Admission Date Admission Type Admitting Provider Attending Provider Department, Room/Bed    6/26/20 Urgent Bill Wu MD Hinsberg, Francis J, DO [address], 99/1    Discharge Date Discharge Disposition Discharge Destination                       Attending Provider:  Shorty Simon DO    Allergies:  No Known Allergies    Isolation:  None   Infection:  None   Code Status:  CPR    Ht:  165 cm (64.96\")   Wt:  49 kg (108 lb 0.4 oz)    Admission Cmt:  None   Principal Problem:  Glioblastoma multiforme (CMS/HCC) [C71.9]                 Active Insurance as of 6/26/2020     Primary Coverage     Payor Plan Insurance Group Employer/Plan Group    ANTHEM BLUE CROSS ANTHEM BLUE CROSS BLUE SHIELD PPO [id number]     Payor Plan Address Payor Plan Phone Number Payor Plan Fax Number Effective Dates    [address] [phone]  5/1/2020 - None Entered    [place] [address]       Subscriber Name Subscriber Birth Date Member ID       DENISE GUAJARDO 1965 [id number]                 Emergency Contacts      (Rel.) Home Phone Work Phone Mobile Phone    BennettMalick (Spouse) [phone] -- [phone]    Shay Henry (Son) [phone] -- --    Jhonatan Henry (Son) [phone] -- --        Laura Vee, RN   Registered Nurse   Physical Medicine and Rehabilitation   Nursing Note   Signed   Date of Service:  07/08/20 1544   Creation Time:  07/08/20 1544            Signed             Show:Clear " all  [x]Manual[]Template[]Copied    Added by:  [x]Laura eVe RN    []Jeff for details  Inpatient Acute Rehab     Today [facility] was called to determine whether or not the patient would be approved for inpatient acute rehab.  [facility] stated that they had not received clinicals although clinicals were sent yesterday and a confirmation report was printed stating that the fax had gone through.  Clinicals were faxed again today and [facility] stated they had received the fax. Will continue to follow. TOSIN Vee, Rehab Admission RN                             Physician Progress Notes           Shorty Simon, DO at 20 1627                [facility] HOSPITALIST    PROGRESS NOTE    Name:  Denise Guajardo   Age:  54 y.o.  Sex:  female  :  1965  MRN:  [id number]   Visit Number:  [id number]  Admission Date:  2020  Date Of Service:  20  Primary Care Physician:  Paz Mera MD     LOS: 12 days :  Patient Care Team:  Paz Mera MD as PCP - General (Family Medicine)  Carlota Peacock MD as Consulting Physician (Radiation Oncology):    History taken from:     patient    Chief Complaint:      Left-sided hemiplegia    Subjective     Interval History:     Patient seen and examined today.  Reviewed history and physical, lab work, x-rays, chart, consult, op note.    Patient is a 54-year-old with no significant past medical history who had dizziness for the past couple of weeks and went to [facility].  She had MRI of the brain which showed 2 lesions consistent with metastatic disease with unknown primary.  She was given Decadron and transferred here on 2020.  CT of chest abdomen pelvis did not note any tumors as it was suspected that the patient may have metastatic disease.  Infectious disease and pulmonology were consulted as it was thought that the patient may have brain abscesses.  All cultures were negative however including CSF and blood.    Patient was  taken for craniotomy on 7/1/2020.  Excisional biopsy of the mass in the right cerebral on call was done.  Pathology showed glioblastoma.  Patient had bleeding after the surgery.  She also has left-sided hemiplegia, dysphagia and dysphonia.  She is unable to ambulate.  She has NG tube in with supplemental feeding.  PT and OT are working with the patient.  Patient will need extensive rehab.    Radiation oncology has been consulted by neurosurgery.  Spoke to the son at length, answered all questions.  Patient has poor historian at this point.  She is unable to answer any questions with any clarity.        Review of Systems:     Unable to obtain due to the patient's current dysphonia.    Objective     Vital Signs:    Temp:  [97.6 °F (36.4 °C)-98.9 °F (37.2 °C)] 98.3 °F (36.8 °C)  Heart Rate:  [68-93] 93  Resp:  [16] 16  BP: (124-137)/(90-94) 129/93    Physical Exam:  General: Alert and awake.  Does follow some directions.  Heart: Regular rate and rhythm without murmur rub or thrill  Lungs: Clear to auscultation bilaterally without use of accessory muscles respiration  Abdomen: Soft/nontender/nondistended.  Neuro: Left-sided hemiplegia noted.       Results Review:      I reviewed the patient's new clinical results.    Labs:    Lab Results (last 24 hours)     Procedure Component Value Units Date/Time    POC Glucose Once [[id number]]  (Normal) Collected:  07/08/20 1207    Specimen:  Blood Updated:  07/08/20 1218     Glucose 127 mg/dL     Tissue Pathology Exam [[id number]] Collected:  07/01/20 1158    Specimen:  Tissue from Brain, Tissue from Brain Updated:  07/08/20 1205     Case Report --     Surgical Pathology Report                         Case: [id number]                                  Authorizing Provider:  Shai Licona MD       Collected:           07/01/2020 11:58 AM          Ordering Location:     [facility]  Received:            07/01/2020 12:06 PM                                 MAIN OR         "                                                              Pathologist:           Vini Jones MD                                                         Specimens:   1) - Brain, RIGHT BRAINSTEM MASS FOR FROZEN                                                         2) - Brain, RIGHT BRAINSTEM MASS                                                            Final Diagnosis --     1-2.  Brain, Right Brainstem, Biopsy:     A.  Glioblastoma, IDH-1, ([id number], WHO grade 4)    1.  IDH (R13H) expression by immunohistochemistry negative.     2.  IDH 1-2 mutation analysis by PCR:  Pending.    3.  ATRX expression by immunohistochemistry: Preserved.    4.  P53 expression by immunohistochemistry:  Positive.      5.  MGMT promotor methylation by PCR:  Pending.      ross/brb            Preliminary Diagnosis --     1. Brain, Right Brainstem Biopsy:    A. Necrotic debris and rare degenerated cells with focal acute inflammation.    Comment: There does appear to be some slight increased cellularity around some vessels but the cells appear degenerated. These cells were not seen on the original frozen which was almost all necrotic.    ross/jsjustin     2. Brain, Brainstem, Biopsy:    A. Malignant cellular neoplasm.        COMMENT:The neoplasm histologically appears to be a primary brain lesion but we are not going to perform immunohistochemical stains at this time but rather send the case out to the [facility] for full work-up. The case was discussed with Dr. Licona on 7/2/2020.       Ross/kds     Ross/kds       Intraoperative Consultation --     FS Dx 1AFS ( 1 block and 1 Touch print): Necrosis and acute inflammation consistent with abscess.  Per Dr. Jones, [facility].  The results are called to Dr. Licona at 12:21 p.m.  MARCELINA/USO/ROSS/candy        Gross Description --     1. Received fresh for frozen section labeled with the patient's name and designated \"right brainstem mass for frozen section\".  The " "specimen consists of two pink tan tissue pieces measuring 0.5 x 0.3 x 0.2 cm in total aggregate dimension.  A portion of the fresh tissue is used for squash prep analysis.  The remaining tissue is submitted en toto for frozen section analysis and designated 1AFS.  The frozen section remnant is submitted en toto in cassette 1AFS. MARCELINA/FEMI/KHARI/candy     2. Received in formalin labeled \"right brain stem mass\" is a Vacutainer which is sealed at both ends with no formalin reaching the tissue. The Vacutainer contains a 1.0 x 0.8 x 0.4 cm aggregate of tan-red friable tissue fragments and clotted blood.  Also received in the same container is a portion of Kenroy gauze which is exposed to the formalin fixation. The Kenroy gauze contains a 0.3 x 0.3 x 0.1 cm aggregate of tan-pink friable tissue fragments. The specimen is entirely submitted as follows:    2A - tissue within Vacutainer   2B - tissue on Kenroy gauze    [place]/femi/khari/hailey       Microscopic Description --     Performed, incorporated in diagnosis.       Clostridium Difficile Toxin - Stool, Per Rectum [[id number]] Collected:  07/08/20 0906    Specimen:  Stool from Per Rectum Updated:  07/08/20 1100    Narrative:       The following orders were created for panel order Clostridium Difficile Toxin - Stool, Per Rectum.  Procedure                               Abnormality         Status                     ---------                               -----------         ------                     Clostridium Difficile To...[[id number]]  Normal              Final result                 Please view results for these tests on the individual orders.    Clostridium Difficile Toxin, PCR - Stool, Per Rectum [[id number]]  (Normal) Collected:  07/08/20 0906    Specimen:  Stool from Per Rectum Updated:  07/08/20 1100     C. Difficile Toxins by PCR Negative    Basic Metabolic Panel [[id number]]  (Abnormal) Collected:  07/08/20 0613    Specimen:  Blood Updated:  07/08/20 0709     Glucose 128 mg/dL  "     BUN 31 mg/dL      Creatinine 0.53 mg/dL      Sodium 136 mmol/L      Potassium 3.8 mmol/L      Chloride 97 mmol/L      CO2 30.0 mmol/L      Calcium 9.2 mg/dL      eGFR Non African Amer 120 mL/min/1.73      BUN/Creatinine Ratio 58.5     Anion Gap 9.0 mmol/L     Narrative:       GFR Normal >60  Chronic Kidney Disease <60  Kidney Failure <15      CBC & Differential [[id number]] Collected:  07/08/20 0613    Specimen:  Blood Updated:  07/08/20 0652    Narrative:       The following orders were created for panel order CBC & Differential.  Procedure                               Abnormality         Status                     ---------                               -----------         ------                     CBC Auto Differential[[id number]]        Abnormal            Final result                 Please view results for these tests on the individual orders.    CBC Auto Differential [[id number]]  (Abnormal) Collected:  07/08/20 0613    Specimen:  Blood Updated:  07/08/20 0652     WBC 14.18 10*3/mm3      RBC 5.30 10*6/mm3      Hemoglobin 16.0 g/dL      Hematocrit 45.9 %      MCV 86.6 fL      MCH 30.2 pg      MCHC 34.9 g/dL      RDW 11.8 %      RDW-SD 37.2 fl      MPV 9.7 fL      Platelets 264 10*3/mm3      Neutrophil % 83.3 %      Lymphocyte % 9.6 %      Monocyte % 5.4 %      Eosinophil % 0.4 %      Basophil % 0.2 %      Immature Grans % 1.1 %      Neutrophils, Absolute 11.81 10*3/mm3      Lymphocytes, Absolute 1.36 10*3/mm3      Monocytes, Absolute 0.77 10*3/mm3      Eosinophils, Absolute 0.05 10*3/mm3      Basophils, Absolute 0.03 10*3/mm3      Immature Grans, Absolute 0.16 10*3/mm3      nRBC 0.0 /100 WBC     Wound Culture - Wound, Brain [[id number]] Collected:  07/01/20 1209    Specimen:  Wound from Brain Updated:  07/08/20 0630     Wound Culture No growth at 1 week     Gram Stain No WBCs or organisms seen    Wound Culture - Wound, Brain [[id number]] Collected:  07/01/20 1159    Specimen:  Wound from Brain Updated:   07/08/20 0629     Wound Culture No growth at 1 week     Gram Stain Occasional WBCs seen      No organisms seen    POC Glucose Once [[id number]]  (Abnormal) Collected:  07/08/20 0531    Specimen:  Blood Updated:  07/08/20 0533     Glucose 131 mg/dL     POC Glucose Once [[id number]]  (Abnormal) Collected:  07/07/20 2343    Specimen:  Blood Updated:  07/07/20 2345     Glucose 144 mg/dL     POC Glucose Once [[id number]]  (Normal) Collected:  07/07/20 1804    Specimen:  Blood Updated:  07/07/20 1806     Glucose 120 mg/dL            Radiology:    Imaging Results (Last 24 Hours)     ** No results found for the last 24 hours. **          Medication Review:       famotidine 20 mg Intravenous Q12H   insulin regular 0-7 Units Subcutaneous Q6H   levETIRAcetam 500 mg Nasogastric Q12H   methylPREDNISolone 4 mg Oral TID Around Food   [START ON 7/9/2020] methylPREDNISolone 4 mg Oral 4x Daily Taper   [START ON 7/10/2020] methylPREDNISolone 4 mg Oral TID Around Food   [START ON 7/11/2020] methylPREDNISolone 4 mg Oral Before Breakfast   [START ON 7/11/2020] methylPREDNISolone 4 mg Oral Tonight   [START ON 7/12/2020] methylPREDNISolone 4 mg Oral Before Breakfast   methylPREDNISolone 8 mg Oral Tonight   sodium chloride 10 mL Intravenous Q12H            Assessment/Plan     Problem List Items Addressed This Visit        Nervous and Auditory    * (Principal) Malignant brain tumor (CMS/HCC) - Primary          1.  Glioblastoma of the right cerebral peduncle status post craniotomy with excision of the mass  2.  Intracranial hemorrhage with adjacent edema after surgery  3.  Left-sided hemiplegia/dysphasia/hypophonia  4.  Mild hyperglycemia likely secondary to steroids.  5.  Urinary retention      Plan:    She will need rehab.  Currently has NG tube in place for feeding.  PT and OT as well as speech therapy to work with her.  Radiation oncology has been consulted by neurosurgery.  Check lab work in the a.m.  Continue to monitor blood sugar.  Await  placement in rehab.  Patient has poor prognosis.  Further recommendations will depend on clinical course.    Shorty Simon DO  07/08/20  16:28    Electronically signed by Shroty Simon DO at 07/08/20 1642     Shai Licona MD at 07/08/20 1531             LOS: 12 days   Patient Care Team:  Paz Mera MD as PCP - General (Family Medicine)    Chief Complaint: Postop craniotomy    Subjective     This patient continues with weakness on her left side.  It does seem to be improving however.    Interval History:     History taken from: patient chart family    Objective      She still has weakness on the left side    Vital Signs  Temp:  [97.6 °F (36.4 °C)-98.9 °F (37.2 °C)] 98 °F (36.7 °C)  Heart Rate:  [68-81] 81  Resp:  [16] 16  BP: (124-137)/(90-94) 124/91       Results Review:     I reviewed the patient's new clinical results.  The pathology reveals a glioblastoma.      Assessment/Plan       Malignant brain tumor (CMS/HCC)    Intracranial hemorrhage (CMS/HCC)    Dysphagia    Acute left-sided weakness      I had a long discussion with the patient's  the patient and her two sons.  I explained the pathology.  I explained that this is a very poor prognosis at this point unless we can get her into some type of experimental protocol.  We will need to wait at least 2 or 3 weeks before we can do anything at all but in the meantime we will consult radiation oncology and also start her on some antidepressants..      Shai Licona MD  07/08/20  15:31          Electronically signed by Shai Licona MD at 07/08/20 1532     Joy Bautista APRN at 07/07/20 0958     Attestation signed by Shai Licona MD at 07/07/20 1442    I have reviewed this documentation and agree.                      [facility] NEUROSURGERY PROGRESS NOTE    PATIENT IDENTIFICATION:   Name:  Denise Guajardo      MRN:  [id number]     54 y.o.  female               CC: POD 6 right temporal craniotomy for excisional  biopsy      Subjective     Interval History: failed repeat swallow eval. Making some progress with PT-stand and shuffle.  Denies headache or jaw pain.    ROS:  HEENT:  no vision changes  GI: No nausea, vomiting;swallow difficulties  Neuro: Left-sided plegia    Objective     Vital signs in last 24 hours:  Temp:  [96.6 °F (35.9 °C)-97.6 °F (36.4 °C)] 96.6 °F (35.9 °C)  Heart Rate:  [62-79] 62  Resp:  [14-18] 14  BP: (132-143)/() 132/85      Intake/Output this shift:  No intake/output data recorded.      Intake/Output last 3 shifts:  I/O last 3 completed shifts:  In: 2102 [Other:425; NG/GT:8057]  Out: 2200 [Urine:2200]    LABS:  Results from last 7 days   Lab Units 07/07/20  0556 07/06/20  0551 07/05/20  0436   WBC 10*3/mm3 11.43* 8.89 11.90*   HEMOGLOBIN g/dL 15.5 15.1 14.1   HEMATOCRIT % 44.5 42.7 40.2   PLATELETS 10*3/mm3 260 249 244     Results from last 7 days   Lab Units 07/07/20  0556 07/06/20  0551 07/05/20  0436   SODIUM mmol/L 135* 136 140   POTASSIUM mmol/L 4.3 4.2 4.4   CHLORIDE mmol/L 96* 97* 101   CO2 mmol/L 30.9* 29.0 30.0*   BUN mg/dL 28* 28* 26*   CREATININE mg/dL 0.55* 0.50* 0.55*   CALCIUM mg/dL 9.3 8.8 9.1   GLUCOSE mg/dL 142* 135* 144*     Path-malignant cellular neoplasia-appears to be primary lesion but being sent to [facility] for full work-up    Blood cx 6/29- NG- final  Operative brain cultures 7/1 - NGTD    IMAGING STUDIES:  No new imaging    I personally viewed and interpreted the patient's chart.    Meds reviewed/changed: Yes    Current Facility-Administered Medications:   •  acetaminophen (TYLENOL) tablet 650 mg, 650 mg, Oral, Q4H PRN, 650 mg at 07/07/20 0420 **OR** acetaminophen (TYLENOL) 160 MG/5ML solution 650 mg, 650 mg, Oral, Q4H PRN, 650 mg at 07/07/20 0940 **OR** acetaminophen (TYLENOL) suppository 650 mg, 650 mg, Rectal, Q4H PRN, Finn Toro MD  •  dexamethasone (DECADRON) injection 1 mg, 1 mg, Intravenous, Q6H, Shai Licona MD, 1 mg at 07/07/20  0940  •  dextrose (D50W) 25 g/ 50mL Intravenous Solution 25 g, 25 g, Intravenous, Q15 Min PRN, Finn Toro MD  •  dextrose (GLUTOSE) oral gel 15 g, 15 g, Oral, Q15 Min PRN, Finn Toro MD  •  famotidine (PEPCID) injection 20 mg, 20 mg, Intravenous, Q12H, Jd Regan MD, 20 mg at 07/07/20 0943  •  glucagon (human recombinant) (GLUCAGEN DIAGNOSTIC) injection 1 mg, 1 mg, Subcutaneous, Q15 Min PRN, Finn Toro MD  •  HYDROcodone-acetaminophen (NORCO) 5-325 MG per tablet 1 tablet, 1 tablet, Oral, Q4H PRN, Finn Toro MD, 1 tablet at 07/05/20 2226  •  HYDROmorphone (DILAUDID) injection 0.5 mg, 0.5 mg, Intravenous, Q2H PRN, 0.5 mg at 07/01/20 2323 **AND** naloxone (NARCAN) injection 0.4 mg, 0.4 mg, Intravenous, Q5 Min PRN, Finn Toro MD  •  insulin regular (humuLIN R,novoLIN R) injection 0-7 Units, 0-7 Units, Subcutaneous, Q6H, Finn Toro MD, 2 Units at 07/06/20 1124  •  levETIRAcetam (KEPPRA) 100 MG/ML solution 500 mg, 500 mg, Nasogastric, Q12H, Finn Toro MD, 500 mg at 07/07/20 0940  •  methocarbamol (ROBAXIN) tablet 500 mg, 500 mg, Oral, Q6H PRN, Joy Bautista APRN  •  ondansetron (ZOFRAN) tablet 4 mg, 4 mg, Oral, Q6H PRN **OR** ondansetron (ZOFRAN) injection 4 mg, 4 mg, Intravenous, Q6H PRN, Finn Toro MD, 4 mg at 07/01/20 2323  •  ondansetron (ZOFRAN) tablet 4 mg, 4 mg, Oral, Q6H PRN **OR** ondansetron (ZOFRAN) injection 4 mg, 4 mg, Intravenous, Q6H PRN, Finn Toro MD  •  potassium chloride (MICRO-K) CR capsule 40 mEq, 40 mEq, Oral, PRN **OR** potassium chloride (KLOR-CON) packet 40 mEq, 40 mEq, Oral, PRN, 40 mEq at 07/04/20 1137 **OR** potassium chloride 10 mEq in 100 mL IVPB, 10 mEq, Intravenous, Q1H PRN, Finn Toro MD  •  sodium chloride 0.9 % flush 10 mL, 10 mL, Intravenous, Q12H, Finn Toro MD, 10 mL at 07/07/20 0942  •  sodium chloride 0.9 % flush 10 mL, 10 mL, Intravenous, PRN, Finn Toro  "MD [place]      Physical Exam:    General:   Awake, alert, oriented x3. Speech     hypophonic.  Flat affect.  HEENT:   Right frontal incision well     approximated with no redness     drainage, minimal swelling.    CN: Extraocular movements are full , PERRLA sluggish;Severe left facial weakness, particularly lower face.  Able to close eyes; tongue deviation to the left.  Able to blow out cheeks.    Motor: No movement left upper extremity, trace movement but not antigravity left lower extremity.  Normal strength right side  Sensation: Able to feel touch left side   Coordination: Finger-to-nose with mild ataxia on right, unable to assess left.  Extremities:   No calf swelling or tenderness    Assessment/Plan     ASSESSMENT:      Malignant brain tumor (CMS/HCC)    Intracranial hemorrhage (CMS/HCC)    Dysphagia    Acute left-sided weakness      PLAN: No neurologic changes since last visit, but did fairly well with physical therapy standing and taking shuffling steps.  Patient continues with left-sided plegia.  She does have some movement to the left lower extremity but not antigravity.  She is verbalizing but she is very hypophonic.  Unable to swallow safely so has core track with feeds.  She is participatory in all aspects of therapy.  Still awaiting final pathology from [facility].  She and her  are aware that this may take a couple of weeks for final results.  Awaiting [facility] mejia.  Oncology following peripherally for final pathology.  Will transition her from IV steroid to Medrol Dosepak.  Okay for discharge to rehab at any time.  We will follow there.  May need to consider something for depression.  Will defer to primary service or PM&R. We will follow peripherally until path returns.    I discussed the patients findings and my recommendations with patient, family, nursing staff and Dr. Licona       LOS: 11 days       Joy Bautista, APRN  7/7/2020  09:58    \"Dictated utilizing Dragon dictation\".  " "      Electronically signed by Shai Licona MD at 20 1442     Jd Regan MD at 20 0923              DAILY PROGRESS NOTE  [facility]    Patient Identification:  Name: Denise Guajardo  Age: 54 y.o.  Sex: female  :  1965  MRN: [id number]         Primary Care Physician: Paz Mera MD    Subjective:  Interval History: No new complaints.  Denies chest pain shortness of breath no issues with nausea vomiting or confusion    Objective: Spouse at bedside and answered all questions.  Patient looks clinically unchanged versus yesterday    Scheduled Meds:  dexamethasone 1 mg Intravenous Q6H   famotidine 20 mg Intravenous Q12H   insulin regular 0-7 Units Subcutaneous Q6H   levETIRAcetam 500 mg Nasogastric Q12H   sodium chloride 10 mL Intravenous Q12H     Continuous Infusions:     Vital signs in last 24 hours:  Temp:  [96.6 °F (35.9 °C)-97.6 °F (36.4 °C)] 96.6 °F (35.9 °C)  Heart Rate:  [62-79] 62  Resp:  [14-18] 14  BP: (132-143)/() 132/85    Intake/Output:    Intake/Output Summary (Last 24 hours) at 2020 0923  Last data filed at 2020 0415  Gross per 24 hour   Intake 1384 ml   Output 1550 ml   Net -166 ml       Exam:  /85 (BP Location: Right arm, Patient Position: Lying)   Pulse 62   Temp 96.6 °F (35.9 °C) (Oral)   Resp 14   Ht 165 cm (64.96\")   Wt 49 kg (108 lb 0.4 oz)   SpO2 97%   BMI 18.00 kg/m²      General Appearance:    Alert, cooperative, no distress, AAOx3                          Head:  Scalp incision clean and dry status post craniotomy                        Throat:   Oral mucosa pink and moist, core track functioning properly                           Neck:   No JVD                         Lungs:    Clear to auscultation bilaterally, respirations unlabored                          Heart:    Regular rate and rhythm, S1 and S2 normal                  Abdomen:     Soft, nontender, bowel sounds active                 Extremities:   No cyanosis " or edema                        Pulses:   Pulses palpable in lower extremities                   Neurologic: Left sided flaccid hemiparesis with left-sided facial droop     Data Review:  Labs in chart were reviewed.    Assessment:  Active Hospital Problems    Diagnosis  POA   • **Malignant brain tumor (CMS/HCC) [C71.9]  Unknown   • Dysphagia [R13.10]  Unknown   • Acute left-sided weakness [R53.1]  Unknown   • Intracranial hemorrhage (CMS/HCC) [I62.9]  No      Resolved Hospital Problems    Diagnosis Date Resolved POA   • Brain abscess [G06.0] 07/05/2020 Yes       Plan:    Brain mass with persistent left-sided hemiplegia/dysphagia/hypophonia/droop              -Initial pathology consistent with neoplasm and final pathology pending as sent to outside pathology office in [place]              -Antibiotics discontinued -afebrile -ID has also signed off              -Decadron/Keppra /Pepcid               -Stable from respiratory standpoint and pulmonology has signed off              -Oncology now going to follow more peripherally     Mild hyperglycemia secondary to steroids - tempted to remove sliding scale here in the next day or 2 as her requirements are minimal at best and there is no past history of diabetes but will continue for now and observe    Urinary retention not surprising.  Patient did not successfully complete voiding trial so Bernabe will be replaced and I think that should further be trialed again at rehab once activity levels improve     SCDs for DVT prophylaxis        Disposition pending CCP/placement as well as final okay from [facility] -hopefully there should be no issue with this patient transitioning over to our acute rehab as that would be clinically best for this patient       Jd Regan MD  7/7/2020  09:23      Electronically signed by Jd Regan MD at 07/07/20 1008       Jessica Adam RN   Registered Nurse   Physical Medicine and Rehabilitation   Nursing Note   Signed   Date of  Service:  07/07/20 1150   Creation Time:  07/07/20 1150            Signed             Show:Clear all  [x]Manual[]Template[]Copied    Added by:  [x]Jessica Adam RN    []Jeff for details  Clinicals have been sent for insurance precert.  Await response.  Discussed with CCP              Essie العلي, RN      Case Management   Progress Notes   Signed   Date of Service:  07/07/20 1309   Creation Time:  07/07/20 1309            Signed             Show:Clear all  []Manual[x]Template[]Copied    Added by:  [x]Essie العلي, RN    []Jeff for details  Continued Stay Note  [facility]     Patient Name: Denise Guajardo              MRN: [id number]  Today's Date: 7/7/2020                       Admit Date: 6/26/2020          Discharge Plan      Row Name 07/07/20 1308           Plan     Plan  [facility], precert pending     Provided Post Acute Provider List?  Yes     Post Acute Provider List  Inpatient Rehab;Home Health;Nursing Home     Provided Post Acute Provider Quality & Resource List?  Yes     Post Acute Provider Quality and Resource List  Home Health;Inpatient Rehab     Delivered To  Support Person     Support Person  Malick Guajardo  [phone]     Method of Delivery  In person     Plan Comments  Received call from Pete with [facility], she states they will initiate acute rehab precert today.  Met with patient, son and Malick Guajardo  [phone] at bedside, patient granted me permission to speak to her while family remained in the room.  Informed them that [facility] is initiating precert.   voiced concerns about cost of long-term care for his wife, gave him a copy or [facility] and pointed out Medicaid number, how to apply for disability and ElderLaw, to provide them with additional guidance.  Also, encourage family to reach out to her employer to find out about short and long-term disability coverage she may have.  Will continue to monitor for new or changing  discharge needs.

## 2020-07-09 NOTE — DISCHARGE SUMMARY
[facility] HOSPITALIST   DISCHARGE SUMMARY      Name:  Denise Guajardo   Age:  54 y.o.  Sex:  female  :  1965  MRN:  [id number]   Visit Number:  [id number]  Primary Care Physician:  Paz Mera MD  Date of Discharge:  2020  Admission Date:  2020      Discharge Diagnosis:     1.  Glioblastoma of the right cerebral peduncle status post craniotomy with excision of the mass  2.  Intracranial hemorrhage with adjacent edema after surgery  3.  Left-sided hemiplegia/dysphasia/hypophonia  4.  Mild hyperglycemia likely secondary to steroids.  5.  Urinary retention    Active Hospital Problems    Diagnosis  POA   • **Glioblastoma multiforme (CMS/HCC) [C71.9]  Unknown   • Dysphagia [R13.10]  Unknown   • Acute left-sided weakness [R53.1]  Unknown   • Intracranial hemorrhage (CMS/HCC) [I62.9]  No      Resolved Hospital Problems    Diagnosis Date Resolved POA   • Brain abscess [G06.0] 2020 Yes         Presenting Problem/History of Present Illness:    Brain tumor (CMS/HCC) [D49.6]         Hospital Course:    Patient is a 54-year-old with no significant past medical history who had dizziness for the past couple of weeks and went to [facility].  She had MRI of the brain which showed 2 lesions consistent with metastatic disease with unknown primary.  She was given Decadron and transferred here on 2020.  CT of chest abdomen pelvis did not note any tumors as it was suspected that the patient may have metastatic disease.  Infectious disease and pulmonology were consulted as it was thought that the patient may have brain abscesses.  All cultures were negative however including CSF and blood.     Patient was taken for craniotomy on 2020.  Excisional biopsy of the mass in the right cerebral on call was done.  Pathology showed glioblastoma.  Patient had bleeding after the surgery.  She also has left-sided hemiplegia, dysphagia and dysphonia.  She is unable to ambulate.  She has NG  tube in with supplemental feeding.  PT and OT are working with the patient.  Patient will need extensive rehab.  [facility] rehab who is accepted the patient for transfer today.     Radiation oncology Dr Peacock has been consulted by neurosurgery.  They have a plan for treatment, and they will check on the patient in 2 weeks and rehab to see if they wish to pursue treatment here or elsewhere.    Dr. Burr from oncology has seen the patient and offered several different options for the patient.  Patient had opted for medical oncology care at [facility] after she is released from rehab.    Patient's WBC count was noted to be elevated today.  Suspect this is from the steroids.  No signs or symptoms of infection are noted.     Spoke to the son at length, answered all questions.  Patient is poor historian at this point.  She is unable to answer any questions with any clarity.  Patient is ready to go to rehab.  She appears comfortable.  Answered all questions of the family thoroughly.  She will be transferred over today.    Procedures Performed:    Procedure(s):  Right-sided craniotomy for excisional biopsy and, placement of lumbar drain       Consults:     Consults     Date and Time Order Name Status Description    7/8/2020 1609 Inpatient Radiation Oncology Consult Completed     7/4/2020 0418 Hematology & Oncology Inpatient Consult      7/1/2020 1638 Inpatient Consult to Intensivist Completed     6/28/2020 1228 Inpatient Infectious Diseases Consult Completed     6/26/2020 1608 Inpatient Neurosurgery Consult Completed     6/26/2020 1608 Inpatient Hematology & Oncology Consult Completed     6/26/2020 1211 Neurosurgery (on-call MD unless specified) Completed           Pertinent Test Results:     Lab Results (all)     Procedure Component Value Units Date/Time    POC Glucose Once [[id number]]  (Normal) Collected:  07/09/20 1113    Specimen:  Blood Updated:  07/09/20 1117     Glucose 125 mg/dL     Renal Function Panel  [[id number]]  (Abnormal) Collected:  07/09/20 0533    Specimen:  Blood Updated:  07/09/20 0611     Glucose 138 mg/dL      BUN 31 mg/dL      Creatinine 0.60 mg/dL      Sodium 137 mmol/L      Potassium 3.7 mmol/L      Chloride 98 mmol/L      CO2 29.1 mmol/L      Calcium 9.2 mg/dL      Albumin 3.80 g/dL      Phosphorus 4.4 mg/dL      Anion Gap 9.9 mmol/L      BUN/Creatinine Ratio 51.7     eGFR Non African Amer 104 mL/min/1.73     Narrative:       GFR Normal >60  Chronic Kidney Disease <60  Kidney Failure <15      Magnesium [[id number]]  (Normal) Collected:  07/09/20 0533    Specimen:  Blood Updated:  07/09/20 0611     Magnesium 2.4 mg/dL     CBC & Differential [[id number]] Collected:  07/09/20 0533    Specimen:  Blood Updated:  07/09/20 0550    Narrative:       The following orders were created for panel order CBC & Differential.  Procedure                               Abnormality         Status                     ---------                               -----------         ------                     CBC Auto Differential[[id number]]        Abnormal            Final result                 Please view results for these tests on the individual orders.    CBC Auto Differential [[id number]]  (Abnormal) Collected:  07/09/20 0533    Specimen:  Blood Updated:  07/09/20 0550     WBC 16.41 10*3/mm3      RBC 5.14 10*6/mm3      Hemoglobin 16.1 g/dL      Hematocrit 45.0 %      MCV 87.5 fL      MCH 31.3 pg      MCHC 35.8 g/dL      RDW 12.2 %      RDW-SD 38.7 fl      MPV 9.6 fL      Platelets 263 10*3/mm3      Neutrophil % 86.0 %      Lymphocyte % 6.6 %      Monocyte % 6.0 %      Eosinophil % 0.2 %      Basophil % 0.2 %      Immature Grans % 1.0 %      Neutrophils, Absolute 14.10 10*3/mm3      Lymphocytes, Absolute 1.09 10*3/mm3      Monocytes, Absolute 0.98 10*3/mm3      Eosinophils, Absolute 0.04 10*3/mm3      Basophils, Absolute 0.04 10*3/mm3      Immature Grans, Absolute 0.16 10*3/mm3      nRBC 0.0 /100 WBC     POC Glucose Once  [[id number]]  (Abnormal) Collected:  07/09/20 0547    Specimen:  Blood Updated:  07/09/20 0549     Glucose 134 mg/dL     POC Glucose Once [[id number]]  (Normal) Collected:  07/09/20 0121    Specimen:  Blood Updated:  07/09/20 0122     Glucose 117 mg/dL     POC Glucose Once [[id number]]  (Normal) Collected:  07/08/20 1823    Specimen:  Blood Updated:  07/08/20 1824     Glucose 118 mg/dL     POC Glucose Once [[id number]]  (Normal) Collected:  07/08/20 1207    Specimen:  Blood Updated:  07/08/20 1218     Glucose 127 mg/dL     Tissue Pathology Exam [[id number]] Collected:  07/01/20 1158    Specimen:  Tissue from Brain, Tissue from Brain Updated:  07/08/20 1205     Case Report --     Surgical Pathology Report                         Case: [id number]                                  Authorizing Provider:  Shai Licona MD       Collected:           07/01/2020 11:58 AM          Ordering Location:     [facility]  Received:            07/01/2020 12:06 PM                                 MAIN OR                                                                      Pathologist:           Vini Jones MD                                                         Specimens:   1) - Brain, RIGHT BRAINSTEM MASS FOR FROZEN                                                         2) - Brain, RIGHT BRAINSTEM MASS                                                            Final Diagnosis --     1-2.  Brain, Right Brainstem, Biopsy:     A.  Glioblastoma, IDH-1, ([id number], WHO grade 4)    1.  IDH (R13H) expression by immunohistochemistry negative.     2.  IDH 1-2 mutation analysis by PCR:  Pending.    3.  ATRX expression by immunohistochemistry: Preserved.    4.  P53 expression by immunohistochemistry:  Positive.      5.  MGMT promotor methylation by PCR:  Pending.      laci/brb            Preliminary Diagnosis --     1. Brain, Right Brainstem Biopsy:    A. Necrotic debris and rare degenerated cells with focal acute  "inflammation.    Comment: There does appear to be some slight increased cellularity around some vessels but the cells appear degenerated. These cells were not seen on the original frozen which was almost all necrotic.    ross/jse     2. Brain, Brainstem, Biopsy:    A. Malignant cellular neoplasm.        COMMENT:The neoplasm histologically appears to be a primary brain lesion but we are not going to perform immunohistochemical stains at this time but rather send the case out to the [facility] for full work-up. The case was discussed with Dr. Licoan on 7/2/2020.       Ross/justins     Ross/hailey       Intraoperative Consultation --     FS Dx 1AFS ( 1 block and 1 Touch print): Necrosis and acute inflammation consistent with abscess.  Per Dr. Jones, [facility].  The results are called to Dr. Licona at 12:21 p.m.  MARCELINA/FEMI/ROSS/candy        Gross Description --     1. Received fresh for frozen section labeled with the patient's name and designated \"right brainstem mass for frozen section\".  The specimen consists of two pink tan tissue pieces measuring 0.5 x 0.3 x 0.2 cm in total aggregate dimension.  A portion of the fresh tissue is used for squash prep analysis.  The remaining tissue is submitted en toto for frozen section analysis and designated 1AFS.  The frozen section remnant is submitted en toto in cassette 1AFS. HT/O/ROSS/candy     2. Received in formalin labeled \"right brain stem mass\" is a Vacutainer which is sealed at both ends with no formalin reaching the tissue. The Vacutainer contains a 1.0 x 0.8 x 0.4 cm aggregate of tan-red friable tissue fragments and clotted blood.  Also received in the same container is a portion of Kenroy gauze which is exposed to the formalin fixation. The Kenroy gauze contains a 0.3 x 0.3 x 0.1 cm aggregate of tan-pink friable tissue fragments. The specimen is entirely submitted as follows:    2A - tissue within Vacutainer   2B - tissue on Kenroy " radha Ho/jania/laci/hailey       Microscopic Description --     Performed, incorporated in diagnosis.       Clostridium Difficile Toxin - Stool, Per Rectum [[id number]] Collected:  07/08/20 0906    Specimen:  Stool from Per Rectum Updated:  07/08/20 1100    Narrative:       The following orders were created for panel order Clostridium Difficile Toxin - Stool, Per Rectum.  Procedure                               Abnormality         Status                     ---------                               -----------         ------                     Clostridium Difficile To...[[id number]]  Normal              Final result                 Please view results for these tests on the individual orders.    Clostridium Difficile Toxin, PCR - Stool, Per Rectum [[id number]]  (Normal) Collected:  07/08/20 0906    Specimen:  Stool from Per Rectum Updated:  07/08/20 1100     C. Difficile Toxins by PCR Negative    Basic Metabolic Panel [[id number]]  (Abnormal) Collected:  07/08/20 0613    Specimen:  Blood Updated:  07/08/20 0709     Glucose 128 mg/dL      BUN 31 mg/dL      Creatinine 0.53 mg/dL      Sodium 136 mmol/L      Potassium 3.8 mmol/L      Chloride 97 mmol/L      CO2 30.0 mmol/L      Calcium 9.2 mg/dL      eGFR Non African Amer 120 mL/min/1.73      BUN/Creatinine Ratio 58.5     Anion Gap 9.0 mmol/L     Narrative:       GFR Normal >60  Chronic Kidney Disease <60  Kidney Failure <15      CBC & Differential [[id number]] Collected:  07/08/20 0613    Specimen:  Blood Updated:  07/08/20 0652    Narrative:       The following orders were created for panel order CBC & Differential.  Procedure                               Abnormality         Status                     ---------                               -----------         ------                     CBC Auto Differential[[id number]]        Abnormal            Final result                 Please view results for these tests on the individual orders.    CBC Auto Differential [[id number]]   (Abnormal) Collected:  07/08/20 0613    Specimen:  Blood Updated:  07/08/20 0652     WBC 14.18 10*3/mm3      RBC 5.30 10*6/mm3      Hemoglobin 16.0 g/dL      Hematocrit 45.9 %      MCV 86.6 fL      MCH 30.2 pg      MCHC 34.9 g/dL      RDW 11.8 %      RDW-SD 37.2 fl      MPV 9.7 fL      Platelets 264 10*3/mm3      Neutrophil % 83.3 %      Lymphocyte % 9.6 %      Monocyte % 5.4 %      Eosinophil % 0.4 %      Basophil % 0.2 %      Immature Grans % 1.1 %      Neutrophils, Absolute 11.81 10*3/mm3      Lymphocytes, Absolute 1.36 10*3/mm3      Monocytes, Absolute 0.77 10*3/mm3      Eosinophils, Absolute 0.05 10*3/mm3      Basophils, Absolute 0.03 10*3/mm3      Immature Grans, Absolute 0.16 10*3/mm3      nRBC 0.0 /100 WBC     Wound Culture - Wound, Brain [[id number]] Collected:  07/01/20 1209    Specimen:  Wound from Brain Updated:  07/08/20 0630     Wound Culture No growth at 1 week     Gram Stain No WBCs or organisms seen    Wound Culture - Wound, Brain [[id number]] Collected:  07/01/20 1159    Specimen:  Wound from Brain Updated:  07/08/20 0629     Wound Culture No growth at 1 week     Gram Stain Occasional WBCs seen      No organisms seen    POC Glucose Once [[id number]]  (Abnormal) Collected:  07/08/20 0531    Specimen:  Blood Updated:  07/08/20 0533     Glucose 131 mg/dL     POC Glucose Once [[id number]]  (Abnormal) Collected:  07/07/20 2343    Specimen:  Blood Updated:  07/07/20 2345     Glucose 144 mg/dL     POC Glucose Once [[id number]]  (Normal) Collected:  07/07/20 1804    Specimen:  Blood Updated:  07/07/20 1806     Glucose 120 mg/dL     POC Glucose Once [[id number]]  (Abnormal) Collected:  07/07/20 1128    Specimen:  Blood Updated:  07/07/20 1135     Glucose 160 mg/dL     Basic Metabolic Panel [[id number]]  (Abnormal) Collected:  07/07/20 0556    Specimen:  Blood Updated:  07/07/20 0639     Glucose 142 mg/dL      BUN 28 mg/dL      Creatinine 0.55 mg/dL      Sodium 135 mmol/L      Potassium 4.3 mmol/L      Chloride 96  mmol/L      CO2 30.9 mmol/L      Calcium 9.3 mg/dL      eGFR Non African Amer 115 mL/min/1.73      BUN/Creatinine Ratio 50.9     Anion Gap 8.1 mmol/L     Narrative:       GFR Normal >60  Chronic Kidney Disease <60  Kidney Failure <15      CBC & Differential [[id number]] Collected:  07/07/20 0556    Specimen:  Blood Updated:  07/07/20 0614    Narrative:       The following orders were created for panel order CBC & Differential.  Procedure                               Abnormality         Status                     ---------                               -----------         ------                     CBC Auto Differential[[id number]]        Abnormal            Final result                 Please view results for these tests on the individual orders.    CBC Auto Differential [[id number]]  (Abnormal) Collected:  07/07/20 0556    Specimen:  Blood Updated:  07/07/20 0614     WBC 11.43 10*3/mm3      RBC 5.06 10*6/mm3      Hemoglobin 15.5 g/dL      Hematocrit 44.5 %      MCV 87.9 fL      MCH 30.6 pg      MCHC 34.8 g/dL      RDW 12.1 %      RDW-SD 38.9 fl      MPV 9.2 fL      Platelets 260 10*3/mm3      Neutrophil % 85.8 %      Lymphocyte % 7.6 %      Monocyte % 5.2 %      Eosinophil % 0.1 %      Basophil % 0.2 %      Immature Grans % 1.1 %      Neutrophils, Absolute 9.81 10*3/mm3      Lymphocytes, Absolute 0.87 10*3/mm3      Monocytes, Absolute 0.59 10*3/mm3      Eosinophils, Absolute 0.01 10*3/mm3      Basophils, Absolute 0.02 10*3/mm3      Immature Grans, Absolute 0.13 10*3/mm3      nRBC 0.0 /100 WBC     POC Glucose Once [[id number]]  (Abnormal) Collected:  07/07/20 0035    Specimen:  Blood Updated:  07/07/20 0036     Glucose 140 mg/dL     POC Glucose Once [[id number]]  (Normal) Collected:  07/06/20 1802    Specimen:  Blood Updated:  07/06/20 1815     Glucose 123 mg/dL     POC Glucose Once [[id number]]  (Abnormal) Collected:  07/06/20 1113    Specimen:  Blood Updated:  07/06/20 1118     Glucose 153 mg/dL     Anaerobic Culture -  Wound, Brain [[id number]] Collected:  07/01/20 1159    Specimen:  Wound from Brain Updated:  07/06/20 0951     Anaerobic Culture No anaerobes isolated at 5 days    Anaerobic Culture - Wound, Brain [[id number]] Collected:  07/01/20 1209    Specimen:  Wound from Brain Updated:  07/06/20 0948     Anaerobic Culture No anaerobes isolated at 5 days    Basic Metabolic Panel [[id number]]  (Abnormal) Collected:  07/06/20 0551    Specimen:  Blood Updated:  07/06/20 0639     Glucose 135 mg/dL      BUN 28 mg/dL      Creatinine 0.50 mg/dL      Sodium 136 mmol/L      Potassium 4.2 mmol/L      Chloride 97 mmol/L      CO2 29.0 mmol/L      Calcium 8.8 mg/dL      eGFR Non African Amer 129 mL/min/1.73      BUN/Creatinine Ratio 56.0     Anion Gap 10.0 mmol/L     Narrative:       GFR Normal >60  Chronic Kidney Disease <60  Kidney Failure <15      CBC & Differential [[id number]] Collected:  07/06/20 0551    Specimen:  Blood Updated:  07/06/20 0625    Narrative:       The following orders were created for panel order CBC & Differential.  Procedure                               Abnormality         Status                     ---------                               -----------         ------                     CBC Auto Differential[[id number]]        Abnormal            Final result                 Please view results for these tests on the individual orders.    CBC Auto Differential [[id number]]  (Abnormal) Collected:  07/06/20 0551    Specimen:  Blood Updated:  07/06/20 0625     WBC 8.89 10*3/mm3      RBC 4.92 10*6/mm3      Hemoglobin 15.1 g/dL      Hematocrit 42.7 %      MCV 86.8 fL      MCH 30.7 pg      MCHC 35.4 g/dL      RDW 11.8 %      RDW-SD 37.2 fl      MPV 9.1 fL      Platelets 249 10*3/mm3      Neutrophil % 85.4 %      Lymphocyte % 6.9 %      Monocyte % 6.7 %      Eosinophil % 0.1 %      Basophil % 0.1 %      Immature Grans % 0.8 %      Neutrophils, Absolute 7.59 10*3/mm3      Lymphocytes, Absolute 0.61 10*3/mm3      Monocytes,  Absolute 0.60 10*3/mm3      Eosinophils, Absolute 0.01 10*3/mm3      Basophils, Absolute 0.01 10*3/mm3      Immature Grans, Absolute 0.07 10*3/mm3      nRBC 0.0 /100 WBC     POC Glucose Once [[id number]]  (Normal) Collected:  07/06/20 0534    Specimen:  Blood Updated:  07/06/20 0535     Glucose 129 mg/dL     POC Glucose Once [[id number]]  (Normal) Collected:  07/05/20 2218    Specimen:  Blood Updated:  07/05/20 2219     Glucose 117 mg/dL     POC Glucose Once [[id number]]  (Abnormal) Collected:  07/05/20 1739    Specimen:  Blood Updated:  07/05/20 1740     Glucose 136 mg/dL     POC Glucose Once [[id number]]  (Abnormal) Collected:  07/05/20 1129    Specimen:  Blood Updated:  07/05/20 1132     Glucose 132 mg/dL     Basic Metabolic Panel [[id number]]  (Abnormal) Collected:  07/05/20 0436    Specimen:  Blood Updated:  07/05/20 0610     Glucose 144 mg/dL      BUN 26 mg/dL      Creatinine 0.55 mg/dL      Sodium 140 mmol/L      Potassium 4.4 mmol/L      Comment: Specimen hemolyzed.  Results may be affected.        Chloride 101 mmol/L      CO2 30.0 mmol/L      Calcium 9.1 mg/dL      eGFR Non African Amer 115 mL/min/1.73      BUN/Creatinine Ratio 47.3     Anion Gap 9.0 mmol/L     Narrative:       GFR Normal >60  Chronic Kidney Disease <60  Kidney Failure <15      CBC & Differential [[id number]] Collected:  07/05/20 0436    Specimen:  Blood Updated:  07/05/20 0544    Narrative:       The following orders were created for panel order CBC & Differential.  Procedure                               Abnormality         Status                     ---------                               -----------         ------                     CBC Auto Differential[[id number]]        Abnormal            Final result                 Please view results for these tests on the individual orders.    CBC Auto Differential [[id number]]  (Abnormal) Collected:  07/05/20 0436    Specimen:  Blood Updated:  07/05/20 0544     WBC 11.90 10*3/mm3      RBC 4.52  10*6/mm3      Hemoglobin 14.1 g/dL      Hematocrit 40.2 %      MCV 88.9 fL      MCH 31.2 pg      MCHC 35.1 g/dL      RDW 11.6 %      RDW-SD 37.5 fl      MPV 9.4 fL      Platelets 244 10*3/mm3      Neutrophil % 87.0 %      Lymphocyte % 4.0 %      Monocyte % 7.8 %      Eosinophil % 0.0 %      Basophil % 0.1 %      Immature Grans % 1.1 %      Neutrophils, Absolute 10.35 10*3/mm3      Lymphocytes, Absolute 0.48 10*3/mm3      Monocytes, Absolute 0.93 10*3/mm3      Eosinophils, Absolute 0.00 10*3/mm3      Basophils, Absolute 0.01 10*3/mm3      Immature Grans, Absolute 0.13 10*3/mm3      nRBC 0.0 /100 WBC     POC Glucose Once [[id number]]  (Abnormal) Collected:  07/04/20 2205    Specimen:  Blood Updated:  07/04/20 2207     Glucose 163 mg/dL     Potassium [[id number]]  (Normal) Collected:  07/04/20 1945    Specimen:  Blood Updated:  07/04/20 2043     Potassium 4.4 mmol/L     Magnesium [[id number]]  (Normal) Collected:  07/04/20 1945    Specimen:  Blood Updated:  07/04/20 2028     Magnesium 2.3 mg/dL     POC Glucose Once [[id number]]  (Abnormal) Collected:  07/04/20 1758    Specimen:  Blood Updated:  07/04/20 1801     Glucose 141 mg/dL     POC Glucose Once [[id number]]  (Abnormal) Collected:  07/04/20 1107    Specimen:  Blood Updated:  07/04/20 1111     Glucose 156 mg/dL     Culture, CSF - Cerebrospinal Fluid, Spine, Lumbar [[id number]] Collected:  07/01/20 1048    Specimen:  Cerebrospinal Fluid from Spine, Lumbar Updated:  07/04/20 0927     CSF Culture No growth at 3 days     Gram Stain No WBCs or organisms seen    Blood Culture - Blood, Arm, Right [[id number]] Collected:  06/29/20 0516    Specimen:  Blood from Arm, Right Updated:  07/04/20 0545     Blood Culture No growth at 5 days    Blood Culture - Blood, Arm, Left [[id number]] Collected:  06/29/20 0520    Specimen:  Blood from Arm, Left Updated:  07/04/20 0545     Blood Culture No growth at 5 days    Basic Metabolic Panel [[id number]]  (Abnormal) Collected:  07/04/20 0431     Specimen:  Blood Updated:  07/04/20 0513     Glucose 143 mg/dL      BUN 24 mg/dL      Creatinine 0.37 mg/dL      Sodium 138 mmol/L      Potassium 3.2 mmol/L      Chloride 109 mmol/L      CO2 24.2 mmol/L      Calcium 7.4 mg/dL      eGFR Non African Amer >150 mL/min/1.73      BUN/Creatinine Ratio 64.9     Anion Gap 4.8 mmol/L     Narrative:       GFR Normal >60  Chronic Kidney Disease <60  Kidney Failure <15      CBC & Differential [[id number]] Collected:  07/04/20 0432    Specimen:  Blood Updated:  07/04/20 0443    Narrative:       The following orders were created for panel order CBC & Differential.  Procedure                               Abnormality         Status                     ---------                               -----------         ------                     CBC Auto Differential[[id number]]        Abnormal            Final result                 Please view results for these tests on the individual orders.    CBC Auto Differential [[id number]]  (Abnormal) Collected:  07/04/20 0432    Specimen:  Blood Updated:  07/04/20 0443     WBC 14.13 10*3/mm3      RBC 4.30 10*6/mm3      Hemoglobin 13.5 g/dL      Hematocrit 38.4 %      MCV 89.3 fL      MCH 31.4 pg      MCHC 35.2 g/dL      RDW 12.1 %      RDW-SD 39.4 fl      MPV 9.0 fL      Platelets 236 10*3/mm3      Neutrophil % 91.9 %      Lymphocyte % 2.0 %      Monocyte % 5.3 %      Eosinophil % 0.0 %      Basophil % 0.1 %      Immature Grans % 0.7 %      Neutrophils, Absolute 12.99 10*3/mm3      Lymphocytes, Absolute 0.28 10*3/mm3      Monocytes, Absolute 0.75 10*3/mm3      Eosinophils, Absolute 0.00 10*3/mm3      Basophils, Absolute 0.01 10*3/mm3      Immature Grans, Absolute 0.10 10*3/mm3      nRBC 0.0 /100 WBC     POC Glucose Once [[id number]]  (Abnormal) Collected:  07/03/20 2347    Specimen:  Blood Updated:  07/03/20 2349     Glucose 179 mg/dL     POC Glucose Once [[id number]]  (Abnormal) Collected:  07/03/20 1752    Specimen:  Blood Updated:  07/03/20 1758      Glucose 165 mg/dL     POC Glucose Once [[id number]]  (Abnormal) Collected:  07/03/20 1156    Specimen:  Blood Updated:  07/03/20 1206     Glucose 187 mg/dL     Basic Metabolic Panel [[id number]]  (Abnormal) Collected:  07/03/20 0355    Specimen:  Blood Updated:  07/03/20 0445     Glucose 169 mg/dL      BUN 19 mg/dL      Creatinine 0.49 mg/dL      Sodium 138 mmol/L      Potassium 4.0 mmol/L      Chloride 103 mmol/L      CO2 25.3 mmol/L      Calcium 9.0 mg/dL      eGFR Non African Amer 132 mL/min/1.73      BUN/Creatinine Ratio 38.8     Anion Gap 9.7 mmol/L     Narrative:       GFR Normal >60  Chronic Kidney Disease <60  Kidney Failure <15      CBC & Differential [[id number]] Collected:  07/03/20 0355    Specimen:  Blood Updated:  07/03/20 0421    Narrative:       The following orders were created for panel order CBC & Differential.  Procedure                               Abnormality         Status                     ---------                               -----------         ------                     CBC Auto Differential[[id number]]        Abnormal            Final result                 Please view results for these tests on the individual orders.    CBC Auto Differential [[id number]]  (Abnormal) Collected:  07/03/20 0355    Specimen:  Blood Updated:  07/03/20 0421     WBC 12.33 10*3/mm3      RBC 4.55 10*6/mm3      Hemoglobin 14.0 g/dL      Hematocrit 39.1 %      MCV 85.9 fL      MCH 30.8 pg      MCHC 35.8 g/dL      RDW 11.6 %      RDW-SD 36.3 fl      MPV 9.2 fL      Platelets 231 10*3/mm3      Neutrophil % 92.6 %      Lymphocyte % 2.6 %      Monocyte % 4.0 %      Eosinophil % 0.0 %      Basophil % 0.1 %      Immature Grans % 0.7 %      Neutrophils, Absolute 11.42 10*3/mm3      Lymphocytes, Absolute 0.32 10*3/mm3      Monocytes, Absolute 0.49 10*3/mm3      Eosinophils, Absolute 0.00 10*3/mm3      Basophils, Absolute 0.01 10*3/mm3      Immature Grans, Absolute 0.09 10*3/mm3      nRBC 0.0 /100 WBC     Protime-INR  [[id number]]  (Normal) Collected:  07/02/20 0410    Specimen:  Blood Updated:  07/02/20 0514     Protime 13.3 Seconds      INR 1.02    aPTT [[id number]]  (Normal) Collected:  07/02/20 0410    Specimen:  Blood Updated:  07/02/20 0514     PTT 23.3 seconds     Basic Metabolic Panel [[id number]]  (Abnormal) Collected:  07/02/20 0410    Specimen:  Blood Updated:  07/02/20 0511     Glucose 138 mg/dL      BUN 17 mg/dL      Creatinine 0.54 mg/dL      Sodium 133 mmol/L      Potassium 3.9 mmol/L      Chloride 100 mmol/L      CO2 25.3 mmol/L      Calcium 8.6 mg/dL      eGFR Non African Amer 118 mL/min/1.73      BUN/Creatinine Ratio 31.5     Anion Gap 7.7 mmol/L     Narrative:       GFR Normal >60  Chronic Kidney Disease <60  Kidney Failure <15      CBC & Differential [[id number]] Collected:  07/02/20 0410    Specimen:  Blood Updated:  07/02/20 0444    Narrative:       The following orders were created for panel order CBC & Differential.  Procedure                               Abnormality         Status                     ---------                               -----------         ------                     CBC Auto Differential[[id number]]        Abnormal            Final result                 Please view results for these tests on the individual orders.    CBC Auto Differential [[id number]]  (Abnormal) Collected:  07/02/20 0410    Specimen:  Blood Updated:  07/02/20 0444     WBC 15.32 10*3/mm3      RBC 4.23 10*6/mm3      Hemoglobin 13.3 g/dL      Hematocrit 37.4 %      MCV 88.4 fL      MCH 31.4 pg      MCHC 35.6 g/dL      RDW 12.2 %      RDW-SD 39.7 fl      MPV 9.4 fL      Platelets 245 10*3/mm3      Neutrophil % 86.5 %      Lymphocyte % 3.5 %      Monocyte % 9.5 %      Eosinophil % 0.0 %      Basophil % 0.1 %      Immature Grans % 0.4 %      Neutrophils, Absolute 13.25 10*3/mm3      Lymphocytes, Absolute 0.54 10*3/mm3      Monocytes, Absolute 1.46 10*3/mm3      Eosinophils, Absolute 0.00 10*3/mm3      Basophils, Absolute 0.01  10*3/mm3      Immature Grans, Absolute 0.06 10*3/mm3      nRBC 0.0 /100 WBC     Basic Metabolic Panel [[id number]]  (Abnormal) Collected:  07/01/20 1656    Specimen:  Blood Updated:  07/01/20 1737     Glucose 172 mg/dL      BUN 20 mg/dL      Creatinine 0.72 mg/dL      Sodium 137 mmol/L      Potassium 3.7 mmol/L      Chloride 99 mmol/L      CO2 23.9 mmol/L      Calcium 8.9 mg/dL      eGFR Non African Amer 84 mL/min/1.73      BUN/Creatinine Ratio 27.8     Anion Gap 14.1 mmol/L     Narrative:       GFR Normal >60  Chronic Kidney Disease <60  Kidney Failure <15      CBC & Differential [[id number]] Collected:  07/01/20 1656    Specimen:  Blood Updated:  07/01/20 1718    Narrative:       The following orders were created for panel order CBC & Differential.  Procedure                               Abnormality         Status                     ---------                               -----------         ------                     CBC Auto Differential[[id number]]        Abnormal            Final result                 Please view results for these tests on the individual orders.    CBC Auto Differential [[id number]]  (Abnormal) Collected:  07/01/20 1656    Specimen:  Blood Updated:  07/01/20 1718     WBC 24.69 10*3/mm3      RBC 4.59 10*6/mm3      Hemoglobin 14.3 g/dL      Hematocrit 41.0 %      MCV 89.3 fL      MCH 31.2 pg      MCHC 34.9 g/dL      RDW 12.3 %      RDW-SD 40.4 fl      MPV 9.6 fL      Platelets 331 10*3/mm3      Neutrophil % 87.7 %      Lymphocyte % 4.2 %      Monocyte % 7.3 %      Eosinophil % 0.0 %      Basophil % 0.2 %      Immature Grans % 0.6 %      Neutrophils, Absolute 21.68 10*3/mm3      Lymphocytes, Absolute 1.03 10*3/mm3      Monocytes, Absolute 1.80 10*3/mm3      Eosinophils, Absolute 0.00 10*3/mm3      Basophils, Absolute 0.04 10*3/mm3      Immature Grans, Absolute 0.14 10*3/mm3      nRBC 0.0 /100 WBC     POC Glucose Once [[id number]]  (Abnormal) Collected:  07/01/20 1619    Specimen:  Blood Updated:   07/01/20 1631     Glucose 159 mg/dL     Glucose, CSF - Cerebrospinal Fluid, Spine, Lumbar [[id number]]  (Abnormal) Collected:  07/01/20 1048    Specimen:  Cerebrospinal Fluid from Spine, Lumbar Updated:  07/01/20 1212     Glucose, CSF 72 mg/dL     Protein, CSF - Cerebrospinal Fluid, Spine, Lumbar [[id number]]  (Normal) Collected:  07/01/20 1048    Specimen:  Cerebrospinal Fluid from Spine, Lumbar Updated:  07/01/20 1212     Protein, Total (CSF) 39.1 mg/dL     Cell Count With Differential, CSF [[id number]] Collected:  07/01/20 1048    Specimen:  Cerebrospinal Fluid from Lumbar Puncture Updated:  07/01/20 1147    Narrative:       The following orders were created for panel order Cell Count With Differential, CSF.  Procedure                               Abnormality         Status                     ---------                               -----------         ------                     Cell Count, CSF - Cerebr...[[id number]]  Abnormal            Final result                 Please view results for these tests on the individual orders.    Cell Count, CSF - Cerebrospinal Fluid, Lumbar Puncture [[id number]]  (Abnormal) Collected:  07/01/20 1048    Specimen:  Cerebrospinal Fluid from Lumbar Puncture Updated:  07/01/20 1147     Color, CSF Colorless     Appearance, CSF Slightly Hazy     RBC, CSF 59 /mm3      Nucleated Cells, CSF 1 /mm3      Tube Number, CSF --     Comment: 1 cup        Method: UF 1000i Automated Method    Narrative:       Differential not indicated.  This test was developed, its performance characteristics determined and judged suitable for clinical purposes by [facility]. It has not been cleared or approved by the FDA. The laboratory is regulated under CLIA as qualified to perform high-complexity testing.    COVID PRE-OP / PRE-PROCEDURE SCREENING ORDER (NO ISOLATION) - Swab, Nasopharynx [[id number]] Collected:  06/30/20 1314    Specimen:  Swab from Nasopharynx Updated:  06/30/20 3255     Narrative:       The following orders were created for panel order COVID PRE-OP / PRE-PROCEDURE SCREENING ORDER (NO ISOLATION) - Swab, Nasopharynx.  Procedure                               Abnormality         Status                     ---------                               -----------         ------                     COVID-19,[facility] RAIN IN-HOUSE...[[id number]]  Normal              Final result                 Please view results for these tests on the individual orders.    COVID-19,[facility] RAIN IN-HOUSE, NP SWAB IN TRANSPORT MEDIA 8-12 HR TAT - Swab, Nasopharynx [[id number]]  (Normal) Collected:  06/30/20 1314    Specimen:  Swab from Nasopharynx Updated:  06/30/20 1515     COVID19 Not Detected    Narrative:       Fact sheet for providers: https://www.fda.gov/media/192226/download     Fact sheet for patients: https://www.fda.gov/media/231697/download    HIV-1 & HIV-2 Antibodies [[id number]] Collected:  06/29/20 0520    Specimen:  Blood Updated:  06/29/20 0629    Narrative:       The following orders were created for panel order HIV-1 & HIV-2 Antibodies.  Procedure                               Abnormality         Status                     ---------                               -----------         ------                     HIV-1 / O / 2 Ag / Antib...[[id number]]  Normal              Final result                 Please view results for these tests on the individual orders.    HIV-1 / O / 2 Ag / Antibody 4th Generation [[id number]]  (Normal) Collected:  06/29/20 0520    Specimen:  Blood Updated:  06/29/20 0629     HIV-1/ HIV-2 Non-Reactive     Comment: A non-reactive test result does not preclude the possibility of exposure to HIV or infection with HIV. An antibody response to recent exposure may take several months to reach detectable levels.       Narrative:       The HIV antibody/antigen combo assay is a qualitative assay for HIV that includes the p24 antigen as well as antibodies to HIV types 1 and 2. This test is intended  to be used as a screening assay in the diagnosis of HIV infection in patients over the age of 2.  Results may be falsely decreased if patient taking Biotin.      Basic Metabolic Panel [[id number]]  (Abnormal) Collected:  06/27/20 0534    Specimen:  Blood Updated:  06/27/20 0740     Glucose 135 mg/dL      BUN 21 mg/dL      Creatinine 0.65 mg/dL      Sodium 136 mmol/L      Potassium 3.9 mmol/L      Chloride 101 mmol/L      CO2 21.6 mmol/L      Calcium 9.5 mg/dL      eGFR Non African Amer 95 mL/min/1.73      BUN/Creatinine Ratio 32.3     Anion Gap 13.4 mmol/L     Narrative:       GFR Normal >60  Chronic Kidney Disease <60  Kidney Failure <15      CBC Auto Differential [[id number]]  (Abnormal) Collected:  06/27/20 0534    Specimen:  Blood Updated:  06/27/20 0551     WBC 8.77 10*3/mm3      RBC 4.83 10*6/mm3      Hemoglobin 14.5 g/dL      Hematocrit 42.7 %      MCV 88.4 fL      MCH 30.0 pg      MCHC 34.0 g/dL      RDW 11.9 %      RDW-SD 38.6 fl      MPV 9.6 fL      Platelets 222 10*3/mm3      Neutrophil % 91.8 %      Lymphocyte % 5.4 %      Monocyte % 2.5 %      Eosinophil % 0.0 %      Basophil % 0.1 %      Immature Grans % 0.2 %      Neutrophils, Absolute 8.05 10*3/mm3      Lymphocytes, Absolute 0.47 10*3/mm3      Monocytes, Absolute 0.22 10*3/mm3      Eosinophils, Absolute 0.00 10*3/mm3      Basophils, Absolute 0.01 10*3/mm3      Immature Grans, Absolute 0.02 10*3/mm3      nRBC 0.0 /100 WBC     CEA [[id number]] Collected:  06/26/20 1053    Specimen:  Blood Updated:  06/26/20 2126     CEA 1.53 ng/mL     Narrative:       CEA Reference Range:    Non Smokers:   Less than 3 ng/mL  Smokers:       Less than 5 ng/mL  Results may be falsely decreased if patient taking Biotin.            Imaging Results (All)     Procedure Component Value Units Date/Time    CT Head Without Contrast [[id number]] Collected:  07/03/20 0353     Updated:  07/03/20 0404    Narrative:       CT HEAD WITHOUT CONTRAST     HISTORY: Postop     COMPARISON:  07/02/2020     TECHNIQUE: Axial CT imaging was obtained through the brain. No IV  contrast was administered.     FINDINGS:  Patient is status post right temporal craniotomy. The previously  identified hemorrhage within the right cerebellar peduncle is unchanged,  measuring up to 2.3 x 1.2 cm. Additional hemorrhage is noted within the  right temporal lobe. This also does not appear significantly changed.  There is extensive pneumocephalus. It has decreased when compared to the  prior examination. There is midline shift of 3 mm. This is stable when  compared to prior exam. No new areas of hemorrhage are seen. There is a  small amount of subdural hemorrhage, including some layering over the  right tentorium. Tiny focus of hemorrhage is also noted within the chilango,  again, not significantly changed. Partial opacification of the right  mastoid air cells is again noted.       Impression:       Slight interval decrease in pneumocephalus. Otherwise, no significant  change.     Radiation dose reduction techniques were utilized, including automated  exposure control and exposure modulation based on body size.     This report was finalized on 7/3/2020 4:01 AM by Dr. Jennifer Walton M.D.       CT Head Without Contrast [[id number]] Collected:  07/01/20 1801     Updated:  07/02/20 0931    Narrative:       CT HEAD WITHOUT CONTRAST     HISTORY:  Facial droop, postop.     A noncontrasted CT examination of the brain was performed and compared  to the MRI examination of 06/26/2020 and CT angiogram of 06/28/2020.     FINDINGS: A right temporal craniotomy is noted. The posterior margin of  the craniotomy extends minimally through the mastoid air cells on the  right.     The MRI examination of 06/26/2020 demonstrated a mass involving the  right cerebral peduncle extending to the midbrain. There is now  hemorrhage present at this location with the area of hemorrhage  measuring approximately 21 x 13 mm in transverse planes  and  approximately 24 mm in the craniocaudal dimension. There is adjacent  edema. Beam hardening artifact limits evaluation of the posterior fossa.  There is an area of increased attenuation involving the chilango to the  right measuring 2 mm in size, present previously and suspicious for a  small area of hemorrhage or calcification.       Impression:       The patient is status post biopsy of a mass involving the  right cerebral peduncle with an area of hemorrhage appreciated measuring  21 x 13 x 24 mm in size. There is a mild degree of adjacent edema. The  above information was called to and discussed with Dr. Licona on  07/01/2020 at 1635 hours.           Radiation dose reduction techniques were utilized, including automated  exposure control and exposure modulation based on body size.     This report was finalized on 7/2/2020 9:28 AM by Dr. Michael Simeon M.D.       CT Head Without Contrast [[id number]] Collected:  07/02/20 0528     Updated:  07/02/20 0544    Narrative:       CT HEAD WITHOUT CONTRAST     HISTORY: Postop     COMPARISON: 07/01/2020     TECHNIQUE: Axial CT imaging was obtained through the brain. No IV  contrast was administered.     FINDINGS:  Area of hemorrhage within the right cerebral peduncle is again seen, and  appears stable in size at 2.1 x 1.3 cm. There is surrounding edema.  There is mass effect on the right lateral ventricle. There is midline  shift of approximately 3 mm, which has increased when compared to prior  exam. Extensive pneumocephalus is noted. There is a small amount of  subdural hemorrhage noted overlying the right cerebral convexity. This  does not appear significantly changed when compared to prior exam.  Hemorrhage is identified within the right temporal lobe. Exact location  is uncertain, but it may be present within the temporal horn of the  right lateral ventricle. It is new when compared to the prior study.  Tiny focus of hemorrhage within the right chilango appears unchanged  when  compared to the prior study. Amount of pneumocephalus is probably  decreased slightly when compared to prior exam. There is partial  opacification of right mastoid air cells, which has increased when  compared to the prior study. Surgical drain is seen within the right  parietotemporal scalp.       Impression:          1. Stable appearance to hemorrhage within the right cerebral peduncle  and right chilango, as well as to a trace amount of hemorrhage overlying the  right cerebral convexity.  2. New hemorrhage seen within the right temporal lobe. Potentially, this  may be intraventricular, although this is difficult to determine, given  how decompressed the patient's ventricles are.  3. Mild midline shift of approximately 3 mm.     FINDINGS were called to the patient's nurse at 5:40 AM.     Radiation dose reduction techniques were utilized, including automated  exposure control and exposure modulation based on body size.     This report was finalized on 7/2/2020 5:41 AM by Dr. Jennifer Walton M.D.       CT Angiogram Head [[id number]] Collected:  06/28/20 1221     Updated:  06/28/20 1347    Narrative:       CONTRAST-ENHANCED CT ANGIOGRAM OF THE HEAD 06/28/2020     CLINICAL HISTORY:  Preop, history of brain tumor.      TECHNIQUE: Spiral CT images were obtained from the C2 cervical level  through the vertex of the skull, both pre-intravenous and  postintravenous contrast and images were reformatted and submitted in 1  mm thick axial, sagittal and coronal CT sections with brain algorithm  and additional 3 mm thick axial CT section with brain algorithm and 2 mm  thick sagittal and coronal reconstructions were performed and submitted  in brain algorithm, and additional spiral CT angiographic images were  obtained during the arterial phase of contrast from the C2 cervical  level to the vertex of the skull and these images were reformatted and  submitted in 1 mm thick axial, sagittal and coronal CT sections with  soft  tissue algorithm and 3D reconstructions were performed to complete  the CT angiogram of the head.     This is correlated to an outside MRI of the brain with and without  contrast 2 days ago from [facility] on 06/26/2020 at  11:20 AM.     FINDINGS:     HEAD CT: There is a peripherally enhancing lesion extending from the  right cerebral peduncle into the posterior limb of the right internal  capsule.  It maximally measures 16 x 15 x 26 mm in mediolateral,  anteroposterior and craniocaudal dimension.  There is mild surrounding  edema.  It is unchanged when compared to MRI 2 days ago on 06/26/2020.   Just inferior to this is an additional very subtle ring-enhancing  abnormality in the central chilango just to the right of midline that is  better demonstrated on MRI 2 days ago.  It maximally measures 6-7 mm in  anteroposterior, mediolateral dimension. The remainder of the brain  parenchyma is normal in attenuation. The ventricles are normal in size.   I see no midline shift and no extra-axial fluid collections are  identified and no additional abnormal areas of enhancement are seen in  the head. The paranasal sinuses and the mastoid air cells and middle ear  cavities are clear.  The calvarium and skull base are normal in  appearance.     CT ANGIOGRAM OF THE HEAD: The visualized portions of the distal  vertebral arteries are normal in appearance from the C2 cervical level  to the vertebrobasilar junction. The posterior inferior cerebellar  arteries are normal in appearance.  The basilar artery and the basilar  tip is normal in appearance. The posterior cerebral and superior  cerebellar arteries are normal in appearance bilaterally. The upper  cervical, petrous, cavernous and supracavernous segments of the internal  carotid arteries are normal in appearance.  The anterior middle cerebral  arteries are normal in appearance.  The anterior communicating artery  origin and middle cerebral artery trifurcations  are normal in  appearance.       Impression:       1. There has been no significant interval change when compared to prior  outside MRI of the brain from [facility] on  2020 at 11:20 AM.  2. There is a peripherally enhancing lesion extending from the right  cerebral peduncle into the posterior limb of the right internal capsule  that maximally measures 1.6 x 1.5 x 2.6 cm in mediolateral,  anteroposterior and craniocaudal dimension.  It has mild surrounding  edema. Just inferior to this lesion within the central chilango just to the  right of midline is a 7 x 7 mm subtle peripherally enhancing lesion.  These lesions are better characterized on recent outside MRI of the  brain. Differential considerations include metastases, and potentially  given their close proximity to each other multifocal glioma, abscesses  versus potentially tumefactive demyelination. 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  I think that metastasis, multifocal GBM and tumefactive MS  are less likely considerations. The results of this study were  communicated Dr. Licona from Neurosurgery 2020 at 12:05 PM.     Radiation dose reduction techniques were utilized, including automated  exposure control and exposure modulation based on body size.     This report was finalized on 2020 1:44 PM by Dr. Vini Lake M.D.       CT Chest With Contrast [[id number]] Collected:  20 1332     Updated:  20 1415    Narrative:       CT SCANS OF THE CHEST AND ABDOMEN AND PELVIS WITH INTRAVENOUS CONTRAST     HISTORY: Probable metastatic brain tumors. Evaluate for primary cancer.     The CT scans of the chest and abdomen and pelvis were performed with  oral and intravenous contrast and demonstrate the followin. The lungs are  well-expanded and clear except for a few scattered  calcified granulomas. There are no suspicious lung nodules.  2. There is no mediastinal or hilar or axillary adenopathy. There is no  pericardial effusion.  3. The liver, spleen, pancreas, and both adrenal glands are  unremarkable. There is a small cortical cyst in each kidney. There are  no suspicious renal masses. The gallbladder contains a probable small  gallstone as best seen on image 47.  4. There is no aortic aneurysm or retroperitoneal lymphadenopathy. The  large and small bowel loops are normal in caliber and no abnormality is  seen by CT scan. There is no abnormality in the pelvis.  5. At bone windows, no suspicious bone lesions are seen.     Radiation dose reduction techniques were utilized, including automated  exposure control and exposure modulation based on body size.     This report was finalized on 2020 2:12 PM by Dr. Nolberto Herrera M.D.       CT Abdomen Pelvis With Contrast [[id number]] Collected:  20 1332     Updated:  20 1415    Narrative:       CT SCANS OF THE CHEST AND ABDOMEN AND PELVIS WITH INTRAVENOUS CONTRAST     HISTORY: Probable metastatic brain tumors. Evaluate for primary cancer.     The CT scans of the chest and abdomen and pelvis were performed with  oral and intravenous contrast and demonstrate the followin. The lungs are well-expanded and clear except for a few scattered  calcified granulomas. There are no suspicious lung nodules.  2. There is no mediastinal or hilar or axillary adenopathy. There is no  pericardial effusion.  3. The liver, spleen, pancreas, and both adrenal glands are  unremarkable. There is a small cortical cyst in each kidney. There are  no suspicious renal masses. The gallbladder contains a probable small  gallstone as best seen on image 47.  4. There is no aortic aneurysm or retroperitoneal lymphadenopathy. The  large and small bowel loops are normal in caliber and no abnormality is  seen  "by CT scan. There is no abnormality in the pelvis.  5. At bone windows, no suspicious bone lesions are seen.     Radiation dose reduction techniques were utilized, including automated  exposure control and exposure modulation based on body size.     This report was finalized on 6/27/2020 2:12 PM by Dr. Nolberto Herrera M.D.             Condition on Discharge:      Guarded    Vital Signs:    /89 (BP Location: Right arm, Patient Position: Lying)   Pulse 89   Temp 97.8 °F (36.6 °C) (Oral)   Resp 18   Ht 165 cm (64.96\")   Wt 49 kg (108 lb 0.4 oz)   SpO2 96%   BMI 18.00 kg/m²     Physical Exam:    General: Alert and awake.  Does follow some directions.  Heart: Regular rate and rhythm without murmur rub or thrill  Lungs: Clear to auscultation bilaterally without use of accessory muscles respiration  Abdomen: Soft/nontender/nondistended.  Neuro: Left-sided hemiplegia noted.    Discharge Disposition:    Rehab Facility or Unit ([facility])    Discharge Medication:       Discharge Medications      ASK your doctor about these medications      Instructions Start Date   Caltrate 600 1500 (600 Ca) MG tablet  Generic drug:  Calcium Carbonate   600 mg, Oral, Daily      cephalexin 500 MG capsule  Commonly known as:  KEFLEX   500 mg, Oral, 2 Times Daily      ESTROVEN PM PO   Oral      fish oil 1000 MG capsule capsule   Oral, Daily With Breakfast      meclizine 12.5 MG tablet  Commonly known as:  ANTIVERT   12.5 mg, Oral, 3 Times Daily PRN             Discharge Diet:     N.p.o. with tube feeding    Activity at Discharge:     As tolerated    Follow-up Appointments:    Future Appointments   Date Time Provider Department Center   7/22/2020  2:15 PM Aby Dover APRN [facility]IN   6/11/2021  9:15 AM MAMMOGRAM L[facility] None   6/11/2021  9:30 AM Rashad Fierro MD [facility] None         Test Results Pending at Discharge:           Shorty Simon DO  07/09/20  15:32  "

## 2020-07-09 NOTE — PLAN OF CARE
Problem: Patient Care Overview  Goal: Plan of Care Review  Outcome: Ongoing (interventions implemented as appropriate)  Flowsheets (Taken 7/9/2020 3911)  Progress: no change  Plan of Care Reviewed With: spouse; patient  Outcome Summary: vss, restlessness noted at times, given Elavil and Tylenol but refused other prn meds, tolerating tube feeds, no diarrhea noted overnight

## 2020-07-20 NOTE — PROGRESS NOTES
Staff message rec from Dr Trimble yesterday-pt is in the hospital and will be starting radiation with Temodar. She calculates to 113.25 mg. Staff message sent to Dr Trimble to clarify dose.    Received: Yesterday   Message Contents   John Trimble MD sent to Ramandeep Wick RegSched Rep; Laura Palafox RN   Cc: Sabina Acosta.             SHE NEEDS 2 U TONE WITH ME IN 3 WEEKS AND WEEKLY CBC CMP AFTER D/CE FROM HOSPITAL, SHE WILL START TEMODAR IN THE HOSPITAL NEXT WEEK AND RADIATION

## 2020-07-20 NOTE — PROGRESS NOTES
Dose clarified with Dr Trimble-he would like 110 mg for pt. Temozolomide PA submitted to [place] through covermymeds. Waiting for a decision.    John Trimble MD sent to Sabina Acosta             110    Previous Messages      ----- Message -----   From: Sabina Acosta   Sent: 7/20/2020   9:31 AM EDT   To: John Trimble MD, *     She calculates to 113.25 mg (she is 1.51 m2 as of 7/16/2020)     Would you like to round this up to 115 or down to 110

## 2020-07-21 NOTE — PROGRESS NOTES
I contacted [employer] [phone] to check status of the Temozolomide 5 mg. I spoke to Kathleen who ran a test claim and the approval for the 100 mg will cover any strength of Temozolomide.    I have submitted both strengths to [employer] for STAT processing.

## 2020-07-22 NOTE — PROGRESS NOTES
Call rec from [facility] pharmacist, Leanne-she called to get more information on the length of treatment for the Temodar. I informed her that she will take the Temodar with radiation for 6 weeks. She asked if the rx can be changed to 42 day supply with 0 refills. I have authorized this. They will finish the process, contact pt, and send out the medication over night.

## 2020-07-24 NOTE — PROGRESS NOTES
Temodar was delivered to pts home yesterday, 7/23/2020. Per [employer] this was requested by the pt. Pts  is to take it to the hospital.    Conversation with Dr Smiley yesterday-he reminded pts  as well. Per Dr Smiley-Temodar/Radiation is to start on Monday.

## 2020-07-28 NOTE — PROGRESS NOTES
Physician Weekly Management Note    Diagnosis:     1. Glioblastoma multiforme (CMS/HCC)       Reason for Visit: Radiation (4/30)    Concurrent Chemo:       Notes on Treatment course, Films, Medical progress and Plan:  Looks great! Sitting in wheel chair. Language MUCH improved. Asks appropriate questions, discusses progress. Denies headache, pain. Hoping to go home soon. Will continue to follow, cont on.    Performance Status:  (2) Ambulatory and capable of self care, unable to carry out work activity, up and about > 50% or waking hours    ROS -  Other than those listed above, as follows:  Constitutional - Normal - no complaints of fatigue, denies lack of appetite, fever, night sweats or change in weight.  Neck - Normal - denies neck masses, muscle weakness, neck pain, decreased range of motion or swelling.  Cardiovascular - Normal - denies arrhythmias, chest pain, dyspnea, edema, orthopnea or palpitations.  Respiratory - Normal - denies cough, dyspnea, hemoptysis, hiccoughs, pleuritic chest pain or wheezing.  Gastrointestinal - Normal - no complaints of constipation, abdominal pain, diarrhea, heartburn/dyspepsia, hematemesis, hemorrhoids, melena or GI bleeding, nausea, pain or cramping or vomiting.  Neuro - Normal - no new complaints of vision change, headache, nausea, cranial nerve deficit, gait abnormality, syncope, seizure.    PHYSICAL EXAM - Other than changes listed above, as follows:  There were no vitals filed for this visit.    Constitutional - Normal - no evidence of impaired alertness, inadequate appearance, premature or advanced chronologic age, uncooperativeness, altered mood, affect or disorientation.  Neck - Normal - no evidence of tender or enlarged lymph nodes, neck abnormalities, restricted range of motion or enlarged thyroid.  Chest - Normal - no evidence of chest abnormalities, tender or enlarged lymph nodes.  Respiratory - Normal - no evidence of abnormal breat sounds, chest abnormalities on  "palpation and chest abnormalities on percussion and normal breath sounds.  Hematologic/Lymphatic - Normal - no evidence of tender or enlarged axillary lymph nodes nor tender or enlarged neck nodes.  Neuro - Normal - no evidence of cranial nerve deficit, gait abnormality.    Problem added:  No problems updated.  Medications added: No orders of the defined types were placed in this encounter.    Ancillary referrals made: No orders of the defined types were placed in this encounter.      Technical aspects reviewed:  Weekly OBI approved if applicable? Yes   Weekly port films approved?  Yes  Change requests noted if applicable?  Yes   Patient setup and plan reviewed?  Yes     Chart Reviewed:  Continue current treatment plan?  Yes  Treatment plan change requested? No    I have reviewed and marked as \"reviewed\" the current medications, allergies and problem list in the patients EMR.    I have reviewed the patient's medical, surgical  history in detail, reviewed any pertinent lab work  and updated the computerized patient record if needed.    Patient's Care Team:  Patient Care Team:  Paz Mera MD as PCP - General (Family Medicine)  Carlota Peacock MD as Consulting Physician (Radiation Oncology)      "

## 2020-07-31 NOTE — TELEPHONE ENCOUNTER
MRI order placed. MRI will be scheduled 9/8/2020 and her follow up appointment will be 9/15/2020.    Spoke with the patient's  and explained what we needed to schedule. I asked him what days are better for her MRI and FUP appointment? He states Monday, Tuesday or Wednesday mid-morning will work.    ----- Message from TIMO Anguiano sent at 7/31/2020  8:45 AM EDT -----  Regarding: RE: Follow up  Thank you! Happy Friday  ----- Message -----  From: Sherri Little MA  Sent: 7/31/2020   8:42 AM EDT  To: TIMO Anguiano  Subject: RE: Follow up                                    I will take care of Ms. Guajardo  Thanks    ----- Message -----  From: Joy Bautista APRN  Sent: 7/30/2020   7:38 PM EDT  To: Sherri Little MA  Subject: Follow up                                        Dr. Licona would like to see this patient one month after completion of radiation which should be at the beginning of September. Please arrange MRI brain with and without contrast and follow up with him in the first week of October. Thank you. Please confirm that you receive this message as I’m doing it from my phone and I want to make sure it sends correctly. Thanks

## 2020-07-31 NOTE — TELEPHONE ENCOUNTER
MRI was moved to IN [place] Same Day and Time         Documentation       Aida Toth RegSched Rep routed conversation to [facility] Clinical Pool 28 minutes ago (12:09 PM)      Aida Toth RegSched Rep 28 minutes ago (12:09 PM)         [facility]  PATIENT LIVES IN [place] AND WOULD LIKE THE ORDER MOVE TO [facility].  IS THIS POSSIBLE. PLEASE CALL PATIENT AND ADVISE.     [phone]

## 2020-07-31 NOTE — TELEPHONE ENCOUNTER
[facility]  PATIENT LIVES IN [place] AND WOULD LIKE THE ORDER MOVE TO [facility].  IS THIS POSSIBLE. PLEASE CALL PATIENT AND ADVISE.    [phone]

## 2020-08-01 NOTE — PLAN OF CARE
Problem: Patient Care Overview  Goal: Plan of Care Review  Outcome: Ongoing (interventions implemented as appropriate)  Flowsheets (Taken 8/1/2020 1412)  Outcome Summary: AAO x 4. Anxious and impatient at times. Was tearful about prognosis with family member. No pain med. Tx x 1 mod assist.  Progress, Functional Goals: demonstrating adequate progress  Plan of Care Reviewed With: patient  IRF Plan of Care Review: progress ongoing, continue

## 2020-08-01 NOTE — PROGRESS NOTES
"   LOS: 23 days   Patient Care Team:  Paz Mera MD as PCP - General (Family Medicine)  Carlota Peacock MD as Consulting Physician (Radiation Oncology)    Chief Complaint:   Glioblastoma   S/p July 1, 2020 - Right temporal craniotomy with excisional biopsy of mass in the right cerebral peduncle and placement of a lumbar drain  Steroid taper - medrol dose pack  Left hemiplegia  Dysarthria  Dysphonia  Right ptosis  Impaired cognition/impaired mobility/ impaired self care  Dysphagia - Cortrak tube feeds  Bladder - estrada catheter  Seizure prophylaxis - Keppra  GI prophylaxis - Pepcid/ lansoprazole  DVT prophylaxis - SCDs    Subjective     History of Present Illness    Subjective  No headache.  Tolerating radiation therapy  Left-sided weakness unchanged.  No significant pain complaints         History taken from: patient    Objective     Vital Signs  Temp:  [98 °F (36.7 °C)-98.8 °F (37.1 °C)] 98.8 °F (37.1 °C)  Heart Rate:  [68-94] 94  Resp:  [14-16] 14  BP: (119-142)/(74-94) 142/94    Objective:  Vital signs: (most recent): Blood pressure 142/94, pulse 94, temperature 98.8 °F (37.1 °C), temperature source Oral, resp. rate 14, height 165.1 cm (65\"), weight 44.8 kg (98 lb 12.3 oz), SpO2 99 %, not currently breastfeeding.            Physical Exam  MENTAL STATUS -  AWAKE / ALERT  HEENT- RIGHT CRANIOTOMY INCISION CDI  RIGHT PTOSIS,  SCLERAE ANICTERIC, CONJUNCTIVAE PINK  LEFT FACIAL DROOP,     OP-thrush/coating on the tongue-not inspected  DYSPHONIA-significantly better-easy to understand.  No delay in her speech  CORTRAK FEEDING TUBE  LUNGS - CTA, NO WHEEZES, RALES OR RHONCHI  HEART- RRR, NO RUB, MURMUR, OR GALLOP  ABD - NORMOACTIVE BOWEL SOUNDS, SOFT, NT.     -      EXT - NO EDEMA OR CYANOSIS  NEURO - AWAKE, SLOW PROCESSING,  DYSPHONIA,   FOLLOWS INSTRUCTIONS    EOMI. RIGHT PTOSIS. LEFT FACIAL DROOP. DYSARTHRIA. DYSPHONIA.   MOTOR EXAM - RUE/RLE 5/5.  Left upper extremity trace elbow flexion, no active finger " flexion.  She has proximal movement at the left lower extremity for hip adduction and also knee extension 3-/5, no ankle dorsiflexion    Results Review:     I reviewed the patient's new clinical results.  No results found for: POCGLU  Results from last 7 days   Lab Units 07/31/20  1023 07/27/20  1030   WBC 10*3/mm3 13.48* 13.03*   HEMOGLOBIN g/dL 14.7 14.9   HEMATOCRIT % 43.5 42.9   PLATELETS 10*3/mm3 255 307     Results from last 7 days   Lab Units 07/31/20  1023 07/27/20  1030   SODIUM mmol/L 138 135*   POTASSIUM mmol/L 4.3 4.0   CHLORIDE mmol/L 97* 97*   CO2 mmol/L 31.0* 26.6   BUN mg/dL 18 22*   CREATININE mg/dL 0.51* 0.54*   CALCIUM mg/dL 9.1 8.8   GLUCOSE mg/dL 90 120*       Medication Review: done  Scheduled Meds:    clotrimazole 10 mg Oral TID   dexamethasone 4 mg Oral Q12H   famotidine 20 mg Nasogastric BID   levETIRAcetam 500 mg Oral Q12H   melatonin 1 mg Oral Nightly   temozolomide 10 mg Oral Nightly   temozolomide 100 mg Oral Nightly     Continuous Infusions:   PRN Meds:.•  acetaminophen **OR** acetaminophen **OR** [DISCONTINUED] acetaminophen  •  amitriptyline  •  diclofenac  •  hydrOXYzine pamoate  •  [DISCONTINUED] HYDROmorphone **AND** naloxone  •  nystatin  •  ondansetron **OR** ondansetron  •  polyethylene glycol  •  potassium chloride **OR** potassium chloride **OR** potassium chloride  •  senna-docusate sodium  •  sodium chloride      Assessment/Plan       Brain tumor (CMS/HCC)    Hemiplegia (CMS/HCC)      Assessment & Plan  Glioblastoma -  -MRI of the brain on June 26, 2020 showed two enhancing masses - 2.6 cm from right lateral thalamic margin into right side of midbrain and chilango and 7 mm in central chilango also on the right. Surrounding vasogenic edema was also noted.   -July 20-oncology plans for radiation therapy/Temodar  -July 23-to start radiation therapy today and also start Temodar  July 24-tolerating initial radiation therapy        S/p right craniotomy for excisional biopsy July 1,  2020     Steroid taper - medrol dose pack  July 17-Decadron 4 mg twice a day added by medical oncology today  July 20-significant improvement in dysphonia/voicing/also with proximal in the left lower extremity on Decadron 4 mg twice a day.  July 29-continues to show significant improvement on steroids.    Leukocytosis - on steroids   -monitor        Left hemiplegia-improved     Dysarthria-improved     Dysphonia-improved     Right ptosis-unchanged     Impaired cognition/impaired mobility/ impaired self care     Dysphagia -improved-started on p.o. intake and tube feeds discontinued  July 24-adjust diet-regular with thins. Needs set-up assistance (cutting up food, opening packages).        Bladder - estrada catheter- Was removed on acute care wing and replaced  July 10-not clear why the Estrada catheter was replaced at the acute care unit.  Will look at possibly trying to discontinue again on Monday a.m.  July 11-Estrada catheter discontinued   July 12- Order U/A C&S. Cut PVRs to BID  July 13-urine culture results pending  July 15-post void residuals low.  Urine culture no growth    Skin-July 13-discomfort from rash at perineal area/buttocks-has been using barrier cream.  Will try Mycostatin antifungal creamSeizure prophylaxis - Keppra    Thrush-Mycelex troches     GI prophylaxis - Pepcid      DVT prophylaxis - SCDs    Anxiety - Add PRN Hydroxizine    Constipation - Add scheduled Senna and PRN Miralax. Monitor    Rehab-  Patient denies any headache.  Denies any vision complaints.  Continues with right ptosis.  Still with dysphonia.  She complains of feeling hungry all the time.  Dietitian is 1 to meet with her regarding the regimen on the tube feeds.  Noted to have thrush and nystatin added.  With bathing moderate assist of 2 for standing.  Upper body dressing max assist.  Lower body dressing dependent.  Grooming moderate assist.  Toileting max dependent x2.  Bed mobility max assist.  Transfers moderate assist of 2 squat  pivot to the wheelchair.  Sit to stand moderate max assist of 2.  Speech therapy feels she is ready for swallow study.  She is voiding with low postvoid residuals.  Oncology to have family meeting tomorrow regarding goals of care/plans.     Now admit for comprehensive acute inpatient rehabilitation .  This would be an interdisciplinary program with physical therapy 1 hour,  occupational therapy 1 hour, and speech therapy 1 hour, 5 days a week.  Rehabilitation nursing for carryover, monitoring of  Nutritional status and neurologic   status, bowel and bladder, and skin  Ongoing physician follow-up.  Weekly team conferences.  Goals are indeterminate   Rehabilitation prognosis indeterminate.  Medical prognosis defer to oncology.  Estimated length of stay is indeterminate.   The patient's functional status and clinical status is unchanged from preadmission assessment and the patient continues appropriate for acute inpatient rehabilitation.  Goal is for home with  Home health   therapies.  Barrier to discharge:  Left hemiplegia/dsphagia  - work on  Trunk control/ mobility / ADLs/ swallow   to overcome.      TEAM CONF July 16 - BETTER TRUNK CONTROL YESTERDAY.BED MAX ASSIST TO SIT. MOD ASSIST TO SUPINE. TRANSFERS MAX ASSIST. BATH MOD 2 FOR STANDING. UBD MAX ASSIST. LBD DEP. GROOMING MOD ASSIST. TOILETING MAX-DEP X 2. COGNITION - STM WITH MIN CUES. MILD DIFFICULTY WITH HIGH LEVEL WORD FINDING TASK. VFSS TODAY. SCALP INCISION HEALING. BLADDER/BOWEL - CONTINENT/INCONTINENT. MULTIPLE SMALL BMS YESTERDAY. SKIN - THRUSH - NYSTATIN. BUTTOCK RASH ANTI-FUNGAL CREAM.  ELOS - NUTRITION - COMPLAINS OF LACK OF SATIETY. TO TRY BOLUS OVER PUMP WITH CORTRAK TO SEE IF HELPS WITH SATIETY AND ABLE TO TOLERATE.  ELOS - THREE WEEKS    TEAM CONF - July 23 - TRANSFERS MIN-MOD ASSIST. GAIT 20 FEET MOD 2 WITH ACE WRAP DORSIFLEX ASSIST WITH MANUAL ADVANCEMENT OF LLE. SITTING BALANCE BETTER. MORE ATTENTIVE. VERY IMPULSIVE. LEFT INATTENTION. BATH MOD.  UBD MAX. LBD DEP. GROOMING MOD ASSIST. TOILETING MAX ASSIST. COGNITION - BETTER RECALL. MIN-MOD CUES FOR THOUGHT ORGANIZATION AND ATTENTION TO LEFT. SWALLOW - MECH SOFT, THIN LIQUIDS, NO MIXED CONSISTENCY.EATING WELL. WILL D/C CORTRAK TUBE. BNE - SEE REPORT. ANXIETY - RELAXATION TECHNIQUES  ELOS - TWO WEEKS PER TEAM , PATIENT WOULD LIKE HOME BY BIRTHDAY July 29 BUT PATIENT LACKS INSIGHT INTO DEGREE OF DEFICITS.    TEAM CONF - JULY 30 - BED CTG-MIN. TRANSFERS MIN. CAR TRANSFERS MIN-MOD. GAIT 30 FEET HEMIWALKER, LEFT AFO, MOD 2. TOILET TRANSFERS MIN-MOD. SHOWER MOD. BATH MOD. UBD MOD. LBD MAX. GROOMING MIN. MIN CUES FOR RECALL AT PARAGRAPH LEVEL. SWALLOW - MECH SOFT, NO MIXED, THIN LIQIDS. BOWEL AND BLADDER - CONTINENT AND INCONTINENT. CONTINUES ON DECADRON 4 MG BID.   ELOS -  ONE WEEK       King Morris MD  08/01/20  18:40    Time:     During rounds, used appropriate personal protective equipment including mask and gloves.  Additional gown if indicated.  Mask used was standard procedure mask. Appropriate PPE was worn during the entire visit.  Hand hygiene was completed before and after.

## 2020-08-01 NOTE — PROGRESS NOTES
Inpatient Rehabilitation Plan of Care Note    Plan of Care  Care Plan Reviewed - No updates at this time.    Safety    Performed Intervention(s)  Hourly rounding.  Environmental set-up to reduce risk      Psychosocial    Performed Intervention(s)  Allow extra time for pt to verbalize needs, concerns,  feelings, etc.      Body Systems    Performed Intervention(s)  Skin assessment q shift and prn.    Signed by: Finn Yen RN

## 2020-08-01 NOTE — THERAPY TREATMENT NOTE
Inpatient Rehabilitation - Physical Therapy Treatment Note  [facility]     Patient Name: Denise Guajardo  : 1965  MRN: [id number]    Today's Date: 2020                 Admit Date: 2020      Visit Dx:      ICD-10-CM ICD-9-CM   1. Glioblastoma multiforme (CMS/HCC) C71.9 191.9       Patient Active Problem List   Diagnosis   • Glioblastoma multiforme (CMS/HCC)   • Intracranial hemorrhage (CMS/HCC)   • Dysphagia   • Acute left-sided weakness   • Brain tumor (CMS/HCC)   • Hemiplegia (CMS/HCC)       Therapy Treatment    IRF Treatment Summary     Row Name 20 1045             Evaluation/Treatment Time and Intent    Subjective Information  no complaints  -LB      Existing Precautions/Restrictions  fall  -LB      Document Type  therapy note (daily note)  -LB      Mode of Treatment  physical therapy  -LB      Patient/Family Observations  seated in w/c  -LB      Recorded by [LB] Talita Hodges PTA      Row Name 20 1045             Bed Mobility Assessment/Treatment    Comment (Bed Mobility)  NT  -LB      Recorded by [LB] Talita Hodges PTA      Row Name 20 1045             Sit-Stand Transfer    Sit-Stand [place] (Transfers)  minimum assist (75% patient effort);verbal cues;nonverbal cues (demo/gesture)  -LB      Assistive Device (Sit-Stand Transfers)  wheelchair;walker, jakub  -LB      Recorded by [LB] Talita Hodges PTA      Row Name 20 1045             Stand-Sit Transfer    Stand-Sit [place] (Transfers)  minimum assist (75% patient effort);verbal cues;nonverbal cues (demo/gesture)  -LB      Assistive Device (Stand-Sit Transfers)  wheelchair;walker, jakub  -LB      Recorded by [LB] Talita Hodges PTA      Row Name 20 1045             Gait/Stairs Assessment/Training    [place] Level (Gait)  moderate assist (50% patient effort);verbal cues;nonverbal cues (demo/gesture);1 person to manage equipment  -LB      Assistive Device (Gait)  walker, jakub L AFO and  foot slider  -LB      Distance in Feet (Gait)  40' x 2  -LB      Pattern (Gait)  step-to;step-through  -LB      Deviations/Abnormal Patterns (Gait)  base of support, wide;maya decreased  -LB      Bilateral Gait Deviations  forward flexed posture  -LB      Left Sided Gait Deviations  knee buckling, left side;weight shift ability decreased;foot drop/toe drag;heel strike decreased;leans right ModA for L foot plaement  -LB      Right Sided Gait Deviations  leans right;weight shift ability decreased  -LB      Recorded by [LB] Talita Hodges PTA      Row Name 08/01/20 1045             Pain Scale: Numbers Pre/Post-Treatment    Pain Scale: Numbers, Pretreatment  0/10 - no pain  -LB      Recorded by [KO] Talita Hodges PTA      Row Name 08/01/20 1045             Standing Balance Activity    Comment (Standing, Balance Training)  standing at jakub bars; stepping up and back off of 4 Inch step w RLE-- working on L hip and knee control  -LB      Recorded by [LB] Talita Hodges PTA        User Key  (r) = Recorded By, (t) = Taken By, (c) = Cosigned By    Initials Name Effective Dates    LB Talita Hodges PTA 03/07/18 -         Wound 07/01/20 1156 Right head Incision (Active)   Dressing Appearance open to air 8/1/2020  8:20 AM   Closure Liquid skin adhesive 8/1/2020  8:20 AM   Base clean;dry 8/1/2020  8:20 AM   Periwound intact 8/1/2020  8:20 AM   Periwound Temperature warm 8/1/2020  8:20 AM   Periwound Skin Turgor soft 8/1/2020  8:20 AM   Drainage Amount none 8/1/2020  8:20 AM     Physical Therapy Education                 Title: PT OT SLP Therapies (In Progress)     Topic: Physical Therapy (In Progress)     Point: Mobility training (In Progress)     Description:   Instruct learner(s) on safety and technique for assisting patient out of bed, chair or wheelchair.  Instruct in the proper use of assistive devices, such as walker, crutches, cane or brace.              Patient Friendly Description:   It's  important to get you on your feet again, but we need to do so in a way that is safe for you. Falling has serious consequences, and your personal safety is the most important thing of all.        When it's time to get out of bed, one of us or a family member will sit next to you on the bed to give you support.     If your doctor or nurse tells you to use a walker, crutches, a cane, or a brace, be sure you use it every time you get out of bed, even if you think you don't need it.    Learning Progress Summary           Patient Acceptance, E, NR by LB at 8/1/2020 1220    Acceptance, E, VU by SL at 7/31/2020 1143    Comment:  Family conf- discussed improvements in cognition, voicing, swallow, diet, swallow strategies;  Rec: cont tx at d/c    Acceptance, E, NR by LB at 7/30/2020 1410    Acceptance, E, NR by LB at 7/29/2020 1058    Eager, E,D, NR by KP at 7/28/2020 0918    Comment:  Gait sequencing    Acceptance, E, NR by LB at 7/27/2020 0942    Acceptance, E,D, VU,DU by LB1 at 7/25/2020 1159    Comment:  wt shifting with ambulation    Acceptance, E, NR by LB at 7/23/2020 1514    Acceptance, E, NR by LB at 7/22/2020 1517    Acceptance, E, NR by LB at 7/21/2020 1511    Acceptance, E, NR by LB at 7/20/2020 1216    Acceptance, E,TB, VU,NR by GEGE at 7/18/2020 1154    Acceptance, E, NR by LB at 7/17/2020 1512    Acceptance, E, NR by LB at 7/16/2020 1208    Acceptance, E, NR by LB at 7/15/2020 1459    Acceptance, E, NR by LB at 7/14/2020 1502    Acceptance, E, NR by LB at 7/13/2020 1458    Acceptance, E,TB,D, NR by EE at 7/10/2020 1031   Family Acceptance, E,D, VU,DU by LB at 7/31/2020 1441    Comment:  sonShay, instructed in bed mob tsfs and car tsfs. Observed bed mob and car. Practiced tsfs.    Acceptance, E, VU by SL at 7/31/2020 1143    Comment:  Family conf- discussed improvements in cognition, voicing, swallow, diet, swallow strategies;  Rec: cont tx at d/c                   Point: Home exercise program (Done)      Description:   Instruct learner(s) on appropriate technique for monitoring, assisting and/or progressing patient with therapeutic exercises and activities.              Learning Progress Summary           Patient Acceptance, E, VU by SL at 7/31/2020 1143    Comment:  Family conf- discussed improvements in cognition, voicing, swallow, diet, swallow strategies;  Rec: cont tx at d/c    Acceptance, E,D, VU,DU by LB1 at 7/25/2020 1159    Comment:  wt shifting with ambulation    Acceptance, E,H,D, VU,DU by LB at 7/24/2020 0908   Family Acceptance, E, VU by SL at 7/31/2020 1143    Comment:  Family conf- discussed improvements in cognition, voicing, swallow, diet, swallow strategies;  Rec: cont tx at d/c   Significant Other Acceptance, E,H,D, VU,DU by LB at 7/24/2020 0908                   Point: Body mechanics (Done)     Description:   Instruct learner(s) on proper positioning and spine alignment for patient and/or caregiver during mobility tasks and/or exercises.              Learning Progress Summary           Patient Acceptance, E, VU by SL at 7/31/2020 1143    Comment:  Family conf- discussed improvements in cognition, voicing, swallow, diet, swallow strategies;  Rec: cont tx at d/c    Acceptance, E, NR by LB at 7/30/2020 1410    Acceptance, E,D, VU,DU by LB1 at 7/25/2020 1159    Comment:  wt shifting with ambulation    Acceptance, E, NR by LB at 7/21/2020 1511    Acceptance, E,TB,D, NR by EE at 7/10/2020 1031   Family Acceptance, E,D, VU by LB at 7/31/2020 1443    Comment:  SonShay was instructed.    Acceptance, E, VU by SL at 7/31/2020 1143    Comment:  Family conf- discussed improvements in cognition, voicing, swallow, diet, swallow strategies;  Rec: cont tx at d/c   Significant Other Eager, E, VU by LB at 7/11/2020 1415    Comment:  Addressing pnt from left side. (affected side)                   Point: Precautions (Done)     Description:   Instruct learner(s) on prescribed precautions during mobility and gait  tasks              Learning Progress Summary           Patient Acceptance, E, VU by SL at 7/31/2020 1143    Comment:  Family conf- discussed improvements in cognition, voicing, swallow, diet, swallow strategies;  Rec: cont tx at d/c    Acceptance, E,D, VU,DU by LB1 at 7/25/2020 1159    Comment:  wt shifting with ambulation    Acceptance, E,TB,D, NR by EE at 7/10/2020 1031   Family Acceptance, E,D, VU by LB at 7/31/2020 1443    Comment:  SonShay was instructed.    Acceptance, E, VU by SL at 7/31/2020 1143    Comment:  Family conf- discussed improvements in cognition, voicing, swallow, diet, swallow strategies;  Rec: cont tx at d/c                               User Key     Initials Effective Dates Name Provider Type Discipline    LB1 06/08/18 -  Talita Mcclelland, PT Physical Therapist PT    SL 06/08/18 -  Celia Islas MS CCC-SLP Speech and Language Pathologist SLP    GEGE 06/08/18 -  Lisa Henderson, PT Physical Therapist PT    LB 03/07/18 -  Talita Hodges PTA Physical Therapy Assistant PT     04/03/18 -  Marylin Hill, PT Physical Therapist PT     04/03/18 -  Shannan Meade, PT Physical Therapist PT                  PT Recommendation and Plan          Patient was wearing a face mask during this therapy encounter. Therapist used appropriate personal protective equipment including mask and gloves.  Mask used was standard procedure mask. Appropriate PPE was worn during the entire therapy session. Hand hygiene was completed before and after therapy session. Patient is not in enhanced droplet precautions.                  Time Calculation:     PT Charges     Row Name 08/01/20 1219             Time Calculation    Start Time  1020  -LB      Stop Time  1105  -LB      Time Calculation (min)  45 min  -LB        User Key  (r) = Recorded By, (t) = Taken By, (c) = Cosigned By    Initials Name Provider Type    LB Talita Hodges PTA Physical Therapy Assistant          Therapy Charges for Today     Code Description  Service Date Service Provider Modifiers Qty    45902223223 HC PT THER PROC EA 15 MIN 7/31/2020 Talita Hodges, EDEN GP 4    75744675532 HC PT THER PROC EA 15 MIN 8/1/2020 Talita Hodges PTA GP 3                   Talita Hodges, PTA  8/1/2020

## 2020-08-01 NOTE — PLAN OF CARE
Problem: Fall Risk (Adult)  Goal: Absence of Fall  Outcome: Ongoing (interventions implemented as appropriate)     Problem: Patient Care Overview  Goal: Plan of Care Review  Outcome: Ongoing (interventions implemented as appropriate)  Flowsheets (Taken 8/1/2020 6590)  Outcome Summary: Slept well.  at bedside. Zofran given 30 min. before Temodar, (home meds. chemo drug) to prevent nausea. Meds whole with water. Slight movement in LUE at shoulder, no Lt. . No c/o pain. No unsafe behaviors.

## 2020-08-02 NOTE — PROGRESS NOTES
"   LOS: 24 days   Patient Care Team:  Paz Mera MD as PCP - General (Family Medicine)  Carlota Peacock MD as Consulting Physician (Radiation Oncology)    Chief Complaint:   Glioblastoma   S/p July 1, 2020 - Right temporal craniotomy with excisional biopsy of mass in the right cerebral peduncle and placement of a lumbar drain  Steroid taper - medrol dose pack  Left hemiplegia  Dysarthria  Dysphonia  Right ptosis  Impaired cognition/impaired mobility/ impaired self care  Dysphagia - Cortrak tube feeds  Bladder - estrada catheter  Seizure prophylaxis - Keppra  GI prophylaxis - Pepcid/ lansoprazole  DVT prophylaxis - SCDs    Subjective     History of Present Illness    Subjective  No headache.  Tolerating radiation therapy  Left-sided weakness overall unchanged.  She reports she moves the left fingers occasionally  No significant pain complaints         History taken from: patient    Objective     Vital Signs  Temp:  [98.1 °F (36.7 °C)-98.3 °F (36.8 °C)] 98.1 °F (36.7 °C)  Heart Rate:  [73-97] 80  Resp:  [14-16] 14  BP: (126-132)/(75-85) 126/85    Objective:  Vital signs: (most recent): Blood pressure 126/85, pulse 80, temperature 98.1 °F (36.7 °C), temperature source Oral, resp. rate 14, height 165.1 cm (65\"), weight 45.5 kg (100 lb 5 oz), SpO2 97 %, not currently breastfeeding.            Physical Exam  MENTAL STATUS -  AWAKE / ALERT  HEENT- RIGHT CRANIOTOMY INCISION CDI  RIGHT PTOSIS,  SCLERAE ANICTERIC, CONJUNCTIVAE PINK  LEFT FACIAL DROOP,     OP-thrush improved  DYSPHONIA-significantly better-easy to understand.  No delay in her speech  CORTRAK FEEDING TUBE  LUNGS -normal respiration  HEART- RRR   ABD -   SOFT, NT.       EXT - NO EDEMA OR CYANOSIS  NEURO - AWAKE, good processing speed.  Very mild dysarthria    EOMI. RIGHT PTOSIS. LEFT FACIAL DROOP.    MOTOR EXAM - RUE/RLE 5/5.  Left upper extremity trace elbow flexion, no active finger flexion.  She has proximal movement at the left lower extremity for hip " adduction and also knee extension 3-/5, no ankle dorsiflexion    Results Review:     I reviewed the patient's new clinical results.  No results found for: POCGLU  Results from last 7 days   Lab Units 07/31/20  1023 07/27/20  1030   WBC 10*3/mm3 13.48* 13.03*   HEMOGLOBIN g/dL 14.7 14.9   HEMATOCRIT % 43.5 42.9   PLATELETS 10*3/mm3 255 307     Results from last 7 days   Lab Units 07/31/20  1023 07/27/20  1030   SODIUM mmol/L 138 135*   POTASSIUM mmol/L 4.3 4.0   CHLORIDE mmol/L 97* 97*   CO2 mmol/L 31.0* 26.6   BUN mg/dL 18 22*   CREATININE mg/dL 0.51* 0.54*   CALCIUM mg/dL 9.1 8.8   GLUCOSE mg/dL 90 120*       Medication Review: done  Scheduled Meds:    clotrimazole 10 mg Oral TID   dexamethasone 4 mg Oral Q12H   famotidine 20 mg Nasogastric BID   levETIRAcetam 500 mg Oral Q12H   melatonin 1 mg Oral Nightly   temozolomide 10 mg Oral Nightly   temozolomide 100 mg Oral Nightly     Continuous Infusions:   PRN Meds:.•  acetaminophen **OR** acetaminophen **OR** [DISCONTINUED] acetaminophen  •  amitriptyline  •  diclofenac  •  hydrOXYzine pamoate  •  [DISCONTINUED] HYDROmorphone **AND** naloxone  •  nystatin  •  ondansetron **OR** ondansetron  •  polyethylene glycol  •  potassium chloride **OR** potassium chloride **OR** potassium chloride  •  senna-docusate sodium  •  sodium chloride      Assessment/Plan       Brain tumor (CMS/HCC)    Hemiplegia (CMS/HCC)      Assessment & Plan  Glioblastoma -  -MRI of the brain on June 26, 2020 showed two enhancing masses - 2.6 cm from right lateral thalamic margin into right side of midbrain and chilango and 7 mm in central chilango also on the right. Surrounding vasogenic edema was also noted.   -July 20-oncology plans for radiation therapy/Temodar  -July 23-to start radiation therapy today and also start Temodar  July 24-tolerating initial radiation therapy        S/p right craniotomy for excisional biopsy July 1, 2020     Steroid taper - medrol dose pack  July 17-Decadron 4 mg twice a day  added by medical oncology today  July 20-significant improvement in dysphonia/voicing/also with proximal in the left lower extremity on Decadron 4 mg twice a day.  July 29-continues to show significant improvement on steroids.    Leukocytosis - on steroids   -monitor        Left hemiplegia-improved     Dysarthria-improved     Dysphonia-improved     Right ptosis-unchanged     Impaired cognition/impaired mobility/ impaired self care     Dysphagia -improved-started on p.o. intake and tube feeds discontinued  July 24-adjust diet-regular with thins. Needs set-up assistance (cutting up food, opening packages).        Bladder - estrada catheter- Was removed on acute care wing and replaced  July 10-not clear why the Estrada catheter was replaced at the acute care unit.  Will look at possibly trying to discontinue again on Monday a.m.  July 11-Estrada catheter discontinued   July 12- Order U/A C&S. Cut PVRs to BID  July 13-urine culture results pending  July 15-post void residuals low.  Urine culture no growth    Skin-July 13-discomfort from rash at perineal area/buttocks-has been using barrier cream.  Will try Mycostatin antifungal creamSeizure prophylaxis - Keppra    Thrush-Mycelex troches     GI prophylaxis - Pepcid      DVT prophylaxis - SCDs    Anxiety - Add PRN Hydroxizine    Constipation - Add scheduled Senna and PRN Miralax. Monitor    Rehab-  Patient denies any headache.  Denies any vision complaints.  Continues with right ptosis.  Still with dysphonia.  She complains of feeling hungry all the time.  Dietitian is 1 to meet with her regarding the regimen on the tube feeds.  Noted to have thrush and nystatin added.  With bathing moderate assist of 2 for standing.  Upper body dressing max assist.  Lower body dressing dependent.  Grooming moderate assist.  Toileting max dependent x2.  Bed mobility max assist.  Transfers moderate assist of 2 squat pivot to the wheelchair.  Sit to stand moderate max assist of 2.  Speech therapy  feels she is ready for swallow study.  She is voiding with low postvoid residuals.  Oncology to have family meeting tomorrow regarding goals of care/plans.     Now admit for comprehensive acute inpatient rehabilitation .  This would be an interdisciplinary program with physical therapy 1 hour,  occupational therapy 1 hour, and speech therapy 1 hour, 5 days a week.  Rehabilitation nursing for carryover, monitoring of  Nutritional status and neurologic   status, bowel and bladder, and skin  Ongoing physician follow-up.  Weekly team conferences.  Goals are indeterminate   Rehabilitation prognosis indeterminate.  Medical prognosis defer to oncology.  Estimated length of stay is indeterminate.   The patient's functional status and clinical status is unchanged from preadmission assessment and the patient continues appropriate for acute inpatient rehabilitation.  Goal is for home with  Home health   therapies.  Barrier to discharge:  Left hemiplegia/dsphagia  - work on  Trunk control/ mobility / ADLs/ swallow   to overcome.      TEAM CONF July 16 - BETTER TRUNK CONTROL YESTERDAY.BED MAX ASSIST TO SIT. MOD ASSIST TO SUPINE. TRANSFERS MAX ASSIST. BATH MOD 2 FOR STANDING. UBD MAX ASSIST. LBD DEP. GROOMING MOD ASSIST. TOILETING MAX-DEP X 2. COGNITION - STM WITH MIN CUES. MILD DIFFICULTY WITH HIGH LEVEL WORD FINDING TASK. VFSS TODAY. SCALP INCISION HEALING. BLADDER/BOWEL - CONTINENT/INCONTINENT. MULTIPLE SMALL BMS YESTERDAY. SKIN - THRUSH - NYSTATIN. BUTTOCK RASH ANTI-FUNGAL CREAM.  ELOS - NUTRITION - COMPLAINS OF LACK OF SATIETY. TO TRY BOLUS OVER PUMP WITH CORTRAK TO SEE IF HELPS WITH SATIETY AND ABLE TO TOLERATE.  ELOS - THREE WEEKS    TEAM CONF - July 23 - TRANSFERS MIN-MOD ASSIST. GAIT 20 FEET MOD 2 WITH ACE WRAP DORSIFLEX ASSIST WITH MANUAL ADVANCEMENT OF LLE. SITTING BALANCE BETTER. MORE ATTENTIVE. VERY IMPULSIVE. LEFT INATTENTION. BATH MOD. UBD MAX. LBD DEP. GROOMING MOD ASSIST. TOILETING MAX ASSIST. COGNITION - BETTER  RECALL. MIN-MOD CUES FOR THOUGHT ORGANIZATION AND ATTENTION TO LEFT. SWALLOW - MECH SOFT, THIN LIQUIDS, NO MIXED CONSISTENCY.EATING WELL. WILL D/C CORTRAK TUBE. BNE - SEE REPORT. ANXIETY - RELAXATION TECHNIQUES  ELOS - TWO WEEKS PER TEAM , PATIENT WOULD LIKE HOME BY BIRTHDAY July 29 BUT PATIENT LACKS INSIGHT INTO DEGREE OF DEFICITS.    TEAM CONF - JULY 30 - BED CTG-MIN. TRANSFERS MIN. CAR TRANSFERS MIN-MOD. GAIT 30 FEET HEMIWALKER, LEFT AFO, MOD 2. TOILET TRANSFERS MIN-MOD. SHOWER MOD. BATH MOD. UBD MOD. LBD MAX. GROOMING MIN. MIN CUES FOR RECALL AT PARAGRAPH LEVEL. SWALLOW - MECH SOFT, NO MIXED, THIN LIQIDS. BOWEL AND BLADDER - CONTINENT AND INCONTINENT. CONTINUES ON DECADRON 4 MG BID.   ELOS -  ONE WEEK       King Morris MD  08/02/20  15:42    Time:     During rounds, used appropriate personal protective equipment including mask and gloves.  Additional gown if indicated.  Mask used was standard procedure mask. Appropriate PPE was worn during the entire visit.  Hand hygiene was completed before and after.

## 2020-08-02 NOTE — PLAN OF CARE
Problem: Fall Risk (Adult)  Goal: Absence of Fall  Outcome: Ongoing (interventions implemented as appropriate)     Problem: Patient Care Overview  Goal: Plan of Care Review  Outcome: Ongoing (interventions implemented as appropriate)  Flowsheets (Taken 8/2/2020 6538)  Outcome Summary: Slept well. Zofran given to prevent nausea/vomiting with chemo drug, no vomiting noted. Meds whole with water. No unsafe behaviors. Lt. side weaker.  at bedside, tonight.

## 2020-08-02 NOTE — PLAN OF CARE
Problem: Patient Care Overview  Goal: Plan of Care Review  Outcome: Ongoing (interventions implemented as appropriate)  Flowsheets (Taken 8/2/2020 1313)  Outcome Summary: transfers much stronger today. No pain. tolerating meds with water.  Progress, Functional Goals: demonstrating adequate progress  Plan of Care Reviewed With: patient  IRF Plan of Care Review: progress ongoing, continue

## 2020-08-02 NOTE — PROGRESS NOTES
Inpatient Rehabilitation Plan of Care Note    Plan of Care  Care Plan Reviewed - No updates at this time.    Safety    Performed Intervention(s)  Fall precautions: bed/chair alarms, bed low and locked, nonskid socks, call  light within reach, personal items within reach.  Hourly rounding.  Uses call light appropriately  Environmental set-up to reduce risk      Psychosocial    Performed Intervention(s)  provide relaxation and distraction as needed.  Allow extra time for pt to verbalize needs, concerns,  feelings, etc.  Support family      Body Systems    Performed Intervention(s)  Check patient for incontinence regularly. Keep skin clean and dry as much as  possible.  Nystatin cream to buttocks prn  Skin assessment q shift and prn.      Sphincter Control    Performed Intervention(s)  Monitor I/O.  Bowel meds as needed.  Bladder scan and intermittent cath, as ordered prn.      Nutrition    Performed Intervention(s)  Daily weights.  Monitor I/O.  HOB 30 degrees or greater during nighttime tube feeding.    Signed by: Savi Meier RN

## 2020-08-02 NOTE — PROGRESS NOTES
Inpatient Rehabilitation Plan of Care Note    Plan of Care  Care Plan Reviewed - No updates at this time.    Safety    Performed Intervention(s)  Hourly rounding.  Uses call light appropriately      Psychosocial    Performed Intervention(s)  Allow extra time for pt to verbalize needs, concerns,  feelings, etc.      Body Systems    Performed Intervention(s)  Check patient for incontinence regularly. Keep skin clean and dry as much as  possible.  Skin assessment q shift and prn.    Signed by: Finn Yen RN

## 2020-08-03 NOTE — THERAPY TREATMENT NOTE
Inpatient Rehabilitation - Physical Therapy Treatment Note  [facility]     Patient Name: Denise Guajardo  : 1965  MRN: [id number]    Today's Date: 8/3/2020                 Admit Date: 2020      Visit Dx:      ICD-10-CM ICD-9-CM   1. Glioblastoma multiforme (CMS/HCC) C71.9 191.9       Patient Active Problem List   Diagnosis   • Glioblastoma multiforme (CMS/HCC)   • Intracranial hemorrhage (CMS/HCC)   • Dysphagia   • Acute left-sided weakness   • Brain tumor (CMS/HCC)   • Hemiplegia (CMS/HCC)       Therapy Treatment    IRF Treatment Summary     Row Name 20 1400 20          Evaluation/Treatment Time and Intent    Subjective Information  no complaints  -CP  no complaints  -LB     Existing Precautions/Restrictions  --  fall  -LB     Document Type  therapy note (daily note)  -CP  therapy note (daily note)  -LB     Mode of Treatment  speech-language pathology  -CP  physical therapy  -LB     Patient/Family Observations  --  seated in w/c  -LB     Recorded by [CP] Cesia Garcia MS CCC-SLP [LB] Talita Hodges PTA     Row Name 20             Bed Mobility Assessment/Treatment    Supine-Sit [place] (Bed Mobility)  contact guard  -LB      Sit-Supine [place] (Bed Mobility)  contact guard  -LB      Assistive Device (Bed Mobility)  head of bed elevated  -LB      Comment (Bed Mobility)  assessed on txment mat  -LB      Recorded by [LB] Talita Hodges PTA      Row Name 20             Bed-Chair Transfer    Bed-Chair [place] (Transfers)  minimum assist (75% patient effort)  -LB      Assistive Device (Bed-Chair Transfers)  wheelchair  -LB      Recorded by [LB] Talita Hodges PTA      Row Name 20             Chair-Bed Transfer    Chair-Bed [place] (Transfers)  minimum assist (75% patient effort)  -LB      Assistive Device (Chair-Bed Transfers)  wheelchair  -LB      Recorded by [LB] Talita Hodges PTA      Row Name 20              Sit-Stand Transfer    Sit-Stand [place] (Transfers)  minimum assist (75% patient effort);verbal cues  -LB      Assistive Device (Sit-Stand Transfers)  wheelchair  -LB      Recorded by [LB] Talita Hodges PTA      Row Name 08/03/20 0914             Stand-Sit Transfer    Stand-Sit [place] (Transfers)  minimum assist (75% patient effort);verbal cues  -LB      Assistive Device (Stand-Sit Transfers)  wheelchair  -LB      Recorded by [LB] Talita Hodges PTA      Row Name 08/03/20 0914             Car Transfer    Type (Car Transfer)  squat pivot  -LB      [place] Level (Car Transfer)  minimum assist (75% patient effort);moderate assist (50% patient effort)  -LB      Assistive Device (Car Transfer)  wheelchair  -LB      Recorded by [LB] Talita Hodges PTA      Row Name 08/03/20 0914             Gait/Stairs Assessment/Training    [place] Level (Gait)  moderate assist (50% patient effort);1 person to manage equipment;verbal cues  -LB      Assistive Device (Gait)  walker, jakub L Ankle AFO  -LB      Distance in Feet (Gait)  50  -LB      Pattern (Gait)  step-to;step-through  -LB      Deviations/Abnormal Patterns (Gait)  base of support, wide;maya decreased  -LB      Bilateral Gait Deviations  forward flexed posture  -LB      Left Sided Gait Deviations  knee buckling, left side;knee hyperextension;weight shift ability decreased;leans right;foot drop/toe drag;heel strike decreased ModA to advance LLE  -LB      Recorded by [LB] Talita Hodges PTA      Row Name 08/03/20 0914             Wheelchair Mobility/Management    Method of Wheelchair Locomotion (Mobility)  jakub-bipedal propulsion (right sided)  -LB      Mobility Activities (Wheelchair)  forward propulsion;steering;turning;doorway navigation  -LB      Forward Propulsion [place] (Wheelchair)  contact guard  -LB      Steering [place] (Wheelchair)  contact guard  -LB      Turning [place] (Wheelchair)  contact guard   -LB      Doorway Navigation [place] (Wheelchair)  contact guard  -LB      Distance Propelled in Feet (Wheelchair)  300  -LB      Recorded by [LB] Talita Hodges PTA      Row Name 08/03/20 0914             Pain Scale: Numbers Pre/Post-Treatment    Pain Scale: Numbers, Pretreatment  0/10 - no pain  -LB      Recorded by [LB] Talita Hodges PTA      Row Name 08/03/20 0914             Core Strengthening Therapeutic Exercise    Exercise Performed (Core Strengthening Therapeutic Exercise)  bridging with bilateral leg support;bridging with single leg support supine on txment mat .gym ball under BLE's: LTR, BKTC w asst  -LB      Sets/Reps (Core Strengthening Therapeutic Exercise)  1/5-10  -LB      Recorded by [LB] Talita Hodges PTA      Row Name 08/03/20 0914             Lower Extremity Seated Therapeutic Exercise    Performed, Seated Lower Extremity (Therapeutic Exercise)  hip abduction/adduction;knee flexion/extension  -LB      Exercise Type, Seated Lower Extremity (Therapeutic Exercise)  AROM (active range of motion);resistive exercise  -LB      Recorded by [LB] Talita Hodges PTA      Row Name 08/03/20 0914             Lower Extremity Supine Therapeutic Exercise    Performed, Supine Lower Extremity (Therapeutic Exercise)  SAQ (short arc quad) over bolster;hip abduction/adduction;heel slides;ankle dorsiflexion/plantarflexion  -LB      Exercise Type, Supine Lower Extremity (Therapeutic Exercise)  AROM (active range of motion);AAROM (active assistive range of motion);PROM (passive range of motion)  -LB      Recorded by [LB] Talita Hodges PTA      Row Name 08/03/20 0914             Positioning and Restraints    Post Treatment Position  wheelchair  -LB      In Wheelchair  sitting;call light within reach;exit alarm on  -LB      Recorded by [LB] Talita Hodges PTA        User Key  (r) = Recorded By, (t) = Taken By, (c) = Cosigned By    Initials Name Effective Dates    Cesia Landeros,  MS CCC-SLP 06/08/18 -     Talita Worthy, PTA 03/07/18 -         Wound 07/01/20 1156 Right head Incision (Active)   Dressing Appearance open to air 8/3/2020  7:45 AM   Closure Liquid skin adhesive 8/3/2020  7:45 AM   Base clean;dry 8/3/2020  7:45 AM   Periwound intact 8/3/2020  7:45 AM   Periwound Temperature warm 8/3/2020  7:45 AM   Periwound Skin Turgor soft 8/3/2020  7:45 AM   Drainage Amount none 8/3/2020  7:45 AM   Dressing Care, Wound open to air 8/3/2020  7:45 AM     Physical Therapy Education                 Title: PT OT SLP Therapies (In Progress)     Topic: Physical Therapy (In Progress)     Point: Mobility training (In Progress)     Description:   Instruct learner(s) on safety and technique for assisting patient out of bed, chair or wheelchair.  Instruct in the proper use of assistive devices, such as walker, crutches, cane or brace.              Patient Friendly Description:   It's important to get you on your feet again, but we need to do so in a way that is safe for you. Falling has serious consequences, and your personal safety is the most important thing of all.        When it's time to get out of bed, one of us or a family member will sit next to you on the bed to give you support.     If your doctor or nurse tells you to use a walker, crutches, a cane, or a brace, be sure you use it every time you get out of bed, even if you think you don't need it.    Learning Progress Summary           Patient Acceptance, E, NR by LB at 8/3/2020 1109    Acceptance, E, NR by LB at 8/1/2020 1220    Acceptance, E, VU by MARRY at 7/31/2020 1143    Comment:  Family conf- discussed improvements in cognition, voicing, swallow, diet, swallow strategies;  Rec: cont tx at d/c    Acceptance, E, NR by LB at 7/30/2020 1410    Acceptance, E, NR by LB at 7/29/2020 1058    Eager, E,D, NR by  at 7/28/2020 0918    Comment:  Gait sequencing    Acceptance, E, NR by LB at 7/27/2020 0942    Acceptance, E,D, VU,DU by LB1 at  7/25/2020 1159    Comment:  wt shifting with ambulation    Acceptance, E, NR by LB at 7/23/2020 1514    Acceptance, E, NR by LB at 7/22/2020 1517    Acceptance, E, NR by LB at 7/21/2020 1511    Acceptance, E, NR by LB at 7/20/2020 1216    Acceptance, E,TB, VU,NR by GEGE at 7/18/2020 1154    Acceptance, E, NR by LB at 7/17/2020 1512    Acceptance, E, NR by LB at 7/16/2020 1208    Acceptance, E, NR by LB at 7/15/2020 1459    Acceptance, E, NR by LB at 7/14/2020 1502    Acceptance, E, NR by LB at 7/13/2020 1458    Acceptance, E,TB,D, NR by EE at 7/10/2020 1031   Family Acceptance, E,D, VU,DU by LB at 7/31/2020 1441    Comment:  sonShay, instructed in bed mob tsfs and car tsfs. Observed bed mob and car. Practiced tsfs.    Acceptance, E, VU by SL at 7/31/2020 1143    Comment:  Family conf- discussed improvements in cognition, voicing, swallow, diet, swallow strategies;  Rec: cont tx at d/c                   Point: Home exercise program (Done)     Description:   Instruct learner(s) on appropriate technique for monitoring, assisting and/or progressing patient with therapeutic exercises and activities.              Learning Progress Summary           Patient Acceptance, E, VU by SL at 7/31/2020 1143    Comment:  Family conf- discussed improvements in cognition, voicing, swallow, diet, swallow strategies;  Rec: cont tx at d/c    Acceptance, E,D, VU,DU by LB1 at 7/25/2020 1159    Comment:  wt shifting with ambulation    Acceptance, E,H,D, VU,DU by LB at 7/24/2020 0908   Family Acceptance, E, VU by SL at 7/31/2020 1143    Comment:  Family conf- discussed improvements in cognition, voicing, swallow, diet, swallow strategies;  Rec: cont tx at d/c   Significant Other Acceptance, E,H,D, VU,DU by LB at 7/24/2020 0908                   Point: Body mechanics (Done)     Description:   Instruct learner(s) on proper positioning and spine alignment for patient and/or caregiver during mobility tasks and/or exercises.               Learning Progress Summary           Patient Acceptance, E, VU by SL at 7/31/2020 1143    Comment:  Family conf- discussed improvements in cognition, voicing, swallow, diet, swallow strategies;  Rec: cont tx at d/c    Acceptance, E, NR by LB at 7/30/2020 1410    Acceptance, E,D, VU,DU by LB1 at 7/25/2020 1159    Comment:  wt shifting with ambulation    Acceptance, E, NR by LB at 7/21/2020 1511    Acceptance, E,TB,D, NR by EE at 7/10/2020 1031   Family Acceptance, E,D, VU by LB at 7/31/2020 1443    Comment:  Shay Seymour was instructed.    Acceptance, E, VU by SL at 7/31/2020 1143    Comment:  Family conf- discussed improvements in cognition, voicing, swallow, diet, swallow strategies;  Rec: cont tx at d/c   Significant Other Eager, E, VU by LB at 7/11/2020 1415    Comment:  Addressing pnt from left side. (affected side)                   Point: Precautions (Done)     Description:   Instruct learner(s) on prescribed precautions during mobility and gait tasks              Learning Progress Summary           Patient Acceptance, E, VU by SL at 7/31/2020 1143    Comment:  Family conf- discussed improvements in cognition, voicing, swallow, diet, swallow strategies;  Rec: cont tx at d/c    Acceptance, E,D, VU,DU by LB1 at 7/25/2020 1159    Comment:  wt shifting with ambulation    Acceptance, E,TB,D, NR by EE at 7/10/2020 1031   Family Acceptance, E,D, VU by LB at 7/31/2020 1443    Comment:  Shay Seymour was instructed.    Acceptance, E, VU by SL at 7/31/2020 1143    Comment:  Family conf- discussed improvements in cognition, voicing, swallow, diet, swallow strategies;  Rec: cont tx at d/c                               User Key     Initials Effective Dates Name Provider Type Discipline    LB 06/08/18 -  Talita Mcclelland, PT Physical Therapist PT     06/08/18 -  Celia Islas MS CCC-SLP Speech and Language Pathologist SLP    GEGE 06/08/18 -  Lisa Henderson, PT Physical Therapist PT     03/07/18 -  Talita Hodges, PTA  Physical Therapy Assistant PT    EE 04/03/18 -  Marylin Hill, PT Physical Therapist PT    KP 04/03/18 -  Shannan Meade PT Physical Therapist PT                  PT Recommendation and Plan          Patient was wearing a face mask during this therapy encounter. Therapist used appropriate personal protective equipment including mask and gloves.  Mask used was standard procedure mask. Appropriate PPE was worn during the entire therapy session. Hand hygiene was completed before and after therapy session. Patient is not in enhanced droplet precautions.                  Time Calculation:     PT Charges     Row Name 08/03/20 1434 08/03/20 1108 08/03/20 0914       Time Calculation    Start Time  1400  -LB  1000  -LB  0900  -LB    Stop Time  1430  -LB  1030  -LB  0930  -LB    Time Calculation (min)  30 min  -LB  30 min  -LB  30 min  -LB      User Key  (r) = Recorded By, (t) = Taken By, (c) = Cosigned By    Initials Name Provider Type    LB Talita Hodges PTA Physical Therapy Assistant          Therapy Charges for Today     Code Description Service Date Service Provider Modifiers Qty    99353916057 HC PT THER PROC EA 15 MIN 8/3/2020 Talita Hodges PTA GP 6                   Talita Hodges PTA  8/3/2020

## 2020-08-03 NOTE — PROGRESS NOTES
Inpatient Rehabilitation Plan of Care Note    Plan of Care  Care Plan Reviewed - No updates at this time.    Safety    Performed Intervention(s)  Fall precautions: bed/chair alarms, bed low and locked, nonskid socks, call  light within reach, personal items within reach.  Hourly rounding.  Uses call light appropriately  Environmental set-up to reduce risk      Psychosocial    Performed Intervention(s)  provide relaxation and distraction as needed.  Allow extra time for pt to verbalize needs, concerns,  feelings, etc.  Support family      Body Systems    Performed Intervention(s)  Check patient for incontinence regularly. Keep skin clean and dry as much as  possible.  Nystatin cream to buttocks prn  Skin assessment q shift and prn.      Sphincter Control    Performed Intervention(s)  Monitor I/O.  Bowel meds as needed.  Bladder scan and intermittent cath, as ordered prn.      Nutrition    Performed Intervention(s)  Daily weights.  Monitor I/O.  HOB 30 degrees or greater during nighttime tube feeding.    Signed by: Bonilla Mcbride RN

## 2020-08-03 NOTE — PLAN OF CARE
Problem: Patient Care Overview  Goal: Plan of Care Review  Outcome: Ongoing (interventions implemented as appropriate)  Flowsheets  Taken 8/3/2020 0420 by Bonilla Mcbride, RN  Outcome Summary: pt rested well this shift.  c/o tiredness.  No pain.  Meds whole with water.  cont b.b  Plan of Care Reviewed With: patient  IRF Plan of Care Review: progress ongoing, continue  Taken 8/2/2020 1313 by Finn Yen RN  Progress, Functional Goals: demonstrating adequate progress

## 2020-08-03 NOTE — PROGRESS NOTES
Occupational Therapy: Branch    Physical Therapy: Branch    Speech Language Pathology:  Individual: 60 minutes.    Signed by: JENNIFER Gaxiola

## 2020-08-03 NOTE — PROGRESS NOTES
Inpatient Rehabilitation Plan of Care Note    Plan of Care  Care Plan Reviewed - No updates at this time.    Body Systems    [RN] Integumentary(Active)  Current Status(08/03/2020): Pt has R scalp incision, which is glued and TIFFANIE w no  drainage. Pt has lumbar puncture site which is closed/healed and TIFFANIE. Pt has  rash-like area to january area which is improving.  Weekly Goal(08/11/2020): Incisions will be free of s/s infection and buttocks  will be kept clean and dry.  Discharge Goal: Incisions will be closed/healed and excoriation to buttocks will  be resolved. Skin will show no further s/s breakdown.        Nutrition    [RN] Nutrition Management(Active)  Current Status(08/03/2020): Pt now on regular diet w thin liquids.  Weekly Goal(08/11/2020): pt tolerating PO diet  Discharge Goal: Pt will receive adequate nutrition.        Psychosocial    [RN] Coping/Adjustment(Active)  Current Status(08/03/2020): Pt is at risk for reduced coping r/t recent  diagnosis, craniotomy, and new deficits. Pt has supportive family.  Weekly Goal(08/11/2020): Pt will verbalize feelings, concerns, needs to staff as  needed.  Discharge Goal: Pt will exhibit adequate coping strategies.    Performed Intervention(s)  Allow extra time for pt to verbalize needs, concerns,  feelings, etc.      Safety    [RN] Potential for Injury(Active)  Current Status(08/03/2020): Patient has had recent craniotomy and has new  physical limitations. She is alert and oriented x3.   Pt slow to process/respond  at times.  Weekly Goal(08/11/2020): Pt will use call light appropriately as needed.  Discharge Goal: Pt  will remain free from falls and injury. Pt and  aware  of fall/safety in the home setting.    Performed Intervention(s)  Hourly rounding.      Sphincter Control    [RN] Bladder Management(Active)  Current Status(08/03/2020): Bernabe catheter removed 7/11/20. Pt continent and  incontinent of urine.  Weekly Goal(08/11/2020): Continent 75%  Discharge Goal:  Pt will be continent of urine.    [RN] Bowel Management(Active)  Current Status(08/03/2020): Pt is  continent and incontinent of stool . Recent  frequent BMs.  Weekly Goal(08/11/2020): Pt will be continent 100% with BM at least q3 days.  Discharge Goal: Pt  will be continent of stool.    Performed Intervention(s)  Bowel meds as needed.    Signed by: Finn Yen RN

## 2020-08-03 NOTE — THERAPY TREATMENT NOTE
Inpatient Rehabilitation - Speech Language Pathology Treatment Note  [facility]     Patient Name: Denise Guajardo  : 1965  MRN: [id number]  Today's Date: 8/3/2020         Admit Date: 2020    Visit Dx:      ICD-10-CM ICD-9-CM   1. Glioblastoma multiforme (CMS/HCC) C71.9 191.9     Patient Active Problem List   Diagnosis   • Glioblastoma multiforme (CMS/HCC)   • Intracranial hemorrhage (CMS/HCC)   • Dysphagia   • Acute left-sided weakness   • Brain tumor (CMS/HCC)   • Hemiplegia (CMS/HCC)        Therapy Treatment      EDUCATION  The patient has been educated in the following areas:   Cognitive Impairment.    SLP Recommendation and Plan                         SLP GOALS     Row Name 20 1400             Memory Skills Goal (SLP)    Progress (Memory Skills Goal, SLP)  60%;independently (over 90% accuracy)  -CP      Progress/Outcomes (Memory Skills Goal, SLP)  goal ongoing  -CP      Comment (Memory Skills Goal, SLP)  visual memory task  -CP         Organizational Skills Goal (SLP)    Progress (Thought Organization Skills Goal, SLP)  100%;independently (over 90% accuracy)  -CP      Progress/Outcomes (Thought Organization Skills Goal, SLP)  good progress toward goal  -CP      Comment (Thought Organization Skills Goal, SLP)  category matrix  -CP         Reasoning Goal (SLP)    Progress (Reasoning Goal, SLP)  with moderate cues (50-74%)  -CP      Progress/Outcomes (Reasoning Goal, SLP)  goal ongoing  -CP      Comment (Reasoning Goal, SLP)  deductive puzzle  -CP         Functional Math Skills Goal (SLP)    Progress (Functional Math Skills Goal, SLP)  80%;independently (over 90% accuracy)  -CP      Progress/Outcomes (Functional Math Skills Goal, SLP)  goal ongoing  -CP      Comment (Functional Math Skills Goal, SLP)  very simple level temporal problem solving  -CP         Right Hemisphere Function Goal (SLP)    Progress (Right Hemisphere Function Goal, SLP)  with minimal cues (75-90%);with moderate cues (50-74%)   -CP      Progress/Outcomes (Right Hemisphere Function Goal, SLP)  goal ongoing  -CP      Comment (Right Hemisphere Function Goal, SLP)  trailmaking task  -CP        User Key  (r) = Recorded By, (t) = Taken By, (c) = Cosigned By    Initials Name Provider Type    Cesia Landeros MS CCC-SLP Speech and Language Pathologist              Time Calculation:     Time Calculation- SLP     Row Name 08/03/20 1427 08/03/20 1420          Time Calculation- SLP    SLP Start Time  1300  -CP  1100  -CP     SLP Stop Time  1330  -CP  1130  -CP     SLP Time Calculation (min)  30 min  -CP  30 min  -CP       User Key  (r) = Recorded By, (t) = Taken By, (c) = Cosigned By    Initials Name Provider Type    Cesia Landeros MS CCC-SLP Speech and Language Pathologist          Therapy Charges for Today     Code Description Service Date Service Provider Modifiers Qty    60363965835 HC ST DEV OF COGN SKILLS INITIAL 15 MIN 8/3/2020 Cesia Garcia MS CCC-SLP  1    29602207973 HC ST DEV OF COGN SKILLS EACH ADDT'L 15 MIN 8/3/2020 Cesia Garcia MS CCC-JENNIFER  3                     MS RUFINO Gaxiola  8/3/2020

## 2020-08-03 NOTE — ED NOTES
Dr. Wu called back from the hospitalist group for [facility].     Kathleen Tay  06/26/20 [id number]     Billing Type: Third-Party Bill

## 2020-08-03 NOTE — THERAPY TREATMENT NOTE
After Visit Summary   10/3/2017    Kira Gtz    MRN: [id number]           Patient Information     Date Of Birth          1953        Visit Information        Provider Department      10/3/2017 7:40 AM Rachael Gtz MD [facility]        Today's Diagnoses     Type 2 diabetes mellitus with stage 3 chronic kidney disease, without long-term current use of insulin (H)    -  1    Hypertension goal BP (blood pressure) < 140/90        Hyperlipidemia, unspecified hyperlipidemia type        Anxiety state          Care Instructions    Make a float appointment and bring in your blood pressure monitor to check.    Consider Lexapro for stress and anxiety.          Follow-ups after your visit        Who to contact     If you have questions or need follow up information about today's clinic visit or your schedule please contact [facility] directly at [phone].  Normal or non-critical lab and imaging results will be communicated to you by SimulScribehart, letter or phone within 4 business days after the clinic has received the results. If you do not hear from us within 7 days, please contact the clinic through SimulScribehart or phone. If you have a critical or abnormal lab result, we will notify you by phone as soon as possible.  Submit refill requests through [employer] or call your pharmacy and they will forward the refill request to us. Please allow 3 business days for your refill to be completed.          Additional Information About Your Visit        MyChart Information     [employer] gives you secure access to your electronic health record. If you see a primary care provider, you can also send messages to your care team and make appointments. If you have questions, please call your primary care clinic.  If you do not have a primary care provider, please call [phone] and they will assist you.        Care EveryWhere ID     This is your Care EveryWhere ID. This could be used by  Inpatient Rehabilitation - Occupational Therapy Treatment Note    [facility]     Patient Name: Denise Guajardo  : 1965  MRN: [id number]    Today's Date: 8/3/2020                 Admit Date: 2020      Visit Dx:    ICD-10-CM ICD-9-CM   1. Glioblastoma multiforme (CMS/HCC) C71.9 191.9       Patient Active Problem List   Diagnosis   • Glioblastoma multiforme (CMS/HCC)   • Intracranial hemorrhage (CMS/HCC)   • Dysphagia   • Acute left-sided weakness   • Brain tumor (CMS/HCC)   • Hemiplegia (CMS/HCC)         Therapy Treatment    IRF Treatment Summary     Row Name 20 1530 20 1400 20 0914       Evaluation/Treatment Time and Intent    Subjective Information  no complaints  -AF  no complaints  -CP  no complaints  -LB    Existing Precautions/Restrictions  fall  -AF  --  fall  -LB    Document Type  therapy note (daily note)  -AF  therapy note (daily note)  -CP  therapy note (daily note)  -LB    Mode of Treatment  occupational therapy  -AF  speech-language pathology  -CP  physical therapy  -LB    Patient/Family Observations  sitting up in w/c in AM in room, up in w/c in PM with SLP  -AF  --  seated in w/c  -LB    Recorded by [AF] Yarely Maldonado, OTR [CP] Cesia Garcia MS CCC-SLP [LB] Talita Hodges PTA    Row Name 20 1530             Cognition/Psychosocial- PT/OT    Affect/Mental Status (Cognitive)  WFL  -AF      Orientation Status (Cognition)  oriented x 3  -AF      Follows Commands (Cognition)  follows one step commands;over 90% accuracy  -AF      Personal Safety Interventions  fall prevention program maintained;gait belt;nonskid shoes/slippers when out of bed  -AF      Cognitive Function (Cognitive)  safety deficit  -AF      Recorded by [AF] Yarely Maldonado OTR      Row Name 20 0914             Bed Mobility Assessment/Treatment    Supine-Sit [place] (Bed Mobility)  contact guard  -LB      Sit-Supine [place] (Bed Mobility)  contact guard  -LB      Assistive Device  (Bed Mobility)  head of bed elevated  -LB      Comment (Bed Mobility)  assessed on txment mat  -LB      Recorded by [LB] Talita Hodges, EDEN      Row Name 08/03/20 1530             Transfer Assessment/Treatment    Comment (Transfers)  stand pivot EOM <> w/c MIN A  -AF      Recorded by [AF] Yarely Maldonado, OTR      Row Name 08/03/20 0914             Bed-Chair Transfer    Bed-Chair [place] (Transfers)  minimum assist (75% patient effort)  -LB      Assistive Device (Bed-Chair Transfers)  wheelchair  -LB      Recorded by [LB] Talita Hodges, PTA      Row Name 08/03/20 0914             Chair-Bed Transfer    Chair-Bed [place] (Transfers)  minimum assist (75% patient effort)  -LB      Assistive Device (Chair-Bed Transfers)  wheelchair  -LB      Recorded by [LB] Talita Hodges, PTA      Row Name 08/03/20 0914             Sit-Stand Transfer    Sit-Stand [place] (Transfers)  minimum assist (75% patient effort);verbal cues  -LB      Assistive Device (Sit-Stand Transfers)  wheelchair  -LB      Recorded by [LB] Talita Hodges, PTA      Row Name 08/03/20 0914             Stand-Sit Transfer    Stand-Sit [place] (Transfers)  minimum assist (75% patient effort);verbal cues  -LB      Assistive Device (Stand-Sit Transfers)  wheelchair  -LB      Recorded by [LB] Talita Hodges, PTA      Row Name 08/03/20 1530             Toilet Transfer    Type (Toilet Transfer)  stand pivot/stand step  -AF      [place] Level (Toilet Transfer)  minimum assist (75% patient effort);verbal cues  -AF      Assistive Device (Toilet Transfer)  commode;grab bars/safety frame;wheelchair  -AF      Recorded by [AF] Yarely Maldonado, OTR      Row Name 08/03/20 0914             Car Transfer    Type (Car Transfer)  squat pivot  -LB      [place] Level (Car Transfer)  minimum assist (75% patient effort);moderate assist (50% patient effort)  -LB      Assistive Device (Car Transfer)  wheelchair  -LB      Recorded by [LB]  other organizations to access your [place] medical records  [id number]        Your Vitals Were     Pulse Temperature Respirations Pulse Oximetry BMI (Body Mass Index)       55 98.2  F (36.8  C) (Temporal) 16 95% 25.06 kg/m2        Blood Pressure from Last 3 Encounters:   10/03/17 174/72   02/03/17 120/64   03/24/16 136/56    Weight from Last 3 Encounters:   10/03/17 146 lb (66.2 kg)   02/28/17 149 lb (67.6 kg)   02/23/17 149 lb 9.6 oz (67.9 kg)              Today, you had the following     No orders found for display         Where to get your medicines      Some of these will need a paper prescription and others can be bought over the counter.  Ask your nurse if you have questions.     Bring a paper prescription for each of these medications     ALPRAZolam 0.5 MG tablet          Primary Care Provider Office Phone # Fax #    Rachael HENOK Gtz [phone]       [address] [place] [address]        Equal Access to Services     [facility]: Hadii aad ku hadasho Soomaali, waaxda luqadaha, qaybta kaalmada adeegyaanahi, marshall henry . So [facility] [phone].    ATENCIÓN: Si habla español, tiene a espinosa disposición servicios gratuitos de asistencia lingüística. Llame al [phone].    We comply with applicable federal civil rights laws and [place] laws. We do not discriminate on the basis of race, color, national origin, age, disability, sex, sexual orientation, or gender identity.            Thank you!     Thank you for choosing [facility]  for your care. Our goal is always to provide you with excellent care. Hearing back from our patients is one way we can continue to improve our services. Please take a few minutes to complete the written survey that you may receive in the mail after your visit with us. Thank you!             Your Updated Medication List - Protect others around you: Learn how to safely use, store and throw away your medicines  Talita Hodges PTA      Row Name 08/03/20 0914             Gait/Stairs Assessment/Training    [place] Level (Gait)  moderate assist (50% patient effort);1 person to manage equipment;verbal cues  -LB      Assistive Device (Gait)  walker, jakub L Ankle AFO  -LB      Distance in Feet (Gait)  50  -LB      Pattern (Gait)  step-to;step-through  -LB      Deviations/Abnormal Patterns (Gait)  base of support, wide;maya decreased  -LB      Bilateral Gait Deviations  forward flexed posture  -LB      Left Sided Gait Deviations  knee buckling, left side;knee hyperextension;weight shift ability decreased;leans right;foot drop/toe drag;heel strike decreased ModA to advance LLE  -LB      Recorded by [LB] Talita Hodges PTA      Row Name 08/03/20 0914             Wheelchair Mobility/Management    Method of Wheelchair Locomotion (Mobility)  jakub-bipedal propulsion (right sided)  -LB      Mobility Activities (Wheelchair)  forward propulsion;steering;turning;doorway navigation  -LB      Forward Propulsion [place] (Wheelchair)  contact guard  -LB      Steering [place] (Wheelchair)  contact guard  -LB      Turning [place] (Wheelchair)  contact guard  -LB      Doorway Navigation [place] (Wheelchair)  contact guard  -LB      Distance Propelled in Feet (Wheelchair)  300  -LB      Recorded by [LB] Talita Hodges PTA      Row Name 08/03/20 1530             Bathing Assessment/Treatment    Bathing [place] Level  bathing skills;moderate assist (50% patient effort);verbal cues  -AF      Bathing Position  supported sitting;supported standing  -AF      Bathing Setup Assistance  obtain supplies  -AF      Recorded by [AF] Yarely Maldonado OTR      Row Name 08/03/20 1530             Upper Body Dressing Assessment/Treatment    Upper Body Dressing Task  upper body dressing skills;moderate assist (50-74% patient effort);verbal cues  -AF      Upper Body Dressing Position  supported sitting;supported standing   at [email or web address].          This list is accurate as of: 10/3/17  8:40 AM.  Always use your most recent med list.                   Brand Name Dispense Instructions for use Diagnosis    ALPRAZolam 0.5 MG tablet    XANAX    30 tablet    TAKE 1 TO 2 TABLETS THREE TIMES DAILY AS NEEDED FOR ANXIETY    Anxiety state       aspirin 81 MG tablet     100    ONE DAILY        atenolol 100 MG tablet    TENORMIN    90 tablet    TAKE 1/2 TABLET  (50 MG) BY MOUTH 2 TIMES DAILY    Hypertension goal BP (blood pressure) < 140/90       atorvastatin 10 MG tablet    LIPITOR    90 tablet    TAKE ONE TABLET BY MOUTH ONE TIME DAILY    Hyperlipidemia, unspecified hyperlipidemia type       blood glucose lancets standard    no brand specified    3 Box    Use to test blood sugars 2-3 times a day while blood sugars are uncontrolled    Type 2 diabetes mellitus with stage 3 chronic kidney disease, without long-term current use of insulin (H)       blood glucose monitoring test strip    no brand specified    100 each    Use to test blood sugars 2-3 times a day while blood sugars are uncontrolled    Type 2 diabetes mellitus with stage 3 chronic kidney disease, without long-term current use of insulin (H)       CALCIUM 1200 PO      Take 1 tablet by mouth        CHROMIUM PICOLATE PO           CINNAMON PO           CoQ-10 10 MG Caps      Take 1 tablet by mouth        flax seed oil 1000 MG capsule      2 to 3 capsule a day        lisinopril 30 MG tablet    PRINIVIL,ZESTRIL    90 tablet    Take 1 tablet (30 mg) by mouth daily    Hypertension goal BP (blood pressure) < 140/90       metFORMIN 500 MG tablet    GLUCOPHAGE    360 tablet    TAKE 2 TABLETS (1000 MG) BY MOUTH 2 TIMES DAILY (WITH MEALS)    Type 2 diabetes mellitus with stage 3 chronic kidney disease, without long-term current use of insulin (H)       MULTI-DAY VITAMINS PO      1 TABLET DAILY        omega 3 1000 MG Caps      1-3 x daily        UNCLASSIFIED OTC PRODUCT      formula 303  -AF      Set-up Assistance (Upper Body Dressing)  obtain clothing  -AF      Comment (Upper Body Dressing)  jakub tech  -AF      Recorded by [AF] Yarely Maldonado, OTR      Row Name 08/03/20 1530             Lower Body Dressing Assessment/Treatment    Lower Body Dressing [place] Level  doff;don;pants/bottoms;shoes/slippers;socks;underwear;moderate assist (50% patient effort);verbal cues  -AF      Lower Body Dressing Position  supported sitting;supported standing  -AF      Lower Body Dressing Setup Assistance  obtain clothing  -AF      Comment (Lower Body Dressing)  jakub tech  -AF      Recorded by [AF] Yarely Maldonado, OTR      Row Name 08/03/20 1530             Grooming Assessment/Treatment    Grooming [place] Level  grooming skills;minimum assist (75% patient effort);verbal cues  -AF      Grooming Position  supported sitting;sink side  -AF      Grooming Setup Assistance  obtain supplies  -AF      Recorded by [AF] Yarely Maldonado, OTR      Row Name 08/03/20 1530             Toileting Assessment/Treatment    Toileting [place] Level  toileting skills;maximum assist (25% patient effort)  -AF      Toileting Position  supported sitting;supported standing  -AF      Comment (Toileting)  chemo   -AF      Recorded by [AF] Yarely Maldonado, OTR      Row Name 08/03/20 1530             Vision Assessment/Intervention    Visual Processing Deficit  jakub-inattention/neglect, left  -AF      Recorded by [AF] Yarely Maldonado, OTR      Row Name 08/03/20 1530 08/03/20 0914          Pain Scale: Numbers Pre/Post-Treatment    Pain Scale: Numbers, Pretreatment  0/10 - no pain  -AF  0/10 - no pain  -LB     Pain Scale: Numbers, Post-Treatment  0/10 - no pain  -AF  --     Recorded by [AF] Yarely Maldonado, OTR [LB] Talita Hodges, PTA     Row Name 08/03/20 1530             Static Sitting Balance    Level of Agar (Unsupported Sitting, Static Balance)  standby assist  -AF      Sitting Position (Unsupported Sitting, Static  muscle relaxant prn           Balance)  sitting edge of mat  -AF      Time Able to Maintain Position (Unsupported Sitting, Static Balance)  more than 5 minutes  -AF      Recorded by [AF] Yarely Maldonado OTR      Row Name 08/03/20 0914             Core Strengthening Therapeutic Exercise    Exercise Performed (Core Strengthening Therapeutic Exercise)  bridging with bilateral leg support;bridging with single leg support supine on txment mat .gym ball under BLE's: LTR, BKTC w asst  -LB      Sets/Reps (Core Strengthening Therapeutic Exercise)  1/5-10  -LB      Recorded by [LB] Talita Hodges PTA      Row Name 08/03/20 0914             Lower Extremity Seated Therapeutic Exercise    Performed, Seated Lower Extremity (Therapeutic Exercise)  hip abduction/adduction;knee flexion/extension  -LB      Exercise Type, Seated Lower Extremity (Therapeutic Exercise)  AROM (active range of motion);resistive exercise  -LB      Recorded by [LB] Talita Hodges PTA      Row Name 08/03/20 0914             Lower Extremity Supine Therapeutic Exercise    Performed, Supine Lower Extremity (Therapeutic Exercise)  SAQ (short arc quad) over bolster;hip abduction/adduction;heel slides;ankle dorsiflexion/plantarflexion  -LB      Exercise Type, Supine Lower Extremity (Therapeutic Exercise)  AROM (active range of motion);AAROM (active assistive range of motion);PROM (passive range of motion)  -LB      Recorded by [LB] Talita Hodges PTA      Row Name 08/03/20 1530             Neuromuscular Re-education    Activities/Techniques Used (Neuromuscular Re-education)  facilitation/inhibition  -AF      Positions Used (Neuromuscular Re-education)  unsupported;sitting  -AF      Equipment Used (Neuromuscular Re-education)  NMES on L wrist extensors  -AF      Comment (Neuromuscular Re-education)  table slides, gross grasp release with NMES   -AF      Recorded by [AF] Yarely Maldonado OTR      Row Name 08/03/20 1530             Modality Interventions    Modality Treatment   NMES/FES/Bioness  -AF      Additional Documentation  Modality Treatment (Row)  -AF      Recorded by [AF] Yarely Maldonado, OTR      Row Name 08/03/20 1530             NMES/FES/Bioness    Location  L wrist extensors  -AF      Position  seated unsuported on EOM  -AF      Treatment Duration  20 mins  -AF      Intensity  19  -AF      Activity  tolerated well no skin or pain issues noted  -AF      Recorded by [AF] Yarely Maldonado, OTR      Row Name 08/03/20 1530 08/03/20 0914          Positioning and Restraints    Pre-Treatment Position  sitting in chair/recliner  -AF  --     Post Treatment Position  wheelchair  -AF  wheelchair  -LB     In Wheelchair  encouraged to call for assist;call light within reach;sitting;exit alarm on;with PT in room in AM, with PT in Pm  -AF  sitting;call light within reach;exit alarm on  -LB     Recorded by [AF] Yarely Maldonado, OTR [LB] Talita Hodges, PTA       User Key  (r) = Recorded By, (t) = Taken By, (c) = Cosigned By    Initials Name Effective Dates    Cesia Landeros, MS CCC-SLP 06/08/18 -     LB Talita Hodges PTA 03/07/18 -     AF Yarely Maldonado, OTR 04/14/20 -           Wound 07/01/20 1156 Right head Incision (Active)   Dressing Appearance open to air 8/3/2020  7:45 AM   Closure Liquid skin adhesive 8/3/2020  7:45 AM   Base clean;dry 8/3/2020  7:45 AM   Periwound intact 8/3/2020  7:45 AM   Periwound Temperature warm 8/3/2020  7:45 AM   Periwound Skin Turgor soft 8/3/2020  7:45 AM   Drainage Amount none 8/3/2020  7:45 AM   Dressing Care, Wound open to air 8/3/2020  7:45 AM         OT Recommendation and Plan                     Occupational Therapy Education                 Title: PT OT SLP Therapies (In Progress)     Topic: Occupational Therapy (Done)     Point: ADL training (Done)     Description:   Instruct learner(s) on proper safety adaptation and remediation techniques during self care or transfers.   Instruct in proper use of assistive devices.              Learning  Progress Summary           Patient Acceptance, E, VU by AF at 7/31/2020 1513    Comment:  Pt and family participated in meeting with rehab team recommend outpatient OT, discussed ADL level and HEP. will continue with discharge teaching    Acceptance, E,TB,D, VU by AF at 7/31/2020 1512    Comment:  son participated in teaching with commode transfers and toileting tasks, educated on SROM/HEP    Acceptance, E, VU by SL at 7/31/2020 1143    Comment:  Family conf- discussed improvements in cognition, voicing, swallow, diet, swallow strategies;  Rec: cont tx at d/c    Acceptance, E, VU,NR by AF at 7/20/2020 1531    Comment:  edcuated on safety with ADl tasks    Acceptance, E,TB,D, NR,DU,VU by AF at 7/16/2020 1230    Comment:  with bed moblity and pulling up in bed    Acceptance, E, VU by RD at 7/10/2020 1529    Comment:  OT educ on OT role in therapeutic process and pt's POC.   Family Acceptance, E, VU by AF at 7/31/2020 1513    Comment:  Pt and family participated in meeting with rehab team recommend outpatient OT, discussed ADL level and HEP. will continue with discharge teaching    Acceptance, E,TB,D, VU by AF at 7/31/2020 1512    Comment:  son participated in teaching with commode transfers and toileting tasks, educated on SROM/HEP    Acceptance, E, VU by SL at 7/31/2020 1143    Comment:  Family conf- discussed improvements in cognition, voicing, swallow, diet, swallow strategies;  Rec: cont tx at d/c   Significant Other Acceptance, E,TB,D, NR,DU,VU by AF at 7/16/2020 1230    Comment:  with bed moblity and pulling up in bed                   Point: Home exercise program (Done)     Description:   Instruct learner(s) on appropriate technique for monitoring, assisting and/or progressing therapeutic exercises/activities.              Learning Progress Summary           Patient Acceptance, E, VU by AF at 7/31/2020 1513    Comment:  Pt and family participated in meeting with rehab team recommend outpatient OT, discussed ADL  level and HEP. will continue with discharge teaching    Acceptance, E, VU by SL at 7/31/2020 1143    Comment:  Family conf- discussed improvements in cognition, voicing, swallow, diet, swallow strategies;  Rec: cont tx at d/c   Family Acceptance, E, VU by AF at 7/31/2020 1513    Comment:  Pt and family participated in meeting with rehab team recommend outpatient OT, discussed ADL level and HEP. will continue with discharge teaching    Acceptance, E, VU by SL at 7/31/2020 1143    Comment:  Family conf- discussed improvements in cognition, voicing, swallow, diet, swallow strategies;  Rec: cont tx at d/c                   Point: Precautions (Done)     Description:   Instruct learner(s) on prescribed precautions during self-care and functional transfers.              Learning Progress Summary           Patient Acceptance, E,TB,D, DU,NR by AF at 8/3/2020 1537    Comment:  educated on use and benefits of NMES for neuro recovery    Acceptance, E, VU by AF at 7/31/2020 1513    Comment:  Pt and family participated in meeting with rehab team recommend outpatient OT, discussed ADL level and HEP. will continue with discharge teaching    Acceptance, E, VU by SL at 7/31/2020 1143    Comment:  Family conf- discussed improvements in cognition, voicing, swallow, diet, swallow strategies;  Rec: cont tx at d/c    Acceptance, E, VU by RD at 7/10/2020 1529    Comment:  OT educ on OT role in therapeutic process and pt's POC.   Family Acceptance, E, VU by AF at 7/31/2020 1513    Comment:  Pt and family participated in meeting with rehab team recommend outpatient OT, discussed ADL level and HEP. will continue with discharge teaching    Acceptance, E, VU by SL at 7/31/2020 1143    Comment:  Family conf- discussed improvements in cognition, voicing, swallow, diet, swallow strategies;  Rec: cont tx at d/c                   Point: Body mechanics (Done)     Description:   Instruct learner(s) on proper positioning and spine alignment during  self-care, functional mobility activities and/or exercises.              Learning Progress Summary           Patient Acceptance, E, VU by AF at 7/31/2020 1513    Comment:  Pt and family participated in meeting with rehab team recommend outpatient OT, discussed ADL level and HEP. will continue with discharge teaching    Acceptance, E, VU by  at 7/31/2020 1143    Comment:  Family conf- discussed improvements in cognition, voicing, swallow, diet, swallow strategies;  Rec: cont tx at d/c    Acceptance, E, VU by RD at 7/10/2020 1529    Comment:  OT educ on OT role in therapeutic process and pt's POC.   Family Acceptance, E, VU by AF at 7/31/2020 1513    Comment:  Pt and family participated in meeting with rehab team recommend outpatient OT, discussed ADL level and HEP. will continue with discharge teaching    Acceptance, E, VU by  at 7/31/2020 1143    Comment:  Family conf- discussed improvements in cognition, voicing, swallow, diet, swallow strategies;  Rec: cont tx at d/c                               User Key     Initials Effective Dates Name Provider Type Discipline     06/08/18 -  Celia Islas MS CCC-SLP Speech and Language Pathologist SLP     04/14/20 -  Yarely Maldonado OTLUZ MARIA Occupational Therapist OT    RD 10/14/19 -  Celsa Hamilton OT Occupational Therapist OT                       Time Calculation:     Time Calculation- OT     Row Name 08/03/20 1539 08/03/20 1538          Time Calculation- OT    OT Start Time  1330  -AF  0800  -AF     OT Stop Time  1400  -AF  0830  -AF     OT Time Calculation (min)  30 min  -AF  30 min  -AF       User Key  (r) = Recorded By, (t) = Taken By, (c) = Cosigned By    Initials Name Provider Type    AF Yarely Maldonado OTLUZ MARIA Occupational Therapist          Therapy Charges for Today     Code Description Service Date Service Provider Modifiers Qty    88013136948 HC OT SELF CARE/MGMT/TRAIN EA 15 MIN 8/3/2020 Yarely Maldonado OTR GO 2    37393664203 HC OT NEUROMUSC RE EDUCATION EA 15  MIN 8/3/2020 Yarely Maldonaod, OTR GO 2                   Yarely Maldonado, OTR  8/3/2020

## 2020-08-03 NOTE — PROGRESS NOTES
"   LOS: 25 days   Patient Care Team:  Paz Mera MD as PCP - General (Family Medicine)  Carlota Peacock MD as Consulting Physician (Radiation Oncology)    Chief Complaint:   Glioblastoma   S/p July 1, 2020 - Right temporal craniotomy with excisional biopsy of mass in the right cerebral peduncle and placement of a lumbar drain  Steroid taper - medrol dose pack  Left hemiplegia  Dysarthria  Dysphonia  Right ptosis  Impaired cognition/impaired mobility/ impaired self care  Dysphagia - Cortrak tube feeds  Bladder - estrada catheter  Seizure prophylaxis - Keppra  GI prophylaxis - Pepcid/ lansoprazole  DVT prophylaxis - SCDs    Subjective     History of Present Illness    Subjective  Tolerating therapies.  She move the fingers a little bit and feels like showing little bit more improvement.  We reviewed plan is to continue Decadron 4 mg twice a day through her radiation course and then look at adjustment per radiation oncology.  We also discussed adding electrical stimulation to the left upper extremity with Occupational Therapy     Patient reviewed with radiation oncology    History taken from: patient    Objective     Vital Signs  Temp:  [98.1 °F (36.7 °C)-98.8 °F (37.1 °C)] 98.2 °F (36.8 °C)  Heart Rate:  [71-88] 71  Resp:  [14-16] 16  BP: (118-126)/(75-85) 125/78    Objective:  Vital signs: (most recent): Blood pressure 125/78, pulse 71, temperature 98.2 °F (36.8 °C), temperature source Oral, resp. rate 16, height 165.1 cm (65\"), weight 45.9 kg (101 lb 3.1 oz), SpO2 98 %, not currently breastfeeding.            Physical Exam  MENTAL STATUS -  AWAKE / ALERT  HEENT- RIGHT CRANIOTOMY INCISION CDI  RIGHT PTOSIS,  SCLERAE ANICTERIC, CONJUNCTIVAE PINK  LEFT FACIAL DROOP,     OP-thrush improved  DYSPHONIA-significantly better-easy to understand.  No delay in her speech  CORTRAK FEEDING TUBE  LUNGS -normal respiration  HEART- RRR   ABD -   SOFT, NT.       EXT - NO EDEMA OR CYANOSIS  NEURO - AWAKE, good processing speed.  " Very mild dysarthria    EOMI. RIGHT PTOSIS. LEFT FACIAL DROOP.    MOTOR EXAM - RUE/RLE 5/5.  Left upper extremity trace elbow flexion, no active finger flexion.  She has proximal movement at the left lower extremity for hip adduction and also knee extension 3-/5, no ankle dorsiflexion    Results Review:     I reviewed the patient's new clinical results.  No results found for: POCGLU  Results from last 7 days   Lab Units 08/03/20 0536 07/31/20  1023 07/27/20  1030   WBC 10*3/mm3 10.02 13.48* 13.03*   HEMOGLOBIN g/dL 14.2 14.7 14.9   HEMATOCRIT % 41.4 43.5 42.9   PLATELETS 10*3/mm3 222 255 307     Results from last 7 days   Lab Units 08/03/20 0536 07/31/20  1023 07/27/20  1030   SODIUM mmol/L 132* 138 135*   POTASSIUM mmol/L 4.4 4.3 4.0   CHLORIDE mmol/L 96* 97* 97*   CO2 mmol/L 30.6* 31.0* 26.6   BUN mg/dL 19 18 22*   CREATININE mg/dL 0.54* 0.51* 0.54*   CALCIUM mg/dL 9.0 9.1 8.8   GLUCOSE mg/dL 100* 90 120*       Medication Review: done  Scheduled Meds:    clotrimazole 10 mg Oral TID   dexamethasone 4 mg Oral Q12H   famotidine 20 mg Nasogastric BID   levETIRAcetam 500 mg Oral Q12H   melatonin 1 mg Oral Nightly   temozolomide 10 mg Oral Nightly   temozolomide 100 mg Oral Nightly     Continuous Infusions:   PRN Meds:.•  acetaminophen **OR** acetaminophen **OR** [DISCONTINUED] acetaminophen  •  amitriptyline  •  diclofenac  •  hydrOXYzine pamoate  •  [DISCONTINUED] HYDROmorphone **AND** naloxone  •  nystatin  •  ondansetron **OR** ondansetron  •  polyethylene glycol  •  potassium chloride **OR** potassium chloride **OR** potassium chloride  •  senna-docusate sodium  •  sodium chloride      Assessment/Plan       Brain tumor (CMS/HCC)    Hemiplegia (CMS/HCC)      Assessment & Plan  Glioblastoma -  -MRI of the brain on June 26, 2020 showed two enhancing masses - 2.6 cm from right lateral thalamic margin into right side of midbrain and chilango and 7 mm in central chilango also on the right. Surrounding vasogenic edema was also  noted.   -July 20-oncology plans for radiation therapy/Temodar  -July 23-to start radiation therapy today and also start Temodar  July 24-tolerating initial radiation therapy        S/p right craniotomy for excisional biopsy July 1, 2020     Steroid-  July 17-Decadron 4 mg twice a day added by medical oncology today  July 20-significant improvement in dysphonia/voicing/also with proximal in the left lower extremity on Decadron 4 mg twice a day.  July 29-continues to show significant improvement on steroids.  August 3-reviewed with radiation oncology-to continue on Decadron 4 mg twice a day through radiation therapy.    Leukocytosis - on steroids   -monitor        Left hemiplegia-improved  Bioness/electrical stimulation left upper extremity     Dysarthria-improved     Dysphonia-improved     Right ptosis-unchanged     Impaired cognition/impaired mobility/ impaired self care     Dysphagia -improved-started on p.o. intake and tube feeds discontinued  July 24-adjust diet-regular with thins. Needs set-up assistance (cutting up food, opening packages).        Bladder - estrada catheter- Was removed on acute care wing and replaced  July 10-not clear why the Estrada catheter was replaced at the acute care unit.  Will look at possibly trying to discontinue again on Monday a.m.  July 11-Estrada catheter discontinued   July 12- Order U/A C&S. Cut PVRs to BID  July 13-urine culture results pending  July 15-post void residuals low.  Urine culture no growth    Skin-July 13-discomfort from rash at perineal area/buttocks-has been using barrier cream.  Will try Mycostatin antifungal creamSeizure prophylaxis - Keppra    Thrush-Mycelex troches     GI prophylaxis - Pepcid      DVT prophylaxis - SCDs    Anxiety - Add PRN Hydroxizine    Constipation - Add scheduled Senna and PRN Miralax. Monitor    Rehab-  Patient denies any headache.  Denies any vision complaints.  Continues with right ptosis.  Still with dysphonia.  She complains of feeling  hungry all the time.  Dietitian is 1 to meet with her regarding the regimen on the tube feeds.  Noted to have thrush and nystatin added.  With bathing moderate assist of 2 for standing.  Upper body dressing max assist.  Lower body dressing dependent.  Grooming moderate assist.  Toileting max dependent x2.  Bed mobility max assist.  Transfers moderate assist of 2 squat pivot to the wheelchair.  Sit to stand moderate max assist of 2.  Speech therapy feels she is ready for swallow study.  She is voiding with low postvoid residuals.  Oncology to have family meeting tomorrow regarding goals of care/plans.     Now admit for comprehensive acute inpatient rehabilitation .  This would be an interdisciplinary program with physical therapy 1 hour,  occupational therapy 1 hour, and speech therapy 1 hour, 5 days a week.  Rehabilitation nursing for carryover, monitoring of  Nutritional status and neurologic   status, bowel and bladder, and skin  Ongoing physician follow-up.  Weekly team conferences.  Goals are indeterminate   Rehabilitation prognosis indeterminate.  Medical prognosis defer to oncology.  Estimated length of stay is indeterminate.   The patient's functional status and clinical status is unchanged from preadmission assessment and the patient continues appropriate for acute inpatient rehabilitation.  Goal is for home with  Home health   therapies.  Barrier to discharge:  Left hemiplegia/dsphagia  - work on  Trunk control/ mobility / ADLs/ swallow   to overcome.      TEAM CONF July 16 - BETTER TRUNK CONTROL YESTERDAY.BED MAX ASSIST TO SIT. MOD ASSIST TO SUPINE. TRANSFERS MAX ASSIST. BATH MOD 2 FOR STANDING. UBD MAX ASSIST. LBD DEP. GROOMING MOD ASSIST. TOILETING MAX-DEP X 2. COGNITION - STM WITH MIN CUES. MILD DIFFICULTY WITH HIGH LEVEL WORD FINDING TASK. VFSS TODAY. SCALP INCISION HEALING. BLADDER/BOWEL - CONTINENT/INCONTINENT. MULTIPLE SMALL BMS YESTERDAY. SKIN - THRUSH - NYSTATIN. BUTTOCK RASH ANTI-FUNGAL  CREAM.  ELOS - NUTRITION - COMPLAINS OF LACK OF SATIETY. TO TRY BOLUS OVER PUMP WITH CORTRAK TO SEE IF HELPS WITH SATIETY AND ABLE TO TOLERATE.  ELOS - THREE WEEKS    TEAM CONF - July 23 - TRANSFERS MIN-MOD ASSIST. GAIT 20 FEET MOD 2 WITH ACE WRAP DORSIFLEX ASSIST WITH MANUAL ADVANCEMENT OF LLE. SITTING BALANCE BETTER. MORE ATTENTIVE. VERY IMPULSIVE. LEFT INATTENTION. BATH MOD. UBD MAX. LBD DEP. GROOMING MOD ASSIST. TOILETING MAX ASSIST. COGNITION - BETTER RECALL. MIN-MOD CUES FOR THOUGHT ORGANIZATION AND ATTENTION TO LEFT. SWALLOW - MECH SOFT, THIN LIQUIDS, NO MIXED CONSISTENCY.EATING WELL. WILL D/C CORTRAK TUBE. BNE - SEE REPORT. ANXIETY - RELAXATION TECHNIQUES  ELOS - TWO WEEKS PER TEAM , PATIENT WOULD LIKE HOME BY BIRTHDAY July 29 BUT PATIENT LACKS INSIGHT INTO DEGREE OF DEFICITS.    TEAM CONF - JULY 30 - BED CTG-MIN. TRANSFERS MIN. CAR TRANSFERS MIN-MOD. GAIT 30 FEET HEMIWALKER, LEFT AFO, MOD 2. TOILET TRANSFERS MIN-MOD. SHOWER MOD. BATH MOD. UBD MOD. LBD MAX. GROOMING MIN. MIN CUES FOR RECALL AT PARAGRAPH LEVEL. SWALLOW - MECH SOFT, NO MIXED, THIN LIQIDS. BOWEL AND BLADDER - CONTINENT AND INCONTINENT. CONTINUES ON DECADRON 4 MG BID.   ELOS -  ONE WEEK       King Morrsi MD  08/03/20  10:04    Time:     During rounds, used appropriate personal protective equipment including mask and gloves.  Additional gown if indicated.  Mask used was standard procedure mask. Appropriate PPE was worn during the entire visit.  Hand hygiene was completed before and after.

## 2020-08-03 NOTE — PLAN OF CARE
Problem: Patient Care Overview  Goal: Plan of Care Review  Outcome: Ongoing (interventions implemented as appropriate)  Flowsheets (Taken 8/3/2020 1510)  Outcome Summary: AAO x 4. Tx x 1. transfers better. Radiation this afternoon.  Progress, Functional Goals: demonstrating adequate progress  Plan of Care Reviewed With: patient  IRF Plan of Care Review: progress ongoing, continue

## 2020-08-04 NOTE — PROGRESS NOTES
Inpatient Rehabilitation Plan of Care Note    Plan of Care  Care Plan Reviewed - No updates at this time.    Safety    Performed Intervention(s)  Fall precautions: bed/chair alarms, bed low and locked, nonskid socks, call  light within reach, personal items within reach.  Hourly rounding.  Uses call light appropriately  Environmental set-up to reduce risk      Psychosocial    Performed Intervention(s)  provide relaxation and distraction as needed.  Allow extra time for pt to verbalize needs, concerns,  feelings, etc.  Support family      Body Systems    Performed Intervention(s)  Check patient for incontinence regularly. Keep skin clean and dry as much as  possible.  Nystatin cream to buttocks prn  Skin assessment q shift and prn.      Sphincter Control    Performed Intervention(s)  Monitor I/O.  Bowel meds as needed.  Bladder scan and intermittent cath, as ordered prn.      Nutrition    Performed Intervention(s)  Daily weights.  Monitor I/O.  HOB 30 degrees or greater during nighttime tube feeding.    Signed by: Danya Graf RN

## 2020-08-04 NOTE — PROGRESS NOTES
"   LOS: 26 days   Patient Care Team:  Paz Mera MD as PCP - General (Family Medicine)  Carlota Peacock MD as Consulting Physician (Radiation Oncology)    Chief Complaint:   Glioblastoma   S/p July 1, 2020 - Right temporal craniotomy with excisional biopsy of mass in the right cerebral peduncle and placement of a lumbar drain  Steroid taper - medrol dose pack  Left hemiplegia  Dysarthria  Dysphonia  Right ptosis  Impaired cognition/impaired mobility/ impaired self care  Dysphagia - Cortrak tube feeds  Bladder - estrada catheter  Seizure prophylaxis - Keppra  GI prophylaxis - Pepcid/ lansoprazole  DVT prophylaxis - SCDs    Subjective     History of Present Illness    Subjective  Tolerating therapies.  Tolerating radiation therapy.  Strength on the left side is about the same.  Discussed with PT and will order a left carbon fiber ankle-foot orthosis to help with her transfers at home for ankle stability      History taken from: patient    Objective     Vital Signs  Temp:  [97.3 °F (36.3 °C)-98.2 °F (36.8 °C)] 97.3 °F (36.3 °C)  Heart Rate:  [71-77] 71  Resp:  [16-18] 18  BP: (128-135)/(83-86) 135/86    Objective:  Vital signs: (most recent): Blood pressure 135/86, pulse 71, temperature 97.3 °F (36.3 °C), temperature source Oral, resp. rate 18, height 165.1 cm (65\"), weight 45.4 kg (100 lb 1.4 oz), SpO2 99 %, not currently breastfeeding.            Physical Exam  MENTAL STATUS -  AWAKE / ALERT  HEENT- RIGHT CRANIOTOMY INCISION CDI  RIGHT PTOSIS,  SCLERAE ANICTERIC, CONJUNCTIVAE PINK  LEFT FACIAL DROOP,     OP-thrush improved  DYSPHONIA-significantly better-easy to understand.  No delay in her speech  CORTRAK FEEDING TUBE  LUNGS -normal respiration  HEART- RRR   ABD -   SOFT, NT.       EXT - NO EDEMA OR CYANOSIS  NEURO - AWAKE, good processing speed.  Very mild dysarthria    EOMI. RIGHT PTOSIS. LEFT FACIAL DROOP.    MOTOR EXAM - RUE/RLE 5/5.  Left upper extremity trace elbow flexion, no active finger flexion.  She " has proximal movement at the left lower extremity for hip adduction and also knee extension 3-/5, no ankle dorsiflexion    Results Review:     I reviewed the patient's new clinical results.  No results found for: POCGLU  Results from last 7 days   Lab Units 08/03/20  0536 07/31/20  1023   WBC 10*3/mm3 10.02 13.48*   HEMOGLOBIN g/dL 14.2 14.7   HEMATOCRIT % 41.4 43.5   PLATELETS 10*3/mm3 222 255     Results from last 7 days   Lab Units 08/03/20  0536 07/31/20  1023   SODIUM mmol/L 132* 138   POTASSIUM mmol/L 4.4 4.3   CHLORIDE mmol/L 96* 97*   CO2 mmol/L 30.6* 31.0*   BUN mg/dL 19 18   CREATININE mg/dL 0.54* 0.51*   CALCIUM mg/dL 9.0 9.1   GLUCOSE mg/dL 100* 90       Medication Review: done  Scheduled Meds:    clotrimazole 10 mg Oral TID   dexamethasone 4 mg Oral Q12H   famotidine 20 mg Nasogastric BID   levETIRAcetam 500 mg Oral Q12H   melatonin 1 mg Oral Nightly   sulfamethoxazole-trimethoprim 1 tablet Oral Once per day on Mon Wed Fri   temozolomide 10 mg Oral Nightly   temozolomide 100 mg Oral Nightly     Continuous Infusions:   PRN Meds:.•  acetaminophen **OR** acetaminophen **OR** [DISCONTINUED] acetaminophen  •  amitriptyline  •  diclofenac  •  hydrOXYzine pamoate  •  [DISCONTINUED] HYDROmorphone **AND** naloxone  •  nystatin  •  ondansetron **OR** ondansetron  •  polyethylene glycol  •  potassium chloride **OR** potassium chloride **OR** potassium chloride  •  senna-docusate sodium  •  sodium chloride      Assessment/Plan       Brain tumor (CMS/HCC)    Hemiplegia (CMS/HCC)      Assessment & Plan  Glioblastoma -  -MRI of the brain on June 26, 2020 showed two enhancing masses - 2.6 cm from right lateral thalamic margin into right side of midbrain and chilango and 7 mm in central chilango also on the right. Surrounding vasogenic edema was also noted.   -July 20-oncology plans for radiation therapy/Temodar  -July 23-to start radiation therapy today and also start Temodar  -July 24-tolerating initial radiation therapy  -  August 4 - Staredt Bactrim for PCP prophylaxis 1 DS daily MWF given lymphocytopenia on therapy        S/p right craniotomy for excisional biopsy July 1, 2020     Steroid-  July 17-Decadron 4 mg twice a day added by medical oncology today  July 20-significant improvement in dysphonia/voicing/also with proximal in the left lower extremity on Decadron 4 mg twice a day.  July 29-continues to show significant improvement on steroids.  August 3-reviewed with radiation oncology-to continue on Decadron 4 mg twice a day through radiation therapy.    Leukocytosis - on steroids   -monitor        Left hemiplegia-improved  Bioness/electrical stimulation left upper extremity  Left carbon fiber ankle-foot orthosis ordered to help with stability of the ankle for transfers at home     Dysarthria-improved     Dysphonia-improved     Right ptosis-unchanged     Impaired cognition/impaired mobility/ impaired self care     Dysphagia -improved-started on p.o. intake and tube feeds discontinued  July 24-adjust diet-regular with thins. Needs set-up assistance (cutting up food, opening packages).        Bladder - estrada catheter- Was removed on acute care wing and replaced  July 10-not clear why the Estrada catheter was replaced at the acute care unit.  Will look at possibly trying to discontinue again on Monday a.m.  July 11-Estrada catheter discontinued   July 12- Order U/A C&S. Cut PVRs to BID  July 13-urine culture results pending  July 15-post void residuals low.  Urine culture no growth    Skin-July 13-discomfort from rash at perineal area/buttocks-has been using barrier cream.  Will try Mycostatin antifungal creamSeizure prophylaxis - Keppra    Thrush-Mycelex troches     GI prophylaxis - Pepcid      DVT prophylaxis - SCDs    Anxiety - Add PRN Hydroxizine    Constipation - Add scheduled Senna and PRN Miralax. Monitor    Rehab-  Patient denies any headache.  Denies any vision complaints.  Continues with right ptosis.  Still with dysphonia.   She complains of feeling hungry all the time.  Dietitian is 1 to meet with her regarding the regimen on the tube feeds.  Noted to have thrush and nystatin added.  With bathing moderate assist of 2 for standing.  Upper body dressing max assist.  Lower body dressing dependent.  Grooming moderate assist.  Toileting max dependent x2.  Bed mobility max assist.  Transfers moderate assist of 2 squat pivot to the wheelchair.  Sit to stand moderate max assist of 2.  Speech therapy feels she is ready for swallow study.  She is voiding with low postvoid residuals.  Oncology to have family meeting tomorrow regarding goals of care/plans.     Now admit for comprehensive acute inpatient rehabilitation .  This would be an interdisciplinary program with physical therapy 1 hour,  occupational therapy 1 hour, and speech therapy 1 hour, 5 days a week.  Rehabilitation nursing for carryover, monitoring of  Nutritional status and neurologic   status, bowel and bladder, and skin  Ongoing physician follow-up.  Weekly team conferences.  Goals are indeterminate   Rehabilitation prognosis indeterminate.  Medical prognosis defer to oncology.  Estimated length of stay is indeterminate.   The patient's functional status and clinical status is unchanged from preadmission assessment and the patient continues appropriate for acute inpatient rehabilitation.  Goal is for home with  Home health   therapies.  Barrier to discharge:  Left hemiplegia/dsphagia  - work on  Trunk control/ mobility / ADLs/ swallow   to overcome.      TEAM CONF July 16 - BETTER TRUNK CONTROL YESTERDAY.BED MAX ASSIST TO SIT. MOD ASSIST TO SUPINE. TRANSFERS MAX ASSIST. BATH MOD 2 FOR STANDING. UBD MAX ASSIST. LBD DEP. GROOMING MOD ASSIST. TOILETING MAX-DEP X 2. COGNITION - STM WITH MIN CUES. MILD DIFFICULTY WITH HIGH LEVEL WORD FINDING TASK. VFSS TODAY. SCALP INCISION HEALING. BLADDER/BOWEL - CONTINENT/INCONTINENT. MULTIPLE SMALL BMS YESTERDAY. SKIN - THRUSH - NYSTATIN. BUTTOCK  RASH ANTI-FUNGAL CREAM.  ELOS - NUTRITION - COMPLAINS OF LACK OF SATIETY. TO TRY BOLUS OVER PUMP WITH CORTRAK TO SEE IF HELPS WITH SATIETY AND ABLE TO TOLERATE.  ELOS - THREE WEEKS    TEAM CONF - July 23 - TRANSFERS MIN-MOD ASSIST. GAIT 20 FEET MOD 2 WITH ACE WRAP DORSIFLEX ASSIST WITH MANUAL ADVANCEMENT OF LLE. SITTING BALANCE BETTER. MORE ATTENTIVE. VERY IMPULSIVE. LEFT INATTENTION. BATH MOD. UBD MAX. LBD DEP. GROOMING MOD ASSIST. TOILETING MAX ASSIST. COGNITION - BETTER RECALL. MIN-MOD CUES FOR THOUGHT ORGANIZATION AND ATTENTION TO LEFT. SWALLOW - MECH SOFT, THIN LIQUIDS, NO MIXED CONSISTENCY.EATING WELL. WILL D/C CORTRAK TUBE. BNE - SEE REPORT. ANXIETY - RELAXATION TECHNIQUES  EL[facility] - TWO WEEKS PER TEAM , PATIENT WOULD LIKE HOME BY BIRTHDAY July 29 BUT PATIENT LACKS INSIGHT INTO DEGREE OF DEFICITS.    TEAM CONF - JULY 30 - BED CTG-MIN. TRANSFERS MIN. CAR TRANSFERS MIN-MOD. GAIT 30 FEET HEMIWALKER, LEFT AFO, MOD 2. TOILET TRANSFERS MIN-MOD. SHOWER MOD. BATH MOD. UBD MOD. LBD MAX. GROOMING MIN. MIN CUES FOR RECALL AT PARAGRAPH LEVEL. SWALLOW - MECH SOFT, NO MIXED, THIN LIQIDS. BOWEL AND BLADDER - CONTINENT AND INCONTINENT. CONTINUES ON DECADRON 4 MG BID.   ELOS -  ONE WEEK       King Morris MD  08/04/20  08:52    Time:     During rounds, used appropriate personal protective equipment including mask and gloves.  Additional gown if indicated.  Mask used was standard procedure mask. Appropriate PPE was worn during the entire visit.  Hand hygiene was completed before and after.

## 2020-08-04 NOTE — THERAPY TREATMENT NOTE
Inpatient Rehabilitation - Occupational Therapy Treatment Note    [facility]     Patient Name: Denise Guajardo  : 1965  MRN: [id number]    Today's Date: 2020                 Admit Date: 2020      Visit Dx:    ICD-10-CM ICD-9-CM   1. Glioblastoma multiforme (CMS/HCC) C71.9 191.9       Patient Active Problem List   Diagnosis   • Glioblastoma multiforme (CMS/HCC)   • Intracranial hemorrhage (CMS/HCC)   • Dysphagia   • Acute left-sided weakness   • Brain tumor (CMS/HCC)   • Hemiplegia (CMS/HCC)         Therapy Treatment    IRF Treatment Summary     Row Name 20             Evaluation/Treatment Time and Intent    Subjective Information  no complaints  -AF      Existing Precautions/Restrictions  fall  -AF      Document Type  therapy note (daily note)  -AF      Mode of Treatment  occupational therapy  -AF      Patient/Family Observations  sitting up w/c in AM and PM in room   -AF      Recorded by [AF] Yarely Maldonado OTLUZ MARIA      Row Name 20             Cognition/Psychosocial- PT/OT    Affect/Mental Status (Cognitive)  WFL  -AF      Orientation Status (Cognition)  oriented x 3  -AF      Follows Commands (Cognition)  follows one step commands;over 90% accuracy  -AF      Personal Safety Interventions  fall prevention program maintained;gait belt;nonskid shoes/slippers when out of bed  -AF      Cognitive Function (Cognitive)  safety deficit  -AF      Safety Deficit (Cognitive)  insight into deficits/self awareness  -AF      Recorded by [AF] Yarely Maldonado OTR      Row Name 20             Bed Mobility Assessment/Treatment    Comment (Bed Mobility)  up in w/c   -AF      Recorded by [AF] Yarely Maldonado OTR      Row Name 20 1513             Transfer Assessment/Treatment    Comment (Transfers)  EOM <> w/c MIN stand pivot  -AF      Recorded by [AF] Yarely Maldonado OTR      Row Name 20 1513             Toilet Transfer    Type (Toilet Transfer)  stand pivot/stand step  -AF       [place] Level (Toilet Transfer)  minimum assist (75% patient effort)  -AF      Assistive Device (Toilet Transfer)  commode;grab bars/safety frame;wheelchair completed with   -AF      Recorded by [AF] Yarely Maldonado, OTR      Row Name 08/04/20 1513             Bathing Assessment/Treatment    Bathing [place] Level  bathing skills;moderate assist (50% patient effort);minimum assist (75% patient effort);verbal cues  -AF      Bathing Position  supported sitting;supported standing;sink side  -AF      Bathing Setup Assistance  obtain supplies  -AF      Recorded by [AF] Yarely Maldonado, OTR      Row Name 08/04/20 1513             Upper Body Dressing Assessment/Treatment    Upper Body Dressing Task  upper body dressing skills;moderate assist (50-74% patient effort);verbal cues  -AF      Upper Body Dressing Position  supported sitting  -AF      Set-up Assistance (Upper Body Dressing)  obtain clothing  -AF      Comment (Upper Body Dressing)  jakub tech  -AF      Recorded by [AF] Yarely Maldonado, OTR      Row Name 08/04/20 1513             Lower Body Dressing Assessment/Treatment    Lower Body Dressing [place] Level  doff;don;pants/bottoms;shoes/slippers;socks;underwear;moderate assist (50% patient effort);verbal cues  -AF      Lower Body Dressing Position  supported sitting;supported standing  -AF      Lower Body Dressing Setup Assistance  obtain clothing  -AF      Comment (Lower Body Dressing)  jakub tech  -AF      Recorded by [AF] Yarely Maldonado, OTR      Row Name 08/04/20 1513             Grooming Assessment/Treatment    Grooming [place] Level  grooming skills;minimum assist (75% patient effort)  -AF      Grooming Position  supported sitting;sink side  -AF      Grooming Setup Assistance  obtain supplies  -AF      Recorded by [AF] Yarely Maldonado, OTR      Row Name 08/04/20 1513             Toileting Assessment/Treatment    Toileting [place] Level  toileting skills;maximum assist (25% patient  effort);moderate assist (50% patient effort);verbal cues  -AF      Toileting Position  supported sitting;supported standing  -AF      Comment (Toileting)  chemo precautions   -AF      Recorded by [AF] Yarely Maldonado, THAOR      Row Name 08/04/20 1513             Vision Assessment/Intervention    Visual Processing Deficit  jakub-inattention/neglect, left  -AF      Recorded by [AF] Yarely Maldonado, OTR      Row Name 08/04/20 1513             Pain Scale: Numbers Pre/Post-Treatment    Pain Scale: Numbers, Pretreatment  0/10 - no pain  -AF      Pain Scale: Numbers, Post-Treatment  0/10 - no pain  -AF      Recorded by [AF] Yarely Maldonado, OTR      Row Name 08/04/20 1513             Static Sitting Balance    Level of [place] (Unsupported Sitting, Static Balance)  standby assist  -AF      Sitting Position (Unsupported Sitting, Static Balance)  sitting edge of mat  -AF      Time Able to Maintain Position (Unsupported Sitting, Static Balance)  more than 5 minutes  -AF      Recorded by [AF] Yarely Maldonado, THAOR      Row Name 08/04/20 1513             Neuromuscular Re-education    Activities/Techniques Used (Neuromuscular Re-education)  facilitation/inhibition  -AF      Positions Used (Neuromuscular Re-education)  unsupported;sitting  -AF      Equipment Used (Neuromuscular Re-education)  NMES on L wrist extensors   -AF      Comment (Neuromuscular Re-education)  with gross grasp/release with L hand crossing midline attending to L side  -AF      Recorded by [AF] Yarely Maldonado, THAOR      Row Name 08/04/20 1513             NMES/FES/Bioness    Location  L wrist extensors  -AF      Position  seated unsupported on EOM  -AF      Treatment Duration  20 mins  -AF      Intensity  20  -AF      Activity  tolerated well no skin or pain issues ntoed   -AF      Recorded by [AF] Yarely Maldonado, OTR      Row Name 08/04/20 1513             Positioning and Restraints    Pre-Treatment Position  sitting in chair/recliner  -AF      Post Treatment  Position  wheelchair  -AF      In Wheelchair  sitting;exit alarm on;with PT;with SLP with SLP in AM, with PT in PM  -AF      Recorded by [AF] Yarely Maldonado, OTR        User Key  (r) = Recorded By, (t) = Taken By, (c) = Cosigned By    Initials Name Effective Dates    AF Yarely Maldonado, OTR 04/14/20 -           Wound 07/01/20 1156 Right head Incision (Active)   Dressing Appearance open to air 8/4/2020  8:09 AM   Closure Liquid skin adhesive;Approximated 8/4/2020  8:09 AM   Base dry;clean 8/4/2020  8:09 AM   Periwound intact;dry;swelling 8/4/2020  8:09 AM   Periwound Temperature warm 8/4/2020  8:09 AM   Periwound Skin Turgor soft 8/4/2020  8:09 AM   Drainage Amount none 8/4/2020  8:09 AM   Dressing Care, Wound open to air 8/3/2020  9:45 PM         OT Recommendation and Plan                     Occupational Therapy Education                 Title: PT OT SLP Therapies (In Progress)     Topic: Occupational Therapy (Done)     Point: ADL training (Done)     Description:   Instruct learner(s) on proper safety adaptation and remediation techniques during self care or transfers.   Instruct in proper use of assistive devices.              Learning Progress Summary           Patient Acceptance, E, VU by AF at 7/31/2020 1513    Comment:  Pt and family participated in meeting with rehab team recommend outpatient OT, discussed ADL level and HEP. will continue with discharge teaching    Acceptance, E,TB,D, VU by AF at 7/31/2020 1512    Comment:  son participated in teaching with commode transfers and toileting tasks, educated on SROM/HEP    Acceptance, E, VU by  at 7/31/2020 1143    Comment:  Family conf- discussed improvements in cognition, voicing, swallow, diet, swallow strategies;  Rec: cont tx at d/c    Acceptance, E, VU,NR by AF at 7/20/2020 1531    Comment:  edcuated on safety with ADl tasks    Acceptance, E,TB,D, NR,DU,VU by AF at 7/16/2020 1230    Comment:  with bed moblity and pulling up in bed    Acceptance, E, VU by  RD at 7/10/2020 1529    Comment:  OT educ on OT role in therapeutic process and pt's POC.   Family Acceptance, E, VU by AF at 7/31/2020 1513    Comment:  Pt and family participated in meeting with rehab team recommend outpatient OT, discussed ADL level and HEP. will continue with discharge teaching    Acceptance, E,TB,D, VU by AF at 7/31/2020 1512    Comment:  son participated in teaching with commode transfers and toileting tasks, educated on SROM/HEP    Acceptance, E, VU by SL at 7/31/2020 1143    Comment:  Family conf- discussed improvements in cognition, voicing, swallow, diet, swallow strategies;  Rec: cont tx at d/c   Significant Other Acceptance, E,TB,D, NR,DU,VU by AF at 7/16/2020 1230    Comment:  with bed moblity and pulling up in bed                   Point: Home exercise program (Done)     Description:   Instruct learner(s) on appropriate technique for monitoring, assisting and/or progressing therapeutic exercises/activities.              Learning Progress Summary           Patient Acceptance, E, VU by AF at 7/31/2020 1513    Comment:  Pt and family participated in meeting with rehab team recommend outpatient OT, discussed ADL level and HEP. will continue with discharge teaching    Acceptance, E, VU by SL at 7/31/2020 1143    Comment:  Family conf- discussed improvements in cognition, voicing, swallow, diet, swallow strategies;  Rec: cont tx at d/c   Family Acceptance, E, VU by AF at 7/31/2020 1513    Comment:  Pt and family participated in meeting with rehab team recommend outpatient OT, discussed ADL level and HEP. will continue with discharge teaching    Acceptance, E, VU by SL at 7/31/2020 1143    Comment:  Family conf- discussed improvements in cognition, voicing, swallow, diet, swallow strategies;  Rec: cont tx at d/c                   Point: Precautions (Done)     Description:   Instruct learner(s) on prescribed precautions during self-care and functional transfers.              Learning Progress  Summary           Patient Acceptance, E,TB,D, DU,NR by AF at 8/3/2020 1537    Comment:  educated on use and benefits of NMES for neuro recovery    Acceptance, E, VU by AF at 7/31/2020 1513    Comment:  Pt and family participated in meeting with rehab team recommend outpatient OT, discussed ADL level and HEP. will continue with discharge teaching    Acceptance, E, VU by SL at 7/31/2020 1143    Comment:  Family conf- discussed improvements in cognition, voicing, swallow, diet, swallow strategies;  Rec: cont tx at d/c    Acceptance, E, VU by RD at 7/10/2020 1529    Comment:  OT educ on OT role in therapeutic process and pt's POC.   Family Acceptance, E, VU by AF at 7/31/2020 1513    Comment:  Pt and family participated in meeting with rehab team recommend outpatient OT, discussed ADL level and HEP. will continue with discharge teaching    Acceptance, E, VU by MARRY at 7/31/2020 1143    Comment:  Family conf- discussed improvements in cognition, voicing, swallow, diet, swallow strategies;  Rec: cont tx at d/c                   Point: Body mechanics (Done)     Description:   Instruct learner(s) on proper positioning and spine alignment during self-care, functional mobility activities and/or exercises.              Learning Progress Summary           Patient Acceptance, E, VU by AF at 7/31/2020 1513    Comment:  Pt and family participated in meeting with rehab team recommend outpatient OT, discussed ADL level and HEP. will continue with discharge teaching    Acceptance, E, VU by MARRY at 7/31/2020 1143    Comment:  Family conf- discussed improvements in cognition, voicing, swallow, diet, swallow strategies;  Rec: cont tx at d/c    Acceptance, E, VU by RD at 7/10/2020 1529    Comment:  OT educ on OT role in therapeutic process and pt's POC.   Family Acceptance, E, VU by AF at 7/31/2020 1513    Comment:  Pt and family participated in meeting with rehab team recommend outpatient OT, discussed ADL level and HEP. will continue with  discharge teaching    YVONNE Mark VU by MARRY at 7/31/2020 1143    Comment:  Family conf- discussed improvements in cognition, voicing, swallow, diet, swallow strategies;  Rec: cont tx at d/c                               User Key     Initials Effective Dates Name Provider Type Discipline    MARRY 06/08/18 -  Celia Islas, MS CCC-SLP Speech and Language Pathologist SLP    AF 04/14/20 -  Yarely Maldonado, OTR Occupational Therapist OT    RD 10/14/19 -  Celsa Hamilton, THAO Occupational Therapist OT                       Time Calculation:     Time Calculation- OT     Row Name 08/04/20 1520 08/04/20 1518          Time Calculation- OT    OT Start Time  1330  -AF  0800  -AF     OT Stop Time  1400  -AF  0830  -AF     OT Time Calculation (min)  30 min  -AF  30 min  -AF       User Key  (r) = Recorded By, (t) = Taken By, (c) = Cosigned By    Initials Name Provider Type    AF Yarely Maldonado, OTR Occupational Therapist          Therapy Charges for Today     Code Description Service Date Service Provider Modifiers Qty    26220697574 HC OT SELF CARE/MGMT/TRAIN EA 15 MIN 8/3/2020 Yarely Maldonado, OTR GO 2    39457370445 HC OT NEUROMUSC RE EDUCATION EA 15 MIN 8/3/2020 Yarely Maldonado, OTR GO 2    66313880369 HC OT SELF CARE/MGMT/TRAIN EA 15 MIN 8/4/2020 Yarely Maldonado, OTR GO 2    32042816095 HC OT NEUROMUSC RE EDUCATION EA 15 MIN 8/4/2020 Yarely Maldonado, OTR GO 2                   MARCIAL Red  8/4/2020

## 2020-08-04 NOTE — THERAPY TREATMENT NOTE
Inpatient Rehabilitation - Physical Therapy Treatment Note  [facility]     Patient Name: Denise Guajardo  : 1965  MRN: [id number]    Today's Date: 2020                 Admit Date: 2020      Visit Dx:      ICD-10-CM ICD-9-CM   1. Glioblastoma multiforme (CMS/HCC) C71.9 191.9       Patient Active Problem List   Diagnosis   • Glioblastoma multiforme (CMS/HCC)   • Intracranial hemorrhage (CMS/HCC)   • Dysphagia   • Acute left-sided weakness   • Brain tumor (CMS/HCC)   • Hemiplegia (CMS/HCC)       Therapy Treatment    IRF Treatment Summary     Row Name 20 1513 20 0915          Evaluation/Treatment Time and Intent    Subjective Information  no complaints  -AF  no complaints  -LB     Existing Precautions/Restrictions  fall  -AF  fall chemo  -LB     Document Type  therapy note (daily note)  -AF  therapy note (daily note)  -LB     Mode of Treatment  occupational therapy  -AF  physical therapy  -LB     Patient/Family Observations  sitting up w/c in AM and PM in room   -AF  seated in w/c  -LB     Recorded by [AF] Yarely Maldonado, OTR [LB] Talita Hodges PTA     Row Name 20 1513             Cognition/Psychosocial- PT/OT    Affect/Mental Status (Cognitive)  WFL  -AF      Orientation Status (Cognition)  oriented x 3  -AF      Follows Commands (Cognition)  follows one step commands;over 90% accuracy  -AF      Personal Safety Interventions  fall prevention program maintained;gait belt;nonskid shoes/slippers when out of bed  -AF      Cognitive Function (Cognitive)  safety deficit  -AF      Safety Deficit (Cognitive)  insight into deficits/self awareness  -AF      Recorded by [AF] Yarely Maldonado OTR      Row Name 20 1513 20 0915          Bed Mobility Assessment/Treatment    Rolling Left [place] (Bed Mobility)  --  supervision;verbal cues  -LB     Rolling Right [place] (Bed Mobility)  --  supervision;verbal cues  -LB     Scooting/Bridging [place] (Bed Mobility)  --   supervision;verbal cues  -LB     Supine-Sit [place] (Bed Mobility)  --  contact guard  -LB     Sit-Supine [place] (Bed Mobility)  --  contact guard  -LB     Comment (Bed Mobility)  up in w/c   -AF  --     Recorded by [AF] Yarely Maldonado, OTR [LB] Talita Hodges PTA     Row Name 08/04/20 1513             Transfer Assessment/Treatment    Comment (Transfers)  EOM <> w/c MIN stand pivot  -AF      Recorded by [AF] Yarely Maldonado, OTR      Row Name 08/04/20 0915             Bed-Chair Transfer    Bed-Chair [place] (Transfers)  minimum assist (75% patient effort)  -LB      Assistive Device (Bed-Chair Transfers)  wheelchair  -LB      Recorded by [LB] Talita Hodges PTA      Row Name 08/04/20 0915             Chair-Bed Transfer    Chair-Bed [place] (Transfers)  minimum assist (75% patient effort)  -LB      Assistive Device (Chair-Bed Transfers)  wheelchair  -LB      Recorded by [LB] Talita Hodges, EDEN      Row Name 08/04/20 1513             Toilet Transfer    Type (Toilet Transfer)  stand pivot/stand step  -AF      [place] Level (Toilet Transfer)  minimum assist (75% patient effort)  -AF      Assistive Device (Toilet Transfer)  commode;grab bars/safety frame;wheelchair completed with   -AF      Recorded by [AF] Yarely Maldonado, OTR      Row Name 08/04/20 0915             Car Transfer    Type (Car Transfer)  stand pivot/stand step  -LB      [place] Level (Car Transfer)  minimum assist (75% patient effort)  -LB      Assistive Device (Car Transfer)  wheelchair  -LB      Recorded by [LB] Talita Hodges, EDEN      Row Name 08/04/20 0915             Gait/Stairs Assessment/Training    [place] Level (Gait)  moderate assist (50% patient effort);1 person to manage equipment  -LB      Assistive Device (Gait)  walker, jakub L AFO  -LB      Distance in Feet (Gait)  40 x 2  -LB      Pattern (Gait)  step-to;step-through  -LB      Deviations/Abnormal Patterns (Gait)  base of support,  wide;maya decreased  -LB      Bilateral Gait Deviations  forward flexed posture  -LB      Left Sided Gait Deviations  knee buckling, left side;knee hyperextension;weight shift ability decreased;leans right;foot drop/toe drag;heel strike decreased modA for l foot placement  -LB      Right Sided Gait Deviations  leans right;weight shift ability decreased  -LB      Recorded by [LB] Talita Hodges PTA      Row Name 08/04/20 1513             Bathing Assessment/Treatment    Bathing [place] Level  bathing skills;moderate assist (50% patient effort);minimum assist (75% patient effort);verbal cues  -AF      Bathing Position  supported sitting;supported standing;sink side  -AF      Bathing Setup Assistance  obtain supplies  -AF      Recorded by [AF] Yarely Maldonado, OTR      Row Name 08/04/20 1513             Upper Body Dressing Assessment/Treatment    Upper Body Dressing Task  upper body dressing skills;moderate assist (50-74% patient effort);verbal cues  -AF      Upper Body Dressing Position  supported sitting  -AF      Set-up Assistance (Upper Body Dressing)  obtain clothing  -AF      Comment (Upper Body Dressing)  jakub tech  -AF      Recorded by [AF] Yarely Maldonado, OTR      Row Name 08/04/20 1513             Lower Body Dressing Assessment/Treatment    Lower Body Dressing [place] Level  doff;don;pants/bottoms;shoes/slippers;socks;underwear;moderate assist (50% patient effort);verbal cues  -AF      Lower Body Dressing Position  supported sitting;supported standing  -AF      Lower Body Dressing Setup Assistance  obtain clothing  -AF      Comment (Lower Body Dressing)  jakub tech  -AF      Recorded by [AF] Yarely Maldonado, OTR      Row Name 08/04/20 1513             Grooming Assessment/Treatment    Grooming [place] Level  grooming skills;minimum assist (75% patient effort)  -AF      Grooming Position  supported sitting;sink side  -AF      Grooming Setup Assistance  obtain supplies  -AF      Recorded by  [AF] Yarely Maldonado, OTR      Row Name 08/04/20 1513             Toileting Assessment/Treatment    Toileting [place] Level  toileting skills;maximum assist (25% patient effort);moderate assist (50% patient effort);verbal cues  -AF      Toileting Position  supported sitting;supported standing  -AF      Comment (Toileting)  chemo precautions   -AF      Recorded by [AF] Yarely Maldonado, OTR      Row Name 08/04/20 1513             Vision Assessment/Intervention    Visual Processing Deficit  jakub-inattention/neglect, left  -AF      Recorded by [AF] Yarely Maldonado, OTR      Row Name 08/04/20 1513 08/04/20 0915          Pain Scale: Numbers Pre/Post-Treatment    Pain Scale: Numbers, Pretreatment  0/10 - no pain  -AF  0/10 - no pain  -LB     Pain Scale: Numbers, Post-Treatment  0/10 - no pain  -AF  --     Recorded by [AF] Yarely Maldonado, OTR [LB] Talita Hodges, EDEN     Row Name 08/04/20 1513             Static Sitting Balance    Level of [place] (Unsupported Sitting, Static Balance)  standby assist  -AF      Sitting Position (Unsupported Sitting, Static Balance)  sitting edge of mat  -AF      Time Able to Maintain Position (Unsupported Sitting, Static Balance)  more than 5 minutes  -AF      Recorded by [AF] Yarely Maldonado, OTR      Row Name 08/04/20 1513             Neuromuscular Re-education    Activities/Techniques Used (Neuromuscular Re-education)  facilitation/inhibition  -AF      Positions Used (Neuromuscular Re-education)  unsupported;sitting  -AF      Equipment Used (Neuromuscular Re-education)  NMES on L wrist extensors   -AF      Comment (Neuromuscular Re-education)  with gross grasp/release with L hand crossing midline attending to L side  -AF      Recorded by [AF] Yarely Maldonado, OTR      Row Name 08/04/20 1513             NMES/FES/Bioness    Location  L wrist extensors  -AF      Position  seated unsupported on EOM  -AF      Treatment Duration  20 mins  -AF      Intensity  20  -AF      Activity   tolerated well no skin or pain issues ntoed   -AF      Recorded by [AF] Yarely Maldonado, OTR      Row Name 08/04/20 1513 08/04/20 0915          Positioning and Restraints    Pre-Treatment Position  sitting in chair/recliner  -AF  --     Post Treatment Position  wheelchair  -AF  bed  -LB     In Bed  --  supine;call light within reach;exit alarm on  -LB     In Wheelchair  sitting;exit alarm on;with PT;with SLP with SLP in AM, with PT in PM  -AF  --     Recorded by [AF] Yarely Maldonado, OTR [LB] Talita Hodges PTA       User Key  (r) = Recorded By, (t) = Taken By, (c) = Cosigned By    Initials Name Effective Dates    LB Talita Hodges PTA 03/07/18 -     AF Yarely Maldonado, OTR 04/14/20 -         Wound 07/01/20 1156 Right head Incision (Active)   Dressing Appearance open to air 8/4/2020  8:09 AM   Closure Liquid skin adhesive;Approximated 8/4/2020  8:09 AM   Base dry;clean 8/4/2020  8:09 AM   Periwound intact;dry;swelling 8/4/2020  8:09 AM   Periwound Temperature warm 8/4/2020  8:09 AM   [place]ound Skin Turgor soft 8/4/2020  8:09 AM   Drainage Amount none 8/4/2020  8:09 AM   Dressing Care, Wound open to air 8/3/2020  9:45 PM     Physical Therapy Education                 Title: PT OT SLP Therapies (In Progress)     Topic: Physical Therapy (In Progress)     Point: Mobility training (In Progress)     Description:   Instruct learner(s) on safety and technique for assisting patient out of bed, chair or wheelchair.  Instruct in the proper use of assistive devices, such as walker, crutches, cane or brace.              Patient Friendly Description:   It's important to get you on your feet again, but we need to do so in a way that is safe for you. Falling has serious consequences, and your personal safety is the most important thing of all.        When it's time to get out of bed, one of us or a family member will sit next to you on the bed to give you support.     If your doctor or nurse tells you to use a walker,  crutches, a cane, or a brace, be sure you use it every time you get out of bed, even if you think you don't need it.    Learning Progress Summary           Patient Acceptance, E, NR by LB at 8/3/2020 1109    Acceptance, E, NR by LB at 8/1/2020 1220    Acceptance, E, VU by SL at 7/31/2020 1143    Comment:  Family conf- discussed improvements in cognition, voicing, swallow, diet, swallow strategies;  Rec: cont tx at d/c    Acceptance, E, NR by LB at 7/30/2020 1410    Acceptance, E, NR by LB at 7/29/2020 1058    Eager, E,D, NR by KP at 7/28/2020 0918    Comment:  Gait sequencing    Acceptance, E, NR by LB at 7/27/2020 0942    Acceptance, E,D, VU,DU by LB1 at 7/25/2020 1159    Comment:  wt shifting with ambulation    Acceptance, E, NR by LB at 7/23/2020 1514    Acceptance, E, NR by LB at 7/22/2020 1517    Acceptance, E, NR by LB at 7/21/2020 1511    Acceptance, E, NR by LB at 7/20/2020 1216    Acceptance, E,TB, VU,NR by GEGE at 7/18/2020 1154    Acceptance, E, NR by LB at 7/17/2020 1512    Acceptance, E, NR by LB at 7/16/2020 1208    Acceptance, E, NR by LB at 7/15/2020 1459    Acceptance, E, NR by LB at 7/14/2020 1502    Acceptance, E, NR by LB at 7/13/2020 1458    Acceptance, E,TB,D, NR by EE at 7/10/2020 1031   Family Acceptance, E,D, VU,DU by LB at 7/31/2020 1441    Comment:  sonShay, instructed in bed mob tsfs and car tsfs. Observed bed mob and car. Practiced tsfs.    Acceptance, E, VU by SL at 7/31/2020 1143    Comment:  Family conf- discussed improvements in cognition, voicing, swallow, diet, swallow strategies;  Rec: cont tx at d/c   Significant Other Acceptance, E,D, VU,DU by LB at 8/4/2020 1520    Comment:  bed mob, transfers, car tsf                   Point: Home exercise program (Done)     Description:   Instruct learner(s) on appropriate technique for monitoring, assisting and/or progressing patient with therapeutic exercises and activities.              Learning Progress Summary           Patient  Acceptance, E, VU by SL at 7/31/2020 1143    Comment:  Family conf- discussed improvements in cognition, voicing, swallow, diet, swallow strategies;  Rec: cont tx at d/c    Acceptance, E,D, VU,DU by LB1 at 7/25/2020 1159    Comment:  wt shifting with ambulation    Acceptance, E,H,D, VU,DU by LB at 7/24/2020 0908   Family Acceptance, E, VU by SL at 7/31/2020 1143    Comment:  Family conf- discussed improvements in cognition, voicing, swallow, diet, swallow strategies;  Rec: cont tx at d/c   Significant Other Acceptance, E,H,D, VU,DU by LB at 7/24/2020 0908                   Point: Body mechanics (Done)     Description:   Instruct learner(s) on proper positioning and spine alignment for patient and/or caregiver during mobility tasks and/or exercises.              Learning Progress Summary           Patient Acceptance, E, VU by SL at 7/31/2020 1143    Comment:  Family conf- discussed improvements in cognition, voicing, swallow, diet, swallow strategies;  Rec: cont tx at d/c    Acceptance, E, NR by LB at 7/30/2020 1410    Acceptance, E,D, VU,DU by LB1 at 7/25/2020 1159    Comment:  wt shifting with ambulation    Acceptance, E, NR by LB at 7/21/2020 1511    Acceptance, E,TB,D, NR by EE at 7/10/2020 1031   Family Acceptance, E,D, VU by LB at 7/31/2020 1443    Comment:  SonShay was instructed.    Acceptance, E, VU by SL at 7/31/2020 1143    Comment:  Family conf- discussed improvements in cognition, voicing, swallow, diet, swallow strategies;  Rec: cont tx at d/c   Significant Other Eager, E, VU by LB at 7/11/2020 1415    Comment:  Addressing pnt from left side. (affected side)                   Point: Precautions (Done)     Description:   Instruct learner(s) on prescribed precautions during mobility and gait tasks              Learning Progress Summary           Patient Acceptance, E, VU by SL at 7/31/2020 1143    Comment:  Family conf- discussed improvements in cognition, voicing, swallow, diet, swallow strategies;   Rec: cont tx at d/c    Acceptance, E,D, VU,DU by LB1 at 7/25/2020 1159    Comment:  wt shifting with ambulation    Acceptance, E,TB,D, NR by EE at 7/10/2020 1031   Family Acceptance, E,D, VU by LB at 7/31/2020 1443    Comment:  SonShay was instructed.    Acceptance, E, VU by SL at 7/31/2020 1143    Comment:  Family conf- discussed improvements in cognition, voicing, swallow, diet, swallow strategies;  Rec: cont tx at d/c                               User Key     Initials Effective Dates Name Provider Type Discipline    LB1 06/08/18 -  Talita Mcclelland, PT Physical Therapist PT    SL 06/08/18 -  Celia Islas MS CCC-SLP Speech and Language Pathologist SLP    GEGE 06/08/18 -  Lisa Henderson, PT Physical Therapist PT    LB 03/07/18 -  Talita Hodges PTA Physical Therapy Assistant PT    EE 04/03/18 -  Marylin Hill, PT Physical Therapist PT     04/03/18 -  Shannan Meade, PT Physical Therapist PT                  PT Recommendation and Plan          Patient was wearing a face mask during this therapy encounter. Therapist used appropriate personal protective equipment including mask and gloves.  Mask used was standard procedure mask. Appropriate PPE was worn during the entire therapy session. Hand hygiene was completed before and after therapy session. Patient is not in enhanced droplet precautions.                  Time Calculation:     PT Charges     Row Name 08/04/20 0900             Time Calculation    Start Time  0900  -LB      Stop Time  0930  -LB      Time Calculation (min)  30 min  -LB        User Key  (r) = Recorded By, (t) = Taken By, (c) = Cosigned By    Initials Name Provider Type    LB Talita Hodges PTA Physical Therapy Assistant          Therapy Charges for Today     Code Description Service Date Service Provider Modifiers Qty    04780165400 HC PT THER PROC EA 15 MIN 8/3/2020 Talita Hodges PTA GP 6                   Talita Hodges PTA  8/4/2020

## 2020-08-04 NOTE — PLAN OF CARE
Problem: Patient Care Overview  Goal: Plan of Care Review  Flowsheets (Taken 8/4/2020 0315)  Outcome Summary: Patient pleasant and cooperative. Continues with radiation and oral chemotherapy. Assist x1 to transfer.  at bedside. No new issues tonight.  Progress, Functional Goals: demonstrating adequate progress  Plan of Care Reviewed With: patient  IRF Plan of Care Review: progress ongoing, continue     Problem: Skin Injury Risk (Adult)  Goal: Skin Health and Integrity  Description  Patient will demonstrate the desired outcomes by discharge/transition of care.   Flowsheets (Taken 8/4/2020 0315)  Skin Health and Integrity: making progress toward outcome     Problem: Fall Risk (Adult)  Goal: Absence of Fall  Description  Patient will demonstrate the desired outcomes by discharge/transition of care.   Flowsheets (Taken 8/4/2020 0315)  Absence of Fall: making progress toward outcome

## 2020-08-04 NOTE — PROGRESS NOTES
Inpatient Rehabilitation Plan of Care Note    Plan of Care  Care Plan Reviewed - No updates at this time.    Body Systems    [RN] Integumentary(Active)  Current Status(08/04/2020): Pt has R scalp incision, which is glued and TIFFANIE w no  drainage. Pt has lumbar puncture site which is closed/healed and TIFFANIE. Pt has  rash-like area to january area which is improving.  Weekly Goal(08/11/2020): Incisions will be free of s/s infection and buttocks  will be kept clean and dry.  Discharge Goal: Incisions will be closed/healed and excoriation to buttocks will  be resolved. Skin will show no further s/s breakdown.    Performed Intervention(s)  Check patient for incontinence regularly. Keep skin clean and dry as much as  possible.  Nystatin cream to buttocks prn  Skin assessment q shift and prn.      Nutrition    [RN] Nutrition Management(Active)  Current Status(08/03/2020): Pt now on regular diet w thin liquids.  Weekly Goal(08/11/2020): pt tolerating PO diet  Discharge Goal: Pt will receive adequate nutrition.    Performed Intervention(s)  Daily weights.  Monitor I/O.  HOB 30 degrees or greater during nighttime tube feeding.      Psychosocial    [RN] Coping/Adjustment(Active)  Current Status(08/04/2020): Pt is at risk for reduced coping r/t recent  diagnosis, craniotomy, and new deficits. Pt has supportive family.  Weekly Goal(08/11/2020): Pt will verbalize feelings, concerns, needs to staff as  needed.  Discharge Goal: Pt will exhibit adequate coping strategies.    Performed Intervention(s)  provide relaxation and distraction as needed.  Allow extra time for pt to verbalize needs, concerns,  feelings, etc.  Support family      Safety    [RN] Potential for Injury(Active)  Current Status(08/04/2020): Patient has had recent craniotomy and has new  physical limitations. She is alert and oriented x3.   Pt slow to process/respond  at times.  Weekly Goal(08/11/2020): Pt will use call light appropriately as needed.  Discharge Goal: Pt   will remain free from falls and injury. Pt and  aware  of fall/safety in the home setting.    Performed Intervention(s)  Fall precautions: bed/chair alarms, bed low and locked, nonskid socks, call  light within reach, personal items within reach.  Hourly rounding.  Uses call light appropriately  Environmental set-up to reduce risk      Sphincter Control    [RN] Bladder Management(Active)  Current Status(08/04/2020): Bernabe catheter removed 7/11/20. Pt continent and  incontinent of urine.  Weekly Goal(08/11/2020): Continent 75%  Discharge Goal: Pt will be continent of urine.    [RN] Bowel Management(Active)  Current Status(08/04/2020): Pt is  continent and incontinent of stool . Recent  frequent BMs.  Weekly Goal(08/11/2020): Pt will be continent 100% with BM at least q3 days.  Discharge Goal: Pt  will be continent of stool.    Performed Intervention(s)  Monitor I/O.  Bowel meds as needed.  Bladder scan and intermittent cath, as ordered prn.    Signed by: Helen Rojas RN

## 2020-08-04 NOTE — THERAPY TREATMENT NOTE
Inpatient Rehabilitation - Speech Language Pathology Treatment Note  [facility]     Patient Name: Denise Guajardo  : 1965  MRN: [id number]  Today's Date: 2020         Admit Date: 2020    Visit Dx:      ICD-10-CM ICD-9-CM   1. Glioblastoma multiforme (CMS/HCC) C71.9 191.9     Patient Active Problem List   Diagnosis   • Glioblastoma multiforme (CMS/HCC)   • Intracranial hemorrhage (CMS/HCC)   • Dysphagia   • Acute left-sided weakness   • Brain tumor (CMS/HCC)   • Hemiplegia (CMS/HCC)        Therapy Treatment      EDUCATION  The patient has been educated in the following areas:   Cognitive Impairment.    SLP Recommendation and Plan             Patient was wearing a face mask during this therapy encounter. Therapist used appropriate personal protective equipment including mask, eye protection and gloves.  Mask used was standard procedure mask. Appropriate PPE was worn during the entire therapy session. Hand hygiene was completed before and after therapy session. Patient is not in enhanced droplet precautions.                SLP GOALS     Row Name 20 1100 20 1400          Memory Skills Goal (SLP)    Progress (Memory Skills Goal, SLP)  --  60%;independently (over 90% accuracy)  -CP     Progress/Outcomes (Memory Skills Goal, SLP)  --  goal ongoing  -CP     Comment (Memory Skills Goal, SLP)  --  visual memory task  -CP        Organizational Skills Goal (SLP)    Progress (Thought Organization Skills Goal, SLP)  80%;independently (over 90% accuracy)  -KA  100%;independently (over 90% accuracy)  -CP     Progress/Outcomes (Thought Organization Skills Goal, SLP)  good progress toward goal  -KA  good progress toward goal  -CP     Comment (Thought Organization Skills Goal, SLP)  category matrix. Providing opposites=80% without cues   -KA  category matrix  -CP        Reasoning Goal (SLP)    Progress (Reasoning Goal, SLP)  --  with moderate cues (50-74%)  -CP     Progress/Outcomes (Reasoning Goal, SLP)   --  goal ongoing  -CP     Comment (Reasoning Goal, SLP)  letter placement task=70% without cues and 100% with min cues  -KA  deductive puzzle  -CP        Functional Math Skills Goal (SLP)    Progress (Functional Math Skills Goal, SLP)  80%;independently (over 90% accuracy)  -KA  80%;independently (over 90% accuracy)  -CP     Progress/Outcomes (Functional Math Skills Goal, SLP)  goal ongoing  -KA  goal ongoing  -CP     Comment (Functional Math Skills Goal, SLP)  simple temporal word words, and in clock math task on IPAD 70% without cues, cues required to allow more time for processing.   -KA  very simple level temporal problem solving  -CP        Right Hemisphere Function Goal (SLP)    Progress (Right Hemisphere Function Goal, SLP)  --  with minimal cues (75-90%);with moderate cues (50-74%)  -CP     Progress/Outcomes (Right Hemisphere Function Goal, SLP)  --  goal ongoing  -CP     Comment (Right Hemisphere Function Goal, SLP)  --  trailmaking task  -CP       User Key  (r) = Recorded By, (t) = Taken By, (c) = Cosigned By    Initials Name Provider Type    Cesia Landeros, MS CCC-SLP Speech and Language Pathologist    Bruno Jenkins MA,CCC-SLP Speech and Language Pathologist              Time Calculation:     Time Calculation- SLP     Row Name 08/04/20 1157 08/04/20 1156          Time Calculation- SLP    SLP Start Time  1100  -KA  0830  -KA     SLP Stop Time  1130  -KA  0900  -KA     SLP Time Calculation (min)  30 min  -KA  30 min  -       User Key  (r) = Recorded By, (t) = Taken By, (c) = Cosigned By    Initials Name Provider Type    Bruno Jenkins MA,CCC-SLP Speech and Language Pathologist          Therapy Charges for Today     Code Description Service Date Service Provider Modifiers Qty    87183721391 HC ST DEV OF COGN SKILLS INITIAL 15 MIN 8/4/2020 Bruno Carias MA,CCC-SLP  1    11221858047 HC ST DEV OF COGN SKILLS EACH ADDT'L 15 MIN 8/4/2020 Bruno Carias MA,CCC-SLP  3                     Carr  REYES Carias,CCC-SLP  8/4/2020

## 2020-08-04 NOTE — PLAN OF CARE
Problem: Patient Care Overview  Goal: Plan of Care Review  Outcome: Ongoing (interventions implemented as appropriate)  Flowsheets  Taken 8/4/2020 1931 by Danya Graf, RN  Outcome Summary: Pt is up in the WC and has attended therapies. No c/o pain or problems.  Taken 8/4/2020 0809 by Danya Graf, RN  Plan of Care Reviewed With: patient

## 2020-08-05 NOTE — THERAPY TREATMENT NOTE
Inpatient Rehabilitation - Physical Therapy Treatment Note  [facility]     Patient Name: Denise Guajardo  : 1965  MRN: [id number]    Today's Date: 2020                 Admit Date: 2020      Visit Dx:      ICD-10-CM ICD-9-CM   1. Glioblastoma multiforme (CMS/HCC) C71.9 191.9       Patient Active Problem List   Diagnosis   • Glioblastoma multiforme (CMS/HCC)   • Intracranial hemorrhage (CMS/HCC)   • Dysphagia   • Acute left-sided weakness   • Brain tumor (CMS/HCC)   • Hemiplegia (CMS/HCC)       Therapy Treatment    IRF Treatment Summary     Row Name 20 1533 20 1523 20 1300       Evaluation/Treatment Time and Intent    Subjective Information  no complaints  -CC  no complaints  -AF  no complaints  -SL    Existing Precautions/Restrictions  fall  -CC  fall  -AF  fall  -SL    Document Type  therapy note (daily note)  -CC  therapy note (daily note)  -AF  therapy note (daily note)  -SL    Mode of Treatment  occupational therapy  -CC  occupational therapy  -AF  individual therapy;speech-language pathology  -SL    Patient/Family Observations  seated on therapy mat   -CC  sitting up in w/c with  present   -AF  cooperative  -SL    Recorded by [CC] Bertha Martell, OTR [AF] Yarely Maldonado, OTR [SL] Celia Islas MS CCC-SLP    Row Name 20 0828             Evaluation/Treatment Time and Intent    Subjective Information  no complaints  -SL      Existing Precautions/Restrictions  fall  -SL      Document Type  therapy note (daily note)  -SL      Mode of Treatment  individual therapy;speech-language pathology  -SL      Patient/Family Observations  COOPERATIVE  -SL      Recorded by [SL] Celia Islas MS CCC-SLP      Row Name 20 1533 20 1523          Cognition/Psychosocial- PT/OT    Affect/Mental Status (Cognitive)  WFL  -CC  WFL  -AF     Orientation Status (Cognition)  oriented x 3  -CC  oriented x 3  -AF     Follows Commands (Cognition)  follows one step commands  -CC  follows one  step commands  -AF     Personal Safety Interventions  fall prevention program maintained;gait belt;nonskid shoes/slippers when out of bed  -CC  fall prevention program maintained;gait belt;nonskid shoes/slippers when out of bed  -AF     Safety Deficit (Cognitive)  --  insight into deficits/self awareness  -AF     Recorded by [CC] Bertha Martell OTR [AF] Yarely Maldonado, MARCIAL     Row Name 08/05/20 1533 08/05/20 1523 08/05/20 0900       Bed Mobility Assessment/Treatment    Rolling Left [place] (Bed Mobility)  --  --  supervision;verbal cues  -LB    Rolling Right [place] (Bed Mobility)  --  --  supervision;verbal cues  -LB    Scooting/Bridging [place] (Bed Mobility)  --  --  supervision  -LB    Supine-Sit [place] (Bed Mobility)  --  --  contact guard  -LB    Sit-Supine [place] (Bed Mobility)  --  --  contact guard  -LB    Comment (Bed Mobility)  NT  -CC  up in w/c   -AF  assessed on txment mat  -LB    Recorded by [CC] Bertha Martell OTR [AF] Yarely Maldonado, OTR [LB] Talita Hodges PTA    Row Name 08/05/20 1523             Transfer Assessment/Treatment    Comment (Transfers)  sit to  shower MIN A  -AF      Recorded by [AF] Yarely Maldonado OTR      Row Name 08/05/20 0900             Bed-Chair Transfer    Bed-Chair [place] (Transfers)  minimum assist (75% patient effort)  -LB      Assistive Device (Bed-Chair Transfers)  wheelchair  -LB      Recorded by [LB] Talita Hodges PTA      Row Name 08/05/20 0900             Chair-Bed Transfer    Chair-Bed [place] (Transfers)  minimum assist (75% patient effort)  -LB      Assistive Device (Chair-Bed Transfers)  wheelchair  -LB      Recorded by [LB] Talita Hodges PTA      Row Name 08/05/20 1533 08/05/20 0900          Sit-Stand Transfer    Sit-Stand [place] (Transfers)  minimum assist (75% patient effort)  -CC  minimum assist (75% patient effort)  -LB     Assistive Device (Sit-Stand Transfers)  wheelchair  -CC   walker, jakub  -LB     Recorded by [CC] Bertha Martell, OTLUZ MARIA [LB] Talita Hodges, EDEN     Row Name 08/05/20 1533 08/05/20 0900          Stand-Sit Transfer    Stand-Sit [place] (Transfers)  minimum assist (75% patient effort)  -CC  minimum assist (75% patient effort)  -LB     Assistive Device (Stand-Sit Transfers)  wheelchair  -CC  walker, jakub  -LB     Recorded by [CC] Bertha Martell OTR [LB] Talita Hodges, EDEN     Row Name 08/05/20 1523             Shower Transfer    Type (Shower Transfer)  stand pivot/stand step  -AF      [place] Level (Shower Transfer)  minimum assist (75% patient effort);verbal cues  -AF      Assistive Device (Shower Transfer)  grab bars/tub rail;tub bench;wheelchair  -AF      Recorded by [AF] Yarely Maldonado OTR      Row Name 08/05/20 0900             Gait/Stairs Assessment/Training    [place] Level (Gait)  moderate assist (50% patient effort);1 person to manage equipment  -LB      Assistive Device (Gait)  walker, jakub L AFO  -LB      Distance in Feet (Gait)  40.30  -LB      Pattern (Gait)  step-to;step-through  -LB      Deviations/Abnormal Patterns (Gait)  base of support, wide;maya decreased  -LB      Bilateral Gait Deviations  forward flexed posture  -LB      Left Sided Gait Deviations  knee buckling, left side;knee hyperextension;weight shift ability decreased;leans right;foot drop/toe drag;forward flexed posture;heel strike decreased min/modA for L foot placement  -LB      Comment (Gait/Stairs)  practiced turning around and backing up to w/c. !st time needed maxA 2nd time modA  -LB      Recorded by [LB] Talita Hodges, EDEN      Row Name 08/05/20 1523             Bathing Assessment/Treatment    Bathing [place] Level  bathing skills;verbal cues;minimum assist (75% patient effort)  -AF      Assistive Device (Bathing)  grab bar/tub rail;hand held shower spray hose;shower chair  -AF      Bathing Position  supported sitting;supported standing  -AF       Bathing Setup Assistance  obtain supplies  -AF      Recorded by [AF] Yarely Maldonado, OTR      Row Name 08/05/20 1523             Upper Body Dressing Assessment/Treatment    Upper Body Dressing Task  upper body dressing skills;moderate assist (50-74% patient effort);verbal cues  -AF      Upper Body Dressing Position  supported sitting;supported standing  -AF      Set-up Assistance (Upper Body Dressing)  obtain clothing  -AF      Comment (Upper Body Dressing)  jakub tech  -AF      Recorded by [AF] Yarely Maldonado, OTR      Row Name 08/05/20 1523             Lower Body Dressing Assessment/Treatment    Lower Body Dressing [place] Level  doff;don;pants/bottoms;shoes/slippers;socks;underwear;moderate assist (50% patient effort);verbal cues  -AF      Lower Body Dressing Position  supported sitting;supported standing  -AF      Lower Body Dressing Setup Assistance  obtain clothing  -AF      Comment (Lower Body Dressing)  jakub tech  -AF      Recorded by [AF] Yarely Maldonado, OTR      Row Name 08/05/20 1523             Grooming Assessment/Treatment    Grooming [place] Level  grooming skills;minimum assist (75% patient effort)  -AF      Grooming Position  supported sitting;supported standing  -AF      Grooming Setup Assistance  obtain supplies  -AF      Recorded by [AF] Yarely Maldonado, OTR      Row Name 08/05/20 1533             Muscle Tone Assessment    Muscle Tone: General Assessment  left upper extremity muscle tone deficit  -CC      Left Upper Extremity (Muscle Tone Assessment)  hypertonicity present;slight increase in tone increased tone Shld, decresaed tone elbow, wrist and hand   -CC      Recorded by [CC] Bertha Martell OTR      Row Name 08/05/20 1533 08/05/20 1523 08/05/20 0900       Pain Scale: Numbers Pre/Post-Treatment    Pain Scale: Numbers, Pretreatment  0/10 - no pain  -CC  0/10 - no pain  -AF  0/10 - no pain  -LB    Pain Scale: Numbers, Post-Treatment  0/10 - no pain  -CC  0/10 - no pain  -AF  --     Recorded by [CC] Bertha Martell OTR [AF] Yarely Maldonado, OTR [LB] Talita Hodges, PTA    Row Name 08/05/20 1523             Static Sitting Balance    Level of [place] (Unsupported Sitting, Static Balance)  standby assist  -AF      Recorded by [AF] Yarely Maldonado OTR      Row Name 08/05/20 0900             Ankle Foot Orthosis Management    Type (Ankle/Foot Orthosis)  left;AFO (ankle foot orthosis)  -LB      Recorded by [LB] Talita Hodges, EDEN      Row Name 08/05/20 1533             Upper Extremity Seated Therapeutic Exercise    Performed, Seated Upper Extremity (Therapeutic Exercise)  shoulder flexion/extension;shoulder abduction/adduction;shoulder horizontal abduction/adduction;scapular protraction/retraction;elbow flexion/extension;forearm supination/pronation;wrist flexion/extension;digit flexion/extension  -CC      Exercise Type, Seated Upper Extremity (Therapeutic Exercise)  AAROM (active assistive range of motion)  -CC      Expected Outcomes, Seated Upper Extremity (Therapeutic Exercise)  strengthen, facilitate independent active range of motion  -CC      Sets/Reps Detail, Seated Upper Extremity (Therapeutic Exercise)  5 reps x 2 in all planes. Vc for shld compensation w elbow.   -CC      Comment, Seated Upper Extremity (Therapeutic Exercise)  trace-1/8 wrist and hand, 1/4 elbow, 1/8 sh;d add/abd, horz Add/Abd, 1/4 shld flex, 1/2-3/4 scapualar elevation   -CC      Recorded by [CC] Bertha Martell OTR      Row Name 08/05/20 0900             Core Strengthening Therapeutic Exercise    Exercise Performed (Core Strengthening Therapeutic Exercise)  bridging with bilateral leg support;bridging with single leg support  -LB      Sets/Reps (Core Strengthening Therapeutic Exercise)  1/10  -LB      Recorded by [LB] Talita Hodges, EDEN      Row Name 08/05/20 0900             Lower Extremity Supine Therapeutic Exercise    Performed, Supine Lower Extremity (Therapeutic Exercise)  hip  flexion/extension;knee flexion/extension;SAQ (short arc quad) over bolster;ankle dorsiflexion/plantarflexion  -LB      Exercise Type, Supine Lower Extremity (Therapeutic Exercise)  AROM (active range of motion);AAROM (active assistive range of motion);PROM (passive range of motion)  -LB      Recorded by [LB] Talita Hodges PTA      Row Name 08/05/20 1533 08/05/20 1523 08/05/20 0900       Positioning and Restraints    Pre-Treatment Position  sitting in chair/recliner  -CC  sitting in chair/recliner  -AF  --    Post Treatment Position  wheelchair  -CC  wheelchair  -AF  wheelchair  -LB    In Wheelchair  sitting;with PT  -CC  sitting;exit alarm on;with SLP  -AF  sitting;call light within reach;exit alarm on;with family/caregiver  -LB    Recorded by [CC] Bertha Martell OTR [AF] Yarely Maldonado, OTR [LB] Talita Hodges PTA    Row Name 08/05/20 0900             Weekly Summary of Progress (PT)    Weekly Outcome Summary: Physical Therapy  Pnt is currently using a L AFO for ambulation training, as she is unable to actively dorsiflex her left ankle.. It also provides L ankle stabilization for transfers. A lightweight w/c is needed for home in order for pnt to navigate her home safely and independently. She is unable to ambulate independently with a cane or a walker..  -LB      Recorded by [LB] Talita Hodges PTA        User Key  (r) = Recorded By, (t) = Taken By, (c) = Cosigned By    Initials Name Effective Dates    CC Bertha Martell OTR 06/08/18 -     SL Celia Islas MS CCC-SLP 06/08/18 -     LB Talita Hodges PTA 03/07/18 -     Yarely Wong OTLUZ MARIA 04/14/20 -         Wound 07/01/20 1156 Right head Incision (Active)   Dressing Appearance open to air 8/5/2020  8:34 AM   Closure Liquid skin adhesive 8/5/2020  8:34 AM   Base clean;dry 8/5/2020  8:34 AM   Periwound intact 8/5/2020  8:34 AM   [place]iwound Temperature warm 8/5/2020  8:34 AM   Periwound Skin Turgor soft 8/5/2020  8:34 AM   Drainage Amount none  8/5/2020  8:34 AM     Physical Therapy Education                 Title: PT OT SLP Therapies (In Progress)     Topic: Physical Therapy (In Progress)     Point: Mobility training (In Progress)     Description:   Instruct learner(s) on safety and technique for assisting patient out of bed, chair or wheelchair.  Instruct in the proper use of assistive devices, such as walker, crutches, cane or brace.              Patient Friendly Description:   It's important to get you on your feet again, but we need to do so in a way that is safe for you. Falling has serious consequences, and your personal safety is the most important thing of all.        When it's time to get out of bed, one of us or a family member will sit next to you on the bed to give you support.     If your doctor or nurse tells you to use a walker, crutches, a cane, or a brace, be sure you use it every time you get out of bed, even if you think you don't need it.    Learning Progress Summary           Patient Acceptance, E, NR by LB at 8/3/2020 1109    Acceptance, E, NR by LB at 8/1/2020 1220    Acceptance, E, VU by SL at 7/31/2020 1143    Comment:  Family conf- discussed improvements in cognition, voicing, swallow, diet, swallow strategies;  Rec: cont tx at d/c    Acceptance, E, NR by LB at 7/30/2020 1410    Acceptance, E, NR by LB at 7/29/2020 1058    Eager, E,D, NR by KP at 7/28/2020 0918    Comment:  Gait sequencing    Acceptance, E, NR by LB at 7/27/2020 0942    Acceptance, E,D, VU,DU by LB1 at 7/25/2020 1159    Comment:  wt shifting with ambulation    Acceptance, E, NR by LB at 7/23/2020 1514    Acceptance, E, NR by LB at 7/22/2020 1517    Acceptance, E, NR by LB at 7/21/2020 1511    Acceptance, E, NR by LB at 7/20/2020 1216    Acceptance, E,TB, VU,NR by GEGE at 7/18/2020 1154    Acceptance, E, NR by LB at 7/17/2020 1512    Acceptance, E, NR by LB at 7/16/2020 1208    Acceptance, E, NR by LB at 7/15/2020 1456    Acceptance, E, NR by LB at 7/14/2020 1504     Acceptance, E, NR by LB at 7/13/2020 1458    Acceptance, E,TB,D, NR by EE at 7/10/2020 1031   Family Acceptance, E,D, VU,DU by LB at 7/31/2020 1441    Comment:  sonShay, instructed in bed mob tsfs and car tsfs. Observed bed mob and car. Practiced tsfs.    Acceptance, E, VU by SL at 7/31/2020 1143    Comment:  Family conf- discussed improvements in cognition, voicing, swallow, diet, swallow strategies;  Rec: cont tx at d/c   Significant Other Acceptance, E,D, VU,DU by LB at 8/4/2020 1520    Comment:  bed mob, transfers, car tsf                   Point: Home exercise program (Done)     Description:   Instruct learner(s) on appropriate technique for monitoring, assisting and/or progressing patient with therapeutic exercises and activities.              Learning Progress Summary           Patient Acceptance, E,H, VU by LB at 8/5/2020 1211    Acceptance, E, VU by SL at 7/31/2020 1143    Comment:  Family conf- discussed improvements in cognition, voicing, swallow, diet, swallow strategies;  Rec: cont tx at d/c    Acceptance, E,D, VU,DU by LB1 at 7/25/2020 1159    Comment:  wt shifting with ambulation    Acceptance, E,H,D, VU,DU by LB at 7/24/2020 0908   Family Acceptance, E, VU by SL at 7/31/2020 1143    Comment:  Family conf- discussed improvements in cognition, voicing, swallow, diet, swallow strategies;  Rec: cont tx at d/c   Significant Other Acceptance, E,H, VU by LB at 8/5/2020 1211    Acceptance, E,H,D, VU,DU by LB at 7/24/2020 0908                   Point: Body mechanics (Done)     Description:   Instruct learner(s) on proper positioning and spine alignment for patient and/or caregiver during mobility tasks and/or exercises.              Learning Progress Summary           Patient Acceptance, E, VU by SL at 7/31/2020 1143    Comment:  Family conf- discussed improvements in cognition, voicing, swallow, diet, swallow strategies;  Rec: cont tx at d/c    Acceptance, E, NR by LB at 7/30/2020 1410    Acceptance,  E,D, VU,DU by LB1 at 7/25/2020 1159    Comment:  wt shifting with ambulation    Acceptance, E, NR by LB at 7/21/2020 1511    Acceptance, E,TB,D, NR by EE at 7/10/2020 1031   Family Acceptance, E,D, VU by LB at 7/31/2020 1443    Comment:  Shay Seymour was instructed.    Acceptance, E, VU by SL at 7/31/2020 1143    Comment:  Family conf- discussed improvements in cognition, voicing, swallow, diet, swallow strategies;  Rec: cont tx at d/c   Significant Other Eager, E, VU by LB at 7/11/2020 1415    Comment:  Addressing pnt from left side. (affected side)                   Point: Precautions (Done)     Description:   Instruct learner(s) on prescribed precautions during mobility and gait tasks              Learning Progress Summary           Patient Acceptance, E, VU by SL at 7/31/2020 1143    Comment:  Family conf- discussed improvements in cognition, voicing, swallow, diet, swallow strategies;  Rec: cont tx at d/c    Acceptance, E,D, VU,DU by LB1 at 7/25/2020 1159    Comment:  wt shifting with ambulation    Acceptance, E,TB,D, NR by EE at 7/10/2020 1031   Family Acceptance, E,D, VU by LB at 7/31/2020 1443    Comment:  Shay Seymour was instructed.    Acceptance, E, VU by SL at 7/31/2020 1143    Comment:  Family conf- discussed improvements in cognition, voicing, swallow, diet, swallow strategies;  Rec: cont tx at d/c                               User Key     Initials Effective Dates Name Provider Type Discipline    LB1 06/08/18 -  Talita Mcclelland, PT Physical Therapist PT    SL 06/08/18 -  Celia Islas MS CCC-SLP Speech and Language Pathologist SLP    GEGE 06/08/18 -  Lisa Henderson, PT Physical Therapist PT    LB 03/07/18 -  Talita Hodges, PTA Physical Therapy Assistant PT    EE 04/03/18 -  Marylin Hill, PT Physical Therapist PT     04/03/18 -  Shannan Meade, PT Physical Therapist PT                  PT Recommendation and Plan       Patient was wearing a face mask during this therapy encounter. Therapist used  appropriate personal protective equipment including mask and gloves.  Mask used was standard procedure mask. Appropriate PPE was worn during the entire therapy session. Hand hygiene was completed before and after therapy session. Patient is not in enhanced droplet precautions.                     Time Calculation:     PT Charges     Row Name 08/05/20 1536 08/05/20 0916          Time Calculation    Start Time  1400  -LB  0900  -LB     Stop Time  1430  -LB  0930  -LB     Time Calculation (min)  30 min  -LB  30 min  -LB       User Key  (r) = Recorded By, (t) = Taken By, (c) = Cosigned By    Initials Name Provider Type    Talita Worthy PTA Physical Therapy Assistant          Therapy Charges for Today     Code Description Service Date Service Provider Modifiers Qty    17786206145 HC PT THER PROC EA 15 MIN 8/4/2020 Talita Hodges PTA GP 4    30597685895 HC PT THER PROC EA 15 MIN 8/5/2020 Talita Hodges PTA GP 4    82564160624 HC PT THER SUPP EA 15 MIN 8/5/2020 Talita Hodges PTA GP 2                   Talita Hodges PTA  8/5/2020

## 2020-08-05 NOTE — PROGRESS NOTES
Patient to be scheduled for outpatient PT and OT here at [facility] after d/c. Tentative schedule will be 2 days/week in afternoon starting at 1:45 p.m. [facility]ed [facility] regarding coordinating schedule. They will keep her at 3:40 p.m so patient can finish her at 3:15 and go over to get radiation at 3:40 p.m. ST and patient discussed further ST, but patient prefers not to do outpatient ST but concentrate on PT and OT.  was here yesterday morning for family teaching. Will continue to assist with plans for d/c on Friday, 8/7.

## 2020-08-05 NOTE — PLAN OF CARE
Problem: Patient Care Overview  Goal: Plan of Care Review  Outcome: Ongoing (interventions implemented as appropriate)  Flowsheets (Taken 8/5/2020 0605)  Outcome Summary: AAO x 4. Anxious at times. Cont B & B. Meds with water. Radiation today.  Progress, Functional Goals: demonstrating adequate progress  Plan of Care Reviewed With: patient  IRF Plan of Care Review: progress ongoing, continue

## 2020-08-05 NOTE — PROGRESS NOTES
Inpatient Rehabilitation Functional Measures Assessment and Plan of Care    Plan of Care  Updated Problems/Interventions  Mobility    [OT] Toilet Transfers(Active)  Current Status(08/05/2020): MIN  Weekly Goal(08/07/2020): MIN  Discharge Goal: Min A    [OT] Tub/Shower Transfers(Active)  Current Status(08/05/2020): MIN  Weekly Goal(08/07/2020): MIN  Discharge Goal: MIN        Self Care    [OT] Bathing(Active)  Current Status(08/05/2020): MIN  Weekly Goal(08/07/2020): MIN  Discharge Goal: MIN    [OT] Dressing (Lower)(Active)  Current Status(08/05/2020): MOD  Weekly Goal(08/07/2020): MOD  Discharge Goal: MOD    [OT] Dressing (Upper)(Active)  Current Status(08/05/2020): MOD  Weekly Goal(08/07/2020): MOD  Discharge Goal: MOD    [OT] Grooming(Active)  Current Status(08/05/2020): MIN  Weekly Goal(08/07/2020): MIN  Discharge Goal: MIN    [OT] Toileting(Active)  Current Status(08/05/2020): MOD  Weekly Goal(08/07/2020): MOD  Discharge Goal: MOD    Functional Measures  DAR Eating:  Branch  DAR Grooming: Branch  DAR Bathing:  Branch  DAR Upper Body Dressing:  Branch  DAR Lower Body Dressing:  Branch  DAR Toileting:  Branch    DAR Bladder Management  Level of Assistance:  Branch  Frequency/Number of Accidents this Shift:  Branch    DAR Bowel Management  Level of Assistance: Branch  Frequency/Number of Accidents this Shift: Branch    DAR Bed/Chair/Wheelchair Transfer:  Branch  DAR Toilet Transfer:  Branch  DAR Tub/Shower Transfer:  Branch    Previously Documented Mode of Locomotion at Discharge: Field  DAR Expected Mode of Locomotion at Discharge: Branch  DAR Walk/Wheelchair:  Branch  DAR Stairs:  Branch    DAR Comprehension:  Branch  DAR Expression:  Branch  DAR Social Interaction:  Branch  DAR Problem Solving:  Branch  DAR Memory:  Branch    Therapy Mode Minutes  Occupational Therapy: Branch  Physical Therapy: Branch  Speech Language Pathology:  Branch    Signed by: Yarely Maldonado OT

## 2020-08-05 NOTE — THERAPY TREATMENT NOTE
Inpatient Rehabilitation - Occupational Therapy Treatment Note    [facility]     Patient Name: Denise Guajardo  : 1965  MRN: [id number]    Today's Date: 2020                 Admit Date: 2020      Visit Dx:    ICD-10-CM ICD-9-CM   1. Glioblastoma multiforme (CMS/HCC) C71.9 191.9       Patient Active Problem List   Diagnosis   • Glioblastoma multiforme (CMS/HCC)   • Intracranial hemorrhage (CMS/HCC)   • Dysphagia   • Acute left-sided weakness   • Brain tumor (CMS/HCC)   • Hemiplegia (CMS/HCC)         Therapy Treatment    IRF Treatment Summary     Row Name 20 1523 20 1300 20 0828       Evaluation/Treatment Time and Intent    Subjective Information  no complaints  -AF  no complaints  -SL  no complaints  -SL    Existing Precautions/Restrictions  fall  -AF  fall  -SL  fall  -SL    Document Type  therapy note (daily note)  -AF  therapy note (daily note)  -SL  therapy note (daily note)  -SL    Mode of Treatment  occupational therapy  -AF  individual therapy;speech-language pathology  -SL  individual therapy;speech-language pathology  -SL    Patient/Family Observations  sitting up in w/c with  present   -AF  cooperative  -SL  COOPERATIVE  -SL    Recorded by [AF] Yarely Maldonado OTR [SL] Celia Islas MS CCC-SLP [SL] Celia Islas, MS CCC-SLP    Row Name 20 1523             Cognition/Psychosocial- PT/OT    Affect/Mental Status (Cognitive)  WFL  -AF      Orientation Status (Cognition)  oriented x 3  -AF      Follows Commands (Cognition)  follows one step commands  -AF      Personal Safety Interventions  fall prevention program maintained;gait belt;nonskid shoes/slippers when out of bed  -AF      Safety Deficit (Cognitive)  insight into deficits/self awareness  -AF      Recorded by [AF] Yarely Maldonado OTR      Row Name 20 1523             Bed Mobility Assessment/Treatment    Comment (Bed Mobility)  up in w/c   -AF      Recorded by [AF] Yarely Maldonado OTR      Row Name 20  1523             Transfer Assessment/Treatment    Comment (Transfers)  sit to  shower MIN A  -AF      Recorded by [AF] Yarely Maldonado, OTR      Row Name 08/05/20 0900             Sit-Stand Transfer    Sit-Stand Craig (Transfers)  minimum assist (75% patient effort)  -LB      Assistive Device (Sit-Stand Transfers)  walker, jakub  -LB      Recorded by [LB] Talita Hodges, EDEN      Row Name 08/05/20 0900             Stand-Sit Transfer    Stand-Sit Craig (Transfers)  minimum assist (75% patient effort)  -LB      Assistive Device (Stand-Sit Transfers)  walker, jakub  -LB      Recorded by [LB] Talita Hodges, EDEN      Row Name 08/05/20 1523             Shower Transfer    Type (Shower Transfer)  stand pivot/stand step  -AF      Craig Level (Shower Transfer)  minimum assist (75% patient effort);verbal cues  -AF      Assistive Device (Shower Transfer)  grab bars/tub rail;tub bench;wheelchair  -AF      Recorded by [AF] Yarely Maldonado, OTR      Row Name 08/05/20 0900             Gait/Stairs Assessment/Training    Craig Level (Gait)  moderate assist (50% patient effort);1 person to manage equipment  -LB      Assistive Device (Gait)  walker, jakub L AFO  -LB      Distance in Feet (Gait)  40.30  -LB      Pattern (Gait)  step-to;step-through  -LB      Deviations/Abnormal Patterns (Gait)  base of support, wide;maya decreased  -LB      Bilateral Gait Deviations  forward flexed posture  -LB      Left Sided Gait Deviations  knee buckling, left side;knee hyperextension;weight shift ability decreased;leans right;foot drop/toe drag;forward flexed posture;heel strike decreased min/modA for L foot placement  -LB      Comment (Gait/Stairs)  practiced turning around and backing up to w/c. !st time needed maxA 2nd time modA  -LB      Recorded by [LB] Talita Hodges PTA      Row Name 08/05/20 1523             Bathing Assessment/Treatment    Bathing Craig Level  bathing skills;verbal  cues;minimum assist (75% patient effort)  -AF      Assistive Device (Bathing)  grab bar/tub rail;hand held shower spray hose;shower chair  -AF      Bathing Position  supported sitting;supported standing  -AF      Bathing Setup Assistance  obtain supplies  -AF      Recorded by [AF] Yarely Maldonado, OTR      Row Name 08/05/20 1523             Upper Body Dressing Assessment/Treatment    Upper Body Dressing Task  upper body dressing skills;moderate assist (50-74% patient effort);verbal cues  -AF      Upper Body Dressing Position  supported sitting;supported standing  -AF      Set-up Assistance (Upper Body Dressing)  obtain clothing  -AF      Comment (Upper Body Dressing)  jakub tech  -AF      Recorded by [AF] Yarely Maldonado, OTR      Row Name 08/05/20 1523             Lower Body Dressing Assessment/Treatment    Lower Body Dressing [place] Level  doff;don;pants/bottoms;shoes/slippers;socks;underwear;moderate assist (50% patient effort);verbal cues  -AF      Lower Body Dressing Position  supported sitting;supported standing  -AF      Lower Body Dressing Setup Assistance  obtain clothing  -AF      Comment (Lower Body Dressing)  jakub tech  -AF      Recorded by [AF] Yarely Maldonado, OTR      Row Name 08/05/20 1523             Grooming Assessment/Treatment    Grooming [place] Level  grooming skills;minimum assist (75% patient effort)  -AF      Grooming Position  supported sitting;supported standing  -AF      Grooming Setup Assistance  obtain supplies  -AF      Recorded by [AF] Yarely Maldonado, OTR      Row Name 08/05/20 1523 08/05/20 0900          Pain Scale: Numbers Pre/Post-Treatment    Pain Scale: Numbers, Pretreatment  0/10 - no pain  -AF  0/10 - no pain  -LB     Pain Scale: Numbers, Post-Treatment  0/10 - no pain  -AF  --     Recorded by [AF] Yarely Maldonado, OTR [LB] Talita Hodges, EDEN     Row Name 08/05/20 1523             Static Sitting Balance    Level of [place] (Unsupported Sitting, Static Balance)   standby assist  -AF      Recorded by [AF] Yarely Maldonado OTR      Row Name 08/05/20 1523 08/05/20 0900          Positioning and Restraints    Pre-Treatment Position  sitting in chair/recliner  -AF  --     Post Treatment Position  wheelchair  -AF  wheelchair  -LB     In Wheelchair  sitting;exit alarm on;with SLP  -AF  sitting;call light within reach;exit alarm on;with family/caregiver  -LB     Recorded by [AF] Yarely Maldonado, OTR [LB] Talita Hodges PTA       User Key  (r) = Recorded By, (t) = Taken By, (c) = Cosigned By    Initials Name Effective Dates     Cher Islasan, MS CCC-SLP 06/08/18 -     LB Talita Hodges PTA 03/07/18 -     AF Yarely Maldonado, OTR 04/14/20 -           Wound 07/01/20 1156 Right head Incision (Active)   Dressing Appearance open to air 8/5/2020  8:34 AM   Closure Liquid skin adhesive 8/5/2020  8:34 AM   Base clean;dry 8/5/2020  8:34 AM   Periwound intact 8/5/2020  8:34 AM   Periwound Temperature warm 8/5/2020  8:34 AM   Periwound Skin Turgor soft 8/5/2020  8:34 AM   Drainage Amount none 8/5/2020  8:34 AM         OT Recommendation and Plan                     Occupational Therapy Education                 Title: PT OT SLP Therapies (In Progress)     Topic: Occupational Therapy (Done)     Point: ADL training (Done)     Description:   Instruct learner(s) on proper safety adaptation and remediation techniques during self care or transfers.   Instruct in proper use of assistive devices.              Learning Progress Summary           Patient Acceptance, E, VU by  at 7/31/2020 1513    Comment:  Pt and family participated in meeting with rehab team recommend outpatient OT, discussed ADL level and HEP. will continue with discharge teaching    Acceptance, E,TB,D, VU by  at 7/31/2020 1512    Comment:  son participated in teaching with commode transfers and toileting tasks, educated on SROM/HEP    Acceptance, E, VU by  at 7/31/2020 1143    Comment:  Family conf- discussed improvements in  cognition, voicing, swallow, diet, swallow strategies;  Rec: cont tx at d/c    Acceptance, E, VU,NR by AF at 7/20/2020 1531    Comment:  edcuated on safety with ADl tasks    Acceptance, E,TB,D, NR,DU,VU by AF at 7/16/2020 1230    Comment:  with bed moblity and pulling up in bed    Acceptance, E, VU by RD at 7/10/2020 1529    Comment:  OT educ on OT role in therapeutic process and pt's POC.   Family Acceptance, E, VU by AF at 7/31/2020 1513    Comment:  Pt and family participated in meeting with rehab team recommend outpatient OT, discussed ADL level and HEP. will continue with discharge teaching    Acceptance, E,TB,D, VU by AF at 7/31/2020 1512    Comment:  son participated in teaching with commode transfers and toileting tasks, educated on SROM/HEP    Acceptance, E, VU by SL at 7/31/2020 1143    Comment:  Family conf- discussed improvements in cognition, voicing, swallow, diet, swallow strategies;  Rec: cont tx at d/c   Significant Other Acceptance, E,TB,D, NR,DU,VU by AF at 7/16/2020 1230    Comment:  with bed moblity and pulling up in bed                   Point: Home exercise program (Done)     Description:   Instruct learner(s) on appropriate technique for monitoring, assisting and/or progressing therapeutic exercises/activities.              Learning Progress Summary           Patient Acceptance, E, VU by AF at 7/31/2020 1513    Comment:  Pt and family participated in meeting with rehab team recommend outpatient OT, discussed ADL level and HEP. will continue with discharge teaching    Acceptance, E, VU by SL at 7/31/2020 1143    Comment:  Family conf- discussed improvements in cognition, voicing, swallow, diet, swallow strategies;  Rec: cont tx at d/c   Family Acceptance, E, VU by AF at 7/31/2020 1513    Comment:  Pt and family participated in meeting with rehab team recommend outpatient OT, discussed ADL level and HEP. will continue with discharge teaching    Acceptance, E, VU by SL at 7/31/2020 1143     Comment:  Family conf- discussed improvements in cognition, voicing, swallow, diet, swallow strategies;  Rec: cont tx at d/c                   Point: Precautions (Done)     Description:   Instruct learner(s) on prescribed precautions during self-care and functional transfers.              Learning Progress Summary           Patient Acceptance, E,TB,D, DU,NR by AF at 8/3/2020 1537    Comment:  educated on use and benefits of NMES for neuro recovery    Acceptance, E, VU by AF at 7/31/2020 1513    Comment:  Pt and family participated in meeting with rehab team recommend outpatient OT, discussed ADL level and HEP. will continue with discharge teaching    Acceptance, E, VU by SL at 7/31/2020 1143    Comment:  Family conf- discussed improvements in cognition, voicing, swallow, diet, swallow strategies;  Rec: cont tx at d/c    Acceptance, E, VU by RD at 7/10/2020 1529    Comment:  OT educ on OT role in therapeutic process and pt's POC.   Family Acceptance, E, VU by AF at 7/31/2020 1513    Comment:  Pt and family participated in meeting with rehab team recommend outpatient OT, discussed ADL level and HEP. will continue with discharge teaching    Acceptance, E, VU by SL at 7/31/2020 1143    Comment:  Family conf- discussed improvements in cognition, voicing, swallow, diet, swallow strategies;  Rec: cont tx at d/c                   Point: Body mechanics (Done)     Description:   Instruct learner(s) on proper positioning and spine alignment during self-care, functional mobility activities and/or exercises.              Learning Progress Summary           Patient Acceptance, E, VU by AF at 7/31/2020 1513    Comment:  Pt and family participated in meeting with rehab team recommend outpatient OT, discussed ADL level and HEP. will continue with discharge teaching    Acceptance, E, VU by SL at 7/31/2020 1143    Comment:  Family conf- discussed improvements in cognition, voicing, swallow, diet, swallow strategies;  Rec: cont tx at  d/c    Acceptance, E, VU by RD at 7/10/2020 1529    Comment:  OT educ on OT role in therapeutic process and pt's POC.   Family Acceptance, E, VU by AF at 7/31/2020 1513    Comment:  Pt and family participated in meeting with rehab team recommend outpatient OT, discussed ADL level and HEP. will continue with discharge teaching    Acceptance, E, VU by SL at 7/31/2020 1143    Comment:  Family conf- discussed improvements in cognition, voicing, swallow, diet, swallow strategies;  Rec: cont tx at d/c                               User Key     Initials Effective Dates Name Provider Type Discipline     06/08/18 -  Celia Islas, MS CCC-SLP Speech and Language Pathologist SLP    AF 04/14/20 -  Yarely Maldonado, OTR Occupational Therapist OT    RD 10/14/19 -  Celsa Hamilton OT Occupational Therapist OT                       Time Calculation:     Time Calculation- OT     Row Name 08/05/20 1526             Time Calculation- OT    OT Start Time  0800  -AF      OT Stop Time  0830  -AF      OT Time Calculation (min)  30 min  -AF        User Key  (r) = Recorded By, (t) = Taken By, (c) = Cosigned By    Initials Name Provider Type    AF Yarely Maldonado, OTR Occupational Therapist          Therapy Charges for Today     Code Description Service Date Service Provider Modifiers Qty    55856458727 HC OT SELF CARE/MGMT/TRAIN EA 15 MIN 8/4/2020 Yarely Maldonado, OTR GO 2    03744496106 HC OT NEUROMUSC RE EDUCATION EA 15 MIN 8/4/2020 Yarely Maldonado, OTLUZ MARIA GO 2    [id number] HC OT SELF CARE/MGMT/TRAIN EA 15 MIN 8/5/2020 Yarely Maldonado, OTR GO 2                   MARCIAL Red  8/5/2020

## 2020-08-05 NOTE — THERAPY TREATMENT NOTE
Inpatient Rehabilitation - Occupational Therapy Treatment Note    [facility]     Patient Name: Denise Guajardo  : 1965  MRN: [id number]    Today's Date: 2020                 Admit Date: 2020      Visit Dx:    ICD-10-CM ICD-9-CM   1. Glioblastoma multiforme (CMS/HCC) C71.9 191.9       Patient Active Problem List   Diagnosis   • Glioblastoma multiforme (CMS/HCC)   • Intracranial hemorrhage (CMS/HCC)   • Dysphagia   • Acute left-sided weakness   • Brain tumor (CMS/HCC)   • Hemiplegia (CMS/HCC)         Therapy Treatment    IRF Treatment Summary     Row Name 20 1533 20 1523 20 1300       Evaluation/Treatment Time and Intent    Subjective Information  no complaints  -CC  no complaints  -AF  no complaints  -SL    Existing Precautions/Restrictions  fall  -CC  fall  -AF  fall  -SL    Document Type  therapy note (daily note)  -CC  therapy note (daily note)  -AF  therapy note (daily note)  -SL    Mode of Treatment  occupational therapy  -CC  occupational therapy  -AF  individual therapy;speech-language pathology  -SL    Patient/Family Observations  seated on therapy mat   -CC  sitting up in w/c with  present   -AF  cooperative  -SL    Recorded by [CC] Bertha Martell, OTR [AF] Yarely Maldonado, OTR [SL] Celia Islas MS CCC-SLP    Row Name 20 0828             Evaluation/Treatment Time and Intent    Subjective Information  no complaints  -SL      Existing Precautions/Restrictions  fall  -SL      Document Type  therapy note (daily note)  -SL      Mode of Treatment  individual therapy;speech-language pathology  -SL      Patient/Family Observations  COOPERATIVE  -SL      Recorded by [SL] Celia Islas MS CCC-SLP      Row Name 20 1533 20 1523          Cognition/Psychosocial- PT/OT    Affect/Mental Status (Cognitive)  WFL  -CC  WFL  -AF     Orientation Status (Cognition)  oriented x 3  -CC  oriented x 3  -AF     Follows Commands (Cognition)  follows one step commands  -CC   follows one step commands  -AF     Personal Safety Interventions  fall prevention program maintained;gait belt;nonskid shoes/slippers when out of bed  -CC  fall prevention program maintained;gait belt;nonskid shoes/slippers when out of bed  -AF     Safety Deficit (Cognitive)  --  insight into deficits/self awareness  -AF     Recorded by [CC] Bertha Martell OTR [AF] Yarely Maldonado OTR     Row Name 08/05/20 1533 08/05/20 1523          Bed Mobility Assessment/Treatment    Comment (Bed Mobility)  NT  -CC  up in w/c   -AF     Recorded by [CC] Bertha Martell OTR [AF] Yarely Maldonado, OTR     Row Name 08/05/20 1523             Transfer Assessment/Treatment    Comment (Transfers)  sit to  shower MIN A  -AF      Recorded by [AF] Yarely Maldonado OTR      Row Name 08/05/20 1533 08/05/20 0900          Sit-Stand Transfer    Sit-Stand [place] (Transfers)  minimum assist (75% patient effort)  -CC  minimum assist (75% patient effort)  -LB     Assistive Device (Sit-Stand Transfers)  wheelchair  -CC  walker, jakub  -LB     Recorded by [CC] Bertha Martell OTR [LB] Talita Hodges PTA     Row Name 08/05/20 1533 08/05/20 0900          Stand-Sit Transfer    Stand-Sit [place] (Transfers)  minimum assist (75% patient effort)  -CC  minimum assist (75% patient effort)  -LB     Assistive Device (Stand-Sit Transfers)  wheelchair  -CC  walker, jakub  -LB     Recorded by [CC] Bertha Martell OTR [LB] Talita Hodges PTA     Row Name 08/05/20 1523             Shower Transfer    Type (Shower Transfer)  stand pivot/stand step  -AF      [place] Level (Shower Transfer)  minimum assist (75% patient effort);verbal cues  -AF      Assistive Device (Shower Transfer)  grab bars/tub rail;tub bench;wheelchair  -AF      Recorded by [AF] Yarely Maldonado OTR      Row Name 08/05/20 0900             Gait/Stairs Assessment/Training    [place] Level (Gait)  moderate assist (50% patient effort);1 person to manage  equipment  -LB      Assistive Device (Gait)  walker, jakub L AFO  -LB      Distance in Feet (Gait)  40.30  -LB      Pattern (Gait)  step-to;step-through  -LB      Deviations/Abnormal Patterns (Gait)  base of support, wide;maya decreased  -LB      Bilateral Gait Deviations  forward flexed posture  -LB      Left Sided Gait Deviations  knee buckling, left side;knee hyperextension;weight shift ability decreased;leans right;foot drop/toe drag;forward flexed posture;heel strike decreased min/modA for L foot placement  -LB      Comment (Gait/Stairs)  practiced turning around and backing up to w/c. !st time needed maxA 2nd time modA  -LB      Recorded by [LB] Talita Hodges PTA      Row Name 08/05/20 1523             Bathing Assessment/Treatment    Bathing [place] Level  bathing skills;verbal cues;minimum assist (75% patient effort)  -AF      Assistive Device (Bathing)  grab bar/tub rail;hand held shower spray hose;shower chair  -AF      Bathing Position  supported sitting;supported standing  -AF      Bathing Setup Assistance  obtain supplies  -AF      Recorded by [AF] Yarely Maldonado OTR      Row Name 08/05/20 1523             Upper Body Dressing Assessment/Treatment    Upper Body Dressing Task  upper body dressing skills;moderate assist (50-74% patient effort);verbal cues  -AF      Upper Body Dressing Position  supported sitting;supported standing  -AF      Set-up Assistance (Upper Body Dressing)  obtain clothing  -AF      Comment (Upper Body Dressing)  jakub tech  -AF      Recorded by [AF] Yarely Maldonado OTR      Row Name 08/05/20 1523             Lower Body Dressing Assessment/Treatment    Lower Body Dressing [place] Level  doff;don;pants/bottoms;shoes/slippers;socks;underwear;moderate assist (50% patient effort);verbal cues  -AF      Lower Body Dressing Position  supported sitting;supported standing  -AF      Lower Body Dressing Setup Assistance  obtain clothing  -AF      Comment (Lower Body Dressing)   jakub tech  -AF      Recorded by [AF] Yarely Maldonado, THAOR      Row Name 08/05/20 1523             Grooming Assessment/Treatment    Grooming [place] Level  grooming skills;minimum assist (75% patient effort)  -AF      Grooming Position  supported sitting;supported standing  -AF      Grooming Setup Assistance  obtain supplies  -AF      Recorded by [AF] Yarely Maldonado, OTR      Row Name 08/05/20 1533             Muscle Tone Assessment    Muscle Tone: General Assessment  left upper extremity muscle tone deficit  -CC      Left Upper Extremity (Muscle Tone Assessment)  hypertonicity present;slight increase in tone increased tone Shld, decresaed tone elbow, wrist and hand   -CC      Recorded by [CC] Bertha Martell OTR      Row Name 08/05/20 1533 08/05/20 1523 08/05/20 0900       Pain Scale: Numbers Pre/Post-Treatment    Pain Scale: Numbers, Pretreatment  0/10 - no pain  -CC  0/10 - no pain  -AF  0/10 - no pain  -LB    Pain Scale: Numbers, Post-Treatment  0/10 - no pain  -CC  0/10 - no pain  -AF  --    Recorded by [CC] Bertha Martell, MARCIAL [AF] Yarely Maldonado, OTR [LB] Talita Hodges, hospitals    Row Name 08/05/20 1523             Static Sitting Balance    Level of [place] (Unsupported Sitting, Static Balance)  standby assist  -AF      Recorded by [AF] Yarely Maldonado, OTR      Row Name 08/05/20 1533             Upper Extremity Seated Therapeutic Exercise    Performed, Seated Upper Extremity (Therapeutic Exercise)  shoulder flexion/extension;shoulder abduction/adduction;shoulder horizontal abduction/adduction;scapular protraction/retraction;elbow flexion/extension;forearm supination/pronation;wrist flexion/extension;digit flexion/extension  -CC      Exercise Type, Seated Upper Extremity (Therapeutic Exercise)  AAROM (active assistive range of motion)  -CC      Expected Outcomes, Seated Upper Extremity (Therapeutic Exercise)  strengthen, facilitate independent active range of motion  -CC      Sets/Reps Detail,  Seated Upper Extremity (Therapeutic Exercise)  5 reps x 2 in all planes. Vc for shld compensation w elbow.   -CC      Comment, Seated Upper Extremity (Therapeutic Exercise)  trace-1/8 wrist and hand, 1/4 elbow, 1/8 sh;d add/abd, horz Add/Abd, 1/4 shld flex, 1/2-3/4 scapualar elevation   -CC      Recorded by [CC] Bertha Martell OTR      Row Name 08/05/20 1533 08/05/20 1523 08/05/20 0900       Positioning and Restraints    Pre-Treatment Position  sitting in chair/recliner  -CC  sitting in chair/recliner  -AF  --    Post Treatment Position  wheelchair  -CC  wheelchair  -AF  wheelchair  -LB    In Wheelchair  sitting;with PT  -CC  sitting;exit alarm on;with SLP  -AF  sitting;call light within reach;exit alarm on;with family/caregiver  -LB    Recorded by [CC] Bertha Martell, MARCIAL [AF] Yarely Maldonado, OTR [LB] Talita Hodges PTA      User Key  (r) = Recorded By, (t) = Taken By, (c) = Cosigned By    Initials Name Effective Dates    CC Bertha Martell OTR 06/08/18 -     SL Celia Islas, MS CCC-SLP 06/08/18 -     Talita Worthy PTA 03/07/18 -     Yarely Wong OTR 04/14/20 -           Wound 07/01/20 1156 Right head Incision (Active)   Dressing Appearance open to air 8/5/2020  8:34 AM   Closure Liquid skin adhesive 8/5/2020  8:34 AM   Base clean;dry 8/5/2020  8:34 AM   Periwound intact 8/5/2020  8:34 AM   Periwound Temperature warm 8/5/2020  8:34 AM   Periwound Skin Turgor soft 8/5/2020  8:34 AM   Drainage Amount none 8/5/2020  8:34 AM         OT Recommendation and Plan                     Occupational Therapy Education                 Title: PT OT SLP Therapies (In Progress)     Topic: Occupational Therapy (In Progress)     Point: ADL training (Done)     Description:   Instruct learner(s) on proper safety adaptation and remediation techniques during self care or transfers.   Instruct in proper use of assistive devices.              Learning Progress Summary           Patient Acceptance, E, VU by AF at  7/31/2020 1513    Comment:  Pt and family participated in meeting with rehab team recommend outpatient OT, discussed ADL level and HEP. will continue with discharge teaching    Acceptance, E,TB,D, VU by AF at 7/31/2020 1512    Comment:  son participated in teaching with commode transfers and toileting tasks, educated on SROM/HEP    Acceptance, E, VU by SL at 7/31/2020 1143    Comment:  Family conf- discussed improvements in cognition, voicing, swallow, diet, swallow strategies;  Rec: cont tx at d/c    Acceptance, E, VU,NR by AF at 7/20/2020 1531    Comment:  edcuated on safety with ADl tasks    Acceptance, E,TB,D, NR,DU,VU by AF at 7/16/2020 1230    Comment:  with bed moblity and pulling up in bed    Acceptance, E, VU by RD at 7/10/2020 1529    Comment:  OT educ on OT role in therapeutic process and pt's POC.   Family Acceptance, E, VU by AF at 7/31/2020 1513    Comment:  Pt and family participated in meeting with rehab team recommend outpatient OT, discussed ADL level and HEP. will continue with discharge teaching    Acceptance, E,TB,D, VU by AF at 7/31/2020 1512    Comment:  son participated in teaching with commode transfers and toileting tasks, educated on SROM/HEP    Acceptance, E, VU by SL at 7/31/2020 1143    Comment:  Family conf- discussed improvements in cognition, voicing, swallow, diet, swallow strategies;  Rec: cont tx at d/c   Significant Other Acceptance, E,TB,D, NR,DU,VU by AF at 7/16/2020 1230    Comment:  with bed moblity and pulling up in bed                   Point: Home exercise program (Done)     Description:   Instruct learner(s) on appropriate technique for monitoring, assisting and/or progressing therapeutic exercises/activities.              Learning Progress Summary           Patient Acceptance, E, VU by AF at 7/31/2020 1513    Comment:  Pt and family participated in meeting with rehab team recommend outpatient OT, discussed ADL level and HEP. will continue with discharge teaching     Acceptance, E, VU by SL at 7/31/2020 1143    Comment:  Family conf- discussed improvements in cognition, voicing, swallow, diet, swallow strategies;  Rec: cont tx at d/c   Family Acceptance, E, VU by AF at 7/31/2020 1513    Comment:  Pt and family participated in meeting with rehab team recommend outpatient OT, discussed ADL level and HEP. will continue with discharge teaching    Acceptance, E, VU by SL at 7/31/2020 1143    Comment:  Family conf- discussed improvements in cognition, voicing, swallow, diet, swallow strategies;  Rec: cont tx at d/c                   Point: Precautions (Done)     Description:   Instruct learner(s) on prescribed precautions during self-care and functional transfers.              Learning Progress Summary           Patient Acceptance, E,TB,D, DU,NR by AF at 8/3/2020 1537    Comment:  educated on use and benefits of NMES for neuro recovery    Acceptance, E, VU by AF at 7/31/2020 1513    Comment:  Pt and family participated in meeting with rehab team recommend outpatient OT, discussed ADL level and HEP. will continue with discharge teaching    Acceptance, E, VU by SL at 7/31/2020 1143    Comment:  Family conf- discussed improvements in cognition, voicing, swallow, diet, swallow strategies;  Rec: cont tx at d/c    Acceptance, E, VU by RD at 7/10/2020 1529    Comment:  OT educ on OT role in therapeutic process and pt's POC.   Family Acceptance, E, VU by AF at 7/31/2020 1513    Comment:  Pt and family participated in meeting with rehab team recommend outpatient OT, discussed ADL level and HEP. will continue with discharge teaching    Acceptance, E, VU by SL at 7/31/2020 1143    Comment:  Family conf- discussed improvements in cognition, voicing, swallow, diet, swallow strategies;  Rec: cont tx at d/c                   Point: Body mechanics (In Progress)     Description:   Instruct learner(s) on proper positioning and spine alignment during self-care, functional mobility activities and/or  exercises.              Learning Progress Summary           Patient Acceptance, E, NR by CC at 8/5/2020 1532    Comment:  Pt discussed and questioned about sexual intercourse and issues post D/C. Ot will provide information on positioning and managing tone    Acceptance, E, VU by AF at 7/31/2020 1513    Comment:  Pt and family participated in meeting with rehab team recommend outpatient OT, discussed ADL level and HEP. will continue with discharge teaching    Acceptance, E, VU by SL at 7/31/2020 1143    Comment:  Family conf- discussed improvements in cognition, voicing, swallow, diet, swallow strategies;  Rec: cont tx at d/c    Acceptance, E, VU by RD at 7/10/2020 1529    Comment:  OT educ on OT role in therapeutic process and pt's POC.   Family Acceptance, E, VU by AF at 7/31/2020 1513    Comment:  Pt and family participated in meeting with rehab team recommend outpatient OT, discussed ADL level and HEP. will continue with discharge teaching    Acceptance, E, VU by SL at 7/31/2020 1143    Comment:  Family conf- discussed improvements in cognition, voicing, swallow, diet, swallow strategies;  Rec: cont tx at d/c                               User Key     Initials Effective Dates Name Provider Type Discipline     06/08/18 -  Bertha Martell OTR Occupational Therapist OT    SL 06/08/18 -  Celia Islas MS CCC-SLP Speech and Language Pathologist SLP    AF 04/14/20 -  Yarely Maldonado OTR Occupational Therapist OT    RD 10/14/19 -  Celsa Hamilton, OT Occupational Therapist OT                       Time Calculation:     Time Calculation- OT     Row Name 08/05/20 1526 08/05/20 1330          Time Calculation- OT    OT Start Time  0800  -AF  1330  -CC     OT Stop Time  0830  -AF  1400  -CC     OT Time Calculation (min)  30 min  -AF  30 min  -CC       User Key  (r) = Recorded By, (t) = Taken By, (c) = Cosigned By    Initials Name Provider Type    CC Bertha Martell OTLUZ MARIA Occupational Therapist    AF Yarely Maldonado,  OTR Occupational Therapist          Therapy Charges for Today     Code Description Service Date Service Provider Modifiers Qty    [id number] HC OT NEUROMUSC RE EDUCATION EA 15 MIN 8/5/2020 Bertha Martell OTR GO 2                   MARCIAL Mccoy  8/5/2020

## 2020-08-05 NOTE — PLAN OF CARE
Problem: Fall Risk (Adult)  Goal: Absence of Fall  Outcome: Ongoing (interventions implemented as appropriate)     Problem: Patient Care Overview  Goal: Plan of Care Review  Outcome: Ongoing (interventions implemented as appropriate)  Flowsheets (Taken 8/5/2020 0310)  Outcome Summary: Slept fairly well.  at bedside. Meds whole with water. Continent of bladder, so far tonight.  No c/o pain. Zofran 8mg given po before Chemo drugs to prevent nausea. Dischange plans for Fri. 8-7.

## 2020-08-05 NOTE — PROGRESS NOTES
Case Management  Inpatient Rehabilitation Plan of Care and Discharge Plan Note    Rehabilitation Diagnosis:  Branch  Date of Onset:  Branch    Medical Summary:  Branch  Past Medical History: Branch    Plan of Care  Updated Problems/Interventions  Field    Expected Intensity:  Branch  Interdisciplinary Team:  Joel  Estimated Length of Stay/Anticipated Discharge Date: Branch  Anticipated Discharge Destination:  Anticipated discharge destination from inpatient rehabilitation is community  discharge with assistance. Patient lives with  in 2 story home with one  step entry.  Bedroom and bath on first floor.  Family conference held on 7/31 with patient,  and sons. Family teaching  done with son and .  D/C plan is home with  and intermittent assist from sons.  Outpatient PT, OT at [facility]  Radiation treatment M-F at [facility]      Based on the patient's medical and functional status, their prognosis and  expected level of functional improvement is:  Joel    Signed by: MATTEO Dinh

## 2020-08-05 NOTE — PROGRESS NOTES
Inpatient Rehabilitation Plan of Care Note    Plan of Care  Care Plan Reviewed - No updates at this time.    Safety    Performed Intervention(s)  Fall precautions: bed/chair alarms, bed low and locked, nonskid socks, call  light within reach, personal items within reach.  Hourly rounding.      Body Systems    Performed Intervention(s)  Skin assessment q shift and prn.      Nutrition    Performed Intervention(s)  Monitor I/O.    Signed by: Finn Yen RN

## 2020-08-05 NOTE — PROGRESS NOTES
Inpatient Rehabilitation Functional Measures Assessment and Plan of Care    Plan of Care  Updated Problems/Interventions  Cognition    [ST] Memory(Active)  Current Status(08/05/2020): min cues for longer, more complex info/recall  Weekly Goal(08/12/2020): Pt will recall 3 errands after 20 minutes with NO cues.  Discharge Goal: Imporved short-term memory for home environment.    [ST] Executive Functions(Active)  Current Status(08/05/2020): needs exta time for procesisng; can be somewhat  impulsive w/responses at times; min to indep for simple organizational,  sequencing tasks; min cues- math, memory, thought flexibility, reasoning  Weekly Goal(08/12/2020): follow schedule indep  Discharge Goal: fxnl congition for home wiht assist        Communication    [ST] Expression(Active)  Current Status(08/05/2020): Pt now requires MIN-NO cues for high-level word  finding tasks. Pt requires NO cues to use voice in conversation. Presents with  mild dysphonia due to reduced breath support.  Weekly Goal(08/07/2020): Pt perform high-level word finding tasks with NO cues.  Discharge Goal: Improved word finding skills for conversation.        Swallow Function    [ST] Swallowing(Active)  Current Status(08/05/2020): Pt is tolerating regular with thins  Weekly Goal(08/12/2020): tolerate reg diet without s/s aspiration  Discharge Goal: Pt will tolerate the least restrictive diet with supervision.    Functional Measures  DAR Eating:  Branch  DAR Grooming: Branch  DAR Bathing:  Branch  DAR Upper Body Dressing:  Branch  DAR Lower Body Dressing:  Branch  DAR Toileting:  Branch    DAR Bladder Management  Level of Assistance:  Branch  Frequency/Number of Accidents this Shift:  Branch    DAR Bowel Management  Level of Assistance: Branch  Frequency/Number of Accidents this Shift: Branch    DAR Bed/Chair/Wheelchair Transfer:  Branch  DAR Toilet Transfer:  Branch  DAR Tub/Shower Transfer:  Branch    Previously Documented Mode of Locomotion at  Discharge: Field  DAR Expected Mode of Locomotion at Discharge: Branch  [facility] Walk/Wheelchair:  Branch  DAR Stairs:  Branch    DAR Comprehension:  Branch  DAR Expression:  [facility] Social Interaction:  [facility] Problem Solving:  [facility] Memory:  Adah    Therapy Mode Minutes  Occupational Therapy: Branch  Physical Therapy: Branch  Speech Language Pathology:  Branch    Signed by: Celia Islas SLP

## 2020-08-05 NOTE — PROGRESS NOTES
Adult Nutrition  Assessment/PES    Patient Name:  Denise Guajardo  YOB: 1965  MRN: [id number]  Admit Date:  7/9/2020    Assessment Date:  8/5/2020      Reason for Assessment     Row Name 08/05/20 0941          Reason for Assessment    Reason For Assessment  follow-up protocol         Nutrition/Diet History     Row Name 08/05/20 0941          Nutrition/Diet History    Typical Food/Fluid Intake  appetite great         Anthropometrics     Row Name 08/05/20 0610          Anthropometrics    Weight  44.8 kg (98 lb 12.3 oz)         Labs/Tests/Procedures/Meds     Row Name 08/05/20 0941          Labs/Procedures/Meds    Lab Results Reviewed  reviewed     Lab Results Comments  Na, Cl, Cr        Diagnostic Tests/Procedures    Diagnostic Test/Procedure Reviewed  reviewed        Medications    Pertinent Medications Reviewed  reviewed         Physical Findings     Row Name 08/05/20 0942          Physical Findings    Overall Physical Appearance  underweight;other (see comments) hemiplegia, left side     Skin  other (see comments);surgical incision           Nutrition Prescription Ordered     Row Name 08/05/20 0942          Nutrition Prescription PO    Current PO Diet  Regular     Fluid Consistency  Thin     Supplement  Mighty Shake         Evaluation of Received Nutrient/Fluid Intake     Row Name 08/05/20 0942          PO Evaluation    Number of Days PO Intake Evaluated  3 days     % PO Intake  100               Problem/Interventions:          Intervention Goal     Row Name 08/05/20 0943          Intervention Goal    General  Maintain nutrition     PO  Maintain intake     Weight  Appropriate weight gain         Nutrition Intervention     Row Name 08/05/20 0943          Nutrition Intervention    RD/Tech Action  Follow Tx progress           Education/Evaluation     Row Name 08/05/20 0943          Monitor/Evaluation    Monitor  Per protocol           Electronically signed by:  Treva Bui RD  08/05/20 09:44

## 2020-08-05 NOTE — PROGRESS NOTES
Inpatient Rehabilitation Plan of Care Note    Plan of Care  Care Plan Reviewed - No updates at this time.    Body Systems    [RN] Integumentary(Active)  Current Status(08/05/2020): Pt has R scalp incision, which is glued and TIFFANIE w no  drainage. Pt has lumbar puncture site which is closed/healed and TIFFANIE. Pt has  rash-like area to january area which is improving.  Weekly Goal(08/11/2020): Incisions will be free of s/s infection and buttocks  will be kept clean and dry.  Discharge Goal: Incisions will be closed/healed and excoriation to buttocks will  be resolved. Skin will show no further s/s breakdown.        Nutrition    [RN] Nutrition Management(Active)  Current Status(08/05/2020): Pt now on regular diet w thin liquids.  Weekly Goal(08/11/2020): pt tolerating PO diet  Discharge Goal: Pt will receive adequate nutrition.        Psychosocial    [RN] Coping/Adjustment(Active)  Current Status(08/05/2020): Pt is at risk for reduced coping r/t recent  diagnosis, craniotomy, and new deficits. Pt has supportive family.  Weekly Goal(08/11/2020): Pt will verbalize feelings, concerns, needs to staff as  needed.  Discharge Goal: Pt will exhibit adequate coping strategies.        Safety    [RN] Potential for Injury(Active)  Current Status(08/05/2020): Patient has had recent craniotomy and has new  physical limitations. She is alert and oriented x3.   Pt slow to process/respond  at times.  Weekly Goal(08/11/2020): Pt will use call light appropriately as needed.  Discharge Goal: Pt  will remain free from falls and injury. Pt and  aware  of fall/safety in the home setting.    Performed Intervention(s)  Fall precautions: bed/chair alarms, bed low and locked, nonskid socks, call  light within reach, personal items within reach.  Hourly rounding.      Sphincter Control    [RN] Bladder Management(Active)  Current Status(08/05/2020): Bernabe catheter removed 7/11/20. Pt continent and  incontinent of urine.  Weekly Goal(08/11/2020):  Continent 75%  Discharge Goal: Pt will be continent of urine.    [RN] Bowel Management(Active)  Current Status(08/05/2020): Pt is  continent and incontinent of stool . Recent  frequent BMs.  Weekly Goal(08/11/2020): Pt will be continent 100% with BM at least q3 days.  Discharge Goal: Pt  will be continent of stool.    Signed by: Finn Yen RN

## 2020-08-05 NOTE — PROGRESS NOTES
"   LOS: 27 days   Patient Care Team:  Paz Mera MD as PCP - General (Family Medicine)  Carlota ePacock MD as Consulting Physician (Radiation Oncology)    Chief Complaint:   Glioblastoma   S/p July 1, 2020 - Right temporal craniotomy with excisional biopsy of mass in the right cerebral peduncle and placement of a lumbar drain  Steroid taper - medrol dose pack  Left hemiplegia  Dysarthria  Dysphonia  Right ptosis  Impaired cognition/impaired mobility/ impaired self care  Dysphagia - Cortrak tube feeds  Bladder - estrada catheter  Seizure prophylaxis - Keppra  GI prophylaxis - Pepcid/ lansoprazole  DVT prophylaxis - SCDs    Subjective     History of Present Illness    Subjective  Tolerating therapies.  Tolerating radiation therapy.  Strength on the left side is about the same.  Looking forward to discharge at the end of the week    History taken from: patient    Objective     Vital Signs  Temp:  [98.1 °F (36.7 °C)-98.9 °F (37.2 °C)] 98.1 °F (36.7 °C)  Heart Rate:  [71-93] 93  Resp:  [16-18] 16  BP: (117-136)/(75-81) 136/78    Objective:  Vital signs: (most recent): Blood pressure 136/78, pulse 93, temperature 98.1 °F (36.7 °C), temperature source Oral, resp. rate 16, height 165.1 cm (65\"), weight 44.8 kg (98 lb 12.3 oz), SpO2 100 %, not currently breastfeeding.            Physical Exam  MENTAL STATUS -  AWAKE / ALERT  HEENT- RIGHT CRANIOTOMY INCISION CDI  RIGHT PTOSIS,  SCLERAE ANICTERIC, CONJUNCTIVAE PINK  LEFT FACIAL DROOP,     OP-thrush improved  DYSPHONIA-significantly better-easy to understand.  No delay in her speech  CORTRAK FEEDING TUBE  LUNGS -normal respiration  HEART- RRR   ABD -   SOFT, NT.       EXT - NO EDEMA OR CYANOSIS  NEURO - AWAKE, good processing speed.  Very mild dysarthria    EOMI. RIGHT PTOSIS. LEFT FACIAL DROOP.    MOTOR EXAM - RUE/RLE 5/5.  Left upper extremity trace elbow flexion, no active finger flexion.  She has proximal movement at the left lower extremity for hip adduction and also " knee extension 3-/5, no ankle dorsiflexion    Results Review:     I reviewed the patient's new clinical results.  No results found for: POCGLU  Results from last 7 days   Lab Units 08/03/20  0536 07/31/20  1023   WBC 10*3/mm3 10.02 13.48*   HEMOGLOBIN g/dL 14.2 14.7   HEMATOCRIT % 41.4 43.5   PLATELETS 10*3/mm3 222 255     Results from last 7 days   Lab Units 08/03/20  0536 07/31/20  1023   SODIUM mmol/L 132* 138   POTASSIUM mmol/L 4.4 4.3   CHLORIDE mmol/L 96* 97*   CO2 mmol/L 30.6* 31.0*   BUN mg/dL 19 18   CREATININE mg/dL 0.54* 0.51*   CALCIUM mg/dL 9.0 9.1   GLUCOSE mg/dL 100* 90       Medication Review: done  Scheduled Meds:    clotrimazole 10 mg Oral TID   dexamethasone 4 mg Oral Q12H   famotidine 20 mg Nasogastric BID   levETIRAcetam 500 mg Oral Q12H   melatonin 1 mg Oral Nightly   sulfamethoxazole-trimethoprim 1 tablet Oral Once per day on Mon Wed Fri   temozolomide 10 mg Oral Nightly   temozolomide 100 mg Oral Nightly     Continuous Infusions:   PRN Meds:.•  acetaminophen **OR** acetaminophen **OR** [DISCONTINUED] acetaminophen  •  amitriptyline  •  diclofenac  •  hydrOXYzine pamoate  •  [DISCONTINUED] HYDROmorphone **AND** naloxone  •  nystatin  •  ondansetron **OR** ondansetron  •  polyethylene glycol  •  potassium chloride **OR** potassium chloride **OR** potassium chloride  •  senna-docusate sodium  •  sodium chloride      Assessment/Plan       Brain tumor (CMS/HCC)    Hemiplegia (CMS/HCC)      Assessment & Plan  Glioblastoma -  -MRI of the brain on June 26, 2020 showed two enhancing masses - 2.6 cm from right lateral thalamic margin into right side of midbrain and chilango and 7 mm in central chilango also on the right. Surrounding vasogenic edema was also noted.   -July 20-oncology plans for radiation therapy/Temodar  -July 23-to start radiation therapy today and also start Temodar  -July 24-tolerating initial radiation therapy  - August 4 - Staredt Bactrim for PCP prophylaxis 1 DS daily MWF given  lymphocytopenia on therapy        S/p right craniotomy for excisional biopsy July 1, 2020     Steroid-  July 17-Decadron 4 mg twice a day added by medical oncology today  July 20-significant improvement in dysphonia/voicing/also with proximal in the left lower extremity on Decadron 4 mg twice a day.  July 29-continues to show significant improvement on steroids.  August 3-reviewed with radiation oncology-to continue on Decadron 4 mg twice a day through radiation therapy.    Leukocytosis - on steroids   -monitor-resolved.        Left hemiplegia-improved  Bioness/electrical stimulation left upper extremity  Left carbon fiber ankle-foot orthosis ordered to help with stability of the ankle for transfers at home     Dysarthria-improved     Dysphonia-improved     Right ptosis-unchanged     Impaired cognition/impaired mobility/ impaired self care     Dysphagia -improved-started on p.o. intake and tube feeds discontinued  July 24-adjust diet-regular with thins. Needs set-up assistance (cutting up food, opening packages).        Bladder - estrada catheter- Was removed on acute care wing and replaced  July 10-not clear why the Estrada catheter was replaced at the acute care unit.  Will look at possibly trying to discontinue again on Monday a.m.  July 11-Estrada catheter discontinued   July 12- Order U/A C&S. Cut PVRs to BID  July 13-urine culture results pending  July 15-post void residuals low.  Urine culture no growth    Skin-July 13-discomfort from rash at perineal area/buttocks-has been using barrier cream.  Will try Mycostatin antifungal creamSeizure prophylaxis - Keppra    Thrush-Mycelex troches     GI prophylaxis - Pepcid      DVT prophylaxis - SCDs    Anxiety - Add PRN Hydroxizine    Constipation - Add scheduled Senna and PRN Miralax. Monitor    Rehab-  Patient denies any headache.  Denies any vision complaints.  Continues with right ptosis.  Still with dysphonia.  She complains of feeling hungry all the time.  Dietitian is  1 to meet with her regarding the regimen on the tube feeds.  Noted to have thrush and nystatin added.  With bathing moderate assist of 2 for standing.  Upper body dressing max assist.  Lower body dressing dependent.  Grooming moderate assist.  Toileting max dependent x2.  Bed mobility max assist.  Transfers moderate assist of 2 squat pivot to the wheelchair.  Sit to stand moderate max assist of 2.  Speech therapy feels she is ready for swallow study.  She is voiding with low postvoid residuals.  Oncology to have family meeting tomorrow regarding goals of care/plans.     Now admit for comprehensive acute inpatient rehabilitation .  This would be an interdisciplinary program with physical therapy 1 hour,  occupational therapy 1 hour, and speech therapy 1 hour, 5 days a week.  Rehabilitation nursing for carryover, monitoring of  Nutritional status and neurologic   status, bowel and bladder, and skin  Ongoing physician follow-up.  Weekly team conferences.  Goals are indeterminate   Rehabilitation prognosis indeterminate.  Medical prognosis defer to oncology.  Estimated length of stay is indeterminate.   The patient's functional status and clinical status is unchanged from preadmission assessment and the patient continues appropriate for acute inpatient rehabilitation.  Goal is for home with  Home health   therapies.  Barrier to discharge:  Left hemiplegia/dsphagia  - work on  Trunk control/ mobility / ADLs/ swallow   to overcome.      TEAM CONF July 16 - BETTER TRUNK CONTROL YESTERDAY.BED MAX ASSIST TO SIT. MOD ASSIST TO SUPINE. TRANSFERS MAX ASSIST. BATH MOD 2 FOR STANDING. UBD MAX ASSIST. LBD DEP. GROOMING MOD ASSIST. TOILETING MAX-DEP X 2. COGNITION - STM WITH MIN CUES. MILD DIFFICULTY WITH HIGH LEVEL WORD FINDING TASK. VFSS TODAY. SCALP INCISION HEALING. BLADDER/BOWEL - CONTINENT/INCONTINENT. MULTIPLE SMALL BMS YESTERDAY. SKIN - THRUSH - NYSTATIN. BUTTOCK RASH ANTI-FUNGAL CREAM.  ELOS - NUTRITION - COMPLAINS OF LACK  OF SATIETY. TO TRY BOLUS OVER PUMP WITH CORTRAK TO SEE IF HELPS WITH SATIETY AND ABLE TO TOLERATE.  ELOS - THREE WEEKS    TEAM CONF - July 23 - TRANSFERS MIN-MOD ASSIST. GAIT 20 FEET MOD 2 WITH ACE WRAP DORSIFLEX ASSIST WITH MANUAL ADVANCEMENT OF LLE. SITTING BALANCE BETTER. MORE ATTENTIVE. VERY IMPULSIVE. LEFT INATTENTION. BATH MOD. UBD MAX. LBD DEP. GROOMING MOD ASSIST. TOILETING MAX ASSIST. COGNITION - BETTER RECALL. MIN-MOD CUES FOR THOUGHT ORGANIZATION AND ATTENTION TO LEFT. SWALLOW - MECH SOFT, THIN LIQUIDS, NO MIXED CONSISTENCY.EATING WELL. WILL D/C CORTRAK TUBE. BNE - SEE REPORT. ANXIETY - RELAXATION TECHNIQUES  ELOS - TWO WEEKS PER TEAM , PATIENT WOULD LIKE HOME BY BIRTHDAY July 29 BUT PATIENT LACKS INSIGHT INTO DEGREE OF DEFICITS.    TEAM CONF - JULY 30 - BED CTG-MIN. TRANSFERS MIN. CAR TRANSFERS MIN-MOD. GAIT 30 FEET HEMIWALKER, LEFT AFO, MOD 2. TOILET TRANSFERS MIN-MOD. SHOWER MOD. BATH MOD. UBD MOD. LBD MAX. GROOMING MIN. MIN CUES FOR RECALL AT PARAGRAPH LEVEL. SWALLOW - MECH SOFT, NO MIXED, THIN LIQIDS. BOWEL AND BLADDER - CONTINENT AND INCONTINENT. CONTINUES ON DECADRON 4 MG BID.   ELOS -  ONE WEEK       King Morris MD  08/05/20  19:17    Time:     During rounds, used appropriate personal protective equipment including mask and gloves.  Additional gown if indicated.  Mask used was standard procedure mask. Appropriate PPE was worn during the entire visit.  Hand hygiene was completed before and after.

## 2020-08-05 NOTE — THERAPY TREATMENT NOTE
Inpatient Rehabilitation - Speech Language Pathology Treatment Note    [facility]       Patient Name: Denise Guajardo  : 1965  MRN: [id number]    Today's Date: 2020           Admit Date: 2020      Visit Dx:        ICD-10-CM ICD-9-CM   1. Glioblastoma multiforme (CMS/HCC) C71.9 191.9       Patient Active Problem List   Diagnosis   • Glioblastoma multiforme (CMS/HCC)   • Intracranial hemorrhage (CMS/HCC)   • Dysphagia   • Acute left-sided weakness   • Brain tumor (CMS/HCC)   • Hemiplegia (CMS/HCC)          Therapy Treatment    Evaluation/Coping    Evaluation/Treatment Time and Intent  Subjective Information: no complaints (20 1300 : Celia Islas, MS CCC-SLP)  Existing Precautions/Restrictions: fall (20 1300 : Celia Islas, MS CCC-SLP)  Document Type: therapy note (daily note) (20 1300 : Celia Islas MS CCC-SLP)  Mode of Treatment: individual therapy, speech-language pathology (20 1300 : Celia Islas, MS CCC-SLP)  Patient/Family Observations: cooperative (20 1300 : Celia Islas, MS CCC-SLP)    Vitals/Pain/Safety         Cognition/Communication         Oral Motor/Eating         Mobility/Basic Activities/Instrumental Activities/Motor/Modality                   ROM/MMT                   Sensory/Myotome/Dermatome/Edema               Posture/Balance/Special Tests/Exercise/Transportation/Sexual Function                   Orthotics/Residual Limb/Prosthetic Management              Outcome Summary         EDUCATION    The patient has been educated in the following areas:     Cognitive Impairment.    SLP Recommendation and Plan                                                          SLP GOALS     Row Name 20 1300 20 0800 20 1100       Attention Goal (SLP)    Progress (Attention Goal, SLP)  --  70%;independently (over 90% accuracy) trailmaking with shape/color logic  -SL  --    Progress/Outcomes (Attention Goal, SLP)  --  goal ongoing  -SL  --       Memory Skills Goal (SLP)     Barriers (Memory Skills Goal, SLP)  --  picture to picture assocaitions-80%  -SL  --    Progress (Memory Skills Goal, SLP)  70%;independently (over 90% accuracy) auditory presentation- recall of paragraphs  -SL  70%;80%;independently (over 90% accuracy) 4 WORDS boxed info- immed recall  -SL  --    Progress/Outcomes (Memory Skills Goal, SLP)  goal ongoing  -  goal ongoing  -  --    Comment (Memory Skills Goal, SLP)  --  38% for recall of position of words in box; 75% for recall ( in general) of the words  -SL  --       Organizational Skills Goal (SLP)    Progress (Thought Organization Skills Goal, SLP)  --  80%;independently (over 90% accuracy) missing letters in words, given categories  -SL  80%;independently (over 90% accuracy)  -KA    Progress/Outcomes (Thought Organization Skills Goal, SLP)  --  goal ongoing  -  good progress toward goal  -    Comment (Thought Organization Skills Goal, SLP)  --  --  category matrix. Providing opposites=80% without cues   -       Reasoning Goal (SLP)    Progress (Reasoning Goal, [facility])  90%;independently (over 90% accuracy) making inferences form paragraphs  -SL  80%;90%;independently (over 90% accuracy) 9 item grid- locating 3 items- same topic- postional restric  -SL  --    Progress/Outcomes (Reasoning Goal, SLP)  goal ongoing  -  goal ongoing  -  --    Comment (Reasoning Goal, [facility])  60% for altering word tasak- 4 different requirements ( thought flexibility task)  -SL  --  letter placement task=70% without cues and 100% with min cues  -       Functional Math Skills Goal (SLP)    Progress (Functional Math Skills Goal, SLP)  --  --  80%;independently (over 90% accuracy)  -KA    Progress/Outcomes (Functional Math Skills Goal, SLP)  --  --  goal ongoing  -    Comment (Functional Math Skills Goal, SLP)  --  --  simple temporal word words, and in clock math task on IPAD 70% without cues, cues required to allow more time for processing.   -ANGELICA    Row Name 08/03/20  1400             Memory Skills Goal (SLP)    Progress (Memory Skills Goal, SLP)  60%;independently (over 90% accuracy)  -CP      Progress/Outcomes (Memory Skills Goal, SLP)  goal ongoing  -CP      Comment (Memory Skills Goal, SLP)  visual memory task  -CP         Organizational Skills Goal (SLP)    Progress (Thought Organization Skills Goal, SLP)  100%;independently (over 90% accuracy)  -CP      Progress/Outcomes (Thought Organization Skills Goal, SLP)  good progress toward goal  -CP      Comment (Thought Organization Skills Goal, SLP)  category matrix  -CP         Reasoning Goal (SLP)    Progress (Reasoning Goal, SLP)  with moderate cues (50-74%)  -CP      Progress/Outcomes (Reasoning Goal, SLP)  goal ongoing  -CP      Comment (Reasoning Goal, SLP)  deductive puzzle  -CP         Functional Math Skills Goal (SLP)    Progress (Functional Math Skills Goal, SLP)  80%;independently (over 90% accuracy)  -CP      Progress/Outcomes (Functional Math Skills Goal, SLP)  goal ongoing  -CP      Comment (Functional Math Skills Goal, SLP)  very simple level temporal problem solving  -CP         Right Hemisphere Function Goal (SLP)    Progress (Right Hemisphere Function Goal, SLP)  with minimal cues (75-90%);with moderate cues (50-74%)  -CP      Progress/Outcomes (Right Hemisphere Function Goal, SLP)  goal ongoing  -CP      Comment (Right Hemisphere Function Goal, SLP)  trailmaking task  -CP        User Key  (r) = Recorded By, (t) = Taken By, (c) = Cosigned By    Initials Name Provider Type    Celia London MS CCC-SLP Speech and Language Pathologist    Cesia Landeros MS CCC-SLP Speech and Language Pathologist    Bruno Jenkins MA,[facility]3]-SLP Speech and Language Pathologist                  Time Calculation:       Time Calculation- SLP     Row Name 08/05/20 1334 08/05/20 0900          Time Calculation- SLP    SLP Start Time  1300  -SL  0830  -SL     SLP Stop Time  1330  -SL  0900  -SL     SLP Time Calculation (min)  30 min   -  30 min  -       User Key  (r) = Recorded By, (t) = Taken By, (c) = Cosigned By    Initials Name Provider Type    Celia London MS CCC-SLP Speech and Language Pathologist            Therapy Charges for Today     Code Description Service Date Service Provider Modifiers Qty    03677446256 HC ST DEV OF COGN SKILLS INITIAL 15 MIN 8/5/2020 Celia Islas MS CCC-SLP  1    90405633748 HC ST DEV OF COGN SKILLS EACH ADDT'L 15 MIN 8/5/2020 Celia Islas MS CCC-SLP  1    39102326721 HC ST DEV OF COGN SKILLS EACH ADDT'L 15 MIN 8/5/2020 Celia Islas MS CCC-SLP  2                           MS RUFINO Flores  8/5/2020

## 2020-08-06 NOTE — PROGRESS NOTES
Inpatient Rehabilitation Plan of Care Note    Plan of Care  Care Plan Reviewed - No updates at this time.    Safety    Performed Intervention(s)  Fall precautions: bed/chair alarms, bed low and locked, nonskid socks, call  light within reach, personal items within reach.  Hourly rounding.  Uses call light appropriately  Environmental set-up to reduce risk      Psychosocial    Performed Intervention(s)  provide relaxation and distraction as needed.  Allow extra time for pt to verbalize needs, concerns,  feelings, etc.  Support family      Body Systems    Performed Intervention(s)  Check patient for incontinence regularly. Keep skin clean and dry as much as  possible.  Nystatin cream to buttocks prn  Skin assessment q shift and prn.      Sphincter Control    Performed Intervention(s)  Monitor I/O.      Nutrition    Performed Intervention(s)  Daily weights.  Monitor I/O.    Signed by: Danya Graf RN

## 2020-08-06 NOTE — PLAN OF CARE
Problem: Patient Care Overview  Goal: Coping Plan  Outcome: Ongoing (interventions implemented as appropriate)     Problem: Skin Injury Risk (Adult)  Goal: Skin Health and Integrity  Outcome: Ongoing (interventions implemented as appropriate)     Problem: Fall Risk (Adult)  Goal: Absence of Fall  Outcome: Ongoing (interventions implemented as appropriate)     Problem: Craniotomy/Craniectomy/Cranioplasty (Adult)  Goal: Signs and Symptoms of Listed Potential Problems Will be Absent, Minimized or Managed (Craniotomy/Craniectomy/Cranioplasty)  Outcome: Ongoing (interventions implemented as appropriate)       Pt A/Ox4 but can be forgetful, OOB x1 squat/pivot to w/c. Pt continent of urine using toilet. No c/o pain. R scalp incision closed and TIFFANIE. Prn zofran given an hour prior to chemo med. No c/o n/v. Spouse at bedside during night. No acute events or changes overnight.

## 2020-08-06 NOTE — THERAPY DISCHARGE NOTE
Inpatient Rehabilitation - Physical Therapy Treatment Note/Discharge  [facility]     Patient Name: Denise Guajardo  : 1965  MRN: [id number]  Today's Date: 2020             Admit Date: 2020    Visit Dx:    ICD-10-CM ICD-9-CM   1. Glioblastoma multiforme (CMS/HCC) C71.9 191.9     Patient Active Problem List   Diagnosis   • Glioblastoma multiforme (CMS/HCC)   • Intracranial hemorrhage (CMS/HCC)   • Dysphagia   • Acute left-sided weakness   • Brain tumor (CMS/HCC)   • Hemiplegia (CMS/HCC)       Physical Therapy Education                 Title: PT OT SLP Therapies (In Progress)     Topic: Physical Therapy (Resolved)     Point: Mobility training (Resolved)     Description:   Instruct learner(s) on safety and technique for assisting patient out of bed, chair or wheelchair.  Instruct in the proper use of assistive devices, such as walker, crutches, cane or brace.              Patient Friendly Description:   It's important to get you on your feet again, but we need to do so in a way that is safe for you. Falling has serious consequences, and your personal safety is the most important thing of all.        When it's time to get out of bed, one of us or a family member will sit next to you on the bed to give you support.     If your doctor or nurse tells you to use a walker, crutches, a cane, or a brace, be sure you use it every time you get out of bed, even if you think you don't need it.    Learning Progress Summary           Patient Acceptance, E, NR by LB at 8/3/2020 1109    Acceptance, E, NR by LB at 2020 1220    Acceptance, E, VU by MARRY at 2020 1143    Comment:  Family conf- discussed improvements in cognition, voicing, swallow, diet, swallow strategies;  Rec: cont tx at d/c    Acceptance, E, NR by LB at 2020 1410    Acceptance, E, NR by LB at 2020 1058    Eager, E,D, NR by KP at 2020 0918    Comment:  Gait sequencing    Acceptance, E, NR by LB at 2020 0942    Acceptance, E,D,  VU,DU by LB1 at 7/25/2020 1159    Comment:  wt shifting with ambulation    Acceptance, E, NR by LB at 7/23/2020 1514    Acceptance, E, NR by LB at 7/22/2020 1517    Acceptance, E, NR by LB at 7/21/2020 1511    Acceptance, E, NR by LB at 7/20/2020 1216    Acceptance, E,TB, VU,NR by GEGE at 7/18/2020 1154    Acceptance, E, NR by LB at 7/17/2020 1512    Acceptance, E, NR by LB at 7/16/2020 1208    Acceptance, E, NR by LB at 7/15/2020 1459    Acceptance, E, NR by LB at 7/14/2020 1502    Acceptance, E, NR by LB at 7/13/2020 1458    Acceptance, E,TB,D, NR by EE at 7/10/2020 1031   Family Acceptance, E,D, VU,DU by LB at 7/31/2020 1441    Comment:  sonShay, instructed in bed mob tsfs and car tsfs. Observed bed mob and car. Practiced tsfs.    Acceptance, E, VU by SL at 7/31/2020 1143    Comment:  Family conf- discussed improvements in cognition, voicing, swallow, diet, swallow strategies;  Rec: cont tx at d/c   Significant Other Acceptance, E,D, VU,DU by LB at 8/4/2020 1520    Comment:  bed mob, transfers, car tsf                   Point: Home exercise program (Resolved)     Description:   Instruct learner(s) on appropriate technique for monitoring, assisting and/or progressing patient with therapeutic exercises and activities.              Learning Progress Summary           Patient Acceptance, E,H, VU by LB at 8/5/2020 1211    Acceptance, E, VU by SL at 7/31/2020 1143    Comment:  Family conf- discussed improvements in cognition, voicing, swallow, diet, swallow strategies;  Rec: cont tx at d/c    Acceptance, E,D, VU,DU by LB1 at 7/25/2020 1159    Comment:  wt shifting with ambulation    Acceptance, E,H,D, VU,DU by LB at 7/24/2020 0908   Family Acceptance, E, VU by SL at 7/31/2020 1143    Comment:  Family conf- discussed improvements in cognition, voicing, swallow, diet, swallow strategies;  Rec: cont tx at d/c   Significant Other Acceptance, XIMENA RUSSELL VU by KO at 8/5/2020 1211    Acceptance, XIMENA RUSSELL D, VU, DU by KO at 7/24/2020  0908                   Point: Body mechanics (Resolved)     Description:   Instruct learner(s) on proper positioning and spine alignment for patient and/or caregiver during mobility tasks and/or exercises.              Learning Progress Summary           Patient Acceptance, E, VU by LB at 8/6/2020 1520    Acceptance, E, VU by SL at 7/31/2020 1143    Comment:  Family conf- discussed improvements in cognition, voicing, swallow, diet, swallow strategies;  Rec: cont tx at d/c    Acceptance, E, NR by LB at 7/30/2020 1410    Acceptance, E,D, VU,DU by LB1 at 7/25/2020 1159    Comment:  wt shifting with ambulation    Acceptance, E, NR by LB at 7/21/2020 1511    Acceptance, E,TB,D, NR by EE at 7/10/2020 1031   Family Acceptance, E,D, VU by LB at 7/31/2020 1443    Comment:  Shay Seymour was instructed.    Acceptance, E, VU by SL at 7/31/2020 1143    Comment:  Family conf- discussed improvements in cognition, voicing, swallow, diet, swallow strategies;  Rec: cont tx at d/c   Significant Other Eager, E, VU by LB at 7/11/2020 1415    Comment:  Addressing pnt from left side. (affected side)                   Point: Precautions (Resolved)     Description:   Instruct learner(s) on prescribed precautions during mobility and gait tasks              Learning Progress Summary           Patient Acceptance, E, VU by SL at 7/31/2020 1143    Comment:  Family conf- discussed improvements in cognition, voicing, swallow, diet, swallow strategies;  Rec: cont tx at d/c    Acceptance, E,D, VU,DU by LB1 at 7/25/2020 1159    Comment:  wt shifting with ambulation    Acceptance, E,TB,D, NR by EE at 7/10/2020 1031   Family Acceptance, E,D, VU by LB at 7/31/2020 1443    Comment:  Shay Seymour was instructed.    Acceptance, E, VU by SL at 7/31/2020 1143    Comment:  Family conf- discussed improvements in cognition, voicing, swallow, diet, swallow strategies;  Rec: cont tx at d/c                               User Key     Initials Effective Dates Name  Provider Type Discipline    LB1 06/08/18 -  Talita Mcclelland, PT Physical Therapist PT    SL 06/08/18 -  Celia Islas MS CCC-SLP Speech and Language Pathologist SLP    GEGE 06/08/18 -  Lisa Henderson, PT Physical Therapist PT    LB 03/07/18 -  Talita Hodges, PTA Physical Therapy Assistant PT    EE 04/03/18 -  Marylin Hill, PT Physical Therapist PT    KP 04/03/18 -  Shannan Meade, PT Physical Therapist PT              PT IRF GOALS     Row Name 08/06/20 1500             Bed Mobility Goal 1 (PT-IRF)    Activity/Assistive Device (Bed Mobility Goal 1, PT-IRF)  rolling to left;rolling to right;supine to sit;sit to supine/supine to sit  -LB      [place] Level (Bed Mobility Goal 1, PT-IRF)  moderate assist (50-74% patient effort)  -LB      Progress/Outcomes (Bed Mobility Goal 1, PT-IRF)  goal met  -LB         Bed Mobility Goal 2 (PT-IRF)    Activity/Assistive Device (Bed Mobility Goal 2, PT-IRF)  rolling to left;rolling to right;sit to supine;supine to sit  -LB      [place] Level (Bed Mobility Goal 2, PT-IRF)  minimum assist (75% or more patient effort)  -LB      Progress/Outcomes (Bed Mobility Goal 2, PT-IRF)  goal met  -LB         Transfer Goal 1 (PT-IRF)    Activity/Assistive Device (Transfer Goal 1, PT-IRF)  bed-to-chair/chair-to-bed  -LB      [place] Level (Transfer Goal 1, PT-IRF)  moderate assist (50-74% patient effort)  -LB      Progress/Outcomes (Transfer Goal 1, PT-IRF)  goal met  -LB         Transfer Goal 2 (PT-IRF)    Activity/Assistive Device (Transfer Goal 2, PT-IRF)  sit-to-stand/stand-to-sit  -LB      [place] Level (Transfer Goal 2, PT-IRF)  maximum assist (25-49% patient effort);moderate assist (50-74% patient effort)  -LB      Progress/Outcomes (Transfer Goal 2, PT-IRF)  goal met  -LB         Transfer Goal 3 (PT-IRF)    Activity/Assistive Device (Transfer Goal 3, PT-IRF)  sit-to-stand/stand-to-sit;bed-to-chair/chair-to-bed  -LB      [place] Level (Transfer Goal 3, PT-IRF)   minimum assist (75% or more patient effort)  -LB      Progress/Outcomes (Transfer Goal 3, PT-IRF)  goal met  -LB         Transfer Goal 4 (PT-IRF)    Activity/Assistive Device (Transfer Goal 4, PT-IRF)  car transfer  -LB      [place] Level (Transfer Goal 4, PT-IRF)  moderate assist (50-74% patient effort)  -LB      Progress/Outcomes (Transfer Goal 4, PT-IRF)  goal met  -LB         Wheelchair Locomotion Goal 1 (PT-IRF)    Activity (Wheelchair Locomotion Goal 1, PT-IRF)  forward propulsion;steering;turning  -LB      [place] Level (Wheelchair Locomotion Goal 1, PT-IRF)  contact guard assist  -LB      Distance Goal 1 (Wheelchair Locomotion, PT-IRF)  50'  -LB      Progress/Outcomes (Wheelchair Locomotion Goal 1, PT-IRF)  goal met  -LB         Wheelchair Locomotion Goal 2 (PT-IRF)    Activity (Wheelchair Locomotion Goal 2, PT-IRF)  forward propulsion;steering;turning;doorway navigation  -LB      [place] Level (Wheelchair Locomotion Goal 2, PT-IRF)  supervision required  -LB      Distance Goal 2 (Wheelchair Locomotion, PT-IRF)  80'  -LB      Progress/Outcomes (Wheelchair Locomotion Goal 2, PT-IRF)  goal partially met  -LB         Balance Goal 1 (PT)    Activity/Assistive Device (Balance Goal 1, PT)  sitting, static  -LB      [place] Level/Cues Needed (Balance Goal 1, PT)  contact guard assist  -LB      Progress/Outcomes (Balance Goal 1, PT)  goal met  -LB         Balance Goal 2 (PT)    Activity/Assistive Device (Balance Goal 2, PT)  standing, static  -LB      [place] Level (Balance Goal 2, PT)  maximum assist (25-49% patient effort);moderate assist (50-74% patient effort)  -LB      Progress/Outcome (Balance Goal 2, PT)  goal met  -LB        User Key  (r) = Recorded By, (t) = Taken By, (c) = Cosigned By    Initials Name Provider Type    Talita Worthy PTA Physical Therapy Assistant          Therapy Treatment    IRF Treatment Summary     Row Name 08/06/20 1300 08/06/20 0940 08/06/20 0919        Evaluation/Treatment Time and Intent    Subjective Information  no complaints  -SL  no complaints  -SL  no complaints  -LB    Existing Precautions/Restrictions  fall  -SL  fall  -SL  fall chemo  -LB    Document Type  therapy note (daily note)  -SL  therapy note (daily note)  -SL  discharge treatment  -LB    Mode of Treatment  speech-language pathology  -SL  speech-language pathology  -SL  physical therapy  -LB    Patient/Family Observations  cooperative  -SL  cooperative  -SL  Seated in w/c. Nurse present in room  -LB    Recorded by [SL] Celia Islas MS CCC-SLP [SL] Celia Islas MS CCC-SLP [LB] Talita Hodges, PTA    Row Name 08/06/20 0919             Bed Mobility Assessment/Treatment    Rolling Left [place] (Bed Mobility)  supervision;verbal cues  -LB      Rolling Right [place] (Bed Mobility)  supervision;verbal cues  -LB      Supine-Sit [place] (Bed Mobility)  contact guard  -LB      Sit-Supine [place] (Bed Mobility)  contact guard  -LB      Comment (Bed Mobility)  assessed on txment mat  -LB      Recorded by [LB] Talita Hodges, EDEN      Row Name 08/06/20 0919             Bed-Chair Transfer    Bed-Chair [place] (Transfers)  minimum assist (75% patient effort) Sit/pivot. attempted stand/pivot but had LOB.  -LB      Assistive Device (Bed-Chair Transfers)  wheelchair  -LB      Recorded by [LB] Talita Hodges, PTA      Row Name 08/06/20 0919             Chair-Bed Transfer    Chair-Bed [place] (Transfers)  minimum assist (75% patient effort)  -LB      Assistive Device (Chair-Bed Transfers)  wheelchair  -LB      Recorded by [LB] Talita Hodges, PTA      Row Name 08/06/20 0919             Sit-Stand Transfer    Sit-Stand [place] (Transfers)  minimum assist (75% patient effort)  -LB      Assistive Device (Sit-Stand Transfers)  walker, jakub  -LB      Recorded by [LB] Talita Hodges, EDEN      Row Name 08/06/20 0919             Stand-Sit Transfer    Stand-Sit [place]  (Transfers)  minimum assist (75% patient effort);verbal cues  -LB      Assistive Device (Stand-Sit Transfers)  walker, jakub  -LB      Recorded by [LB] Talita Hodges PTA      Row Name 08/06/20 0919             Car Transfer    Type (Car Transfer)  squat pivot  -LB      [place] Level (Car Transfer)  minimum assist (75% patient effort)  -LB      Assistive Device (Car Transfer)  wheelchair  -LB      Recorded by [LB] Talita Hodges PTA      Row Name 08/06/20 0919             Gait/Stairs Assessment/Training    [place] Level (Gait)  moderate assist (50% patient effort);1 person to manage equipment mod x 1, cga x 1  -LB      Assistive Device (Gait)  walker, jakub L carbon fiber AFO  -LB      Distance in Feet (Gait)  80, 40  -LB      Pattern (Gait)  step-to;step-through  -LB      Deviations/Abnormal Patterns (Gait)  base of support, wide;maya decreased  -LB      Bilateral Gait Deviations  forward flexed posture  -LB      Left Sided Gait Deviations  knee buckling, left side;knee hyperextension;foot drop/toe drag;forward flexed posture;heel strike decreased;Trendelenburg sign;weight shift ability decreased modA for L foot placement  -LB      Recorded by [LB] Talita Hodges PTA      Row Name 08/06/20 0919             Pain Scale: Numbers Pre/Post-Treatment    Pain Scale: Numbers, Pretreatment  0/10 - no pain  -LB      Recorded by [LB] Talita Hodges PTA      Row Name 08/06/20 0919             Core Strengthening Therapeutic Exercise    Exercise Performed (Core Strengthening Therapeutic Exercise)  bridging with bilateral leg support;bridging with single leg support  -LB      Recorded by [LB] Talita Hodges PTA      Row Name 08/06/20 0919             Lower Extremity Supine Therapeutic Exercise    Performed, Supine Lower Extremity (Therapeutic Exercise)  hip flexion/extension;hip abduction/adduction;knee flexion/extension;SAQ (short arc quad) over bolster;ankle dorsiflexion/plantarflexion  -LB       Exercise Type, Supine Lower Extremity (Therapeutic Exercise)  AROM (active range of motion);AAROM (active assistive range of motion);PROM (passive range of motion)  -LB      Recorded by [KO] Talita Hodges PTA      Row Name 08/06/20 0919             Positioning and Restraints    Post Treatment Position  wheelchair  -LB      In Wheelchair  sitting;exit alarm on;with SLP  -LB      Recorded by [KO] Talita Hodges PTA        User Key  (r) = Recorded By, (t) = Taken By, (c) = Cosigned By    Initials Name Effective Dates    MARRY Islas Celia, MS CCC-SLP 06/08/18 -     Talita Worthy PTA 03/07/18 -           PT Recommendation and Plan  Planned Therapy Interventions (PT Eval): balance training, bed mobility training, gait training, home exercise program, motor coordination training, neuromuscular re-education, patient/family education, ROM (range of motion), strengthening, stretching, transfer training, wheelchair management/propulsion training     Patient was wearing a face mask during this therapy encounter. Therapist used appropriate personal protective equipment including mask and gloves.  Mask used was standard procedure mask. Appropriate PPE was worn during the entire therapy session. Hand hygiene was completed before and after therapy session. Patient is not in enhanced droplet precautions.          Time Calculation:   PT Charges     Row Name 08/06/20 1519 08/06/20 0918          Time Calculation    Start Time  1230  -LB  0900  -LB     Stop Time  1300  -LB  0930  -LB     Time Calculation (min)  30 min  -LB  30 min  -LB       User Key  (r) = Recorded By, (t) = Taken By, (c) = Cosigned By    Initials Name Provider Type    Talita Worthy PTA Physical Therapy Assistant          Therapy Charges for Today     Code Description Service Date Service Provider Modifiers Qty    96724543104 HC PT THER PROC EA 15 MIN 8/5/2020 Talita Hodges PTA GP 4    84209189322 HC PT THER SUPP EA 15 MIN 8/5/2020  Talita Hodges, EDEN GP 2    09384720153 HC PT THER PROC EA 15 MIN 8/6/2020 Talita Hodges PTA GP 4    70242339215 HC PT THER SUPP EA 15 MIN 8/6/2020 Talita Hodges, EDEN GP 2               PT Discharge Summary  Reason for Discharge: Discharge from facility  Outcomes Achieved: Patient able to partially acheive established goals  Discharge Destination: Home with assist, Home with outpatient services    Talita Hodges PTA  8/6/2020

## 2020-08-06 NOTE — PROGRESS NOTES
Physician Weekly Management Note    Diagnosis:     1. Glioblastoma multiforme (CMS/HCC)       Reason for Visit: Radiation (11/30)    Concurrent Chemo:       Notes on Treatment course, Films, Medical progress and Plan:  Looks better still! Sitting in wheel chair. Language MUCH improved. Ready for discharge tomorrow. Continues on steroid. Denies headache, pain. Will continue.    Performance Status:  (2) Ambulatory and capable of self care, unable to carry out work activity, up and about > 50% or waking hours    ROS -  Other than those listed above, as follows:  Constitutional - Normal - no complaints of fatigue, denies lack of appetite, fever, night sweats or change in weight.  Neck - Normal - denies neck masses, muscle weakness, neck pain, decreased range of motion or swelling.  Cardiovascular - Normal - denies arrhythmias, chest pain, dyspnea, edema, orthopnea or palpitations.  Respiratory - Normal - denies cough, dyspnea, hemoptysis, hiccoughs, pleuritic chest pain or wheezing.  Gastrointestinal - Normal - no complaints of constipation, abdominal pain, diarrhea, heartburn/dyspepsia, hematemesis, hemorrhoids, melena or GI bleeding, nausea, pain or cramping or vomiting.  Neuro - Normal - no new complaints of vision change, headache, nausea, cranial nerve deficit, gait abnormality, syncope, seizure.    PHYSICAL EXAM - Other than changes listed above, as follows:  There were no vitals filed for this visit.    Constitutional - Normal - no evidence of impaired alertness, inadequate appearance, premature or advanced chronologic age, uncooperativeness, altered mood, affect or disorientation.  Neck - Normal - no evidence of tender or enlarged lymph nodes, neck abnormalities, restricted range of motion or enlarged thyroid.  Chest - Normal - no evidence of chest abnormalities, tender or enlarged lymph nodes.  Respiratory - Normal - no evidence of abnormal breat sounds, chest abnormalities on palpation and chest  "abnormalities on percussion and normal breath sounds.  Hematologic/Lymphatic - Normal - no evidence of tender or enlarged axillary lymph nodes nor tender or enlarged neck nodes.  Neuro - Normal - no evidence of cranial nerve deficit, gait abnormality.    Problem added:  No problems updated.  Medications added: No orders of the defined types were placed in this encounter.    Ancillary referrals made: No orders of the defined types were placed in this encounter.      Technical aspects reviewed:  Weekly OBI approved if applicable? Yes   Weekly port films approved?  Yes  Change requests noted if applicable?  Yes   Patient setup and plan reviewed?  Yes     Chart Reviewed:  Continue current treatment plan?  Yes  Treatment plan change requested? No    I have reviewed and marked as \"reviewed\" the current medications, allergies and problem list in the patients EMR.    I have reviewed the patient's medical, surgical  history in detail, reviewed any pertinent lab work  and updated the computerized patient record if needed.    Patient's Care Team:  Patient Care Team:  Paz Mera MD as PCP - General (Family Medicine)  Carlota Peacock MD as Consulting Physician (Radiation Oncology)      "

## 2020-08-06 NOTE — PROGRESS NOTES
"   LOS: 28 days   Patient Care Team:  Paz Mera MD as PCP - General (Family Medicine)  Carlota Peacock MD as Consulting Physician (Radiation Oncology)    Chief Complaint:   Glioblastoma   S/p July 1, 2020 - Right temporal craniotomy with excisional biopsy of mass in the right cerebral peduncle and placement of a lumbar drain  Steroid taper - medrol dose pack  Left hemiplegia  Dysarthria  Dysphonia  Right ptosis  Impaired cognition/impaired mobility/ impaired self care  Dysphagia - Cortrak tube feeds  Bladder - estrada catheter  Seizure prophylaxis - Keppra  GI prophylaxis - Pepcid/ lansoprazole  DVT prophylaxis - SCDs    Subjective     History of Present Illness    Subjective    Tolerates therapies.  A bit stronger the left leg.  Still weak with the left arm.      History taken from: patient    Objective     Vital Signs  Temp:  [97.6 °F (36.4 °C)-98.1 °F (36.7 °C)] 97.6 °F (36.4 °C)  Heart Rate:  [78-93] 78  Resp:  [16-18] 18  BP: (113-136)/(67-78) 113/77    Objective:  Vital signs: (most recent): Blood pressure 113/77, pulse 78, temperature 97.6 °F (36.4 °C), temperature source Oral, resp. rate 18, height 165.1 cm (65\"), weight 45.5 kg (100 lb 5 oz), SpO2 98 %, not currently breastfeeding.            Physical Exam  MENTAL STATUS -  AWAKE / ALERT  HEENT- RIGHT CRANIOTOMY INCISION CDI  RIGHT PTOSIS,  SCLERAE ANICTERIC, CONJUNCTIVAE PINK  LEFT FACIAL DROOP,     OP-thrush improved  DYSPHONIA-significantly better-easy to understand.  No delay in her speech  CORTRAK FEEDING TUBE  LUNGS -normal respiration-clear to auscultation  HEART- RRR   ABD -   SOFT, NT.       EXT - NO EDEMA OR CYANOSIS  NEURO - AWAKE, good processing speed.  Very mild dysarthria    EOMI. RIGHT PTOSIS. LEFT FACIAL DROOP.    MOTOR EXAM - RUE/RLE 5/5.  Left upper extremity trace elbow flexion, no active finger flexion.  She has proximal movement at the left lower extremity for hip adduction and also knee extension 3+/5, no ankle dorsiflexion, " trace ankle plantarflexion    Results Review:     I reviewed the patient's new clinical results.  No results found for: POCGLU  Results from last 7 days   Lab Units 08/03/20  0536 07/31/20  1023   WBC 10*3/mm3 10.02 13.48*   HEMOGLOBIN g/dL 14.2 14.7   HEMATOCRIT % 41.4 43.5   PLATELETS 10*3/mm3 222 255     Results from last 7 days   Lab Units 08/03/20  0536 07/31/20  1023   SODIUM mmol/L 132* 138   POTASSIUM mmol/L 4.4 4.3   CHLORIDE mmol/L 96* 97*   CO2 mmol/L 30.6* 31.0*   BUN mg/dL 19 18   CREATININE mg/dL 0.54* 0.51*   CALCIUM mg/dL 9.0 9.1   GLUCOSE mg/dL 100* 90       Medication Review: done  Scheduled Meds:    clotrimazole 10 mg Oral TID   dexamethasone 4 mg Oral Q12H   famotidine 20 mg Nasogastric BID   levETIRAcetam 500 mg Oral Q12H   melatonin 1 mg Oral Nightly   sulfamethoxazole-trimethoprim 1 tablet Oral Once per day on Mon Wed Fri   temozolomide 10 mg Oral Nightly   temozolomide 100 mg Oral Nightly     Continuous Infusions:   PRN Meds:.•  acetaminophen **OR** acetaminophen **OR** [DISCONTINUED] acetaminophen  •  amitriptyline  •  diclofenac  •  hydrOXYzine pamoate  •  [DISCONTINUED] HYDROmorphone **AND** naloxone  •  nystatin  •  ondansetron **OR** ondansetron  •  polyethylene glycol  •  potassium chloride **OR** potassium chloride **OR** potassium chloride  •  senna-docusate sodium  •  sodium chloride      Assessment/Plan       Brain tumor (CMS/HCC)    Hemiplegia (CMS/HCC)      Assessment & Plan  Glioblastoma -  -MRI of the brain on June 26, 2020 showed two enhancing masses - 2.6 cm from right lateral thalamic margin into right side of midbrain and chilango and 7 mm in central chilango also on the right. Surrounding vasogenic edema was also noted.   -July 20-oncology plans for radiation therapy/Temodar  -July 23-to start radiation therapy today and also start Temodar  -July 24-tolerating initial radiation therapy  - August 4 - Staredt Bactrim for PCP prophylaxis 1 DS daily MWF given lymphocytopenia on  therapy        S/p right craniotomy for excisional biopsy July 1, 2020     Steroid-  July 17-Decadron 4 mg twice a day added by medical oncology today  July 20-significant improvement in dysphonia/voicing/also with proximal in the left lower extremity on Decadron 4 mg twice a day.  July 29-continues to show significant improvement on steroids.  August 3-reviewed with radiation oncology-to continue on Decadron 4 mg twice a day through radiation therapy.    Leukocytosis - on steroids   -monitor-resolved.        Left hemiplegia-improved  Bioness/electrical stimulation left upper extremity  Left carbon fiber ankle-foot orthosis ordered to help with stability of the ankle for transfers at home     Dysarthria-improved     Dysphonia-improved     Right ptosis-unchanged     Impaired cognition/impaired mobility/ impaired self care     Dysphagia -improved-started on p.o. intake and tube feeds discontinued  July 24-adjust diet-regular with thins. Needs set-up assistance (cutting up food, opening packages).        Bladder - estrada catheter- Was removed on acute care wing and replaced  July 10-not clear why the Estrada catheter was replaced at the acute care unit.  Will look at possibly trying to discontinue again on Monday a.m.  July 11-Estrada catheter discontinued   July 12- Order U/A C&S. Cut PVRs to BID  July 13-urine culture results pending  July 15-post void residuals low.  Urine culture no growth    Skin-July 13-discomfort from rash at perineal area/buttocks-has been using barrier cream.  Will try Mycostatin antifungal creamSeizure prophylaxis - Keppra    Thrush-Mycelex troches     GI prophylaxis - Pepcid      DVT prophylaxis - SCDs    Anxiety - Add PRN Hydroxizine    Constipation - Add scheduled Senna and PRN Miralax. Monitor    Rehab-  Patient denies any headache.  Denies any vision complaints.  Continues with right ptosis.  Still with dysphonia.  She complains of feeling hungry all the time.  Dietitian is 1 to meet with her  regarding the regimen on the tube feeds.  Noted to have thrush and nystatin added.  With bathing moderate assist of 2 for standing.  Upper body dressing max assist.  Lower body dressing dependent.  Grooming moderate assist.  Toileting max dependent x2.  Bed mobility max assist.  Transfers moderate assist of 2 squat pivot to the wheelchair.  Sit to stand moderate max assist of 2.  Speech therapy feels she is ready for swallow study.  She is voiding with low postvoid residuals.  Oncology to have family meeting tomorrow regarding goals of care/plans.     Now admit for comprehensive acute inpatient rehabilitation .  This would be an interdisciplinary program with physical therapy 1 hour,  occupational therapy 1 hour, and speech therapy 1 hour, 5 days a week.  Rehabilitation nursing for carryover, monitoring of  Nutritional status and neurologic   status, bowel and bladder, and skin  Ongoing physician follow-up.  Weekly team conferences.  Goals are indeterminate   Rehabilitation prognosis indeterminate.  Medical prognosis defer to oncology.  Estimated length of stay is indeterminate.   The patient's functional status and clinical status is unchanged from preadmission assessment and the patient continues appropriate for acute inpatient rehabilitation.  Goal is for home with  Home health   therapies.  Barrier to discharge:  Left hemiplegia/dsphagia  - work on  Trunk control/ mobility / ADLs/ swallow   to overcome.      TEAM CONF July 16 - BETTER TRUNK CONTROL YESTERDAY.BED MAX ASSIST TO SIT. MOD ASSIST TO SUPINE. TRANSFERS MAX ASSIST. BATH MOD 2 FOR STANDING. UBD MAX ASSIST. LBD DEP. GROOMING MOD ASSIST. TOILETING MAX-DEP X 2. COGNITION - STM WITH MIN CUES. MILD DIFFICULTY WITH HIGH LEVEL WORD FINDING TASK. VFSS TODAY. SCALP INCISION HEALING. BLADDER/BOWEL - CONTINENT/INCONTINENT. MULTIPLE SMALL BMS YESTERDAY. SKIN - THRUSH - NYSTATIN. BUTTOCK RASH ANTI-FUNGAL CREAM.  ELOS - NUTRITION - COMPLAINS OF LACK OF SATIETY. TO TRY  BOLUS OVER PUMP WITH CORTRAK TO SEE IF HELPS WITH SATIETY AND ABLE TO TOLERATE.  ELOS - THREE WEEKS    TEAM CONF - July 23 - TRANSFERS MIN-MOD ASSIST. GAIT 20 FEET MOD 2 WITH ACE WRAP DORSIFLEX ASSIST WITH MANUAL ADVANCEMENT OF LLE. SITTING BALANCE BETTER. MORE ATTENTIVE. VERY IMPULSIVE. LEFT INATTENTION. BATH MOD. UBD MAX. LBD DEP. GROOMING MOD ASSIST. TOILETING MAX ASSIST. COGNITION - BETTER RECALL. MIN-MOD CUES FOR THOUGHT ORGANIZATION AND ATTENTION TO LEFT. SWALLOW - MECH SOFT, THIN LIQUIDS, NO MIXED CONSISTENCY.EATING WELL. WILL D/C CORTRAK TUBE. BNE - SEE REPORT. ANXIETY - RELAXATION TECHNIQUES  ELOS - TWO WEEKS PER TEAM , PATIENT WOULD LIKE HOME BY BIRTHDAY July 29 BUT PATIENT LACKS INSIGHT INTO DEGREE OF DEFICITS.    TEAM CONF - JULY 30 - BED CTG-MIN. TRANSFERS MIN. CAR TRANSFERS MIN-MOD. GAIT 30 FEET HEMIWALKER, LEFT AFO, MOD 2. TOILET TRANSFERS MIN-MOD. SHOWER MOD. BATH MOD. UBD MOD. LBD MAX. GROOMING MIN. MIN CUES FOR RECALL AT PARAGRAPH LEVEL. SWALLOW - MECH SOFT, NO MIXED, THIN LIQIDS. BOWEL AND BLADDER - CONTINENT AND INCONTINENT. CONTINUES ON DECADRON 4 MG BID.   ELOS -  ONE WEEK    TEAM CONF - AUGUST 6 -  BED CTG. TRANSFERS MIN ASSIST. RIGHT CARBON FIBER AFO TO ASSIST WITH TRANSFERS. CAR TRANSFERS MIN ASSIST. GAIT 40 FEET HEMIWALKER, LEFT AFO, MOD ASSIST WITH WHEELCHAIR CLOSE BEHIND. TOILET AND SHOWER TRANSFERS MIN. BUD MOD. LBD MOD. BATH MIN. GROOMING MIN. EXTRA TIME FOR PROCESSING INFORMATION. MIN TO INDEPENDENT FOR SIMPLE ORGANIZATIONAL, SEQUENCING TASKS. MIN CUES FOR MATH, MEMORY, THOUGHT FLEXIBILTY, REASONING. MIN CUES FOR RECALL OF LONGER MATERIAL. REGULAR DIET, THIN LIQUIDS. CONTINENT BOWEL AND BLADDER.   ELOS-   TOMORROW. OUTPT PT, OT AT [facility].        King Morris MD  08/06/20  08:53    Time:     During rounds, used appropriate personal protective equipment including mask and gloves.  Additional gown if indicated.  Mask used was standard procedure mask. Appropriate PPE was worn during  the entire visit.  Hand hygiene was completed before and after.

## 2020-08-06 NOTE — PROGRESS NOTES
Discussed progress and d/c plans for home tomorrow, 8/7 with patient and , by phone. Discussed and gave patient outpatient therapy schedule. Patient will start outpatient OT on Monday, 8/11 at 2:30 p.m here at [facility]. Will start outpatient PT on Thursday, 8/13. Schedule will be Mondays 1:45 PT, 2:30 OT; Thursdays 1:45 OT, 2:30 PT. Patient will continue radiation treatments at [facility] [place] Monday through Friday at 3:40 p.m. Teaching completed with  and sons. Patient is happy to be going home tomorrow. Offered assistance should they need it after d/c.

## 2020-08-06 NOTE — PROGRESS NOTES
SECTION GG      Self Care Performance Discharge:   Oral Hygiene: [place] sets up or cleans up; patient completes activity. [place]  assists only prior to or following the activity.   Toileting Hygiene: : [place] does less than half the effort. [place] lifts, holds  or supports trunk or limbs but provides less than half the effort.   Shower/Bathe Self: [place] does less than half the effort. [place] lifts, holds  or supports trunk or limbs but provides less than half the effort.   Upper Body Dressing: [place] does less than half the effort. [place] lifts, holds  or supports trunk or limbs but provides less than half the effort.   Lower Body Dressing: [place] does less than half the effort. [place] lifts, holds  or supports trunk or limbs but provides less than half the effort.   Putting On/Taking Off Footwear: [place] does more than half the effort. [place]  lifts or holds trunk or limbs and provides more than half the effort.    Mobility Toilet Transfer Discharge: [place] does less than half the effort.  [place] lifts, holds or supports trunk or limbs but provides less than half the  effort.    Signed by: Yarely Maldonado, OT

## 2020-08-06 NOTE — PROGRESS NOTES
SECTION GG      Mobility Performance Discharge:     Roll Left and Right: [place] provides verbal cues and/or touching/steadying  and/or contact guard assistance as patient completes activity. Assistance may be  provided throughout the activity or intermittently.   Sit to Lying: [place] provides verbal cues and/or touching/steadying and/or  contact guard assistance as patient completes activity. Assistance may be  provided throughout the activity or intermittently.   Lying to Sitting on Side of Bed: [place] provides verbal cues and/or  touching/steadying and/or contact guard assistance as patient completes  activity. Assistance may be provided throughout the activity or intermittently.   Sit to Stand: [place] does less than half the effort. [place] lifts, holds or  supports trunk or limbs but provides less than half the effort.   Chair/Bed to Chair Transfer: [place] does less than half the effort. [place]  lifts, holds or supports trunk or limbs but provides less than half the effort.   Car Transfer: [place] does less than half the effort. [place] lifts, holds or  supports trunk or limbs but provides less than half the effort.   Walk 10 Feet:   [place] does more than half the effort. [place] lifts or holds  trunk or limbs and provides more than half the effort.  Walk 50 Feet with 2 Turns:   [place] does all of the effort. Patient does none of  the effort to complete the activity. Or, the assistance of 2 or more helpers is  required for the patient to complete the activity.  Walk 150 Feet:   Not attempted due to medical or safety concerns.  Walking 10 Feet on Uneven Surfaces:   Not attempted due to medical or safety  concerns.  1 Step Over Curb or Up/Down Stair:   Not attempted due to medical or safety  concerns.  Picking up an Object:   Not attempted due to medical or safety concerns. Uses  Wheelchair and/or Scooter: Yes  Wheel 50 Feet with Two Turns: [place] provides verbal cues and/or  touching/steadying and/or contact guard  assistance as patient completes  activity. Assistance may be provided throughout the activity or intermittently.  Type of Wheelchair or Scooter Used: Manual  Wheel 150 Feet: [place] provides verbal cues and/or touching/steadying and/or  contact guard assistance as patient completes activity. Assistance may be  provided throughout the activity or intermittently.  Type of Wheelchair or Scooter Used: Manual    Signed by: Talita Hodges PTA

## 2020-08-06 NOTE — PROGRESS NOTES
Inpatient Rehabilitation Plan of Care Note    Plan of Care  Care Plan Reviewed - Updates as Follows    Safety    [RN] Potential for Injury(Active)  Current Status(08/05/2020): Patient has had recent craniotomy and has new  physical limitations. She is alert and oriented x3-4.   Pt slow to  process/respond at times.  Weekly Goal(08/11/2020): Pt will use call light appropriately as needed.  Discharge Goal: Pt  will remain free from falls and injury. Pt and  aware  of fall/safety in the home setting.    Performed Intervention(s)  Fall precautions: bed/chair alarms, bed low and locked, nonskid socks, call  light within reach, personal items within reach.  Hourly rounding.  Uses call light appropriately  Environmental set-up to reduce risk      Sphincter Control    [RN] Bladder Management(Active)  Current Status(08/05/2020): Bernabe catheter removed 7/11/20. Pt mostly continent  of urine now.  Weekly Goal(08/11/2020): Continent 100%.  Discharge Goal: Pt will be continent of urine.    Performed Intervention(s)  Monitor I/O.  Bowel meds as needed.  Bladder scan and intermittent cath, as ordered prn.      Psychosocial    Performed Intervention(s)  provide relaxation and distraction as needed.  Allow extra time for pt to verbalize needs, concerns,  feelings, etc.  Support family      Body Systems    Performed Intervention(s)  Check patient for incontinence regularly. Keep skin clean and dry as much as  possible.  Nystatin cream to buttocks prn  Skin assessment q shift and prn.      Nutrition    Performed Intervention(s)  Daily weights.  Monitor I/O.  HOB 30 degrees or greater during nighttime tube feeding.    Signed by: Carlyn Khan RN

## 2020-08-06 NOTE — THERAPY DISCHARGE NOTE
Inpatient Rehabilitation - Speech Language Pathology Discharge Summary  [facility]       Patient Name: Denise Guajardo  : 1965  MRN: [id number]    Today's Date: 2020                   Admit Date: 2020      SLP Recommendation and Plan  The patient has made progress in tx. She is now tolerating a regular diet with thin liquids with use of swallow strategies - small bites/sips, slow rate. Intermittent verbal cues are required as pt can be impulsive.   Ability to produce voice and express needs intelligibly is now functional.  Cognitive skills have steadily improved, and pt is currently displaying fxnl simple reasoning, home management, organizational, naming, and simple/basic  memory skills. She does continue to display deficits in more complex reasoning/integration of multiple pieces of information, deductive thinking, memory for complex, lengthy information, and working memory, and attention- especially attention to details. She does tend to be impulsive with responses, and needs cues to think through stimulus information and allow extra time to process information.  The patient does not wish to continue ST at this time.  Constant 24 hour supervision is recommended at home.  Visit Dx:    ICD-10-CM ICD-9-CM   1. Glioblastoma multiforme (CMS/[facility]) C71.9 191.9         Time Calculation- SLP     Row Name 20 1328 20 0957 20 0924       Time Calculation- SLP    SLP Start Time  1300  -SL  0930  -SL  --    SLP Stop Time  1330  -SL  1000  -SL  --    SLP Time Calculation (min)  30 min  -SL  30 min  -SL  --    SLP Non-Billable Time (min)  --  --  15 min rounds  -SL      User Key  (r) = Recorded By, (t) = Taken By, (c) = Cosigned By    Initials Name Provider Type    Celia London MS CCC-SLP Speech and Language Pathologist            SLP GOALS     Row Name 20 1300 20 0900 20 1300       Attention Goal (SLP)    Progress (Attention Goal, SLP)  90%;with minimal cues  (75-90%);independently (over 90% accuracy) counting cards attn tasks  -SL  --  --    Progress/Outcomes (Attention Goal, SLP)  goal ongoing  -SL  --  --    Comment (Attention Goal, SLP)  completing sequences of card suit representing different calculations  -SL  --  --       Memory Skills Goal (SLP)    Progress (Memory Skills Goal, SLP)  90%;independently (over 90% accuracy) immed recall- 5 words; placement recall  -SL  100%;independently (over 90% accuracy) immed recall- 10 word pairs- mixed associations  -SL  70%;independently (over 90% accuracy) auditory presentation- recall of paragraphs  -SL    Progress/Outcomes (Memory Skills Goal, SLP)  goal ongoing  -SL  goal ongoing  -SL  goal ongoing  -SL    Comment (Memory Skills Goal, SLP)  stimulus items needed to be repeated x2 or x3  in order to increase recall and processing  -SL  --  --       Reasoning Goal (SLP)    Barriers (Reasoning Goal, SLP)  --  80% for ID sequences and patterns- 100% w/min cues  -SL  --    Progress (Reasoning Goal, SLP)  80%;independently (over 90% accuracy) disguised words  -SL  90%;independently (over 90% accuracy) sequencing informational statments  -SL  90%;independently (over 90% accuracy) making inferences form paragraphs  -SL    Progress/Outcomes (Reasoning Goal, SLP)  goal ongoing  -SL  goal ongoing  -SL  goal ongoing  -SL    Comment (Reasoning Goal, SLP)  placing info in L/R columns next to associated numbers per written sentence directives- 80%  -SL  multistep directives and use of hospital map- 80%  -SL  60% for altering word tasak- 4 different requirements ( thought flexibility task)  -    Row Name 08/05/20 0800 08/04/20 1100          Attention Goal (SLP)    Progress (Attention Goal, SLP)  70%;independently (over 90% accuracy) trailmaking with shape/color logic  -SL  --     Progress/Outcomes (Attention Goal, SLP)  goal ongoing  -SL  --        Memory Skills Goal (SLP)    Barriers (Memory Skills Goal, SLP)  picture to picture  assocaitions-80%  -SL  --     Progress (Memory Skills Goal, SLP)  70%;80%;independently (over 90% accuracy) 4 WORDS boxed info- immed recall  -SL  --     Progress/Outcomes (Memory Skills Goal, SLP)  goal ongoing  -SL  --     Comment (Memory Skills Goal, SLP)  38% for recall of position of words in box; 75% for recall ( in general) of the words  -SL  --        Organizational Skills Goal (SLP)    Progress (Thought Organization Skills Goal, SLP)  80%;independently (over 90% accuracy) missing letters in words, given categories  -SL  80%;independently (over 90% accuracy)  -KA     Progress/Outcomes (Thought Organization Skills Goal, SLP)  goal ongoing  -  good progress toward goal  -KA     Comment (Thought Organization Skills Goal, SLP)  --  category matrix. Providing opposites=80% without cues   -KA        Reasoning Goal (SLP)    Progress (Reasoning Goal, SLP)  80%;90%;independently (over 90% accuracy) 9 item grid- locating 3 items- same topic- postional restric  -SL  --     Progress/Outcomes (Reasoning Goal, SLP)  goal ongoing  -  --     Comment (Reasoning Goal, SLP)  --  letter placement task=70% without cues and 100% with min cues  -KA        Functional Math Skills Goal (SLP)    Progress (Functional Math Skills Goal, SLP)  --  80%;independently (over 90% accuracy)  -KA     Progress/Outcomes (Functional Math Skills Goal, SLP)  --  goal ongoing  -KA     Comment (Functional Math Skills Goal, SLP)  --  simple temporal word words, and in clock math task on IPAD 70% without cues, cues required to allow more time for processing.   -KA       User Key  (r) = Recorded By, (t) = Taken By, (c) = Cosigned By    Initials Name Provider Type    Celia London MS CCC-SLP Speech and Language Pathologist    Bruno Jenkins MA,[facility]SLP Speech and Language Pathologist            Therapy Charges for Today     Code Description Service Date Service Provider Modifiers Qty    15075069831  ST DEV OF COGN SKILLS INITIAL 15 MIN 8/5/2020  Celia Islas MS CCC-SLP  1    09862761856 HC ST DEV OF COGN SKILLS EACH ADDT'L 15 MIN 8/5/2020 Celia Islas MS CCC-SLP  1    [id number] HC ST DEV OF COGN SKILLS EACH ADDT'L 15 MIN 8/5/2020 Celia Islas MS CCC-SLP  2    [id number] HC ST DEV OF COGN SKILLS INITIAL 15 MIN 8/6/2020 Celia Islas MS CCC-SLP  1    [id number] HC ST DEV OF COGN SKILLS EACH ADDT'L 15 MIN 8/6/2020 Celia Islas MS CCC-SLP  1    91609939227 HC ST DEV OF COGN SKILLS EACH ADDT'L 15 MIN 8/6/2020 Celia Islas MS CCC-SLP  2            SLP Discharge Summary  Anticipated Dischage Disposition (SLP): home with 24/7 care  Reason for Discharge: discharge from this facility  Progress Toward Achieving Short/long Term Goals: goals partially met within established timelines  Discharge Destination: home w/ 24/7 care      Celia Islas MS CCC-SLP  8/6/2020

## 2020-08-06 NOTE — THERAPY TREATMENT NOTE
Inpatient Rehabilitation - Speech Language Pathology Treatment Note    [facility]       Patient Name: Denise Guajardo  : 1965  MRN: [id number]    Today's Date: 2020           Admit Date: 2020      Visit Dx:        ICD-10-CM ICD-9-CM   1. Glioblastoma multiforme (CMS/HCC) C71.9 191.9       Patient Active Problem List   Diagnosis   • Glioblastoma multiforme (CMS/HCC)   • Intracranial hemorrhage (CMS/HCC)   • Dysphagia   • Acute left-sided weakness   • Brain tumor (CMS/HCC)   • Hemiplegia (CMS/HCC)          Therapy Treatment    Evaluation/Coping    Evaluation/Treatment Time and Intent  Subjective Information: no complaints (20 1300 : Celia Islas, MS CCC-SLP)  Existing Precautions/Restrictions: fall (20 1300 : Celia Islas, MS CCC-SLP)  Document Type: therapy note (daily note) (20 1300 : Celia Islas, MS CCC-SLP)  Mode of Treatment: speech-language pathology (20 1300 : Celia Islas, MS CCC-SLP)  Patient/Family Observations: cooperative (20 1300 : Celia Islas, MS CCC-SLP)    Vitals/Pain/Safety         Cognition/Communication         Oral Motor/Eating         Mobility/Basic Activities/Instrumental Activities/Motor/Modality                   ROM/MMT                   Sensory/Myotome/Dermatome/Edema               Posture/Balance/Special Tests/Exercise/Transportation/Sexual Function                   Orthotics/Residual Limb/Prosthetic Management              Outcome Summary         EDUCATION    The patient has been educated in the following areas:     Cognitive Impairment.    SLP Recommendation and Plan                                                          SLP GOALS     Row Name 20 1300 20 0900 20 1300       Attention Goal (SLP)    Progress (Attention Goal, SLP)  90%;with minimal cues (75-90%);independently (over 90% accuracy) counting cards attn tasks  -SL  --  --    Progress/Outcomes (Attention Goal, SLP)  goal ongoing  -SL  --  --    Comment (Attention Goal, SLP)   completing sequences of card suit representing different calculations  -SL  --  --       Memory Skills Goal (SLP)    Progress (Memory Skills Goal, SLP)  90%;independently (over 90% accuracy) immed recall- 5 words; placement recall  -SL  100%;independently (over 90% accuracy) immed recall- 10 word pairs- mixed associations  -SL  70%;independently (over 90% accuracy) auditory presentation- recall of paragraphs  -SL    Progress/Outcomes (Memory Skills Goal, SLP)  goal ongoing  -SL  goal ongoing  -SL  goal ongoing  -SL    Comment (Memory Skills Goal, SLP)  stimulus items needed to be repeated x2 or x3  in order to increase recall and processing  -SL  --  --       Reasoning Goal (SLP)    Barriers (Reasoning Goal, SLP)  --  80% for ID sequences and patterns- 100% w/min cues  -SL  --    Progress (Reasoning Goal, SLP)  80%;independently (over 90% accuracy) disguised words  -SL  90%;independently (over 90% accuracy) sequencing informational statments  -SL  90%;independently (over 90% accuracy) making inferences form paragraphs  -SL    Progress/Outcomes (Reasoning Goal, SLP)  goal ongoing  -SL  goal ongoing  -SL  goal ongoing  -SL    Comment (Reasoning Goal, SLP)  placing info in L/R columns next to associated numbers per written sentence directives- 80%  -SL  multistep directives and use of hospital map- 80%  -SL  60% for altering word tasak- 4 different requirements ( thought flexibility task)  -    Row Name 08/05/20 0800 08/04/20 1100 08/03/20 1400       Attention Goal (SLP)    Progress (Attention Goal, SLP)  70%;independently (over 90% accuracy) trailmaking with shape/color logic  -SL  --  --    Progress/Outcomes (Attention Goal, SLP)  goal ongoing  -SL  --  --       Memory Skills Goal (SLP)    Barriers (Memory Skills Goal, SLP)  picture to picture assocaitions-80%  -SL  --  --    Progress (Memory Skills Goal, SLP)  70%;80%;independently (over 90% accuracy) 4 WORDS boxed info- immed recall  -SL  --  60%;independently  (over 90% accuracy)  -CP    Progress/Outcomes (Memory Skills Goal, SLP)  goal ongoing  -SL  --  goal ongoing  -CP    Comment (Memory Skills Goal, SLP)  38% for recall of position of words in box; 75% for recall ( in general) of the words  -SL  --  visual memory task  -CP       Organizational Skills Goal (SLP)    Progress (Thought Organization Skills Goal, SLP)  80%;independently (over 90% accuracy) missing letters in words, given categories  -SL  80%;independently (over 90% accuracy)  -KA  100%;independently (over 90% accuracy)  -CP    Progress/Outcomes (Thought Organization Skills Goal, SLP)  goal ongoing  -SL  good progress toward goal  -KA  good progress toward goal  -CP    Comment (Thought Organization Skills Goal, SLP)  --  category matrix. Providing opposites=80% without cues   -KA  category matrix  -CP       Reasoning Goal (SLP)    Progress (Reasoning Goal, SLP)  80%;90%;independently (over 90% accuracy) 9 item grid- locating 3 items- same topic- postional restric  -SL  --  with moderate cues (50-74%)  -CP    Progress/Outcomes (Reasoning Goal, SLP)  goal ongoing  -SL  --  goal ongoing  -CP    Comment (Reasoning Goal, SLP)  --  letter placement task=70% without cues and 100% with min cues  -KA  deductive puzzle  -CP       Functional Math Skills Goal (SLP)    Progress (Functional Math Skills Goal, SLP)  --  80%;independently (over 90% accuracy)  -KA  80%;independently (over 90% accuracy)  -CP    Progress/Outcomes (Functional Math Skills Goal, SLP)  --  goal ongoing  -KA  goal ongoing  -CP    Comment (Functional Math Skills Goal, SLP)  --  simple temporal word words, and in clock math task on IPAD 70% without cues, cues required to allow more time for processing.   -KA  very simple level temporal problem solving  -CP       Right Hemisphere Function Goal (SLP)    Progress (Right Hemisphere Function Goal, SLP)  --  --  with minimal cues (75-90%);with moderate cues (50-74%)  -CP    Progress/Outcomes (Right  Hemisphere Function Goal, SLP)  --  --  goal ongoing  -CP    Comment (Right Hemisphere Function Goal, SLP)  --  --  trailmaking task  -CP      User Key  (r) = Recorded By, (t) = Taken By, (c) = Cosigned By    Initials Name Provider Type    Celia London MS CCC-SLP Speech and Language Pathologist    Cesia Landeros, MS CCC-SLP Speech and Language Pathologist    Bruno Jenkins MA,[facility]-SLP Speech and Language Pathologist                  Time Calculation:       Time Calculation- SLP     Row Name 08/06/20 1328 08/06/20 0957 08/06/20 0924       Time Calculation- SLP    SLP Start Time  1300  -SL  0930  -SL  --    SLP Stop Time  1330  -SL  1000  -SL  --    SLP Time Calculation (min)  30 min  -SL  30 min  -SL  --    SLP Non-Billable Time (min)  --  --  15 min rounds  -SL      User Key  (r) = Recorded By, (t) = Taken By, (c) = Cosigned By    Initials Name Provider Type    Celia London MS CCC-SLP Speech and Language Pathologist            Therapy Charges for Today     Code Description Service Date Service Provider Modifiers Qty    94287479024 HC ST DEV OF COGN SKILLS INITIAL 15 MIN 8/5/2020 Celia Islas MS CCC-SLP  1    [id number] HC ST DEV OF COGN SKILLS EACH ADDT'L 15 MIN 8/5/2020 Celia Islas MS CCC-SLP  1    [id number] HC ST DEV OF COGN SKILLS EACH ADDT'L 15 MIN 8/5/2020 Celia Islas MS CCC-SLP  2    [id number] HC ST DEV OF COGN SKILLS INITIAL 15 MIN 8/6/2020 Celia Islas MS CCC-SLP  1    [id number] HC ST DEV OF COGN SKILLS EACH ADDT'L 15 MIN 8/6/2020 Celia Islas MS CCC-SLP  1    [id number] HC ST DEV OF COGN SKILLS EACH ADDT'L 15 MIN 8/6/2020 Celia Islas MS CCC-SLP  2                           Celia Islas MS CCC-SLP  8/6/2020

## 2020-08-06 NOTE — THERAPY DISCHARGE NOTE
Inpatient Rehabilitation - Occupational Therapy Treatment Note/Discharge  [facility]     Patient Name: Denise Guajardo  : 1965  MRN: [id number]  Today's Date: 2020               Admit Date: 2020    Visit Dx:     ICD-10-CM ICD-9-CM   1. Glioblastoma multiforme (CMS/HCC) C71.9 191.9     Patient Active Problem List   Diagnosis   • Glioblastoma multiforme (CMS/HCC)   • Intracranial hemorrhage (CMS/HCC)   • Dysphagia   • Acute left-sided weakness   • Brain tumor (CMS/HCC)   • Hemiplegia (CMS/HCC)       Therapy Treatment  IRF Treatment Summary     Row Name 20 1545 20 1300 20 0940       Evaluation/Treatment Time and Intent    Subjective Information  no complaints  -AF  no complaints  -SL  no complaints  -SL    Existing Precautions/Restrictions  fall  -AF  fall  -SL  fall  -SL    Document Type  therapy note (daily note);discharge treatment  -AF  therapy note (daily note)  -SL  therapy note (daily note)  -SL    Mode of Treatment  occupational therapy  -AF  speech-language pathology  -SL  speech-language pathology  -SL    Patient/Family Observations  sitting up in w/c in AM and PM sessions, excited about going home  -AF  cooperative  -SL  cooperative  -SL    Recorded by [AF] Yarely Maldonado OTR [SL] Celia Islas, MS CCC-SLP [SL] Celia Islas, MS CCC-SLP    Row Name 20 0919             Evaluation/Treatment Time and Intent    Subjective Information  no complaints  -LB      Existing Precautions/Restrictions  fall chemo  -LB      Document Type  discharge treatment  -LB      Mode of Treatment  physical therapy  -LB      Patient/Family Observations  Seated in w/c. Nurse present in room  -LB      Recorded by [LB] Talita Hodges PTA      Row Name 20 1545             Cognition/Psychosocial- PT/OT    Affect/Mental Status (Cognitive)  WFL  -AF      Orientation Status (Cognition)  oriented x 3  -AF      Follows Commands (Cognition)  follows one step commands  -AF      Personal Safety  Interventions  fall prevention program maintained;gait belt;nonskid shoes/slippers when out of bed  -AF      Memory Deficit (Cognitive)  procedural memory  -AF      Safety Deficit (Cognitive)  insight into deficits/self awareness  -AF      Recorded by [AF] Yarely Maldonado, OTR      Row Name 08/06/20 1545 08/06/20 0919          Bed Mobility Assessment/Treatment    Rolling Left [place] (Bed Mobility)  --  supervision;verbal cues  -LB     Rolling Right [place] (Bed Mobility)  --  supervision;verbal cues  -LB     Supine-Sit [place] (Bed Mobility)  --  contact guard  -LB     Sit-Supine [place] (Bed Mobility)  contact guard;verbal cues  -AF  contact guard  -LB     Comment (Bed Mobility)  --  assessed on txment mat  -LB     Recorded by [AF] Yarely Maldonado, OTR [LB] Talita Hodges, EDEN     Row Name 08/06/20 1545             Transfer Assessment/Treatment    Comment (Transfers)  EOM <> w/c MIN A   -AF      Recorded by [AF] Yarely Maldonado OTR      Row Name 08/06/20 0919             Bed-Chair Transfer    Bed-Chair [place] (Transfers)  minimum assist (75% patient effort) Sit/pivot. attempted stand/pivot but had LOB.  -LB      Assistive Device (Bed-Chair Transfers)  wheelchair  -LB      Recorded by [LB] Talita Hodges PTA      Row Name 08/06/20 1545 08/06/20 0919          Chair-Bed Transfer    Chair-Bed [place] (Transfers)  minimum assist (75% patient effort);verbal cues  -AF  minimum assist (75% patient effort)  -LB     Assistive Device (Chair-Bed Transfers)  wheelchair  -AF  wheelchair  -LB     Recorded by [AF] Yarely Maldonado, OTR [LB] Talita Hodges, PTA     Row Name 08/06/20 0919             Sit-Stand Transfer    Sit-Stand [place] (Transfers)  minimum assist (75% patient effort)  -LB      Assistive Device (Sit-Stand Transfers)  walker, jakub  -LB      Recorded by [LB] Talita Hodges PTA      Row Name 08/06/20 0919             Stand-Sit Transfer    Stand-Sit [place]  (Transfers)  minimum assist (75% patient effort);verbal cues  -LB      Assistive Device (Stand-Sit Transfers)  walker, jakub  -LB      Recorded by [LB] Talita Hodges PTA      Row Name 08/06/20 1545             Toilet Transfer    Type (Toilet Transfer)  stand pivot/stand step  -AF      [place] Level (Toilet Transfer)  minimum assist (75% patient effort);verbal cues  -AF      Assistive Device (Toilet Transfer)  commode;grab bars/safety frame;wheelchair  -AF      Recorded by [AF] Yarely Maldonado, THAOR      Row Name 08/06/20 0919             Car Transfer    Type (Car Transfer)  squat pivot  -LB      [place] Level (Car Transfer)  minimum assist (75% patient effort)  -LB      Assistive Device (Car Transfer)  wheelchair  -LB      Recorded by [LB] Talita Hodges PTA      Row Name 08/06/20 0919             Gait/Stairs Assessment/Training    [place] Level (Gait)  moderate assist (50% patient effort);1 person to manage equipment mod x 1, cga x 1  -LB      Assistive Device (Gait)  walker, jakub L carbon fiber AFO  -LB      Distance in Feet (Gait)  80, 40  -LB      Pattern (Gait)  step-to;step-through  -LB      Deviations/Abnormal Patterns (Gait)  base of support, wide;maya decreased  -LB      Bilateral Gait Deviations  forward flexed posture  -LB      Left Sided Gait Deviations  knee buckling, left side;knee hyperextension;foot drop/toe drag;forward flexed posture;heel strike decreased;Trendelenburg sign;weight shift ability decreased modA for L foot placement  -LB      Recorded by [LB] Talita Hodges PTA      Row Name 08/06/20 1545             Bathing Assessment/Treatment    Bathing [place] Level  bathing skills;minimum assist (75% patient effort);verbal cues  -AF      Bathing Position  sink side;supported sitting;supported standing  -AF      Comment (Bathing)  jakub tech  -AF      Recorded by [AF] Yarely Maldonado, THAOR      Row Name 08/06/20 1545             Upper Body Dressing  Assessment/Treatment    Upper Body Dressing Task  upper body dressing skills;moderate assist (50-74% patient effort);verbal cues  -AF      Upper Body Dressing Position  supported sitting;supported standing  -AF      Set-up Assistance (Upper Body Dressing)  obtain clothing  -AF      Comment (Upper Body Dressing)  jakub tech  -AF      Recorded by [AF] Yarely Maldonado, OTR      Row Name 08/06/20 1545             Lower Body Dressing Assessment/Treatment    Lower Body Dressing [place] Level  doff;don;pants/bottoms;shoes/slippers;socks;underwear;moderate assist (50% patient effort);verbal cues  -AF      Lower Body Dressing Position  supported sitting;supported standing  -AF      Comment (Lower Body Dressing)  jakub tech  -AF      Recorded by [AF] Yarely Maldonado, OTR      Row Name 08/06/20 1545             Grooming Assessment/Treatment    Grooming [place] Level  grooming skills;minimum assist (75% patient effort)  -AF      Grooming Position  supported sitting  -AF      Grooming Setup Assistance  obtain supplies  -AF      Recorded by [AF] Yarely Maldonado, OTR      Row Name 08/06/20 1545             Toileting Assessment/Treatment    Toileting [place] Level  toileting skills;moderate assist (50% patient effort);maximum assist (25% patient effort);verbal cues  -AF      Toileting Position  supported sitting;supported standing  -AF      Comment (Toileting)  chemo precautions   -AF      Recorded by [AF] Yarely Maldonado, OTR      Row Name 08/06/20 1545             Right Upper Ext    Rt Upper Extremity Comments   RUE AROM WFL  -AF      Recorded by [AF] Yarely Maldonado, OTR      Row Name 08/06/20 1545             Left Upper Ext    Lt Upper Extremity Comments   LUE has scapular elevation 1/3, protraction 1/3, elbow flexion 1/4, finger flexion 1/4   -AF      Recorded by [AF] Yarely Maldonado, OTR      Row Name 08/06/20 1545             MMT Right Upper Ext    Rt Upper Extremity Comments   RUE WFL  -AF      Recorded by [AF]  Yarely Maldonado, THAOR      Row Name 08/06/20 1545             MMT Left Upper Ext    Lt Upper Extremity Comments   LUE trace grasp, elbow flexion and shoulder flexion  -AF      Recorded by [AF] Yarely Maldonado, OTR      Row Name 08/06/20 1545             Motor Assessment/Intervention    Additional Documentation  Fine Motor Testing & Training (Group);Gross Motor Coordination (Group);Hand  Strength Testing (Group)  -AF      Recorded by [AF] Yarely Maldonado, THAOR      Row Name 08/06/20 1545             Muscle Tone Assessment    Muscle Tone: General Assessment  left upper extremity muscle tone deficit  -AF      Left Upper Extremity (Muscle Tone Assessment)  slight increase in tone;hypertonicity present L shoulder  -AF      Recorded by [AF] Yarely Maldonado, OTR      Row Name 08/06/20 1545             Hand  Strength Testing    Right Hand, Setting 1 (Dynamometer Testing)  25  -AF      Left Hand, Setting 1 (Dynamometer Testing)  trace  -AF      Right Hand: Lateral (Key) Pinch Strength (Pinch Dynamometer Testing)  10  -AF      Recorded by [AF] Yarely Maldonado, THAOR      Row Name 08/06/20 1545             Fine Motor Testing & Training    Fine Motor Tests  Box N Block Test Results  -AF      Results, Box N Block Test-Right  45  -AF      Recorded by [AF] Yarely Maldonado, THAOR      Row Name 08/06/20 1545             Vision Assessment/Intervention    Visual Processing Deficit  jakub-inattention/neglect, left  -AF      Vision Assessment Comment  increased awareness to L side   -AF      Recorded by [AF] Yarely Maldonado, OTR      Row Name 08/06/20 1545             Light Touch Sensation Assessment    Left Upper Extremity: Light Touch Sensation Assessment  mild impairment, 75% or more correct responses  -AF      Recorded by [AF] Yarely Maldonado, THAOR      Row Name 08/06/20 1545 08/06/20 0919          Pain Scale: Numbers Pre/Post-Treatment    Pain Scale: Numbers, Pretreatment  0/10 - no pain  -AF  0/10 - no pain  -LB     Pain Scale: Numbers,  Post-Treatment  0/10 - no pain  -AF  --     Recorded by [AF] Yarely Maldonado, OTR [LB] Talita Hodges PTA     Row Name 08/06/20 1545             Static Sitting Balance    Level of [place] (Unsupported Sitting, Static Balance)  standby assist  -AF      Recorded by [AF] Yarely Maldonado, OTR      Row Name 08/06/20 1545             Dynamic Sitting Balance    Level of [place], Reaches Outside Midline (Sitting, Dynamic Balance)  contact guard assist;standby assist  -AF      Recorded by [AF] Yarely Maldonado, OTR      Row Name 08/06/20 1545             Static Standing Balance    Level of [place] (Supported Standing, Static Balance)  minimal assist, 75% patient effort  -AF      Comment (Supported Standing, Static Balance)  during ADL tasks   -AF      Recorded by [AF] Yarely Maldonado, OTR      Row Name 08/06/20 0919             Core Strengthening Therapeutic Exercise    Exercise Performed (Core Strengthening Therapeutic Exercise)  bridging with bilateral leg support;bridging with single leg support  -LB      Recorded by [LB] Talita Hodges PTA      Row Name 08/06/20 0919             Lower Extremity Supine Therapeutic Exercise    Performed, Supine Lower Extremity (Therapeutic Exercise)  hip flexion/extension;hip abduction/adduction;knee flexion/extension;SAQ (short arc quad) over bolster;ankle dorsiflexion/plantarflexion  -LB      Exercise Type, Supine Lower Extremity (Therapeutic Exercise)  AROM (active range of motion);AAROM (active assistive range of motion);PROM (passive range of motion)  -LB      Recorded by [LB] Talita Hodges, EDEN      Row Name 08/06/20 1545             Neuromuscular Re-education    Activities/Techniques Used (Neuromuscular Re-education)  facilitation/inhibition  -AF      Positions Used (Neuromuscular Re-education)  unsupported;sitting  -AF      Equipment Used (Neuromuscular Re-education)  NMES on L wrist extensors  -AF      Comment (Neuromuscular Re-education)  with table  slides and review of HEP  -AF      Recorded by [AF] Yarely Maldonado, OTR      Row Name 08/06/20 1545             NMES/FES/Bioness    Location  L wrist extension  -AF      Position  seated on EOM  -AF      Treatment Duration  20 mins  -AF      Intensity  20   -AF      Activity  tolerated well no skin or pain issues noted   -AF      Recorded by [AF] Yarely Maldonado, OTR      Row Name 08/06/20 1545 08/06/20 0919          Positioning and Restraints    Pre-Treatment Position  sitting in chair/recliner  -AF  --     Post Treatment Position  bed  -AF  wheelchair  -LB     In Bed  supine;call light within reach;encouraged to call for assist;exit alarm on in PM  -AF  --     In Wheelchair  sitting;call light within reach;encouraged to call for assist;exit alarm on in AM  -AF  sitting;exit alarm on;with SLP  -LB     Recorded by [AF] Yarely Maldonado, OTR [LB] Talita Hodges, PTA       User Key  (r) = Recorded By, (t) = Taken By, (c) = Cosigned By    Initials Name Effective Dates    SL Celia Islas, MS CCC-SLP 06/08/18 -     LB Talita Hodges PTA 03/07/18 -     AF Yarely Maldonado, OTR 04/14/20 -           Wound 07/01/20 1156 Right head Incision (Active)   Dressing Appearance open to air 8/6/2020  9:02 AM   Closure Liquid skin adhesive;Approximated 8/6/2020  9:02 AM   Base dry;clean 8/6/2020  9:02 AM   Periwound intact;dry;swelling 8/6/2020  9:02 AM   Periwound Temperature warm 8/6/2020  9:02 AM   Periwound Skin Turgor soft 8/6/2020  9:02 AM   Drainage Amount none 8/6/2020  9:02 AM   Dressing Care, Wound open to air 8/5/2020 11:00 PM             Occupational Therapy Education                 Title: PT OT SLP Therapies (Done)     Topic: Occupational Therapy (Resolved)     Point: ADL training (Resolved)     Description:   Instruct learner(s) on proper safety adaptation and remediation techniques during self care or transfers.   Instruct in proper use of assistive devices.              Learning Progress Summary           Patient  Acceptance, E, VU by AF at 7/31/2020 1513    Comment:  Pt and family participated in meeting with rehab team recommend outpatient OT, discussed ADL level and HEP. will continue with discharge teaching    Acceptance, E,TB,D, VU by AF at 7/31/2020 1512    Comment:  son participated in teaching with commode transfers and toileting tasks, educated on SROM/HEP    Acceptance, E, VU by SL at 7/31/2020 1143    Comment:  Family conf- discussed improvements in cognition, voicing, swallow, diet, swallow strategies;  Rec: cont tx at d/c    Acceptance, E, VU,NR by AF at 7/20/2020 1531    Comment:  edcuated on safety with ADl tasks    Acceptance, E,TB,D, NR,DU,VU by AF at 7/16/2020 1230    Comment:  with bed moblity and pulling up in bed    Acceptance, E, VU by RD at 7/10/2020 1529    Comment:  OT educ on OT role in therapeutic process and pt's POC.   Family Acceptance, E, VU by AF at 7/31/2020 1513    Comment:  Pt and family participated in meeting with rehab team recommend outpatient OT, discussed ADL level and HEP. will continue with discharge teaching    Acceptance, E,TB,D, VU by AF at 7/31/2020 1512    Comment:  son participated in teaching with commode transfers and toileting tasks, educated on SROM/HEP    Acceptance, E, VU by SL at 7/31/2020 1143    Comment:  Family conf- discussed improvements in cognition, voicing, swallow, diet, swallow strategies;  Rec: cont tx at d/c   Significant Other Acceptance, E,TB,D, NR,DU,VU by AF at 7/16/2020 1230    Comment:  with bed moblity and pulling up in bed                   Point: Home exercise program (Resolved)     Description:   Instruct learner(s) on appropriate technique for monitoring, assisting and/or progressing therapeutic exercises/activities.              Learning Progress Summary           Patient Acceptance, E,D, VU by AF at 8/6/2020 1556    Comment:  review of HEP, ADLs, sex education and kitchen gadgets    Acceptance, E, VU by AF at 7/31/2020 1513    Comment:  Pt and  family participated in meeting with rehab team recommend outpatient OT, discussed ADL level and HEP. will continue with discharge teaching    Acceptance, E, VU by SL at 7/31/2020 1143    Comment:  Family conf- discussed improvements in cognition, voicing, swallow, diet, swallow strategies;  Rec: cont tx at d/c   Family Acceptance, E, VU by AF at 7/31/2020 1513    Comment:  Pt and family participated in meeting with rehab team recommend outpatient OT, discussed ADL level and HEP. will continue with discharge teaching    Acceptance, E, VU by SL at 7/31/2020 1143    Comment:  Family conf- discussed improvements in cognition, voicing, swallow, diet, swallow strategies;  Rec: cont tx at d/c                   Point: Precautions (Resolved)     Description:   Instruct learner(s) on prescribed precautions during self-care and functional transfers.              Learning Progress Summary           Patient Acceptance, E,TB,D, DU,NR by AF at 8/3/2020 1537    Comment:  educated on use and benefits of NMES for neuro recovery    Acceptance, E, VU by AF at 7/31/2020 1513    Comment:  Pt and family participated in meeting with rehab team recommend outpatient OT, discussed ADL level and HEP. will continue with discharge teaching    Acceptance, E, VU by SL at 7/31/2020 1143    Comment:  Family conf- discussed improvements in cognition, voicing, swallow, diet, swallow strategies;  Rec: cont tx at d/c    Acceptance, E, VU by RD at 7/10/2020 1529    Comment:  OT educ on OT role in therapeutic process and pt's POC.   Family Acceptance, E, VU by AF at 7/31/2020 1513    Comment:  Pt and family participated in meeting with rehab team recommend outpatient OT, discussed ADL level and HEP. will continue with discharge teaching    Acceptance, E, VU by SL at 7/31/2020 1143    Comment:  Family conf- discussed improvements in cognition, voicing, swallow, diet, swallow strategies;  Rec: cont tx at d/c                   Point: Body mechanics  (Resolved)     Description:   Instruct learner(s) on proper positioning and spine alignment during self-care, functional mobility activities and/or exercises.              Learning Progress Summary           Patient Acceptance, E, NR by CC at 8/5/2020 1532    Comment:  Pt discussed and questioned about sexual intercourse and issues post D/C. Ot will provide information on positioning and managing tone    Acceptance, E, VU by AF at 7/31/2020 1513    Comment:  Pt and family participated in meeting with rehab team recommend outpatient OT, discussed ADL level and HEP. will continue with discharge teaching    Acceptance, E, VU by SL at 7/31/2020 1143    Comment:  Family conf- discussed improvements in cognition, voicing, swallow, diet, swallow strategies;  Rec: cont tx at d/c    Acceptance, E, VU by RD at 7/10/2020 1529    Comment:  OT educ on OT role in therapeutic process and pt's POC.   Family Acceptance, E, VU by AF at 7/31/2020 1513    Comment:  Pt and family participated in meeting with rehab team recommend outpatient OT, discussed ADL level and HEP. will continue with discharge teaching    Acceptance, E, VU by SL at 7/31/2020 1143    Comment:  Family conf- discussed improvements in cognition, voicing, swallow, diet, swallow strategies;  Rec: cont tx at d/c                               User Key     Initials Effective Dates Name Provider Type Discipline     06/08/18 -  Bertha Martell, OTR Occupational Therapist OT    SL 06/08/18 -  Celia Islas MS CCC-SLP Speech and Language Pathologist SLP    AF 04/14/20 -  Yarely Maldonado OTR Occupational Therapist OT    RD 10/14/19 -  Celsa Hamilton OT Occupational Therapist OT                  OT Recommendation and Plan  Anticipated Discharge Disposition (OT): home, home with assist, home with home health, home with 24/7 care               Time Calculation:    Time Calculation- OT     Row Name 08/06/20 1558 08/06/20 1556          Time Calculation- OT    OT Start Time   1330  -AF  0800  -AF     OT Stop Time  1400  -AF  0830  -AF     OT Time Calculation (min)  30 min  -AF  30 min  -AF       User Key  (r) = Recorded By, (t) = Taken By, (c) = Cosigned By    Initials Name Provider Type    Yarely Wong, OTLUZ MARIA Occupational Therapist          Therapy Charges for Today     Code Description Service Date Service Provider Modifiers Qty    72229780093 HC OT SELF CARE/MGMT/TRAIN EA 15 MIN 8/5/2020 Yarely Maldonado OTR GO 2    41670432114 HC OT SELF CARE/MGMT/TRAIN EA 15 MIN 8/6/2020 Yarely Maldonado OTR GO 2    19501504534 HC OT NEUROMUSC RE EDUCATION EA 15 MIN 8/6/2020 Yarely Maldonado OTLUZ MARIA GO 2               OT Discharge Summary  Anticipated Discharge Disposition (OT): home, home with assist, home with home health, home with 24/7 care  Reason for Discharge: Maximum functional potential achieved  Outcomes Achieved: Patient able to partially acheive established goals  Discharge Destination: Home with assist, Home with home health(24 hour assist)    MARCIAL Red  8/6/2020

## 2020-08-06 NOTE — PLAN OF CARE
Problem: Patient Care Overview  Goal: Plan of Care Review  Outcome: Ongoing (interventions implemented as appropriate)  Flowsheets  Taken 8/6/2020 1549 by Danya Graf, RN  Outcome Summary: Pt has been up in chair and is preparing for DC in the am. Went to radiation  Taken 8/6/2020 0902 by Danya Graf, RN  Plan of Care Reviewed With: patient

## 2020-08-07 NOTE — THERAPY DISCHARGE NOTE
Inpatient Rehabilitation - Occupational Therapy Treatment Note/Discharge  [facility]     Patient Name: Denise Guajardo  : 1965  MRN: [id number]  Today's Date: 2020               Admit Date: 2020    Visit Dx:     ICD-10-CM ICD-9-CM   1. Glioblastoma multiforme (CMS/HCC) C71.9 191.9     Patient Active Problem List   Diagnosis   • Glioblastoma multiforme (CMS/HCC)   • Intracranial hemorrhage (CMS/HCC)   • Dysphagia   • Acute left-sided weakness   • Brain tumor (CMS/HCC)   • Hemiplegia (CMS/HCC)       Therapy Treatment  IRF Treatment Summary     Row Name 20 1203 20 1040          Evaluation/Treatment Time and Intent    Subjective Information  no complaints  -AF  no complaints  -LB     Existing Precautions/Restrictions  fall chemo  -AF  fall chemo  -LB     Document Type  therapy note (daily note);discharge treatment  -AF  discharge treatment addendum. Pnt and  requested PT session  -LB     Mode of Treatment  occupational therapy  -AF  physical therapy  -LB     Patient/Family Observations  seated in w/c, discharging today  -AF  Seated in w/c.  and OT present  -LB     Recorded by [AF] Yarely Maldonado, OTR [LB] Talita Hodges PTA     Row Name 20 1203             Cognition/Psychosocial- PT/OT    Affect/Mental Status (Cognitive)  WFL  -AF      Orientation Status (Cognition)  oriented x 3  -AF      Follows Commands (Cognition)  follows one step commands  -AF      Personal Safety Interventions  gait belt;fall prevention program maintained;nonskid shoes/slippers when out of bed  -AF      Cognitive Function (Cognitive)  safety deficit  -AF      Memory Deficit (Cognitive)  procedural memory  -AF      Safety Deficit (Cognitive)  insight into deficits/self awareness  -AF      Recorded by [AF] Yarely Maldonado, OTR      Row Name 20 1040             Bed Mobility Assessment/Treatment    Supine-Sit [place] (Bed Mobility)  contact guard  -LB      Sit-Supine [place] (Bed  Mobility)  contact guard  -LB      Comment (Bed Mobility)  assessed on txment mat  -LB      Recorded by [LB] Talita Hodges, EDEN      Row Name 08/07/20 1203             Transfer Assessment/Treatment    Comment (Transfers)  sit to  shower MIN grab bar   -AF      Recorded by [AF] Yarely Maldonado, OTR      Row Name 08/07/20 1040             Bed-Chair Transfer    Bed-Chair [place] (Transfers)  minimum assist (75% patient effort) sit/pivot  -LB      Assistive Device (Bed-Chair Transfers)  wheelchair  -LB      Recorded by [LB] Talita Hodges, PTA      Row Name 08/07/20 1040             Chair-Bed Transfer    Chair-Bed [place] (Transfers)  minimum assist (75% patient effort) sit/pivot  -LB      Assistive Device (Chair-Bed Transfers)  wheelchair  -LB      Recorded by [LB] Talita Hodges, PTA      Row Name 08/07/20 1040             Sit-Stand Transfer    Sit-Stand [place] (Transfers)  minimum assist (75% patient effort)  -LB      Assistive Device (Sit-Stand Transfers)  walker, jakub  -LB      Recorded by [LB] Talita Hodges, PTA      Row Name 08/07/20 1040             Stand-Sit Transfer    Stand-Sit [place] (Transfers)  minimum assist (75% patient effort)  -LB      Assistive Device (Stand-Sit Transfers)  walker, jakub  -LB      Recorded by [LB] Talita Hodges, PTA      Row Name 08/07/20 1203             Shower Transfer    Type (Shower Transfer)  stand pivot/stand step  -AF      [place] Level (Shower Transfer)  minimum assist (75% patient effort);verbal cues  -AF      Assistive Device (Shower Transfer)  grab bars/tub rail;tub bench;wheelchair  -AF      Recorded by [AF] Yarely Maldonado, OTR      Row Name 08/07/20 1040             Gait/Stairs Assessment/Training    [place] Level (Gait)  moderate assist (50% patient effort);1 person to manage equipment  -LB      Assistive Device (Gait)  walkerjakub L AFO  -LB      Distance in Feet (Gait)  80, 20  -LB      Pattern (Gait)   step-to;step-through  -LB      Deviations/Abnormal Patterns (Gait)  base of support, wide;maya decreased  -LB      Bilateral Gait Deviations  forward flexed posture  -LB      Left Sided Gait Deviations  knee buckling, left side;foot drop/toe drag;forward flexed posture;heel strike decreased;weight shift ability decreased;Trendelenburg sign modA for L foot placement  -LB      Right Sided Gait Deviations  leans right;weight shift ability decreased  -LB      Recorded by [LB] Talita Hodges PTA      Row Name 08/07/20 1203             Bathing Assessment/Treatment    Bathing [place] Level  bathing skills;minimum assist (75% patient effort)  -AF      Assistive Device (Bathing)  grab bar/tub rail;hand held shower spray hose  -AF      Bathing Position  supported sitting;supported standing  -AF      Bathing Setup Assistance  obtain supplies  -AF      Comment (Bathing)  jakub tech  -AF      Recorded by [AF] Yarely Maldonado, OTR      Row Name 08/07/20 1203             Upper Body Dressing Assessment/Treatment    Upper Body Dressing Task  upper body dressing skills;moderate assist (50-74% patient effort);verbal cues  -AF      Upper Body Dressing Position  supported sitting  -AF      Set-up Assistance (Upper Body Dressing)  obtain clothing  -AF      Comment (Upper Body Dressing)  jakub tech  -AF      Recorded by [AF] Yarely Maldoando, OTR      Row Name 08/07/20 1203             Lower Body Dressing Assessment/Treatment    Lower Body Dressing [place] Level  doff;don;pants/bottoms;shoes/slippers;socks;underwear;moderate assist (50% patient effort)  -AF      Lower Body Dressing Position  supported sitting;supported standing  -AF      Lower Body Dressing Setup Assistance  obtain clothing  -AF      Comment (Lower Body Dressing)  jakub tech  -AF      Recorded by [AF] aYrely Maldonado, OTR      Row Name 08/07/20 1203             Grooming Assessment/Treatment    Grooming [place] Level  grooming skills;minimum assist (75%  patient effort)  -AF      Grooming Position  supported sitting  -AF      Grooming Setup Assistance  obtain supplies  -AF      Comment (Grooming)  assist with pony tail  -AF      Recorded by [AF] Yarely Maldonado, OTR      Row Name 08/07/20 1203 08/07/20 1040          Pain Scale: Numbers Pre/Post-Treatment    Pain Scale: Numbers, Pretreatment  0/10 - no pain  -AF  0/10 - no pain  -LB     Pain Scale: Numbers, Post-Treatment  0/10 - no pain  -AF  --     Recorded by [AF] Yarely Maldonado, OTR [LB] Talita Hodges, PTA     Row Name 08/07/20 1203             Static Sitting Balance    Level of [place] (Unsupported Sitting, Static Balance)  standby assist  -AF      Recorded by [AF] Yarely Maldonado OTR      Row Name 08/07/20 1203             Static Standing Balance    Level of [place] (Supported Standing, Static Balance)  minimal assist, 75% patient effort  -AF      Assistive Device Utilized (Supported Standing, Static Balance)  -- grab bar   -AF      Comment (Supported Standing, Static Balance)  during ADL  -AF      Recorded by [AF] Yarely Maldonado, OTR      Row Name 08/07/20 1203 08/07/20 1040          Positioning and Restraints    Pre-Treatment Position  sitting in chair/recliner  -AF  --     Post Treatment Position  wheelchair  -AF  wheelchair  -LB     In Wheelchair  sitting;exit alarm on;with PT  -AF  --     Other Position  --  return to room with caregiver  -LB     Recorded by [AF] Yarely Maldonado, OTR [LB] Talita Hodges, PTA       User Key  (r) = Recorded By, (t) = Taken By, (c) = Cosigned By    Initials Name Effective Dates    LB Talita Hodges, PTA 03/07/18 -     AF Yarely Maldonado, OTR 04/14/20 -           Wound 07/01/20 1156 Right head Incision (Active)   Dressing Appearance intact 8/6/2020  8:00 PM   Drainage Amount none 8/7/2020  7:27 AM   Dressing Care, Wound open to air 8/7/2020  7:27 AM             Occupational Therapy Education                 Title: PT OT SLP Therapies (Done)     Topic:  Occupational Therapy (Resolved)     Point: ADL training (Resolved)     Description:   Instruct learner(s) on proper safety adaptation and remediation techniques during self care or transfers.   Instruct in proper use of assistive devices.              Learning Progress Summary           Patient Acceptance, E, VU by AF at 7/31/2020 1513    Comment:  Pt and family participated in meeting with rehab team recommend outpatient OT, discussed ADL level and HEP. will continue with discharge teaching    Acceptance, E,TB,D, VU by AF at 7/31/2020 1512    Comment:  son participated in teaching with commode transfers and toileting tasks, educated on SROM/HEP    Acceptance, E, VU by SL at 7/31/2020 1143    Comment:  Family conf- discussed improvements in cognition, voicing, swallow, diet, swallow strategies;  Rec: cont tx at d/c    Acceptance, E, VU,NR by AF at 7/20/2020 1531    Comment:  edcuated on safety with ADl tasks    Acceptance, E,TB,D, NR,DU,VU by AF at 7/16/2020 1230    Comment:  with bed moblity and pulling up in bed    Acceptance, E, VU by RD at 7/10/2020 1529    Comment:  OT educ on OT role in therapeutic process and pt's POC.   Family Acceptance, E, VU by AF at 7/31/2020 1513    Comment:  Pt and family participated in meeting with rehab team recommend outpatient OT, discussed ADL level and HEP. will continue with discharge teaching    Acceptance, E,TB,D, VU by AF at 7/31/2020 1512    Comment:  son participated in teaching with commode transfers and toileting tasks, educated on SROM/HEP    Acceptance, E, VU by SL at 7/31/2020 1143    Comment:  Family conf- discussed improvements in cognition, voicing, swallow, diet, swallow strategies;  Rec: cont tx at d/c   Significant Other Acceptance, E,TB,D, NR,DU,VU by AF at 7/16/2020 1230    Comment:  with bed moblity and pulling up in bed                   Point: Home exercise program (Resolved)     Description:   Instruct learner(s) on appropriate technique for monitoring,  assisting and/or progressing therapeutic exercises/activities.              Learning Progress Summary           Patient Acceptance, E,D, VU by AF at 8/6/2020 1556    Comment:  review of HEP, ADLs, sex education and kitchen gadgets    Acceptance, E, VU by AF at 7/31/2020 1513    Comment:  Pt and family participated in meeting with rehab team recommend outpatient OT, discussed ADL level and HEP. will continue with discharge teaching    Acceptance, E, VU by SL at 7/31/2020 1143    Comment:  Family conf- discussed improvements in cognition, voicing, swallow, diet, swallow strategies;  Rec: cont tx at d/c   Family Acceptance, E, VU by AF at 7/31/2020 1513    Comment:  Pt and family participated in meeting with rehab team recommend outpatient OT, discussed ADL level and HEP. will continue with discharge teaching    Acceptance, E, VU by SL at 7/31/2020 1143    Comment:  Family conf- discussed improvements in cognition, voicing, swallow, diet, swallow strategies;  Rec: cont tx at d/c                   Point: Precautions (Resolved)     Description:   Instruct learner(s) on prescribed precautions during self-care and functional transfers.              Learning Progress Summary           Patient Acceptance, E,TB,D, DU,NR by AF at 8/3/2020 1537    Comment:  educated on use and benefits of NMES for neuro recovery    Acceptance, E, VU by AF at 7/31/2020 1513    Comment:  Pt and family participated in meeting with rehab team recommend outpatient OT, discussed ADL level and HEP. will continue with discharge teaching    Acceptance, E, VU by SL at 7/31/2020 1143    Comment:  Family conf- discussed improvements in cognition, voicing, swallow, diet, swallow strategies;  Rec: cont tx at d/c    Acceptance, E, VU by RD at 7/10/2020 1529    Comment:  OT educ on OT role in therapeutic process and pt's POC.   Family Acceptance, E, VU by AF at 7/31/2020 1513    Comment:  Pt and family participated in meeting with rehab team recommend  outpatient OT, discussed ADL level and HEP. will continue with discharge teaching    Acceptance, E, VU by SL at 7/31/2020 1143    Comment:  Family conf- discussed improvements in cognition, voicing, swallow, diet, swallow strategies;  Rec: cont tx at d/c                   Point: Body mechanics (Resolved)     Description:   Instruct learner(s) on proper positioning and spine alignment during self-care, functional mobility activities and/or exercises.              Learning Progress Summary           Patient Acceptance, E, NR by CC at 8/5/2020 1532    Comment:  Pt discussed and questioned about sexual intercourse and issues post D/C. Ot will provide information on positioning and managing tone    Acceptance, E, VU by AF at 7/31/2020 1513    Comment:  Pt and family participated in meeting with rehab team recommend outpatient OT, discussed ADL level and HEP. will continue with discharge teaching    Acceptance, E, VU by SL at 7/31/2020 1143    Comment:  Family conf- discussed improvements in cognition, voicing, swallow, diet, swallow strategies;  Rec: cont tx at d/c    Acceptance, E, VU by RD at 7/10/2020 1529    Comment:  OT educ on OT role in therapeutic process and pt's POC.   Family Acceptance, E, VU by AF at 7/31/2020 1513    Comment:  Pt and family participated in meeting with rehab team recommend outpatient OT, discussed ADL level and HEP. will continue with discharge teaching    Acceptance, E, VU by SL at 7/31/2020 1143    Comment:  Family conf- discussed improvements in cognition, voicing, swallow, diet, swallow strategies;  Rec: cont tx at d/c                               User Key     Initials Effective Dates Name Provider Type Discipline     06/08/18 -  Bertha Martell, OTR Occupational Therapist OT    SL 06/08/18 -  Celia Islas MS CCC-SLP Speech and Language Pathologist SLP    AF 04/14/20 -  Yarely Maldonado OTR Occupational Therapist OT    RD 10/14/19 -  Celsa Hamilton, OT Occupational Therapist OT                   OT Recommendation and Plan  Anticipated Discharge Disposition (OT): home, home with assist, home with home health, home with 24/7 care               Time Calculation:    Time Calculation- OT     Row Name 08/07/20 1213             Time Calculation- OT    OT Start Time  1000  -AF      OT Stop Time  1030  -AF      OT Time Calculation (min)  30 min  -AF        User Key  (r) = Recorded By, (t) = Taken By, (c) = Cosigned By    Initials Name Provider Type    AF Yarely Maldonado, OTR Occupational Therapist          Therapy Charges for Today     Code Description Service Date Service Provider Modifiers Qty    95594626194 HC OT SELF CARE/MGMT/TRAIN EA 15 MIN 8/6/2020 Yarely Maldonado OTR GO 2    44306826771 HC OT NEUROMUSC RE EDUCATION EA 15 MIN 8/6/2020 Yarely Maldonado OTR GO 2    35176951569 HC OT SELF CARE/MGMT/TRAIN EA 15 MIN 8/7/2020 Yarely Maldonado OTR GO 2               OT Discharge Summary  Anticipated Discharge Disposition (OT): home, home with assist, home with home health, home with 24/7 care  Reason for Discharge: Maximum functional potential achieved  Outcomes Achieved: Patient able to partially acheive established goals  Discharge Destination: Home with assist, Home with home health(24 hour assist)    MARCIAL Red  8/7/2020

## 2020-08-07 NOTE — PLAN OF CARE
Problem: Patient Care Overview  Goal: Plan of Care Review  Outcome: Outcome(s) achieved  Flowsheets (Taken 8/7/2020 1109)  Outcome Summary: Patient is looking forward to discharge. She is alert and oriented, continent of bladder and bowel, and takes medciction with water. Follow up appointments and medications will be discussed prior to discharge, patient to do meds to beds, and no pain or other complaints at this time.  Progress, Functional Goals: preparing for discharge  Plan of Care Reviewed With: patient; spouse  IRF Plan of Care Review: progress ongoing, continue

## 2020-08-07 NOTE — PAYOR COMM NOTE
"Good afternoon!     AUTH # [id number]     The patient listed below was discharged home today, 8/7, with plans for outpatient therapies. If you have any questions please call.      Thank you!     Morris Jose Rafaelkatherine RN  p   f     Denise Guajardo (55 y.o. Female)     Date of Birth Social Security Number Address Home Phone MRN    1965  [address] [place] [address] [phone] [id number]    [place] Marital Status          Oriental orthodox        Admission Date Admission Type Admitting Provider Attending Provider Department, Room/Bed    7/9/20 Elective King Morris MD  [facility], 4414/1    Discharge Date Discharge Disposition Discharge Destination        8/7/2020 Home or Self Care              Attending Provider:  (none)   Allergies:  No Known Allergies    Isolation:  None   Infection:  None   Code Status:  CPR    Ht:  165.1 cm (65\")   Wt:  45.6 kg (100 lb 8.5 oz)    Admission Cmt:  None   Principal Problem:  None                Active Insurance as of 7/9/2020     Primary Coverage     Payor Plan Insurance Group Employer/Plan Group    [employer]HE[employer] BLUE CROSS ANTHEM BLUE CROSS BLUE SHIELD PPO [id number]     Payor Plan Address Payor Plan Phone Number Payor Plan Fax Number Effective Dates    [address] [phone]  5/1/2020 - None Entered    [facility] [address]       Subscriber Name Subscriber Birth Date Member ID       DENISE GUAJARDO 1965 [id number]                 Emergency Contacts      (Rel.) Home Phone Work Phone Mobile Phone    Malick Guajardo (Spouse) [phone] -- [phone]    Shay Henry (Son) [phone] -- --    Jhonatan Henry (Son) [phone] -- --              "

## 2020-08-07 NOTE — PROGRESS NOTES
Inpatient Rehabilitation Plan of Care Note    Plan of Care  Care Plan Reviewed - No updates at this time.    Safety    Performed Intervention(s)  Fall precautions: bed/chair alarms, bed low and locked, nonskid socks, call  light within reach, personal items within reach.  Hourly rounding.  Uses call light appropriately  Environmental set-up to reduce risk      Psychosocial    Performed Intervention(s)  provide relaxation and distraction as needed.  Allow extra time for pt to verbalize needs, concerns,  feelings, etc.  Support family      Body Systems    Performed Intervention(s)  Check patient for incontinence regularly. Keep skin clean and dry as much as  possible.  Nystatin cream to buttocks prn  Skin assessment q shift and prn.      Sphincter Control    Performed Intervention(s)  Bernabe catheter care q shift and prn.  Monitor I/O.  Bowel meds as needed.  Bladder scan and intermittent cath, as ordered prn.      Nutrition    Performed Intervention(s)  Daily weights.  Monitor I/O.    Signed by: Bonilla Mcbride RN

## 2020-08-07 NOTE — PROGRESS NOTES
SECTION GG    Eating Performance Discharge: Patient completed the activities by him/herself  with no assistance from a helper.    Signed by: Elizabeth Mayes RN

## 2020-08-07 NOTE — PLAN OF CARE
Problem: Patient Care Overview  Goal: Plan of Care Review  Outcome: Ongoing (interventions implemented as appropriate)  Flowsheets (Taken 8/7/2020 0221)  Outcome Summary: Pt rested well this shift.   at bedside.  Meds whole with thins (zofran and other HS meds 2100, tramador 2200)  Pt anticipates DC today.  Up to bathroom to void.  Progress, Functional Goals: demonstrating adequate progress  Plan of Care Reviewed With: patient  IRF Plan of Care Review: progress ongoing, continue

## 2020-08-07 NOTE — DISCHARGE INSTRUCTIONS
Patient to follow up with Radiation Oncology after completes radiation therapy on any adjustment in Decadron dosing  Patient to follow up with Medical Oncology on how long to take Bactrim as goes through courses of Temodar  Continue Protonix as long as on steroids - Decadron.  Do not stop Decadron or Keppra abruptly

## 2020-08-07 NOTE — PROGRESS NOTES
Inpatient Rehabilitation Plan of Care Note    Plan of Care  Care Plan Reviewed - No updates at this time.    Safety    Performed Intervention(s)  Fall precautions: bed/chair alarms, bed low and locked, nonskid socks, call  light within reach, personal items within reach.  Hourly rounding.  Uses call light appropriately  Environmental set-up to reduce risk      Psychosocial    Performed Intervention(s)  provide relaxation and distraction as needed.  Allow extra time for pt to verbalize needs, concerns,  feelings, etc.  Support family      Body Systems    Performed Intervention(s)  Check patient for incontinence regularly. Keep skin clean and dry as much as  possible.  Nystatin cream to buttocks prn  Skin assessment q shift and prn.      Sphincter Control    Performed Intervention(s)  Monitor I/O.  Bowel meds as needed.  Bladder scan and intermittent cath, as ordered prn.      Nutrition    Performed Intervention(s)  Daily weights.  Monitor I/O.    Signed by: Elizabeth Mayes RN

## 2020-08-07 NOTE — THERAPY DISCHARGE NOTE
Inpatient Rehabilitation - Physical Therapy Treatment Note/Discharge  [facility]     Patient Name: Denise Guajardo  : 1965  MRN: [id number]  Today's Date: 2020             Admit Date: 2020    Visit Dx:    ICD-10-CM ICD-9-CM   1. Glioblastoma multiforme (CMS/HCC) C71.9 191.9     Patient Active Problem List   Diagnosis   • Glioblastoma multiforme (CMS/HCC)   • Intracranial hemorrhage (CMS/HCC)   • Dysphagia   • Acute left-sided weakness   • Brain tumor (CMS/HCC)   • Hemiplegia (CMS/HCC)       Physical Therapy Education                 Title: PT OT SLP Therapies (Done)     Topic: Physical Therapy (Resolved)     Point: Mobility training (Resolved)     Description:   Instruct learner(s) on safety and technique for assisting patient out of bed, chair or wheelchair.  Instruct in the proper use of assistive devices, such as walker, crutches, cane or brace.              Patient Friendly Description:   It's important to get you on your feet again, but we need to do so in a way that is safe for you. Falling has serious consequences, and your personal safety is the most important thing of all.        When it's time to get out of bed, one of us or a family member will sit next to you on the bed to give you support.     If your doctor or nurse tells you to use a walker, crutches, a cane, or a brace, be sure you use it every time you get out of bed, even if you think you don't need it.    Learning Progress Summary           Patient Acceptance, E, NR by LB at 8/3/2020 1109    Acceptance, E, NR by LB at 2020 1220    Acceptance, E, VU by MARRY at 2020 1143    Comment:  Family conf- discussed improvements in cognition, voicing, swallow, diet, swallow strategies;  Rec: cont tx at d/c    Acceptance, E, NR by LB at 2020 1410    Acceptance, E, NR by LB at 2020 1058    Eager, E,D, NR by  at 2020 0918    Comment:  Gait sequencing    Acceptance, E, NR by LB at 2020 0942    Acceptance, E,D, VU,DU by  LB1 at 7/25/2020 1159    Comment:  wt shifting with ambulation    Acceptance, E, NR by LB at 7/23/2020 1514    Acceptance, E, NR by LB at 7/22/2020 1517    Acceptance, E, NR by LB at 7/21/2020 1511    Acceptance, E, NR by LB at 7/20/2020 1216    Acceptance, E,TB, VU,NR by GEGE at 7/18/2020 1154    Acceptance, E, NR by LB at 7/17/2020 1512    Acceptance, E, NR by LB at 7/16/2020 1208    Acceptance, E, NR by LB at 7/15/2020 1459    Acceptance, E, NR by LB at 7/14/2020 1502    Acceptance, E, NR by LB at 7/13/2020 1458    Acceptance, E,TB,D, NR by EE at 7/10/2020 1031   Family Acceptance, E,D, VU,DU by LB at 7/31/2020 1441    Comment:  sonShay, instructed in bed mob tsfs and car tsfs. Observed bed mob and car. Practiced tsfs.    Acceptance, E, VU by SL at 7/31/2020 1143    Comment:  Family conf- discussed improvements in cognition, voicing, swallow, diet, swallow strategies;  Rec: cont tx at d/c   Significant Other Acceptance, E,D, VU by LB at 8/7/2020 1146    Comment:  up and down curb w w/c    Acceptance, E,D, VU,DU by LB at 8/4/2020 1520    Comment:  bed mob, transfers, car tsf                   Point: Home exercise program (Resolved)     Description:   Instruct learner(s) on appropriate technique for monitoring, assisting and/or progressing patient with therapeutic exercises and activities.              Learning Progress Summary           Patient Acceptance, E,H, VU by LB at 8/5/2020 1211    Acceptance, E, VU by SL at 7/31/2020 1143    Comment:  Family conf- discussed improvements in cognition, voicing, swallow, diet, swallow strategies;  Rec: cont tx at d/c    Acceptance, E,D, VU,DU by LB1 at 7/25/2020 1159    Comment:  wt shifting with ambulation    Acceptance, E,H,D, VU,DU by LB at 7/24/2020 0908   Family Acceptance, E, VU by SL at 7/31/2020 1143    Comment:  Family conf- discussed improvements in cognition, voicing, swallow, diet, swallow strategies;  Rec: cont tx at d/c   Significant Other Acceptance, E,H, VU  by LB at 8/5/2020 1211    Acceptance, E,H,D, VU,DU by LB at 7/24/2020 0908                   Point: Body mechanics (Resolved)     Description:   Instruct learner(s) on proper positioning and spine alignment for patient and/or caregiver during mobility tasks and/or exercises.              Learning Progress Summary           Patient Acceptance, E, VU by LB at 8/6/2020 1520    Acceptance, E, VU by SL at 7/31/2020 1143    Comment:  Family conf- discussed improvements in cognition, voicing, swallow, diet, swallow strategies;  Rec: cont tx at d/c    Acceptance, E, NR by LB at 7/30/2020 1410    Acceptance, E,D, VU,DU by LB1 at 7/25/2020 1159    Comment:  wt shifting with ambulation    Acceptance, E, NR by LB at 7/21/2020 1511    Acceptance, E,TB,D, NR by EE at 7/10/2020 1031   Family Acceptance, E,D, VU by LB at 7/31/2020 1443    Comment:  Shay Seymour was instructed.    Acceptance, E, VU by SL at 7/31/2020 1143    Comment:  Family conf- discussed improvements in cognition, voicing, swallow, diet, swallow strategies;  Rec: cont tx at d/c   Significant Other Eager, E, VU by LB at 7/11/2020 1415    Comment:  Addressing pnt from left side. (affected side)                   Point: Precautions (Resolved)     Description:   Instruct learner(s) on prescribed precautions during mobility and gait tasks              Learning Progress Summary           Patient Acceptance, E, VU by SL at 7/31/2020 1143    Comment:  Family conf- discussed improvements in cognition, voicing, swallow, diet, swallow strategies;  Rec: cont tx at d/c    Acceptance, E,D, VU,DU by LB1 at 7/25/2020 1159    Comment:  wt shifting with ambulation    Acceptance, E,TB,D, NR by EE at 7/10/2020 1031   Family Acceptance, E,D, VU by LB at 7/31/2020 1443    Comment:  Shay Seymour was instructed.    Acceptance, E, VU by SL at 7/31/2020 1143    Comment:  Family conf- discussed improvements in cognition, voicing, swallow, diet, swallow strategies;  Rec: cont tx at d/c                                User Key     Initials Effective Dates Name Provider Type Discipline    LB1 06/08/18 -  Talita Mcclelland, PT Physical Therapist PT    SL 06/08/18 -  Celia Islas MS CCC-SLP Speech and Language Pathologist SLP    GEGE 06/08/18 -  Lisa Henderson, PT Physical Therapist PT    LB 03/07/18 -  Talita Hodges PTA Physical Therapy Assistant PT    EE 04/03/18 -  Marylin Hill, PT Physical Therapist PT    KP 04/03/18 -  Shannan Meade, PT Physical Therapist PT                  Therapy Treatment    IRF Treatment Summary     Row Name 08/07/20 1040             Evaluation/Treatment Time and Intent    Subjective Information  no complaints  -LB      Existing Precautions/Restrictions  fall chemo  -LB      Document Type  discharge treatment addendum. Pnt and  requested PT session  -LB      Mode of Treatment  physical therapy  -LB      Patient/Family Observations  Seated in w/c.  and OT present  -LB      Recorded by [LB] Talita Hodges, EDEN      Row Name 08/07/20 1040             Bed Mobility Assessment/Treatment    Supine-Sit [place] (Bed Mobility)  contact guard  -LB      Sit-Supine [place] (Bed Mobility)  contact guard  -LB      Comment (Bed Mobility)  assessed on txment mat  -LB      Recorded by [LB] Talita Hodges PTA      Row Name 08/07/20 1040             Bed-Chair Transfer    Bed-Chair [place] (Transfers)  minimum assist (75% patient effort) sit/pivot  -LB      Assistive Device (Bed-Chair Transfers)  wheelchair  -LB      Recorded by [LB] Talita Hodges PTA      Row Name 08/07/20 1040             Chair-Bed Transfer    Chair-Bed [place] (Transfers)  minimum assist (75% patient effort) sit/pivot  -LB      Assistive Device (Chair-Bed Transfers)  wheelchair  -LB      Recorded by [LB] Talita Hodges PTA      Row Name 08/07/20 1040             Sit-Stand Transfer    Sit-Stand [place] (Transfers)  minimum assist (75% patient effort)  -LB      Assistive  Device (Sit-Stand Transfers)  walker, jakub  -LB      Recorded by [LB] Talita Hodges PTA      Row Name 08/07/20 1040             Stand-Sit Transfer    Stand-Sit [place] (Transfers)  minimum assist (75% patient effort)  -LB      Assistive Device (Stand-Sit Transfers)  walker, jakub  -LB      Recorded by [LB] Talita Hodges PTA      Row Name 08/07/20 1040             Gait/Stairs Assessment/Training    [place] Level (Gait)  moderate assist (50% patient effort);1 person to manage equipment  -LB      Assistive Device (Gait)  walker, jakub L AFO  -LB      Distance in Feet (Gait)  80, 20  -LB      Pattern (Gait)  step-to;step-through  -LB      Deviations/Abnormal Patterns (Gait)  base of support, wide;maya decreased  -LB      Bilateral Gait Deviations  forward flexed posture  -LB      Left Sided Gait Deviations  knee buckling, left side;foot drop/toe drag;forward flexed posture;heel strike decreased;weight shift ability decreased;Trendelenburg sign modA for L foot placement  -LB      Right Sided Gait Deviations  leans right;weight shift ability decreased  -LB      Recorded by [LB] Talita Hodges PTA      Row Name 08/07/20 1040             Pain Scale: Numbers Pre/Post-Treatment    Pain Scale: Numbers, Pretreatment  0/10 - no pain  -LB      Recorded by [KO] Talita Hodges PTA      Row Name 08/07/20 1040             Positioning and Restraints    Post Treatment Position  wheelchair  -LB      Other Position  return to room with caregiver  -LB      Recorded by [KO] Talita Hodges PTA        User Key  (r) = Recorded By, (t) = Taken By, (c) = Cosigned By    Initials Name Effective Dates    Talita Worthy PTA 03/07/18 -           PT Recommendation and Plan  Planned Therapy Interventions (PT Eval): balance training, bed mobility training, gait training, home exercise program, motor coordination training, neuromuscular re-education, patient/family education, ROM (range of motion),  strengthening, stretching, transfer training, wheelchair management/propulsion training     Patient was wearing a face mask during this therapy encounter. Therapist used appropriate personal protective equipment including mask and gloves.  Mask used was standard procedure mask. Appropriate PPE was worn during the entire therapy session. Hand hygiene was completed before and after therapy session. Patient is not in enhanced droplet precautions.          Time Calculation:   PT Charges     Row Name 08/07/20 1148 08/07/20 1145          Time Calculation    Start Time  1030  -LB  0810  -LB     Stop Time  1100  -LB  0820  -LB     Time Calculation (min)  30 min  -LB  10 min  -LB       User Key  (r) = Recorded By, (t) = Taken By, (c) = Cosigned By    Initials Name Provider Type    Talita Worthy PTA Physical Therapy Assistant          Therapy Charges for Today     Code Description Service Date Service Provider Modifiers Qty    69182629221 HC PT THER PROC EA 15 MIN 8/6/2020 Talita Hodges PTA GP 4    20986501661 HC PT THER SUPP EA 15 MIN 8/6/2020 Talita Hodges PTA GP 2    29078421841 HC PT THER PROC EA 15 MIN 8/7/2020 Talita Hodges PTA GP 3               PT Discharge Summary  Reason for Discharge: Discharge from facility  Outcomes Achieved: Patient able to partially acheive established goals  Discharge Destination: Home with assist, Home with outpatient services    Talita Hodges PTA  8/7/2020

## 2020-08-07 NOTE — DISCHARGE SUMMARY
[facility] - REHABILITATION UNIT    VELIA DAMON  1965    Date admission July 9, 2020  Date of discharge 8/7/2020    Chief Complaint:   Glioblastoma   S/p July 1, 2020 - Right temporal craniotomy with excisional biopsy of mass in the right cerebral peduncle and placement of a lumbar drain  Steroids-Decadron 4 mg twice daily through course of radiation therapy then as directed by radiation oncology  Radiation therapy/Temodar  Left hemiparesis  Dysarthria-improved  Dysphonia-improved  Right ptosis  Impaired cognition/impaired mobility/ impaired self care-improved  Dysphagia -improved  Seizure prophylaxis - Keppra  GI prophylaxis      History of Present Illness  54 year old female with who presented to [facility] with dizziness and some tingling and numbness in left leg. MRI revealed two lesions which appeared to be consistent with metastatic disease.   CT head on post op evening did show area of hemorrhage with mild adjacent edema. CT Head on July 2, 2020 showed stable hemorrhage in right peduncle and righ tpons with new hemorrhage in right temporal lobe, possibly intraventricular with 3 mm midline shift. CT Head on July 3, 2020 showed stable right peduncle and right temporal lobe hemorrhage, improvement in pneumocephalus and stable midline shift.       She has impaired cognition with slow processing, oriented, severe dysphonia, right ptosis, dysphagia- Cortrak tube feeds, left hemiplegia. Family / nursing note occasional trace movement/ reflex movement on left side. With PT, Bed mobility mod-max assist. Max assist for sitting balance at edge of bed for 4-5 minutes then able to correct with CTG for a few minutes.  Transfers CTG 2. Gait mod 2 for 3 side steps at [facility] with assist to slide patient's left foot. Cued for upright posture. With OT, pt completed PROM L UE, still no movement in LUE. Pt performed bed mobility w. mod to max A. Sat EOB mod A w. lean to the L. washed face and hands w. mod A. UBB min A,  LBB max/dep. LBD dep     Given functional impairments and co-morbidities, now admit for acute inpt rehab  Review of Systems   ROS limited by severe dysphonia. C/o headache. No apparent vision complaint. Voice comes and goes. No breathing complaints. Weakness left side.   Medical History        Past Medical History:   Diagnosis Date   • Vertigo           Surgical History         Past Surgical History:   Procedure Laterality Date   • BREAST BIOPSY       • DENTAL PROCEDURE       • TUBAL ABDOMINAL LIGATION       • WISDOM TOOTH EXTRACTION                   Family History   Problem Relation Age of Onset   • Ovarian cancer Maternal Aunt     • Breast cancer Neg Hx     • Uterine cancer Neg Hx     • Colon cancer Neg Hx     • Deep vein thrombosis Neg Hx     • Pulmonary embolism Neg Hx        Social History            Tobacco Use   • Smoking status: Never Smoker   • Smokeless tobacco: Never Used   Substance Use Topics   • Alcohol use: Yes       Comment: Socially   • Drug use: No   . Spouse works from home. 24 hour assistance available. Patient independent prior to admission.          Hospital course:  Problem list-    Patient participated comprehensive inpatient rehabilitation program.  She was followed by neurosurgery, medical oncology, radiation oncology.  Her neurologic/functional status showed a significant provement when steroids were increased with Decadron 4 mg twice daily .  She has been started on radiation therapy and Temodar and tolerating.  Has shown improvement and left hemiparesis but still significant impairment.  During her hospital course the following areas were addressed-    Glioblastoma -  -MRI of the brain on June 26, 2020 showed two enhancing masses - 2.6 cm from right lateral thalamic margin into right side of midbrain and chilango and 7 mm in central chilango also on the right. Surrounding vasogenic edema was also noted.   -July 20-oncology plans for radiation therapy/Temodar  -July 23-to start radiation  therapy today and also start Temodar  -July 24-tolerating initial radiation therapy  - August 4 - Staredt Bactrim for PCP prophylaxis 1 DS daily MWF given lymphocytopenia on therapy        S/p right craniotomy for excisional biopsy July 1, 2020     Steroid-  July 17-Decadron 4 mg twice a day added by medical oncology today  July 20-significant improvement in dysphonia/voicing/also with proximal in the left lower extremity on Decadron 4 mg twice a day.  July 29-continues to show significant improvement on steroids.  August 3-reviewed with radiation oncology-to continue on Decadron 4 mg twice a day through radiation therapy.     Leukocytosis - on steroids   -monitor-resolved.        Left hemiplegia-improved  Bioness/electrical stimulation left upper extremity  Left carbon fiber ankle-foot orthosis ordered to help with stability of the ankle for transfers at home     Dysarthria-improved     Dysphonia-improved     Right ptosis-      Impaired cognition/impaired mobility/ impaired self care-improved     Dysphagia -improved-started on p.o. intake and tube feeds discontinued  July 24-adjust diet-regular with thins. Needs set-up assistance (cutting up food, opening packages).         Bladder - estrada catheter- Was removed on acute care wing and replaced  July 10-not clear why the Estrada catheter was replaced at the acute care unit.  Will look at possibly trying to discontinue again on Monday a.m.  July 11-Estrada catheter discontinued   July 12- Order U/A C&S. Cut PVRs to BID  July 13-urine culture results pending  July 15-post void residuals low.  Urine culture no growth     Skin-July 13-discomfort from rash at perineal area/buttocks-has been using barrier cream.  Will try Mycostatin antifungal cream    Seizure prophylaxis - Keppra     Thrush-Mycelex troches     GI prophylaxis -Protonix     DVT prophylaxis - SCDs          Rehab-   admit for comprehensive acute inpatient rehabilitation .  This would be an interdisciplinary  program with physical therapy 1 hour,  occupational therapy 1 hour, and speech therapy 1 hour, 5 days a week.  Rehabilitation nursing for carryover, monitoring of  Nutritional status and neurologic   status, bowel and bladder, and skin  Ongoing physician follow-up.  Weekly team conferences.      Goal is for home with  Home health   therapies.  Barrier to discharge:  Left hemiplegia/dsphagia  - worked on  Trunk control/ mobility / ADLs/ swallow   to overcome.      TEAM CONF July 16 - BETTER TRUNK CONTROL YESTERDAY.BED MAX ASSIST TO SIT. MOD ASSIST TO SUPINE. TRANSFERS MAX ASSIST. BATH MOD 2 FOR STANDING. UBD MAX ASSIST. LBD DEP. GROOMING MOD ASSIST. TOILETING MAX-DEP X 2. COGNITION - STM WITH MIN CUES. MILD DIFFICULTY WITH HIGH LEVEL WORD FINDING TASK. VFSS TODAY. SCALP INCISION HEALING. BLADDER/BOWEL - CONTINENT/INCONTINENT. MULTIPLE SMALL BMS YESTERDAY. SKIN - THRUSH - NYSTATIN. BUTTOCK RASH ANTI-FUNGAL CREAM.  ELOS - NUTRITION - COMPLAINS OF LACK OF SATIETY. TO TRY BOLUS OVER PUMP WITH CORTRAK TO SEE IF HELPS WITH SATIETY AND ABLE TO TOLERATE.  ELOS - THREE WEEKS     TEAM CONF - July 23 - TRANSFERS MIN-MOD ASSIST. GAIT 20 FEET MOD 2 WITH ACE WRAP DORSIFLEX ASSIST WITH MANUAL ADVANCEMENT OF LLE. SITTING BALANCE BETTER. MORE ATTENTIVE. VERY IMPULSIVE. LEFT INATTENTION. BATH MOD. UBD MAX. LBD DEP. GROOMING MOD ASSIST. TOILETING MAX ASSIST. COGNITION - BETTER RECALL. MIN-MOD CUES FOR THOUGHT ORGANIZATION AND ATTENTION TO LEFT. SWALLOW - MECH SOFT, THIN LIQUIDS, NO MIXED CONSISTENCY.EATING WELL. WILL D/C CORTRAK TUBE. BNE - SEE REPORT. ANXIETY - RELAXATION TECHNIQUES  ELOS - TWO WEEKS PER TEAM , PATIENT WOULD LIKE HOME BY BIRTHDAY July 29 BUT PATIENT LACKS INSIGHT INTO DEGREE OF DEFICITS.     TEAM CONF - JULY 30 - BED CTG-MIN. TRANSFERS MIN. CAR TRANSFERS MIN-MOD. GAIT 30 FEET HEMIWALKER, LEFT AFO, MOD 2. TOILET TRANSFERS MIN-MOD. SHOWER MOD. BATH MOD. UBD MOD. LBD MAX. GROOMING MIN. MIN CUES FOR RECALL AT PARAGRAPH  LEVEL. SWALLOW - MECH SOFT, NO MIXED, THIN LIQIDS. BOWEL AND BLADDER - CONTINENT AND INCONTINENT. CONTINUES ON DECADRON 4 MG BID.   ELOS -  ONE WEEK     TEAM CONF - AUGUST 6 -  BED CTG. TRANSFERS MIN ASSIST. RIGHT CARBON FIBER AFO TO ASSIST WITH TRANSFERS. CAR TRANSFERS MIN ASSIST. GAIT 40 FEET HEMIWALKER, LEFT AFO, MOD ASSIST WITH WHEELCHAIR CLOSE BEHIND. TOILET AND SHOWER TRANSFERS MIN. BUD MOD. LBD MOD. BATH MIN. GROOMING MIN. EXTRA TIME FOR PROCESSING INFORMATION. MIN TO INDEPENDENT FOR SIMPLE ORGANIZATIONAL, SEQUENCING TASKS. MIN CUES FOR MATH, MEMORY, THOUGHT FLEXIBILTY, REASONING. MIN CUES FOR RECALL OF LONGER MATERIAL. REGULAR DIET, THIN LIQUIDS. CONTINENT BOWEL AND BLADDER.   ELOS-   TOMORROW. OUTPT PT, OT AT [facility].      August 7-maximized benefit from inpatient rehabilitation program.  Medical status stable.  Family teaching completed.  Medications and follow-up reviewed.  Discharge home today.  Outpatient physical therapy occupational therapy.    Physical Exam  MENTAL STATUS -  AWAKE / ALERT  HEENT- RIGHT CRANIOTOMY INCISION CDI  RIGHT PTOSIS,  SCLERAE ANICTERIC, CONJUNCTIVAE PINK  LEFT FACIAL DROOP,    OP-thrush improved  DYSPHONIA-significantly better-easy to understand.  No delay in her speech  CORTRAK FEEDING TUBE  LUNGS -normal respiration-clear to auscultation  HEART- RRR   ABD -   SOFT, NT.     EXT - NO EDEMA OR CYANOSIS  NEURO - AWAKE, good processing speed.  Very mild dysarthria    EOMI. RIGHT PTOSIS. LEFT FACIAL DROOP.    MOTOR EXAM - RUE/RLE 5/5.  Left upper extremity trace elbow flexion, no active finger flexion.  She has proximal movement at the left lower extremity for hip adduction and also knee extension 3+/5, no ankle dorsiflexion, trace ankle plantarflexion     Results Review:      Results from last 7 days   Lab Units 08/07/20  1206 08/03/20  0536   WBC 10*3/mm3 11.79* 10.02   HEMOGLOBIN g/dL 15.6 14.2   HEMATOCRIT % 46.4 41.4   PLATELETS 10*3/mm3 228 222     Results from last 7 days   Lab  Units 08/07/20  1206 08/03/20  0536   SODIUM mmol/L 136 132*   POTASSIUM mmol/L 3.9 4.4   CHLORIDE mmol/L 98 96*   CO2 mmol/L 26.7 30.6*   BUN mg/dL 17 19   CREATININE mg/dL 0.57 0.54*   CALCIUM mg/dL 8.9 9.0   GLUCOSE mg/dL 84 100*       Name Relationship Specialty Phone Fax Address Order              [facility]      [address]  4th [place] [address]  [phone]     Next Steps: Go on 8/10/2020      Instructions: You will start outpatient OT on Monday, 8/10. Please arrive at Entrance A to be screened at 2:15 p.m. You will start PT on Thursday, 8/13. Schedule will be Mondays 1:45 PT, 2:30 OT; Thursdays 1:45 OT, 2:30 PT.       [facility]   Radiation Oncology [phone] [address] [id number]     Next Steps: Follow up      Instructions: Radiation treatments Monday through Friday at 3:40 p.m.       King Morris MD   Physical Medicine and Rehabilitation [phone] [address] [id number]     Next Steps: Follow up on 8/26/2020      Instructions: 2:40 pm      Paz Mera MD  PCP - General Family Medicine [phone] [address]  [place] [address]     Next Steps: Follow up      Instructions: appointment on august 11;2020@ 11:30 am      hSai Licona MD   Neurosurgery [phone] [address] [id number]     Next Steps: Follow up on 9/15/2020      Instructions: Appointment on Tuesday September 15, 2020 @9:30am.  MRI- 9/8 at 11:00am at [facility] Carlota Beyer MD  Consulting Physician Radiation Oncology [phone] [facility] [address]     Next Steps: Go in 2 week(s)      Instructions: Talked to radiation center and they will schedule f/u with patient      Kamron Gautam MD   Hematology and Oncology  Oncology  Hematology  [phone] [address] [address]     Next Steps: Follow up      Instructions: Appointment scheduled on 8/13/2020 @ 10:40am with Dr. Trimble             Discharge Medications      New Medications      Instructions Start Date   clotrimazole 10 MG lily  Commonly known as:  MYCELEX   10 mg, Oral, 3 Times Daily      dexamethasone 4 MG tablet  Commonly known as:  DECADRON   4 mg, Oral, Every 12 Hours Scheduled      levETIRAcetam 500 MG tablet  Commonly known as:  KEPPRA   500 mg, Oral, Every 12 Hours Scheduled      melatonin 1 MG tablet   1 mg, Oral, Nightly      ondansetron 8 MG tablet  Commonly known as:  ZOFRAN   8 mg, Oral, Every 8 Hours PRN      pantoprazole 40 MG EC tablet  Commonly known as:  PROTONIX   40 mg, Oral, Daily      polyethylene glycol 17 g packet  Commonly known as:  MIRALAX   17 g, Oral, Daily PRN      sennosides-docusate 8.6-50 MG per tablet  Commonly known as:  PERICOLACE   2 tablets, Oral, 2 Times Daily PRN      sulfamethoxazole-trimethoprim 800-160 MG per tablet  Commonly known as:  BACTRIM DS,SEPTRA DS   One tablet by mouth three times weekly      temozolomide 5 MG chemo capsule  Commonly known as:  TEMODAR   10 mg, Oral, Daily      temozolomide 100 MG chemo capsule  Commonly known as:  TEMODAR   100 mg, Oral, Daily         Continue These Medications      Instructions Start Date   Caltrate 600 1500 (600 Ca) MG tablet  Generic drug:  Calcium Carbonate   600 mg, Oral, Daily      Vitamin D (Cholecalciferol) 10 MCG (400 UNIT) tablet  Commonly known as:  CHOLECALCIFEROL   Oral, Daily, Unknown units          Stop These Medications    ESTROVEN PM PO     fish oil 1000 MG capsule capsule          Patient to follow up with Radiation Oncology after completes radiation therapy on any adjustment in Decadron dosing  Patient to follow up with Medical Oncology on how long to take Bactrim as goes through courses of Temodar  Continue Protonix as long as on steroids -  Decadron.  Do not stop Decadron or Keppra abruptly    >30 on discharge

## 2020-08-10 NOTE — THERAPY EVALUATION
Outpatient Occupational Therapy Neuro Initial Evaluation  [facility]     Patient Name: Denise Guajardo  : 1965  MRN: [id number]  Today's Date: 8/10/2020      Visit Date: 08/10/2020    Patient Active Problem List   Diagnosis   • Glioblastoma multiforme (CMS/HCC)   • Intracranial hemorrhage (CMS/HCC)   • Dysphagia   • Acute left-sided weakness   • Brain tumor (CMS/HCC)   • Hemiplegia (CMS/HCC)        Past Medical History:   Diagnosis Date   • Glioblastoma (CMS/HCC)    • Glioblastoma (CMS/HCC)    • S/P craniotomy    • Vertigo         Past Surgical History:   Procedure Laterality Date   • BRAIN SURGERY     • BREAST BIOPSY     • CRANIOTOMY FOR TUMOR Right 2020    Procedure: Right-sided craniotomy for excisional biopsy and, placement of lumbar drain;  Surgeon: Shai Licona MD;  Location: [facility];  Service: Neurosurgery;  Laterality: Right;   • DENTAL PROCEDURE     • TUBAL ABDOMINAL LIGATION     • WISDOM TOOTH EXTRACTION           Visit Dx:      ICD-10-CM ICD-9-CM   1. Glioblastoma multiforme (CMS/HCC) C71.9 191.9   2. Weakness of right arm R29.898 729.89       Patient History     Row Name 08/10/20 1500             History    Chief Complaint  Balance Problems;Difficulty Walking;Difficulty with daily activities;Falls/history of falls;Fatigue/poor endurance;Muscle weakness  -SG      Date Current Problem(s) Began  20  -SG      Brief Description of Current Complaint  Pt presents to [facility] with dizziness, tingling and numbness in L leg. MRI shows 2 lesions consistent with metastatic disease. Pt then undergoes right craniotomy for excisional biopsy on 2020.   -SG      Previous treatment for THIS PROBLEM  Rehabilitation Chemo  -SG      Patient/Caregiver Goals  Return to prior level of function;Improve mobility;Improve strength  -SG      Hand Dominance  right-handed  -SG         Pain     Pain at Present  0  -SG         Fall Risk Assessment    Any falls in the past year:  No  -SG         Services     Prior Rehab/Home Health Experiences  Yes  -SG      When was the prior experience with Rehab/Home Health  [facility], d/c 8/7/20  -SG      Are you currently receiving Home Health services  No  -SG      Do you plan to receive Home Health services in the near future  No  -SG         Daily Activities    Primary Language  English  -SG      Are you able to read  Yes  -SG      Are you able to write  Yes  -SG      How does patient learn best?  Reading  -SG      Teaching needs identified  Home Exercise Program;Management of Condition;Falls Prevention;Home Safety  -SG      Patient is concerned about/has problems with  Bed Mobility;Climbing Stairs;Coordination;Difficulty with self care (i.e. bathing, dressing, toileting:;Flexibility;Grasping objects lifting;Performing home management (household chores, shopping, care of dependents);Performing sports, recreation, and play activities;Reaching over head;Repetitive movements of the hand, arm, shoulder;Sitting;Standing;Transfers (getting out of a chair, bed);Walking;Writing/grasping items with hand(s)  -SG      Does patient have problems with the following?  None  -SG      Barriers to learning  Visual;Cognitive  -SG      Recommended Referrals  Speech Therapy;Physical Therapy;Occupational Therapy  -SG      Pt Participated in POC and Goals  Yes  -SG         Safety    Are you being hurt, hit, or frightened by anyone at home or in your life?  No  -SG      Are you being neglected by a caregiver  No  -SG        User Key  (r) = Recorded By, (t) = Taken By, (c) = Cosigned By    Initials Name Provider Type    Dulce Leong OTR Occupational Therapist          OT Neuro     Row Name 08/10/20 1500             Subjective Comments    Subjective Comments  Darren very involved in patient care and motivated  -SG         Precautions and Contraindications    Precautions/Limitations  fall precautions  -SG         Subjective Pain    Able to rate subjective pain?  yes  -SG      Pre-Treatment Pain  Level  0  -SG         Home Living    Living Arrangements  house  -SG      Living Environment Comment  Shower chair, grab bars  -SG         Vision - Complex Assessment    Additional Comments  Inattention to L visual field/LUE  -SG         Cognitive Assessment/Intervention    Current Cognitive/Communication Assessment  impaired  -SG      Orientation Status (Cognition)  oriented x 3  -SG      Follows Commands (Cognition)  follows one step commands;50-74% accuracy  -SG      Memory Deficit (Cognitive)  moderate deficit  -SG      Cognition Comments  Decreased processing, difficutly following commands, hand over hand needed,   -SG         Sensation    Light Touch  Severe deficits in the LUE  -SG      Sharp/Dull  Severe deficits in the LUE  -SG         Proprioception    Proprioception  Decreased awareness to LUE  -SG         Coordination    Coordination Tests  Box and Blocks;9-Hole Peg  -SG      Box and Blocks Left  Unable  -SG      9-Hole Peg Left  Unable  -SG         General ROM    RT Upper Ext  Comments  -SG      LT Upper Ext  Comment  -SG         Right Upper Ext    Rt Upper Extremity Comments   WFL  -SG         Left Upper Ext    Lt Upper Extremity Comments   Trace movement in shoulder and elbow  -SG         MMT (Manual Muscle Testing)    Rt Upper Ext  Comments  -SG      Lt Upper Ext  Comments  -SG         MMT Right Upper Ext    Rt Upper Extremity Comments   WFL  -SG         MMT Left Upper Ext    Lt Upper Extremity Comments   1/5  -SG         Tone    Tone Comments  No active tone, flaccid LUE  -SG         Transfers    Transfers, Sit-Stand [place]  minimum assist (75% patient effort);moderate assist (50% patient effort);verbal cues required;nonverbal cues required (demo/gesture)  -SG      Transfers, Stand-Sit [place]  moderate assist (50% patient effort);verbal cues required;nonverbal cues required (demo/gesture)  -SG      [place] Level (Toilet Transfer)  moderate assist (50% patient effort);maximum assist  (25% patient effort)  -SG      [place] Level (Shower Transfer)  moderate assist (50% patient effort);maximum assist (25% patient effort)  -SG         ADL Assessment/Intervention    ADL's Assessed?  Upper Body Bathing;Lower Body Bathing;Upper Body Dressing;Lower Body Dressing;Toileting;Grooming  -SG         Bathing Assessment/Intervention    Bathing [place] Level  bathing skills;moderate assist (50% patient effort)  -SG         Upper Body Dressing Assessment/Training    Upper Body Dressing [place] Level  upper body dressing skills;moderate assist (50% patient effort)  -SG         Lower Body Dressing Assessment/Training    Lower Body Dressing [place] Level  lower body dressing skills;maximum assist (25% patient effort)  -SG         Toileting Assessment/Training    [place] Level (Toileting)  toileting skills;maximum assist (25% patient effort)  -SG         Grooming Assessment/Training    [place] Level (Grooming)  grooming skills;minimum assist (75% patient effort)  -SG        User Key  (r) = Recorded By, (t) = Taken By, (c) = Cosigned By    Initials Name Provider Type    Dulce Leong, OTR Occupational Therapist                  Therapy Education  Education Details: Pt and spouse issued WB handout, AAROM/AROM, and table slides to work on at home. Encouraged to bring NMES unit.  Given: HEP  Program: New  How Provided: Verbal, Written  Provided to: Patient, Caregiver  Level of Understanding: Verbalized, Demonstrated, Teach back education performed    OT Goals     Row Name 08/10/20 1500          OT Short Term Goals    STG Date to Achieve  09/07/20  -SG     STG 1  Patient and family to be independent with gross motor coordination home exercise program.  -     STG 2  Pt. and family to be independent with UE ROM HEP.  -     STG 3  Pt. and family to be instructed in beginning LUE PROM HEP and therapy ball trunk exercises.  -SG        Long Term Goals    LTG Date to Achieve  11/02/20  -      LTG 1  Pt to improve static standing balance to min A to improve independence with ADLs.  -SG     LTG 2  Patient to use affected hand to hold grooming objects for opening containers and completing grooming tasks.  -SG     LTG 3  Pt. to use L upper extremity as stabilizer during functional tasks.  -SG     LTG 4  Pt to show improvement in L shoulder AROM to 1/4 in all planes.  -SG     LTG 5  Pt to show trace digit ROM of the L hand.   -SG     LTG 6  Tolerated WB'ing through R hand x5 min and performed functional reaching w/L UE.  -SG     LTG 7  Pt to show improvement in STM for ADL tasks as demonstrated by visual/logical sequencing task in the clinic.  -SG        Time Calculation    OT Goal Re-Cert Due Date  09/07/20  -SG       User Key  (r) = Recorded By, (t) = Taken By, (c) = Cosigned By    Initials Name Provider Type    Dulce Leong OTR Occupational Therapist                 OT Assessment/Plan     Row Name 08/10/20 1608          OT Assessment    Functional Limitations  Decreased safety during functional activities;Limitations in functional capacity and performance;Performance in leisure activities;Performance in self-care ADL;Limitation in home management;Limitations in community activities  -     Impairments  Range of motion;Motor function;Endurance;Impaired attention;Posture;Poor body mechanics;Dexterity;Coordination;Cognition;Balance;Muscle strength  -     Assessment Comments  Pt seen today for OT eval following new dx of right temporal craniotomy with excisional biopsy of mass in the right cerebral peduncle and placement of a lumbar drain. Pt presents today with significant impairments in the LUE including ROM, strength, coordination, cognition and attention. Pt shows LUE neglect and decreased scanning to the L side. She has trace movement proximal to the body in the LUE but also has severe sensory deficits that impact her functional use. She is requiring a significant amount of assist for ADLs  and functional transfers and was indep at baseline. Pt and spouse are highly motivated to improve her LUE use and would benefit from OP OT.  -SG     Please refer to paper survey for additional self-reported information  Yes  -SG     OT Diagnosis  LUE weakness, decreased ROM and attention  -SG     OT Rehab Potential  Good  -SG     Patient/caregiver participated in establishment of treatment plan and goals  Yes  -SG     Patient would benefit from skilled therapy intervention  Yes  -SG        OT Plan    OT Frequency  2x/week  -SG     Predicted Duration of Therapy Intervention (Therapy Eval)  2x/week for 3 months  -SG     Planned CPT's?  OT EVAL MOD COMPLEXITY: 77523;OT NEUROMUSC RE EDUCATION EA 15 MIN: 47531;OT SELF CARE/MGMT/TRAIN 15 MIN: 49671;OT HOT/COLD PACK;OT THER PROC EA 15 MIN: 33410AC;OT ELECTRIC STIM ATTD EA 15 MIN: 76175  -SG     Planned Therapy Interventions (Optional Details)  balance training;home exercise program;motor coordination training;strengthening;ROM (Range of Motion);postural re-education;patient/family education;neuromuscular re-education;stretching;transfer training  -SG       User Key  (r) = Recorded By, (t) = Taken By, (c) = Cosigned By    Initials Name Provider Type    Dulce Leong OTR Occupational Therapist        OT Exercises     Row Name 08/10/20 1600             Exercise 1    Exercise Name 1  ANDRÉS LANG, WB, table slides with HEP given to .   -SG      Cueing 1  Verbal;Tactile;Demo  -SG        User Key  (r) = Recorded By, (t) = Taken By, (c) = Cosigned By    Initials Name Provider Type    Dulce Leong OTR Occupational Therapist            Outcome Measure Options: AM-PAC 6 Clicks Daily Activity (OT)  9 Hole Peg  9-Hole Peg Left: Unable  How much help from another is currently needed...  Putting on and taking off regular lower body clothing?: a lot  Bathing (including washing, rinsing, and drying): a lot  Toileting (which includes using toilet bed pan or  urinal): a lot  Putting on and taking off regular upper body clothing: a lot  Taking care of personal grooming (such as brushing teeth): a lot  Eating meals: a lot  AM-PAC 6 Clicks Score (OT): 12  Box and Blocks  Box and Blocks Left: Unable         Time Calculation:   OT Start Time: 1430  OT Stop Time: 1517  OT Time Calculation (min): 47 min     Therapy Charges for Today     Code Description Service Date Service Provider Modifiers Qty    64313045813  OT EVAL MOD COMPLEXITY 2 8/10/2020 Dulce Villalpando OTR GO 1    [id number]  OT NEUROMUSC RE EDUCATION EA 15 MIN 8/10/2020 Dulce Villalpando OTR GO 1                     MARCIAL Hodge  8/10/2020

## 2020-08-10 NOTE — TELEPHONE ENCOUNTER
Patient received a letter in the mail stating she missed an appt on 07/29/20. Patient did not know she had an appt was not informed about this appt. Patient was in the hospital at the time      Patient phone # [phone]

## 2020-08-11 NOTE — PROGRESS NOTES
Case Management  Inpatient Rehabilitation Team Conference    Conference Date/Time: 8/6/2020 8:47:16 AM    Team Conference Attendees:  Dr. King Shrestha, Pharmacist  Celia Hutson, SAMUELW  Talita Hodges, PTA  Jaylene Thao, PT  Yarely Maldonado, OT  Celia Islas, SLP  Celia Verde, CTRS  Treva Bui RD, LD  Morris Dominguez, RN  Danya Graf, SARMAD    Demographics            Age: 55Y            Gender: Female    Admission Date: 7/9/2020 8:07:04 PM  Rehabilitation Diagnosis:  Crani for glioblastoma  Past Medical History: Past Medical History:  DiagnosisDate  ?Vertigo?    ?  Surgical?History  Past Surgical History:  ProcedureLateralityDate  ?BREAST BIOPSY??  ?DENTAL PROCEDURE??  ?TUBAL ABDOMINAL LIGATION??  ?WISDOM TOOTH EXTRACTION??    ?      Plan of Care  Anticipated Discharge Date/Estimated Length of Stay: ELOS: DC 8/7  Anticipated Discharge Destination: Community discharge with assistance  Discharge Plan : Patient lives with  in 2 story home with one step entry.   Bedroom and bath on first floor.  Family conference held on 7/31 with patient,  and sons. Family teaching  done with son and .  D/C plan is home with  and intermittent assist from sons.  [facility]  Radiation treatment M[facility]F [facility]  Medical Necessity Expected Level Rationale: Goals are indeterminate  Rehabilitation prognosis indeterminate.  Medical prognosis defer to oncology.  Intensity and Duration: an average of 3 hours/5 days per week  Medical Supervision and 24 Hour Rehab Nursing: x  Physical Therapy: x  PT Intensity/Duration: 1 hour/day, 5 days/week for approximately 15 - 20 days  Occupational Therapy: x  OT Intensity/Duration: 1 hour/day, 5 days/week for approximately 15 - 20 days  Speech and Language Therapy: x  SLP Intensity/Duration: 1 hour/day, 5 days/week for approximately 15 - 20 days  Social Work: x  Therapeutic Recreation: x  Psychology: x  Updated (if changes  indicated)    Anticipated Discharge Date/Estimated Length of Stay:   ELOS: DC 8/7    Based on the patient's medical and functional status, their prognosis and  expected level of functional improvement is: Goals are indeterminate  Rehabilitation prognosis indeterminate.  Medical prognosis defer to oncology.      Interdisciplinary Problem/Goals/Status    All Rehab Problems:  Body Systems    [RN] Integumentary(Active)  Current Status(08/05/2020): Pt has R scalp incision, which is glued and TIFFANIE w no  drainage. Pt has lumbar puncture site which is closed/healed and TIFFANIE. Pt has  rash-like area to january area which is improving.  Weekly Goal(08/11/2020): Incisions will be free of s/s infection and buttocks  will be kept clean and dry.  Discharge Goal: Incisions will be closed/healed and excoriation to buttocks will  be resolved. Skin will show no further s/s breakdown.        Cognition    [ST] Memory(Active)  Current Status(08/05/2020): min cues for longer, more complex info/recall  Weekly Goal(08/12/2020): Pt will recall 3 errands after 20 minutes with NO cues.  Discharge Goal: Imporved short-term memory for home environment.    [ST] Executive Functions(Active)  Current Status(08/05/2020): needs exta time for procesisng; can be somewhat  impulsive w/responses at times; min to indep for simple organizational,  sequencing tasks; min cues- math, memory, thought flexibility, reasoning  Weekly Goal(08/12/2020): follow schedule indep  Discharge Goal: fxnl congition for home wiht assist        Communication    [ST] Expression(Active)  Current Status(08/05/2020): Pt now requires MIN-NO cues for high-level word  finding tasks. Pt requires NO cues to use voice in conversation. Presents with  mild dysphonia due to reduced breath support.  Weekly Goal(08/07/2020): Pt perform high-level word finding tasks with NO cues.  Discharge Goal: Improved word finding skills for conversation.        Mobility    [OT] Toilet  Transfers(Active)  Current Status(08/05/2020): MIN  Weekly Goal(08/07/2020): MIN  Discharge Goal: Min A    [OT] Tub/Shower Transfers(Active)  Current Status(08/05/2020): MIN  Weekly Goal(08/07/2020): MIN  Discharge Goal: MIN    [PT] Bed Mobility(Active)  Current Status(08/05/2020): CGA  Weekly Goal(08/07/2020): CGA  Discharge Goal: CGA    [PT] Bed/Chair/Wheelchair(Active)  Current Status(08/05/2020): Dolores w vc's  Weekly Goal(08/07/2020): CGA/Min  Discharge Goal: CGA/Min    [PT] Car Transfers(Active)  Current Status(08/05/2020): Dolores  Weekly Goal(08/07/2020): PT only  Discharge Goal: Dolores    [PT] Walk(Active)  Current Status(08/05/2020): 40 ft hemiwx,L AFO, Mod x 1,w/c close behind.  Weekly Goal(08/07/2020): PT only  Discharge Goal: Mod x 1 30 ft hemiwx.Pnt may not be functional ambulater at  home.        Nutrition    [RN] Nutrition Management(Active)  Current Status(08/05/2020): Pt now on regular diet w thin liquids.  Weekly Goal(08/11/2020): pt tolerating PO diet  Discharge Goal: Pt will receive adequate nutrition.        Psychosocial    [RN] Coping/Adjustment(Active)  Current Status(08/05/2020): Pt is at risk for reduced coping r/t recent  diagnosis, craniotomy, and new deficits. Pt has supportive family.  Weekly Goal(08/11/2020): Pt will verbalize feelings, concerns, needs to staff as  needed.  Discharge Goal: Pt will exhibit adequate coping strategies.        Safety    [RN] Potential for Injury(Active)  Current Status(08/05/2020): Patient has had recent craniotomy and has new  physical limitations. She is alert and oriented x3-4.   Pt slow to  process/respond at times.  Weekly Goal(08/11/2020): Pt will use call light appropriately as needed.  Discharge Goal: Pt  will remain free from falls and injury. Pt and  aware  of fall/safety in the home setting.        Self Care    [OT] Bathing(Active)  Current Status(08/05/2020): MIN  Weekly Goal(08/07/2020): MIN  Discharge Goal: MIN    [OT] Dressing  (Lower)(Active)  Current Status(08/05/2020): MOD  Weekly Goal(08/07/2020): MOD  Discharge Goal: MOD    [OT] Dressing (Upper)(Active)  Current Status(08/05/2020): MOD  Weekly Goal(08/07/2020): MOD  Discharge Goal: MOD    [OT] Grooming(Active)  Current Status(08/05/2020): MIN  Weekly Goal(08/07/2020): MIN  Discharge Goal: MIN    [OT] Toileting(Active)  Current Status(08/05/2020): MOD  Weekly Goal(08/07/2020): MOD  Discharge Goal: MOD        Sphincter Control    [RN] Bladder Management(Active)  Current Status(08/05/2020): Bernabe catheter removed 7/11/20. Pt mostly continent  of urine now.  Weekly Goal(08/11/2020): Continent 100%.  Discharge Goal: Pt will be continent of urine.    [RN] Bowel Management(Active)  Current Status(08/05/2020): Pt is  continent and incontinent of stool . Recent  frequent BMs.  Weekly Goal(08/11/2020): Pt will be continent 100% with BM at least q3 days.  Discharge Goal: Pt  will be continent of stool.        Swallow Function    [ST] Swallowing(Active)  Current Status(08/05/2020): Pt is tolerating regular with thins  Weekly Goal(08/12/2020): tolerate reg diet without s/s aspiration  Discharge Goal: Pt will tolerate the least restrictive diet with supervision.        Comments: 7/16: meeting with oncology today to discuss treatment plans,  prognosis, etc.; fatigued at times, will need scheduled rest breaks; left  inattention, right eye ptosis; VFSS today; trial TF boluses today via pump -  patient c/o hunger;    7/23: impulsive at times; therapies doubtful that patient will be functional  ambulator at home; poor insight; patient wants to discharge prior to birthday  next Wednesday, team to discuss with patient and spouse;    7/30: not likely to be a functional ambultor at home; family conf tomorrow;  spouse pushing for patient to stay as long as possible;    8/6: to receive radiation prior to discharge;    Signed by: Morris Dominguez RN    Physician CoSigned By: King Morris 08/10/2020 20:31:25

## 2020-08-13 PROBLEM — G93.6 VASOGENIC BRAIN EDEMA (HCC): Status: ACTIVE | Noted: 2020-01-01

## 2020-08-13 NOTE — THERAPY TREATMENT NOTE
Outpatient Occupational Therapy Neuro Treatment Note  [facility]     Patient Name: Denise Guajardo  : 1965  MRN: [id number]  Today's Date: 2020       Visit Date: 2020    Patient Active Problem List   Diagnosis   • Glioblastoma multiforme (CMS/HCC)   • Intracranial hemorrhage (CMS/HCC)   • Dysphagia   • Acute left-sided weakness   • Brain tumor (CMS/HCC)   • Hemiplegia (CMS/HCC)   • Vasogenic brain edema (CMS/HCC)        Past Medical History:   Diagnosis Date   • Anxiety    • Glioblastoma (CMS/HCC)    • History of hemiplegia    • S/P craniotomy    • Vertigo         Past Surgical History:   Procedure Laterality Date   • BRAIN SURGERY     • BREAST BIOPSY     • CRANIOTOMY FOR TUMOR Right 2020    Procedure: Right-sided craniotomy for excisional biopsy and, placement of lumbar drain;  Surgeon: Shai Licona MD;  Location: [facility];  Service: Neurosurgery;  Laterality: Right;   • DENTAL PROCEDURE     • TUBAL ABDOMINAL LIGATION     • WISDOM TOOTH EXTRACTION           Visit Dx:    ICD-10-CM ICD-9-CM   1. Glioblastoma multiforme (CMS/HCC) C71.9 191.9   2. Weakness of right arm R29.898 729.89                   OT Assessment/Plan     Row Name 20 1449          OT Assessment    Assessment Comments  Pt seen for first treatment session following initial eval this date, pt demos good participation with interventions to improve LUE function despite fatigue due to multiple appointments this date. Discussed with pt and  the possibility of decreasing therapy frequency as pt with multiple medical appointments at this time.  -MR       User Key  (r) = Recorded By, (t) = Taken By, (c) = Cosigned By    Initials Name Provider Type    Jessica Vela, OT Occupational Therapist                     Therapy Education  Given: HEP  Program: Reinforced(table slides for LUE; pt encouraged to bring home NMES unit to next therapy session)  How Provided: Verbal, Demonstration  Provided to:  Patient  Level of Understanding: Teach back education performed, Verbalized, Demonstrated    Modalities     Row Name 08/13/20 1300             ELECTRICAL STIMULATION    Attended/Unattended  Attended  -MR      Stimulation Type  FES  -MR      Max mAmp  6.5  -MR      Location/Electrode Placement/Other  NMES applied to LUE forearm extensors for facilitation of L hand and wrist.  -MR        User Key  (r) = Recorded By, (t) = Taken By, (c) = Cosigned By    Initials Name Provider Type    Jessica Vela, OT Occupational Therapist        OT Exercises     Row Name 08/13/20 1400             Subjective Comments    Subjective Comments  Pt/ report pt is very fatigued today following other appointments earlier this morning.  -MR         Subjective Pain    Able to rate subjective pain?  yes  -MR         Exercise 1    Exercise Name 1  OT assists pt with table slide exercises to facilitate movement with LUE. Performed various movement patterns including shoulder flexion, horizontal ab/adduction, internal/external rotation.  -MR      Cueing 1  Verbal;Tactile;Demo  -MR         Exercise 2    Exercise Name 2  While NMES applied to LUE forearm, pt performs grasp and release activity transferring cones across table surface. OT initially provides hand over hand assist, and facilitate movement with L shoulder. Pt noted with trace digit flexion during activity when given cues for increased attention to L side. Multiple reps completed.  -MR      Cueing 2  Verbal;Demo  -MR        User Key  (r) = Recorded By, (t) = Taken By, (c) = Cosigned By    Initials Name Provider Type    Jessica Vela, OT Occupational Therapist                      Time Calculation:   OT Start Time: 1350  OT Stop Time: 1430  OT Time Calculation (min): 40 min  Total Timed Code Minutes- OT: 40 minute(s)     Therapy Charges for Today     Code Description Service Date Service Provider Modifiers Qty    [id number]  OT NEUROMUSC RE EDUCATION EA 15 MIN  8/13/2020 Jessica Watt, OT GO 3              Patient was wearing a face mask during this therapy encounter. Therapist used appropriate personal protective equipment including mask and eye protection.  Mask used was standard procedure mask. Appropriate PPE was worn during the entire therapy session. Hand hygiene was completed before and after therapy session.             Jessica Watt, THAO  8/13/2020

## 2020-08-13 NOTE — PROGRESS NOTES
Physician Weekly Management Note    Diagnosis:     1. Glioblastoma multiforme (CMS/HCC)       Reason for Visit: Radiation (16/30)    Concurrent Chemo:       Notes on Treatment course, Films, Medical progress and Plan:  Still better. Some fatigue but doing so much more each day. Language without issue now.  Continues on steroid. Denies headache, pain. Will continue.    Performance Status:  (2) Ambulatory and capable of self care, unable to carry out work activity, up and about > 50% or waking hours    ROS -  Other than those listed above, as follows:  Constitutional - Normal - no complaints of fatigue, denies lack of appetite, fever, night sweats or change in weight.  Neck - Normal - denies neck masses, muscle weakness, neck pain, decreased range of motion or swelling.  Cardiovascular - Normal - denies arrhythmias, chest pain, dyspnea, edema, orthopnea or palpitations.  Respiratory - Normal - denies cough, dyspnea, hemoptysis, hiccoughs, pleuritic chest pain or wheezing.  Gastrointestinal - Normal - no complaints of constipation, abdominal pain, diarrhea, heartburn/dyspepsia, hematemesis, hemorrhoids, melena or GI bleeding, nausea, pain or cramping or vomiting.  Neuro - Normal - no new complaints of vision change, headache, nausea, cranial nerve deficit, gait abnormality, syncope, seizure.    PHYSICAL EXAM - Other than changes listed above, as follows:  There were no vitals filed for this visit.    Constitutional - Normal - no evidence of impaired alertness, inadequate appearance, premature or advanced chronologic age, uncooperativeness, altered mood, affect or disorientation.  Neck - Normal - no evidence of tender or enlarged lymph nodes, neck abnormalities, restricted range of motion or enlarged thyroid.  Chest - Normal - no evidence of chest abnormalities, tender or enlarged lymph nodes.  Respiratory - Normal - no evidence of abnormal breat sounds, chest abnormalities on palpation and chest abnormalities on  "percussion and normal breath sounds.  Hematologic/Lymphatic - Normal - no evidence of tender or enlarged axillary lymph nodes nor tender or enlarged neck nodes.  Neuro - Normal - no evidence of cranial nerve deficit, gait abnormality.    Problem added:  No problems updated.  Medications added: No orders of the defined types were placed in this encounter.    Ancillary referrals made: No orders of the defined types were placed in this encounter.      Technical aspects reviewed:  Weekly OBI approved if applicable? Yes   Weekly port films approved?  Yes  Change requests noted if applicable?  Yes   Patient setup and plan reviewed?  Yes     Chart Reviewed:  Continue current treatment plan?  Yes  Treatment plan change requested? No    I have reviewed and marked as \"reviewed\" the current medications, allergies and problem list in the patients EMR.    I have reviewed the patient's medical, surgical  history in detail, reviewed any pertinent lab work  and updated the computerized patient record if needed.    Patient's Care Team:  Patient Care Team:  Paz Mera MD as PCP - General (Family Medicine)  Carlota Peacock MD as Consulting Physician (Radiation Oncology)  John Trimble MD as Consulting Physician (Hematology and Oncology)      "

## 2020-08-13 NOTE — PROGRESS NOTES
REASON FOR FOLLOWUP/CHIEF COMPLAINT:  Glioblastoma OF THE PROTUBERANCE DENSE LEFT BODY PARALYSIS, WHEEL CHAIR BOUND TOTAL CARE BY , UNDERGOING RADIATION AND TEMODAR.    HISTORY OF PRESENT ILLNESS: PATIENT WAS CALLED THE DAY BEFORE BY THE OFFICE TO ASK FOR SYMPTOMS THAT COULD BE CONSISTENT WITH CORONAVIRUS INFECTION, AND BEING NEGATIVE WAS SCHEDULED TO BE SEEN IN THE OFFICE TODAY. SIMILAR QUESTIONING TODAY INCLUDING, CHILLS, FEVER, NEW COUGH, SHORTNESS OF BREATH, DIARRHEA,DIFFUSE BODY ACHES  AND CHANGES IN SMELL OR TASTE WERE NEGATIVE.THE PATIENT DENIED ANY CONTACT WITH PERSONS WHO WERE POSITIVE FOR COVID, AND PATIENT IS NOT IN CATEGORY OF HIGH RISK BEHAVIOR TO ACQUIRE COVID.    DURING THE VISIT WITH THE PATIENT TODAY , PATIENT HAD FACE MASK, MY MEDICAL ASSISTANT AND I  HAD PROPPER PROTECTIVE EQUIPMENT, AND I DID HAND HYGIENE WITH SOAP AND WATER BEFORE AND AFTER THE VISIT.    This patient returns today to the office in company of her . She is in a wheelchair but it is amazing for me to see the difference in regard to her language. Her ability to express herself, her ability to move her left lower extremity and her positive attitude towards life is something that is to die for. The patient has been at home now for several weeks after being discharged from physical therapy but she continues going outpatient to continue her treatment. She is undergoing radiation therapy as well as Temodar treatment on regular schedule. She still has 3 weeks of radiation therapy to go. So far tolerance to the Temodar is excellent with no nausea or vomiting and she has not developed so far any hematological or liver toxicity. Her appetite is terrific. She has no difficulty swallowing and she is eating a regular diet. She thinks that she is eating like a hog. Her bowel activity is normal. She is controlling her sphincter with no difficulty and urination is normal. She is not experiencing any pain or headache. The  mobility of the left lower extremity has improved. Minimal mobility in the left upper extremity. No seizure activity. Her language has improved. The patient has not had any difficulty with sleeping. She has not had any other abnormal deficit. She has not had any falls even though she wants to stand up and walk.          Past Medical History   Past Medical History:   Diagnosis Date   • Anxiety    • Glioblastoma (CMS/HCC)    • History of hemiplegia    • S/P craniotomy    • Vertigo      Social History     Socioeconomic History   • Marital status:      Spouse name: Malick   • Number of children: Not on file   • Years of education: Not on file   • Highest education level: Not on file   Occupational History     Employer: [id number]   Tobacco Use   • Smoking status: Never Smoker   • Smokeless tobacco: Never Used   Substance and Sexual Activity   • Alcohol use: Yes     Comment: Socially   • Drug use: No   • Sexual activity: Yes     Partners: Male     Birth control/protection: Post-menopausal     Family History   Problem Relation Age of Onset   • Ovarian cancer Maternal Aunt    • Breast cancer Neg Hx    • Uterine cancer Neg Hx    • Colon cancer Neg Hx    • Deep vein thrombosis Neg Hx    • Pulmonary embolism Neg Hx    No Known Allergies   Current Outpatient Medications on File Prior to Visit   Medication Sig   • Calcium Carbonate (Caltrate 600) 1500 (600 Ca) MG tablet Take 600 mg by mouth Daily.   • clotrimazole (MYCELEX) 10 MG lily Dissolve 1 tablet by mouth 3 (Three) Times a Day.   • dexamethasone (DECADRON) 4 MG tablet Take 1 tablet by mouth Every 12 (Twelve) Hours.   • HYDROcodone-acetaminophen (NORCO) 5-325 MG per tablet    • levETIRAcetam (KEPPRA) 500 MG tablet Take 1 tablet by mouth Every 12 (Twelve) Hours.   • melatonin 1 MG tablet Take 1 tablet by mouth Every Night.   • ondansetron (ZOFRAN) 8 MG tablet Take 1 tablet by mouth Every 8 (Eight) Hours As Needed for Nausea or Vomiting.   •  pantoprazole (PROTONIX) 40 MG EC tablet Take 1 tablet by mouth Daily.   • polyethylene glycol (MIRALAX) 17 g packet Take 17 g by mouth Daily As Needed (No BM in 2 days).   • sennosides-docusate (PERICOLACE) 8.6-50 MG per tablet Take 2 tablets by mouth 2 (Two) Times a Day As Needed for Constipation.   • sulfamethoxazole-trimethoprim (BACTRIM DS,SEPTRA DS) 800-160 MG per tablet One tablet by mouth three times weekly  Indications: PROPHYLAXIS   • temozolomide (TEMODAR) 100 MG chemo capsule Take 1 capsule by mouth with 1 other temozolomide prescription for 110 mg total Daily.   • temozolomide (TEMODAR) 5 MG chemo capsule Take 2 capsules by mouth with 1 other temozolomide prescription for 110 mg total Daily.   • Vitamin D, Cholecalciferol, (CHOLECALCIFEROL) 10 MCG (400 UNIT) tablet Take  by mouth Daily. Unknown units     No current facility-administered medications on file prior to visit.        :  General: no fever, no chills,  fatigue,no weight changes, no lack of appetite.  Eyes: no epiphora, xerophthalmia,conjunctivitis, pain, glaucoma, blurred vision, blindness, secretion, photophobia, proptosis, diplopia.  Ears: no otorrhea, tinnitus, otorrhagia, deafness, pain, vertigo.  Nose: no rhinorrhea, no epistaxis, no alteration in perception of odors, no sinuses pressure.  Mouth: no alteration in gums or teeth,  No ulcers, no difficulty with mastication or deglut ion, no odynophagia.  Neck: no masses or pain, no thyroid alterations, no pain in muscles or arteries, no carotid odynia, no crepitation.  Respiratory: no cough, no sputum production,no dyspnea,no trepopnea, no pleuritic pain,no hemoptysis.  Heart: no syncope, no irregularity, no palpitations, no angina,no orthopnea,no paroxysmal nocturnal dyspnea.  Vascular Venous: no tenderness,no edema,no palpable cords,no postphlebitic syndrome, no skin changes no ulcerations.  Vascular Arterial: no distal ischemia, noclaudication, no gangrene, no neuropathic ischemic pain, no  "skin ulcers, no paleness no cyanosis.  GI: no dysphagia, no odynophagia, no regurgitation, no heartburn,no indigestion,no nausea,no vomiting,no hematemesis ,no melena,no jaundice,no distention, no obstipation,no enterorrhagia,no proctalgia,no anal  lesions, no changes in bowel habits.  : no frequency, no hesitancy, no hematuria, no discharge,no  pain.  Musculoskeletal: no muscle or tendon pain or inflammation,no  joint pain, no edema, no functional limitation,no fasciculations, no mass.  Neurologic: no headache, no seizures, STATED alterations on Craneal nerves, dense left side motor deficit, and sensory deficit, poor coordination, no alteration in memory,normal orientation, calculation,normal writting, verbal and written language.  Skin: no rashes,no pruritus no localized lesions.  Psychiatric: no anxiety, no depression,no agitation, no delusions, proper insight.    Vitals:    08/13/20 1053   BP: 143/88   Pulse: 86   Resp: 16   Temp: 97.7 °F (36.5 °C)   TempSrc: Temporal   SpO2: 99%   Weight: 44.9 kg (99 lb)  Comment: cannot stand.  last hopsital weight   Height: 165.1 cm (65\")   PainSc: 0-No pain     EXAM:  I HAVE PERSONALLY REVIEWED THE HISTORY OF THE PRESENT ILLNESS, PAST MEDICAL HISTORY, FAMILY HISTORY, SOCIAL HISTORY, ALLERGIES, MEDICATIONS STATED ABOVE IN THE OFFICE NOTE FROM TODAY.        GENERAL:  Well-developed, well-nourished  Patient  in no acute distress.IN A WHEEL CHAIR   SKIN:  Warm, dry ,NO rashes,NO purpura ,NO petechiae.  HEENT:  Pupils were equal and reactive to light and accomodation, conjunctivas non injected, no pterigion, normal extraocular movements, normal visual acuity. DROPPED R UPPER LID.  NECK:  Supple with good range of motion; no thyromegaly or masses, no JVD or bruits, no cervical adenopathies.No carotid arteries pain, no carotid abnormal pulsation , NO arterial dance.  LYMPHATICS:  No cervical, NO supraclavicular, NO axillary,NO epitrochlear , NO inguinal adenopathy.  CARDIAC   " normal rate and regular rhythm, without murmur,NO rubs NO S3 NO S4 right or left . Normal femoral, popliteal, pedis, brachial and carotid pulses.  VASCULAR ARTERIAL: normal carotids,brachial,radial,femoral,popliteal, pedis pulses , no bruits.no paleness or cyanosis, no pain, no edema, no numbness, no gangrene.  VASCULAR VENOUS: no cyanosis, collateral circulation, varicosities, edema, palpable cords, pain, erythema.  ABDOMEN:  Soft, nontender with no hepatomegaly, no splenomegaly,no masses, no ascites, no collateral circulation,no distention,no Barron sign, no abdominal pain, no inguinal hernias,no umbilical hernia, no abdominal bruits. Normal bowel sounds.  GENITAL: Not  Performed.  EXTREMITIES  AND SPINE:  No clubbing, cyanosis or edema, no deformities or pain .No kyphosis, scoliosis, deformities or pain in spine, ribs or pelvic bone.  NEUROLOGICAL:  Patient was awake, alert, oriented to time, person and place.CRANIAL NERVES:  TASTE AND TEMPERATURE DISCRIMINATION IN MOUTH WAS NORMAL. FACE WAS ASYMMETRIC, NORMAL SYMMETRIC SENSORY MODALITIES FOR 3 BRANCHES OF TRIGEMINAL NERVE.ABNORMAL GESTICULATION OF THE FACE MUSCULATURE. NORMAL ELEVATION OF LEFT UPPER LID. NORMAL VISION.  MOTOR EXAM: SYMMETRIC PROXIMAL AND DISTAL STRENGTH IN UPPER AND LOWER R SIDE EXTREMITIES.   LONG TRACK:TOES DOWN GOING, left  BABINSKI, dense left body paralysis  REFLEXES:left  BICIPITAL, TRICIPITAL, PATELLAR AND ACHILLES SYMMETRIC AND 2+.  MUSCLE TONE: NO RIGIDITY , NO SPASTICITY, NO COGWHEEL.  SENSORY MODALITIES: TOUCH, THERMAL, POINT DISCRIMINATION , VIBRATORY SENSATION NORMAL BILATERALLY.  BALANCE AND COORDINATION: DENSE LEFT BODY PARALYSIS  ABNORMAL MOVEMENTS: NO TREMOR, NO SEIZURES, NO TICS, NO FASCICULATIONS.  MENINGES: NO KERNING, NO BRUDZINSKI, SOFT SUPPLE NECK        RECENT LABS:  WBC   Date Value Ref Range Status   08/13/2020 10.00 3.40 - 10.80 10*3/mm3 Final     Hemoglobin   Date Value Ref Range Status   08/13/2020 15.2 12.0 - 15.9  g/dL Final     Platelets   Date Value Ref Range Status   08/13/2020 183 140 - 450 10*3/mm3 Final     Comprehensive Metabolic Panel   Order: [id number]   Status:  Final result   Visible to patient:  No (Not Released)   Next appt:  08/14/2020 at 03:50 PM in Radiation Oncology ( [id number])   Dx:  Glioblastoma multiforme (CMS/HCC)   Specimen Information: Blood        Component  Ref Range & Units 10:36   Glucose  74 - 124 mg/dL 129High     BUN  6 - 20 mg/dL 20    Creatinine  0.60 - 1.10 mg/dL 0.69    Sodium  134 - 145 mmol/L 136    Potassium  3.5 - 4.7 mmol/L 4.8High     Chloride  98 - 107 mmol/L 98    CO2  22.0 - 29.0 mmol/L 28.8    Calcium  8.5 - 10.2 mg/dL 9.4    Total Protein  6.3 - 8.0 g/dL 6.4    Albumin  3.50 - 5.20 g/dL 4.10    ALT (SGPT)  0 - 33 U/L 27    AST (SGOT)  0 - 32 U/L 17    Alkaline Phosphatase  38 - 116 U/L 64    Total Bilirubin  0.2 - 1.2 mg/dL 0.5    eGFR Non African Amer  >60 mL/min/1.73 88    Globulin  1.8 - 3.5 gm/dL 2.3    A/G Ratio  1.1 - 2.4 g/dL 1.8    BUN/Creatinine Ratio  7.3 - 30.0 29.0    Anion Gap  5.0 - 15.0 mmol/L 9.2    Resulting Agency  [facility]         Specimen Collected: 08/13/20 10:36 Last Resulted: 08/13/20 11:38                         ASSESSMENT/PLAN:This patient has a glioblastoma multiforme, World Health Organization, grade 4, in the chilango on the right side associated with brain edema. This triggers very dense body paralysis on the left side. On the other hand comparing this patient with how she was 3 or 4 weeks ago at the rehab unit at [facility] and how she is now, she is like day and night. Her speech has improved 90%. She is enjoying her food. She has been able to swallow with no difficulties. She is controlling sphincters. She has not had any seizure activity. She has no pain. She has started to gain some movement in the left lower extremity, especially in the quadriceps. The movement in the left upper extremity is very limited. Most of the motion is  triggered by shoulder activity more than anything else. The right body activity is normal. 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     This patient has a tremendous desire for life. She implies darryn and happiness to anybody and is amazing to see this push for her to get better. Her treatment, radiation and chemotherapy are not affecting her in a negative way besides the mask on her face that bothers her time to time. Temodar has not produced any nausea or vomiting, neither hematological or liver toxicity according to hematological and chemistry profile stated above. So far the patient is almost MCFP through her treatment and she still has another 3 weeks to go. She already has another MRI of the brain scheduled by Dr. Licona in a couple of weeks.     From the point of view of her medications, her steroids remain about the same. The Temodar remains the same and the radiation treatment will remain ongoing. I pointed out to her and her  that she has to be extremely careful in regard to transfers to minimize potential for fall. The  is very strong. She is very skinny and he is not having any difficulties with this process.     I would like to review her back in 4-5 weeks and by then she should be almost done with her part of the chemotherapy treatment induction and the radiation therapy. Eventually, 5-6 weeks after completion of her radiation treatment and chemotherapy, we will proceed with another MRI of the brain. If she continues having the clinical evolution that she has and radiologically she continues improving, the patient will be advised to undergo therapy with Temodar 5 days out of the month for the  next year or so.     We will continue monitoring blood counts and chemistry profile in the office in a couple of weeks.     The patient will be seen by nurse practitioner in 1 month.     The desire for life in this patient is more than amazing and I think we need to provide her the support that we can in order to give her as much time as we can under the present circumstances.     The patient’s  and the patient’s children are aware of the dark cloud that eventually will happen at some point in the future upon progression. How that is going to happen and how will be the degree of response that she has remains to be seen, but comparing from now to 3 weeks ago is like day and night.    I DISCUSSED WITH PATIENT IN DETAIL FORMS TO DECREASE CHANCES OF CORONAVIRUS INFECTION INCLUDING ISOLATION, PROPER HAND HIGIENE, AVOID PUBLIC PLACES  WITH CROWDS, FOLLOW  CDC RECOMENDATIONS, AND KEEP PERSONAL AND SOCIAL RESPONSIBILITY, WARE A MASK IN PUBLIC PLACES.  PATIENT IS AWARE THIS INFECTION COULD HAVE SEVERE CONSEQUENCES TO PERSONAL HEALTH AND FAMILY RAMIFICATIONS OF THIS.

## 2020-08-17 NOTE — THERAPY EVALUATION
.Outpatient Physical Therapy Neuro Initial Evaluation  [facility]     Patient Name: Denise Guajardo  : 1965  MRN: [id number]  Today's Date: 2020      Visit Date: 2020    Patient Active Problem List   Diagnosis   • Glioblastoma multiforme (CMS/HCC)   • Intracranial hemorrhage (CMS/HCC)   • Dysphagia   • Acute left-sided weakness   • Brain tumor (CMS/HCC)   • Hemiplegia (CMS/HCC)   • Vasogenic brain edema (CMS/HCC)        Past Medical History:   Diagnosis Date   • Anxiety    • Glioblastoma (CMS/HCC)    • History of hemiplegia    • S/P craniotomy    • Vertigo         Past Surgical History:   Procedure Laterality Date   • BRAIN SURGERY     • BREAST BIOPSY     • CRANIOTOMY FOR TUMOR Right 2020    Procedure: Right-sided craniotomy for excisional biopsy and, placement of lumbar drain;  Surgeon: Shai Licona MD;  Location: [facility];  Service: Neurosurgery;  Laterality: Right;   • DENTAL PROCEDURE     • TUBAL ABDOMINAL LIGATION     • WISDOM TOOTH EXTRACTION           Visit Dx:     ICD-10-CM ICD-9-CM   1. Glioblastoma (CMS/HCC) C71.9 191.9   2. Left hemiparesis (CMS/HCC) G81.94 342.90   3. Functional gait abnormality R26.89 781.2       Patient History     Row Name 20 1345             History    Chief Complaint  Balance Problems;Difficulty Walking;Difficulty with daily activities;Falls/history of falls;Fatigue/poor endurance;Muscle weakness  -      Date Current Problem(s) Began  20  -LB      Brief Description of Current Complaint  Pt presents to [facility] with dizziness, tingling and numbness in L leg. MRI shows 2 lesions consistent with metastatic disease. Pt then undergoes right craniotomy for excisional biopsy on 2020.  Pt has left hemiparesis. Pt currently is undergoing radiation daily and chemotherapy daily (orally).    -LB      Previous treatment for THIS PROBLEM  Rehabilitation;Other (comment) undergoing chemo and radiation   -LB      Patient/Caregiver Goals  Return  "to prior level of function;Improve mobility;Improve strength \"Getting back to normal life.\"   -LB      Hand Dominance  right-handed  -LB      Surgery/Hospitalization  craniotomy 7/1/20   -LB         Pain     Pain at Present  0  -LB         Fall Risk Assessment    Any falls in the past year:  No  -LB         Services    Prior Rehab/Home Health Experiences  Yes  -LB      Are you currently receiving Home Health services  No  -LB      Do you plan to receive Home Health services in the near future  No  -LB         Daily Activities    Primary Language  English  -LB      Are you able to read  Yes  -LB      Are you able to write  Yes  -LB      How does patient learn best?  Reading  -LB      Teaching needs identified  Home Exercise Program;Management of Condition;Falls Prevention;Home Safety  -LB      Patient is concerned about/has problems with  Bed Mobility;Climbing Stairs;Coordination;Difficulty with self care (i.e. bathing, dressing, toileting:;Flexibility;Grasping objects lifting;Performing home management (household chores, shopping, care of dependents);Performing sports, recreation, and play activities;Reaching over head;Repetitive movements of the hand, arm, shoulder;Sitting;Standing;Transfers (getting out of a chair, bed);Walking;Writing/grasping items with hand(s)  -LB      Does patient have problems with the following?  None  -LB      Barriers to learning  Visual;Cognitive  -LB      Recommended Referrals  Speech Therapy;Physical Therapy;Occupational Therapy  -LB      Pt Participated in POC and Goals  Yes  -LB         Safety    Are you being hurt, hit, or frightened by anyone at home or in your life?  No  -LB      Are you being neglected by a caregiver  No  -LB        User Key  (r) = Recorded By, (t) = Taken By, (c) = Cosigned By    Initials Name Provider Type    Talita Ferro, PT Physical Therapist              PT Neuro     Row Name 08/17/20 2032             Precautions and Contraindications    " Precautions/Limitations  fall precautions  -LB      Precautions  left UE flaccid , moves quickly with functional activities   -LB         Subjective Pain    Able to rate subjective pain?  yes  -LB      Pre-Treatment Pain Level  0  -LB      Post-Treatment Pain Level  0  -LB         Home Living    Living Arrangements  house  -LB      Home Accessibility  wheelchair accessible  -LB      Home Equipment  Grab bars;Other (Comment) jakub-walker, shower chair   -LB      Living Environment Comment  Pt lives with her .  -LB         Vision-Basic Assessment    Patient Visual Report  Other (Comment) left neglect   -LB         Cognition    Memory  Appears intact  -LB      Orientation Level  Oriented to situation;Oriented to person;Oriented to place  -LB      Safety Judgment  Good awareness of safety precautions  -LB         Proprioception    Proprioception  intact in L LE   -LB         Posture/Observations    Posture/Observations Comments  Pt arrived to PT via wheelchair with her AFO in place. In sitting trunk in neutral. In standing pt shifted strongly to the right.   -LB         General ROM    GENERAL ROM COMMENTS  PROM WNL L LE, AROM WNL R LE   -LB         MMT (Manual Muscle Testing)    Lt Upper Ext  -- no functional movement noted with transfers or bed mobility   -LB      Rt Lower Ext  Rt Hip WNL;Rt Knee WNL;Rt Ankle WNL  -LB      Lt Lower Ext  Lt Hip Flexion;Lt Hip Extension;Lt Hip ABduction;Lt Knee Extension;Lt Knee Flexion;Lt Ankle Plantarflexion;Lt Ankle Dorsiflexion;Lt Ankle Subtalar Inversion;Lt Ankle Subtalar Eversion  -LB         MMT Left Lower Ext    Lt Hip Flexion MMT, Gross Movement  (2+/5) poor plus  -LB      Lt Hip Extension MMT, Gross Movement  other (see comments) minimal resistance in supine   -LB      Lt Hip ABduction MMT, Gross Movement  (2/5) poor  -LB      Lt Knee Extension MMT, Gross Movement  (3-/5) fair minus  -LB      Lt Knee Flexion MMT, Gross Movement  (2-/5) poor minus  -LB      Lt Ankle  Plantarflexion MMT, Gross Movement  (0/5) zero  -LB      Lt Ankle Dorsiflexion MMT, Gross Movement  (0/5) zero  -LB      Lt Ankle Subtalar Inversion MMT, Gross Movement  (0/5) zero  -LB      Lt Ankle Subtalar Eversion MMT, Gross Movement  (0/5) zero  -LB         Tone    UE Tone  left:;Hypotonic  -LB      LE Tone  left:;Hypotonic  -LB         Bed Mobility Assessment/Treatment    Rolling Left Francisco (Bed Mobility)  contact guard;verbal cues  -LB      Rolling Right Francisco (Bed Mobility)  minimum assist (75% patient effort);verbal cues  -LB      Supine-Sit Francisco (Bed Mobility)  moderate assist (50% patient effort)  -LB      Sit-Supine Francisco (Bed Mobility)  minimum assist (75% patient effort) for L LE   -LB      Bed Mobility, Safety Issues  decreased use of legs for bridging/pushing;other (see comments) moves quickly with activities  -LB      Comment (Bed Mobility)  When attempting to sit pt attempted to sit straight up to 90 degrees but was unable to do so. Per  pt has a wedge and is able to do so at home much easier.   -LB         Transfers    Bed-Chair Francisco (Transfers)  minimum assist (75% patient effort) squat to stand pivot   -LB      Chair-Bed Francisco (Transfers)  minimum assist (75% patient effort) squat to stand pivot   -LB      Assistive Device (Bed-Chair Transfers)  wheelchair;other (see comments) AFO   -LB      Sit-Stand Francisco (Transfers)  minimum assist (75% patient effort);verbal cues  -LB      Stand-Sit Francisco (Transfers)  minimum assist (75% patient effort);verbal cues decreased trunk flexion with to sit  -LB      Transfers, Sit-Stand-Sit, Assist Device  other (see comments) hand on w/c and AFO   -LB      Transfer, Safety Issues  balance decreased during turns;sequencing ability decreased;weight-shifting ability decreased;impulsivity  -LB      Transfer, Impairments  muscle tone abnormal;ROM decreased;strength decreased;impaired balance  -LB       Comment (Transfers)  Pt strongly pulls to the R with mat to w/c transfers. When coming to stand from w/c or mat pt pulls R LE under the w/c or mat and leans strongly R with minimal wt thru the L LE. Required moderate assist to shift to the L.   -LB         Gait/Stairs Assessment/Training    Fleischmanns Level (Gait)  moderate assist (50% patient effort);2 person assist 2nd person to support L UE and for safety   -LB      Assistive Device (Gait)  walker, jakub  -LB      Distance in Feet (Gait)  30' x 2   -LB      Pattern (Gait)  step-to  -LB      Deviations/Abnormal Patterns (Gait)  base of support, narrow;gait speed decreased;stride length decreased  -LB      Bilateral Gait Deviations  forward flexed posture  -LB      Left Sided Gait Deviations  foot drop/toe drag;heel strike decreased;hip hiking;knee buckling, left side;leans right;weight shift ability decreased leans strongly right   -LB      Right Sided Gait Deviations  heel strike decreased  -LB      Fleischmanns Level (Stairs)  other (see comments) not appropriate at this time  -LB      Comment (Gait/Stairs)  When ambulating pt demonstrated no left knee flexion with swing phase.   -LB         Balance Skills Training    Standing-Level of Assistance  Moderate assistance  -LB      Static Standing Balance Support  assistive device  -LB      Standing-Balance Activities  Weight Shift R-L  -LB      Balance Comments  strongly leans to the left   -LB         Ankle Foot Orthosis Management    Type (Ankle/Foot Orthosis)  AFO (ankle foot orthosis);other (see comments) Thusane Spry Step   -LB      Functional Design (Ankle Foot Orthosis)  dynamic orthosis  -LB      Wearing Schedule (Ankle Foot Orthosis)  wear with activity/work  -LB        User Key  (r) = Recorded By, (t) = Taken By, (c) = Cosigned By    Initials Name Provider Type    LB Talita Mcclelland, PT Physical Therapist                  Therapy Education  Education Details: Reviewed supine to sidelying and then to sit.  Emphasized the importance of placing LE evenly with to stand. Advised pt and  positioing in front of pt with pt's R hand on his forearm verses around his back as they demonstrated. Advised pt and  not to perform ambulation or stepping activities at home. Advised pt to wear AFO with activity.    Given: Mobility training, Fall prevention and home safety  How Provided: Verbal, Demonstration  Provided to: Patient, Caregiver  Level of Understanding: Teach back education performed, Verbalized, Demonstrated    PT OP Goals     Row Name 08/17/20 1345          PT Short Term Goals    STG Date to Achieve  09/16/20  -LB     STG 1  Pt to perform rolling to the right with CGA and verbal cues.   -LB     STG 2  Pt to perform sit to supine with CGA and supine to sit with miniimal.  -LB     STG 3  Pt to come to stand with feet even and increased wt through her L LE ~ 50% to promote L LE strengthening.   -LB     STG 4  Pt to come to stand and stand to sit with minimal of 1 with consistent wt shift over her LE's.    -LB     STG 5  Pt to increase strength in her left LE by 1/2 grade overall.   -LB     STG 6  Pt to ambulate with jakub-walker ~ 60' x 3 with moderte of 1.   -LB     STG 7  Pt to ambulate with decreased left hip hiking and increased knee flexion.   -LB     STG 8  Pt and  to be independent with HEP with emphasis on L LE strengthening.   -LB        Long Term Goals    LTG Date to Achieve  12/07/20  -LB     LTG 1  Pt to perform rolling right and left independently.   -LB     LTG 2  Pt to perform sit to supine and supine to sit with supervision.   -LB     LTG 3  Pt to come to stand with feet even and increased wt through her L LE ~ 90% to promote L LE strengthening.  -LB     LTG 4  Pt to increase strength in her left LE by 1/2 grade overall.   -LB     LTG 5  Pt to perform wheelchair to bed/mat with SBA.   -LB     LTG 6  Pt to ambulate > 150' with CGA with least restrictive device.  -LB     LTG 7  Pt to ambulate  with consistent left knee flexion with swing phase.   -LB     LTG 8  Pt and  to be independent with HEP with emphasis on standing balance.   -LB        Time Calculation    PT Goal Re-Cert Due Date  09/16/20  -LB       User Key  (r) = Recorded By, (t) = Taken By, (c) = Cosigned By    Initials Name Provider Type    LB Talita Mcclelland, PT Physical Therapist          PT Assessment/Plan     Row Name 08/17/20 8493          PT Assessment    Functional Limitations  Decreased safety during functional activities;Impaired gait;Performance in self-care ADL;Limitation in home management  -LB     Impairments  Balance;Gait;Muscle strength;Endurance  -LB     Assessment Comments  Pt ia 55 year female diagnosed with glioblastoma with subsequent craniotomy on 7/1/20. Pt is currently undergoing radiation and chemotherapy daily. Pt demonstrated dense hemiparesis in the left UE and LE. Pt demonstrated deficits with transfers, gait and balance. When performing transfers pt placing majority of weight on her R LE. Pt is extremely motivated to improve her functional level. Pt would greatly benefit from skilled PT with emphasis on L LE strengthening, transfers, and gait.    -LB     Please refer to paper survey for additional self-reported information  Yes  -LB     Rehab Potential  Good  -LB     Patient/caregiver participated in establishment of treatment plan and goals  Yes  -LB     Patient would benefit from skilled therapy intervention  Yes  -LB        PT Plan    PT Frequency  2x/week  -LB     Predicted Duration of Therapy Intervention (Therapy Eval)  16 weeks   -LB     Planned CPT's?  PT EVAL MOD COMPLELITY: 89123;PT NEUROMUSC RE-EDUCATION EA 15 MIN: 53827;PT THER ACT EA 15 MIN: 26477  -LB     Physical Therapy Interventions (Optional Details)  balance training;bed mobility training;gait training;home exercise program;neuromuscular re-education;strengthening;transfer training  -LB       User Key  (r) = Recorded By, (t) = Taken By, (c)  = Cosigned By    Initials Name Provider Type    Talita Ferro, PT Physical Therapist             OP Exercises     Row Name 08/17/20 1345             Subjective Pain    Able to rate subjective pain?  yes  -LB      Pre-Treatment Pain Level  0  -LB      Post-Treatment Pain Level  0  -LB        User Key  (r) = Recorded By, (t) = Taken By, (c) = Cosigned By    Initials Name Provider Type    Talita Ferro PT Physical Therapist                              Time Calculation:   Start Time: 1345  Stop Time: 1430  Time Calculation (min): 45 min  Total Timed Code Minutes- PT: 45 minute(s)   Therapy Charges for Today     Code Description Service Date Service Provider Modifiers Qty    57914248445 HC PT EVAL MOD COMPLEXITY 3 8/17/2020 Talita Mcclelland, PT GP 1        Patient was wearing a face mask during this therapy encounter. Therapist used appropriate personal protective equipment including mask and gloves.  Mask used was standard procedure mask. Appropriate PPE was worn during the entire therapy session. Hand hygiene was completed before and after therapy session. Patient is not in enhanced droplet precautions. PT wore protective glasses.                Talita Mcclelland PT  8/17/2020

## 2020-08-19 NOTE — PROGRESS NOTES
PPS CMG Coordinator  Inpatient Rehabilitation Discharge    Mode of Locomotion: Wheelchair.    Discharge Against Medical Advice:  No.  Discharge Information  Patient Discharged Alive:  Yes  Discharge Destination/Living Setting: Home.  At discharge, the patient was discharged to live (with) (02)  Family / Relatives    Diagnosis for Interruption/Death: ICD    Impairment Group: Brain Dysfunction: 02.9 Other brain    Comorbidities: ICD    Complications: ICD    QUALITY INDICATORS  Section J Health Conditions: Fall(s) Since Admission:  No    Section M. Skin Conditions Discharge:  Unhealed Pressure Ulcer(s) at Stage 1 or  Higher:  No    . Current Number of Unhealed Pressure Ulcers  Branch    Section N. Medication:  Medication Intervention: N/A - There were no potential clinically significant  medication issues identified since admission or patient is not taking any  medications.    Signed by: Morris Dominguez RN

## 2020-08-19 NOTE — THERAPY TREATMENT NOTE
Outpatient Occupational Therapy Neuro Treatment Note  [facility]     Patient Name: Denise Guajardo  : 1965  MRN: [id number]  Today's Date: 2020       Visit Date: 2020    Patient Active Problem List   Diagnosis   • Glioblastoma multiforme (CMS/HCC)   • Intracranial hemorrhage (CMS/HCC)   • Dysphagia   • Acute left-sided weakness   • Brain tumor (CMS/HCC)   • Hemiplegia (CMS/HCC)   • Vasogenic brain edema (CMS/HCC)        Past Medical History:   Diagnosis Date   • Anxiety    • Glioblastoma (CMS/HCC)    • History of hemiplegia    • S/P craniotomy    • Vertigo         Past Surgical History:   Procedure Laterality Date   • BRAIN SURGERY     • BREAST BIOPSY     • CRANIOTOMY FOR TUMOR Right 2020    Procedure: Right-sided craniotomy for excisional biopsy and, placement of lumbar drain;  Surgeon: Shai Licona MD;  Location: [facility] MAIN OR;  Service: Neurosurgery;  Laterality: Right;   • DENTAL PROCEDURE     • TUBAL ABDOMINAL LIGATION     • WISDOM TOOTH EXTRACTION           Visit Dx:    ICD-10-CM ICD-9-CM   1. Glioblastoma multiforme (CMS/HCC) C71.9 191.9   2. Weakness of right arm R29.898 729.89                   OT Assessment/Plan     Row Name 20 1537          OT Assessment    Assessment Comments  Pt highly motivated to improve, she actually did show some more shoulder retraction with LUE as well as pronation/supination with NMES assisting.  -SG       User Key  (r) = Recorded By, (t) = Taken By, (c) = Cosigned By    Initials Name Provider Type    Dulce Leong OTLUZ MARIA Occupational Therapist                          Modalities     Row Name 20 1500             ELECTRICAL STIMULATION    Attended/Unattended  Attended  -SG      Stimulation Type  FES  -SG      Location/Electrode Placement/Other  NMES to L forearm for wrist extension/flexion at intensity 18. Skin integrity ok following removal of pads.  -SG        User Key  (r) = Recorded By, (t) = Taken By, (c) = Cosigned By    Initials  Name Provider Type    Dulce Leong OTR Occupational Therapist        OT Exercises     Row Name 08/19/20 1500             Precautions    Existing Precautions/Restrictions  fall  -SG         Subjective Comments    Subjective Comments  Pt feeling good today, frustrated with LUE progress however.  -         Subjective Pain    Able to rate subjective pain?  yes  -SG      Pre-Treatment Pain Level  0  -SG         Exercise 1    Exercise Name 1  Supine LUE ther ex with aircast to LUE: shoulder mvt in all planes, trace movement noted throughout  -SG      Cueing 1  Verbal;Tactile;Demo  -SG         Exercise 2    Exercise Name 2  Pt performs LUE table slides for targets, pushing/pulling beanbags with OT providing mod A at elbow and hand. Shows slight shoulder retraction during task.  -SG      Cueing 2  Verbal;Demo  -SG         Exercise 3    Exercise Name 3  Pt performs hand over hand movement with cones, interlocking fingers and reaching from R<>L to place cones onto dowel. Pt impulsive with task and requries vcing and min A for completion.  -        User Key  (r) = Recorded By, (t) = Taken By, (c) = Cosigned By    Initials Name Provider Type    Dulce Leong OTR Occupational Therapist                      Time Calculation:   OT Start Time: 1435  OT Stop Time: 1518  OT Time Calculation (min): 43 min     Therapy Charges for Today     Code Description Service Date Service Provider Modifiers Qty    [id number] HC OT NEUROMUSC RE EDUCATION EA 15 MIN 8/19/2020 Dulce Villalpando OTR GO 3                    MARCIAL Hodge  8/19/2020

## 2020-08-19 NOTE — PROGRESS NOTES
MTM telephone follow up re side effects and adherence- Temodar    Denise is doing well today. I introduced her to the MT program and explained the purpose of my call. She continues on daily Temodar with radiation. She has not had any issues or side effects; she reports good appetite and denies any nausea/vomiting. Medication administration and adherence seem appropriate; she takes her Temodar on an empty stomach and has not missed any doses since starting therapy. She denies any recent medication changes. MD dictation noted. Pharmacy will continue to follow.      8/13/2020   WBC 3.40 - 10.80 10*3/mm3 10.00   Neutrophils Absolute 1.70 - 7.00 10*3/mm3 9.02 (A)   Hemoglobin 12.0 - 15.9 g/dL 15.2   Hematocrit 34.0 - 46.6 % 45.4   Platelets 140 - 450 10*3/mm3 183   Creatinine 0.60 - 1.10 mg/dL 0.69   eGFR Non African Am >60 mL/min/1.73 88   BUN 6 - 20 mg/dL 20   Sodium 134 - 145 mmol/L 136   Potassium 3.5 - 4.7 mmol/L 4.8 (A)   Glucose 74 - 124 mg/dL 129 (A)   Calcium 8.5 - 10.2 mg/dL 9.4   Albumin 3.50 - 5.20 g/dL 4.10   Total Protein 6.3 - 8.0 g/dL 6.4   AST (SGOT) 0 - 32 U/L 17   ALT (SGPT) 0 - 33 U/L 27   Alkaline Phosphatase 38 - 116 U/L 64   Total Bilirubin 0.2 - 1.2 mg/dL 0.5     Thanks,  Laura Huggins, PharmD

## 2020-08-20 NOTE — PROGRESS NOTES
CBC results reviewed with pt and spouse. CBC WNL. Pt's only complaint today is occasional fatigue. Denies any recent fever, nausea or vomiting. She states she is eating and drinking well. Pt currently on temodar with radiation. Will return weekly for lab and review. CMP results pending. Informed pt and spouse they would be called if any results abnormal. Pt encouraged to call with any complaints. Pt and spouse v/u.   CMP results received and WNL.

## 2020-08-20 NOTE — THERAPY TREATMENT NOTE
"    Outpatient Physical Therapy Neuro Treatment Note  [facility]     Patient Name: Denise Guajardo  : 1965  MRN: [id number]  Today's Date: 2020      Visit Date: 2020    Visit Dx:    ICD-10-CM ICD-9-CM   1. Glioblastoma (CMS/HCC) C71.9 191.9   2. Left hemiparesis (CMS/HCC) G81.94 342.90   3. Functional gait abnormality R26.89 781.2       Patient Active Problem List   Diagnosis   • Glioblastoma multiforme (CMS/HCC)   • Intracranial hemorrhage (CMS/HCC)   • Dysphagia   • Acute left-sided weakness   • Brain tumor (CMS/HCC)   • Hemiplegia (CMS/HCC)   • Vasogenic brain edema (CMS/HCC)           PT Neuro     Row Name 20 9634             Subjective Comments    Subjective Comments  Pt said \"my goal is to be walking by myself by next week or maybe the week after.\" Pt said she is only holding 's forearm now with transfers.   -GEGE         Precautions and Contraindications    Precautions/Limitations  fall precautions  -GEGE      Precautions  left UE flaccid , moves quickly with functional activities   -GEGE         Subjective Pain    Able to rate subjective pain?  yes  -GEGE      Pre-Treatment Pain Level  0  -GEGE      Post-Treatment Pain Level  0  -GEGE         Posture/Observations    Posture/Observations Comments  Pt arrived to PT via wheelchair with her AFO in place. In sitting trunk in neutral. Pt has bandaid L Leg at proximal band on AFO. Pt said they \"knocked\" the skin loose yesterday with the AFO.   -GEGE         Bed Mobility Assessment/Treatment    Supine-Sit [place] (Bed Mobility)  minimum assist (75% patient effort);moderate assist (50% patient effort) pt turned min to R and then sat up.   -GEGE      Sit-Supine [place] (Bed Mobility)  minimum assist (75% patient effort) for L LE   -GEGE      Bed Mobility, Safety Issues  decreased use of legs for bridging/pushing;other (see comments)  -GEGE      Comment (Bed Mobility)  Pt said she has a bar/rail near bed she can use.  -GEGE         Transfers    " "Bed-Chair [place] (Transfers)  minimum assist (75% patient effort) stand pivot   -GEGE      Chair-Bed [place] (Transfers)  minimum assist (75% patient effort) stand pivot   -GEGE      Assistive Device (Bed-Chair Transfers)  wheelchair;other (see comments) AFO  -GEGE      Sit-Stand [place] (Transfers)  minimum assist (75% patient effort);verbal cues cues for forward weight shift   -GEGE      Stand-Sit [place] (Transfers)  minimum assist (75% patient effort);verbal cues to flex forward and then to reach back for armrest on R   -GEGE      Transfers, Sit-Stand-Sit, Assist Device  jakub walker AFO  -GEGE      Transfer, Safety Issues  balance decreased during turns;sequencing ability decreased;weight-shifting ability decreased;impulsivity  -GEGE      Transfer, Impairments  muscle tone abnormal;ROM decreased;strength decreased;impaired balance  -GEGE      Comment (Transfers)  Pt moved quickly with transfers and weight shifted toward R   -GEGE         Gait/Stairs Assessment/Training    [place] Level (Gait)  moderate assist (50% patient effort);2 person assist  2nd person to support L UE and for safety  -GEGE      Assistive Device (Gait)  walker, jakub L AFO   -GEGE      Distance in Feet (Gait)  30' x 3  -GEGE      Pattern (Gait)  step-to  -GEGE      Deviations/Abnormal Patterns (Gait)  base of support, narrow;gait speed decreased;stride length decreased  -GEGE      Bilateral Gait Deviations  forward flexed posture  -GEGE      Left Sided Gait Deviations  foot drop/toe drag;heel strike decreased;hip hiking;knee buckling, left side;leans right;weight shift ability decreased leans to R   -GEGE      Right Sided Gait Deviations  heel strike decreased quick step   -GEGE      Comment (Gait/Stairs)  Pt required min/mod assist to advance and place L LE. Cues to \"bump\" L hip like the dance bump helped her in static stance to reduce lean R.   -GEGE         Balance Skills Training    Standing-Level of Assistance  Moderate assistance  -GEGE      Static " "Standing Balance Support  assistive device  -GEGE      Standing-Balance Activities  Weight Shift R-L  -GEGE         Ankle Foot Orthosis Management    Type (Ankle/Foot Orthosis)  AFO (ankle foot orthosis) [place] brace   -GEGE      Functional Design (Ankle Foot Orthosis)  dynamic orthosis  -GEGE      Wearing Schedule (Ankle Foot Orthosis)  wear with activity/work  -GEGE        User Key  (r) = Recorded By, (t) = Taken By, (c) = Cosigned By    Initials Name Provider Type    Lisa Robertson PT Physical Therapist                  PT Assessment/Plan     Row Name 08/20/20 1549          PT Assessment    Assessment Comments  Pt demonstrating with less lean to R in standing. She responded with cue of \"bump\" L hip toward the L for more midline posture but needed continued reminders while ambulating. Pt remains impulsive with all transitional movements.   -GEGE       User Key  (r) = Recorded By, (t) = Taken By, (c) = Cosigned By    Initials Name Provider Type    Lisa Robertson PT Physical Therapist             OP Exercises     Row Name 08/20/20 1430             Subjective Comments    Subjective Comments  Pt said \"my goal is to be walking by myself by next week or maybe the week after.\" Pt said she is only holding 's forearm now with transfers.   -GEGE         Subjective Pain    Able to rate subjective pain?  yes  -GEGE      Pre-Treatment Pain Level  0  -GEGE      Post-Treatment Pain Level  0  -GEGE         Exercise 1    Exercise Name 1  Supine: B bridge with assist to hold L LE in position   -GEGE      Cueing 1  Verbal;Tactile  -GEGE      Reps 1  15  -GEGE         Exercise 2    Exercise Name 2  Supine: DKTC and LTR with B LE's on peanut ball. AAROM for L LE   -GEGE      Cueing 2  Verbal;Tactile  -GEGE      Reps 2  12 each   -GEGE         Exercise 3    Exercise Name 3  Seated: L LAQ  -GEGE      Reps 3  10  -GEGE      Additional Comments  Pt did not have any L knee flexion in sitting.   -GEGE        User Key  (r) = Recorded By, (t) = Taken By, (c) " = Cosigned By    Initials Name Provider Type    Lisa Robertson, PT Physical Therapist                          Therapy Education  Education Details: Reminded pt to continue holding 's forearm with transfers. Cues for more equal standing/reduce lean to R.   Given: Mobility training  How Provided: Verbal, Demonstration  Provided to: Patient  Level of Understanding: Teach back education performed, Verbalized, Demonstrated              Time Calculation:   Start Time: 1430  Stop Time: 1515  Time Calculation (min): 45 min  Total Timed Code Minutes- PT: 45 minute(s)   Therapy Charges for Today     Code Description Service Date Service Provider Modifiers Qty    [id number]  PT NEUROMUSC RE EDUCATION EA 15 MIN 8/20/2020 Lisa Henderson, PT GP 2    [id number]  PT THERAPEUTIC ACT EA 15 MIN 8/20/2020 Lisa Henderson, PT GP 1              Patient was wearing a face mask during this therapy encounter. Therapist used appropriate personal protective equipment including mask and gloves.  Mask used was standard procedure mask. Appropriate PPE was worn during the entire therapy session. Hand hygiene was completed before and after therapy session. Patient is not in enhanced droplet precautions. PT wore protective glasses too.           Lisa Henderson PT  8/20/2020

## 2020-08-21 NOTE — PROGRESS NOTES
Physician Weekly Management Note    Diagnosis:     1. Glioblastoma multiforme (CMS/HCC)       Reason for Visit: Radiation (22/30)    Concurrent Chemo:       Notes on Treatment course, Films, Medical progress and Plan:  Some fatigue, especially end of the day, but doing well. Language without issue now.  Continues on steroid, 6 am and 6 pm. Noted some fleeting changes in vision and also slight left sided numbness intermittently. Will watch over weekend but told her if it worsens to add third steroid mid-day. Will ask Lucy to check on her Monday and increase if needed. Denies headache, pain. Will continue.    Performance Status:  (2) Ambulatory and capable of self care, unable to carry out work activity, up and about > 50% or waking hours    ROS -  Other than those listed above, as follows:  Constitutional - Normal - no complaints of fatigue, denies lack of appetite, fever, night sweats or change in weight.  Neck - Normal - denies neck masses, muscle weakness, neck pain, decreased range of motion or swelling.  Cardiovascular - Normal - denies arrhythmias, chest pain, dyspnea, edema, orthopnea or palpitations.  Respiratory - Normal - denies cough, dyspnea, hemoptysis, hiccoughs, pleuritic chest pain or wheezing.  Gastrointestinal - Normal - no complaints of constipation, abdominal pain, diarrhea, heartburn/dyspepsia, hematemesis, hemorrhoids, melena or GI bleeding, nausea, pain or cramping or vomiting.  Neuro - Normal - no new complaints of vision change, headache, nausea, cranial nerve deficit, gait abnormality, syncope, seizure.    PHYSICAL EXAM - Other than changes listed above, as follows:  There were no vitals filed for this visit.    Constitutional - Normal - no evidence of impaired alertness, inadequate appearance, premature or advanced chronologic age, uncooperativeness, altered mood, affect or disorientation.  Neck - Normal - no evidence of tender or enlarged lymph nodes, neck abnormalities, restricted range  "of motion or enlarged thyroid.  Chest - Normal - no evidence of chest abnormalities, tender or enlarged lymph nodes.  Respiratory - Normal - no evidence of abnormal breat sounds, chest abnormalities on palpation and chest abnormalities on percussion and normal breath sounds.  Hematologic/Lymphatic - Normal - no evidence of tender or enlarged axillary lymph nodes nor tender or enlarged neck nodes.  Neuro - Normal - no evidence of cranial nerve deficit, gait abnormality.    Problem added:  No problems updated.  Medications added: No orders of the defined types were placed in this encounter.    Ancillary referrals made: No orders of the defined types were placed in this encounter.      Technical aspects reviewed:  Weekly OBI approved if applicable? Yes   Weekly port films approved?  Yes  Change requests noted if applicable?  Yes   Patient setup and plan reviewed?  Yes     Chart Reviewed:  Continue current treatment plan?  Yes  Treatment plan change requested? No    I have reviewed and marked as \"reviewed\" the current medications, allergies and problem list in the patients EMR.    I have reviewed the patient's medical, surgical  history in detail, reviewed any pertinent lab work  and updated the computerized patient record if needed.    Patient's Care Team:  Patient Care Team:  Paz Mera MD as PCP - General (Family Medicine)  Carlota Peacock MD as Consulting Physician (Radiation Oncology)  John Trimble MD as Consulting Physician (Hematology and Oncology)      "

## 2020-08-21 NOTE — PROGRESS NOTES
[facility] Lab Review- [facility]      8/20/2020   WBC 3.40 - 10.80 10*3/mm3 10.23   Neutrophils Absolute 1.70 - 7.00 10*3/mm3 8.91 (A)   Hemoglobin 12.0 - 15.9 g/dL 14.6   Hematocrit 34.0 - 46.6 % 42.8   Platelets 140 - 450 10*3/mm3 190   Creatinine 0.60 - 1.10 mg/dL 0.49 (A)   eGFR Non African Am >60 mL/min/1.73 131   BUN 6 - 20 mg/dL 22 (A)   Sodium 134 - 145 mmol/L 136   Potassium 3.5 - 4.7 mmol/L 4.3   Glucose 74 - 124 mg/dL 146 (A)   Calcium 8.5 - 10.2 mg/dL 9.0   Albumin 3.50 - 5.20 g/dL 4.00   Total Protein 6.3 - 8.0 g/dL 6.2 (A)   AST (SGOT) 0 - 32 U/L 15   ALT (SGPT) 0 - 33 U/L 24   Alkaline Phosphatase 38 - 116 U/L 63   Total Bilirubin 0.2 - 1.2 mg/dL 0.4     Pharmacy will continue to follow.    Thanks,  Laura Huggins, PharmD

## 2020-08-24 NOTE — THERAPY TREATMENT NOTE
Outpatient Occupational Therapy Neuro Treatment Note  [facility]     Patient Name: Denise Guajardo  : 1965  MRN: [id number]  Today's Date: 2020       Visit Date: 2020    Patient Active Problem List   Diagnosis   • Glioblastoma multiforme (CMS/HCC)   • Intracranial hemorrhage (CMS/HCC)   • Dysphagia   • Acute left-sided weakness   • Brain tumor (CMS/HCC)   • Hemiplegia (CMS/HCC)   • Vasogenic brain edema (CMS/HCC)        Past Medical History:   Diagnosis Date   • Anxiety    • Glioblastoma (CMS/HCC)    • History of hemiplegia    • S/P craniotomy    • Vertigo         Past Surgical History:   Procedure Laterality Date   • BRAIN SURGERY     • BREAST BIOPSY     • CRANIOTOMY FOR TUMOR Right 2020    Procedure: Right-sided craniotomy for excisional biopsy and, placement of lumbar drain;  Surgeon: Shai Licona MD;  Location: [facility];  Service: Neurosurgery;  Laterality: Right;   • DENTAL PROCEDURE     • TUBAL ABDOMINAL LIGATION     • WISDOM TOOTH EXTRACTION           Visit Dx:    ICD-10-CM ICD-9-CM   1. Glioblastoma multiforme (CMS/HCC) C71.9 191.9   2. Weakness of right arm R29.898 729.89                   OT Assessment/Plan     Row Name 20 1548          OT Assessment    Assessment Comments  Pt and spouse do well with NMES to LUE today, worked on repetitive table slides to improve ROM and attention to L side. Pt very fatigued following sessions.  -THOMAS       User Key  (r) = Recorded By, (t) = Taken By, (c) = Cosigned By    Initials Name Provider Type    Dulce Leong OTR Occupational Therapist                     Therapy Education  Education Details: NMES use for home, see note for full education. Wear 1x daily for 30 min.  Given: Posture/body mechanics  Program: New  How Provided: Verbal, Demonstration, Written  Provided to: Patient, Caregiver  Level of Understanding: Teach back education performed, Verbalized, Demonstrated    Modalities     Row Name 20 0928              ELECTRICAL STIMULATION    Attended/Unattended  Attended  -SG      Stimulation Type  FES  -SG      Location/Electrode Placement/Other  NMES to L forearm for wrist extension at intensity 9; skin integrity ok following pad removal  -SG        User Key  (r) = Recorded By, (t) = Taken By, (c) = Cosigned By    Initials Name Provider Type    Dulce Leong OTR Occupational Therapist        OT Exercises     Row Name 08/24/20 1543             Precautions    Existing Precautions/Restrictions  fall  -SG         Exercise 1    Exercise Name 1  Spouse present for NMES home unit training; worked on pad placement, proper programming, wrist extension exercises, wearing time, pad removal etc. Spouse demonstrated turuning on/off unit. Pt worked on table slides with assistance from OT and spouse, pt worked on pushing bean bags toward targets while stim provided.   -SG      Cueing 1  Verbal;Tactile;Demo;Auditory  -SG        User Key  (r) = Recorded By, (t) = Taken By, (c) = Cosigned By    Initials Name Provider Type    Dulce Leong OTR Occupational Therapist                      Time Calculation:   OT Start Time: 1430  OT Stop Time: 1518  OT Time Calculation (min): 48 min     Therapy Charges for Today     Code Description Service Date Service Provider Modifiers Qty    [id number] HC OT NEUROMUSC RE EDUCATION EA 15 MIN 8/24/2020 Dulce Villalpando OTR GO 3                    MARCIAL Hodge  8/24/2020

## 2020-08-24 NOTE — TELEPHONE ENCOUNTER
Temozolomide refill request rec electronically from [facility] [employer]. Pt has rec her 42 days of therapy in 2 shipments. Her last dispense was on 8/18 for both strengths with a $0 co-pay. The month before was on 7/22.   Request denied-she will not need additional supply at this time.

## 2020-08-24 NOTE — THERAPY TREATMENT NOTE
"    Outpatient Physical Therapy Neuro Treatment Note  [facility]     Patient Name: Denise Guajardo  : 1965  MRN: [id number]  Today's Date: 2020      Visit Date: 2020    Visit Dx:    ICD-10-CM ICD-9-CM   1. Glioblastoma (CMS/HCC) C71.9 191.9   2. Left hemiparesis (CMS/HCC) G81.94 342.90   3. Functional gait abnormality R26.89 781.2       Patient Active Problem List   Diagnosis   • Glioblastoma multiforme (CMS/HCC)   • Intracranial hemorrhage (CMS/HCC)   • Dysphagia   • Acute left-sided weakness   • Brain tumor (CMS/HCC)   • Hemiplegia (CMS/HCC)   • Vasogenic brain edema (CMS/HCC)           PT Neuro     Row Name 20 1346             Subjective Comments    Subjective Comments  Pt reporting she was had some soreness in her right and left legs over the weekend. Maybe overdid it. No pain today., Pt's  reporting, \"Look what she can do today..\" (pt demonstrated left elbow flexion)     -LB         Precautions and Contraindications    Precautions/Limitations  fall precautions  -LB      Precautions  left UE flaccid , moves quickly with functional activities   -LB         Subjective Pain    Able to rate subjective pain?  yes  -LB      Pre-Treatment Pain Level  0  -LB      Post-Treatment Pain Level  0  -LB         Cognition    Memory  Appears intact  -LB      Orientation Level  Oriented to situation;Oriented to person;Oriented to place  -LB      Safety Judgment  Good awareness of safety precautions  -LB      Deficits  Decreased awareness of deficits  -LB         Posture/Observations    Posture/Observations Comments  Pt arrived to PT via wheelchair with her AFO in place. In sitting trunk in neutral. Pt has bandaid L Leg at proximal band on AFO. Pt said they \"knocked\" the skin loose yesterday with the AFO.   -LB         Bed Mobility Assessment/Treatment    Comment (Bed Mobility)  not performed  -LB         Transfers    Bed-Chair [place] (Transfers)  minimum assist (75% patient effort) stand " pivot   -LB      Chair-Bed [place] (Transfers)  minimum assist (75% patient effort) stand pivot   -LB      Assistive Device (Bed-Chair Transfers)  wheelchair;other (see comments) AFO   -LB      Sit-Stand [place] (Transfers)  minimum assist (75% patient effort);verbal cues cues for forward wt shift   -LB      Stand-Sit [place] (Transfers)  minimum assist (75% patient effort);verbal cues  -LB      Transfers, Sit-Stand-Sit, Assist Device  jakub walker  -LB      Transfer, Safety Issues  balance decreased during turns;sequencing ability decreased;weight-shifting ability decreased;impulsivity  -LB      Transfer, Impairments  muscle tone abnormal;ROM decreased;strength decreased;impaired balance  -LB      Comment (Transfers)  Pt places feet evenly when beginning to come to stand and then quickly pulls her R LE back a few inches and her leg is against the wheelchair seat.   -LB         Gait/Stairs Assessment/Training    [place] Level (Gait)  moderate assist (50% patient effort);2 person assist;other (see comments) strong verbal cues to wt shift to L LE before advancing R LE  -LB      Assistive Device (Gait)  walker, jakub  -LB      Distance in Feet (Gait)  35' x 4  -LB      Pattern (Gait)  step-to  -LB      Deviations/Abnormal Patterns (Gait)  base of support, narrow;gait speed decreased;stride length decreased  -LB      Bilateral Gait Deviations  forward flexed posture;leans right  -LB      Left Sided Gait Deviations  foot drop/toe drag;heel strike decreased;hip hiking;knee buckling, left side;leans right;weight shift ability decreased  -LB      Right Sided Gait Deviations  heel strike decreased quick step   -LB      Comment (Gait/Stairs)  Pt advanced L LE independently 90% of the time. Moderate cue to left hip to stay forward .  -LB         Balance Skills Training    Standing-Level of Assistance  Minimum assistance;Moderate assistance  -LB      Static Standing Balance Support  assistive device  -LB       "Standing-Balance Activities  Weight Shift R-L  -LB         Ankle Foot Orthosis Management    Type (Ankle/Foot Orthosis)  left;AFO (ankle foot orthosis)  -LB      Functional Design (Ankle Foot Orthosis)  dynamic orthosis  -LB      Wearing Schedule (Ankle Foot Orthosis)  wear with activity/work  -LB        User Key  (r) = Recorded By, (t) = Taken By, (c) = Cosigned By    Initials Name Provider Type    Talita Ferro, PT Physical Therapist                  PT Assessment/Plan     Row Name 08/24/20 1708          PT Assessment    Assessment Comments  Pt reporting increased pain in her right and left legs over the weekend but it is better today. Pt was able to advance her L LE with signficantly less assistance.   -LB       User Key  (r) = Recorded By, (t) = Taken By, (c) = Cosigned By    Initials Name Provider Type    Talita Ferro, PT Physical Therapist             OP Exercises     Row Name 08/24/20 1346             Subjective Comments    Subjective Comments  Pt reporting she was had some soreness in her right and left legs over the weekend. Maybe overdid it. No pain today., Pt's  reporting, \"Look what she can do today..\" (pt demonstrated left elbow flexion)     -LB         Subjective Pain    Able to rate subjective pain?  yes  -LB      Pre-Treatment Pain Level  0  -LB      Post-Treatment Pain Level  0  -LB         Exercise 4    Exercise Name 4  PERCH sitting -- hips flexed ~ 45 degrees with wt shift to the L LE while minimally flexing R LE   -LB      Cueing 4  Tactile;Verbal  -LB      Sets 4  2  -LB      Reps 4  5  -LB      Additional Comments  minimal tactile to maintain balance   -LB        User Key  (r) = Recorded By, (t) = Taken By, (c) = Cosigned By    Initials Name Provider Type    Talita Ferro, PT Physical Therapist                                         Time Calculation:   Start Time: 1346  Stop Time: 1430  Time Calculation (min): 44 min   Therapy Charges for Today     Code Description Service " Date Service Provider Modifiers Qty    [id number]  PT NEUROMUSC RE EDUCATION EA 15 MIN 8/24/2020 Talita Mcclelland, PT GP 2    [id number]  PT THERAPEUTIC ACT EA 15 MIN 8/24/2020 Talita Mcclelland, PT GP 1            Patient was wearing a face mask during this therapy encounter. Therapist used appropriate personal protective equipment including mask and gloves.  Mask used was standard procedure mask. Appropriate PPE was worn during the entire therapy session. Hand hygiene was completed before and after therapy session. Patient is not in enhanced droplet precautions. PT wore protective glasses.             Talita Mcclelland, PT  8/24/2020

## 2020-08-26 NOTE — PROGRESS NOTES
Physician Weekly Management Note    Diagnosis:     1. Glioblastoma multiforme (CMS/HCC)       Reason for Visit: Radiation (25/30)    Concurrent Chemo:       Notes on Treatment course, Films, Medical progress and Plan:  Continues doing so well. Some movement of left arm and hand. PT/OT going really well. She is fatigued as expected but thrilled with progress. Some fatigue, especially end of the day, but doing well. Language without issue now.  Stable with steroid as is. Will continue.    Performance Status:  (2) Ambulatory and capable of self care, unable to carry out work activity, up and about > 50% or waking hours    ROS -  Other than those listed above, as follows:  Constitutional - Normal - no complaints of fatigue, denies lack of appetite, fever, night sweats or change in weight.  Neck - Normal - denies neck masses, muscle weakness, neck pain, decreased range of motion or swelling.  Cardiovascular - Normal - denies arrhythmias, chest pain, dyspnea, edema, orthopnea or palpitations.  Respiratory - Normal - denies cough, dyspnea, hemoptysis, hiccoughs, pleuritic chest pain or wheezing.  Gastrointestinal - Normal - no complaints of constipation, abdominal pain, diarrhea, heartburn/dyspepsia, hematemesis, hemorrhoids, melena or GI bleeding, nausea, pain or cramping or vomiting.  Neuro - Normal - no new complaints of vision change, headache, nausea, cranial nerve deficit, gait abnormality, syncope, seizure.    PHYSICAL EXAM - Other than changes listed above, as follows:  There were no vitals filed for this visit.    Constitutional - Normal - no evidence of impaired alertness, inadequate appearance, premature or advanced chronologic age, uncooperativeness, altered mood, affect or disorientation.  Neck - Normal - no evidence of tender or enlarged lymph nodes, neck abnormalities, restricted range of motion or enlarged thyroid.  Chest - Normal - no evidence of chest abnormalities, tender or enlarged lymph  "nodes.  Respiratory - Normal - no evidence of abnormal breat sounds, chest abnormalities on palpation and chest abnormalities on percussion and normal breath sounds.  Hematologic/Lymphatic - Normal - no evidence of tender or enlarged axillary lymph nodes nor tender or enlarged neck nodes.  Neuro - Normal - no evidence of cranial nerve deficit, gait abnormality.    Problem added:  No problems updated.  Medications added: No orders of the defined types were placed in this encounter.    Ancillary referrals made: No orders of the defined types were placed in this encounter.      Technical aspects reviewed:  Weekly OBI approved if applicable? Yes   Weekly port films approved?  Yes  Change requests noted if applicable?  Yes   Patient setup and plan reviewed?  Yes     Chart Reviewed:  Continue current treatment plan?  Yes  Treatment plan change requested? No    I have reviewed and marked as \"reviewed\" the current medications, allergies and problem list in the patients EMR.    I have reviewed the patient's medical, surgical  history in detail, reviewed any pertinent lab work  and updated the computerized patient record if needed.    Patient's Care Team:  Patient Care Team:  Paz Mera MD as PCP - General (Family Medicine)  Carlota Peacock MD as Consulting Physician (Radiation Oncology)  John Trimble MD as Consulting Physician (Hematology and Oncology)      "

## 2020-08-27 NOTE — NURSING NOTE
Pt is here for lab with RN review.  CBC reviewed with pt, counts are stable for this pt at this time. Pt has no complaints.  Copy of labs given to pt and f/u appt reviewed. Pt is instructed to call the office with any concerns or new symptoms prior to next visit. Pt vu  Lab Results   Component Value Date    WBC 9.90 08/27/2020    HGB 14.8 08/27/2020    HCT 43.8 08/27/2020    MCV 95.8 08/27/2020     08/27/2020     She continues with fatigue and has 1 week left of Temodar and radiation.  Pt/ will call with any questions or concerns before their appt next week.

## 2020-08-27 NOTE — THERAPY TREATMENT NOTE
"    Outpatient Physical Therapy Neuro Treatment Note  [facility]     Patient Name: Denise Guajardo  : 1965  MRN: [id number]  Today's Date: 2020      Visit Date: 2020    Visit Dx:    ICD-10-CM ICD-9-CM   1. Glioblastoma (CMS/HCC) C71.9 191.9   2. Left hemiparesis (CMS/HCC) G81.94 342.90   3. Functional gait abnormality R26.89 781.2       Patient Active Problem List   Diagnosis   • Glioblastoma multiforme (CMS/HCC)   • Intracranial hemorrhage (CMS/HCC)   • Dysphagia   • Acute left-sided weakness   • Brain tumor (CMS/HCC)   • Hemiplegia (CMS/HCC)   • Vasogenic brain edema (CMS/HCC)           PT Neuro     Row Name 20 1500             Subjective Comments    Subjective Comments  \"When can I start walking with my ?\"  -JK         Precautions and Contraindications    Precautions/Limitations  fall precautions  -JK      Precautions  left UE flaccid , moves quickly with functional activities   -JK         Subjective Pain    Able to rate subjective pain?  yes  -JK      Pre-Treatment Pain Level  0  -JK      Post-Treatment Pain Level  0  -JK         Cognition    Memory  Appears intact  -JK      Orientation Level  Oriented to situation;Oriented to person;Oriented to place  -JK      Safety Judgment  Good awareness of safety precautions  -JK      Deficits  Decreased awareness of deficits  -JK         Posture/Observations    Posture/Observations Comments  Pt arrived to PT via wheelchair with her AFO in place. In sitting trunk in neutral. Pt has bandaid L Leg at proximal band on AFO. Pt said they \"knocked\" the skin loose yesterday with the AFO.   -JK         Transfers    Sit-Stand [place] (Transfers)  minimum assist (75% patient effort);verbal cues  -JK      Stand-Sit [place] (Transfers)  minimum assist (75% patient effort);verbal cues  -JK      Transfers, Sit-Stand-Sit, Assist Device  jakub walker;quad cane LBQC  -JK      Transfer, Safety Issues  balance decreased during turns;sequencing " ability decreased;weight-shifting ability decreased;impulsivity  -JK      Transfer, Impairments  muscle tone abnormal;ROM decreased;strength decreased;impaired balance  -JK      Comment (Transfers)  VCs to scoot to edge of seat, WS forward during stand. Immediate stand is with decreased weight on L LE with lean to the R.   -JK         Gait/Stairs Assessment/Training    [place] Level (Gait)  moderate assist (50% patient effort);2 person assist;other (see comments)  -JK      Assistive Device (Gait)  walker, jakub;cane, quad LBQC  -JK      Distance in Feet (Gait)  70' x 4; 20' x 1  -JK      Pattern (Gait)  step-to  -JK      Deviations/Abnormal Patterns (Gait)  base of support, narrow;gait speed decreased;stride length decreased  -JK      Bilateral Gait Deviations  forward flexed posture;leans right  -JK      Left Sided Gait Deviations  foot drop/toe drag;heel strike decreased;hip hiking;knee buckling, left side;leans right;weight shift ability decreased  -JK      Right Sided Gait Deviations  heel strike decreased  -JK      Comment (Gait/Stairs)  PT did not sit in front of pt for last two walks and let pt practice advancing and placing L LE; L step length varied with each; pt cued at hip and posterior knee during L stance  -JK         Ankle Foot Orthosis Management    Type (Ankle/Foot Orthosis)  left;AFO (ankle foot orthosis)  -JK      Functional Design (Ankle Foot Orthosis)  dynamic orthosis  -JK      Wearing Schedule (Ankle Foot Orthosis)  wear with activity/work  -JK        User Key  (r) = Recorded By, (t) = Taken By, (c) = Cosigned By    Initials Name Provider Type    Shelli Velasco, PT Physical Therapist                  PT Assessment/Plan     Row Name 08/27/20 8209          PT Assessment    Functional Limitations  Decreased safety during functional activities;Impaired gait;Performance in self-care ADL;Limitation in home management  -JK     Impairments  Balance;Gait;Muscle strength;Endurance  -JK      "Assessment Comments  Pt able to walk today with LBQC and jakub walker. PT practiced walking with pt standing behind pt (with tech on L side holding L UE), but pt's steps were inconsistent and pt still needs a little assist to correctly place L LE at times.  -JK       User Key  (r) = Recorded By, (t) = Taken By, (c) = Cosigned By    Initials Name Provider Type    Shelli Velasco PT Physical Therapist             OP Exercises     Row Name 08/27/20 1500             Subjective Comments    Subjective Comments  \"When can I start walking with my ?\"  -JK         Subjective Pain    Able to rate subjective pain?  yes  -JK      Pre-Treatment Pain Level  0  -JK      Post-Treatment Pain Level  0  -JK        User Key  (r) = Recorded By, (t) = Taken By, (c) = Cosigned By    Initials Name Provider Type    Shelli Velasco PT Physical Therapist                          Therapy Education  Education Details: Encouraged pt to keep working hard in therapy  Given: Symptoms/condition management, Mobility training  Program: Reinforced  How Provided: Verbal  Provided to: Patient  Level of Understanding: Teach back education performed, Verbalized, Demonstrated              Time Calculation:   Start Time: 1430  Stop Time: 1515  Time Calculation (min): 45 min   Therapy Charges for Today     Code Description Service Date Service Provider Modifiers Qty    31846637287 HC PT NEUROMUSC RE EDUCATION EA 15 MIN 8/27/2020 Shelli Piper PT GP 3            Patient was wearing a face mask during this therapy encounter. Therapist used appropriate personal protective equipment including mask and gloves and eye protection.  Mask used was standard procedure mask. Appropriate PPE was worn during the entire therapy session. Hand hygiene was completed before and after therapy session. Patient is not in enhanced droplet precautions.           Shelli Piper PT  8/27/2020     "

## 2020-08-28 NOTE — PROGRESS NOTES
[facility] Lab Review- [facility]      8/27/2020   WBC 3.40 - 10.80 10*3/mm3 9.90   Neutrophils Absolute 1.70 - 7.00 10*3/mm3 8.76 (A)   Hemoglobin 12.0 - 15.9 g/dL 14.8   Hematocrit 34.0 - 46.6 % 43.8   Platelets 140 - 450 10*3/mm3 165   Creatinine 0.60 - 1.10 mg/dL 0.50 (A)   eGFR Non African Am >60 mL/min/1.73 128   BUN 6 - 20 mg/dL 26 (A)   Sodium 134 - 145 mmol/L 137   Potassium 3.5 - 4.7 mmol/L 4.6   Glucose 74 - 124 mg/dL 94   Calcium 8.5 - 10.2 mg/dL 8.9   Albumin 3.50 - 5.20 g/dL 4.00   Total Protein 6.3 - 8.0 g/dL 6.2 (A)   AST (SGOT) 0 - 32 U/L 16   ALT (SGPT) 0 - 33 U/L 29   Alkaline Phosphatase 38 - 116 U/L 62   Total Bilirubin 0.2 - 1.2 mg/dL 0.5     Pharmacy will continue to follow.    Thanks,  Laura Huggins, PharmD

## 2020-09-02 NOTE — PROGRESS NOTES
Pt and  here for RN review. Copy of labs given to pt. Pt states this is her last day of radiation and temodar. States she has began to get a little more fatigued throughout the day. No n/v/d. Continues to take dex as scheduled. Pt will return in 1 and 2 weeks for RN review, seeing Dr. Trimble in 3 weeks.  attentively caring for patient's needs at home. Informed pt to call with any concerns.     Lab Results   Component Value Date    WBC 10.48 09/02/2020    HGB 14.6 09/02/2020    HCT 43.1 09/02/2020    MCV 93.9 09/02/2020     (L) 09/02/2020

## 2020-09-03 NOTE — THERAPY TREATMENT NOTE
Outpatient Physical Therapy Neuro Treatment Note  [facility]     Patient Name: Denise Guajardo  : 1965  MRN: [id number]  Today's Date: 9/3/2020      Visit Date: 2020    Visit Dx:    ICD-10-CM ICD-9-CM   1. Glioblastoma (CMS/HCC) C71.9 191.9   2. Left hemiparesis (CMS/HCC) G81.94 342.90   3. Functional gait abnormality R26.89 781.2       Patient Active Problem List   Diagnosis   • Glioblastoma multiforme (CMS/HCC)   • Intracranial hemorrhage (CMS/HCC)   • Dysphagia   • Acute left-sided weakness   • Brain tumor (CMS/HCC)   • Hemiplegia (CMS/HCC)   • Vasogenic brain edema (CMS/HCC)           PT Neuro     Row Name 20 1429             Subjective Comments    Subjective Comments  Pt asked about getting some more exercises to do at home.   -GEGE         Precautions and Contraindications    Precautions/Limitations  fall precautions  -GEGE      Precautions  left UE flaccid , moves quickly with functional activities   -GEGE         Subjective Pain    Able to rate subjective pain?  yes  -GEGE      Pre-Treatment Pain Level  0  -GEGE      Post-Treatment Pain Level  0  -GEGE         Posture/Observations    Posture/Observations Comments  Pt arrived to PT via wheelchair with her AFO in place. In sitting trunk in neutral. Pt said her AFO was bothering her and PT noticed an inside pad at proximal leg was bend and pressing into her leg. PT readjusted it and it felt better per pt.   -GEGE         Transfers    Sit-Stand [place] (Transfers)  minimum assist (75% patient effort);verbal cues  -GEGE      Stand-Sit [place] (Transfers)  minimum assist (75% patient effort);verbal cues  -GEGE      Transfers, Sit-Stand-Sit, Assist Device  jakub walker;quad cane SBQC  -GEGE      Transfer, Safety Issues  weight-shifting ability decreased;balance decreased during turns;sequencing ability decreased  -GEGE      Transfer, Impairments  muscle tone abnormal;ROM decreased;strength decreased;impaired balance  -GEGE      Comment (Transfers)  Cues to  transfer to stand more in midline, still leans heavily to R.   -GEGE         Gait/Stairs Assessment/Training    [place] Level (Gait)  moderate assist (50% patient effort);2 person assist 2nd person to support L UE and for safety   -GEGE      Assistive Device (Gait)  walker, jakub;cane, quad;AFO SBQC; ace wrap L foot for ease of sliding foot   -GEGE      Distance in Feet (Gait)  40' x 2, 20' x 1 hemiwalker; 15' x 1 SBQC -- less stability with QC   -GEGE      Pattern (Gait)  step-to  -GEGE      Deviations/Abnormal Patterns (Gait)  base of support, narrow;gait speed decreased;stride length decreased  -GEGE      Bilateral Gait Deviations  forward flexed posture;leans right  -GEGE      Left Sided Gait Deviations  foot drop/toe drag;heel strike decreased;hip hiking;knee buckling, left side;leans right;weight shift ability decreased  -GEGE      Right Sided Gait Deviations  heel strike decreased quick step R   -GEGE      Comment (Gait/Stairs)  PT sat in front of pt on stool and braced L knee in stance. Pt able to advance L LE on her own.   -GEGE         Balance Skills Training    Standing-Level of Assistance  Minimum assistance quarding L knee   -GEGE      Static Standing Balance Support  jakub bar  -GEGE      Standing-Balance Activities  Weight Shift R-L  -GEGE        User Key  (r) = Recorded By, (t) = Taken By, (c) = Cosigned By    Initials Name Provider Type    Lisa Robertson PT Physical Therapist                  PT Assessment/Plan     Row Name 09/03/20 9016          PT Assessment    Assessment Comments  Pt is very motivated to do HEP at home and she was ready to begin some standing exercises by counter with  providing assist at her knee. The jakub-walker appears more stable for pt at this time vs the SBQC.   -GEGE       User Key  (r) = Recorded By, (t) = Taken By, (c) = Cosigned By    Initials Name Provider Type    Lisa Robertson PT Physical Therapist             OP Exercises     Row Name 09/03/20 0275              Subjective Comments    Subjective Comments  Pt asked about getting some more exercises to do at home.   -GEGE         Subjective Pain    Able to rate subjective pain?  yes  -GEGE      Pre-Treatment Pain Level  0  -GEGE      Post-Treatment Pain Level  0  -GEGE         Exercise 3    Exercise Name 3  Seated: L LAQ  -GEGE      Reps 3  10  -GEGE         Exercise 5    Exercise Name 5  Standing facing hemibars: mini squats   -GEGE      Cueing 5  Verbal;Tactile;Demo  -GEGE      Reps 5  15  -GEGE      Additional Comments  min bracing L knee at extension   -GEGE        User Key  (r) = Recorded By, (t) = Taken By, (c) = Cosigned By    Initials Name Provider Type    Lisa Robertson, PT Physical Therapist                          Therapy Education  Education Details: Reviewed HEP: pt told what she was doing -- LAQ, pressing L foot down into footplate (isometric), hip abduction in sitting. Added 2 new exercises: facing kitchen sink at home and hands on counter -- mini squats, lateral weight shift.  shown and demonstrated correct assist/support at her L knee.   Given: HEP, Mobility training  Program: Reinforced, New  How Provided: Verbal, Demonstration, Written  Provided to: Patient, Caregiver  Level of Understanding: Teach back education performed, Verbalized, Demonstrated              Time Calculation:   Start Time: 1429  Stop Time: 1515  Time Calculation (min): 46 min  Total Timed Code Minutes- PT: 46 minute(s)   Therapy Charges for Today     Code Description Service Date Service Provider Modifiers Qty    20976182650  PT NEUROMUSC RE EDUCATION EA 15 MIN 9/3/2020 Lisa Henderson, PT GP 2    [id number]  PT THERAPEUTIC ACT EA 15 MIN 9/3/2020 Lisa Henderson, PT GP 1              Patient was wearing a face mask during this therapy encounter. Therapist used appropriate personal protective equipment including eye protection, mask, and gloves.  Mask used was standard procedure mask. Appropriate PPE was worn during the entire therapy  session. Hand hygiene was completed before and after therapy session. Patient is not in enhanced droplet precautions.           Lisa Henderson, PT  9/3/2020

## 2020-09-08 NOTE — PROGRESS NOTES
RADIATION THERAPY TREATMENT SUMMARY  Patient: Denise Guajardo  YOB: 1965  Diagnosis:    Glioblastoma multiforme (CMS/HCC)     Denise Guajardo has recently completed the course of radiation therapy prescribed for the above-mentioned diagnosis.  Below, please find the specifics of the course of treatment delivered:    Radiation Details:    Tx Start Date  7/23/2020   Tx End date  9/2/2020  Intent   Curative   Total Treatments 30  Prescription Name Partial Brain - GBM BOOST  Primary/Boost  BOOST  Dose Per Fraction 300 cGy/Frac  Number of Fractions 5  Prescribe To  1500 cGy    Mode    Photon  Technique  VMAT  Frequency  Once Daily  Energy   6X  Prescription Note 100% COVERS 95%GTV1    Prescription Name Partial Brain - GBM  Primary/Boost  PRIMARY  Dose Per Fraction 180 cGy/Frac  Number of Fractions 25  Prescribe To  4500 cGy    Mode    Photon  Technique  VMAT  Frequency  Once Daily  Energy   6X  Prescription Note 100% COVERS 95%PTV45    TOTAL DOSE:  6000 cGy in 30 treatments      Tolerance and Toxicities: she tolerated the treatments well, mild fatigue , requiring no treatment breaks. she completed the treatments in 40 elapsed days.    Follow-up Plans: Has f/u mri scheduled for the 8th- seeing Dr. Licona on the 15th. Explained that's early but will review and arrange f/u as needed thereafter. Sees Dr. Trimble on the 25th as well.

## 2020-09-09 NOTE — THERAPY TREATMENT NOTE
"    Outpatient Physical Therapy Neuro Treatment Note  [facility]     Patient Name: Denise Guajardo  : 1965  MRN: [id number]  Today's Date: 2020      Visit Date: 2020    Visit Dx:    ICD-10-CM ICD-9-CM   1. Glioblastoma (CMS/HCC) C71.9 191.9   2. Left hemiparesis (CMS/HCC) G81.94 342.90   3. Functional gait abnormality R26.89 781.2       Patient Active Problem List   Diagnosis   • Glioblastoma multiforme (CMS/HCC)   • Intracranial hemorrhage (CMS/HCC)   • Dysphagia   • Acute left-sided weakness   • Brain tumor (CMS/HCC)   • Hemiplegia (CMS/HCC)   • Vasogenic brain edema (CMS/HCC)           PT Neuro     Row Name 20 1431             Subjective Comments    Subjective Comments  Pt said she has been having \"spasms\" in L leg.  said he has notived her R knee \"giving out sometimes\" too. Pt reports she dreams about walking and running.   -GEGE         Precautions and Contraindications    Precautions/Limitations  fall precautions  -GEGE      Precautions  left UE flaccid , moves quickly with functional activities   -GEGE         Subjective Pain    Able to rate subjective pain?  yes  -GEGE      Pre-Treatment Pain Level  0  -GEGE      Post-Treatment Pain Level  0  -GEGE         Posture/Observations    Posture/Observations Comments  Pt arrived to PT via wheelchair sitting in midline with her AFO in place.  Pt said her AFO was bothering her and PT noticed an inside pad at proximal leg was bend and pressing into her leg. PT adjusted AFO proximal pad again.   -GEGE         MMT Left Lower Ext    Lt Hip Flexion MMT, Gross Movement  -- pt was able to do 8 heel slides   -GEGE         Bed Mobility Assessment/Treatment    Supine-Sit Rescue (Bed Mobility)  minimum assist (75% patient effort);moderate assist (50% patient effort)  -GEGE      Sit-Supine Rescue (Bed Mobility)  minimum assist (75% patient effort) for L LE   -GEGE      Bed Mobility, Safety Issues  decreased use of legs for bridging/pushing;decreased use of " arms for pushing/pulling;impaired trunk control for bed mobility  -GEGE         Transfers    Bed-Chair [place] (Transfers)  minimum assist (75% patient effort)  -GEGE      Chair-Bed [place] (Transfers)  minimum assist (75% patient effort)  -GEGE      Sit-Stand [place] (Transfers)  minimum assist (75% patient effort);moderate assist (50% patient effort);verbal cues leaned a little more to R initially   -GEGE      Stand-Sit [place] (Transfers)  minimum assist (75% patient effort);verbal cues  -GEGE      Transfers, Sit-Stand-Sit, Assist Device  jakub walker  -GEGE      Transfer, Safety Issues  weight-shifting ability decreased;balance decreased during turns;sequencing ability decreased  -GEGE      Transfer, Impairments  muscle tone abnormal;ROM decreased;strength decreased;impaired balance  -GEGE         Gait/Stairs Assessment/Training    [place] Level (Gait)  moderate assist (50% patient effort);2 person assist at first, 2nd person for L UE, then safety only   -GEGE      Assistive Device (Gait)  walker, jakub;AFO did not ace wrap L foot today   -GEGE      Distance in Feet (Gait)  35' x 4  -GEGE      Pattern (Gait)  step-to  -GEGE      Deviations/Abnormal Patterns (Gait)  base of support, narrow;gait speed decreased;stride length decreased  -GEGE      Bilateral Gait Deviations  forward flexed posture;leans right  -GEGE      Left Sided Gait Deviations  foot drop/toe drag;heel strike decreased;hip hiking;knee buckling, left side;leans right;weight shift ability decreased  -GEGE      Right Sided Gait Deviations  heel strike decreased;weight shift ability decreased quick step   -GEGE      Comment (Gait/Stairs)  For first 2 walks, PT sat on stool in front of pt to block L knee and facilitate weight shift. Then last 2 walks, PT stood on her L side and used her hand to brace L knee (minimal assist to prevent buckling) and tech stood on R side. Pt was able to advance her L LE on her own 80% of the time.   -GEGE         Balance Skills  "Training    Standing-Level of Assistance  Minimum assistance guarding L knee   -GEGE      Static Standing Balance Support  jakub bar;Right upper extremity supported;Left upper extremity supported L UE was physically placed on bar   -GEGE      Standing-Balance Activities  Weight Shift R-L  -GEGE         Ankle Foot Orthosis Management    Type (Ankle/Foot Orthosis)  left;AFO (ankle foot orthosis)  -GEGE      Functional Design (Ankle Foot Orthosis)  dynamic orthosis  -GEGE      Wearing Schedule (Ankle Foot Orthosis)  wear with activity/work  -GEGE        User Key  (r) = Recorded By, (t) = Taken By, (c) = Cosigned By    Initials Name Provider Type    Lisa [facility], PT Physical Therapist                  PT Assessment/Plan     Row Name 09/09/20 1701          PT Assessment    Assessment Comments  Pt remains very motivated in therapy and trying very hard in each session. Her L LE appears to be getting stronger as she was able to do heel slide and SAQ. Although buckling of L knee with ambulation still remains. Pt continues to benefit from PT to work on improving her mobility and quality of life.   -GEGE       User Key  (r) = Recorded By, (t) = Taken By, (c) = Cosigned By    Initials Name Provider Type    Lisa [facility], PT Physical Therapist             OP Exercises     Row Name 09/09/20 1431             Subjective Comments    Subjective Comments  Pt said she has been having \"spasms\" in L leg.  said he has notived her R knee \"giving out sometimes\" too. Pt reports she dreams about walking and running.   -GEGE         Subjective Pain    Able to rate subjective pain?  yes  -GEGE      Pre-Treatment Pain Level  0  -GEGE      Post-Treatment Pain Level  0  -GEGE         Exercise 6    Exercise Name 6  Supine: B quad sets  -GEGE      Cueing 6  Verbal;Tactile  -GEGE      Reps 6  10  -GEGE         Exercise 7    Exercise Name 7  Supine: L SAQ  -GEGE      Cueing 7  Verbal;Tactile  -GEGE      Reps 7  8  -GEGE         Exercise 8    Exercise Name 8  " Supine: L heel slides   -GEGE      Cueing 8  Tactile  -GEGE      Reps 8  10  -GEGE        User Key  (r) = Recorded By, (t) = Taken By, (c) = Cosigned By    Initials Name Provider Type    Lisa [facility], PT Physical Therapist                          Therapy Education  Education Details: Two additional HEP added today: L SAQ and B quad sets.    Given: HEP  Program: New, Reinforced  How Provided: Verbal, Demonstration, Written  Provided to: Patient, Caregiver  Level of Understanding: Teach back education performed, Verbalized, Demonstrated              Time Calculation:   Start Time: 1431  Stop Time: 1516  Time Calculation (min): 45 min  Total Timed Code Minutes- PT: 45 minute(s)   Therapy Charges for Today     Code Description Service Date Service Provider Modifiers Qty    45202870713  PT NEUROMUSC RE EDUCATION EA 15 MIN 9/9/2020 Lisa Henderson, PT GP 2    [id number]  PT THERAPEUTIC ACT EA 15 MIN 9/9/2020 Lisa Henderson, PT GP 1            Patient was wearing a face mask during this therapy encounter. Therapist used appropriate personal protective equipment including eye protection, mask, and gloves.  Mask used was standard procedure mask. Appropriate PPE was worn during the entire therapy session. Hand hygiene was completed before and after therapy session. Patient is not in enhanced droplet precautions.              iLsa Henderson, HILARIO  9/9/2020

## 2020-09-10 NOTE — THERAPY TREATMENT NOTE
Outpatient Occupational Therapy Neuro Treatment Note  [facility]     Patient Name: Denise Guajardo  : 1965  MRN: [id number]  Today's Date: 9/10/2020       Visit Date: 09/10/2020    Patient Active Problem List   Diagnosis   • Glioblastoma multiforme (CMS/HCC)   • Intracranial hemorrhage (CMS/HCC)   • Dysphagia   • Acute left-sided weakness   • Brain tumor (CMS/HCC)   • Hemiplegia (CMS/HCC)   • Vasogenic brain edema (CMS/HCC)        Past Medical History:   Diagnosis Date   • Anxiety    • Glioblastoma (CMS/HCC)    • History of hemiplegia    • S/P craniotomy    • Vertigo         Past Surgical History:   Procedure Laterality Date   • BRAIN SURGERY     • BREAST BIOPSY     • CRANIOTOMY FOR TUMOR Right 2020    Procedure: Right-sided craniotomy for excisional biopsy and, placement of lumbar drain;  Surgeon: Shai Licona MD;  Location: [facility];  Service: Neurosurgery;  Laterality: Right;   • DENTAL PROCEDURE     • TUBAL ABDOMINAL LIGATION     • WISDOM TOOTH EXTRACTION           Visit Dx:    ICD-10-CM ICD-9-CM   1. Glioblastoma multiforme (CMS/HCC) C71.9 191.9   2. Weakness of right arm R29.898 729.89                   OT Assessment/Plan     Row Name 09/10/20 1546          OT Assessment    Assessment Comments  Showing some increase in tone/flexor muscles with internal rotation. Strongly encouraging proper positioning while sitting/standing and reaching out/away from body to decrease synergistic positioning.   -SG       User Key  (r) = Recorded By, (t) = Taken By, (c) = Cosigned By    Initials Name Provider Type    Dulce Leong OTLUZ MARIA Occupational Therapist                          Modalities     Row Name 09/10/20 1546             ELECTRICAL STIMULATION    Attended/Unattended  Attended  -SG      Stimulation Type  FES  -SG      Location/Electrode Placement/Other  NMES to L shoulder (16) and forearm (9.5). Skin integrity ok following removal of pads.  -SG        User Key  (r) = Recorded By, (t) =  "Taken By, (c) = Cosigned By    Initials Name Provider Type    Dulce Leong OTR Occupational Therapist        OT Exercises     Row Name 09/10/20 1543             Precautions    Existing Precautions/Restrictions  fall  -SG         Subjective Comments    Subjective Comments  Pt and spouse ask if patient is improving. \"She can hold a medicine bottle in her hand in the mornings.\"  -SG         Subjective Pain    Able to rate subjective pain?  yes  -SG      Pre-Treatment Pain Level  0  -SG         Exercise 1    Exercise Name 1  Supine air cast AAROM for shoulder in all planes.  -SG      Cueing 1  Verbal;Auditory  -SG         Exercise 2    Exercise Name 2  LUE table slides with target as focus to reach forward for; pt with strong IR that requires A from OT to push agaist. Encourage reaching out and away from body with UE support at shoulder. Pt also performs grasp/release of cones with OT providing max A at forearm and hand for rote task.  -SG      Cueing 2  Verbal;Demo  -SG        User Key  (r) = Recorded By, (t) = Taken By, (c) = Cosigned By    Initials Name Provider Type    Dulce Leong OTR Occupational Therapist                      Time Calculation:   OT Start Time: 1349  OT Stop Time: 1430  OT Time Calculation (min): 41 min     Therapy Charges for Today     Code Description Service Date Service Provider Modifiers Qty    41693677138 HC OT NEUROMUSC RE EDUCATION EA 15 MIN 9/10/2020 Dulce Villalpando OTR GO 3                    MARCIAL Hodge  9/10/2020  "

## 2020-09-10 NOTE — PROGRESS NOTES
CBC reviewed with pt and . Plt count slightly lower at 107K, all other counts stable.  had a few questions for Dr. Trimble:  -Does pt need to return next week for recheck  -Is it OK for her to go to the grocery?  -She has started to repeat words/phrases, is this to be expected?    Pt had MRI of brain done this week and would like for Dr. Trimble to interpret.    Discussed all with Dr. Trimble. Per Dr. Trimble, pt does need to keep apt next week. She is OK to go to the grocery as long as she wears a mask. He will look over MRI and give pt and  a call once he reviews. Informed pt and  and they v/u.       Lab Results   Component Value Date    WBC 9.46 09/10/2020    HGB 14.3 09/10/2020    HCT 40.8 09/10/2020    MCV 92.9 09/10/2020     (L) 09/10/2020

## 2020-09-14 NOTE — THERAPY TREATMENT NOTE
Outpatient Physical Therapy Neuro Treatment Note  [facility]     Patient Name: Denise Guajardo  : 1965  MRN: [id number]  Today's Date: 2020      Visit Date: 2020    Visit Dx:  No diagnosis found.    Patient Active Problem List   Diagnosis   • Glioblastoma multiforme (CMS/HCC)   • Intracranial hemorrhage (CMS/HCC)   • Dysphagia   • Acute left-sided weakness   • Brain tumor (CMS/HCC)   • Hemiplegia (CMS/HCC)   • Vasogenic brain edema (CMS/HCC)           PT Neuro     Row Name 20 8235             Subjective Comments    Subjective Comments  Pt and spouse deny concerns at home.  Family wants pt to ambulate as much as possible  -KP         Subjective Pain    Able to rate subjective pain?  yes  -KP      Pre-Treatment Pain Level  0  -KP      Post-Treatment Pain Level  0  -KP         Cognition    Memory  Appears intact  -KP      Orientation Level  Oriented to situation;Oriented to person;Oriented to place  -KP      Safety Judgment  Good awareness of safety precautions  -KP      Deficits  Decreased awareness of deficits  -KP         Posture/Observations    Posture/Observations Comments  Pt arrived to Pt via W/c with spouse.  -KP         Transfers    Assistive Device (Bed-Chair Transfers)  wheelchair;walker, jakub  -KP      Sit-Stand [place] (Transfers)  minimum assist (75% patient effort);moderate assist (50% patient effort);verbal cues  -KP      Stand-Sit [place] (Transfers)  minimum assist (75% patient effort);verbal cues  -KP      Transfers, Sit-Stand-Sit, Assist Device  jakub walker  -KP      Transfer, Safety Issues  weight-shifting ability decreased;balance decreased during turns;sequencing ability decreased  -KP      Transfer, Impairments  muscle tone abnormal;ROM decreased;strength decreased;impaired balance  -KP      Comment (Transfers)  Cues for midline orientation.  -KP         Gait/Stairs (Locomotion)    [place] Level (Gait)  moderate assist (50% patient effort);2 person  assist  -KP      Assistive Device (Gait)  walker, jakub  -KP      Distance in Feet (Gait)  35 x 2, 90 x 2  -KP      Pattern (Gait)  step-to  -KP      Deviations/Abnormal Patterns (Gait)  base of support, narrow;gait speed decreased;stride length decreased  -KP      Bilateral Gait Deviations  forward flexed posture;leans right  -KP      Left Sided Gait Deviations  foot drop/toe drag;heel strike decreased;hip hiking;knee buckling, left side;leans right;weight shift ability decreased  -KP      Right Sided Gait Deviations  heel strike decreased;weight shift ability decreased;knee buckling, left side  -      Comment (Gait/Stairs)  Improved swing L with WS at hips.  Improved gait quality with cues for sequence and Vc to slow down.  -         Balance Skills Training    Standing-Level of Assistance  Minimum assistance  -      Static Standing Balance Support  jakub bar;Right upper extremity supported;Left upper extremity supported  -      Standing-Balance Activities  Weight Shift R-L  -KP         Ankle Foot Orthosis Management    Type (Ankle/Foot Orthosis)  left;AFO (ankle foot orthosis)  -      Functional Design (Ankle Foot Orthosis)  dynamic orthosis  -      Wearing Schedule (Ankle Foot Orthosis)  wear with activity/work  -        User Key  (r) = Recorded By, (t) = Taken By, (c) = Cosigned By    Initials Name Provider Type     Shannan Meade, PT Physical Therapist                  PT Assessment/Plan     Row Name 09/14/20 1618 09/14/20 1412       PT Assessment    Functional Limitations  Decreased safety during functional activities;Impaired gait;Limitations in community activities;Limitation in home management;Limitations in functional capacity and performance;Performance in leisure activities;Performance in sport activities;Performance in work activities  -  Decreased safety during functional activities;Impaired gait;Limitations in community activities;Limitation in home management;Limitations in  functional capacity and performance  -    Impairments  Balance;Cognition;Gait;Endurance;Impaired muscle endurance  -  Balance;Endurance;Gait;Impaired muscle endurance;Muscle strength  -    Assessment Comments  Pt and family continue to be very motivated for pt to amb at home.  Pt balance, motor control, motor planning, and strength are limiting safe amb at home.   Pt demonstrated improved gait quality today in terms of swing and wt shift but needed mod MC at hips and trunk to perform.  Rec cont PT to adress impairments.  -  Pt is very motivated to participate with Pt but is limted by weakness and ability to coordinate all movts to attain functional gait.  Improved gait distance and gait quality nted today.  Able to achieve swing L with Min MC at hips to WS R.  -      User Key  (r) = Recorded By, (t) = Taken By, (c) = Cosigned By    Initials Name Provider Type    Shannan Young PT Physical Therapist             OP Exercises     Row Name 09/14/20 8106             Subjective Comments    Subjective Comments  Pt and spouse deny concerns at home.  Family wants pt to ambulate as much as possible  -         Subjective Pain    Able to rate subjective pain?  yes  -      Pre-Treatment Pain Level  0  -KP      Post-Treatment Pain Level  0  -         Exercise 9    Exercise Name 9  standing terminal knee ext at jakub bar  -      Cueing 9  Verbal;Demo  -      Reps 9  10  -         Exercise 10    Exercise Name 10  standing step ups R to 4 inch step.  -      Cueing 10  Verbal;Demo  -      Reps 10  10  -KP        User Key  (r) = Recorded By, (t) = Taken By, (c) = Cosigned By    Initials Name Provider Type    Shannan Young PT Physical Therapist                          Therapy Education  Education Details: Reviewed need to slow down and break gait/ transfers into steps in order to perfomr more safely.  Given: HEP, Mobility training, Fall prevention and home safety  Program: Reinforced  How  Provided: Verbal, Demonstration  Provided to: Patient, Caregiver  Level of Understanding: Teach back education performed, Verbalized, Demonstrated              Time Calculation:   Start Time: 1350  Stop Time: 1435  Time Calculation (min): 45 min  Total Timed Code Minutes- PT: 45 minute(s)   Therapy Charges for Today     Code Description Service Date Service Provider Modifiers Qty    81655369257  PT NEUROMUSC RE EDUCATION EA 15 MIN 9/14/2020 Shannan Meade, PT GP 2    [id number]  PT THER PROC EA 15 MIN 9/14/2020 Shannan Meade, PT GP 1          Patient was wearing a face mask during this therapy encounter. Therapist used appropriate personal protective equipment including eye protection, mask, and gloves.  Mask used was standard procedure mask. Appropriate PPE was worn during the entire therapy session. Hand hygiene was completed before and after therapy session. Patient is not in enhanced droplet precautions.                 Shannan Meade, PT  9/14/2020

## 2020-09-14 NOTE — TELEPHONE ENCOUNTER
Called the patient to offer a telephone visit instead of an in office visit if the patient wish's. I was unable to LVM to call the office as the voicemail box was full. If she prefers to come to the office that is perfectly ok, we just need to complete her Covid travel screening.

## 2020-09-14 NOTE — PROGRESS NOTES
Subjective   History of Present Illness: Denise Guajardo is a 55 y.o. female is here today for post-op follow-up. Ms. Guajardo had Right temporal craniotomy with excisional biopsy of mass in the right cerebral peduncle and placement of a lumbar drain done on 7/1/2020.     She denies any headaches or dizziness. She reports intermittent blurry and double vision in her right eye. Her incision is clean and dry with no redness and drainage.    Patient and her family are both wearing a mask in our office today.     History of Present Illness     This patient returns today.  Her incision has healed well.  She is markedly improved from when she was in the hospital.  Her movement on the left side has improved.  She still has some 3rd nerve weakness on the right side.  She finished radiation about 2 weeks ago.    The following portions of the patient's history were reviewed and updated as appropriate: allergies, current medications, past family history, past medical history, past social history, past surgical history and problem list.    Review of Systems   Constitutional: Negative for fever.   Eyes: Positive for visual disturbance (Right eye- Blurry and double vision).   Respiratory: Negative for chest tightness and shortness of breath.    Cardiovascular: Negative for chest pain.   Neurological: Negative for dizziness and headaches.       I have reviewed the review of systems as documented by my MA.      Objective     Vitals:    09/15/20 0929   BP: 117/78   Pulse: 87   Temp: 98.4 °F (36.9 °C)     There is no height or weight on file to calculate BMI.      Physical Exam  Neurological:      Mental Status: She is alert and oriented to person, place, and time.       Neurologic Exam     Mental Status   Oriented to person, place, and time.           Assessment/Plan   Independent Review of Radiographic Studies:      I personally reviewed the images from the following studies.    I reviewed her MRI that was done at [facility] on 8  September.  This shows a slight increase of the tumor in the chilango.  The postoperative hematoma has completely resorbed.    Medical Decision Making:      I told the patient and her  about the imaging.  She is clinically improved.  I recommended we repeat the imaging in about 6 weeks.  This shows further growth of the tumor this will give us a better idea of prognosis.    Denise was seen today for post-op.    Diagnoses and all orders for this visit:    Glioblastoma multiforme (CMS/HCC)  -     MRI Brain With & Without Contrast; Future      Return in about 6 weeks (around 10/27/2020).

## 2020-09-17 NOTE — THERAPY PROGRESS REPORT/RE-CERT
Outpatient Occupational Therapy Neuro Re-Evaluation  [facility]     Patient Name: Denise Guajardo  : 1965  MRN: [id number]  Today's Date: 2020      Visit Date: 2020    Patient Active Problem List   Diagnosis   • Glioblastoma multiforme (CMS/HCC)   • Intracranial hemorrhage (CMS/HCC)   • Dysphagia   • Acute left-sided weakness   • Brain tumor (CMS/HCC)   • Hemiplegia (CMS/HCC)   • Vasogenic brain edema (CMS/HCC)        Past Medical History:   Diagnosis Date   • Anxiety    • Glioblastoma (CMS/HCC)    • History of hemiplegia    • S/P craniotomy    • Vertigo         Past Surgical History:   Procedure Laterality Date   • BRAIN SURGERY     • BREAST BIOPSY     • CRANIOTOMY FOR TUMOR Right 2020    Procedure: Right-sided craniotomy for excisional biopsy and, placement of lumbar drain;  Surgeon: Shai Licona MD;  Location: [facility] MAIN OR;  Service: Neurosurgery;  Laterality: Right;   • DENTAL PROCEDURE     • TUBAL ABDOMINAL LIGATION     • WISDOM TOOTH EXTRACTION           Visit Dx:      ICD-10-CM ICD-9-CM   1. Glioblastoma multiforme (CMS/HCC)  C71.9 191.9   2. Weakness of right arm  R29.898 729.89           OT Neuro     Row Name 20 1500             Subjective Comments    Subjective Comments  Pt and spouse are seeing improvements at home with pt and think her LUE is improving.  -SG         Precautions and Contraindications    Precautions/Limitations  fall precautions  -SG         Subjective Pain    Able to rate subjective pain?  yes  -SG      Pre-Treatment Pain Level  0  -SG         Cognitive Assessment/Intervention    Orientation Status (Cognition)  oriented x 3  -SG      Cognition Comments  Slurred speech especially with fatigue  -SG         Sensation    Light Touch  Partial deficits in the LUE  -SG      Sharp/Dull  Partial deficits in the LUE  -SG      Additional Comments  Proprioception improving, seems to attend to LUE with less cues and more indep'ly  -SG         Coordination    Box  and Blocks Left  UNABLE  -SG      9-Hole Peg Left  UNABLE  -SG         General ROM    LT Upper Ext  Lt Shoulder ABduction;Lt Shoulder Extension;Lt Shoulder Flexion;Lt Shoulder External Rotation;Lt Shoulder Internal Rotation;Lt Elbow Extension/Flexion;Lt Elbow Supination;Lt Elbow Pronation;Lt Wrist Flexion;Lt Wrist Extension  -SG         Left Upper Ext    Lt Shoulder Abduction AROM  1/8  -SG      Lt Shoulder Extension AROM  1/8  -SG      Lt Shoulder Flexion AROM  TRACE  -SG      Lt Shoulder External Rotation AROM  TRACE  -SG      Lt Shoulder Internal Rotation AROM  1/2  -SG      Lt Elbow Extension/Flexion AROM  LACKS 7/8 EXT, 1/4 FLEX  -SG      Lt Elbow Supination AROM  TRACE  -SG      Lt Elbow Pronation AROM  TRACE  -SG      Lt Wrist Flexion AROM  0  -SG      Lt Wrist Extension AROM  0  -SG         MMT Left Upper Ext    Lt Upper Extremity Comments   1 TO 2-/5 THROUGHOUT  -SG         Transfers    Transfers, Sit-Stand [place]  minimum assist (75% patient effort);moderate assist (50% patient effort);verbal cues required;nonverbal cues required (demo/gesture)  -SG      Transfers, Stand-Sit [place]  moderate assist (50% patient effort);verbal cues required;nonverbal cues required (demo/gesture)  -SG         Bathing Assessment/Intervention    [place] Level (Bathing)  bathing skills;minimum assist (75% patient effort);moderate assist (50% patient effort)  -SG         Upper Body Dressing Assessment/Training    [place] Level (Upper Body Dressing)  upper body dressing skills;minimum assist (75% patient effort)  -SG         Lower Body Dressing Assessment/Training    [place] Level (Lower Body Dressing)  lower body dressing skills;moderate assist (50% patient effort)  -SG         Toileting Assessment/Training    [place] Level (Toileting)  toileting skills;moderate assist (50% patient effort);maximum assist (25% patient effort)  -SG         Grooming Assessment/Training    [place] Level  (Grooming)  grooming skills;minimum assist (75% patient effort)  -SG        User Key  (r) = Recorded By, (t) = Taken By, (c) = Cosigned By    Initials Name Provider Type    Dulce Leong OTR Occupational Therapist                  Therapy Education  Education Details: Continued to stress importance of shoulder shurgs and scap retraction as well as proper positioning and support.  Given: Symptoms/condition management, HEP  Program: Reinforced  How Provided: Verbal  Provided to: Patient, Caregiver  Level of Understanding: Verbalized, Demonstrated    OT Goals     Row Name 09/17/20 1500          OT Short Term Goals    STG Date to Achieve  10/15/20  -SG     STG 1  Patient and family to be independent with gross motor coordination home exercise program.  -SG     STG 1 Progress  Ongoing  -SG     STG 2  Pt. and family to be independent with UE ROM HEP.  -SG     STG 2 Progress  Ongoing  -SG     STG 3  Pt. and family to be instructed in beginning LUE PROM HEP and therapy ball trunk exercises.  -SG     STG 3 Progress  Ongoing  -SG        Long Term Goals    LTG Date to Achieve  12/10/20  -SG     LTG 1  Pt to improve static standing balance to min A to improve independence with ADLs.  -SG     LTG 1 Progress  Ongoing  -SG     LTG 2  Patient to use affected hand to hold grooming objects for opening containers and completing grooming tasks.  -SG     LTG 2 Progress  Ongoing  -SG     LTG 3  Pt. to use L upper extremity as stabilizer during functional tasks.  -SG     LTG 3 Progress  Ongoing  -SG     LTG 4  Pt to show improvement in L shoulder AROM to 1/4 in all planes.  -SG     LTG 4 Progress  Ongoing  -SG     LTG 5  Pt to show trace digit ROM of the L hand.   -SG     LTG 5 Progress  Met  -SG     LTG 6  Tolerated WB'ing through R hand x5 min and performed functional reaching w/L UE.  -SG     LTG 6 Progress  Partially Met;Ongoing  -SG     LTG 7  Pt to show improvement in STM for ADL tasks as demonstrated by visual/logical  sequencing task in the clinic.  -SG     LTG 7 Progress  Ongoing  -SG        Time Calculation    OT Goal Re-Cert Due Date  10/15/20  -SG       User Key  (r) = Recorded By, (t) = Taken By, (c) = Cosigned By    Initials Name Provider Type    Dulce Leong OTR Occupational Therapist                 OT Assessment/Plan     Row Name 09/17/20 1551          OT Assessment    Functional Limitations  Decreased safety during functional activities;Limitations in functional capacity and performance;Performance in leisure activities;Performance in self-care ADL;Limitation in home management;Limitations in community activities  -SG     Impairments  Range of motion;Motor function;Endurance;Impaired attention;Posture;Poor body mechanics;Dexterity;Coordination;Cognition;Balance;Muscle strength  -SG     Assessment Comments  Pt seen today for OT re-evaluation. Pt has been seen by OT to address the LUE deficits as well as ADL function. Pt has improved slightly with needing less assist with ADLs and states that she is now able to most of her UBB as well as put her shirt on with Carlotta for the LUE. She attemps to help with simple chores at the house such as folding laundry seated. Pt has shown some slight increase in movement of the LUE, particularly the elbow. Last week she did begin to show some increase in tone but with education on positioning today she was able to work more on some external rotating tasks. She cont to have limited grasp of the L hand however she did show trace digit flexion and extension today. She could continue to benefit from OP OT to address the LUE to improve her functional independence and improve quality of life.  -SG     Please refer to paper survey for additional self-reported information  Yes  -SG     OT Rehab Potential  Good  -SG     Patient/caregiver participated in establishment of treatment plan and goals  Yes  -SG     Patient would benefit from skilled therapy intervention  Yes  -SG        OT Plan     Predicted Duration of Therapy Intervention (OT)  1-2x/week for 12 weeks  -     Planned CPT's?  OT EVAL MOD COMPLEXITY: 60011;OT NEUROMUSC RE EDUCATION EA 15 MIN: 48021;OT SELF CARE/MGMT/TRAIN 15 MIN: 45374;OT HOT/COLD PACK;OT THER PROC EA 15 MIN: 16289US;OT ELECTRIC STIM ATTD EA 15 MIN: 34444  -SG     Planned Therapy Interventions (Optional Details)  balance training;home exercise program;motor coordination training;strengthening;ROM (Range of Motion);postural re-education;patient/family education;neuromuscular re-education;stretching;transfer training  -SG       User Key  (r) = Recorded By, (t) = Taken By, (c) = Cosigned By    Initials Name Provider Type    Dulce Leong OTR Occupational Therapist        OT Exercises     Row Name 09/17/20 1500             Exercise 1    Exercise Name 1  LUE table slides with a focus on flexion and horizontal movement toward the L, vcing and targets given on the L side to facilitate increased attention and movement.   -SG      Cueing 1  Verbal;Tactile;Auditory  -SG         Exercise 2    Exercise Name 2  Gross grasp of cones with the L hand, placing them onto a dowel with mod/max A from OT.  -SG      Cueing 2  Verbal;Demo  -SG        User Key  (r) = Recorded By, (t) = Taken By, (c) = Cosigned By    Initials Name Provider Type    Dulce Leong OTR Occupational Therapist            9 Hole Peg  9-Hole Peg Left: UNABLE  How much help from another is currently needed...  Putting on and taking off regular lower body clothing?: a lot  Bathing (including washing, rinsing, and drying): a lot  Toileting (which includes using toilet bed pan or urinal): a lot  Putting on and taking off regular upper body clothing: a lot  Taking care of personal grooming (such as brushing teeth): a lot  Eating meals: a little  AM-PAC 6 Clicks Score (OT): 13  Box and Blocks  Box and Blocks Left: UNABLE         Time Calculation:   OT Start Time: 1348  OT Stop Time: 1430  OT Time Calculation (min):  42 min     Therapy Charges for Today     Code Description Service Date Service Provider Modifiers Qty    [id number] HC OT NEUROMUSC RE EDUCATION EA 15 MIN 9/17/2020 Dulce Villalpando, MARCIAL GO 3                     MARCIAL Hodge  9/17/2020

## 2020-09-18 NOTE — PROGRESS NOTES
Lab Results   Component Value Date    WBC 8.29 09/18/2020    HGB 13.9 09/18/2020    HCT 39.5 09/18/2020    MCV 92.5 09/18/2020     (L) 09/18/2020     Pt is here for lab with RN review.  CBC reviewed with pt, counts are stable for this pt at this time. Spouse present. She has completed radiation therapy and is no longer taking Temodar. She reports swelling of left foot at times  Minimal swelling noted at present. Encouraged to keep foot elevated as much as possible. Wants to get hair done and states that somebody can come to house to do that. OK given if all are wearing masks. Copy of labs given to pt and f/u appt reviewed. Pt is instructed to call the office with any concerns or new symptoms prior to next visit. Marcellus fernandez

## 2020-09-21 NOTE — TELEPHONE ENCOUNTER
Pt has been feeling more tired lately. She recently completed radiation and oral chemo. Told pt and spouse that it is normal to experience increasing fatigue during and immediately after chemo and radiation as the body will take some time to recover. They both v/u.

## 2020-09-21 NOTE — THERAPY TREATMENT NOTE
Outpatient Occupational Therapy Neuro Treatment Note  [facility]     Patient Name: Denise Guajardo  : 1965  MRN: [id number]  Today's Date: 2020       Visit Date: 2020    Patient Active Problem List   Diagnosis   • Glioblastoma multiforme (CMS/HCC)   • Intracranial hemorrhage (CMS/HCC)   • Dysphagia   • Acute left-sided weakness   • Brain tumor (CMS/HCC)   • Hemiplegia (CMS/HCC)   • Vasogenic brain edema (CMS/HCC)        Past Medical History:   Diagnosis Date   • Anxiety    • Glioblastoma (CMS/HCC)    • History of hemiplegia    • S/P craniotomy    • Vertigo         Past Surgical History:   Procedure Laterality Date   • BRAIN SURGERY     • BREAST BIOPSY     • CRANIOTOMY FOR TUMOR Right 2020    Procedure: Right-sided craniotomy for excisional biopsy and, placement of lumbar drain;  Surgeon: Shai Licona MD;  Location: [facility];  Service: Neurosurgery;  Laterality: Right;   • DENTAL PROCEDURE     • TUBAL ABDOMINAL LIGATION     • WISDOM TOOTH EXTRACTION           Visit Dx:    ICD-10-CM ICD-9-CM   1. Glioblastoma multiforme (CMS/HCC)  C71.9 191.9   2. Weakness of right arm  R29.898 729.89                   OT Assessment/Plan     Row Name 20 1554          OT Assessment    Assessment Comments  Spouse and pt would like to be put on hold for OT after today due to limited visits and possibly applying for an appeal for visits or paying out of pockets. Will put on hold at this time.  -SG       User Key  (r) = Recorded By, (t) = Taken By, (c) = Cosigned By    Initials Name Provider Type    Dulce Leong OTR Occupational Therapist                     Therapy Education  Education Details: Encouraged pt and spouse to perform ROM/SROM/AAROM in supine and seated positions, WB, NMES, gave info on givmohr sling for LUE, LUE support and precautions as pt is going on a break due to insurance and visit limites.  Given: HEP  Program: Reinforced  How Provided: Verbal, Demonstration,  Written  Provided to: Patient, Caregiver  Level of Understanding: Verbalized, Demonstrated      OT Exercises     Row Name 09/21/20 1500             Exercise 1    Exercise Name 1  Reviewed supine and seated AAROM and SROM with pt and spouse.  -SG      Cueing 1  Verbal;Tactile;Auditory  -SG         Exercise 2    Exercise Name 2  Reviewed table slides, GMC, reaching for a target, LUE WB on different surfaces.  -SG      Cueing 2  Verbal;Demo  -SG        User Key  (r) = Recorded By, (t) = Taken By, (c) = Cosigned By    Initials Name Provider Type    Dulce Leong OTR Occupational Therapist                      Time Calculation:   OT Start Time: 1435  OT Stop Time: 1515  OT Time Calculation (min): 40 min     Therapy Charges for Today     Code Description Service Date Service Provider Modifiers Qty    [id number]  OT NEUROMUSC RE EDUCATION EA 15 MIN 9/21/2020 Dulce Villalpando OTR GO 3                    MARCIAL Hodge  9/21/2020

## 2020-09-21 NOTE — THERAPY TREATMENT NOTE
Outpatient Physical Therapy Neuro Treatment Note  [facility]     Patient Name: Denise Guajardo  : 1965  MRN: [id number]  Today's Date: 2020      Visit Date: 2020    Visit Dx:  No diagnosis found.    Patient Active Problem List   Diagnosis   • Glioblastoma multiforme (CMS/HCC)   • Intracranial hemorrhage (CMS/HCC)   • Dysphagia   • Acute left-sided weakness   • Brain tumor (CMS/HCC)   • Hemiplegia (CMS/HCC)   • Vasogenic brain edema (CMS/HCC)           PT Neuro     Row Name 20 1400 20 [id number]          Subjective Comments    Subjective Comments  --  Pt and spouse very anxious to begin walking at home.    -KP        Precautions and Contraindications    Precautions/Limitations  --  fall precautions  -KP        Subjective Pain    Able to rate subjective pain?  --  yes  -KP     Pre-Treatment Pain Level  --  0  -KP     Post-Treatment Pain Level  --  0  -KP        Cognition    Memory  --  Appears intact  -KP     Orientation Level  --  Oriented to situation;Oriented to person;Oriented to place  -KP     Safety Judgment  --  Good awareness of safety precautions  -KP     Deficits  --  Decreased awareness of deficits  -KP        Posture/Observations    Posture/Observations Comments  --  pt arrived to PT with spouse in w/c.  -KP        MMT Left Lower Ext    Lt Hip ABduction MMT, Gross Movement  (2/5) poor  -KP  --     Lt Knee Extension MMT, Gross Movement  (3-/5) fair minus  -KP  --     Lt Knee Flexion MMT, Gross Movement  (2-/5) poor minus  -KP  --     Lt Ankle Plantarflexion MMT, Gross Movement  (0/5) zero  -KP  --     Lt Ankle Dorsiflexion MMT, Gross Movement  (0/5) zero  -KP  --     Lt Ankle Subtalar Inversion MMT, Gross Movement  (0/5) zero  -KP  --     Lt Ankle Subtalar Eversion MMT, Gross Movement  (0/5) zero  -KP  --        Bed Mobility    Rolling Right [place] (Bed Mobility)  --  minimum assist (75% patient effort);verbal cues  -KP     Supine-Sit [place] (Bed Mobility)  --   minimum assist (75% patient effort);set up  -KP     Sit-Supine [place] (Bed Mobility)  --  minimum assist (75% patient effort);set up  -KP     Bed Mobility, Safety Issues  --  decreased use of legs for bridging/pushing;decreased use of arms for pushing/pulling;impaired trunk control for bed mobility  -KP     Assistive Device (Bed Mobility)  --  -- wedge  -KP        Transfers    Bed-Chair [place] (Transfers)  --  minimum assist (75% patient effort);set up  -KP     Chair-Bed [place] (Transfers)  --  minimum assist (75% patient effort);set up  -KP     Assistive Device (Bed-Chair Transfers)  --  wheelchair  -KP     Sit-Stand [place] (Transfers)  --  moderate assist (50% patient effort);minimum assist (75% patient effort);verbal cues;set up  -KP     Stand-Sit [place] (Transfers)  --  moderate assist (50% patient effort);minimum assist (75% patient effort);verbal cues;set up  -KP     Transfers, Sit-Stand-Sit, Assist Device  --  jakub walker  -KP     Transfer, Safety Issues  --  weight-shifting ability decreased;balance decreased during turns;sequencing ability decreased  -KP     Transfer, Impairments  --  muscle tone abnormal;ROM decreased;strength decreased;impaired balance  -     Comment (Transfers)  --  VC for sequencing, slow down, midline  -KP        Gait/Stairs (Locomotion)    [place] Level (Gait)  --  moderate assist (50% patient effort);minimum assist (75% patient effort);2 person assist Mod + min of second for safety  -KP     Assistive Device (Gait)  --  walker, jakub  -KP     Distance in Feet (Gait)  --  35 x 4  -KP     Pattern (Gait)  --  step-to  -KP     Deviations/Abnormal Patterns (Gait)  --  base of support, narrow;gait speed decreased;stride length decreased  -KP     Bilateral Gait Deviations  --  forward flexed posture;leans right  -KP     Left Sided Gait Deviations  --  foot drop/toe drag;heel strike decreased;hip hiking;knee buckling, left side;leans right;weight shift  ability decreased  -     Right Sided Gait Deviations  --  heel strike decreased;weight shift ability decreased;knee buckling, left side  -     Comment (Gait/Stairs)  --  At times, pt req frequent VC for sequence.  Dec balance noted with fatigue and distractionl.  Spouse - andrez - present for amb x 2 and instr in appropriate assist on L at gait belt and L shoulder as well as VC for sequencing and safety.  Spouse verbalized and demonstrated return and teach back.  Deny concerns with gait technique and appropriate safety concerns.  -        Balance Skills Training    Standing-Level of Assistance  --  Minimum assistance;Moderate assistance  -     Static Standing Balance Support  --  Right upper extremity supported;jakub bar  -     Standing-Balance Activities  --  Weight Shift R-L step tap R to 4 inch step 2 x 10  -        Ankle Foot Orthosis Management    Type (Ankle/Foot Orthosis)  --  left;AFO (ankle foot orthosis) [place] Liberal Step  -     Functional Design (Ankle Foot Orthosis)  --  dynamic orthosis  -     Wearing Schedule (Ankle Foot Orthosis)  --  wear with activity/work  -       User Key  (r) = Recorded By, (t) = Taken By, (c) = Cosigned By    Initials Name Provider Type     Shannan Meade, PT Physical Therapist                  PT Assessment/Plan     Row Name 09/21/20 1454 09/21/20 1400       PT Assessment    Functional Limitations  --  Decreased safety during functional activities;Impaired gait;Limitations in community activities;Limitation in home management;Limitations in functional capacity and performance;Performance in leisure activities;Performance in sport activities;Performance in work activities  -    Impairments  --  Balance;Cognition;Gait;Endurance;Impaired muscle endurance  -    Assessment Comments  Pt has made  progress with Pt over the last month and has shown improvement with gait and activity tolerance.  L swing and foot placement as well as sequencing have improved to  allow for inc safety with gati despite minimal strength returns which will allow pt and spouse to beging to attmept minimal amb at home with assist of another.Pt continues to demonstrate impariments in sensation, motor control, strength, balance, coordination, cognition and functional activity tolerance which have impacted functional activity and transfers.  Pt would benefit from continued PT to progress functional status as tolerated to maximize functional activity at home.  Rec comtinued PT 1-2 x/ week to progress transfers, gait  and balance as tolerated.  -KP  --      User Key  (r) = Recorded By, (t) = Taken By, (c) = Cosigned By    Initials Name Provider Type    Shannan Young PT Physical Therapist             OP Exercises     Row Name 09/21/20 1355             Subjective Comments    Subjective Comments  Pt and spouse very anxious to begin walking at home.    -         Subjective Pain    Able to rate subjective pain?  yes  -KP      Pre-Treatment Pain Level  0  -KP      Post-Treatment Pain Level  0  -KP        User Key  (r) = Recorded By, (t) = Taken By, (c) = Cosigned By    Initials Name Provider Type    Shannan Young PT Physical Therapist                      PT OP Goals     Row Name 09/21/20 1400          PT Short Term Goals    STG Date to Achieve  10/19/20  -     STG 1  Pt to perform rolling to the right with CGA and verbal cues.   -KP     STG 1 Progress  Met  -     STG 2  Pt to perform sit to supine with CGA and supine to sit with miniimal.  -KP     STG 2 Progress  Ongoing  -     STG 2 Progress Comments  Req Min a for LE at end of session.  -KP     STG 3  Pt to come to stand with feet even and increased wt through her L LE ~ 50% to promote L LE strengthening.   -KP     STG 3 Progress  Ongoing  -     STG 3 Progress Comments  VC for inc WB R  -KP     STG 4  Pt to come to stand and stand to sit with minimal of 1 with consistent wt shift over her LE's.    -KP     STG 4 Progress  Ongoing   -KP     STG 4 Progress Comments  Occ req Mod A due to LOB post  -KP     STG 5  Pt to increase strength in her left LE by 1/2 grade overall.   -KP     STG 5 Progress  Ongoing  -KP     STG 5 Progress Comments  Pt cont to struggle with L LE strength  -KP     STG 6  Pt to ambulate with jakub-walker ~ 60' x 3 with moderte of 1.   -KP     STG 6 Progress  Partially Met  -KP     STG 6 Progress Comments  Met when not fatigued - PT only as to inc cues needed toward end of gait.  -KP     STG 7  Pt to ambulate with decreased left hip hiking and increased knee flexion.   -KP     STG 7 Progress  Ongoing  -     STG 8  Pt and  to be independent with HEP with emphasis on L LE strengthening.   -KP     STG 8 Progress  Partially Met  -        Long Term Goals    LTG Date to Achieve  12/07/20  -     LTG 1  Pt to perform rolling right and left independently.   -KP     LTG 1 Progress  Ongoing  -     LTG 2  Pt to perform sit to supine and supine to sit with supervision.   -     LTG 2 Progress  Ongoing  -     LTG 3  Pt to come to stand with feet even and increased wt through her L LE ~ 90% to promote L LE strengthening.  -KP     LTG 3 Progress  Ongoing  -     LTG 4  Pt to increase strength in her left LE by 1/2 grade overall.   -     LTG 4 Progress  Ongoing  -     LTG 5  Pt to perform wheelchair to bed/mat with SBA.   -KP     LTG 5 Progress  Ongoing  -KP     LTG 5 Progress Comments  Performs with Min to R  -KP     LTG 6  Pt to ambulate > 150' with CGA with least restrictive device.  -     LTG 6 Progress  Ongoing  -     LTG 6 Progress Comments  Dist limited to 90 ft  -KP     LTG 7  Pt to ambulate with consistent left knee flexion with swing phase.   -     LTG 7 Progress  Ongoing  -       User Key  (r) = Recorded By, (t) = Taken By, (c) = Cosigned By    Initials Name Provider Type    Shannan Young, PT Physical Therapist          Therapy Education  Education Details: gave pt and spouse OK to amb in home 1-  2 times daily with Assist of two people( spouse providing Min/Mod and second person for safety/chair.  Ambulation only to occur when pt is not fatigued and for short distances<40 ft on uncarpeted surfaces.  Pt is to use jakub wx.  Spouse to hold gait belt and MC at L shoulder for upright posture.  Given: Mobility training, Fall prevention and home safety  Program: Reinforced  How Provided: Verbal  Provided to: Patient, Caregiver  Level of Understanding: Teach back education performed, Verbalized, Demonstrated              Time Calculation:   Start Time: 1345  Stop Time: 1430  Time Calculation (min): 45 min   Therapy Charges for Today     Code Description Service Date Service Provider Modifiers Qty    [id number]  PT NEUROMUSC RE EDUCATION EA 15 MIN 9/21/2020 Shannan Meade, PT GP 2    [id number]  PT THERAPEUTIC ACT EA 15 MIN 9/21/2020 Shannan Meade, PT GP 1            Patient was wearing a face mask during this therapy encounter. Therapist used appropriate personal protective equipment including eye protection, mask, and gloves.  Mask used was standard procedure mask. Appropriate PPE was worn during the entire therapy session. Hand hygiene was completed before and after therapy session. Patient is not in enhanced droplet precautions.             Shannan Meade, PT  9/21/2020

## 2020-09-25 PROBLEM — Z79.899 HIGH RISK MEDICATION USE: Status: ACTIVE | Noted: 2020-01-01

## 2020-09-25 PROBLEM — F32.9 REACTIVE DEPRESSION: Status: ACTIVE | Noted: 2020-01-01

## 2020-09-25 PROBLEM — B37.0 ORAL CANDIDA: Status: ACTIVE | Noted: 2020-01-01

## 2020-09-25 NOTE — PROGRESS NOTES
REASON FOR FOLLOWUP/CHIEF COMPLAINT:  Glioblastoma OF THE PROTUBERANCE DENSE LEFT BODY PARALYSIS, WHEEL CHAIR BOUND TOTAL CARE BY , UNDERGOING RADIATION AND TEMODAR.    HISTORY OF PRESENT ILLNESS:   PATIENT WAS CALLED THE DAY BEFORE BY THE OFFICE TO ASK FOR SYMPTOMS THAT COULD BE CONSISTENT WITH CORONAVIRUS INFECTION, AND BEING NEGATIVE WAS SCHEDULED TO BE SEEN IN THE OFFICE TODAY. SIMILAR QUESTIONING TODAY INCLUDING, CHILLS, FEVER, NEW COUGH, SHORTNESS OF BREATH, DIARRHEA,DIFFUSE BODY ACHES  AND CHANGES IN SMELL OR TASTE WERE NEGATIVE.THE PATIENT DENIED ANY CONTACT WITH PERSONS WHO WERE POSITIVE FOR COVID, AND PATIENT IS NOT IN CATEGORY OF HIGH RISK BEHAVIOR TO ACQUIRE COVID.    DURING THE VISIT WITH THE PATIENT TODAY , PATIENT HAD FACE MASK, MY MEDICAL ASSISTANT AND I  HAD PROPPER PROTECTIVE EQUIPMENT, AND I DID HAND HYGIENE WITH SOAP AND WATER BEFORE AND AFTER THE VISIT.      This patient returns today to the office in company of her  in a wheelchair to continue monitoring the status of her glioblastoma multiforme of mid-chilango that was treated with definitive radiation treatment and chemotherapy with Temodar for 42 days and completed close to 3 weeks ago. The patient has not had any further improvement in her neurological function in the left upper extremity and the left lower extremity function remains about what it was during the previous visit. The right side of her body is functioning well. She has minimal tingling in her left lower extremity at bedtime. The patient has not had any seizure activity or headache. She has developed some episodes of echolalia. The patient has had minimal deterioration in her speech especially when she tries to talk too fast. She has not had any difficulty swallowing and no choking. No fever, no chills. No cardiovascular or respiratory trouble. Normal bowel function and urination with normal continence. Her  is doing everything possible for her to be  involved in the family life and actually she has been able to ride in a buggy that they have at the farm. They are planning to go to [place] at some point. The patient has not had any other new issues at this time. We have reviewed her medication list in detail, please review below.     Also we reviewed with her today and her  the most recent MRI of her brain that was done too soon after completion of her radiation therapy.             Past Medical History:   Diagnosis Date   • Anxiety    • Glioblastoma (CMS/HCC)    • History of hemiplegia    • S/P craniotomy    • Vertigo      Past Surgical History:   Procedure Laterality Date   • BRAIN SURGERY     • BREAST BIOPSY     • CRANIOTOMY FOR TUMOR Right 7/1/2020    Procedure: Right-sided craniotomy for excisional biopsy and, placement of lumbar drain;  Surgeon: Shai Licona MD;  Location: [facility];  Service: Neurosurgery;  Laterality: Right;   • DENTAL PROCEDURE     • TUBAL ABDOMINAL LIGATION     • WISDOM TOOTH EXTRACTION       Social History     Socioeconomic History   • Marital status:      Spouse name: Malick   • Number of children: Not on file   • Years of education: Not on file   • Highest education level: Not on file   Occupational History     Employer: [employer]   Tobacco Use   • Smoking status: Never Smoker   • Smokeless tobacco: Never Used   Substance and Sexual Activity   • Alcohol use: Yes     Comment: Socially   • Drug use: No   • Sexual activity: Yes     Partners: Male     Birth control/protection: Post-menopausal     Family History   Problem Relation Age of Onset   • Ovarian cancer Maternal Aunt    • Breast cancer Neg Hx    • Uterine cancer Neg Hx    • Colon cancer Neg Hx    • Deep vein thrombosis Neg Hx    • Pulmonary embolism Neg Hx    No Known Allergies   Current Outpatient Medications on File Prior to Visit   Medication Sig   • Calcium Carbonate (Caltrate 600) 1500 (600 Ca) MG tablet Take 600 mg by mouth Daily.    • clotrimazole (MYCELEX) 10 MG lily Dissolve 1 tablet by mouth 3 (Three) Times a Day.   • dexamethasone (DECADRON) 4 MG tablet Take 1 tablet by mouth Every 12 (Twelve) Hours.   • HYDROcodone-acetaminophen (NORCO) 5-325 MG per tablet    • levETIRAcetam (KEPPRA) 500 MG tablet Take 1 tablet by mouth Every 12 (Twelve) Hours.   • melatonin 1 MG tablet Take 1 tablet by mouth Every Night.   • ondansetron (ZOFRAN) 8 MG tablet Take 1 tablet by mouth Every 8 (Eight) Hours As Needed for Nausea or Vomiting.   • pantoprazole (PROTONIX) 40 MG EC tablet Take 1 tablet by mouth Daily.   • polyethylene glycol (MIRALAX) 17 g packet Take 17 g by mouth Daily As Needed (No BM in 2 days).   • sennosides-docusate (PERICOLACE) 8.6-50 MG per tablet Take 2 tablets by mouth 2 (Two) Times a Day As Needed for Constipation.   • sulfamethoxazole-trimethoprim (BACTRIM DS,SEPTRA DS) 800-160 MG per tablet One tablet by mouth three times weekly  Indications: PROPHYLAXIS   • temozolomide (TEMODAR) 100 MG chemo capsule Take 1 capsule by mouth with 1 other temozolomide prescription for 110 mg total Daily.   • temozolomide (TEMODAR) 5 MG chemo capsule Take 2 capsules by mouth with 1 other temozolomide prescription for 110 mg total Daily.   • Vitamin D, Cholecalciferol, (CHOLECALCIFEROL) 10 MCG (400 UNIT) tablet Take  by mouth Daily. Unknown units     No current facility-administered medications on file prior to visit.        Ros:       General: no fever, no chills,  fatigue,no weight changes, no lack of appetite.  Eyes: no epiphora, xerophthalmia,conjunctivitis, pain, glaucoma, blurred vision, blindness, secretion, photophobia, proptosis, diplopia.  Ears: no otorrhea, tinnitus, otorrhagia, deafness, pain, vertigo.  Nose: no rhinorrhea, no epistaxis, no alteration in perception of odors, no sinuses pressure.  Mouth: no alteration in gums or teeth,  No ulcers, no difficulty with mastication or deglut ion, mild odynophagia.  Neck: no masses or pain, no  "thyroid alterations, no pain in muscles or arteries, no carotid odynia, no crepitation.  Respiratory: no cough, no sputum production,no dyspnea,no trepopnea, no pleuritic pain,no hemoptysis.  Heart: no syncope, no irregularity, no palpitations, no angina,no orthopnea,no paroxysmal nocturnal dyspnea.  Vascular Venous: no tenderness,no edema,no palpable cords,no postphlebitic syndrome, no skin changes no ulcerations.  Vascular Arterial: no distal ischemia, noclaudication, no gangrene, no neuropathic ischemic pain, no skin ulcers, no paleness no cyanosis.  GI: no dysphagia, stated odynophagia, no regurgitation, no heartburn,no indigestion,no nausea,no vomiting,no hematemesis ,no melena,no jaundice,no distention, no obstipation,no enterorrhagia,no proctalgia,no anal  lesions, no changes in bowel habits.  : no frequency, no hesitancy, no hematuria, no discharge,no  pain.  Musculoskeletal: no muscle or tendon pain or inflammation,no  joint pain, no edema, severe functional limitation,no fasciculations, no mass.  Neurologic: no headache, no seizures, stated alterations on Craneal nerves,  motor deficit,  sensory deficit, poor coordination, no alteration in memory,normal orientation, calculation,normal writting, verbal and written language.  Skin: no rashes,no pruritus no localized lesions.  Psychiatric: no anxiety, significant depression,no agitation, no delusions, proper insight.      Vitals:    09/25/20 1031   BP: 120/85   Pulse: 91   Resp: 19   Temp: 97 °F (36.1 °C)   TempSrc: Temporal   SpO2: 97%   Weight: 47.2 kg (104 lb)   Height: 165.1 cm (65\")       EXAM:I HAVE PERSONALLY REVIEWED THE HISTORY OF THE PRESENT ILLNESS, PAST MEDICAL HISTORY, FAMILY HISTORY, SOCIAL HISTORY, ALLERGIES, MEDICATIONS STATED ABOVE IN THE OFFICE NOTE FROM TODAY.        GENERAL:  Well-developed, well-nourished  Patient  in no acute distress. In a wheel chair  SKIN:  Warm, dry ,NO rashes,NO purpura ,NO petechiae.  HEENT:  Pupils were equal " and reactive to light and accomodation, conjunctivae noninjected, no pterigion, normal extraocular movements, normal visual acuity. Ptosis r upper lid, extensive oral candida  NECK:  Supple with good range of motion; no thyromegaly or masses, no JVD or bruits, no cervical adenopathies.No carotid artery pain, no carotid abnormal pulsation , NO arterial dance.  LYMPHATICS:  No cervical, NO supraclavicular, NO axillary,NO epitrochlear , NO inguinal adenopathy.  CARDIAC   normal rate and regular rhythm, without murmur,NO rubs NO S3 NO S4 right or left . Normal femoral, popliteal, pedis, brachial and carotid pulses.  VASCULAR ARTERIAL: normal carotids,brachial,radial,femoral,popliteal, pedis pulses , no bruits.no paleness or cyanosis, no pain, no edema, no numbness, no gangrene.  VASCULAR VENOUS: no cyanosis, collateral circulation, varicosities, edema, palpable cords, pain, erythema.  ABDOMEN:  Soft, nontender with no hepatomegaly, no splenomegaly,no masses, no ascites, no collateral circulation,no distention,no [place] sign, no abdominal pain, no inguinal hernias,no umbilical hernia, no abdominal bruits. Normal bowel sounds.  GENITAL: Not  Performed.  EXTREMITIES  AND SPINE:  No clubbing, cyanosis or edema, no deformities or pain .No kyphosis, scoliosis, deformities or pain in spine, ribs or pelvic bone.  NEUROLOGICAL:  Patient was awake, alert, oriented to time, person and place.CRANIAL NERVES: PUPILS ARE EQUAL AND REACTIVE TO  LIGHT AND ACOMODATION, EXTRAOCULAR MOVEMENTS WERE NORMAL, NO DIPLOPIA OR NYSTAGMUS.ptosis r upper lid  TASTE AND TEMPERATURE DISCRIMINATION IN MOUTH WAS NORMAL. FACE WAS ASYMMETRIC, NORMAL SYMMETRIC SENSORY MODALITIES FOR 3 BRANCHES OF TRIGEMINAL NERVE.ABNORMAL GESTICULATION OF THE FACE MUSCULATURE. ABNORMAL ELEVATION OF UPPER LIDS. NORMAL VISION.  MOTOR EXAM: SYMMETRIC PROXIMAL AND DISTAL STRENGTH IN RIGHT UPPER AND LOWER EXTREMITIES.PARALYSIS LEFT UPPER EXTREMITY, PARESIS LEFT LOWER EXTREMITY  STRENGTH 1/5 , NO STANDING FROM CHAIR   LONG TRACK:RTOES DOWN GOING, LEFT BABINSKI  REFLEXES: BICIPITAL, TRICIPITAL, PATELLAR AND ACHILLES SYMMETRIC AND 2+.  MUSCLE TONE: NO RIGIDITY , NO SPASTICITY, NO COGWHEEL.LEFT SIDE FLACCID  SENSORY MODALITIES: TOUCH, THERMAL, POINT DISCRIMINATION , VIBRATORY SENSATION NORMAL BILATERALLY.  ABNORMAL MOVEMENTS: NO TREMOR, NO SEIZURES, NO TICS, NO FASCICULATIONS.  MENINGES: NO KERNING, NO BRUDZINSKI, SOFT SUPPLE NECK.                RECENT LABS:  WBC   Date Value Ref Range Status   09/25/2020 5.61 3.40 - 10.80 10*3/mm3 Final     Hemoglobin   Date Value Ref Range Status   09/25/2020 13.2 12.0 - 15.9 g/dL Final     Platelets   Date Value Ref Range Status   09/25/2020 116 (L) 140 - 450 10*3/mm3 Final     MRI BRAIN W WO CONTRAST     Date of Exam: 9/8/2020 11:45 EDT     Indication: Brain tumor.     Comparison Exams: 6/26/2020, 7/16/2020     Technique: Routine multiplanar multisequence MR imaging of the brain was performed before and after the administration of 10 cc of ProHance IV gadolinium contrast.     FINDINGS:  Diffusion weighted images demonstrate no areas of restricted diffusion to suggest acute infarct. Patient again noted to be status post right-sided craniotomy. There is interval improvement in blood products within the temporal horn right lateral   ventricle. No evidence of hydrocephalus. There is abnormal increased T2 signal within the anterior medial right temporal lobe. There is also abnormal increased T2 signal within the chilango and right middle and superior cerebellar peduncles. There is   abnormal increased T2 signal extending into the right cerebral peduncle and right thalamus. There is an enhancing heterogeneous mass within the chilango to the right of midline this has a thick nodular enhancing wall and is increased in size from 6/26/2020.   This mass within the right side of the chilango measures 1.5 x 1.3 cm, previously 0.7 x 0.7 cm. This is now contiguous with the mass  seen within the right cerebral peduncle and right thalamus. The thalamic portion of this mass appears smaller and may have   been partially debulked in the interval. Thalamic portion of the mass measures 1.5 x 0.2 cm, previously 1.7 x 1.2 cm. There is no new pathologic contrast enhancement identified.     No abnormal extra-axial fluid collection identified. No midline shift or hydrocephalus. Major intracranial vascular flow voids are preserved. Sella and suprasellar cistern appear within normal limits. Craniovertebral junction appears normal.     Globes and orbits are within normal limits. There is partial opacification of the right mastoid air cells which is new.     IMPRESSION:     1. Heterogeneous contrast enhancing tumor within the chilango extending into the right cerebral peduncle and right thalamus. Pontine portion mass appears larger as detailed above. There is new abnormal increased T2 signal within the chilango, midbrain, thalamus,   and anterior temporal lobe.  2. Interval right craniotomy no abnormal extra-axial fluid collection identified. There has been improvement in blood products within the temporal horn of the right lateral ventricle. No evidence of hydrocephalus.     Electronically Signed: Juan Garcia MD 9/8/2020 12:59 EDT                  ASSESSMENT/PLAN:This patient has a glioblastoma multiforme, World Health Organization, grade 4, in the chilango on the right side associated with brain edema. This triggers very dense body paralysis on the left side. On the other hand comparing this patient with how she was  weeks ago at the rehab unit at [facility] and how she is now, she is like day and night. Her speech has improved 90%. She is enjoying her food. She has been able to swallow with no difficulties. She is controlling sphincters. She has not had any seizure activity. She has no pain. She has started to gain some movement in the left lower extremity, especially in the quadriceps. The  movement in the left upper extremity is very limited. Most of the motion is triggered by shoulder activity more than anything else. The right body activity is normal. 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     Since the previous visit the patient has had in my opinion stability of her clinical situation in regard the paralysis of the left side of her body. The strength in the left lower extremity is about the same that it was when I saw her the last time. The strength in the left upper extremity is very minimal if any movement at all. The patient has had some tingling in the left lower extremity especially at bedtime and she has not had any seizure activity. Her speech is not as good as it was before, she has some echolalia and also the patient has developed, especially when she speaks too fast difficulty with proper maya of her language.     The other issue that the patient has today is significant oral candida. They have not been doing a good job with the Mycelex. I insisted on this medicine at this time.     From the point of view of anxiety it is not an issue but the patient is depressed, she is crying frequently because she realizes that she is as good as she is going to get.    The patient cries at night even though she is trying to have as much fun through the day with her  who helps her to ride in a buggy that they have at the farm. She also gets into the wheelchair around the house and goes into all sorts of situations. They are trying to go to [place] as well. The  has been more than an marsha taking care of her for all of the needs that she has because she depends on him completely for  everything. The patient is not able to stand up on her own. She is barely able to walk on her own at all.    From the point of view of the MRI I reviewed the MRI pictures and indeed there are some changes that are very subtle that suggest minimal tumor progression but again the MRI was done too early after completion of her radiation therapy 3 weeks out of that and I would like to repeat another MRI at the critical point of 6 weeks where we will have a definitive notion if the radiation treatment and chemotherapy with Temodar has any positive or no impact at all. Therefore we went ahead and scheduled the MRI.    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 Therefore in spite of continuing to do the physical therapy and good strength in the right side of her body I would like to start to taper down her dexamethasone to 4 mg in the morning and 2 mg in the evening for 3 weeks and thereafter 2 mg in the morning and 2 mg in the evening for 3 weeks. I pointed out to the  that if he sees any deterioration in the neurological function when he does this transition she needs to remain on 4 mg twice a day no matter what.     In regard to her depression I pointed out to the patient that seeing herself in this kind of situation with the kind of life that she had before is nothing that she needs to be embarrassed about. I pointed out to her that taking a low dose Zyprexa 2.5 mg oral nightly this medicine will help her to sleep, will take care of the fruition of ideas and also to be beneficial in regard nausea control and depression symptoms. She understands that this medicine is not going to make her dependent on it and the low dose I do not think will be large enough to modify the threshold for seizure activity.     In regard to her seizure medication the Keppra this will  remain ongoing for the time being.    In regard to the chemotherapy with Temodar we are going to go ahead and start the 5 days of Temodar for the next few days starting this next week and hopefully recycle the medicine again the week at the time that I see her back in a month.     In regard to the prophylactic medications for infection specifically PCP given the fact that she needs to remain on dexamethasone and she will go back onto the Temodar, I would like for her to remain on the Bactrim 1 tablet oral 3 times a week.     In regard to her blood work her white count and hemoglobin remain normal, the platelet count 116,000 probably leftover effect of the chemotherapy that does not concern me at all.     In regard to vitamin D supplements she is not taking any. I asked her to take 1000 units of vitamin D a day.    In regard to the use of Zofran for nausea control before chemotherapy a new refill will be sent and she will take only the days that she receives chemotherapy.     The patient is not requiring any pain medicine. We discontinued the Lortab from the care plan completely. Occasionally she takes a Tylenol.     In regard to physical therapy the  continues doing this at her place at home and she remains very proactive in regard to her ability to try to function and improve.     In regard to going to [place] I do not see any problems as long as they are able to get back into town at the time that she has her MRI in 3 weeks.    The patient will require her  to be with her in any future visits.     In my opinion this patient is completely and permanently disabled. She has had long term disability and I asked them to bring papers, we will be glad to take care of them.     I insisted again into the Mycelex, she has extensive oral candida.     In regard to proton pump inhibitor for gastric protection, this medicine will remain the same.     I sent a message to Shai Licona MD, in regard the MRI  scheduled in 3 weeks from now. That way his office will not need to bother to do another MRI later on.     I DISCUSSED WITH PATIENT IN DETAIL FORMS TO DECREASE CHANCES OF CORONAVIRUS INFECTION INCLUDING ISOLATION, PROPER HAND HIGIENE, AVOID PUBLIC PLACES  WITH CROWDS, FOLLOW  CDC RECOMENDATIONS, AND KEEP PERSONAL AND SOCIAL RESPONSIBILITY, WARE A MASK IN PUBLIC PLACES.  PATIENT IS AWARE THIS INFECTION COULD HAVE SEVERE CONSEQUENCES TO PERSONAL HEALTH AND FAMILY RAMIFICATIONS OF THIS.

## 2020-09-28 NOTE — PROGRESS NOTES
Temozolomide dosing confirmed by Dr Trimble at 225 mg daily for 5 days out of 28.    I have escribed the rx's to [facility]

## 2020-09-28 NOTE — TELEPHONE ENCOUNTER
Talked with pt spouse and instructed him new RX has been sent in to pharmacy , and which pharmacy they were sent to .   He V/U  He is wanting to know new dosage of temodar.  I spoke with Jaylene Dela Cruz and they are going to call patient with instructions on new dosing  I called pt back again , no answer.

## 2020-09-28 NOTE — TELEPHONE ENCOUNTER
I checked on the prescriptions and they were sent in to pt pharmacy on 09/25/20 per Dr Trimble  I called patient , no answer, mailbox full, unable to leave VM

## 2020-09-28 NOTE — THERAPY TREATMENT NOTE
Outpatient Physical Therapy Neuro Treatment Note  [facility]     Patient Name: Denise Guajardo  : 1965  MRN: [id number]  Today's Date: 2020      Visit Date: 2020    Visit Dx:    ICD-10-CM ICD-9-CM   1. Glioblastoma (CMS/HCC)  C71.9 191.9   2. Left hemiparesis (CMS/HCC)  G81.94 342.90   3. Functional gait abnormality  R26.89 781.2       Patient Active Problem List   Diagnosis   • Glioblastoma multiforme (CMS/HCC)   • Intracranial hemorrhage (CMS/HCC)   • Acute left-sided weakness   • Brain tumor (CMS/HCC)   • Hemiplegia (CMS/HCC)   • Vasogenic brain edema (CMS/HCC)   • Reactive depression   • High risk medication use   • Oral candida           PT Neuro     Row Name 20 1346             Subjective Comments    Subjective Comments  Pt and  report she is walking at home to and from the wheelchair and doing better. Pt reports she is going to being started chemotherapy 5 x a week soon. Pt and  report that one day a week of PT will work for now.    -LB         Precautions and Contraindications    Precautions/Limitations  fall precautions  -LB      Precautions  left UE flaccid , moves quickly with functional activities   -LB         Subjective Pain    Able to rate subjective pain?  yes  -LB      Pre-Treatment Pain Level  0  -LB      Post-Treatment Pain Level  0  -LB         Cognition    Memory  Appears intact  -LB      Orientation Level  Oriented to situation;Oriented to person;Oriented to place  -LB      Safety Judgment  Good awareness of safety precautions  -LB      Deficits  Fully aware of deficits pt more aware of deficits   -LB         Posture/Observations    Posture/Observations Comments  pt arrived to PT with spouse in w/c.  -LB         Bed Mobility    Comment (Bed Mobility)  not performed   -LB         Transfers    Sit-Stand [place] (Transfers)  moderate assist (50% patient effort);minimum assist (75% patient effort);verbal cues;set up  -LB      Stand-Sit [place]  "(Transfers)  moderate assist (50% patient effort);minimum assist (75% patient effort);verbal cues;set up  -LB      Transfers, Sit-Stand-Sit, Assist Device  jakub walker  -LB      Transfer, Safety Issues  weight-shifting ability decreased;balance decreased during turns;sequencing ability decreased  -LB      Transfer, Impairments  muscle tone abnormal;ROM decreased;strength decreased;impaired balance  -LB      Comment (Transfers)  STRONG verbal cues to perform transitions movements slowly. Pt placing > wt on her R LE and leaning R.   -LB         Gait/Stairs (Locomotion)    Bailey Level (Gait)  (S) moderate assist (50% patient effort);verbal cues strong verbal cues to increase L stance   -LB      Assistive Device (Gait)  walker, jakub  -LB      Distance in Feet (Gait)  40' x 2, 30' x 3   -LB      Pattern (Gait)  step-to  -LB      Deviations/Abnormal Patterns (Gait)  base of support, narrow;gait speed decreased;stride length decreased  -LB      Bilateral Gait Deviations  forward flexed posture;leans right  -LB      Left Sided Gait Deviations  foot drop/toe drag;heel strike decreased;hip hiking;knee buckling, left side;leans right;weight shift ability decreased  -LB      Right Sided Gait Deviations  heel strike decreased;weight shift ability decreased;knee buckling, left side  -LB      Bailey Level (Stairs)  other (see comments) not appropriate at this time   -LB         Balance Skills Training    Standing-Level of Assistance  Moderate assistance;Maximum assistance  -LB      Static Standing Balance Support  Right upper extremity supported;parallel bars  -LB      Standing-Balance Activities  Weight Shift R-L placing R LE onto 4\" step 5 x max to blcok L knee  -LB      Gait Balance-Level of Assistance  Moderate assistance;Maximum assistance strong blocking of the left LE   -LB      Gait Balance Support  Right upper extremity supported;parallel bars  -LB      Gait Balance Activities  side-stepping 3' each direction "   -LB      Balance Comments  {t was able to abduct her L LE in sidestepping L   -LB         Ankle Foot Orthosis Management    Type (Ankle/Foot Orthosis)  left;AFO (ankle foot orthosis)  -LB      Functional Design (Ankle Foot Orthosis)  dynamic orthosis  -LB      Wearing Schedule (Ankle Foot Orthosis)  wear with activity/work  -LB        User Key  (r) = Recorded By, (t) = Taken By, (c) = Cosigned By    Initials Name Provider Type    Talita Ferro PT Physical Therapist                  PT Assessment/Plan     Row Name 09/28/20 1650          PT Assessment    Assessment Comments  Pt and  Malick reporting they are walking short distances at home without any issues. Pt demonstrated decreased wt shift to the L LE at stance with minimal L knee buckling noted when pt rushes her R step. Pt's L UE was placed in her jacket pocket during ambulation with pt reporting no discomfort.  -LB       User Key  (r) = Recorded By, (t) = Taken By, (c) = Cosigned By    Initials Name Provider Type    Talita Ferro PT Physical Therapist             OP Exercises     Row Name 09/28/20 1346             Subjective Comments    Subjective Comments  Pt and  report she is walking at home to and from the wheelchair and doing better. Pt reports she is going to being started chemotherapy 5 x a week soon. Pt and  report that one day a week of PT will work for now.    -LB         Subjective Pain    Able to rate subjective pain?  yes  -LB      Pre-Treatment Pain Level  0  -LB      Post-Treatment Pain Level  0  -LB        User Key  (r) = Recorded By, (t) = Taken By, (c) = Cosigned By    Initials Name Provider Type    Talita Ferro PT Physical Therapist                          Therapy Education  Education Details: Emphasized wt shifting L with ambulation and sidestepping.  Given: Mobility training  How Provided: Verbal  Provided to: Patient  Level of Understanding: Teach back education performed, Verbalized,  Demonstrated              Time Calculation:   Start Time: 1346  Stop Time: 1430  Time Calculation (min): 44 min  Total Timed Code Minutes- PT: 44 minute(s)   Therapy Charges for Today     Code Description Service Date Service Provider Modifiers Qty    18549111828  PT NEUROMUSC RE EDUCATION EA 15 MIN 9/28/2020 Talita Mcclelland, PT GP 2    [id number]  PT THERAPEUTIC ACT EA 15 MIN 9/28/2020 Talita Mcclelland, PT GP 1            Patient was wearing a face mask during this therapy encounter. Therapist used appropriate personal protective equipment including eye protection, mask, and gloves.  Mask used was standard procedure mask. Appropriate PPE was worn during the entire therapy session. Hand hygiene was completed before and after therapy session. Patient is not in enhanced droplet precautions.             Talita Mcclelland, PT  9/28/2020

## 2020-09-28 NOTE — TELEPHONE ENCOUNTER
----- Message from Laura Huggins [facility] sent at 9/25/2020  1:39 PM EDT -----  Regarding: Oral chemo education  Please schedule either telephone or in person oral chemo education next week.     Thanks!  Laura

## 2020-09-28 NOTE — TELEPHONE ENCOUNTER
Patient was in on 9/25 numerous prescription were supposed to be called into her pharmacy, they have not received any orders? Can this be checked on.

## 2020-09-28 NOTE — PROGRESS NOTES
Oral Chemotherapy Teaching      Patient Name/:  Denies Guajardo   1965  Oral Chemotherapy Regimen:  Temozolomide 225mg D1-D5, 28 day cycle  Date Started Medication:     Initial Teaching Follow Up Comments     Safety     Storage instructions (away from children; away from heat/cold, sunlight, or moisture), handling - use of gloves (caregivers), washing hands after touching pills, managing waste     “How are you storing your medications?”, reminders on storage, proper handling (caregivers using gloves, washing hands, away from children, managing waste, etc.), disposal of medication with D/C or dosage change    Reviewed receipt of medications via specialty pharmacy. Reviewed storage of medication at room temp/away from children/animals. Reviewed handling of medication with gloves and hand washing afterwards.     Adherence      patient and/or caregiver on how to take medication, take with/without food, assess their adherence potential, stress importance of adherence, ways to manage adherence (pill boxes, phone reminders, calendars), what to do if miss a dose   “How are you taking your medication?” “How are you remembering to take your medication?”, “How many doses have you missed?”, determine reasons for non-adherence (not remembering, side effects, etc), ways to improve, overadherence? Remind patient of ways to improve/maintain adherence   Reviewed to take at least one hour before or two hours after meals. Reviewed 28-day cycle w/ med administration on D1-D5. Will provide calendar. Reviewed missed dose protocol - if still within 12h of normal dosage time, take missed dose. If >12h, skip dose and take next one.      Side Effects/Adverse Reactions      patient on potential side effects, s/s, ways to manage, when to call MD/seek help     Determine if patient experiencing side effects, ways to manage  Reviewed common side effects, including hair loss, cytopenias, fatigue, nausea/vomiting, headache,  diarrhea, constipation.     Discussed supportive care, including zofran for nausea (30-60min before temozolomide doses), sennakot-s + fiber diet + hydration for constipation, and imodium for diarrhea. PCP prophylaxis w/ Bactrim was also reviewed (Mon/Wed/Fri).    Instructed to call office/seek medical help in the event of a fever >100.4, or any severe bleeding events.     Miscellaneous     Food interactions, DDIs, financial issues Determine if patient started any new medications since being placed on oral chemo (analyze for DDI) No relevant DDI. Instructed to space medication out from meals at least 1h before or 2h after.     Additional Notes:  Telephone interview was conducted w/ patient's partner. Pt has received Temozolomide previously w/ radiation, so prior knowledge was assessed and corrected where appropriate. [facility] direct line [phone] provided. Mr. Guajardo was concerned about refills for all medications discussed - pharmacy will follow tomorrow with more information about temozolomide initiation.

## 2020-09-29 NOTE — TELEPHONE ENCOUNTER
Called and spoke with pt's  re: low potassium and the need to start oral potassium supplement; prescription sent to pt's [facility] pharmacy.  also had questions re: previous scripts and dosing schedule/time of day to take temodar. Verified that prescriptions from the appointment on Friday were sent to [facility]. Also discussed proper administration time for temodar with Jaylene Dela Cruz [facility] pharmacist and informed Mr. Guajardo of the recommendation to take it at bedtime 30-60 min following a dose of zofran.  v/u and states that is how they were dosing it last month.

## 2020-10-01 NOTE — PROGRESS NOTES
I was notified by [employer] that they have attempted to contact pt and pts  to schedule the Temodar but have not been successful. They called both pts phone and husbands phone on 9/29 and again today and both mail boxes are full. They are not able to leave messages. I attempted to all as well and rec no answer.    I have asked for the MT team to assist in contacting pt.

## 2020-10-02 NOTE — PROGRESS NOTES
I was able to speak with Malick and provided him with the phone number to call [facility] to schedule the delivery of the Temozolomide.    He states [facility] did not call and the VM boxes are not full. He will call them to schedule the delivery.

## 2020-10-05 NOTE — THERAPY TREATMENT NOTE
"    Outpatient Physical Therapy Neuro Treatment Note  [facility]     Patient Name: Denise Guajardo  : 1965  MRN: [id number]  Today's Date: 10/5/2020      Visit Date: 10/05/2020    Visit Dx:    ICD-10-CM ICD-9-CM   1. Glioblastoma (CMS/HCC)  C71.9 191.9   2. Left hemiparesis (CMS/HCC)  G81.94 342.90   3. Functional gait abnormality  R26.89 781.2       Patient Active Problem List   Diagnosis   • Glioblastoma multiforme (CMS/HCC)   • Intracranial hemorrhage (CMS/HCC)   • Acute left-sided weakness   • Brain tumor (CMS/HCC)   • Hemiplegia (CMS/HCC)   • Vasogenic brain edema (CMS/HCC)   • Reactive depression   • High risk medication use   • Oral candida           PT Neuro     Row Name 10/05/20 1345             Subjective Comments    Subjective Comments  Pt's  reports she has started her chemotherapy. \"I take a pill at night.\" \"I haven't had any troubles.\" Per  when walking at [employer] she moves fast. Per  she needs to rest after being up 3 hours.   -LB         Precautions and Contraindications    Precautions/Limitations  fall precautions  -LB      Precautions  left UE flaccid , moves quickly with functional activities   -LB         Subjective Pain    Able to rate subjective pain?  yes  -LB      Pre-Treatment Pain Level  0  -LB      Post-Treatment Pain Level  0  -LB         Cognition    Overall Cognitive Status  WFL  -LB      Memory  Appears intact  -LB      Orientation Level  Oriented to situation;Oriented to person;Oriented to place  -LB      Safety Judgment  Good awareness of safety precautions  -LB      Deficits  Fully aware of deficits  -LB      Comments  Pt does need to to slow down with ambulation and other gait activities.   -LB         Posture/Observations    Posture/Observations Comments  pt arrived to PT in her w/c with spouse   -LB         Bed Mobility    Comment (Bed Mobility)  not performed   -LB         Transfers    Sit-Stand [place] (Transfers)  moderate assist (50% patient " "effort);minimum assist (75% patient effort);verbal cues;set up  -LB      Stand-Sit [place] (Transfers)  moderate assist (50% patient effort);minimum assist (75% patient effort);verbal cues;set up  -LB      Transfers, Sit-Stand-Sit, Assist Device  jakub walker  -LB      Transfer, Safety Issues  weight-shifting ability decreased;balance decreased during turns;sequencing ability decreased  -LB      Transfer, Impairments  muscle tone abnormal;ROM decreased;strength decreased;impaired balance  -LB      Comment (Transfers)  moderate leaning to the R first 2 x when standing, then minimal cues to increase wt shift to the L LE  -LB         Gait/Stairs (Locomotion)    Chrisney Level (Gait)  (S) moderate assist (50% patient effort);verbal cues verbal cues to increase L stance time [id number] gait cycle   -LB      Assistive Device (Gait)  walker, jakub  -LB      Distance in Feet (Gait)  45' x 2, 40' x 1  -LB      Pattern (Gait)  step-to  -LB      Deviations/Abnormal Patterns (Gait)  base of support, narrow;gait speed decreased;stride length decreased  -LB      Bilateral Gait Deviations  forward flexed posture;leans right  -LB      Left Sided Gait Deviations  foot drop/toe drag;heel strike decreased;hip hiking;knee buckling, left side;leans right;weight shift ability decreased  -LB      Right Sided Gait Deviations  heel strike decreased;weight shift ability decreased;knee buckling, left side  -LB      Chrisney Level (Stairs)  other (see comments) not appropriate at this time   -LB         Balance Skills Training    Standing-Level of Assistance  Moderate assistance;Maximum assistance  -LB      Static Standing Balance Support  Right upper extremity supported;jakub bar  -LB      Standing-Balance Activities  Weight Shift R-L places R LE onto 4\" step while wt shifting L LE   -LB      Gait Balance-Level of Assistance  Moderate assistance;Maximum assistance  -LB      Gait Balance Support  Right upper extremity supported;jakub " "bar  -LB      Gait Balance Activities  side-stepping 8' each direction   -LB      Balance Comments  L LE step-ups onto 2\" foam x 5 with R UE support on jakub-bars (moderate assist to promote hip and knee extension   -LB         Ankle Foot Orthosis Management    Type (Ankle/Foot Orthosis)  left;AFO (ankle foot orthosis)  -LB      Functional Design (Ankle Foot Orthosis)  dynamic orthosis  -LB      Wearing Schedule (Ankle Foot Orthosis)  wear with activity/work  -LB        User Key  (r) = Recorded By, (t) = Taken By, (c) = Cosigned By    Initials Name Provider Type    Talita Ferro, PT Physical Therapist                  PT Assessment/Plan     Row Name 10/05/20 1623          PT Assessment    Assessment Comments  Pt was able to reduce leaning to the R with ambulation when verbally cued to wt shift to the L. However, pt advances her R LE very quickly at times prior to L knee fully extending.  -LB       User Key  (r) = Recorded By, (t) = Taken By, (c) = Cosigned By    Initials Name Provider Type    Talita Ferro, PT Physical Therapist             OP Exercises     Row Name 10/05/20 1345             Subjective Comments    Subjective Comments  Pt's  reports she has started her chemotherapy. \"I take a pill at night.\" \"I haven't had any troubles.\" Per  when walking at [facility] she moves fast. Per  she needs to rest after being up 3 hours.   -LB         Subjective Pain    Able to rate subjective pain?  yes  -LB      Pre-Treatment Pain Level  0  -LB      Post-Treatment Pain Level  0  -LB        User Key  (r) = Recorded By, (t) = Taken By, (c) = Cosigned By    Initials Name Provider Type    Talita Ferro, PT Physical Therapist                      PT OP Goals     Row Name 10/05/20 1600          Time Calculation    PT Goal Re-Cert Due Date  11/19/20  -LB       User Key  (r) = Recorded By, (t) = Taken By, (c) = Cosigned By    Initials Name Provider Type    Talita Ferro, PT Physical Therapist    "       Therapy Education  Education Details: Emphasized wt shift to the L LE and extending the knee prior to moving the R LE with ambulation and sidestepping.  Given: Mobility training  How Provided: Verbal  Provided to: Patient  Level of Understanding: Verbalized, Teach back education performed              Time Calculation:   Start Time: 1345  Stop Time: 1430  Time Calculation (min): 45 min  Total Timed Code Minutes- PT: 45 minute(s)   Therapy Charges for Today     Code Description Service Date Service Provider Modifiers Qty    23668832477  PT NEUROMUSC RE EDUCATION EA 15 MIN 10/5/2020 Talita Mcclelland, PT GP 2    [id number]  PT THERAPEUTIC ACT EA 15 MIN 10/5/2020 Talita Mcclelland, PT GP 1        Patient was wearing a face mask during this therapy encounter. Therapist used appropriate personal protective equipment including eye protection, mask, and gloves.  Mask used was standard procedure mask. Appropriate PPE was worn during the entire therapy session. Hand hygiene was completed before and after therapy session. Patient is not in enhanced droplet precautions.                 Talita Mcclelland, PT  10/5/2020

## 2020-10-06 NOTE — PROGRESS NOTES
CATARACT, OS VISUALLY SIGNIFICANT OS. SCHEDULE SX OS.  GLS RX GIVEN TO FILL IF DESIRES IN THE EVENT PT DOES NOT PROCEED W/ SX. [facility] telephone follow up- Temodar    I spoke to Malick. He tells me that Denise is doing well. She started on maintenance Temodar on 10/3 and has not had any issues or side effects thus far. Medication administration and adherence seem appropriate. Denise has not had any recent medication changes. Pharmacy will continue to follow.     Thanks,   Laura Huggins, PharmD

## 2020-10-12 NOTE — THERAPY PROGRESS REPORT/RE-CERT
"    .Outpatient Physical Therapy Neuro Progress Note  [facility]     Patient Name: Denise Guajardo  : 1965  MRN: [id number]  Today's Date: 10/12/2020      Visit Date: 10/12/2020    Patient Active Problem List   Diagnosis   • Glioblastoma multiforme (CMS/HCC)   • Intracranial hemorrhage (CMS/HCC)   • Acute left-sided weakness   • Brain tumor (CMS/HCC)   • Hemiplegia (CMS/HCC)   • Vasogenic brain edema (CMS/HCC)   • Reactive depression   • High risk medication use   • Oral candida        Past Medical History:   Diagnosis Date   • Anxiety    • Glioblastoma (CMS/HCC)    • History of hemiplegia    • S/P craniotomy    • Vertigo         Past Surgical History:   Procedure Laterality Date   • BRAIN SURGERY     • BREAST BIOPSY     • CRANIOTOMY FOR TUMOR Right 2020    Procedure: Right-sided craniotomy for excisional biopsy and, placement of lumbar drain;  Surgeon: Shai Licona MD;  Location: [facility];  Service: Neurosurgery;  Laterality: Right;   • DENTAL PROCEDURE     • TUBAL ABDOMINAL LIGATION     • WISDOM TOOTH EXTRACTION           Visit Dx:     ICD-10-CM ICD-9-CM   1. Glioblastoma (CMS/HCC)  C71.9 191.9   2. Left hemiparesis (CMS/HCC)  G81.94 342.90   3. Functional gait abnormality  R26.89 781.2               PT Neuro     Row Name 10/12/20 1345             Subjective Comments    Subjective Comments  \"I finished my chemotherapy.\" Per  this is her last approved PT and therapy notes have been submitted for more. Pt reports no pain.   -LB         Precautions and Contraindications    Precautions/Limitations  fall precautions  -LB      Precautions  left UE flaccid , moves quickly with functional activities   -LB         Subjective Pain    Able to rate subjective pain?  yes  -LB      Pre-Treatment Pain Level  0  -LB      Post-Treatment Pain Level  0  -LB         Cognition    Overall Cognitive Status  WFL  -LB      Memory  Appears intact  -LB      Orientation Level  Oriented to situation;Oriented to " person;Oriented to place  -LB      Safety Judgment  Good awareness of safety precautions  -LB      Deficits  Fully aware of deficits  -LB         Posture/Observations    Posture/Observations Comments  pt arrived to PT in her w/c with spouse   -LB         MMT Left Lower Ext    Lt Hip Flexion MMT, Gross Movement  (2+/5) poor plus  -LB      Lt Hip Extension MMT, Gross Movement  other (see comments)  -LB      Lt Hip ABduction MMT, Gross Movement  -- mmnimal resistance in supine   -LB      Lt Knee Extension MMT, Gross Movement  (3-/5) fair minus  -LB      Lt Knee Flexion MMT, Gross Movement  (2-/5) poor minus  -LB         Bed Mobility    Rolling Left [place] (Bed Mobility)  contact guard  -LB      Rolling Right [place] (Bed Mobility)  minimum assist (75% patient effort);verbal cues  -LB      Supine-Sit [place] (Bed Mobility)  minimum assist (75% patient effort);set up;moderate assist (50% patient effort)  -LB      Sit-Supine [place] (Bed Mobility)  minimum assist (75% patient effort);set up  -LB      Bed Mobility, Safety Issues  decreased use of legs for bridging/pushing;decreased use of arms for pushing/pulling;impaired trunk control for bed mobility  -LB         Transfers    Bed-Chair [place] (Transfers)  minimum assist (75% patient effort);verbal cues  -LB      Chair-Bed [place] (Transfers)  minimum assist (75% patient effort);verbal cues  -LB      Assistive Device (Bed-Chair Transfers)  wheelchair  -LB      Sit-Stand [place] (Transfers)  minimum assist (75% patient effort);moderate assist (50% patient effort)  -LB      Stand-Sit [place] (Transfers)  minimum assist (75% patient effort)  -LB      Transfers, Sit-Stand-Sit, Assist Device  jakub walker  -LB      Transfer, Safety Issues  weight-shifting ability decreased;balance decreased during turns;sequencing ability decreased  -LB      Transfer, Impairments  muscle tone abnormal;ROM decreased;strength decreased;impaired balance   -LB      Comment (Transfers)  moderate leaning to the R 1 st stand then minimal leaning, L knee lfexed ~20 degrees  -LB         Gait/Stairs (Locomotion)    [place] Level (Gait)  moderate assist (50% patient effort);verbal cues  -LB      Assistive Device (Gait)  walker, jakub  -LB      Distance in Feet (Gait)  30' x 2, 25' x 2   -LB      Pattern (Gait)  step-to  -LB      Deviations/Abnormal Patterns (Gait)  base of support, narrow;gait speed decreased;stride length decreased  -LB      Bilateral Gait Deviations  forward flexed posture;leans right  -LB      Left Sided Gait Deviations  foot drop/toe drag;heel strike decreased;hip hiking;knee buckling, left side;leans right;weight shift ability decreased  -LB      Right Sided Gait Deviations  heel strike decreased;weight shift ability decreased;knee buckling, left side  -LB      Comment (Gait/Stairs)  When pt doesn't fully wt shift to the L and lean on the cane L knee nicholas minimally.   -LB         Ankle Foot Orthosis Management    Type (Ankle/Foot Orthosis)  left;AFO (ankle foot orthosis)  -LB      Functional Design (Ankle Foot Orthosis)  dynamic orthosis  -LB      Wearing Schedule (Ankle Foot Orthosis)  wear with activity/work  -LB        User Key  (r) = Recorded By, (t) = Taken By, (c) = Cosigned By    Initials Name Provider Type    Talita Ferro, PT Physical Therapist                  Therapy Education  Education Details: Reviewed with pt and wife to continue to work on bridges.  Given: Mobility training, Fall prevention and home safety  How Provided: Verbal  Provided to: Patient  Level of Understanding: Verbalized, Demonstrated, Teach back education performed    PT OP Goals     Row Name 10/12/20 1600          PT Short Term Goals    STG Date to Achieve  11/11/20  -LB     STG 1  Pt to perform rolling to the right with CGA and verbal cues.   -LB     STG 1 Progress  Met  -LB     STG 2  Pt to perform sit to supine with CGA and supine to sit with miniimal.  -LB      STG 2 Progress  Met  -LB     STG 3  Pt to come to stand with feet even and increased wt through her L LE ~ 50% to promote L LE strengthening.   -LB     STG 3 Progress  Met  -LB     STG 4  Pt to come to stand and stand to sit with minimal of 1 with consistent wt shift over her LE's.    -LB     STG 4 Progress  Progressing;Ongoing  -LB     STG 5  Pt to increase strength in her left LE by 1/2 grade overall.   -LB     STG 5 Progress  Ongoing  -LB     STG 6  Pt to ambulate with jakub-walker ~ 60' x 3 with moderte of 1.   -LB     STG 6 Progress  Progressing;Ongoing  -LB     STG 6 Progress Comments  Pt is able to ambulate 35-45' with moderate of 1.   -LB     STG 7  Pt to ambulate with decreased left hip hiking and increased knee flexion.   -LB     STG 7 Progress  Met  -LB     STG 8  Pt and  to be independent with HEP with emphasis on L LE strengthening.   -LB     STG 8 Progress  Progressing;Ongoing  -LB        Long Term Goals    LTG Date to Achieve  12/07/20  -LB     LTG 1  Pt to perform rolling right and left independently.   -LB     LTG 1 Progress  Progressing;Ongoing  -LB     LTG 2  Pt to perform sit to supine and supine to sit with supervision.   -LB     LTG 2 Progress  Progressing;Ongoing  -LB     LTG 3  Pt to come to stand with feet even and increased wt through her L LE ~ 90% to promote L LE strengthening.  -LB     LTG 3 Progress  Progressing;Ongoing  -LB     LTG 4  Pt to increase strength in her left LE by 1/2 grade overall.   -LB     LTG 4 Progress  Ongoing  -LB     LTG 5  Pt to perform wheelchair to bed/mat with SBA.   -LB     LTG 5 Progress  Ongoing  -LB     LTG 6  Pt to ambulate > 150' with CGA with least restrictive device.  -LB     LTG 6 Progress  Progressing;Ongoing  -LB     LTG 7  Pt to ambulate with consistent left knee flexion with swing phase.   -LB     LTG 7 Progress  Progressing;Ongoing  -LB     LTG 8  Pt and  to be independent with HEP with emphasis on standing balance.   -LB     LTG 8  "Progress  Ongoing  -LB        Time Calculation    PT Goal Re-Cert Due Date  11/11/20  -LB       User Key  (r) = Recorded By, (t) = Taken By, (c) = Cosigned By    Initials Name Provider Type    Talita Ferro, PT Physical Therapist          PT Assessment/Plan     Row Name 10/12/20 6247          PT Assessment    Functional Limitations  Decreased safety during functional activities;Impaired gait;Performance in self-care ADL  -LB     Impairments  Balance;Endurance;Gait;Muscle strength  -LB     Assessment Comments  Pt has been seen in PT 1 x week verses recommended 2 x a week due to limited insurance visits. Pt reports she has finished her chemotherapy. Pt demonstrated improvement with her transfers as requires less assistance and is placing greater weight on her L LE with less leaning tot he R. Pt has advance d to ambulation with one person and advancews her L LE independently. Pt would benefit from continued PT with emphasis on L LE strengthening, transfers, gait and balance  -LB     Rehab Potential  Good  -LB     Patient/caregiver participated in establishment of treatment plan and goals  Yes  -LB     Patient would benefit from skilled therapy intervention  Yes  -LB        PT Plan    PT Frequency  2x/week;1x/week  -LB     Predicted Duration of Therapy Intervention (OT)  8 weeks  -LB       User Key  (r) = Recorded By, (t) = Taken By, (c) = Cosigned By    Initials Name Provider Type    Talita Ferro PT Physical Therapist             OP Exercises     Row Name 10/12/20 1345             Subjective Comments    Subjective Comments  \"I finished my chemotherapy.\" Per  this is her last approved PT and therapy notes have been submitted for more. Pt reports no pain.   -LB         Subjective Pain    Able to rate subjective pain?  yes  -LB      Pre-Treatment Pain Level  0  -LB      Post-Treatment Pain Level  0  -LB         Exercise 1    Exercise Name 1  Supine: B bridge with assist to hold L LE in position   -LB      " Cueing 1  Verbal;Tactile  -LB      Reps 1  10  -LB         Exercise 4    Exercise Name 4  PERCH sitting -- hips flexed ~ 45 degrees with wt shift to the L LE while minimally flexing R LE   -LB      Cueing 4  Tactile;Verbal  -LB      Sets 4  2  -LB      Reps 4  8  -LB         Exercise 7    Exercise Name 7  Supine: left hip abd/ER in hooklying  -LB      Cueing 7  Verbal;Tactile  -LB      Reps 7  10  -LB         Exercise 8    Exercise Name 8  SCOOTING in sitting each direction 3'   -LB      Cueing 8  Verbal;Tactile  -LB      Additional Comments  pt requires strong assistance to wt shift forward   -LB        User Key  (r) = Recorded By, (t) = Taken By, (c) = Cosigned By    Initials Name Provider Type    Talita Ferro, PT Physical Therapist                              Time Calculation:   Start Time: 1345  Stop Time: 1430  Time Calculation (min): 45 min  Total Timed Code Minutes- PT: 45 minute(s)   Therapy Charges for Today     Code Description Service Date Service Provider Modifiers Qty    68592422241  PT NEUROMUSC RE EDUCATION EA 15 MIN 10/12/2020 Talita Mcclelland, PT GP 2    [id number]  PT THERAPEUTIC ACT EA 15 MIN 10/12/2020 Talita Mcclelland, PT GP 1            Patient was wearing a face mask during this therapy encounter. Therapist used appropriate personal protective equipment including eye protection, mask, and gloves.  Mask used was standard procedure mask. Appropriate PPE was worn during the entire therapy session. Hand hygiene was completed before and after therapy session. Patient is not in enhanced droplet precautions.             Talita Mcclelland, HILARIO  10/12/2020

## 2020-10-28 NOTE — PROGRESS NOTES
Staff message rec from Dr Trimble-No more Temodar for pt. I have notified John Johnson MD sent to Sabina Acosta             No more sudhakar

## 2020-10-28 NOTE — PROGRESS NOTES
REASON FOR FOLLOWUP/CHIEF COMPLAINT:  Glioblastoma OF THE PROTUBERANCE DENSE LEFT BODY PARALYSIS, WHEEL CHAIR BOUND TOTAL CARE BY , UNDERGOING RADIATION AND TEMODAR.    HISTORY OF PRESENT ILLNESS:   PATIENT WAS CALLED THE DAY BEFORE BY THE OFFICE TO ASK FOR SYMPTOMS THAT COULD BE CONSISTENT WITH CORONAVIRUS INFECTION, AND BEING NEGATIVE WAS SCHEDULED TO BE SEEN IN THE OFFICE TODAY. SIMILAR QUESTIONING TODAY INCLUDING, CHILLS, FEVER, NEW COUGH, SHORTNESS OF BREATH, DIARRHEA,DIFFUSE BODY ACHES  AND CHANGES IN SMELL OR TASTE WERE NEGATIVE.THE PATIENT DENIED ANY CONTACT WITH PERSONS WHO WERE POSITIVE FOR COVID, AND PATIENT IS NOT IN CATEGORY OF HIGH RISK BEHAVIOR TO ACQUIRE COVID.    DURING THE VISIT WITH THE PATIENT TODAY , PATIENT HAD FACE MASK, MY MEDICAL ASSISTANT AND I  HAD PROPPER PROTECTIVE EQUIPMENT, AND I DID HAND HYGIENE WITH SOAP AND WATER BEFORE AND AFTER THE VISIT.      This patient returns today to the office in company of her  for review after she has had a new MRI of the brain recently that has documented progressive disease in regard to her glioblastoma present in the chilango and protuberance . This goes along with the clinical picture of the patient that is not rated by the patient’s  today. He states that the ability to function in regard to the left lower extremity has decreased and the left upper extremity has lost function. Her voice has become a little bit more difficult to understand and she has had more fatigue and tiredness. The patient has not had any fever or infection. She has not had any cough or shortness of breath. Her voice gets tired by the end of the day to the point that she is barely able to verbalize anything. 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 The patient continues eating relatively well. Her bowel activity and urination are normal. She remains continent of sphincters and she has not  had any seizure activity. She is not able to stand up on her own and she requires total care by her  in regard to dressing, bathing need and feeding need. She is able to use her right upper extremity to eat her food.              Past Medical History:   Diagnosis Date   • Anxiety    • Glioblastoma (CMS/HCC)    • History of hemiplegia    • S/P craniotomy    • Vertigo      Past Surgical History:   Procedure Laterality Date   • BRAIN SURGERY     • BREAST BIOPSY     • CRANIOTOMY FOR TUMOR Right 7/1/2020    Procedure: Right-sided craniotomy for excisional biopsy and, placement of lumbar drain;  Surgeon: Shai Licona MD;  Location: [facility];  Service: Neurosurgery;  Laterality: Right;   • DENTAL PROCEDURE     • TUBAL ABDOMINAL LIGATION     • WISDOM TOOTH EXTRACTION       Social History     Socioeconomic History   • Marital status:      Spouse name: Malick   • Number of children: Not on file   • Years of education: Not on file   • Highest education level: Not on file   Occupational History     Employer: [employer]   Tobacco Use   • Smoking status: Never Smoker   • Smokeless tobacco: Never Used   Substance and Sexual Activity   • Alcohol use: Yes     Comment: Socially   • Drug use: No   • Sexual activity: Yes     Partners: Male     Birth control/protection: Post-menopausal     Family History   Problem Relation Age of Onset   • Ovarian cancer Maternal Aunt    • Breast cancer Neg Hx    • Uterine cancer Neg Hx    • Colon cancer Neg Hx    • Deep vein thrombosis Neg Hx    • Pulmonary embolism Neg Hx    No Known Allergies   Current Outpatient Medications on File Prior to Visit   Medication Sig   • clotrimazole (MYCELEX) 10 MG lily Dissolve 1 tablet by mouth 3 (Three) Times a Day.   • dexamethasone (DECADRON) 4 MG tablet Take 1 tablet by mouth Every 12 (Twelve) Hours.   • levETIRAcetam (KEPPRA) 500 MG tablet Take 1 tablet by mouth Every 12 (Twelve) Hours.   • OLANZapine (zyPREXA) 2.5  MG tablet Take 1 tablet by mouth Every Night.   • ondansetron (ZOFRAN) 8 MG tablet Take 1 tablet by mouth Every 8 (Eight) Hours As Needed for Nausea or Vomiting.   • pantoprazole (PROTONIX) 40 MG EC tablet Take 1 tablet by mouth Daily.   • potassium chloride (K-DUR,KLOR-CON) 10 MEQ CR tablet Take 2 tablets by mouth Daily.   • sulfamethoxazole-trimethoprim (BACTRIM DS,SEPTRA DS) 800-160 MG per tablet One tablet by mouth three times weekly  Indications: PROPHYLAXIS   • temozolomide (TEMODAR) 100 MG chemo capsule Take 2 capsules by mouth with 2 other temozolomide prescriptions for 225 mg total Daily. For 5 days out of 28 days.   • temozolomide (TEMODAR) 20 MG chemo capsule Take 1 capsule by mouth with 2 other temozolomide prescriptions for 225 mg total Daily. For 5 days out of 28 days.   • temozolomide (TEMODAR) 5 MG chemo capsule Take 1 capsule by mouth with 2 other temozolomide prescriptions for 225 mg total Daily. For 5 days out of 28 days.   • Vitamin D, Cholecalciferol, (CHOLECALCIFEROL) 10 MCG (400 UNIT) tablet Take  by mouth Daily. Unknown units     No current facility-administered medications on file prior to visit.        Ros:   General: no fever, no chills, fatigue,no weight changes,  lack of appetite.  Eyes: no epiphora, xerophthalmia,conjunctivitis, pain, glaucoma, blurred vision, blindness, secretion, photophobia, proptosis, diplopia.  Ears: no otorrhea, tinnitus, otorrhagia, deafness, pain, vertigo.  Nose: no rhinorrhea, no epistaxis, no alteration in perception of odors, no sinuses pressure.  Mouth: no alteration in gums or teeth,  No ulcers, no difficulty with mastication or deglut ion, no odynophagia.  Neck: no masses or pain, no thyroid alterations, no pain in muscles or arteries, no carotid odynia, no crepitation.  Respiratory: no cough, no sputum production,no dyspnea,no trepopnea, no pleuritic pain,no hemoptysis.  Heart: no syncope, no irregularity, no palpitations, no angina,no orthopnea,no  "paroxysmal nocturnal dyspnea.  Vascular Venous: no tenderness,no edema,no palpable cords,no postphlebitic syndrome, no skin changes no ulcerations.  Vascular Arterial: no distal ischemia, noclaudication, no gangrene, no neuropathic ischemic pain, no skin ulcers, no paleness no cyanosis.  GI: no dysphagia, no odynophagia, no regurgitation, no heartburn,no indigestion,no nausea,no vomiting,no hematemesis ,no melena,no jaundice,no distention, no obstipation,no enterorrhagia,no proctalgia,no anal  lesions, no changes in bowel habits.  : no frequency, no hesitancy, no hematuria, no discharge,no  pain.  Musculoskeletal: no muscle or tendon pain or inflammation,no  joint pain, no edema, SEVERE functional limitation,no fasciculations, no mass.  Neurologic: no headache, no seizures, STATED alterations on Craneal nerves, SEVERE LEFT SIDE motor deficit, no sensory deficit, POOR coordination, no alteration in memory,normal orientation.  Skin: no rashes,no pruritus no localized lesions.  Psychiatric:  anxiety, SIGNIFICNAT depression,no agitation, no delusions, proper insight.            Vitals:    10/28/20 1341   BP: 117/82   Pulse: 85   Resp: 19   Temp: 97.9 °F (36.6 °C)   TempSrc: Temporal   SpO2: 98%   Height: 165.1 cm (65\")       EXAM:  I HAVE PERSONALLY REVIEWED THE HISTORY OF THE PRESENT ILLNESS, PAST MEDICAL HISTORY, FAMILY HISTORY, SOCIAL HISTORY, ALLERGIES, MEDICATIONS STATED ABOVE IN THE OFFICE NOTE FROM TODAY.        GENERAL:  Well-developed, well-nourished  Patient  in no acute distress. IN A WHEEL CHAIR  SKIN:  Warm, dry ,NO rashes,NO purpura ,NO petechiae.EXTESNIVE ECHYMOSIS IN SKIN  HEENT:  Pupils were equal and reactive to light and accomodation, conjunctivae noninjected, no pterigion, normal extraocular movements, normal visual acuity. CUSHING'S FACE, ASYMMETRIC MOUTH OPENING  NECK:  Supple with good range of motion; no thyromegaly or masses, no JVD or bruits, no cervical adenopathies.No carotid artery pain, " no carotid abnormal pulsation , NO arterial dance.  LYMPHATICS:  No cervical, NO supraclavicular, NO axillary,NO epitrochlear , NO inguinal adenopathy.  CARDIAC   normal rate and regular rhythm, without murmur,NO rubs NO S3 NO S4 right or left . Normal femoral, popliteal, pedis, brachial and carotid pulses.  VASCULAR ARTERIAL: normal carotids,brachial,radial,femoral,popliteal, pedis pulses , no bruits.no paleness or cyanosis, no pain, no edema, no numbness, no gangrene.  VASCULAR VENOUS: no cyanosis, collateral circulation, varicosities, edema, palpable cords, pain, erythema.  ABDOMEN:  Soft, nontender with no hepatomegaly, no splenomegaly,no masses, no ascites, no collateral circulation,no distention,no McBee sign, no abdominal pain, no inguinal hernias,no umbilical hernia, no abdominal bruits. Normal bowel sounds.  GENITAL: Not  Performed.  EXTREMITIES  AND SPINE:  No clubbing, cyanosis or edema, no deformities or pain .No kyphosis, scoliosis, deformities or pain in spine, ribs or pelvic bone.  NEUROLOGICAL:  Patient was awake, alert, oriented to time, person and place.CRANIAL NERVES: PUPILS ARE EQUAL AND REACTIVE TO  LIGHT AND ACOMODATION, EXTRAOCULAR MOVEMENTS WERE NORMAL, NO DIPLOPIA OR NYSTAGMUS.  TASTE AND TEMPERATURE DISCRIMINATION IN MOUTH WAS NORMAL. FACE WAS ASYMMETRIC,  MOTOR EXAM: SYMMETRIC PROXIMAL AND DISTAL STRENGTH IN UPPER AND LOWER RIGHT EXTREMITY.SEVERE LEFT SIDE PARALYSIS  LONG TRACK:TOES DOWN GOING, LEFT BABINSKI  REFLEXES: BICIPITAL, TRICIPITAL, PATELLAR AND ACHILLES SYMMETRIC AND 2+ ON THE RIGHT, HYPO ON THE LEFT  MUSCLE TONE: NO RIGIDITY , NO SPASTICITY, NO COGWHEEL.  SENSORY MODALITIES: TOUCH, THERMAL, POINT DISCRIMINATION , VIBRATORY SENSATION NORMAL BILATERALLY.  ABNORMAL MOVEMENTS: NO TREMOR, NO SEIZURES, NO TICS, NO FASCICULATIONS.  MENINGES: NO KERNING, NO BRUDZINSKI, SOFT SUPPLE NECK.                    RECENT LABS:  WBC   Date Value Ref Range Status   10/28/2020 6.27 3.40 - 10.80  10*3/mm3 Final     Hemoglobin   Date Value Ref Range Status   10/28/2020 12.2 12.0 - 15.9 g/dL Final     Platelets   Date Value Ref Range Status   10/28/2020 132 (L) 140 - 450 10*3/mm3 Final       MRI BRAIN W WO CONTRAST     Date of Exam: 10/21/2020 9:22 EDT     Indication: ca.     Comparison Exams: September 8, 2020, July 16, 2020     Technique: Multiecho multiplanar images of brain were obtained prior to and prominent with ministration of contrast. Approximately 10 cc of ProHance was utilized.     FINDINGS:  There is no restricted diffusion. The patient has had a right-sided craniotomy. There is a small extra-axial fluid collection along the right temporal lobe measuring approximately 2 x 0.88 cm. This previously measured 3.2 x 0.68 cm. There is some signal   on FLAIR within the right temporal lobe that may be manifestation of previous surgery. Correlation with whether or not there was a lesion within the temporal lobe previously would be suggested. There has been some abnormal signal within the anterior   right temporal lobe on prior imaging.     There remains abnormal edema within the chilango extending into the brachium pontis and superiorly into the midbrain particularly involving the right cerebral peduncle with the superior extent in the area of the right thalamus. There is susceptibility   artifact within this area suggesting underlying hemosiderin deposition. There is a tiny focus of susceptibility artifact in the right cerebellar hemisphere that may relate to calcification or hemosiderin deposition in this area and is unchanged. There is   irregular enhancement extending from the chilango to the thalamus. Within the chilango this area measures about 2.1 x 1.9 cm previously measuring 1.4 x 1.26 cm. This measures about 4.8 cm in length previously measuring 4.4 cm and approximately 2.5 cm in   transverse oblique dimension previously measuring 1.55 cm.     The pituitary does not appear unusual. There is no orbital  abnormality. The paranasal sinuses seem relatively clear.     IMPRESSION:  1.The underlying lesion which extends from the chilango to the thalamic area seems larger as compared to prior study. There remains abnormal edema in adjacent structures. There also is susceptibility artifact from underlying blood products within this area.  2.There are postsurgical changes around the right temporal area. There remains some T2 signal within the more anterior right temporal lobe.     Electronically Signed: Chava Larios MD 10/21/2020          ASSESSMENT/PLAN:This patient has a glioblastoma multiforme, World Health Organization, grade 4, in the chilango on the right side associated with brain edema. This triggers very dense body paralysis on the left side. On the other hand comparing this patient with how she was  weeks ago at the rehab unit at [facility] and how she is now, she is like day and night. Her speech has improved 90%. She is enjoying her food. She has been able to swallow with no difficulties. She is controlling sphincters. She has not had any seizure activity. She has no pain. She has started to gain some movement in the left lower extremity, especially in the quadriceps. The movement in the left upper extremity is very limited. Most of the motion is triggered by shoulder activity more than anything else. The right body activity is normal. 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       The patient was further reviewed on 10/28/2020 since she completed 1 round of Temodar for the 42 days of the induction of radiation therapy and then 5 days of Temodar subsequently. Unfortunately the MRI that has been reported above  and reviewed by me and agreeing with the radiologist’s interpretation shows very substantial increase in the size of edema and tumoral lesion in the chilango and in the protuberance. The patient has obvious neurological deterioration with worsening left body paralysis. Associated with this, she has deterioration of her voice and she has had more anxiety and depression. She decided that she wanted to take the depression medicine, Zyprexa, on p.r.n. basis and I pointed out to them that this medicine does not work in that way. This medicine will require continually taking 3 or 4 weeks before they can see some real benefit. On the other hand when she takes the medicine at bedtime she has very significant improvement in her ability to sleep and have a good night of sleep.     Unfortunately I do not believe that the tumor area is having too much benefit in this patient’s situation at this point and the  brought the question in regard to how hot the tumor will be growing if the patient will not have any Temodar in the system. I wish I knew the answer to this question. What I see though is that the Temodar is not having any benefit whatsoever in the patient’s tumor. For this reason I advised them to discontinue this medicine at this time.     Obviously this raised the question in regard how to proceed. I have more medicine that we could utilize for palliation with very low yield of response including the possibility of using Avastin, Gemzar or carboplatin. Also we could consider immunotherapy medicines like Opdivo or Keytruda.     The chances that this patient will benefit from any of these medicines is very low but is still up to consideration. The 2nd option will be to look for a clinical trial. I provided information pertinent to the patient’s tumor to the research nurses and they will get back with me in the next 48 hours in regard to what they have found. The patient and the  are willing to travel out of the  [facility] and they have a friend who owns an airplane and he is willing to transport them back and forth wherever she needs to go.     From the point of view of depression, as I mentioned the patient needs to take her Zyprexa on daily basis. She agrees to proceed with this and see if this perks her up a little bit as well as her .     From the point of view of her Temodar, again we will notify pharmacist in regard to the cancellation of any further doses of this medication.     I asked my research nurses to open a query in regard to participation in clinical trials either at the [facility] or at [facility].     The patient will be contacted by me by telephone with the patient in the next few days.     The rest of the medicines that the patient is taking are still ongoing and hopefully she will be able to come off dexamethasone as provided by Radiation Oncologist in the next few days and according to schedule provided to the patient.     In regard to prevention of infection, the patient will remain on Mycelex Paulino for prevention of candida 3 times a day. She will remain on dexamethasone and will require the continuation of Bactrim.     In regard to gastric protection, she will remain on Protonix.     Because of hypokalemia induced by steroids, the patient will remain on potassium supplementation.     I will get back in touch with the patient and her  once that we get information about possible participation in clinical trials.    I DISCUSSED WITH PATIENT IN DETAIL FORMS TO DECREASE CHANCES OF CORONAVIRUS INFECTION INCLUDING ISOLATION, PROPER HAND HIGIENE, AVOID PUBLIC PLACES  WITH CROWDS, FOLLOW  CDC RECOMENDATIONS, AND KEEP PERSONAL AND SOCIAL RESPONSIBILITY, WARE A MASK IN PUBLIC PLACES.  PATIENT IS AWARE THIS INFECTION COULD HAVE SEVERE CONSEQUENCES TO PERSONAL HEALTH AND FAMILY RAMIFICATIONS OF THIS.

## 2020-11-03 NOTE — TELEPHONE ENCOUNTER
PER LAST DICTATION DR. RENTERIA WAS GOING TO LET PT KNOW WHEN HE FOUND OUT INFO ON CLINICAL TRIALS. MESSAGED DR. RENTERIA.

## 2020-11-05 NOTE — TELEPHONE ENCOUNTER
Pt's  called and requested not to go back to see Shai Barker, who was her surgeon. Mr Bennett said Dr. Trimble agreed not to go back to surgeon.    They continue getting calls from the surgeon's office to set up appts and asked to have the referral cancelled.

## 2020-11-09 NOTE — PROGRESS NOTES
____________________PATIENT EDUCATION____________________    PATIENT EDUCATION:  Today I met with the patient and her  via televisit to discuss the chemotherapy regimen recommended for treatment of her glioblastoma.  The patient was given explanation of treatment premed side effects including office policy that prohibits patients to drive if sedating medications are administered, MD explanation given regarding benefits, side effects, toxicities and goals of treatment.  The patient received a Chemotherapy/Biotherapy Plan Summary including diagnosis and specific treatment plan.    SIDE EFFECTS:  Common side effects were discussed with the patient and/or significant other.  Discussion included hair loss/discoloration, anemia/fatigue, infection/chills/fever, appetite, bleeding risk/precautions, constipation, diarrhea, mouth sores, taste alteration, loss of appetite,nausea/vomiting, peripheral neuropathy, skin/nail changes, rash, muscle aches/weakness, photosensitivity, weight gain/loss, hearing loss, dizziness, menopausal symptoms, menstrrual irregularity, sterility, high blood pressure, heart damage, liver damage, lung damage, kidney damage, DVT/PE risk, fluid retention, pleural/pericardial effusion, somnolence, electrolyte/LFT imbalance, vein exercises and/or the possible need for vascular access/port placement.  The patient was advice that although uncommon, leakage of an infused medication from the vein or venous access device (port) may lead to skin breakdown and/or other tissue damage.  The patient was advised that he/she may have pain, bleeding, and/or bruising from the insertion of a needle in their vein or venous access device (port).  The patient was further advised that, in spite of proper technique, infection with redness and irritation may rarely occur at the site where the needle was inserted.  The patient was advised that if complications occur, additional medical treatment is  available.    Discussion also included side effects specific to drugs in the treatment plan, specifically Gemzar.    A total of 24 minutes were spent with the patient, with 100% of time spent in education and counseling.    You have chosen to receive care through a telephone visit today. Do you consent to use a telephone visit for your medical care today? Yes     This visit has been rescheduled as a phone visit to comply with patient safety concerns in accordance with CDC recommendations. Total time of discussion was 24 minutes.

## 2020-11-16 NOTE — NURSING NOTE
"Education folder given to pt and chemotherapy consent reviewed and signed for Gemzar. Pt and  expressed concerns over pt being possibly pregnant due to recent weight gain, but pt explained that her tubes have been tied. Per pt request pregnancy test order placed. Rn informed pt that the test would have to be collected through a urine sample and pt refused stating that she was now \"confident that she is not pregnant.\"  "

## 2020-11-16 NOTE — PROGRESS NOTES
REASON FOR FOLLOWUP/CHIEF COMPLAINT:  Glioblastoma OF THE PROTUBERANCE DENSE LEFT BODY PARALYSIS, WHEEL CHAIR BOUND TOTAL CARE BY , UNDERGOING RADIATION AND TEMODAR.    HISTORY OF PRESENT ILLNESS:     PATIENT WAS CALLED THE DAY BEFORE BY THE OFFICE TO ASK FOR SYMPTOMS THAT COULD BE CONSISTENT WITH CORONAVIRUS INFECTION, AND BEING NEGATIVE WAS SCHEDULED TO BE SEEN IN THE OFFICE TODAY. SIMILAR QUESTIONING TODAY INCLUDING, CHILLS, FEVER, NEW COUGH, SHORTNESS OF BREATH, DIARRHEA,DIFFUSE BODY ACHES  AND CHANGES IN SMELL OR TASTE WERE NEGATIVE.THE PATIENT DENIED ANY CONTACT WITH PERSONS WHO WERE POSITIVE FOR COVID, AND PATIENT IS NOT IN CATEGORY OF HIGH RISK BEHAVIOR TO ACQUIRE COVID.    DURING THE VISIT WITH THE PATIENT TODAY , PATIENT HAD FACE MASK, MY MEDICAL ASSISTANT AND I  HAD PROPPER PROTECTIVE EQUIPMENT, AND I DID HAND HYGIENE WITH SOAP AND WATER BEFORE AND AFTER THE VISIT.    This patient returns today to the office for follow up in company of her  after she had a very successful trip to [place] that she enjoyed very much. She had a lot of time with her  at the beach and they were doing perfectly fine. She was very tired on the way back and she was able to sleep quite an amount of time yesterday and she feels much better today. She has not had any obvious deterioration of neurological function besides some tingling in the left side of her body that happens from time to time sporadically. She has not had any seizure activity. No headache. No other alteration in cranial nerves. Her right side of her body remains strong. The left side of the body remains paralyzed especially the left upper extremity. She had minimal mobility in the left lower extremity. She has had some occasional difficulty with dry food choking but she is drinking well and she has not had any cough or sputum production on a permanent basis. Otherwise she feels well. She is full of life. She has been not having any  deficit in regard speech besides the normal alteration that she has had from the baseline.               Past Medical History:   Diagnosis Date   • Anxiety    • Glioblastoma (CMS/HCC)    • History of hemiplegia    • S/P craniotomy    • Vertigo      Past Surgical History:   Procedure Laterality Date   • BRAIN SURGERY     • BREAST BIOPSY     • CRANIOTOMY FOR TUMOR Right 7/1/2020    Procedure: Right-sided craniotomy for excisional biopsy and, placement of lumbar drain;  Surgeon: Shai Licona MD;  Location: [facility];  Service: Neurosurgery;  Laterality: Right;   • DENTAL PROCEDURE     • TUBAL ABDOMINAL LIGATION     • WISDOM TOOTH EXTRACTION       Social History     Socioeconomic History   • Marital status:      Spouse name: Malick   • Number of children: Not on file   • Years of education: Not on file   • Highest education level: Not on file   Occupational History     Employer: [employer]   Tobacco Use   • Smoking status: Never Smoker   • Smokeless tobacco: Never Used   Substance and Sexual Activity   • Alcohol use: Yes     Comment: Socially   • Drug use: No   • Sexual activity: Yes     Partners: Male     Birth control/protection: Post-menopausal     Family History   Problem Relation Age of Onset   • Ovarian cancer Maternal Aunt    • Breast cancer Neg Hx    • Uterine cancer Neg Hx    • Colon cancer Neg Hx    • Deep vein thrombosis Neg Hx    • Pulmonary embolism Neg Hx    No Known Allergies   Current Outpatient Medications on File Prior to Visit   Medication Sig   • clotrimazole (MYCELEX) 10 MG lily Dissolve 1 tablet by mouth 3 (Three) Times a Day.   • dexamethasone (DECADRON) 4 MG tablet Take 1 tablet by mouth Every 12 (Twelve) Hours.   • levETIRAcetam (KEPPRA) 500 MG tablet Take 1 tablet by mouth Every 12 (Twelve) Hours.   • OLANZapine (zyPREXA) 2.5 MG tablet Take 1 tablet by mouth Every Night.   • ondansetron (ZOFRAN) 8 MG tablet Take 1 tablet by mouth Every 8 (Eight) Hours As  Needed for Nausea or Vomiting.   • pantoprazole (PROTONIX) 40 MG EC tablet Take 1 tablet by mouth Daily.   • potassium chloride (K-DUR,KLOR-CON) 10 MEQ CR tablet Take 2 tablets by mouth Daily.   • temozolomide (TEMODAR) 100 MG chemo capsule Take 2 capsules by mouth with 2 other temozolomide prescriptions for 225 mg total Daily. For 5 days out of 28 days.   • temozolomide (TEMODAR) 20 MG chemo capsule Take 1 capsule by mouth with 2 other temozolomide prescriptions for 225 mg total Daily. For 5 days out of 28 days.   • temozolomide (TEMODAR) 5 MG chemo capsule Take 1 capsule by mouth with 2 other temozolomide prescriptions for 225 mg total Daily. For 5 days out of 28 days.   • Vitamin D, Cholecalciferol, (CHOLECALCIFEROL) 10 MCG (400 UNIT) tablet Take  by mouth Daily. Unknown units   • [DISCONTINUED] sulfamethoxazole-trimethoprim (BACTRIM DS,SEPTRA DS) 800-160 MG per tablet One tablet by mouth three times weekly  Indications: PROPHYLAXIS     No current facility-administered medications on file prior to visit.        Ros:       General: no fever, no chills,  fatigue,no weight changes, no lack of appetite.  Eyes: no epiphora, xerophthalmia,conjunctivitis, pain, glaucoma, blurred vision, blindness, secretion, photophobia, proptosis, diplopia.  Ears: no otorrhea, tinnitus, otorrhagia, deafness, pain, vertigo.  Nose: no rhinorrhea, no epistaxis, no alteration in perception of odors, no sinuses pressure.  Mouth: no alteration in gums or teeth,  No ulcers, no difficulty with mastication or deglut ion, no odynophagia.  Neck: no masses or pain, no thyroid alterations, no pain in muscles or arteries, no carotid odynia, no crepitation.  Respiratory: no cough, no sputum production,no dyspnea,no trepopnea, no pleuritic pain,no hemoptysis.  Heart: no syncope, no irregularity, no palpitations, no angina,no orthopnea,no paroxysmal nocturnal dyspnea.  Vascular Venous: no tenderness,no edema,no palpable cords,no postphlebitic syndrome,  "no skin changes no ulcerations.  Vascular Arterial: no distal ischemia, noclaudication, no gangrene, no neuropathic ischemic pain, no skin ulcers, no paleness no cyanosis.  GI: no dysphagia, no odynophagia, no regurgitation, no heartburn,no indigestion,no nausea,no vomiting,no hematemesis ,no melena,no jaundice,no distention, no obstipation,no enterorrhagia,no proctalgia,no anal  lesions, no changes in bowel habits.  : no frequency, no hesitancy, no hematuria, no discharge,no  pain.  Musculoskeletal: no muscle or tendon pain or inflammation,no  joint pain, no edema, no functional limitation,no fasciculations, no mass.  Neurologic: no headache, no seizures, UNCHANGED alterations on Craneal nerves, SEVERE LEFT SIDE motor deficit, AND sensory deficit, no alteration in memory,normal orientation.  Skin: no rashes,no pruritus no localized lesions.  Psychiatric: no anxiety, no depression,no agitation, no delusions, proper insight.          Vitals:    11/16/20 0807   BP: 124/91   Pulse: 93   Resp: 19   Temp: 96.9 °F (36.1 °C)   TempSrc: Temporal   SpO2: 99%   Height: 165.1 cm (65\")       EXAM:    I HAVE PERSONALLY REVIEWED THE HISTORY OF THE PRESENT ILLNESS, PAST MEDICAL HISTORY, FAMILY HISTORY, SOCIAL HISTORY, ALLERGIES, MEDICATIONS STATED ABOVE IN THE OFFICE NOTE FROM TODAY.    Patient screened  for depression based on a PHQ-9 score of 6 on 8/6/2020.  .    GENERAL:  Well-developed, well-nourished  Patient  in no acute distress.   SKIN:  Warm, dry ,NO rashes,NO purpura ,NO petechiae.  HEENT:  Pupils were equal and reactive to light and accomodation, conjunctivae noninjected, no pterygium, normal extraocular movements, normal visual acuity.   NECK:  Supple with good range of motion; no thyromegaly or masses, no JVD or bruits, no cervical adenopathies.No carotid artery pain, no carotid abnormal pulsation , NO arterial dance.  LYMPHATICS:  No cervical, NO supraclavicular, NO axillary,NO epitrochlear , NO inguinal " adenopathy.  CARDIAC   normal rate and regular rhythm, without murmur,NO rubs NO S3 NO S4 right or left . Normal femoral, popliteal, pedis, brachial and carotid pulses.  VASCULAR ARTERIAL: normal carotids,brachial,radial,femoral,popliteal, pedis pulses , no bruits.no paleness or cyanosis, no pain, no edema, no numbness, no gangrene.  VASCULAR VENOUS: no cyanosis, collateral circulation, varicosities, edema, palpable cords, pain, erythema.  ABDOMEN:  Soft, nontender with no hepatomegaly, no splenomegaly,no masses, no ascites, no collateral circulation,no distention,no Barron sign, no abdominal pain, no inguinal hernias,no umbilical hernia, no abdominal bruits. Normal bowel sounds.  GENITAL: Not  Performed.  EXTREMITIES  AND SPINE:  No clubbing, cyanosis or edema, no deformities or pain .No kyphosis, scoliosis, deformities or pain in spine, ribs or pelvic bone.  NEUROLOGICAL:  Patient was awake, alert, oriented to time, person and place.CRANIAL NERVES: PUPILS ARE EQUAL AND REACTIVE TO  LIGHT AND ACOMODATION, EXTRAOCULAR MOVEMENTS WERE NORMAL, NO DIPLOPIA OR NYSTAGMUS.  TASTE AND TEMPERATURE DISCRIMINATION IN MOUTH WAS NORMAL. FACE WAS ASYMMETRIC.  MOTOR EXAM: SYMMETRIC PROXIMAL AND DISTAL STRENGTH IN UPPER AND LOWER RIGHT EXTREMITIES.PARALIZED LEFT SIDE DENSE  LONG TRACK:TOES DOWN GOING, LEFT BABINSKI  REFLEXES: BICIPITAL, TRICIPITAL, PATELLAR AND ACHILLES SYMMETRIC 0  MUSCLE TONE: NO RIGIDITY , LEFT SIDE SPASTICITY, NO COGWHEEL.  SENSORY MODALITIES: TOUCH, THERMAL, POINT DISCRIMINATION , VIBRATORY SENSATION NORMAL BILATERALLY.  ABNORMAL MOVEMENTS: NO TREMOR, NO SEIZURES, NO TICS, NO FASCICULATIONS.  MENINGES: NO KERNING, NO BRUDZINSKI, SOFT SUPPLE NECK.                      RECENT LABS:  WBC   Date Value Ref Range Status   11/16/2020 7.14 3.40 - 10.80 10*3/mm3 Final     Hemoglobin   Date Value Ref Range Status   11/16/2020 13.0 12.0 - 15.9 g/dL Final     Platelets   Date Value Ref Range Status   11/16/2020 101 (L)  140 - 450 10*3/mm3 Final                ASSESSMENT/PLAN:This patient has a glioblastoma multiforme, World Health Organization, grade 4, in the chilango on the right side associated with brain edema. This triggers very dense body paralysis on the left side. On the other hand comparing this patient with how she was  weeks ago at the rehab unit at [facility] and how she is now, she is like day and night. Her speech has improved 90%. She is enjoying her food. She has been able to swallow with no difficulties. She is controlling sphincters. She has not had any seizure activity. She has no pain. She has started to gain some movement in the left lower extremity, especially in the quadriceps. The movement in the left upper extremity is very limited. Most of the motion is triggered by shoulder activity more than anything else. The right body activity is normal. 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       The patient was further reviewed on 10/28/2020 since she completed 1 round of Temodar for the 42 days of the induction of radiation therapy and then 5 days of Temodar subsequently. Unfortunately the MRI that has been reported above and reviewed by me and agreeing with the radiologist’s interpretation shows very substantial increase in the size of edema and tumoral lesion in the chilango and in the protuberance. The patient has obvious neurological deterioration with worsening left body paralysis. Associated with this, she has deterioration of her voice and she has had more anxiety and depression. She decided that she wanted to take the depression medicine, Zyprexa, on p.r.n. basis and I pointed out to them that this  medicine does not work in that way. This medicine will require continually taking 3 or 4 weeks before they can see some real benefit. On the other hand when she takes the medicine at bedtime she has very significant improvement in her ability to sleep and have a good night of sleep.     Unfortunately I do not believe that the tumor area is having too much benefit in this patient’s situation at this point and the  brought the question in regard to how hot the tumor will be growing if the patient will not have any Temodar in the system. I wish I knew the answer to this question. What I see though is that the Temodar is not having any benefit whatsoever in the patient’s tumor. For this reason I advised them to discontinue this medicine at this time.     Obviously this raised the question in regard how to proceed. I have more medicine that we could utilize for palliation with very low yield of response including the possibility of using Avastin, Gemzar or carboplatin. Also we could consider immunotherapy medicines like Opdivo or Keytruda.     The chances that this patient will benefit from any of these medicines is very low but is still up to consideration. The 2nd option will be to look for a clinical trial. I provided information pertinent to the patient’s tumor to the research nurses and they will get back with me in the next 48 hours in regard to what they have found. The patient and the  are willing to travel out of the [facility] and they have a friend who owns an airplane and he is willing to transport them back and forth wherever she needs to go.   Since the previous visit the patient has had a successful trip to [place] with her  that she enjoyed very much. She had a lot of sun exposure, she had a great time on the beach and she was able to carry through. She had profound fatigue on the trip back in a car but she had no difficulties sleeping through Saturday and Sunday to rest over  after the traveling. Fortunately in spite of all of this the patient has not developed any vein thrombosis. She has no swelling in her lower extremities.     The white count and hemoglobin are normal. The platelet count remains low at 100,000.     I do believe clinically the patient still looks relatively well and she is willing to proceed with palliative therapy with Gemzar. I have advised the patient that she will receive the Gemzar every other week for a total of 3 times and then she will be reassessed on each one of those occasions clinically and radiologically after the 4th infusion.     In regard to the treatment of her brain edema the patient will remain on the same dose of dexamethasone that she is receiving at this time.    In regard to PPI she will remain on this medicine for the time being as long as she remains on dexamethasone.    In regard to candida prophylaxis she will remain on Mycelex 3 times a day on a regular basis.     I did discuss with them the fact that she is no longer receiving Temodar and therefore the patient will not have any further issues in regard hematological toxicity from this medication.     I discussed with them side effects of the Gemzar that can include low grade fever on day 2 or 3 after treatment, the slight rash that goes away with no implications and fatigue. The patient is already fatigued therefore it will be difficult to distinguish how much is one thing and how much is the other thing.    I also discussed with the patient and  the fact that we were not able to find any clinical trials applicable to her tumor site at this time. Our research nurses did a deep search about this and could not find anything that would be suitable for her.    The patient's  requested for us to look into [facility] neurosurgical and neuro-oncology team. They have the description of 1 patient who did well on a treatment that was not obviously described in the information  that was given to me. We could not find anything equivalent that was available for clinical trial for them at that institution.     I explained to them that the chance of Gemzar being effective for the treatment of this condition is less than 20% but she is willing to take the rounds and see what happens. We know that Gemzar IV sensitizes tissue to the effect of radiation therapy. She still has benefit of radiation therapy ongoing in my belief and hopefully this will have some positive impact in the areas where she needs to have more effect.    She has not had the need for any other medication at this time and again she will return in 2, 4 and 6 weeks to continue her therapy.     Her  will always be allowed into the room with the patient not only in the medical assessment area but also in the chemotherapy area given the fact that the patient is completely incapacitated for any activity.         I DISCUSSED WITH PATIENT IN DETAIL FORMS TO DECREASE CHANCES OF CORONAVIRUS INFECTION INCLUDING ISOLATION, PROPER HAND HIGIENE, AVOID PUBLIC PLACES  WITH CROWDS, FOLLOW  CDC RECOMENDATIONS, AND KEEP PERSONAL AND SOCIAL RESPONSIBILITY, WARE A MASK IN PUBLIC PLACES.  PATIENT IS AWARE THIS INFECTION COULD HAVE SEVERE CONSEQUENCES TO PERSONAL HEALTH AND FAMILY RAMIFICATIONS OF THIS.

## 2020-11-17 NOTE — TELEPHONE ENCOUNTER
Pt's  called and wanted to let Dr. Trimble know that she  started the new chemo yesterday and is feeling more tingling and heavy on the left side.

## 2020-11-17 NOTE — TELEPHONE ENCOUNTER
PT'S  CALLING ABOUT PT FEELING HEAVY AND HAVING TINGLING L SIDE OF BODY. STARTED AFTER SHE REC'D GEMZAR YESTERDAY. PT IS VERY CONCERNED. S/W DR. RENTERIA AND HE ADVISED PT TO CONTINUE TO MONITOR FOR NOW.  MADE AWARE AND HE V/U.

## 2020-11-18 NOTE — TELEPHONE ENCOUNTER
S/W PT. , NAIDA.  INFORMED  I SENT MEDICAL RECORDS HIS MESSAGE AS WELL AS DR. RENTERIA IF DR. RENTERIA NEEDS TO S/W HIM FURTHER ABOUT PT GOING TO [facility].  NO FURTHER QUESTIONS AT THIS TIME.

## 2020-11-18 NOTE — TELEPHONE ENCOUNTER
Malick, patient's , calling.    They have been told about a research trial at [facility].  How do they go about getting records and scans to review to see if she would be a candidate.    Please call Malick at- [phone]    [facility] Fax-  [phone]    Patient's [facility] patient id# [id number]

## 2020-11-23 NOTE — PROGRESS NOTES
REASON FOR FOLLOWUP:   1. Glioblastoma of the protuberance, dense left body paralysis, wheelchair-bound, total care by .  Completed Temodar and radiation with progression, now continuing on Gemzar     HISTORY OF PRESENT ILLNESS:    The patient is a 55 y.o. female with the above-mentioned street, who was brought into the office today for triage visit.  She recently initiated palliative Gemzar 11/16/2020.  She does require total care by her .  He was lifting her over the weekend and she nearly had a fall though he was able to grab her by the left shoulder.  This unfortunately resulted in a hematoma.  They called the on-call provider and were instructed to come in today for further evaluation.  She denies signs or symptoms of bleeding.  The bruise on her left shoulder is improving per the 's report.  There is a blood blister present though the  feels this is decreasing in size.   Additionally, he reports over the weekend he decreased her dexamethasone to 2 mg twice daily.  He reports overall she has had a very good weekend with improvement in her energy, mood and mobility.  She continues to have tingling in her left arm and hand which Dr. Trimble reports this is chronic and unchanged.  Overall, the patient's  is quite pleased with her status and tolerance to Gemzar thus far.    Past Medical History:   Diagnosis Date   • Anxiety    • Glioblastoma (CMS/HCC)    • History of hemiplegia    • S/P craniotomy    • Vertigo      Past Surgical History:   Procedure Laterality Date   • BRAIN SURGERY     • BREAST BIOPSY     • CRANIOTOMY FOR TUMOR Right 7/1/2020    Procedure: Right-sided craniotomy for excisional biopsy and, placement of lumbar drain;  Surgeon: Shai Licona MD;  Location: [facility];  Service: Neurosurgery;  Laterality: Right;   • DENTAL PROCEDURE     • TUBAL ABDOMINAL LIGATION     • WISDOM TOOTH EXTRACTION       Social History     Socioeconomic History   • Marital  status:      Spouse name: Malick   • Number of children: Not on file   • Years of education: Not on file   • Highest education level: Not on file   Occupational History     Employer: [employer]   Tobacco Use   • Smoking status: Never Smoker   • Smokeless tobacco: Never Used   Substance and Sexual Activity   • Alcohol use: Yes     Comment: Socially   • Drug use: No   • Sexual activity: Yes     Partners: Male     Birth control/protection: Post-menopausal     Family History   Problem Relation Age of Onset   • Ovarian cancer Maternal Aunt    • Breast cancer Neg Hx    • Uterine cancer Neg Hx    • Colon cancer Neg Hx    • Deep vein thrombosis Neg Hx    • Pulmonary embolism Neg Hx    No Known Allergies   Current Outpatient Medications on File Prior to Visit   Medication Sig   • clotrimazole (MYCELEX) 10 MG lily Dissolve 1 tablet by mouth 3 (Three) Times a Day.   • dexamethasone (DECADRON) 4 MG tablet Take 1 tablet by mouth Every 12 (Twelve) Hours.   • levETIRAcetam (KEPPRA) 500 MG tablet Take 1 tablet by mouth Every 12 (Twelve) Hours.   • OLANZapine (zyPREXA) 2.5 MG tablet Take 1 tablet by mouth Every Night.   • ondansetron (ZOFRAN) 8 MG tablet Take 1 tablet by mouth Every 8 (Eight) Hours As Needed for Nausea or Vomiting.   • pantoprazole (PROTONIX) 40 MG EC tablet Take 1 tablet by mouth Daily.   • potassium chloride (K-DUR,KLOR-CON) 10 MEQ CR tablet Take 2 tablets by mouth Daily.   • temozolomide (TEMODAR) 100 MG chemo capsule Take 2 capsules by mouth with 2 other temozolomide prescriptions for 225 mg total Daily. For 5 days out of 28 days.   • temozolomide (TEMODAR) 20 MG chemo capsule Take 1 capsule by mouth with 2 other temozolomide prescriptions for 225 mg total Daily. For 5 days out of 28 days.   • temozolomide (TEMODAR) 5 MG chemo capsule Take 1 capsule by mouth with 2 other temozolomide prescriptions for 225 mg total Daily. For 5 days out of 28 days.   • Vitamin D, Cholecalciferol,  "(CHOLECALCIFEROL) 10 MCG (400 UNIT) tablet Take  by mouth Daily. Unknown units     No current facility-administered medications on file prior to visit.      I have reviewed the patient's medical history in detail and updated the computerized patient record.    Review of Systems   Constitutional: Positive for appetite change and fatigue. Negative for chills and fever.   HENT:   Negative for trouble swallowing.    Respiratory: Negative for cough and shortness of breath.    Cardiovascular: Negative for chest pain and leg swelling.   Gastrointestinal: Negative for abdominal distention, blood in stool, constipation, diarrhea and nausea.   Musculoskeletal: Positive for gait problem. Negative for arthralgias and myalgias.   Skin: Negative for rash.        Large left chest/shoulder bruise.   Neurological: Positive for extremity weakness, gait problem and speech difficulty. Negative for dizziness.   Hematological: Bruises/bleeds easily.   Review of systems 11/23/2020  unchanged from previous office visit except as updated.        Vitals:    11/23/20 1037   BP: 123/87   Pulse: 94   Resp: 16   Temp: 97.7 °F (36.5 °C)   SpO2: 100%   Weight: Comment: pt. unable to stand   Height: 165.1 cm (65\")   PainSc: 0-No pain       EXAM:  GENERAL:  Well-developed, well-nourished in no acute distress.  Seen seated in wheelchair with the assistance of her   SKIN:  Warm, dry without rashes.  Generalized bruising on upper and lower extremities with large ecchymosis area of the left shoulder and chest, central blood blister measuring 2 cm this is not opened.  There is no soft tissue swelling under the superficial bruise.  HEAD:  Normocephalic.  EYES:  Pupils equal, round.  EOMs intact.  Conjunctivae normal.  EARS:  Hearing intact.  CHEST:  Breathing unlabored, no acute distress  EXTREMITIES:  No clubbing, cyanosis or edema.  NEUROLOGICAL: Left upper and lower extremity paralysis.  PSYCHIATRIC:  Normal affect and mood.  .  Physical exam " 11/23/2020  unchanged from previous office visit except as updated.        RECENT LABS:  Results from last 7 days   Lab Units 11/23/20  1013   WBC 10*3/mm3 3.33*   NEUTROS ABS 10*3/mm3 2.71   HEMOGLOBIN g/dL 12.2   HEMATOCRIT % 35.7   PLATELETS 10*3/mm3 93*                      ASSESSMENT/PLAN:    1. Glioblastoma (WHO grade 4).    · Diagnosis per right brainstem biopsy 7/1/2020.  · Await IDH 1-2 mutation analysis by PCR (negative by NGS).  · MGMT promoter methylation by PCR negative  · Regardless of pending studies, as per NCCN guidelines, plan concurrent Temodar with radiation, then adjuvant Temodar   ? (clinical benefit of Temodar is likely to be lower in patients who do not have MGMT promoter methylation)  ? (If MGMT methylated, then 46% 2-year OS versus about 14%.  Median OS 21.7 months versus 12.7 months.)  ? If IGH 1-2 mutated, then significantly improved prognosis.  This does not affect treatment choice).  · Alternating electric field therapy is only an option for supratentorial disease.  Therefore, this is not an option due to the location of this malignancy  · Patient was started on concurrent chemoradiotherapy 7/23/2020.  Temodar dosing would be 75 mg/m² daily x42 days.  ? Concurrent chemoradiation complete 9/2/2020  · Completed 1 cycle of maintenance Temodar with follow-up MRI of the brain 10/21/2020  · MRI 10/21/2020 with very substantial increase in the size of the edema and tumoral lesion in the chilango and in the protuberance.  Obvious neurologic deterioration with worsening left paralysis  · Consideration of clinical trial at the time of progression, though ultimately elected to proceed with palliative Gemzar.  Dr. Trimble is explained less than 20% probability of Gemzar being effective, the family is agreeable to proceed  · Gemzar day 1 day 15 of a 28-day cycle initiated 11/16/2020 with plans to repeat MRI of the brain following 3 doses    2.  Left-sided paralysis secondary to GBM  · Neurologic symptoms  worsened at the time of progression and prior to initiation of Gemzar  · Continuing dexamethasone 2 mg twice daily, this was tapered just 2 days ago.  No plans to further taper at this time  · Since initiation of Gemzar, the patient has noted improvement in her paralysis    3.  Depression and anxiety  · Continuing on Zyprexa 2.5 mg nightly.  · The patient's  does note improvement in her mood over the past few days    4.  Bruising with no hematoma  · Historically with generalized bruising  · The patient had a near fall over the weekend, her  grabbed her left arm to catch her resulting in a large bruise on her left shoulder, though no soft tissue hematoma present.  There is a small blood blister centrally located, though no swelling or signs of infection  · Platelets are 93,000 today, continue to monitor    5.  Prophylaxis.    · Continue on Protonix 40 mg daily    6.  Previous Candida  · Mycelex 3 times daily  · No evidence of Candida today    PLAN:  1. Continue to monitor bruise on the left chest, the blood blister will likely open at some point at which time the patient's  will apply liquid bandage  2. Continue dexamethasone 2 mg twice daily with no plans to further taper at this time  3. Continue Zyprexa nightly  4. Continue Protonix 40 mg daily for prophylaxis  5. Continue Mycelex  6. Return 12/1/2020 for CBC, CMP, nurse practitioner follow-up and to cycle 1 day 15 Gemzar  7. MD follow-up 12/15/2020 with CBC, CMP, continued Gemzar.  We will likely repeat MRI of the brain at that time    Todays plan was reviewed with Dr. Trimble who is in agreement.  Of note, the patient's  does wish for them to travel to [place] again.  They were considering traveling by plane, though as reviewed with Dr. Trimble, his preference would be to travel by car.  25 minutes were spent with the patient and her , 22 minutes been in face-to-face counseling and education    Lisa Reddy  APRN  11/23/2020

## 2020-12-01 NOTE — PROGRESS NOTES
REASON FOR FOLLOWUP:   1. Glioblastoma of the protuberance, dense left body paralysis, wheelchair-bound, total care by .  Completed Temodar and radiation with progression, now continuing on Gemzar     HISTORY OF PRESENT ILLNESS:    The patient is a 55 y.o. female with the above-mentioned history who is here today for lab review and evaluation prior to cycle 1 day 15 Gemzar.  Hematoma on her left shoulder continues to improve.  The blood blister is gone.  Patient has had some episodes of nausea.  The first 1 occurred Thanksgiving evening, and the other 1 was yesterday evening.  Both episodes were relieved with her antiemetics, and she never vomited.  Patient does report some blurred vision and occasional double vision, but this occurs typically only with position change.  No fevers or chills.  No headaches.  She had fatigue starting around day 3 or 4 after her last Gemzar, and it lasted about 3 to 4 days.  Overall, the patient is only awake about 10 hours a day, but this is not changed.    There supposed to have a telephone evaluation with [facility] soon to see if there are any other treatment options.    Past Medical History:   Diagnosis Date   • Anxiety    • Glioblastoma (CMS/HCC)    • History of hemiplegia    • S/P craniotomy    • Vertigo      Past Surgical History:   Procedure Laterality Date   • BRAIN SURGERY     • BREAST BIOPSY     • CRANIOTOMY FOR TUMOR Right 7/1/2020    Procedure: Right-sided craniotomy for excisional biopsy and, placement of lumbar drain;  Surgeon: Shai Licona MD;  Location: [facility];  Service: Neurosurgery;  Laterality: Right;   • DENTAL PROCEDURE     • TUBAL ABDOMINAL LIGATION     • WISDOM TOOTH EXTRACTION       Social History     Socioeconomic History   • Marital status:      Spouse name: Malick   • Number of children: Not on file   • Years of education: Not on file   • Highest education level: Not on file   Occupational History     Employer: [employer]   Tobacco Use   • Smoking status: Never Smoker   • Smokeless tobacco: Never Used   Substance and Sexual Activity   • Alcohol use: Yes     Comment: Socially   • Drug use: No   • Sexual activity: Yes     Partners: Male     Birth control/protection: Post-menopausal     Family History   Problem Relation Age of Onset   • Ovarian cancer Maternal Aunt    • Breast cancer Neg Hx    • Uterine cancer Neg Hx    • Colon cancer Neg Hx    • Deep vein thrombosis Neg Hx    • Pulmonary embolism Neg Hx    No Known Allergies   Current Outpatient Medications on File Prior to Visit   Medication Sig   • clotrimazole (MYCELEX) 10 MG lily Dissolve 1 tablet by mouth 3 (Three) Times a Day.   • dexamethasone (DECADRON) 4 MG tablet Take 1 tablet by mouth Every 12 (Twelve) Hours.   • levETIRAcetam (KEPPRA) 500 MG tablet Take 1 tablet by mouth Every 12 (Twelve) Hours.   • OLANZapine (zyPREXA) 2.5 MG tablet Take 1 tablet by mouth Every Night.   • ondansetron (ZOFRAN) 8 MG tablet Take 1 tablet by mouth Every 8 (Eight) Hours As Needed for Nausea or Vomiting.   • pantoprazole (PROTONIX) 40 MG EC tablet Take 1 tablet by mouth Daily.   • potassium chloride (K-DUR,KLOR-CON) 10 MEQ CR tablet Take 2 tablets by mouth Daily.   • temozolomide (TEMODAR) 100 MG chemo capsule Take 2 capsules by mouth with 2 other temozolomide prescriptions for 225 mg total Daily. For 5 days out of 28 days.   • temozolomide (TEMODAR) 20 MG chemo capsule Take 1 capsule by mouth with 2 other temozolomide prescriptions for 225 mg total Daily. For 5 days out of 28 days.   • temozolomide (TEMODAR) 5 MG chemo capsule Take 1 capsule by mouth with 2 other temozolomide prescriptions for 225 mg total Daily. For 5 days out of 28 days.   • Vitamin D, Cholecalciferol, (CHOLECALCIFEROL) 10 MCG (400 UNIT) tablet Take  by mouth Daily. Unknown units     No current facility-administered medications on file prior to visit.      I have reviewed the patient's medical history  "in detail and updated the computerized patient record.    Review of Systems   Constitutional: Positive for fatigue. Negative for appetite change, chills and fever.   HENT:   Negative for trouble swallowing.    Respiratory: Negative for cough and shortness of breath.    Cardiovascular: Negative for chest pain and leg swelling.   Gastrointestinal: Positive for nausea (see HPI). Negative for abdominal distention, blood in stool, constipation and diarrhea.   Musculoskeletal: Positive for gait problem. Negative for arthralgias and myalgias.   Skin: Negative for rash.        Large left chest/shoulder bruise.   Neurological: Positive for extremity weakness, gait problem and speech difficulty. Negative for dizziness.   Hematological: Bruises/bleeds easily.     12/1/2020 ROS unchanged from above except as updated.    Vitals:    12/01/20 0933   BP: 111/60   Pulse: 90   Resp: 16   Temp: 97.6 °F (36.4 °C)   TempSrc: Temporal   SpO2: 99%   Weight: Comment: unable to stand   Height: 165.1 cm (65\")   PainSc: 0-No pain       Physical Exam  Vitals signs reviewed.   Constitutional:       General: She is not in acute distress.     Appearance: Normal appearance. She is well-developed.      Comments: Seated in a wheelchair, accompanied by her    HENT:      Head: Normocephalic and atraumatic.      Mouth/Throat:      Pharynx: No oropharyngeal exudate.   Eyes:      Pupils: Pupils are equal, round, and reactive to light.   Neck:      Musculoskeletal: Normal range of motion.   Cardiovascular:      Rate and Rhythm: Normal rate and regular rhythm.      Heart sounds: Normal heart sounds. No murmur.   Pulmonary:      Effort: Pulmonary effort is normal. No respiratory distress.      Breath sounds: Normal breath sounds. No wheezing, rhonchi or rales.   Abdominal:      General: Bowel sounds are normal. There is no distension.      Palpations: Abdomen is soft.   Musculoskeletal: Normal range of motion.   Skin:     General: Skin is warm and " dry.      Findings: No rash.      Comments: Scattered ecchymosis on the upper extremities bilaterally.  Large area of ecchymosis on the left shoulder.   Neurological:      Mental Status: She is alert and oriented to person, place, and time.         RECENT LABS:  Results from last 7 days   Lab Units 12/01/20  0910   WBC 10*3/mm3 3.08*   NEUTROS ABS 10*3/mm3 2.38   HEMOGLOBIN g/dL 12.3   HEMATOCRIT % 36.1   PLATELETS 10*3/mm3 166                      ASSESSMENT/PLAN:    1. Glioblastoma (WHO grade 4).    · Diagnosis per right brainstem biopsy 7/1/2020.  · Await IDH 1-2 mutation analysis by PCR (negative by NGS).  · MGMT promoter methylation by PCR negative  · Regardless of pending studies, as per NCCN guidelines, plan concurrent Temodar with radiation, then adjuvant Temodar   ? (clinical benefit of Temodar is likely to be lower in patients who do not have MGMT promoter methylation)  ? (If MGMT methylated, then 46% 2-year OS versus about 14%.  Median OS 21.7 months versus 12.7 months.)  ? If IGH 1-2 mutated, then significantly improved prognosis.  This does not affect treatment choice).  · Alternating electric field therapy is only an option for supratentorial disease.  Therefore, this is not an option due to the location of this malignancy  · Patient was started on concurrent chemoradiotherapy 7/23/2020.  Temodar dosing would be 75 mg/m² daily x42 days.  ? Concurrent chemoradiation complete 9/2/2020  · Completed 1 cycle of maintenance Temodar with follow-up MRI of the brain 10/21/2020  · MRI 10/21/2020 with very substantial increase in the size of the edema and tumoral lesion in the chilango and in the protuberance.  Obvious neurologic deterioration with worsening left paralysis  · Consideration of clinical trial at the time of progression, though ultimately elected to proceed with palliative Gemzar.  Dr. Trimble is explained less than 20% probability of Gemzar being effective, the family is agreeable to proceed  · Gemzar  day 1 day 15 of a 28-day cycle initiated 11/16/2020 with plans to repeat MRI of the brain following 3 doses.  · 12/1/2020 here for cycle 1 day 15 Gemzar, overall tolerating well.    2.  Left-sided paralysis secondary to GBM  · Neurologic symptoms worsened at the time of progression and prior to initiation of Gemzar  · Continuing dexamethasone 2 mg twice daily, this was tapered just 2 days ago.  No plans to further taper at this time  · Since initiation of Gemzar, the patient has noted improvement in her paralysis    3.  Depression and anxiety  · Continuing on Zyprexa 2.5 mg nightly.  · The patient's  does note improvement in her mood over the past few days    4.  Bruising with no hematoma  · Historically with generalized bruising  · The patient had a near fall over the weekend, her  grabbed her left arm to catch her resulting in a large bruise on her left shoulder, though no soft tissue hematoma present.  There is a small blood blister centrally located, though no swelling or signs of infection  · Platelets 166,000 today.  We will continue to monitor.    5.  Prophylaxis.    · Continue on Protonix     6.  Previous Candida  · Mycelex 3 times daily  · No evidence of Candida today    7.  Nausea: Has occurred 2 times after eating large meals.  We discussed it may be reflux related, as the patient was lying down both times, although propped up.  She is on Protonix 40 mg daily, instructed her to an increase that to twice daily.  Can also continue antiemetics if needed.    PLAN:  1. Proceed with cycle 1 day 15 Gemzar today.  2. Continue dexamethasone 2 mg twice daily.  3. Continue Zyprexa nightly.  4. Increase Protonix 40 mg twice daily.  5. Continue antiemetics if needed.  6. Continue Mycelex.  7. Return on 12/15/2020 for follow-up with Dr. Trimble with CBC, CMP, and continue Gemzar.  We will likely repeat MRI of the brain after that cycle.        Ramandeep Benoit, APRN  12/1/2020

## 2020-12-15 PROBLEM — R73.9 STEROID-INDUCED HYPERGLYCEMIA: Status: ACTIVE | Noted: 2020-01-01

## 2020-12-15 PROBLEM — T38.0X5A STEROID-INDUCED HYPERGLYCEMIA: Status: ACTIVE | Noted: 2020-01-01

## 2020-12-15 NOTE — PROGRESS NOTES
REASON FOR FOLLOWUP/CHIEF COMPLAINT:  1.Glioblastoma OF THE PROTUBERANCE DENSE LEFT BODY PARALYSIS, WHEEL CHAIR BOUND TOTAL CARE BY , UNDERWENT RADIATION AND TEMODAR; PROGRESSIVE DISEASE BY MRI MOVING INTO GEMZAR IV EVERY 2 WEEKS, NO FURTHER ROLE FOR SURGERY OR RADIATION THERAPY.    2. REFERRAL TO [facility] FOR SECOND OPINION.    3. HYPERGLYCEMIA DU TO CHRONIC STEROID USE SUGARS IN  RANGE      HISTORY OF PRESENT ILLNESS:     PATIENT WAS CALLED THE DAY BEFORE BY THE OFFICE TO ASK FOR SYMPTOMS THAT COULD BE CONSISTENT WITH CORONAVIRUS INFECTION, AND BEING NEGATIVE WAS SCHEDULED TO BE SEEN IN THE OFFICE TODAY. SIMILAR QUESTIONING TODAY INCLUDING, CHILLS, FEVER, NEW COUGH, SHORTNESS OF BREATH, DIARRHEA,DIFFUSE BODY ACHES  AND CHANGES IN SMELL OR TASTE WERE NEGATIVE.THE PATIENT DENIED ANY CONTACT WITH PERSONS WHO WERE POSITIVE FOR COVID, AND PATIENT IS NOT IN CATEGORY OF HIGH RISK BEHAVIOR TO ACQUIRE COVID.    DURING THE VISIT WITH THE PATIENT TODAY , PATIENT HAD FACE MASK, MY MEDICAL ASSISTANT AND I  HAD PROPPER PROTECTIVE EQUIPMENT, AND I DID HAND HYGIENE WITH SOAP AND WATER BEFORE AND AFTER THE VISIT.    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 The patient's appetite remains excellent, she has occasional difficulty swallowing, occasional choking of food. Her body becomes very tired when she is talking too much and she continues having some numbness in the left hemibody from time to time that lasts for half a day or so. She has very severe paralysis on the left side that is unchanged. The right side of her body remains strong. She has not had any alteration in the anatomy of her face, it has remained the same like before with the exception of the obvious development of Cushing's face. The patient's bowel function  and urination are fine, continence is fine and she has no swelling in her left lower extremity. She has minimal degree of swelling in the right leg that goes away upon raising the leg in bed. She has a minimal sore on her buttock and pictures were shown to me by her . The patient also has fatigue most likely associated with the Gemzar but she is able to handle this. She has not had any hematological toxicity. They have had a video medicine visit with the [facility] and they have requested another one with [facility]. Please review below.                     Past Medical History:   Diagnosis Date   • Anxiety    • Glioblastoma (CMS/HCC)    • History of hemiplegia    • S/P craniotomy    • Vertigo      Past Surgical History:   Procedure Laterality Date   • BRAIN SURGERY     • BREAST BIOPSY     • CRANIOTOMY FOR TUMOR Right 7/1/2020    Procedure: Right-sided craniotomy for excisional biopsy and, placement of lumbar drain;  Surgeon: Shai Licona MD;  Location: [facility];  Service: Neurosurgery;  Laterality: Right;   • DENTAL PROCEDURE     • TUBAL ABDOMINAL LIGATION     • WISDOM TOOTH EXTRACTION       Social History     Socioeconomic History   • Marital status:      Spouse name: Malick   • Number of children: Not on file   • Years of education: Not on file   • Highest education level: Not on file   Occupational History     Employer: [employer]   Tobacco Use   • Smoking status: Never Smoker   • Smokeless tobacco: Never Used   Substance and Sexual Activity   • Alcohol use: Yes     Comment: Socially   • Drug use: No   • Sexual activity: Yes     Partners: Male     Birth control/protection: Post-menopausal     Family History   Problem Relation Age of Onset   • Ovarian cancer Maternal Aunt    • Breast cancer Neg Hx    • Uterine cancer Neg Hx    • Colon cancer Neg Hx    • Deep vein thrombosis Neg Hx    • Pulmonary embolism Neg Hx    No Known Allergies   Current  Outpatient Medications on File Prior to Visit   Medication Sig   • dexamethasone (DECADRON) 4 MG tablet Take 1 tablet by mouth Every 12 (Twelve) Hours.   • levETIRAcetam (KEPPRA) 500 MG tablet Take 1 tablet by mouth Every 12 (Twelve) Hours.   • OLANZapine (zyPREXA) 2.5 MG tablet Take 1 tablet by mouth Every Night.   • ondansetron (ZOFRAN) 8 MG tablet Take 1 tablet by mouth Every 8 (Eight) Hours As Needed for Nausea or Vomiting.   • potassium chloride (K-DUR,KLOR-CON) 10 MEQ CR tablet Take 2 tablets by mouth Daily.   • Vitamin D, Cholecalciferol, (CHOLECALCIFEROL) 10 MCG (400 UNIT) tablet Take  by mouth Daily. Unknown units   • [DISCONTINUED] clotrimazole (MYCELEX) 10 MG lily Dissolve 1 tablet by mouth 3 (Three) Times a Day.   • [DISCONTINUED] pantoprazole (PROTONIX) 40 MG EC tablet Take 1 tablet by mouth Daily.   • [DISCONTINUED] temozolomide (TEMODAR) 100 MG chemo capsule Take 2 capsules by mouth with 2 other temozolomide prescriptions for 225 mg total Daily. For 5 days out of 28 days.   • [DISCONTINUED] temozolomide (TEMODAR) 20 MG chemo capsule Take 1 capsule by mouth with 2 other temozolomide prescriptions for 225 mg total Daily. For 5 days out of 28 days.   • [DISCONTINUED] temozolomide (TEMODAR) 5 MG chemo capsule Take 1 capsule by mouth with 2 other temozolomide prescriptions for 225 mg total Daily. For 5 days out of 28 days.     No current facility-administered medications on file prior to visit.        Ros:               General: no fever, no chills,  fatigue,no weight changes, no lack of appetite.  Eyes: no epiphora, xerophthalmia,conjunctivitis, pain, glaucoma, blurred vision, blindness, secretion, photophobia, proptosis, diplopia.  Ears: no otorrhea, tinnitus, otorrhagia, deafness, pain, vertigo.  Nose: no rhinorrhea, no epistaxis, no alteration in perception of odors, no sinuses pressure.  Mouth: no alteration in gums or teeth,  No ulcers, no difficulty with mastication or deglut ion, no  "odynophagia.  Neck: no masses or pain, no thyroid alterations, no pain in muscles or arteries, no carotid odynia, no crepitation.  Respiratory: no cough, no sputum production,no dyspnea,no trepopnea, no pleuritic pain,no hemoptysis.  Heart: no syncope, no irregularity, no palpitations, no angina,no orthopnea,no paroxysmal nocturnal dyspnea.  Vascular Venous: no tenderness,no edema,no palpable cords,no postphlebitic syndrome, no skin changes no ulcerations.  Vascular Arterial: no distal ischemia, noclaudication, no gangrene, no neuropathic ischemic pain, no skin ulcers, no paleness no cyanosis.  GI: no dysphagia, no odynophagia, no regurgitation,  heartburn, indigestion,no nausea,no vomiting,no hematemesis ,no melena,no jaundice,no distention, no obstipation,no enterorrhagia,no proctalgia,no anal  lesions, no changes in bowel habits.  : no frequency, no hesitancy, no hematuria, no discharge,no  pain.  Musculoskeletal: no muscle or tendon pain or inflammation,no  joint pain, no edema, no functional limitation,no fasciculations, no mass.  Neurologic: no headache, no seizures, stated alterations on Craneal nerves, severe left side motor deficit, and sensory deficit, poor coordination, no alteration in memory  Skin: no rashes,no pruritus no localized lesions.  Psychiatric: no anxiety, no depression,no agitation, no delusions, proper insight.          Vitals:    12/15/20 1133   BP: 122/87   Pulse: 103   Resp: 19   Temp: 97.5 °F (36.4 °C)   TempSrc: Temporal   SpO2: 97%   Height: 165.1 cm (65\")       EXAM:      I HAVE PERSONALLY REVIEWED THE HISTORY OF THE PRESENT ILLNESS, PAST MEDICAL HISTORY, FAMILY HISTORY, SOCIAL HISTORY, ALLERGIES, MEDICATIONS STATED ABOVE IN THE OFFICE NOTE FROM TODAY.        GENERAL:  Well-developed, well-nourished  Patient  in no acute distress. IN A WHEEL CHAIR  SKIN:  Warm, dry ,NO rashes,NO purpura ,NO petechiae.  HEENT:  Pupils were equal and reactive to light and accomodation, conjunctivae " noninjected, no pterygium, normal extraocular movements, normal visual acuity.   NECK:  Supple with good range of motion; no thyromegaly or masses, no JVD or bruits, no cervical adenopathies.No carotid artery pain, no carotid abnormal pulsation , NO arterial dance.  LYMPHATICS:  No cervical, NO supraclavicular, NO axillary,NO epitrochlear , NO inguinal adenopathy.  CARDIAC   normal rate and regular rhythm, without murmur,NO rubs NO S3 NO S4 right or left . Normal femoral, popliteal, pedis, brachial and carotid pulses.  VASCULAR ARTERIAL: normal carotids,brachial,radial,femoral,popliteal, pedis pulses , no bruits.no paleness or cyanosis, no pain, no edema, no numbness, no gangrene.  VASCULAR VENOUS: no cyanosis, collateral circulation, varicosities, edema, palpable cords, pain, erythema.  ABDOMEN:  Soft, nontender with no hepatomegaly, no splenomegaly,no masses, no ascites, no collateral circulation,no distention,no [place] sign, no abdominal pain, no inguinal hernias,no umbilical hernia, no abdominal bruits. Normal bowel sounds.  GENITAL: Not  Performed.  EXTREMITIES  AND SPINE:  No clubbing, cyanosis or edema, no deformities or pain .No kyphosis, scoliosis, deformities or pain in spine, ribs or pelvic bone.  NEUROLOGICAL:  Patient was awake, alert, oriented to time, person and place.DENSE LEFT BODY PARALYSIS,R FACIAL PARALYSIS                    RECENT LABS:  WBC   Date Value Ref Range Status   12/15/2020 5.42 3.40 - 10.80 10*3/mm3 Final     Hemoglobin   Date Value Ref Range Status   12/15/2020 11.2 (L) 12.0 - 15.9 g/dL Final     Platelets   Date Value Ref Range Status   12/15/2020 151 140 - 450 10*3/mm3 Final                ASSESSMENT/PLAN:This patient has a glioblastoma multiforme, World Health Organization, grade 4, in the chilango on the right side associated with brain edema. This triggers very dense body paralysis on the left side. On the other hand comparing this patient with how she was  weeks ago at the rehab  unit at [facility] and how she is now, she is like day and night. Her speech has improved 90%. She is enjoying her food. She has been able to swallow with no difficulties. She is controlling sphincters. She has not had any seizure activity. She has no pain. She has started to gain some movement in the left lower extremity, especially in the quadriceps. The movement in the left upper extremity is very limited. Most of the motion is triggered by shoulder activity more than anything else. The right body activity is normal. 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       The patient was further reviewed on 10/28/2020 since she completed 1 round of Temodar for the 42 days of the induction of radiation therapy and then 5 days of Temodar subsequently. Unfortunately the MRI that has been reported above and reviewed by me and agreeing with the radiologist’s interpretation shows very substantial increase in the size of edema and tumoral lesion in the chilango and in the protuberance. The patient has obvious neurological deterioration with worsening left body paralysis. Associated with this, she has deterioration of her voice and she has had more anxiety and depression. She decided that she wanted to take the depression medicine, Zyprexa, on p.r.n. basis and I pointed out to them that this medicine does not work in that way. This medicine will require continually taking 3 or 4 weeks before they can see some real benefit. On the other hand when she takes the medicine at bedtime she has very significant improvement in her ability to sleep and have a good night of sleep.     Unfortunately I do not believe that the tumor area  is having too much benefit in this patient’s situation at this point and the  brought the question in regard to how hot the tumor will be growing if the patient will not have any Temodar in the system. I wish I knew the answer to this question. What I see though is that the Temodar is not having any benefit whatsoever in the patient’s tumor. For this reason I advised them to discontinue this medicine at this time.     Obviously this raised the question in regard how to proceed. I have more medicine that we could utilize for palliation with very low yield of response including the possibility of using Avastin, Gemzar or carboplatin. Also we could consider immunotherapy medicines like Opdivo or Keytruda.     The chances that this patient will benefit from any of these medicines is very low but is still up to consideration. The 2nd option will be to look for a clinical trial. I provided information pertinent to the patient’s tumor to the research nurses and they will get back with me in the next 48 hours in regard to what they have found. The patient and the  are willing to travel out of the [facility] and they have a friend who owns an airplane and he is willing to transport them back and forth wherever she needs to go.       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 Her trip was successful in regard to her ability to take a break from her routine in the house. She is very satisfied that she was able to accomplish this.     In regard to her brain tumor the neurological dysfunction is a little bit more prominent including her speech that is becoming a little bit more difficult to understand and the numbness that comes intermittently on the left side of her body. The paralysis on the left side is unchanged. The patient is having occasional difficulty with food no choking but she has to be very careful in regard chewing slow and  swallowing slow. She has no difficulty with liquids at all. She has not had any obvious aspiration. She has no cough or shortness of breath.     Obviously it is difficult to tell if the Gemzar is having any benefit in her tumor but at least she is not dramatically worse clinically speaking than how she was 2-4 weeks ago respectively. The Gemzar is providing her some fatigue, it is not surprising that her white count, hemoglobin and platelets remain normal.    Her blood sugar has become elevated in the 170s and this is effect of the dexamethasone that she has been taking now since the time of her surgery. She needs to remain on this dose of dexamethasone to keep brain swelling down as much as we can.     She also has developed ecchymosis in the skin pertinent to the use of dexamethasone. There is not too much that we can do about that.     The  has shown me photography of an area that she has in the buttock that looks like candida and maybe a pressure sore. This will be treated accordingly.     They have had video medicine visit with the [facility]. The physician in that facility has been Dr. Deutsch. He has suggested for her to receive Avastin. We have discussed this medicine with them before. We have discussed the very limited benefit and besides the significant probability of side effects including hypertension, vein thrombosis or clinical bleeding. They are not ready to make this move. They are pending to have another 2nd opinion at [facility] through video medicine as well. I doubt that they will find a clinical trial available for her.    From the point of view of indigestion the patient is not having benefit of the PPI and I have send a prescription for Pepcid 4 mg twice a day.    In regard oral candida she has none today but she will remain on Mycelex 1 tablet dissolved in the mouth 3 times a day as long as she remains on dexamethasone.    In regard to the possible candida in the  buttock area I advised her to use ketoconazole cream to apply in this area twice a day.    In regard to her seizure medication she will remain the same. She has not had any seizure activity.     In regard to her depression medicine olanzapine she is sleeping extremely well, she is happier, she is crying less and she feels with better disposition. She has a tremendous appetite probably triggered by this medicine. Therefore this intervention has been successful.     I proposed to them to continue her Gemzar today and in 2 weeks and we will pursue an MRI of the brain in 5 weeks and review her back on clinical grounds in 6 weeks. Depending on what we find and depending on the final visit with [facility] in [place] we will decide how to proceed about modifying medicines. The  is more tempted to use Avastin and I pointed out to him that we will need to be extremely careful with this medicine given the possibility of side effects including vein thrombosis, hypertension and clinical bleeding. He is aware of that situation.         I DISCUSSED WITH PATIENT IN DETAIL FORMS TO DECREASE CHANCES OF CORONAVIRUS INFECTION INCLUDING ISOLATION, PROPER HAND HIGIENE, AVOID PUBLIC PLACES  WITH CROWDS, FOLLOW  CDC RECOMENDATIONS, AND KEEP PERSONAL AND SOCIAL RESPONSIBILITY, WARE A MASK IN PUBLIC PLACES.  PATIENT IS AWARE THIS INFECTION COULD HAVE SEVERE CONSEQUENCES TO PERSONAL HEALTH AND FAMILY RAMIFICATIONS OF THIS.

## 2020-12-27 NOTE — PROGRESS NOTES
Phone call from Denise Bennett's .  Patient was given antibiotics but still having a cough with no shortness of breath -noted in temperature but I suggested she get a Covid test so that we can be prepared either way regarding chemo in the office on Tuesday and possibility of using monoclonal antibodies etc. and they are going to do this

## 2020-12-28 NOTE — TELEPHONE ENCOUNTER
Returning pt's 's call. Pt  stated pt had slight cough with no other symptoms over the weekend. Pt  stated Dr. Trimble started her on antibiotics that she has been taking and cough has improved, but pt  called and spoke to on call  yesterday who wanted pt to have covid test, so they had one done yesterday. Pt's  stated they don't have the results yet, but pt isn't having any symptoms and was wondering if she is still able to come to her appt tomorrow and if she should continue antibiotics. Told pt's  since she was covid tested pt will not be able to come to appt tomorrow, but to call back once they have results and we can get her rescheduled, or have the appt pushed out and that I will ask Dr. Trimble about antibiotics. Pt's  v/u.

## 2020-12-28 NOTE — TELEPHONE ENCOUNTER
PT had a cough over the weekend they called in and was told for her to get a Covid test.  She did get one results aren't back yet. Cough is gone and she feels fine can she come for her treatment tomorrow.

## 2020-12-28 NOTE — TELEPHONE ENCOUNTER
Called pt's  to update him on what Dr. Trimble said. Pt's  v/u.     ----- Message from John Trimble MD sent at 12/28/2020 10:45 AM EST -----  Stop ab, call us back with report of covid no eunice until covid report back  ----- Message -----  From: Yayo Doyle RN  Sent: 12/28/2020   9:46 AM EST  To: John Trimble MD, January Rosado RN    Pt's  calling. Pt was covid tested yesterday, but does not have results back yet. Pt will not be able to come in for treatment tomorrow since results are not back. Told them to call once they receive the results. Pt's  was also wondering if pt needs to continue on the antibiotics you prescribed her last week for her mild cough. Pt's  said cough has improved.     Please advise.  Thanks!

## 2020-12-29 NOTE — TELEPHONE ENCOUNTER
Patients  called to tell us shes developed a cough again. Stopped antibiotics yesterday per  recommendation due to cough being almost gone. Now it is wet sounding in her chest. covid test still pending. ms sent to  for recommendation.

## 2020-12-30 NOTE — TELEPHONE ENCOUNTER
Pt's spouse calling to inform us that pt has tested positive for COVID.  Discussed with Dr. Trimble and orders received.  Instructed spouse to start Vit D 1000 u, Vit C 1000mg and a Zinc supplement daily.  Pt is to use Listerine with alcohol and rinse mouth and gargle 6 times a day.  Dr. Trimble would like her oxygen levels checked tid and if her sat falls below 90 to report to the ER.  Pt can take OTC cough medication for her cough per Dr. Trimble.  Pt and  are to quarantine until January 10.  They will keep their Jan 12 apt.  Will add on a NP apt for that day as well due to her current illness.  Pt due Gemzar on the 12th.

## 2020-12-30 NOTE — TELEPHONE ENCOUNTER
----- Message from Micheline Bland RN sent at 12/30/2020  1:22 PM EST -----  Regarding: Need to add an NP apt for 1/12  Pt has a Gemzar apt for 1/12.  We need to add a lab and NP apt to this please and call pt's  with time.  Thanks

## 2020-12-30 NOTE — TELEPHONE ENCOUNTER
Said they both tested positive for Covid, pt just has a cough, wants to see what she can take for that.  Missed treatment yesterday and wants to see when she can get that rescheduled.  Said they were expecting a call back yesterday and didn't get one.

## 2021-01-01 ENCOUNTER — TELEMEDICINE (OUTPATIENT)
Dept: ONCOLOGY | Facility: CLINIC | Age: 56
End: 2021-01-01

## 2021-01-01 ENCOUNTER — TELEPHONE (OUTPATIENT)
Dept: ONCOLOGY | Facility: CLINIC | Age: 56
End: 2021-01-01

## 2021-01-01 ENCOUNTER — OFFICE VISIT (OUTPATIENT)
Dept: ONCOLOGY | Facility: CLINIC | Age: 56
End: 2021-01-01

## 2021-01-01 ENCOUNTER — APPOINTMENT (OUTPATIENT)
Dept: ONCOLOGY | Facility: HOSPITAL | Age: 56
End: 2021-01-01

## 2021-01-01 ENCOUNTER — INFUSION (OUTPATIENT)
Dept: ONCOLOGY | Facility: HOSPITAL | Age: 56
End: 2021-01-01

## 2021-01-01 ENCOUNTER — APPOINTMENT (OUTPATIENT)
Dept: MRI IMAGING | Facility: HOSPITAL | Age: 56
End: 2021-01-01

## 2021-01-01 ENCOUNTER — HOSPITAL ENCOUNTER (OUTPATIENT)
Dept: MRI IMAGING | Facility: HOSPITAL | Age: 56
Discharge: HOME | End: 2021-01-13
Admitting: INTERNAL MEDICINE

## 2021-01-01 ENCOUNTER — TELEPHONE (OUTPATIENT)
Dept: ONCOLOGY | Facility: HOSPITAL | Age: 56
End: 2021-01-01

## 2021-01-01 VITALS — DIASTOLIC BLOOD PRESSURE: 77 MMHG | SYSTOLIC BLOOD PRESSURE: 109 MMHG

## 2021-01-01 VITALS
TEMPERATURE: 97.4 F | BODY MASS INDEX: 17.31 KG/M2 | OXYGEN SATURATION: 96 % | HEART RATE: 108 BPM | HEIGHT: 65 IN | DIASTOLIC BLOOD PRESSURE: 48 MMHG | SYSTOLIC BLOOD PRESSURE: 83 MMHG

## 2021-01-01 DIAGNOSIS — Z51.5 TERMINAL CARE: ICD-10-CM

## 2021-01-01 DIAGNOSIS — G93.6 VASOGENIC BRAIN EDEMA (HCC): ICD-10-CM

## 2021-01-01 DIAGNOSIS — C71.9 GLIOBLASTOMA MULTIFORME (HCC): Primary | ICD-10-CM

## 2021-01-01 DIAGNOSIS — D64.9 ANEMIA, UNSPECIFIED TYPE: ICD-10-CM

## 2021-01-01 DIAGNOSIS — Z51.5 TERMINAL CARE: Primary | ICD-10-CM

## 2021-01-01 DIAGNOSIS — F32.9 REACTIVE DEPRESSION: ICD-10-CM

## 2021-01-01 DIAGNOSIS — T38.0X5A STEROID-INDUCED HYPERGLYCEMIA: ICD-10-CM

## 2021-01-01 DIAGNOSIS — I62.9 INTRACRANIAL HEMORRHAGE (HCC): ICD-10-CM

## 2021-01-01 DIAGNOSIS — C71.9 GLIOBLASTOMA MULTIFORME (HCC): ICD-10-CM

## 2021-01-01 DIAGNOSIS — M25.571 PAIN AND SWELLING OF RIGHT ANKLE: ICD-10-CM

## 2021-01-01 DIAGNOSIS — R79.89 ELEVATED LFTS: Primary | ICD-10-CM

## 2021-01-01 DIAGNOSIS — R73.9 STEROID-INDUCED HYPERGLYCEMIA: ICD-10-CM

## 2021-01-01 DIAGNOSIS — L08.9 SOFT TISSUE INFECTION OF FOOT: ICD-10-CM

## 2021-01-01 DIAGNOSIS — G93.6 VASOGENIC BRAIN EDEMA (HCC): Primary | ICD-10-CM

## 2021-01-01 DIAGNOSIS — M25.471 PAIN AND SWELLING OF RIGHT ANKLE: ICD-10-CM

## 2021-01-01 DIAGNOSIS — Z79.899 HIGH RISK MEDICATION USE: ICD-10-CM

## 2021-01-01 DIAGNOSIS — R79.89 ELEVATED LFTS: ICD-10-CM

## 2021-01-01 DIAGNOSIS — B37.0 ORAL CANDIDA: ICD-10-CM

## 2021-01-01 LAB
ALBUMIN SERPL-MCNC: 3 G/DL (ref 3.5–5.2)
ALBUMIN/GLOB SERPL: 1 G/DL (ref 1.1–2.4)
ALP SERPL-CCNC: 123 U/L (ref 38–116)
ALT SERPL W P-5'-P-CCNC: 132 U/L (ref 0–33)
ANION GAP SERPL CALCULATED.3IONS-SCNC: 17.4 MMOL/L (ref 5–15)
AST SERPL-CCNC: 78 U/L (ref 0–32)
BASOPHILS # BLD AUTO: 0.02 10*3/MM3 (ref 0–0.2)
BASOPHILS NFR BLD AUTO: 0.2 % (ref 0–1.5)
BILIRUB SERPL-MCNC: 0.6 MG/DL (ref 0.2–1.2)
BUN SERPL-MCNC: 21 MG/DL (ref 6–20)
BUN/CREAT SERPL: 65.6 (ref 7.3–30)
CALCIUM SPEC-SCNC: 8.5 MG/DL (ref 8.5–10.2)
CHLORIDE SERPL-SCNC: 99 MMOL/L (ref 98–107)
CO2 SERPL-SCNC: 23.6 MMOL/L (ref 22–29)
CREAT SERPL-MCNC: 0.32 MG/DL (ref 0.6–1.1)
DEPRECATED RDW RBC AUTO: 65.5 FL (ref 37–54)
EOSINOPHIL # BLD AUTO: 0 10*3/MM3 (ref 0–0.4)
EOSINOPHIL NFR BLD AUTO: 0 % (ref 0.3–6.2)
ERYTHROCYTE [DISTWIDTH] IN BLOOD BY AUTOMATED COUNT: 16.5 % (ref 12.3–15.4)
GFR SERPL CREATININE-BSD FRML MDRD: >150 ML/MIN/1.73
GGT SERPL-CCNC: 180 U/L (ref 5–36)
GLOBULIN UR ELPH-MCNC: 3 GM/DL (ref 1.8–3.5)
GLUCOSE SERPL-MCNC: 210 MG/DL (ref 74–124)
HCT VFR BLD AUTO: 28.5 % (ref 34–46.6)
HGB BLD-MCNC: 9 G/DL (ref 12–15.9)
IMM GRANULOCYTES # BLD AUTO: 0.57 10*3/MM3 (ref 0–0.05)
IMM GRANULOCYTES NFR BLD AUTO: 5.8 % (ref 0–0.5)
LYMPHOCYTES # BLD AUTO: 0.28 10*3/MM3 (ref 0.7–3.1)
LYMPHOCYTES NFR BLD AUTO: 2.8 % (ref 19.6–45.3)
MCH RBC QN AUTO: 34.5 PG (ref 26.6–33)
MCHC RBC AUTO-ENTMCNC: 31.6 G/DL (ref 31.5–35.7)
MCV RBC AUTO: 109.2 FL (ref 79–97)
MONOCYTES # BLD AUTO: 0.32 10*3/MM3 (ref 0.1–0.9)
MONOCYTES NFR BLD AUTO: 3.3 % (ref 5–12)
NEUTROPHILS NFR BLD AUTO: 8.64 10*3/MM3 (ref 1.7–7)
NEUTROPHILS NFR BLD AUTO: 87.9 % (ref 42.7–76)
NRBC BLD AUTO-RTO: 0.9 /100 WBC (ref 0–0.2)
PLATELET # BLD AUTO: 207 10*3/MM3 (ref 140–450)
PMV BLD AUTO: 9.5 FL (ref 6–12)
POTASSIUM SERPL-SCNC: 3.8 MMOL/L (ref 3.5–4.7)
PROT SERPL-MCNC: 6 G/DL (ref 6.3–8)
RBC # BLD AUTO: 2.61 10*6/MM3 (ref 3.77–5.28)
SODIUM SERPL-SCNC: 140 MMOL/L (ref 134–145)
VIT B12 BLD-MCNC: 398 PG/ML (ref 211–946)
WBC # BLD AUTO: 9.83 10*3/MM3 (ref 3.4–10.8)

## 2021-01-01 PROCEDURE — 36415 COLL VENOUS BLD VENIPUNCTURE: CPT | Performed by: NURSE PRACTITIONER

## 2021-01-01 PROCEDURE — 99441 PR PHYS/QHP TELEPHONE EVALUATION 5-10 MIN: CPT | Performed by: INTERNAL MEDICINE

## 2021-01-01 PROCEDURE — 99442 PR PHYS/QHP TELEPHONE EVALUATION 11-20 MIN: CPT | Performed by: INTERNAL MEDICINE

## 2021-01-01 PROCEDURE — 85025 COMPLETE CBC W/AUTO DIFF WBC: CPT

## 2021-01-01 PROCEDURE — 80053 COMPREHEN METABOLIC PANEL: CPT

## 2021-01-01 PROCEDURE — 25010000002 GEMCITABINE 1 GM/26.3ML SOLUTION 26.3 ML VIAL: Performed by: INTERNAL MEDICINE

## 2021-01-01 PROCEDURE — 99215 OFFICE O/P EST HI 40 MIN: CPT | Performed by: NURSE PRACTITIONER

## 2021-01-01 PROCEDURE — 99214 OFFICE O/P EST MOD 30 MIN: CPT | Performed by: INTERNAL MEDICINE

## 2021-01-01 PROCEDURE — 96413 CHEMO IV INFUSION 1 HR: CPT

## 2021-01-01 PROCEDURE — 99213 OFFICE O/P EST LOW 20 MIN: CPT | Performed by: INTERNAL MEDICINE

## 2021-01-01 PROCEDURE — 82607 VITAMIN B-12: CPT | Performed by: NURSE PRACTITIONER

## 2021-01-01 PROCEDURE — 96375 TX/PRO/DX INJ NEW DRUG ADDON: CPT

## 2021-01-01 PROCEDURE — 96361 HYDRATE IV INFUSION ADD-ON: CPT

## 2021-01-01 PROCEDURE — 25010000002 DEXAMETHASONE PER 1 MG: Performed by: INTERNAL MEDICINE

## 2021-01-01 PROCEDURE — 25010000002 GADOTERIDOL PER 1 ML: Performed by: INTERNAL MEDICINE

## 2021-01-01 PROCEDURE — A9579 GAD-BASE MR CONTRAST NOS,1ML: HCPCS | Performed by: INTERNAL MEDICINE

## 2021-01-01 PROCEDURE — 82977 ASSAY OF GGT: CPT | Performed by: NURSE PRACTITIONER

## 2021-01-01 PROCEDURE — 70553 MRI BRAIN STEM W/O & W/DYE: CPT

## 2021-01-01 RX ORDER — DEXAMETHASONE SODIUM PHOSPHATE 4 MG/ML
4 INJECTION, SOLUTION INTRA-ARTICULAR; INTRALESIONAL; INTRAMUSCULAR; INTRAVENOUS; SOFT TISSUE ONCE
Status: COMPLETED | OUTPATIENT
Start: 2021-01-01 | End: 2021-01-01

## 2021-01-01 RX ORDER — DEXAMETHASONE 4 MG/1
4 TABLET ORAL EVERY 12 HOURS SCHEDULED
Qty: 60 TABLET | Refills: 3 | Status: SHIPPED | OUTPATIENT
Start: 2021-01-01

## 2021-01-01 RX ORDER — SODIUM CHLORIDE 9 MG/ML
250 INJECTION, SOLUTION INTRAVENOUS ONCE
Status: DISCONTINUED | OUTPATIENT
Start: 2021-01-01 | End: 2021-01-01 | Stop reason: HOSPADM

## 2021-01-01 RX ORDER — ONDANSETRON HYDROCHLORIDE 8 MG/1
8 TABLET, FILM COATED ORAL EVERY 8 HOURS PRN
Qty: 30 TABLET | Refills: 1 | Status: SHIPPED | OUTPATIENT
Start: 2021-01-01

## 2021-01-01 RX ORDER — SODIUM CHLORIDE 9 MG/ML
500 INJECTION, SOLUTION INTRAVENOUS ONCE
Status: COMPLETED | OUTPATIENT
Start: 2021-01-01 | End: 2021-01-01

## 2021-01-01 RX ORDER — SULFAMETHOXAZOLE AND TRIMETHOPRIM 800; 160 MG/1; MG/1
1 TABLET ORAL
Qty: 30 TABLET | Refills: 1 | Status: SHIPPED | OUTPATIENT
Start: 2021-01-01 | End: 2021-01-01

## 2021-01-01 RX ORDER — CEPHALEXIN 500 MG/1
500 CAPSULE ORAL 3 TIMES DAILY
Qty: 30 CAPSULE | Refills: 0 | Status: SHIPPED | OUTPATIENT
Start: 2021-01-01

## 2021-01-01 RX ADMIN — SODIUM CHLORIDE 900 MG: 900 INJECTION, SOLUTION INTRAVENOUS at 16:03

## 2021-01-01 RX ADMIN — SODIUM CHLORIDE 500 ML: 900 INJECTION, SOLUTION INTRAVENOUS at 15:05

## 2021-01-01 RX ADMIN — SODIUM CHLORIDE 500 ML: 900 INJECTION, SOLUTION INTRAVENOUS at 16:43

## 2021-01-01 RX ADMIN — GADOTERIDOL 10 ML: 279.3 INJECTION, SOLUTION INTRAVENOUS at 14:00

## 2021-01-01 RX ADMIN — DEXAMETHASONE SODIUM PHOSPHATE 4 MG: 4 INJECTION, SOLUTION INTRAMUSCULAR; INTRAVENOUS at 15:54

## 2021-01-12 NOTE — PROGRESS NOTES
REASON FOR FOLLOWUP:   1. Glioblastoma of the protuberance, dense left body paralysis, wheelchair-bound, total care by .  Completed Temodar and radiation with progression, now continuing on Gemzar     HISTORY OF PRESENT ILLNESS:    The patient is a 55 y.o. female with the above-mentioned history today for lab review and evaluation prior to cycle 2, day 15 Gemzar that she receives for her GBM.      Patient and her  both tested positive for Covid on 12/27/2020.  They quarantined until 10 January.According to patients  who assists with history today, they were both asymptomatic.  Although, patient has had ongoing issues with a cough.  She is very weak.  No fevers.  He has closely monitoring them, as well as their oxygen levels.  She has been taking Mucinex DM regularly.    She is having some pain in her right lower rib area.  He is not sure of this is due to the transfer belt.  Patient also has multiple bruises on her body.  He states that many of these start is like a blood blister and will eventually rupture.    Patients  has also noticed a slight cognitive decline in the patient.  She continues to eat well, although it is less than previously.      Past Medical History:   Diagnosis Date   • Anxiety    • Glioblastoma (CMS/HCC)    • History of hemiplegia    • S/P craniotomy    • Vertigo      Past Surgical History:   Procedure Laterality Date   • BRAIN SURGERY     • BREAST BIOPSY     • CRANIOTOMY FOR TUMOR Right 7/1/2020    Procedure: Right-sided craniotomy for excisional biopsy and, placement of lumbar drain;  Surgeon: Shai Licona MD;  Location: [facility];  Service: Neurosurgery;  Laterality: Right;   • DENTAL PROCEDURE     • TUBAL ABDOMINAL LIGATION     • WISDOM TOOTH EXTRACTION       Social History     Socioeconomic History   • Marital status:      Spouse name: Malick   • Number of children: Not on file   • Years of education: Not on file   • Highest education  level: Not on file   Occupational History     Employer: [employer]   Tobacco Use   • Smoking status: Never Smoker   • Smokeless tobacco: Never Used   Substance and Sexual Activity   • Alcohol use: Yes     Comment: Socially   • Drug use: No   • Sexual activity: Yes     Partners: Male     Birth control/protection: Post-menopausal     Family History   Problem Relation Age of Onset   • Ovarian cancer Maternal Aunt    • Breast cancer Neg Hx    • Uterine cancer Neg Hx    • Colon cancer Neg Hx    • Deep vein thrombosis Neg Hx    • Pulmonary embolism Neg Hx    No Known Allergies   Current Outpatient Medications on File Prior to Visit   Medication Sig   • clotrimazole (MYCELEX) 10 MG lily Dissolve 1 tablet by mouth 3 (Three) Times a Day.   • dexamethasone (DECADRON) 4 MG tablet Take 1 tablet by mouth Every 12 (Twelve) Hours.   • famotidine (PEPCID) 40 MG tablet Take 1 tablet by mouth 2 (Two) Times a Day.   • ketoconazole (NIZORAL) 2 % cream Apply  topically to the appropriate area as directed 2 (two) times a day.   • levETIRAcetam (KEPPRA) 500 MG tablet Take 1 tablet by mouth Every 12 (Twelve) Hours.   • OLANZapine (zyPREXA) 2.5 MG tablet Take 1 tablet by mouth Every Night.   • ondansetron (ZOFRAN) 8 MG tablet Take 1 tablet by mouth Every 8 (Eight) Hours As Needed for Nausea or Vomiting.   • potassium chloride (K-DUR,KLOR-CON) 10 MEQ CR tablet Take 2 tablets by mouth Daily.   • Vitamin D, Cholecalciferol, (CHOLECALCIFEROL) 10 MCG (400 UNIT) tablet Take  by mouth Daily. Unknown units     Current Facility-Administered Medications on File Prior to Visit   Medication   • dexamethasone (DECADRON) injection 4 mg   • gemcitabine (GEMZAR) 900 mg in sodium chloride 0.9 % 273.7 mL chemo IVPB   • sodium chloride 0.9 % infusion 250 mL   • [COMPLETED] sodium chloride 0.9 % infusion 500 mL   • sodium chloride 0.9 % infusion 500 mL   • [DISCONTINUED] dexamethasone (DECADRON) 4 mg in sodium chloride 0.9 % IVPB     I have  "reviewed the patient's medical history in detail and updated the computerized patient record.    Review of Systems   Constitutional: Positive for fatigue. Negative for appetite change, chills and fever.   HENT:   Negative for trouble swallowing.    Respiratory: Positive for cough. Negative for shortness of breath.    Cardiovascular: Negative for chest pain and leg swelling.   Gastrointestinal: Positive for abdominal distention and nausea. Negative for blood in stool, constipation and diarrhea.   Musculoskeletal: Positive for gait problem. Negative for arthralgias and myalgias.   Skin: Negative for rash.        Multiple bruises   Neurological: Positive for extremity weakness, gait problem and speech difficulty. Negative for dizziness.   Hematological: Bruises/bleeds easily.   01/12/2021 unchanged from above except as updated.        Vitals:    01/12/21 1359   BP: (!) 83/48   Pulse: 108   Temp: 97.4 °F (36.3 °C)   SpO2: 96%   Weight: Comment: unable to stand   Height: 165.1 cm (65\")       Physical Exam  Vitals signs reviewed.   Constitutional:       General: She is not in acute distress.     Appearance: Normal appearance. She is well-developed.      Comments: Seated in a wheelchair, accompanied by her  who assist with history.  Patient will answer questions intermittently appropriately, but at times is very slow to respond.   HENT:      Head: Normocephalic and atraumatic.      Mouth/Throat:      Pharynx: No oropharyngeal exudate.   Eyes:      Pupils: Pupils are equal, round, and reactive to light.   Neck:      Musculoskeletal: Normal range of motion.   Cardiovascular:      Rate and Rhythm: Normal rate and regular rhythm.      Heart sounds: Normal heart sounds. No murmur.   Pulmonary:      Effort: Pulmonary effort is normal. No respiratory distress.      Breath sounds: Examination of the right-lower field reveals decreased breath sounds. Examination of the left-lower field reveals decreased breath sounds. " Decreased breath sounds present. No wheezing, rhonchi or rales.   Abdominal:      General: Bowel sounds are normal. There is distension.      Palpations: Abdomen is soft.      Tenderness: There is no abdominal tenderness.   Skin:     General: Skin is warm and dry.      Findings: No rash.      Comments: Multiple bruises on the patient's chest, upper and lower extremities bilaterally   Neurological:      Mental Status: She is alert and oriented to person, place, and time.      Comments: Left hemiparesis         RECENT LABS:  Results from last 7 days   Lab Units 01/12/21  1347   WBC 10*3/mm3 9.83   NEUTROS ABS 10*3/mm3 8.64*   HEMOGLOBIN g/dL 9.0*   HEMATOCRIT % 28.5*   PLATELETS 10*3/mm3 207     Results from last 7 days   Lab Units 01/12/21  1347   SODIUM mmol/L 140   POTASSIUM mmol/L 3.8   CHLORIDE mmol/L 99   CO2 mmol/L 23.6   BUN mg/dL 21*   CREATININE mg/dL 0.32*   CALCIUM mg/dL 8.5   ALBUMIN g/dL 3.00*   BILIRUBIN mg/dL 0.6   ALK PHOS U/L 123*   ALT (SGPT) U/L 132*   AST (SGOT) U/L 78*   GLUCOSE mg/dL 210*                  ASSESSMENT/PLAN:    1. Glioblastoma (WHO grade 4).    · Diagnosis per right brainstem biopsy 7/1/2020.  · Await IDH 1-2 mutation analysis by PCR (negative by NGS).  · MGMT promoter methylation by PCR negative  · Regardless of pending studies, as per NCCN guidelines, plan concurrent Temodar with radiation, then adjuvant Temodar   ? (clinical benefit of Temodar is likely to be lower in patients who do not have MGMT promoter methylation)  ? (If MGMT methylated, then 46% 2-year OS versus about 14%.  Median OS 21.7 months versus 12.7 months.)  ? If IGH 1-2 mutated, then significantly improved prognosis.  This does not affect treatment choice).  · Alternating electric field therapy is only an option for supratentorial disease.  Therefore, this is not an option due to the location of this malignancy  · Patient was started on concurrent chemoradiotherapy 7/23/2020.  Temodar dosing would be 75 mg/m²  daily x42 days.  ? Concurrent chemoradiation complete 9/2/2020  · Completed 1 cycle of maintenance Temodar with follow-up MRI of the brain 10/21/2020  · MRI 10/21/2020 with very substantial increase in the size of the edema and tumoral lesion in the chilango and in the protuberance.  Obvious neurologic deterioration with worsening left paralysis  · Consideration of clinical trial at the time of progression, though ultimately elected to proceed with palliative Gemzar.  Dr. Trimble is explained less than 20% probability of Gemzar being effective, the family is agreeable to proceed  · Gemzar day 1 day 15 of a 28-day cycle initiated 11/16/2020 with plans to repeat MRI of the brain following 3 doses.  · 12/1/2020 cycle 1 day 15 Gemzar, overall tolerating well.  · 1/21/2021 cycle 2-day 15 Gemzar, which is delayed as the patient had a positive COVID-19 test on 12/27/2020.  Patient has had some  cognitive decline since then.  On labs today her hemoglobin has declined, she is also hypotensive with a blood pressure of 83/48 (manual recheck of 88/68).  I reviewed with Dr. Trimble.  He feels like some of the anemia could be related to recent Covid as well as Gemzar.  We will provide her with 1 L additional IV normal saline today.  We will proceed with treatment.  We will go ahead and move her MRI of the brain up until this week so that we can further evaluate given her recent issues with cognitive decline.    2.  Left-sided paralysis secondary to GBM  · Neurologic symptoms worsened at the time of progression and prior to initiation of Gemzar  · Continuing dexamethasone 2 mg twice daily, this was tapered just 2 days ago.  No plans to further taper at this time  · Since initiation of Gemzar, the patient has noted improvement in her paralysis    3.  Depression and anxiety  · Continuing on Zyprexa 2.5 mg nightly.  · The patient's  does note improvement in her mood over the past few days    4.  Bruising with no  hematoma  · Historically with generalized bruising  · The patient had a near fall over the weekend, her  grabbed her left arm to catch her resulting in a large bruise on her left shoulder, though no soft tissue hematoma present.  There is a small blood blister centrally located, though no swelling or signs of infection  · Platelets 166,000 today.  We will continue to monitor.    5.  Prophylaxis.    · Continue on Protonix     6.  Previous Candida  · Mycelex 3 times daily-currently on hold  · No evidence of Candida today    7.  Nausea: Has occurred 2 times after eating large meals.  We discussed it may be reflux related, as the patient was lying down both times, although propped up.  She is on Protonix 40 mg daily, instructed her to an increase that to twice daily.  Can also continue antiemetics if needed.    8.  Elevated liver function: 1/12/2021 ALT up to 132, AST 78, alkaline phosphatase 123, total bilirubin 0.6.  Patient has been intermittently taking Tylenol as well as cold medication (Mucinex DM).  Denies alcohol use.    PLAN:  1. Proceed with cycle 2-day 15 Gemzar today.  2. Patient will receive 1 L of IV normal saline.  3. We will get the patient's MRI of the brain moved up till tomorrow for further evaluation due to recent worsening cognitive decline.  4. Patient will telephone visit with Dr. Trimble next week to review MRI results.    5. Continue dexamethasone 2 mg twice daily.  6. Continue Zyprexa nightly.  7. Increase Protonix 40 mg twice daily.  8. Continue antiemetics if needed.  9. Pending results of MRI patient currently scheduled for return in 2 weeks for further chemotherapy.      Ramandeep Benoit, APRN  12/1/2020

## 2021-01-12 NOTE — PROGRESS NOTES
Pt to infusion room for treatment  Pt seen by Ramandeep Benoit NP,  Lab results noted and discussed with Ramandeep Benoit,   Will proceed with gemzar treatment today , and give additional one liter of IVF's today  Discussed with family member ,  He V/U of prescribed treatment

## 2021-01-15 NOTE — PROGRESS NOTES
REASON FOR FOLLOWUP/CHIEF COMPLAINT:  1.Glioblastoma OF THE PROTUBERANCE DENSE LEFT BODY PARALYSIS, WHEEL CHAIR BOUND TOTAL CARE BY , UNDERWENT RADIATION AND TEMODAR; PROGRESSIVE DISEASE BY MRI MOVING INTO GEMZAR IV EVERY 2 WEEKS, NO FURTHER ROLE FOR SURGERY OR RADIATION THERAPY.    2. REFERRAL TO [facility] FOR SECOND OPINION.    3. HYPERGLYCEMIA DU TO CHRONIC STEROID USE SUGARS IN  RANGE      HISTORY OF PRESENT ILLNESS:       I HAVE CALLED THIS PATIENT ON THE PHONE TODAY AND VERBALLY HAS CONSENTED ABOUT TELEMEDICINE VISIT     I have called this patient's home. Her  has answered the telephone and he has agreed to proceed with the appointment today by Telemedicine. The patient is slowly going down the hill and he realizes that her mobility in the right side of her body is also decreasing and her ability to stand up and support herself is less and less. She has been able to drink liquids and eat some food appropriately. Her bowel activity is appropriate. She is now wearing diapers and urine output is appropriate. She is not in pain. She was able to shake the COVID infection that she recently had. She had not had any seizure activity. Motor deficit in the left side of her body remains ongoing and again is starting to develop on the right side. An MRI has been done. This will be reviewed below showing significant worsening of her tumoral lesion in the medulla and chilango and for this reason we advised the  to proceed with terminal care. They do not want to proceed with any kind of Hospice care at this point.                Past Medical History:   Diagnosis Date   • Anxiety    • Glioblastoma (CMS/HCC)    • History of hemiplegia    • S/P craniotomy    • Vertigo      Past Surgical History:   Procedure Laterality Date   • BRAIN SURGERY     • BREAST BIOPSY     • CRANIOTOMY FOR TUMOR Right 7/1/2020    Procedure: Right-sided craniotomy for excisional biopsy and, placement of lumbar  drain;  Surgeon: Shai Licona MD;  Location: [facility];  Service: Neurosurgery;  Laterality: Right;   • DENTAL PROCEDURE     • TUBAL ABDOMINAL LIGATION     • WISDOM TOOTH EXTRACTION       Social History     Socioeconomic History   • Marital status:      Spouse name: Malick   • Number of children: Not on file   • Years of education: Not on file   • Highest education level: Not on file   Occupational History     Employer: [employer]   Tobacco Use   • Smoking status: Never Smoker   • Smokeless tobacco: Never Used   Substance and Sexual Activity   • Alcohol use: Yes     Comment: Socially   • Drug use: No   • Sexual activity: Yes     Partners: Male     Birth control/protection: Post-menopausal     Family History   Problem Relation Age of Onset   • Ovarian cancer Maternal Aunt    • Breast cancer Neg Hx    • Uterine cancer Neg Hx    • Colon cancer Neg Hx    • Deep vein thrombosis Neg Hx    • Pulmonary embolism Neg Hx    No Known Allergies   Current Outpatient Medications on File Prior to Visit   Medication Sig   • clotrimazole (MYCELEX) 10 MG lily Dissolve 1 tablet by mouth 3 (Three) Times a Day.   • dexamethasone (DECADRON) 4 MG tablet Take 1 tablet by mouth Every 12 (Twelve) Hours.   • famotidine (PEPCID) 40 MG tablet Take 1 tablet by mouth 2 (Two) Times a Day.   • ketoconazole (NIZORAL) 2 % cream Apply  topically to the appropriate area as directed 2 (two) times a day.   • levETIRAcetam (KEPPRA) 500 MG tablet Take 1 tablet by mouth Every 12 (Twelve) Hours.   • OLANZapine (zyPREXA) 2.5 MG tablet Take 1 tablet by mouth Every Night.   • ondansetron (ZOFRAN) 8 MG tablet Take 1 tablet by mouth Every 8 (Eight) Hours As Needed for Nausea or Vomiting.   • potassium chloride (K-DUR,KLOR-CON) 10 MEQ CR tablet Take 2 tablets by mouth Daily.   • Vitamin D, Cholecalciferol, (CHOLECALCIFEROL) 10 MCG (400 UNIT) tablet Take  by mouth Daily. Unknown units     No current facility-administered  medications on file prior to visit.              There were no vitals filed for this visit.    EXAM:                RECENT LABS:  No results found for: WBC, HGB, PLT  MRI BRAIN W WO CONTRAST-     Date of Exam: 1/13/2021 1:08 PM     Indication: gbm; C71.9-Malignant neoplasm of brain, unspecified;  G93.6-Cerebral edema; F32.9-Major depressive disorder, single episode,  unspecified; B37.0-Candidal stomatitis; Z79.899-Other long term  (current) drug therapy; R73.9-Hyperglycemia, unspecified;  T38.8Y6G-Urebztf effect of glucocorticoids and synthetic analogues,  initial encounter.     Technique: Technique: Routine multiplanar multisequence MR imaging of  the brain was performed without and with the administration of 10 ml of  ProHance  gadolinium contrast.        Comparison Exams: 10/21/2020     FINDINGS:  There are no areas of restricted diffusion.  There is no evidence of  acute intracranial abnormality.  Again seen is abnormal increased T2  signal within the chilango and midbrain extending inferiorly into the  medulla to the right of midline and into the bilateral middle cerebellar  peduncles.  This abnormal increased T2 signal extends cephalad into the  right thalamus and right centrum semiovale.  Overall extent of the  abnormal T2 signal is similar to the prior study.  There is no midline  shift or hydrocephalus.  There are no abnormal extra-axial fluid  collections identified.  Postcontrast images demonstrate peripherally  enhancing mass which is centered within the right side of the chilango with  extension of abnormal enhancement cephalad into the right thalamus and  inferiorly to the level of medulla.  Enhancing component within the chilango  measures 3.0 x 2.1 cm, previously 2.1 x 1.9 cm enhancement measures  proximal 3.6 cm in craniocaudal dimension.  Previously this was measured  at 4.8 cm and slight differences in measurement may be related to volume  averaging and slice selection rather than true decrease in size.  No  new  areas of contrast enhancement seen elsewhere within the brain or  cerebellum.     Major intracranial vascular flow voids are preserved.  Craniovertebral  junction is within normal limits.  Lynne supersellar cistern appear  normal.     Globes and orbits are within normal limits.  Visualized paranasal  sinuses and mastoid air cells are clear.     IMPRESSION:        1.  Progression of disease with interval increase in size of contrast  enhancing tumor centered within the right side of the chilango with  extension cephalad into the right thalamus and inferiorly into the right  side of the medulla.  There is surrounding abnormal increased T2 signal  which appears similar to the prior exam.  No new contrast enhancement or  worsening abnormal increased T2 signal identified.  2.  No evidence of acute infarct or hemorrhage.     Electronically Signed By-Fabian Garcia MD On:1/13/2021 3:10 PM  This report was finalized on [id number] by  Fabian Garcia MD.           ASSESSMENT/PLAN:This patient has a glioblastoma multiforme, World Health Organization, grade 4, in the chilango on the right side associated with brain edema. This triggers very dense body paralysis on the left side. On the other hand comparing this patient with how she was  weeks ago at the rehab unit at [facility] and how she is now, she is like day and night. Her speech has improved 90%. She is enjoying her food. She has been able to swallow with no difficulties. She is controlling sphincters. She has not had any seizure activity. She has no pain. She has started to gain some movement in the left lower extremity, especially in the quadriceps. The movement in the left upper extremity is very limited. Most of the motion is triggered by shoulder activity more than anything else. The right body activity is normal. 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       The patient was further reviewed on 10/28/2020 since she completed 1 round of Temodar for the 42 days of the induction of radiation therapy and then 5 days of Temodar subsequently. Unfortunately the MRI that has been reported above and reviewed by me and agreeing with the radiologist’s interpretation shows very substantial increase in the size of edema and tumoral lesion in the chilango and in the protuberance. The patient has obvious neurological deterioration with worsening left body paralysis. Associated with this, she has deterioration of her voice and she has had more anxiety and depression. She decided that she wanted to take the depression medicine, Zyprexa, on p.r.n. basis and I pointed out to them that this medicine does not work in that way. This medicine will require continually taking 3 or 4 weeks before they can see some real benefit. On the other hand when she takes the medicine at bedtime she has very significant improvement in her ability to sleep and have a good night of sleep.       The patient was reviewed with the  by Telemedicine on 01/15/2021 after I have personally reviewed the MRI of the brain that shows very dramatic progression of her tumors in the chilango and in the medulla. There is tremendous degree of edema, most of the anatomical  structure is compromised by tumor and obviously this explains the obvious clinical deterioration. Under the present circumstances life expectancy is measured in a few days. The patient is still drinking liquids okay; still eating some solids and she is able to take her medicines. She is not choking. She has not had any seizure activity. She is not in pain. She was able to go through her COVID infection with no turmoil and respiratory symptomatology. She  remains on dexamethasone and seizure medication.     I discussed the facts with the patient’s . He realizes and he sees the changes clinically and he agrees with terminal care. They do not want to have Hospice care at this time, whatsoever and this was discussed with him and the patient’s 2 children. They will take care of her at home in a peaceful environment. I will ask for me to review them on Monday and Wednesday of next week for any other terminal care questions or needs. The medicines will remain ongoing for the time being until she is not able to swallow. When that stops it will be okay to stop the medicines and let things be. He realizes that. He is an  and he knows the logic of things.     Phone visit will be done this Monday and next Wednesday depending of the circumstances anyway. He is aware that life expectancy is measured in a few days.    She is DO NOT RESUSCITATE and they will not call the ambulance under any circumstances. We will be happy to provide death certificate at the time that events occur.    Duration phone visit 15 minutes

## 2021-01-18 PROBLEM — Z51.5 TERMINAL CARE: Status: ACTIVE | Noted: 2021-01-01

## 2021-01-18 NOTE — PROGRESS NOTES
REASON FOR FOLLOWUP/CHIEF COMPLAINT:  1.Glioblastoma OF THE PROTUBERANCE DENSE LEFT BODY PARALYSIS, WHEEL CHAIR BOUND TOTAL CARE BY , UNDERWENT RADIATION AND TEMODAR; PROGRESSIVE DISEASE BY MRI MOVING INTO GEMZAR IV EVERY 2 WEEKS, NO FURTHER ROLE FOR SURGERY OR RADIATION THERAPY.    2. REFERRAL TO [facility] FOR SECOND OPINION.    3. HYPERGLYCEMIA DU TO CHRONIC STEROID USE SUGARS IN  RANGE      HISTORY OF PRESENT ILLNESS:       I HAVE CALLED THIS PATIENT ON THE PHONE TODAY AND VERBALLY HAS CONSENTED ABOUT TELEMEDICINE VISIT     I have discussed with this patient's  and the patient's son today the status of her care. This patient is now requiring terminal care at home. Her  and the family are taking care of this. They do not want to participate with hospice services at this point. Overall today she is not any worse than how she was last Friday when we had a conversation with her. She is profoundly weak, she has lesser mobility and her speech is becoming more difficult to understand. He is more easily confused but she is not in pain. She is still able to swallow some bites of food and some liquids. She is not having any seizure activity. Her left body paralysis is more pronounced and her right side is becoming weaker. She is not able to stand up anymore. She has not had any cardiac issues, no vascular issues, no pulmonary problems. She is still continent to urine. They are using diapers at this time.                 Past Medical History:   Diagnosis Date   • Anxiety    • Glioblastoma (CMS/HCC)    • History of hemiplegia    • S/P craniotomy    • Vertigo      Past Surgical History:   Procedure Laterality Date   • BRAIN SURGERY     • BREAST BIOPSY     • CRANIOTOMY FOR TUMOR Right 7/1/2020    Procedure: Right-sided craniotomy for excisional biopsy and, placement of lumbar drain;  Surgeon: Shai Licona MD;  Location: [facility];  Service: Neurosurgery;  Laterality: Right;    • DENTAL PROCEDURE     • TUBAL ABDOMINAL LIGATION     • WISDOM TOOTH EXTRACTION       Social History     Socioeconomic History   • Marital status:      Spouse name: Malick   • Number of children: Not on file   • Years of education: Not on file   • Highest education level: Not on file   Occupational History     Employer: [employer]   Tobacco Use   • Smoking status: Never Smoker   • Smokeless tobacco: Never Used   Substance and Sexual Activity   • Alcohol use: Yes     Comment: Socially   • Drug use: No   • Sexual activity: Yes     Partners: Male     Birth control/protection: Post-menopausal     Family History   Problem Relation Age of Onset   • Ovarian cancer Maternal Aunt    • Breast cancer Neg Hx    • Uterine cancer Neg Hx    • Colon cancer Neg Hx    • Deep vein thrombosis Neg Hx    • Pulmonary embolism Neg Hx    No Known Allergies   Current Outpatient Medications on File Prior to Visit   Medication Sig   • dexamethasone (DECADRON) 4 MG tablet Take 1 tablet by mouth Every 12 (Twelve) Hours.   • famotidine (PEPCID) 40 MG tablet Take 1 tablet by mouth 2 (Two) Times a Day.   • levETIRAcetam (KEPPRA) 500 MG tablet Take 1 tablet by mouth Every 12 (Twelve) Hours.   • ondansetron (ZOFRAN) 8 MG tablet Take 1 tablet by mouth Every 8 (Eight) Hours As Needed for Nausea or Vomiting.   • [DISCONTINUED] clotrimazole (MYCELEX) 10 MG lily Dissolve 1 tablet by mouth 3 (Three) Times a Day.   • [DISCONTINUED] ketoconazole (NIZORAL) 2 % cream Apply  topically to the appropriate area as directed 2 (two) times a day.   • [DISCONTINUED] OLANZapine (zyPREXA) 2.5 MG tablet Take 1 tablet by mouth Every Night.   • [DISCONTINUED] potassium chloride (K-DUR,KLOR-CON) 10 MEQ CR tablet Take 2 tablets by mouth Daily.   • [DISCONTINUED] Vitamin D, Cholecalciferol, (CHOLECALCIFEROL) 10 MCG (400 UNIT) tablet Take  by mouth Daily. Unknown units     No current facility-administered medications on file prior to visit.               There were no vitals filed for this visit.    EXAM:                RECENT LABS:  No results found for: WBC, HGB, PLT  MRI BRAIN W WO CONTRAST-     Date of Exam: 1/13/2021 1:08 PM     Indication: gbm; C71.9-Malignant neoplasm of brain, unspecified;  G93.6-Cerebral edema; F32.9-Major depressive disorder, single episode,  unspecified; B37.0-Candidal stomatitis; Z79.899-Other long term  (current) drug therapy; R73.9-Hyperglycemia, unspecified;  T38.3Q6Y-Wjvmgje effect of glucocorticoids and synthetic analogues,  initial encounter.     Technique: Technique: Routine multiplanar multisequence MR imaging of  the brain was performed without and with the administration of 10 ml of  ProHance  gadolinium contrast.        Comparison Exams: 10/21/2020     FINDINGS:  There are no areas of restricted diffusion.  There is no evidence of  acute intracranial abnormality.  Again seen is abnormal increased T2  signal within the chilango and midbrain extending inferiorly into the  medulla to the right of midline and into the bilateral middle cerebellar  peduncles.  This abnormal increased T2 signal extends cephalad into the  right thalamus and right centrum semiovale.  Overall extent of the  abnormal T2 signal is similar to the prior study.  There is no midline  shift or hydrocephalus.  There are no abnormal extra-axial fluid  collections identified.  Postcontrast images demonstrate peripherally  enhancing mass which is centered within the right side of the chilango with  extension of abnormal enhancement cephalad into the right thalamus and  inferiorly to the level of medulla.  Enhancing component within the chilango  measures 3.0 x 2.1 cm, previously 2.1 x 1.9 cm enhancement measures  proximal 3.6 cm in craniocaudal dimension.  Previously this was measured  at 4.8 cm and slight differences in measurement may be related to volume  averaging and slice selection rather than true decrease in size.  No new  areas of contrast enhancement  seen elsewhere within the brain or  cerebellum.     Major intracranial vascular flow voids are preserved.  Craniovertebral  junction is within normal limits.  Lynne supersellar cistern appear  normal.     Globes and orbits are within normal limits.  Visualized paranasal  sinuses and mastoid air cells are clear.     IMPRESSION:        1.  Progression of disease with interval increase in size of contrast  enhancing tumor centered within the right side of the chilango with  extension cephalad into the right thalamus and inferiorly into the right  side of the medulla.  There is surrounding abnormal increased T2 signal  which appears similar to the prior exam.  No new contrast enhancement or  worsening abnormal increased T2 signal identified.  2.  No evidence of acute infarct or hemorrhage.     Electronically Signed By-Fabian Garcia MD On:1/13/2021 3:10 PM  This report was finalized on [id number] by  Fabian Garcia MD.           ASSESSMENT/PLAN:This patient has a glioblastoma multiforme, World Health Organization, grade 4, in the chilango on the right side associated with brain edema. This triggers very dense body paralysis on the left side. On the other hand comparing this patient with how she was  weeks ago at the rehab unit at [facility] and how she is now, she is like day and night. Her speech has improved 90%. She is enjoying her food. She has been able to swallow with no difficulties. She is controlling sphincters. She has not had any seizure activity. She has no pain. She has started to gain some movement in the left lower extremity, especially in the quadriceps. The movement in the left upper extremity is very limited. Most of the motion is triggered by shoulder activity more than anything else. The right body activity is normal. 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 She also  has a special motorized wheelchair that is able to go through her farm and be able to go to the garden and enjoy herself picking up some tomatoes with the right hand.       The patient was further reviewed on 10/28/2020 since she completed 1 round of Temodar for the 42 days of the induction of radiation therapy and then 5 days of Temodar subsequently. Unfortunately the MRI that has been reported above and reviewed by me and agreeing with the radiologist’s interpretation shows very substantial increase in the size of edema and tumoral lesion in the chilango and in the protuberance. The patient has obvious neurological deterioration with worsening left body paralysis. Associated with this, she has deterioration of her voice and she has had more anxiety and depression. She decided that she wanted to take the depression medicine, Zyprexa, on p.r.n. basis and I pointed out to them that this medicine does not work in that way. This medicine will require continually taking 3 or 4 weeks before they can see some real benefit. On the other hand when she takes the medicine at bedtime she has very significant improvement in her ability to sleep and have a good night of sleep.       The patient was reviewed with the  by Telemedicine on 01/15/2021 after I have personally reviewed the MRI of the brain that shows very dramatic progression of her tumors in the chilango and in the medulla. There is tremendous degree of edema, most of the anatomical  structure is compromised by tumor and obviously this explains the obvious clinical deterioration. Under the present circumstances life expectancy is measured in a few days. The patient is still drinking liquids okay; still eating some solids and she is able to take her medicines. She is not choking. She has not had any seizure activity. She is not in pain. She was able to go through her COVID infection with no turmoil and respiratory symptomatology. She remains on dexamethasone and seizure  medication.     I discussed the facts with the patient’s . He realizes and he sees the changes clinically and he agrees with terminal care. They do not want to have Hospice care at this time, whatsoever and this was discussed with him and the patient’s 2 children. They will take care of her at home in a peaceful environment. I will ask for me to review them on Monday and Wednesday of next week for any other terminal care questions or needs. The medicines will remain ongoing for the time being until she is not able to swallow. When that stops it will be okay to stop the medicines and let things be. He realizes that. He is an  and he knows the logic of things.     She is DO NOT RESUSCITATE and they will not call the ambulance under any circumstances. We will be happy to provide death certificate at the time that events occur.   The patient was again reviewed by telemedicine on 01/18/2021. Overall her situation is no different today of how it was on Saturday. She has profound weakness and her paralysis is more evident on the left and is becoming more obvious on the right. This is not surprising;  what was found on the MRI and I discussed this with him in a lot of detail. I provided information that only 10% of the chilango is not compromised by tumor and surprises me that she still has some mobility on the right side and her breathing is still up and running as well as deglutition. It is not going to be long until one of these situations is going to deteriorate including deglutition will be going away and then breathing can become the real next challenge. They are not willing to go to hospice care at this point. They are satisfied how things are going and how they are able to handle the situation.     Therefore my advice is to discontinue multiple medicines including the olanzapine, the ketoconazole, the clotrimazole, the vitamin D and the potassium. I asked them to remain on the Pepcid, the Keppra, the  Zofran for nausea and the decadron for brain edema. He raised the question if the decadron is absolutely necessary and I told them that that is the only thing that is keeping her from having very pronounced brain edema and made the symptoms worse. Taking away the dexamethasone will probably speed the process of death eventually.     They wanted to hear from us again this week and we will make a telemedicine visit again on Thursday.     I asked them to call if any issues or concerns.     The patient remains DNR and they do not want hospice care. They will not call the ambulance.     The patient's son was very satisfied with the conversation today.       Duration phone visit 12 minutes

## 2021-01-21 NOTE — PROGRESS NOTES
REASON FOR FOLLOWUP/CHIEF COMPLAINT:  1.Glioblastoma OF THE PROTUBERANCE DENSE LEFT BODY PARALYSIS, WHEEL CHAIR BOUND TOTAL CARE BY , UNDERWENT RADIATION AND TEMODAR; PROGRESSIVE DISEASE BY MRI MOVING INTO GEMZAR IV EVERY 2 WEEKS, NO FURTHER ROLE FOR SURGERY OR RADIATION THERAPY.    2. REFERRAL TO [facility] FOR SECOND OPINION.    3. HYPERGLYCEMIA DU TO CHRONIC STEROID USE SUGARS IN  RANGE      HISTORY OF PRESENT ILLNESS:       I HAVE CALLED THIS PATIENT  ON THE PHONE TODAY AND VERBALLY HAS CONSENTED ABOUT TELEMEDICINE VISIT     I talked with this patient's  this morning.   She continues slowly declining 1 day at a time with lesser and lesser mobility and lesser functionality. She is still able to swallow her pills and her food with no choking. Occasional bowel incontinence. No urinary incontinence. No seizure activity. No obvious pain. She has further neurological deterioration including progressive weakness in the right side of her body as well. She has been able to get to ride in a wheelchair, she is taken to the shop when he is working on old cars and that noise and activity makes her to feel happy. She is sleeping well and she is not being a problem for them. He is enjoying taking care of her.               Past Medical History:   Diagnosis Date   • Anxiety    • Glioblastoma (CMS/HCC)    • History of hemiplegia    • S/P craniotomy    • Vertigo      Past Surgical History:   Procedure Laterality Date   • BRAIN SURGERY     • BREAST BIOPSY     • CRANIOTOMY FOR TUMOR Right 7/1/2020    Procedure: Right-sided craniotomy for excisional biopsy and, placement of lumbar drain;  Surgeon: Shai Licona MD;  Location: [facility];  Service: Neurosurgery;  Laterality: Right;   • DENTAL PROCEDURE     • TUBAL ABDOMINAL LIGATION     • WISDOM TOOTH EXTRACTION       Social History     Socioeconomic History   • Marital status:      Spouse name: Malick   • Number of children:  Not on file   • Years of education: Not on file   • Highest education level: Not on file   Occupational History     Employer: [employer]   Tobacco Use   • Smoking status: Never Smoker   • Smokeless tobacco: Never Used   Substance and Sexual Activity   • Alcohol use: Yes     Comment: Socially   • Drug use: No   • Sexual activity: Yes     Partners: Male     Birth control/protection: Post-menopausal     Family History   Problem Relation Age of Onset   • Ovarian cancer Maternal Aunt    • Breast cancer Neg Hx    • Uterine cancer Neg Hx    • Colon cancer Neg Hx    • Deep vein thrombosis Neg Hx    • Pulmonary embolism Neg Hx    No Known Allergies   Current Outpatient Medications on File Prior to Visit   Medication Sig   • dexamethasone (DECADRON) 4 MG tablet Take 1 tablet by mouth Every 12 (Twelve) Hours.   • famotidine (PEPCID) 40 MG tablet Take 1 tablet by mouth 2 (Two) Times a Day.   • levETIRAcetam (KEPPRA) 500 MG tablet Take 1 tablet by mouth Every 12 (Twelve) Hours.   • ondansetron (ZOFRAN) 8 MG tablet Take 1 tablet by mouth Every 8 (Eight) Hours As Needed for Nausea or Vomiting.     No current facility-administered medications on file prior to visit.              There were no vitals filed for this visit.    EXAM:NONE                         ASSESSMENT/PLAN:This patient has a glioblastoma multiforme, World Health Organization, grade 4, in the chilango on the right side associated with brain edema. This triggers very dense body paralysis on the left side. On the other hand comparing this patient with how she was  weeks ago at the rehab unit at [facility] and how she is now, she is like day and night. Her speech has improved 90%. She is enjoying her food. She has been able to swallow with no difficulties. She is controlling sphincters. She has not had any seizure activity. She has no pain. She has started to gain some movement in the left lower extremity, especially in the quadriceps. The  movement in the left upper extremity is very limited. Most of the motion is triggered by shoulder activity more than anything else. The right body activity is normal. 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       The patient was further reviewed on 10/28/2020 since she completed 1 round of Temodar for the 42 days of the induction of radiation therapy and then 5 days of Temodar subsequently. Unfortunately the MRI that has been reported above and reviewed by me and agreeing with the radiologist’s interpretation shows very substantial increase in the size of edema and tumoral lesion in the chilango and in the protuberance. The patient has obvious neurological deterioration with worsening left body paralysis. Associated with this, she has deterioration of her voice and she has had more anxiety and depression. She decided that she wanted to take the depression medicine, Zyprexa, on p.r.n. basis and I pointed out to them that this medicine does not work in that way. This medicine will require continually taking 3 or 4 weeks before they can see some real benefit. On the other hand when she takes the medicine at bedtime she has very significant improvement in her ability to sleep and have a good night of sleep.       The patient was reviewed with the  by Telemedicine on 01/15/2021 after I have personally reviewed the MRI of the brain that shows very dramatic progression of her tumors in the chilango and in the medulla. There is tremendous degree of edema, most of the anatomical  structure is compromised by tumor and obviously this explains the obvious clinical deterioration. Under the present circumstances life expectancy is  measured in a few days. The patient is still drinking liquids okay; still eating some solids and she is able to take her medicines. She is not choking. She has not had any seizure activity. She is not in pain. She was able to go through her COVID infection with no turmoil and respiratory symptomatology. She remains on dexamethasone and seizure medication.     I discussed the facts with the patient’s . He realizes and he sees the changes clinically and he agrees with terminal care. They do not want to have Hospice care at this time, whatsoever and this was discussed with him and the patient’s 2 children. They will take care of her at home in a peaceful environment. I will ask for me to review them on Monday and Wednesday of next week for any other terminal care questions or needs. The medicines will remain ongoing for the time being until she is not able to swallow. When that stops it will be okay to stop the medicines and let things be. He realizes that. He is an  and he knows the logic of things.     She is DO NOT RESUSCITATE and they will not call the ambulance under any circumstances. We will be happy to provide death certificate at the time that events occur.     The patient was further reviewed with the  on the telephone on 01/21/2021. Her clinical situation continues deteriorating a little bit at a time. She is still able to swallow. She has still most of the time fecal continence, sometimes occasional accident. Urinary continence is still there. She is still able to communicate but her verbal communication is becoming less and less. No seizure activity. Neurological deficit is more pronounced also on the right side. She requires total care. She has no difficulty sleeping. The  is able to get her located from the bed to the chair, to the wheelchair and she has been able to be with him in the shop when he is working from time to time and the noise and activity makes her to be active  and still present. They do not want to pursue hospice care and they are very satisfied how things are going and they do not need any help. Therefore from my point of view we will continue doing weekly visits on the telephone and taking care of 1 issue at a time.     Duration of phone call 11 minutes

## 2021-01-27 NOTE — PROGRESS NOTES
REASON FOR FOLLOWUP/CHIEF COMPLAINT:  1.Glioblastoma OF THE PROTUBERANCE DENSE LEFT BODY PARALYSIS, WHEEL CHAIR BOUND TOTAL CARE BY , UNDERWENT RADIATION AND TEMODAR; PROGRESSIVE DISEASE BY MRI MOVING INTO GEMZAR IV EVERY 2 WEEKS, NO FURTHER ROLE FOR SURGERY OR RADIATION THERAPY.        2. HYPERGLYCEMIA DU TO CHRONIC STEROID USE SUGARS IN  RANGE      HISTORY OF PRESENT ILLNESS:       I HAVE CALLED THIS PATIENT  ON THE PHONE TODAY AND VERBALLY HAS CONSENTED ABOUT TELEMEDICINE VISIT     I have talked with this patient's  and the 2 sons on the telephone today by Telemedicine. The patient is still with us having further deterioration of vision and neurological function. No seizure activity. Still able to swallow the pills and having sips of juice or liquid and very small amount of food. Defecation and urination is still in a diaper. She still has some mumbling and language persistent and she is trying to communicate. She is still enjoying family around and she enjoys the children through obvious actions. She has not had any pain and no fever or infection. No bed sores.                Past Medical History:   Diagnosis Date   • Anxiety    • Glioblastoma (CMS/HCC)    • History of hemiplegia    • S/P craniotomy    • Vertigo      Past Surgical History:   Procedure Laterality Date   • BRAIN SURGERY     • BREAST BIOPSY     • CRANIOTOMY FOR TUMOR Right 7/1/2020    Procedure: Right-sided craniotomy for excisional biopsy and, placement of lumbar drain;  Surgeon: Shai Licona MD;  Location: [facility];  Service: Neurosurgery;  Laterality: Right;   • DENTAL PROCEDURE     • TUBAL ABDOMINAL LIGATION     • WISDOM TOOTH EXTRACTION       Social History     Socioeconomic History   • Marital status:      Spouse name: Malick   • Number of children: Not on file   • Years of education: Not on file   • Highest education level: Not on file   Occupational History     Employer: [employer]   Tobacco Use   • Smoking status: Never Smoker   • Smokeless tobacco: Never Used   Substance and Sexual Activity   • Alcohol use: Yes     Comment: Socially   • Drug use: No   • Sexual activity: Yes     Partners: Male     Birth control/protection: Post-menopausal     Family History   Problem Relation Age of Onset   • Ovarian cancer Maternal Aunt    • Breast cancer Neg Hx    • Uterine cancer Neg Hx    • Colon cancer Neg Hx    • Deep vein thrombosis Neg Hx    • Pulmonary embolism Neg Hx    No Known Allergies   Current Outpatient Medications on File Prior to Visit   Medication Sig   • dexamethasone (DECADRON) 4 MG tablet Take 1 tablet by mouth Every 12 (Twelve) Hours.   • famotidine (PEPCID) 40 MG tablet Take 1 tablet by mouth 2 (Two) Times a Day.   • levETIRAcetam (KEPPRA) 500 MG tablet Take 1 tablet by mouth Every 12 (Twelve) Hours.   • ondansetron (ZOFRAN) 8 MG tablet Take 1 tablet by mouth Every 8 (Eight) Hours As Needed for Nausea or Vomiting.     No current facility-administered medications on file prior to visit.              There were no vitals filed for this visit.    EXAM:NONE                         ASSESSMENT/PLAN:This patient has a glioblastoma multiforme, World Health Organization, grade 4, in the chilango on the right side associated with brain edema. This triggers very dense body paralysis on the left side. On the other hand comparing this patient with how she was  weeks ago at the rehab unit at [facility] and how she is now, she is like day and night. Her speech has improved 90%. She is enjoying her food. She has been able to swallow with no difficulties. She is controlling sphincters. She has not had any seizure activity. She has no pain. She has started to gain some movement in the left lower extremity, especially in the quadriceps. The movement in the left upper extremity is very limited. Most of the motion is triggered by shoulder activity more than anything else. The  right body activity is normal. 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       The patient was further reviewed on 10/28/2020 since she completed 1 round of Temodar for the 42 days of the induction of radiation therapy and then 5 days of Temodar subsequently. Unfortunately the MRI that has been reported above and reviewed by me and agreeing with the radiologist’s interpretation shows very substantial increase in the size of edema and tumoral lesion in the chilango and in the protuberance. The patient has obvious neurological deterioration with worsening left body paralysis. Associated with this, she has deterioration of her voice and she has had more anxiety and depression. She decided that she wanted to take the depression medicine, Zyprexa, on p.r.n. basis and I pointed out to them that this medicine does not work in that way. This medicine will require continually taking 3 or 4 weeks before they can see some real benefit. On the other hand when she takes the medicine at bedtime she has very significant improvement in her ability to sleep and have a good night of sleep.       The patient was reviewed with the  by Telemedicine on 01/15/2021 after I have personally reviewed the MRI of the brain that shows very dramatic progression of her tumors in the chilango and in the medulla. There is tremendous degree of edema, most of the anatomical  structure is compromised by tumor and obviously this explains the obvious clinical deterioration. Under the present circumstances life expectancy is measured in a few days. The patient is still drinking liquids okay; still eating some solids and she is able to take her medicines. She is  not choking. She has not had any seizure activity. She is not in pain. She was able to go through her COVID infection with no turmoil and respiratory symptomatology. She remains on dexamethasone and seizure medication.     I discussed the facts with the patient’s . He realizes and he sees the changes clinically and he agrees with terminal care. They do not want to have Hospice care at this time, whatsoever and this was discussed with him and the patient’s 2 children. They will take care of her at home in a peaceful environment. I will ask for me to review them on Monday and Wednesday of next week for any other terminal care questions or needs. The medicines will remain ongoing for the time being until she is not able to swallow. When that stops it will be okay to stop the medicines and let things be. He realizes that. He is an  and he knows the logic of things.     She is DO NOT RESUSCITATE and they will not call the ambulance under any circumstances. We will be happy to provide death certificate at the time that events occur.       Telemedicine visit on 01/27/2021 documents that the patient has had minimal further deterioration of her condition but is still minimally able to verbalize and mumble a few things. She still has some family, 2 children coming to visit her, and she enjoys for them to be around. The  is not having any difficulty taking care of her, not requiring too much of anything besides changing her diaper and mobilizing her from the bed to the wheelchair. She has not had any seizure activity. There is no obvious verbalization of pain. She is still able to swallow the dexamethasone, the Pepcid and the seizure medicine. No need for nausea medicine. Therefore under the present circumstances I asked them if they want to continue the dexamethasone and they voiced  yes. They want for us to keep checking on her and I went ahead and made another Telemedicine visit next Tuesday.     I  asked them to call if any issues or problems or the  is not able to take care of her anymore for one reason or the other and we will be glad to send Hospice. They are not willing to allow this to happen as long as they are able to take care of her. The sons have been very participative in the care that she receives and they are giving the stepfather a break time to time.     For billing purposes, requirement is made in regard to time on the telephone, 10 minutes. This is not necessary in a patient who is terminal care in my opinion and is irrelevant.

## 2021-02-02 NOTE — PROGRESS NOTES
REASON FOR FOLLOWUP/CHIEF COMPLAINT:  1.Glioblastoma OF THE PROTUBERANCE DENSE LEFT BODY PARALYSIS, WHEEL CHAIR BOUND TOTAL CARE BY , UNDERWENT RADIATION AND TEMODAR; PROGRESSIVE DISEASE BY MRI MOVING INTO GEMZAR IV EVERY 2 WEEKS, NO FURTHER ROLE FOR SURGERY OR RADIATION THERAPY.        2. HYPERGLYCEMIA DU TO CHRONIC STEROID USE SUGARS IN  RANGE      HISTORY OF PRESENT ILLNESS:       I HAVE CALLED THIS PATIENT  ON THE PHONE TODAY AND VERBALLY HAS CONSENTED ABOUT TELEMEDICINE VISIT       I have talked with this patient's  today on the telephone. She remains about the same situation having a little bit more trouble swallowing solid food not choking on anything but she needs to remove the food out of her mouth with her finger when things do not go through. She has achiness in her legs with minimal degree of swelling but no sensitivity upon touching. She has not had any fever or chills. Her sphincters are still continent most of the time. She is able to move her right upper extremity and has minimal vision through the eye and she is trying to paint some water colors. She has great difficulty verbalizing anything. No seizure activity. She has not had any cough, shortness of breath, no obvious palpitations and he is not checking her blood pressure or vital signs anymore.             Past Medical History:   Diagnosis Date   • Anxiety    • Glioblastoma (CMS/HCC)    • History of hemiplegia    • S/P craniotomy    • Vertigo      Past Surgical History:   Procedure Laterality Date   • BRAIN SURGERY     • BREAST BIOPSY     • CRANIOTOMY FOR TUMOR Right 7/1/2020    Procedure: Right-sided craniotomy for excisional biopsy and, placement of lumbar drain;  Surgeon: Shai Licona MD;  Location: [facility];  Service: Neurosurgery;  Laterality: Right;   • DENTAL PROCEDURE     • TUBAL ABDOMINAL LIGATION     • WISDOM TOOTH EXTRACTION       Social History     Socioeconomic History   • Marital  status:      Spouse name: Malick   • Number of children: Not on file   • Years of education: Not on file   • Highest education level: Not on file   Occupational History     Employer: [employer]   Tobacco Use   • Smoking status: Never Smoker   • Smokeless tobacco: Never Used   Substance and Sexual Activity   • Alcohol use: Yes     Comment: Socially   • Drug use: No   • Sexual activity: Yes     Partners: Male     Birth control/protection: Post-menopausal     Family History   Problem Relation Age of Onset   • Ovarian cancer Maternal Aunt    • Breast cancer Neg Hx    • Uterine cancer Neg Hx    • Colon cancer Neg Hx    • Deep vein thrombosis Neg Hx    • Pulmonary embolism Neg Hx    No Known Allergies   Current Outpatient Medications on File Prior to Visit   Medication Sig   • dexamethasone (DECADRON) 4 MG tablet Take 1 tablet by mouth Every 12 (Twelve) Hours.   • famotidine (PEPCID) 40 MG tablet Take 1 tablet by mouth 2 (Two) Times a Day.   • levETIRAcetam (KEPPRA) 500 MG tablet Take 1 tablet by mouth Every 12 (Twelve) Hours.   • ondansetron (ZOFRAN) 8 MG tablet Take 1 tablet by mouth Every 8 (Eight) Hours As Needed for Nausea or Vomiting.     No current facility-administered medications on file prior to visit.              There were no vitals filed for this visit.    EXAM:NONE                         ASSESSMENT/PLAN:This patient has a glioblastoma multiforme, World Health Organization, grade 4, in the chilango on the right side associated with brain edema. This triggers very dense body paralysis on the left side. On the other hand comparing this patient with how she was  weeks ago at the rehab unit at [facility] and how she is now, she is like day and night. Her speech has improved 90%. She is enjoying her food. She has been able to swallow with no difficulties. She is controlling sphincters. She has not had any seizure activity. She has no pain. She has started to gain some  movement in the left lower extremity, especially in the quadriceps. The movement in the left upper extremity is very limited. Most of the motion is triggered by shoulder activity more than anything else. The right body activity is normal. 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       The patient was further reviewed on 10/28/2020 since she completed 1 round of Temodar for the 42 days of the induction of radiation therapy and then 5 days of Temodar subsequently. Unfortunately the MRI that has been reported above and reviewed by me and agreeing with the radiologist’s interpretation shows very substantial increase in the size of edema and tumoral lesion in the chilango and in the protuberance. The patient has obvious neurological deterioration with worsening left body paralysis. Associated with this, she has deterioration of her voice and she has had more anxiety and depression. She decided that she wanted to take the depression medicine, Zyprexa, on p.r.n. basis and I pointed out to them that this medicine does not work in that way. This medicine will require continually taking 3 or 4 weeks before they can see some real benefit. On the other hand when she takes the medicine at bedtime she has very significant improvement in her ability to sleep and have a good night of sleep.       The patient was reviewed with the  by Telemedicine on 01/15/2021 after I have personally reviewed the MRI of the brain that shows very dramatic progression of her tumors in the chilango and in the medulla. There is tremendous degree of edema, most of the anatomical  structure is compromised by tumor and obviously this explains the obvious clinical  deterioration. Under the present circumstances life expectancy is measured in a few days. The patient is still drinking liquids okay; still eating some solids and she is able to take her medicines. She is not choking. She has not had any seizure activity. She is not in pain. She was able to go through her COVID infection with no turmoil and respiratory symptomatology. She remains on dexamethasone and seizure medication.     I discussed the facts with the patient’s . He realizes and he sees the changes clinically and he agrees with terminal care. They do not want to have Hospice care at this time, whatsoever and this was discussed with him and the patient’s 2 children. They will take care of her at home in a peaceful environment. I will ask for me to review them on Monday and Wednesday of next week for any other terminal care questions or needs. The medicines will remain ongoing for the time being until she is not able to swallow. When that stops it will be okay to stop the medicines and let things be. He realizes that. He is an  and he knows the logic of things.     She is DO NOT RESUSCITATE and they will not call the ambulance under any circumstances. We will be happy to provide death certificate at the time that events occur.         Telemedicine took place on 02/02/2021. The clinical situation remains about the same, maybe some minor deterioration in regard to her ability to swallow and having some choking and it is not going to be long until she aspirates. I recommended for him to modify her diet from solid to applesauce consistency and the medicines can be crushed and given to her in that way. Her raised the question putting her back on Zyprexa will be the right thing, this medicine made her to feel very different and less depressed and I find no problem with this medicine to go back onto to be given to her at bedtime. In regard to the dexamethasone she remains on this and they do not want to  discontinue the medicine as it is.     The patient's children have been instrumental in helping the  to have some time off here and there.     I advised him to continue doing the care that he has been doing. I advised him that he is doing a great job and I advised him if he needs hospice care he is to let us know. He has no need for this, he does not want them in the house.     We will make a follow up visit in a week on Monday next week to see how things are going. Otherwise he can call anytime.     Total duration of the visit on the telephone 7 minutes 32 seconds.

## 2021-02-09 NOTE — PROGRESS NOTES
REASON FOR FOLLOWUP/CHIEF COMPLAINT:  1.Glioblastoma OF THE PROTUBERANCE DENSE LEFT BODY PARALYSIS, WHEEL CHAIR BOUND TOTAL CARE BY , UNDERWENT RADIATION AND TEMODAR; PROGRESSIVE DISEASE BY MRI MOVING INTO [place] IV EVERY 2 WEEKS, NO FURTHER ROLE FOR SURGERY OR RADIATION THERAPY.        2. HYPERGLYCEMIA DU TO CHRONIC STEROID USE SUGARS IN  RANGE      HISTORY OF PRESENT ILLNESS:       I HAVE CALLED THIS PATIENT  ON THE PHONE TODAY AND VERBALLY HAS CONSENTED ABOUT TELEMEDICINE VISIT         I have discussed the clinical situation of this patient today with the  on the telephone. Surprising enough she has not further deteriorated in regard to the function of the right side of the body for the last few days. She is still able to swallow some foods and swallow her medications with no choking and she is not having any obvious cough or episodes of aspiration. No fever, no chills. Minimal fecal incontinence from time to time. No urinary incontinence. She is able to sit in a wheelchair and actually she has been able to  a brush and do some drawings and doing some water colors that are very primitive and simple. She is able to verbalize very few words and she has developed echolalia. He bowel activity is sluggish every 3 days with some abdominal discomfort from time to time and significant flatulence. Her appetite actually remains very good. She has multiple bruises in the skin related to the use of dexamethasone. No obvious headache, no obvious seizure activity. Verbalization is deteriorating a little bit at a time but she is still in contact with her children in regard to communication. She is sleeping relatively well. Obviously she requires total care by the .                  Past Medical History:   Diagnosis Date   • Anxiety    • Glioblastoma (CMS/HCC)    • History of hemiplegia    • S/P craniotomy    • Vertigo      Past Surgical History:   Procedure Laterality Date   •  BRAIN SURGERY     • BREAST BIOPSY     • CRANIOTOMY FOR TUMOR Right 7/1/2020    Procedure: Right-sided craniotomy for excisional biopsy and, placement of lumbar drain;  Surgeon: Shai Licona MD;  Location: [facility]  Service: Neurosurgery;  Laterality: Right;   • DENTAL PROCEDURE     • TUBAL ABDOMINAL LIGATION     • WISDOM TOOTH EXTRACTION       Social History     Socioeconomic History   • Marital status:      Spouse name: Malick   • Number of children: Not on file   • Years of education: Not on file   • Highest education level: Not on file   Occupational History     Employer: [employer]   Tobacco Use   • Smoking status: Never Smoker   • Smokeless tobacco: Never Used   Substance and Sexual Activity   • Alcohol use: Yes     Comment: Socially   • Drug use: No   • Sexual activity: Yes     Partners: Male     Birth control/protection: Post-menopausal     Family History   Problem Relation Age of Onset   • Ovarian cancer Maternal Aunt    • Breast cancer Neg Hx    • Uterine cancer Neg Hx    • Colon cancer Neg Hx    • Deep vein thrombosis Neg Hx    • Pulmonary embolism Neg Hx    No Known Allergies   Current Outpatient Medications on File Prior to Visit   Medication Sig   • dexamethasone (DECADRON) 4 MG tablet Take 1 tablet by mouth Every 12 (Twelve) Hours.   • famotidine (PEPCID) 40 MG tablet Take 1 tablet by mouth 2 (Two) Times a Day.   • levETIRAcetam (KEPPRA) 500 MG tablet Take 1 tablet by mouth Every 12 (Twelve) Hours.   • ondansetron (ZOFRAN) 8 MG tablet Take 1 tablet by mouth Every 8 (Eight) Hours As Needed for Nausea or Vomiting.     No current facility-administered medications on file prior to visit.              There were no vitals filed for this visit.    EXAM:NONE                         ASSESSMENT/PLAN:This patient has a glioblastoma multiforme, World Health Organization, grade 4, in the chilango on the right side associated with brain edema. This triggers very dense body  paralysis on the left side. On the other hand comparing this patient with how she was  weeks ago at the rehab unit at [facility] and how she is now, she is like day and night. Her speech has improved 90%. She is enjoying her food. She has been able to swallow with no difficulties. She is controlling sphincters. She has not had any seizure activity. She has no pain. She has started to gain some movement in the left lower extremity, especially in the quadriceps. The movement in the left upper extremity is very limited. Most of the motion is triggered by shoulder activity more than anything else. The right body activity is normal. 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       The patient was further reviewed on 10/28/2020 since she completed 1 round of Temodar for the 42 days of the induction of radiation therapy and then 5 days of Temodar subsequently. Unfortunately the MRI that has been reported above and reviewed by me and agreeing with the radiologist’s interpretation shows very substantial increase in the size of edema and tumoral lesion in the chilango and in the protuberance. The patient has obvious neurological deterioration with worsening left body paralysis. Associated with this, she has deterioration of her voice and she has had more anxiety and depression. She decided that she wanted to take the depression medicine, Zyprexa, on p.r.n. basis and I pointed out to them that this medicine does not work in that way. This medicine will require continually taking 3 or 4 weeks before they can see some real benefit. On the other hand when she takes the medicine at bedtime she has very significant improvement in  her ability to sleep and have a good night of sleep.       The patient was reviewed with the  by Telemedicine on 01/15/2021 after I have personally reviewed the MRI of the brain that shows very dramatic progression of her tumors in the chilango and in the medulla. There is tremendous degree of edema, most of the anatomical  structure is compromised by tumor and obviously this explains the obvious clinical deterioration. Under the present circumstances life expectancy is measured in a few days. The patient is still drinking liquids okay; still eating some solids and she is able to take her medicines. She is not choking. She has not had any seizure activity. She is not in pain. She was able to go through her COVID infection with no turmoil and respiratory symptomatology. She remains on dexamethasone and seizure medication.     I discussed the facts with the patient’s . He realizes and he sees the changes clinically and he agrees with terminal care. They do not want to have Hospice care at this time, whatsoever and this was discussed with him and the patient’s 2 children. They will take care of her at home in a peaceful environment. I will ask for me to review them on Monday and Wednesday of next week for any other terminal care questions or needs. The medicines will remain ongoing for the time being until she is not able to swallow. When that stops it will be okay to stop the medicines and let things be. He realizes that. He is an  and he knows the logic of things.     She is DO NOT RESUSCITATE and they will not call the ambulance under any circumstances. We will be happy to provide death certificate at the time that events occur.           Telemedicine took place on 02/09/2021. Overall the clinical situation is stable, there is no seizure activity, there is no obvious headache. She has not had any choking or respiratory symptomatology to suggest aspiration. Her bowel activity is sluggish with bowel  activity every 2-3 days and occasional abdominal discomfort. She has not had any nausea or vomiting. She has multiple bruises in the skin in multiple sites related to dexamethasone. Her  is taking care of this area with just moisturizing cream and local therapy. She has not had any seizure activity. They are committed to continue giving her food and her medicines like they are including the dexamethasone. They just had the last refill of this medicine filled a couple of days ago. They have plenty of seizure medication.     Otherwise there are no major changes on her. It is amazing to see her going, going and going and I think the steroid medicine as I discussed with the  today and before that this is keeping her up and running. If she was not taking the steroids the clinical situation would be very different. This raised the question if it will be okay for her to be placed in a car and drive to [place]. My personal belief is that under these circumstances that would be an extreme hardship for everybody.    Otherwise she is not in pain. I suggested for him to give her some fiber or some prunes to help her bowels to work and just to continue the medicines like they are. I asked him to call if any needs.     The patient's  is doing relatively well. 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 He is running the business in the time that he has and he is taking care of her at the same time. The patient's 2 children come to help the patient's  from time to time as well during the weekends.     We will do a repeat telemedicine visit next week.     Duration phone visit 8 minutes

## 2021-02-16 PROBLEM — L08.9 SOFT TISSUE INFECTION OF FOOT: Status: ACTIVE | Noted: 2021-01-01

## 2021-02-16 PROBLEM — M25.471 PAIN AND SWELLING OF RIGHT ANKLE: Status: ACTIVE | Noted: 2021-01-01

## 2021-02-16 PROBLEM — M25.571 PAIN AND SWELLING OF RIGHT ANKLE: Status: ACTIVE | Noted: 2021-01-01

## 2021-02-16 NOTE — PROGRESS NOTES
REASON FOR FOLLOWUP/CHIEF COMPLAINT:  1.Glioblastoma OF THE PROTUBERANCE DENSE LEFT BODY PARALYSIS, WHEEL CHAIR BOUND TOTAL CARE BY , UNDERWENT RADIATION AND TEMODAR; PROGRESSIVE DISEASE BY MRI MOVING INTO [place] IV EVERY 2 WEEKS, NO FURTHER ROLE FOR SURGERY OR RADIATION THERAPY.        2. HYPERGLYCEMIA DU TO CHRONIC STEROID USE SUGARS IN  RANGE      HISTORY OF PRESENT ILLNESS:       I HAVE CALLED THIS PATIENT  ON THE PHONE TODAY AND VERBALLY HAS CONSENTED ABOUT TELEMEDICINE VISIT           I have called the patient's  today in regard the status of her condition. Overall she continues being sustained by her , continues receiving dexamethasone for her brain edema, continues being verbal but now developing echolalia and many times language that nobody is able to understand. She has no difficulty swallowing soft foods, she has difficulty swallowing hard foods and her  has modified the consistency of foods accordingly. She is not experiencing any nausea, vomiting or choking, good bowel activity every 2nd or 3rd day, normal urination with occasional incontinence especially at nighttime. No evidence of urinary tract infection. No cardiovascular or respiratory issues. She has a soft tissue infection in the lateral aspect of the ankle on the right side. Also she has some pain throbbing in nature not relieved by Motrin in the right lower extremity. She has no pain upon standing up in the right leg. She has no swelling, no erythema and no varicosities. The left side of the body remains paralyzed. She has not had any seizure activity. She remains parcipitive of the events of the family, still able to sit in a chair,  a pencil or brush and paint things in some way or the other. She still has a lot of fatigue and requires a lot of naps through the day. She is going to bed at nighttime and nighttime is when she develops a throbbing pain in the right leg that keeps her  awake. He is requesting some pain medicine for that knowing that the Motrin is not doing anything for this at all.     Other than that no new issues have developed.                  Past Medical History:   Diagnosis Date   • Anxiety    • Glioblastoma (CMS/HCC)    • History of hemiplegia    • S/P craniotomy    • Vertigo      Past Surgical History:   Procedure Laterality Date   • BRAIN SURGERY     • BREAST BIOPSY     • CRANIOTOMY FOR TUMOR Right 7/1/2020    Procedure: Right-sided craniotomy for excisional biopsy and, placement of lumbar drain;  Surgeon: Shai Licona MD;  Location: [facility];  Service: Neurosurgery;  Laterality: Right;   • DENTAL PROCEDURE     • TUBAL ABDOMINAL LIGATION     • WISDOM TOOTH EXTRACTION       Social History     Socioeconomic History   • Marital status:      Spouse name: Malick   • Number of children: Not on file   • Years of education: Not on file   • Highest education level: Not on file   Occupational History     Employer: [employer]   Tobacco Use   • Smoking status: Never Smoker   • Smokeless tobacco: Never Used   Substance and Sexual Activity   • Alcohol use: Yes     Comment: Socially   • Drug use: No   • Sexual activity: Yes     Partners: Male     Birth control/protection: Post-menopausal     Family History   Problem Relation Age of Onset   • Ovarian cancer Maternal Aunt    • Breast cancer Neg Hx    • Uterine cancer Neg Hx    • Colon cancer Neg Hx    • Deep vein thrombosis Neg Hx    • Pulmonary embolism Neg Hx    No Known Allergies   Current Outpatient Medications on File Prior to Visit   Medication Sig   • dexamethasone (DECADRON) 4 MG tablet Take 1 tablet by mouth Every 12 (Twelve) Hours.   • famotidine (PEPCID) 40 MG tablet Take 1 tablet by mouth 2 (Two) Times a Day.   • levETIRAcetam (KEPPRA) 500 MG tablet Take 1 tablet by mouth Every 12 (Twelve) Hours.   • ondansetron (ZOFRAN) 8 MG tablet Take 1 tablet by mouth Every 8 (Eight) Hours As Needed  for Nausea or Vomiting.     No current facility-administered medications on file prior to visit.              There were no vitals filed for this visit.    EXAM:NONE                         ASSESSMENT/PLAN:This patient has a glioblastoma multiforme, World Health Organization, grade 4, in the chilango on the right side associated with brain edema. This triggers very dense body paralysis on the left side. On the other hand comparing this patient with how she was  weeks ago at the rehab unit at [facility] and how she is now, she is like day and night. Her speech has improved 90%. She is enjoying her food. She has been able to swallow with no difficulties. She is controlling sphincters. She has not had any seizure activity. She has no pain. She has started to gain some movement in the left lower extremity, especially in the quadriceps. The movement in the left upper extremity is very limited. Most of the motion is triggered by shoulder activity more than anything else. The right body activity is normal. 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       The patient was further reviewed on 10/28/2020 since she completed 1 round of Temodar for the 42 days of the induction of radiation therapy and then 5 days of Temodar subsequently. Unfortunately the MRI that has been reported above and reviewed by me and agreeing with the radiologist’s interpretation shows very substantial increase in the size of edema and tumoral lesion in the chilango and in the protuberance. The patient has obvious neurological deterioration with worsening left body paralysis. Associated with this, she has deterioration of her voice and she has had more  anxiety and depression. She decided that she wanted to take the depression medicine, Zyprexa, on p.r.n. basis and I pointed out to them that this medicine does not work in that way. This medicine will require continually taking 3 or 4 weeks before they can see some real benefit. On the other hand when she takes the medicine at bedtime she has very significant improvement in her ability to sleep and have a good night of sleep.       The patient was reviewed with the  by Telemedicine on 01/15/2021 after I have personally reviewed the MRI of the brain that shows very dramatic progression of her tumors in the chilango and in the medulla. There is tremendous degree of edema, most of the anatomical  structure is compromised by tumor and obviously this explains the obvious clinical deterioration. Under the present circumstances life expectancy is measured in a few days. The patient is still drinking liquids okay; still eating some solids and she is able to take her medicines. She is not choking. She has not had any seizure activity. She is not in pain. She was able to go through her COVID infection with no turmoil and respiratory symptomatology. She remains on dexamethasone and seizure medication.     I discussed the facts with the patient’s . He realizes and he sees the changes clinically and he agrees with terminal care. They do not want to have Hospice care at this time, whatsoever and this was discussed with him and the patient’s 2 children. They will take care of her at home in a peaceful environment. I will ask for me to review them on Monday and Wednesday of next week for any other terminal care questions or needs. The medicines will remain ongoing for the time being until she is not able to swallow. When that stops it will be okay to stop the medicines and let things be. He realizes that. He is an  and he knows the logic of things.     She is DO NOT RESUSCITATE and they will not call the ambulance  under any circumstances. We will be happy to provide death certificate at the time that events occur.             Telemedicine visit on 02/16/2021 for 15 minutes. We discussed issues in regard to her nutrition that remains ongoing. The consistency of the foods have been changed by the  to soft and so far she has not had any problems in that arena. She has not had any obvious aspiration or choking. No respiratory symptoms.     In regard to bowel activity, it happens every 3 days. In regard to urinary activity, it happens on a daily basis and some incontinence at bedtime with wearing a diaper. No sores or decubitus ulcers anywhere.    She has developed a soft tissue infection in the sternal aspect of the right ankle in absence of any trauma. Minimal pus comes out of small orifice, she has no erythema, no obvious pain. The  decided to put her on Levaquin that she had at home from before and it seems to be that this is helping. She has no fever or chills. I asked him to clean this area with peroxide twice a day and then use a solution of Clorox 1% applied to the skin a couple of times a day with gauze after the peroxide has been utilized. No other obvious medication needs to be applied there as far as I can tell. There is nothing to suggest implication of the joint or deeper soft tissues at this point.    From the point of view of the throbbing pain in the right lower extremity there is no obvious evidence of clots and we are not interested in figuring out if she has a clot there or not. The Motrin is not helping her to control this and I advised him to switch her back to the Tylenol. Tylenol arthritis pills last for 8 hours and maybe that will be the one to give to her at night before she is put in bed. Nevertheless I sent a prescription for liquid Lortab to give her 7.5 mg at bedtime if necessary on prn basis. I made him aware of the side effects of this medicine including nausea for which Zofran could  be utilized and constipation. If he uses the medicine on a regular schedule she needs to have Milk of Magnesia or MiraLax to minimize constipation.     Other than that it is amazing for me to see her still alive and running and doing as well as he says she is doing. We will continue doing telemedicine visits on weekly basis.     Other than that no other implications from my point of view at this time.     No laboratory testing is necessary and I asked him to call if any other issues or concerns.    Duration of the visit 15 minutes.

## 2021-02-24 NOTE — PROGRESS NOTES
REASON FOR FOLLOWUP/CHIEF COMPLAINT:  1.Glioblastoma OF THE PROTUBERANCE DENSE LEFT BODY PARALYSIS, WHEEL CHAIR BOUND TOTAL CARE BY , UNDERWENT RADIATION AND TEMODAR; PROGRESSIVE DISEASE BY MRI MOVING INTO GEMZAR IV EVERY 2 WEEKS, NO FURTHER ROLE FOR SURGERY OR RADIATION THERAPY.        2. HYPERGLYCEMIA DU TO CHRONIC STEROID USE SUGARS IN  RANGE      HISTORY OF PRESENT ILLNESS:       I HAVE CALLED THIS PATIENT  ON THE PHONE TODAY AND VERBALLY HAS CONSENTED ABOUT TELEMEDICINE VISIT       I have discussed with the patient's  today the fact that this patient continues doing extremely well. She has a very good appetite. She has not been choking on food, she is able to swallow soft foods and nothing dry. No alteration with liquids. She remains active using the pencil or a brush to paint using the right hand. The paralysis in the left body remains the same. She has not had any seizure activity. The persistent use of dexamethasone has produced more bruising throughout her body and the skin has the tendency to come off. She has not had any mucosal bleeding. She has not had any fever or chills. The pain in the right leg is better taking the Tylenol. She has no had any further issues due to the soft tissue infection in the right ankle. She has not had any cough and she has not had any infection. They raised the question about COVID vaccination for both of them knowing that both of them had COVID very mild case though.                          Past Medical History:   Diagnosis Date   • Anxiety    • Glioblastoma (CMS/HCC)    • History of hemiplegia    • S/P craniotomy    • Vertigo      Past Surgical History:   Procedure Laterality Date   • BRAIN SURGERY     • BREAST BIOPSY     • CRANIOTOMY FOR TUMOR Right 7/1/2020    Procedure: Right-sided craniotomy for excisional biopsy and, placement of lumbar drain;  Surgeon: Shai Licona MD;  Location: [facility];  Service: Neurosurgery;   Laterality: Right;   • DENTAL PROCEDURE     • TUBAL ABDOMINAL LIGATION     • WISDOM TOOTH EXTRACTION       Social History     Socioeconomic History   • Marital status:      Spouse name: Malick   • Number of children: Not on file   • Years of education: Not on file   • Highest education level: Not on file   Occupational History     Employer: [employer]   Tobacco Use   • Smoking status: Never Smoker   • Smokeless tobacco: Never Used   Substance and Sexual Activity   • Alcohol use: Yes     Comment: Socially   • Drug use: No   • Sexual activity: Yes     Partners: Male     Birth control/protection: Post-menopausal     Family History   Problem Relation Age of Onset   • Ovarian cancer Maternal Aunt    • Breast cancer Neg Hx    • Uterine cancer Neg Hx    • Colon cancer Neg Hx    • Deep vein thrombosis Neg Hx    • Pulmonary embolism Neg Hx    No Known Allergies   Current Outpatient Medications on File Prior to Visit   Medication Sig   • dexamethasone (DECADRON) 4 MG tablet Take 1 tablet by mouth Every 12 (Twelve) Hours.   • famotidine (PEPCID) 40 MG tablet Take 1 tablet by mouth 2 (Two) Times a Day.   • HYDROcodone-acetaminophen (HYCET) 7.5-325 MG/15ML solution GIVE HER 7.5 ML AT 9:30 PM BEFORE BED AS NEEDED FOR PAIN   • levETIRAcetam (KEPPRA) 500 MG tablet Take 1 tablet by mouth Every 12 (Twelve) Hours.   • ondansetron (ZOFRAN) 8 MG tablet Take 1 tablet by mouth Every 8 (Eight) Hours As Needed for Nausea or Vomiting.     No current facility-administered medications on file prior to visit.              There were no vitals filed for this visit.    EXAM:NONE                         ASSESSMENT/PLAN:This patient has a glioblastoma multiforme, World Health Organization, grade 4, in the chilango on the right side associated with brain edema. This triggers very dense body paralysis on the left side. On the other hand comparing this patient with how she was  weeks ago at the rehab unit at [facility]  [place] and how she is now, she is like day and night. Her speech has improved 90%. She is enjoying her food. She has been able to swallow with no difficulties. She is controlling sphincters. She has not had any seizure activity. She has no pain. She has started to gain some movement in the left lower extremity, especially in the quadriceps. The movement in the left upper extremity is very limited. Most of the motion is triggered by shoulder activity more than anything else. The right body activity is normal. 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       The patient was further reviewed on 10/28/2020 since she completed 1 round of Temodar for the 42 days of the induction of radiation therapy and then 5 days of Temodar subsequently. Unfortunately the MRI that has been reported above and reviewed by me and agreeing with the radiologist’s interpretation shows very substantial increase in the size of edema and tumoral lesion in the chilango and in the protuberance. The patient has obvious neurological deterioration with worsening left body paralysis. Associated with this, she has deterioration of her voice and she has had more anxiety and depression. She decided that she wanted to take the depression medicine, Zyprexa, on p.r.n. basis and I pointed out to them that this medicine does not work in that way. This medicine will require continually taking 3 or 4 weeks before they can see some real benefit. On the other hand when she takes the medicine at bedtime she has very significant improvement in her ability to sleep and have a good night of sleep.       The patient was reviewed with the  by Telemedicine on 01/15/2021  after I have personally reviewed the MRI of the brain that shows very dramatic progression of her tumors in the chilango and in the medulla. There is tremendous degree of edema, most of the anatomical  structure is compromised by tumor and obviously this explains the obvious clinical deterioration. Under the present circumstances life expectancy is measured in a few days. The patient is still drinking liquids okay; still eating some solids and she is able to take her medicines. She is not choking. She has not had any seizure activity. She is not in pain. She was able to go through her COVID infection with no turmoil and respiratory symptomatology. She remains on dexamethasone and seizure medication.     I discussed the facts with the patient’s . He realizes and he sees the changes clinically and he agrees with terminal care. They do not want to have Hospice care at this time, whatsoever and this was discussed with him and the patient’s 2 children. They will take care of her at home in a peaceful environment. I will ask for me to review them on Monday and Wednesday of next week for any other terminal care questions or needs. The medicines will remain ongoing for the time being until she is not able to swallow. When that stops it will be okay to stop the medicines and let things be. He realizes that. He is an  and he knows the logic of things.     She is DO NOT RESUSCITATE and they will not call the ambulance under any circumstances. We will be happy to provide death certificate at the time that events occur.       I reviewed by telemedicine on 02/24/2021 the patient's situation that overall I do not see has changed too much since the previous visit a week ago. The pain in the right leg is better just taking Tylenol. The infection in the soft tissue is very much gone. She completed her antibiotic with no difficulties. She was able to carry a conversation with me today on the telephone and she was able to  tell me what she is doing and what she is eating and what she likes to eat and so forth. She raised the question if it will be okay for her to have COVID vaccination and I mentioned to her that that will be okay from my point of view because the case of COVID that they had was very mild and probably the degree of antibody formation was not high enough anyway. Difficult to know. I find no difficulty with this as long as they are able to get around and get the vaccines at the same time.     From the point of view when to see her back I will be working next week on Tuesday and we will be glad to do another telemedicine visit at that time. I advised him to give her vitamin C 1 gram a day in a liquid form to see if this makes any difference in regard to the ecchymosis that she has in the skin related to the use of dexamethasone. Obviously the other option will be stop the dexamethasone but he is not interested in going into this avenue. They are enjoying the time that she has had she is functioning at a certain point in a very efficient way. All of this is very surprising to me and we will take it.             Duration of the visit 10 minutes.

## 2021-03-03 NOTE — PROGRESS NOTES
REASON FOR FOLLOWUP/CHIEF COMPLAINT:  1.Glioblastoma OF THE PROTUBERANCE DENSE LEFT BODY PARALYSIS, WHEEL CHAIR BOUND TOTAL CARE BY , UNDERWENT RADIATION AND TEMODAR; PROGRESSIVE DISEASE BY MRI MOVING INTO GEMZAR IV EVERY 2 WEEKS, NO FURTHER ROLE FOR SURGERY OR RADIATION THERAPY.        2. HYPERGLYCEMIA DU TO CHRONIC STEROID USE SUGARS IN  RANGE      HISTORY OF PRESENT ILLNESS:       I HAVE CALLED THIS PATIENT  ON THE PHONE TODAY AND VERBALLY HAS CONSENTED ABOUT TELEMEDICINE VISIT       I have discussed with the patient's  today the fact that this patient continues doing  well. She has a very good appetite. She has not been choking on food, she is able to swallow soft foods and nothing dry. No alteration with liquids.   She continues drinking her liquids and having her diet in a normal schedule with no limitations and no obvious aspiration. Her voice is more difficult to understand and her speech volume is decreasing. She is more fatigued and tired than usual. She has not had any seizure activity. She is having more incontinence issues as times goes by. She remains in the bed but the  is able to get her out of the bed and put her in the wheelchair and she functions much better at that level. She is trying to paint something with a brush in her right hand. No obvious seizure activity. All the areas of ecchymosis triggered by the steroids remain about the same even though he believes that the vitamin C maybe has had some benefit. The soft tissue infection in the right ankle is almost completely gone.                         Past Medical History:   Diagnosis Date   • Anxiety    • Glioblastoma (CMS/HCC)    • History of hemiplegia    • S/P craniotomy    • Vertigo      Past Surgical History:   Procedure Laterality Date   • BRAIN SURGERY     • BREAST BIOPSY     • CRANIOTOMY FOR TUMOR Right 7/1/2020    Procedure: Right-sided craniotomy for excisional biopsy and, placement of lumbar  drain;  Surgeon: Shai Licona MD;  Location: [facility] OR;  Service: Neurosurgery;  Laterality: Right;   • DENTAL PROCEDURE     • TUBAL ABDOMINAL LIGATION     • WISDOM TOOTH EXTRACTION       Social History     Socioeconomic History   • Marital status:      Spouse name: Malick   • Number of children: Not on file   • Years of education: Not on file   • Highest education level: Not on file   Occupational History     Employer: [employer]   Tobacco Use   • Smoking status: Never Smoker   • Smokeless tobacco: Never Used   Substance and Sexual Activity   • Alcohol use: Yes     Comment: Socially   • Drug use: No   • Sexual activity: Yes     Partners: Male     Birth control/protection: Post-menopausal     Family History   Problem Relation Age of Onset   • Ovarian cancer Maternal Aunt    • Breast cancer Neg Hx    • Uterine cancer Neg Hx    • Colon cancer Neg Hx    • Deep vein thrombosis Neg Hx    • Pulmonary embolism Neg Hx    No Known Allergies   Current Outpatient Medications on File Prior to Visit   Medication Sig   • dexamethasone (DECADRON) 4 MG tablet Take 1 tablet by mouth Every 12 (Twelve) Hours.   • famotidine (PEPCID) 40 MG tablet Take 1 tablet by mouth 2 (Two) Times a Day.   • HYDROcodone-acetaminophen (HYCET) 7.5-325 MG/15ML solution GIVE HER 7.5 ML AT 9:30 PM BEFORE BED AS NEEDED FOR PAIN   • levETIRAcetam (KEPPRA) 500 MG tablet Take 1 tablet by mouth Every 12 (Twelve) Hours.   • ondansetron (ZOFRAN) 8 MG tablet Take 1 tablet by mouth Every 8 (Eight) Hours As Needed for Nausea or Vomiting.     No current facility-administered medications on file prior to visit.              There were no vitals filed for this visit.    EXAM:NONE                         ASSESSMENT/PLAN:This patient has a glioblastoma multiforme, World Health Organization, grade 4, in the chilango on the right side associated with brain edema. This triggers very dense body paralysis on the left side. On the other hand  comparing this patient with how she was  weeks ago at the rehab unit at [facility] and how she is now, she is like day and night. Her speech has improved 90%. She is enjoying her food. She has been able to swallow with no difficulties. She is controlling sphincters. She has not had any seizure activity. She has no pain. She has started to gain some movement in the left lower extremity, especially in the quadriceps. The movement in the left upper extremity is very limited. Most of the motion is triggered by shoulder activity more than anything else. The right body activity is normal. 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       The patient was further reviewed on 10/28/2020 since she completed 1 round of Temodar for the 42 days of the induction of radiation therapy and then 5 days of Temodar subsequently. Unfortunately the MRI that has been reported above and reviewed by me and agreeing with the radiologist’s interpretation shows very substantial increase in the size of edema and tumoral lesion in the chilango and in the protuberance. The patient has obvious neurological deterioration with worsening left body paralysis. Associated with this, she has deterioration of her voice and she has had more anxiety and depression. She decided that she wanted to take the depression medicine, Zyprexa, on p.r.n. basis and I pointed out to them that this medicine does not work in that way. This medicine will require continually taking 3 or 4 weeks before they can see some real benefit. On the other hand when she takes the medicine at bedtime she has very significant improvement in her ability to sleep and have a good night of  sleep.       The patient was reviewed with the  by Telemedicine on 01/15/2021 after I have personally reviewed the MRI of the brain that shows very dramatic progression of her tumors in the chilango and in the medulla. There is tremendous degree of edema, most of the anatomical  structure is compromised by tumor and obviously this explains the obvious clinical deterioration. Under the present circumstances life expectancy is measured in a few days. The patient is still drinking liquids okay; still eating some solids and she is able to take her medicines. She is not choking. She has not had any seizure activity. She is not in pain. She was able to go through her COVID infection with no turmoil and respiratory symptomatology. She remains on dexamethasone and seizure medication.     I discussed the facts with the patient’s . He realizes and he sees the changes clinically and he agrees with terminal care. They do not want to have Hospice care at this time, whatsoever and this was discussed with him and the patient’s 2 children. They will take care of her at home in a peaceful environment. I will ask for me to review them on Monday and Wednesday of next week for any other terminal care questions or needs. The medicines will remain ongoing for the time being until she is not able to swallow. When that stops it will be okay to stop the medicines and let things be. He realizes that. He is an  and he knows the logic of things.     She is DO NOT RESUSCITATE and they will not call the ambulance under any circumstances. We will be happy to provide death certificate at the time that events occur.       I reviewed the situation by Telemedicine with the patient’s  on 03/03/2021. The clinical situation with mild deterioration in her speech and her energy but overall she remains relatively functional, communicative, and still able to hang out in her wheelchair for certain period of time during the day. She  is sleeping better and she has more incontinence issues. Her voice volume and her speech are slowly declining. No seizure activity. The soft tissue infection in the right ankle is healing but he would like to have more Bactrim available just in case. Vitamin C is still ongoing and it seems that the multiple areas of ecchymosis are not getting any worse at this point.     She has not required any pain medicine.     Her seizure medicine is still ongoing and again no seizure activity.     He would like to have a refill on her Zofran, her Bactrim and her dexamethasone and I will send refills on these medicines today.     We will need to continue weekly TeleVisits.     I mentioned to the patient’s  that some other agencies short of Hospice could be available if he wishes including [facility] and entities of this nature. If he thinks that he is ready for one of these companies we will be glad to ask the  to communicate with the  and schedule some visits and interviews with them.     How much longer we are going to go is already surprising to me since the middle of 01/2021 this patient has been made NO CODE 300 after her MRI was reviewed showing very dramatic progressive disease. In spite of all this the patient is still with us. She has very good care by her .     I will review her back in a week. I will not need to have any blood work and neither radiological analysis on her at this time.     I asked the patient’s  to call if any needs at any time.            Duration of the visit 13 minutes.

## 2021-03-10 PROBLEM — L08.9 SOFT TISSUE INFECTION OF FOOT: Status: RESOLVED | Noted: 2021-01-01 | Resolved: 2021-01-01

## 2021-03-16 NOTE — TELEPHONE ENCOUNTER
Called patients spouse, andrez, to find out what services they were needing. I asked him about her mobility. He stated that he is doing all of the transferring, rolling, pulling up in bed, bathroom care, bathing etc. He stated that he has been getting her in the wheelchair and getting her to the toilet, then back in wheelchair to bed. I suggested that i could order a bedside commode to which he stated they have one and could give it a try to see if it is any easier on him since it is less transferring. He then let me know he has been doing the wound care and stated that it is getting worse. I asked about home health if it was something he would be interested in. At first there was slight hesitation, after speaking with him I let him know that no one is here to step in and take over, just to assist him to ease some of his care to take care of himself. He agreed that some help could be beneficial to both him and the patient. He was concerned about insurance coverage. I called home health and spoke with them to ensure I was ordering everything they think this patient would need to assess and treat. Main thing was the wounds/ skin break down and what hospital bed we would need to order for her. I suggested the low loss air mattress to the  and educated on that, he seemed to be interest in whatever bed could help her with her skin and transferring. I called patients insurance company to find out information about their coverage for him. The  stated that they have met out of pocket so the insurance company would cover 70% and they would be responsible for 30%, also that a PA was required. I spoke with [facility] [place] to let them know that a PA was needed when they run insurance. They will be able to determine final pricing once insurance and PA are ran. Fax was sent to [phone] with referral and patient information for home care.     Attempted to contact Andrez back to let him know above  information. No answer, I have left a vm with my direct line to give me a call when he has time.

## 2021-03-16 NOTE — PROGRESS NOTES
REASON FOR FOLLOWUP/CHIEF COMPLAINT:  1.Glioblastoma OF THE PROTUBERANCE DENSE LEFT BODY PARALYSIS, WHEEL CHAIR BOUND TOTAL CARE BY , UNDERWENT RADIATION AND TEMODAR; PROGRESSIVE DISEASE BY MRI MOVING INTO GEMZAR IV EVERY 2 WEEKS, NO FURTHER ROLE FOR SURGERY OR RADIATION THERAPY.        2. HYPERGLYCEMIA DU TO CHRONIC STEROID USE SUGARS IN  RANGE      HISTORY OF PRESENT ILLNESS:       I HAVE CALLED THIS PATIENT  ON THE PHONE TODAY AND VERBALLY HAS CONSENTED ABOUT TELEMEDICINE VISIT           I have talked with this patient's  today in regard her situation. She has further deterioration with more weakness and more imbalance to the point that she is lying in the bed most of the time even though he tries to get her out of the bed, for example the chore of going to the bathroom and give her a bath is becoming more and more difficult. She has not had any seizure activity and the movement in the left lower extremity is very minimal at this time. No choking situation yet. She is still eating, having bowel activity and urination on the diaper. She has multiple purple areas related to the use of dexamethasone with no secondary infection. The minor area of infection in the right foot has come back and he has started her back on Bactrim. She has not had any fever, chills or cough. No seizure activity.                          Past Medical History:   Diagnosis Date   • Anxiety    • Glioblastoma (CMS/HCC)    • History of hemiplegia    • S/P craniotomy    • Vertigo      Past Surgical History:   Procedure Laterality Date   • BRAIN SURGERY     • BREAST BIOPSY     • CRANIOTOMY FOR TUMOR Right 7/1/2020    Procedure: Right-sided craniotomy for excisional biopsy and, placement of lumbar drain;  Surgeon: Shai Licona MD;  Location: [facility];  Service: Neurosurgery;  Laterality: Right;   • DENTAL PROCEDURE     • TUBAL ABDOMINAL LIGATION     • WISDOM TOOTH EXTRACTION       Social History      Socioeconomic History   • Marital status:      Spouse name: Malick   • Number of children: Not on file   • Years of education: Not on file   • Highest education level: Not on file   Tobacco Use   • Smoking status: Never Smoker   • Smokeless tobacco: Never Used   Substance and Sexual Activity   • Alcohol use: Yes     Comment: Socially   • Drug use: No   • Sexual activity: Yes     Partners: Male     Birth control/protection: Post-menopausal     Family History   Problem Relation Age of Onset   • Ovarian cancer Maternal Aunt    • Breast cancer Neg Hx    • Uterine cancer Neg Hx    • Colon cancer Neg Hx    • Deep vein thrombosis Neg Hx    • Pulmonary embolism Neg Hx    No Known Allergies   Current Outpatient Medications on File Prior to Visit   Medication Sig   • dexamethasone (DECADRON) 4 MG tablet Take 1 tablet by mouth Every 12 (Twelve) Hours.   • famotidine (PEPCID) 40 MG tablet Take 1 tablet by mouth 2 (Two) Times a Day.   • HYDROcodone-acetaminophen (HYCET) 7.5-325 MG/15ML solution GIVE HER 7.5 ML AT 9:30 PM BEFORE BED AS NEEDED FOR PAIN   • levETIRAcetam (KEPPRA) 500 MG tablet Take 1 tablet by mouth Every 12 (Twelve) Hours.   • ondansetron (ZOFRAN) 8 MG tablet Take 1 tablet by mouth Every 8 (Eight) Hours As Needed for Nausea or Vomiting.   • sulfamethoxazole-trimethoprim (BACTRIM DS,SEPTRA DS) 800-160 MG per tablet Take 1 tablet by mouth Daily.     No current facility-administered medications on file prior to visit.             There were no vitals filed for this visit.    EXAM:NONE                         ASSESSMENT/PLAN:This patient has a glioblastoma multiforme, World Health Organization, grade 4, in the chilango on the right side associated with brain edema. This triggers very dense body paralysis on the left side. On the other hand comparing this patient with how she was  weeks ago at the rehab unit at [facility] and how she is now, she is like day and night. Her speech has improved 90%.  She is enjoying her food. She has been able to swallow with no difficulties. She is controlling sphincters. She has not had any seizure activity. She has no pain. She has started to gain some movement in the left lower extremity, especially in the quadriceps. The movement in the left upper extremity is very limited. Most of the motion is triggered by shoulder activity more than anything else. The right body activity is normal. 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       The patient was further reviewed on 10/28/2020 since she completed 1 round of Temodar for the 42 days of the induction of radiation therapy and then 5 days of Temodar subsequently. Unfortunately the MRI that has been reported above and reviewed by me and agreeing with the radiologist’s interpretation shows very substantial increase in the size of edema and tumoral lesion in the chilango and in the protuberance. The patient has obvious neurological deterioration with worsening left body paralysis. Associated with this, she has deterioration of her voice and she has had more anxiety and depression. She decided that she wanted to take the depression medicine, Zyprexa, on p.r.n. basis and I pointed out to them that this medicine does not work in that way. This medicine will require continually taking 3 or 4 weeks before they can see some real benefit. On the other hand when she takes the medicine at bedtime she has very significant improvement in her ability to sleep and have a good night of sleep.       The patient was reviewed with the  by Telemedicine on 01/15/2021 after I have personally reviewed the MRI of the brain that shows very dramatic  progression of her tumors in the chilango and in the medulla. There is tremendous degree of edema, most of the anatomical  structure is compromised by tumor and obviously this explains the obvious clinical deterioration. Under the present circumstances life expectancy is measured in a few days. The patient is still drinking liquids okay; still eating some solids and she is able to take her medicines. She is not choking. She has not had any seizure activity. She is not in pain. She was able to go through her COVID infection with no turmoil and respiratory symptomatology. She remains on dexamethasone and seizure medication.     I discussed the facts with the patient’s . He realizes and he sees the changes clinically and he agrees with terminal care. They do not want to have Hospice care at this time, whatsoever and this was discussed with him and the patient’s 2 children. They will take care of her at home in a peaceful environment. I will ask for me to review them on Monday and Wednesday of next week for any other terminal care questions or needs. The medicines will remain ongoing for the time being until she is not able to swallow. When that stops it will be okay to stop the medicines and let things be. He realizes that. He is an  and he knows the logic of things.     She is DO NOT RESUSCITATE and they will not call the ambulance under any circumstances. We will be happy to provide death certificate at the time that events occur.       I reviewed the situation on 03/16/2021. The patient's  is still doing all of the care for her, she is having more weakness, he is having trouble lifting her out of the bed to take her in a wheelchair to go to the bathroom. Even though he is very strong he is starting to feel the consequences of all of this lifting back and forth. He is interested in talking with home health in regard to a hospital bed and maybe the crank to lift her out of the bed and take her to the  bathroom and give her a bath in that way.    He is not interested in hospice services at this point. He is not interested in [facility] for example services neither.     I insisted in the need for him to take care of himself and that way he does not get sick and that way she does not miss him taking care of her.     Overall there is no need for any prescriptions, there is no need for any changes in the medicines that she is receiving and we will continue doing weekly visits the same way.     Duration of this phone visit 10 minutes.

## 2021-03-16 NOTE — TELEPHONE ENCOUNTER
REYNA SCALES FROM [facility] CALLED TO CONFIRM THAT THEY HAVE RECEIVED THE REFERRAL FOR HOME CARE AND SAYS [facility] WILL BE CARING FOR THIS PT     PH# [phone]

## 2021-03-24 NOTE — TELEPHONE ENCOUNTER
He had telephone call with Dr. Trimble this morning they talked about [facility].  He said [facility] is being unreasonable and wants to talk to Dr. Trimble.

## 2021-03-24 NOTE — TELEPHONE ENCOUNTER
called my direct line. I let him know that I have talked with hospice and wrote it on her referral that they need to turn their badges and keep it away from patient and family per their request. Patient does not want to know they are there.  asked when they will be coming out I let him know that I sent all her information this am and it could take a bit to process. I gave him the hospice number to call tomorrow if he has not heard from them. He was very appreciative.

## 2021-03-24 NOTE — TELEPHONE ENCOUNTER
Spoke with  because he called back because he was upset about his initial conversation with hospice. I called the  he stated that they were only doing a phone call not coming to see the patient and that they had to announce who they were.  stated that his wife has said from the start she does not want to know when hospice would be called in because she does not want to know when she's getting close to dying. I spoke with scooter at hospice who stated that they are calling  before they go to their apt at 9:30 am to get an idea of how he would like it to go so they are on the same page. Scooter stated that legally they have to wear a badge but they can turn it around to only show a white background. I called  back to let him know all of this and he was very appreciative. I told him when they call and they come up with a plan for the visit if he does not like it then he is allowed to decline the service if he wishes. He is very anxious about this visit and very protective of his wife. I also told him that if the patient asks who the nurse is that legally she has to tell her.  v/u. He was very thankful for my call and understanding. I let him know he can call me at anytime for any concerns he has.

## 2021-03-24 NOTE — PROGRESS NOTES
REASON FOR FOLLOWUP/CHIEF COMPLAINT:  1.Glioblastoma OF THE PROTUBERANCE DENSE LEFT BODY PARALYSIS, WHEEL CHAIR BOUND TOTAL CARE BY , UNDERWENT RADIATION AND TEMODAR; PROGRESSIVE DISEASE BY MRI MOVING INTO GEMZAR IV EVERY 2 WEEKS, NO FURTHER ROLE FOR SURGERY OR RADIATION THERAPY.        2. HYPERGLYCEMIA DU TO CHRONIC STEROID USE SUGARS IN  RANGE      HISTORY OF PRESENT ILLNESS:       I HAVE CALLED THIS PATIENT  ON THE PHONE TODAY AND VERBALLY HAS CONSENTED ABOUT TELEMEDICINE VISIT             I have talked with this patient’s  today. She remains in a wheelchair and stays in bed most of the time given her paralysis on the left side that is very dense associated with glioblastoma multiforme. The patient is still able to eat now days, applesauce consistency food. She has not had any choking episodes and she is not having cough or fever. Her bowel activity is still sluggish and she has probably impaction with hard rock stools and subsequent diarrhea. She still has an opening in the skin in her right leg in spite of giving her Bactrim twice a day. Pus is still coming out of this anatomical site and raises the question about the need for a different antibiotic. She has not had any difficulty with urination. She has not had any seizure activity and no evidence of pain. She requires total care 24 hours a day and the patient’s  is the only one providing this for her. He is getting very tired of being there 24 hours a day and he finally agreed today to proceed with Hospice care with certain restrictions. Please review below.                         Past Medical History:   Diagnosis Date   • Anxiety    • Glioblastoma (CMS/HCC)    • History of hemiplegia    • S/P craniotomy    • Vertigo      Past Surgical History:   Procedure Laterality Date   • BRAIN SURGERY     • BREAST BIOPSY     • CRANIOTOMY FOR TUMOR Right 7/1/2020    Procedure: Right-sided craniotomy for excisional biopsy and,  placement of lumbar drain;  Surgeon: Shai Licona MD;  Location: [facility];  Service: Neurosurgery;  Laterality: Right;   • DENTAL PROCEDURE     • TUBAL ABDOMINAL LIGATION     • WISDOM TOOTH EXTRACTION       Social History     Socioeconomic History   • Marital status:      Spouse name: Malick   • Number of children: Not on file   • Years of education: Not on file   • Highest education level: Not on file   Tobacco Use   • Smoking status: Never Smoker   • Smokeless tobacco: Never Used   Substance and Sexual Activity   • Alcohol use: Yes     Comment: Socially   • Drug use: No   • Sexual activity: Yes     Partners: Male     Birth control/protection: Post-menopausal     Family History   Problem Relation Age of Onset   • Ovarian cancer Maternal Aunt    • Breast cancer Neg Hx    • Uterine cancer Neg Hx    • Colon cancer Neg Hx    • Deep vein thrombosis Neg Hx    • Pulmonary embolism Neg Hx    No Known Allergies   Current Outpatient Medications on File Prior to Visit   Medication Sig   • dexamethasone (DECADRON) 4 MG tablet Take 1 tablet by mouth Every 12 (Twelve) Hours.   • famotidine (PEPCID) 40 MG tablet Take 1 tablet by mouth 2 (Two) Times a Day.   • HYDROcodone-acetaminophen (HYCET) 7.5-325 MG/15ML solution GIVE HER 7.5 ML AT 9:30 PM BEFORE BED AS NEEDED FOR PAIN   • levETIRAcetam (KEPPRA) 500 MG tablet Take 1 tablet by mouth Every 12 (Twelve) Hours.   • ondansetron (ZOFRAN) 8 MG tablet Take 1 tablet by mouth Every 8 (Eight) Hours As Needed for Nausea or Vomiting.   • [DISCONTINUED] sulfamethoxazole-trimethoprim (BACTRIM DS,SEPTRA DS) 800-160 MG per tablet Take 1 tablet by mouth Daily.     No current facility-administered medications on file prior to visit.             There were no vitals filed for this visit.    EXAM:NONE                         ASSESSMENT/PLAN:This patient has a glioblastoma multiforme, World Health Organization, grade 4, in the chilango on the right side associated with brain edema.  This triggers very dense body paralysis on the left side. On the other hand comparing this patient with how she was  weeks ago at the rehab unit at [facility] and how she is now, she is like day and night. Her speech has improved 90%. She is enjoying her food. She has been able to swallow with no difficulties. She is controlling sphincters. She has not had any seizure activity. She has no pain. She has started to gain some movement in the left lower extremity, especially in the quadriceps. The movement in the left upper extremity is very limited. Most of the motion is triggered by shoulder activity more than anything else. The right body activity is normal. 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       The patient was further reviewed on 10/28/2020 since she completed 1 round of Temodar for the 42 days of the induction of radiation therapy and then 5 days of Temodar subsequently. Unfortunately the MRI that has been reported above and reviewed by me and agreeing with the radiologist’s interpretation shows very substantial increase in the size of edema and tumoral lesion in the chilango and in the protuberance. The patient has obvious neurological deterioration with worsening left body paralysis. Associated with this, she has deterioration of her voice and she has had more anxiety and depression. She decided that she wanted to take the depression medicine, Zyprexa, on p.r.n. basis and I pointed out to them that this medicine does not work in that way. This medicine will require continually taking 3 or 4 weeks before they can see some real benefit. On the other hand when she takes the medicine at bedtime she has  very significant improvement in her ability to sleep and have a good night of sleep.       The patient was reviewed with the  by Telemedicine on 01/15/2021 after I have personally reviewed the MRI of the brain that shows very dramatic progression of her tumors in the chilango and in the medulla. There is tremendous degree of edema, most of the anatomical  structure is compromised by tumor and obviously this explains the obvious clinical deterioration. Under the present circumstances life expectancy is measured in a few days. The patient is still drinking liquids okay; still eating some solids and she is able to take her medicines. She is not choking. She has not had any seizure activity. She is not in pain. She was able to go through her COVID infection with no turmoil and respiratory symptomatology. She remains on dexamethasone and seizure medication.     I discussed the facts with the patient’s . He realizes and he sees the changes clinically and he agrees with terminal care. They do not want to have Hospice care at this time, whatsoever and this was discussed with him and the patient’s 2 children. They will take care of her at home in a peaceful environment. I will ask for me to review them on Monday and Wednesday of next week for any other terminal care questions or needs. The medicines will remain ongoing for the time being until she is not able to swallow. When that stops it will be okay to stop the medicines and let things be. He realizes that. He is an  and he knows the logic of things.     She is DO NOT RESUSCITATE and they will not call the ambulance under any circumstances. We will be happy to provide death certificate at the time that events occur.     The patient was further reviewed by Telemedicine with the patient’s  on 03/24/2021. The symptoms in regard to her paralysis are about the same. The strength is not changing. The right side of the body remains mildly functional. She  has not had any episodes of aspiration and all the food consistency is pudding consistency or applesauce consistency with no choking and no coughing. She has no labored breathing. She has no obvious pain. She looks to me in the description of her bowel activity, probably impacted because she has very hard stool and then after the hard stool goes through she has profuse diarrhea. She is not distended and she is not nauseated or vomiting. The urine is appropriate. She has not had any seizure. She is not in pain. The area in her right lower extremity that was draining purulent material has not improved with the Bactrim twice a day and this will require changing medicines to Keflex today at the dose of 500 mg t.i.d. for 10 days and see how things evolve.     Most importantly the patient’s  is exhausted from taking care of her. He is there 24 hours a day. He has no other help and he finally agrees with Hospice. The only condition that he places is that the Hospice representatives do not introduce themselves like Hospice helpers to the patient. Therefore, under these circumstances, I instructed my nurse, Myrna, to discuss these issues with Hospice. They are welcome to call Mr. Guajardo anytime to make an appointment and come and see her.     I went ahead and cancelled the prescription for Bactrim that she had before and I went ahead and gave her the prescription for Keflex as stated above. The skin care will remain with moisturizer creams. It is getting harder and harder to take her to the bathroom and this is another issue that she will require to have a bath in bed through Hospice at least twice a week.     We will continue doing Telemedicine visits once a week for the time being. Obviously I find no reason for radiological assessment or bring her back to the office at this time. She remains DO NOT RESUSCITATE.        Duration of this phone visit 14 minutes.

## 2021-03-24 NOTE — TELEPHONE ENCOUNTER
Attempted to contact  on both numbers no answer. Wanted to update that we have placed a referral and I spoke with hospice to request that they turn their badge per patient and family request. This was written on referral as well. Hospice stated that this would be fine to do, they do it often for families.

## 2021-03-30 NOTE — PROGRESS NOTES
REASON FOR FOLLOWUP/CHIEF COMPLAINT:  1.Glioblastoma OF THE PROTUBERANCE DENSE LEFT BODY PARALYSIS, WHEEL CHAIR BOUND TOTAL CARE BY , UNDERWENT RADIATION AND TEMODAR; PROGRESSIVE DISEASE BY MRI MOVING INTO GEMZAR IV EVERY 2 WEEKS, NO FURTHER ROLE FOR SURGERY OR RADIATION THERAPY.        2. HYPERGLYCEMIA DU TO CHRONIC STEROID USE SUGARS IN  RANGE      HISTORY OF PRESENT ILLNESS:       I HAVE CALLED THIS PATIENT  ON THE PHONE TODAY AND VERBALLY HAS CONSENTED ABOUT TELEMEDICINE VISIT             I have talked with this patient’s  today.   During the last few days the patient has further deteriorated in regard to her ability to talk and her ability to swallow and she is taking hours and hours to have a bite of food or sip of water. All the liquids are thickened and Hospice has been in the house and they have been useful to take care of places in the skin and instructions in regard to general care. He already has a care package at home and I will instruct him below how to use these medicines. I think with the way how things are going the final turn for the worse is happening now and I asked him to not be surprised if in the next week or 10 days she is not with us anymore.     So far he is coping with all this situation in the best way that he can. Otherwise, no obvious pain, no obvious seizure activity. She requires total care.                         Past Medical History:   Diagnosis Date   • Anxiety    • Glioblastoma (CMS/HCC)    • History of hemiplegia    • S/P craniotomy    • Vertigo      Past Surgical History:   Procedure Laterality Date   • BRAIN SURGERY     • BREAST BIOPSY     • CRANIOTOMY FOR TUMOR Right 7/1/2020    Procedure: Right-sided craniotomy for excisional biopsy and, placement of lumbar drain;  Surgeon: Shai Licona MD;  Location: [facility];  Service: Neurosurgery;  Laterality: Right;   • DENTAL PROCEDURE     • TUBAL ABDOMINAL LIGATION     • WISDOM TOOTH  EXTRACTION       Social History     Socioeconomic History   • Marital status:      Spouse name: Malick   • Number of children: Not on file   • Years of education: Not on file   • Highest education level: Not on file   Tobacco Use   • Smoking status: Never Smoker   • Smokeless tobacco: Never Used   Substance and Sexual Activity   • Alcohol use: Yes     Comment: Socially   • Drug use: No   • Sexual activity: Yes     Partners: Male     Birth control/protection: Post-menopausal     Family History   Problem Relation Age of Onset   • Ovarian cancer Maternal Aunt    • Breast cancer Neg Hx    • Uterine cancer Neg Hx    • Colon cancer Neg Hx    • Deep vein thrombosis Neg Hx    • Pulmonary embolism Neg Hx    No Known Allergies   Current Outpatient Medications on File Prior to Visit   Medication Sig   • cephalexin (Keflex) 500 MG capsule Take 1 capsule by mouth 3 (Three) Times a Day.   • dexamethasone (DECADRON) 4 MG tablet Take 1 tablet by mouth Every 12 (Twelve) Hours.   • famotidine (PEPCID) 40 MG tablet Take 1 tablet by mouth 2 (Two) Times a Day.   • HYDROcodone-acetaminophen (HYCET) 7.5-325 MG/15ML solution GIVE HER 7.5 ML AT 9:30 PM BEFORE BED AS NEEDED FOR PAIN   • levETIRAcetam (KEPPRA) 500 MG tablet Take 1 tablet by mouth Every 12 (Twelve) Hours.   • ondansetron (ZOFRAN) 8 MG tablet Take 1 tablet by mouth Every 8 (Eight) Hours As Needed for Nausea or Vomiting.     No current facility-administered medications on file prior to visit.             There were no vitals filed for this visit.    EXAM:NONE                         ASSESSMENT/PLAN:This patient has a glioblastoma multiforme, World Health Organization, grade 4, in the chilango on the right side associated with brain edema. This triggers very dense body paralysis on the left side. On the other hand comparing this patient with how she was  weeks ago at the rehab unit at [facility] and how she is now, she is like day and night. Her speech has  improved 90%. She is enjoying her food. She has been able to swallow with no difficulties. She is controlling sphincters. She has not had any seizure activity. She has no pain. She has started to gain some movement in the left lower extremity, especially in the quadriceps. The movement in the left upper extremity is very limited. Most of the motion is triggered by shoulder activity more than anything else. The right body activity is normal. 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       The patient was further reviewed on 10/28/2020 since she completed 1 round of Temodar for the 42 days of the induction of radiation therapy and then 5 days of Temodar subsequently. Unfortunately the MRI that has been reported above and reviewed by me and agreeing with the radiologist’s interpretation shows very substantial increase in the size of edema and tumoral lesion in the chilango and in the protuberance. The patient has obvious neurological deterioration with worsening left body paralysis. Associated with this, she has deterioration of her voice and she has had more anxiety and depression. She decided that she wanted to take the depression medicine, Zyprexa, on p.r.n. basis and I pointed out to them that this medicine does not work in that way. This medicine will require continually taking 3 or 4 weeks before they can see some real benefit. On the other hand when she takes the medicine at bedtime she has very significant improvement in her ability to sleep and have a good night of sleep.       The patient was reviewed with the  by Telemedicine on 01/15/2021 after I have personally reviewed the MRI of the brain that shows very  dramatic progression of her tumors in the chilango and in the medulla. There is tremendous degree of edema, most of the anatomical  structure is compromised by tumor and obviously this explains the obvious clinical deterioration. Under the present circumstances life expectancy is measured in a few days. The patient is still drinking liquids okay; still eating some solids and she is able to take her medicines. She is not choking. She has not had any seizure activity. She is not in pain. She was able to go through her COVID infection with no turmoil and respiratory symptomatology. She remains on dexamethasone and seizure medication.     I discussed the facts with the patient’s . He realizes and he sees the changes clinically and he agrees with terminal care. They do not want to have Hospice care at this time, whatsoever and this was discussed with him and the patient’s 2 children. They will take care of her at home in a peaceful environment. I will ask for me to review them on Monday and Wednesday of next week for any other terminal care questions or needs. The medicines will remain ongoing for the time being until she is not able to swallow. When that stops it will be okay to stop the medicines and let things be. He realizes that. He is an  and he knows the logic of things.     She is DO NOT RESUSCITATE and they will not call the ambulance under any circumstances. We will be happy to provide death certificate at the time that events occur.     On further review on 03/30/2021 by Telemedicine, it is very clear that she has further deteriorated. Her swallowing ability is decreasing. Her speech ability is decreasing. She is choking on things. She is less with it. She has a lot more weakness and there is obviously deterioration of her overall condition. This is something that have been waiting for many weeks now and it was not happening but now it is happening and that is a reality. I think that she is getting  into the end of the road and he recognizes that.     I instructed him how to use the care package, especially the lorazepam and the morphine in case of agitation or struggle breathing or discomfort. He can use either of these medicines or both medicines together once an hour as needed. I asked him to call Hospice if any need in the middle of the night or through the day or any other issues are necessary. We will continue talking on the telephone once a week. I expect that maybe she is not going to be with us in the next few days. He realizes that. He was gratified for the care that we have done through the telephone and he will keep us informed. We will do another Telemedicine visit next week.            Duration 12  minutes.

## 2021-04-08 NOTE — TELEPHONE ENCOUNTER
Returning call to Bryanna BAÑUELOS with [facility]. Bryanna wanted to clarify that pt is supposed to be on long term course of keflex. Told RN that it looks like it was only supposed to be a ten day course and pt was supposed to take 3 tablets daily. Bryanna BAÑUELOS stated pt still has about 5 or 6 pills left and wanted to clarify with Dr. Trimble. Told RN I would check with him to verify and get back with her. RN v/u.

## 2021-04-08 NOTE — TELEPHONE ENCOUNTER
Regarding other note. Per Dr. Trimble pt does not need to continue on keflex unless  would like for her to continue. Called Jah BAÑUELOS to let her know she is going to offer the  to complete the course and see how he feels after that. Told Jah BAÑUELOS that is fine.

## 2021-04-08 NOTE — PROGRESS NOTES
REASON FOR FOLLOWUP/CHIEF COMPLAINT:  1.Glioblastoma OF THE PROTUBERANCE DENSE LEFT BODY PARALYSIS, WHEEL CHAIR BOUND TOTAL CARE BY , UNDERWENT RADIATION AND TEMODAR; PROGRESSIVE DISEASE BY MRI MOVING INTO GEMZAR IV EVERY 2 WEEKS, NO FURTHER ROLE FOR SURGERY OR RADIATION THERAPY.        2. HYPERGLYCEMIA DU TO CHRONIC STEROID USE SUGARS IN  RANGE      HISTORY OF PRESENT ILLNESS:       I HAVE CALLED THIS PATIENT  ON THE PHONE TODAY AND VERBALLY HAS CONSENTED ABOUT TELEMEDICINE VISIT             I have talked with this patient’s  today.     The  states that during the last few days it has been hard to watch the significant deterioration of her performance status. Her voice is now almost completely gone. She has not had difficulty breathing and she has not developed any dysautonomia, perspiration of hypertension. She has not been in pain and she has not had any seizure activity. She is still eating with no obvious aspiration. Her bowel activity is much better and she had a large fecal matter yesterday that was so big and probably was related to impaction. She is much better from this point of view today. Urine is now incontinent not surprising. She stays in bed and she requires total care. Hospice is assisting them successfully with good help and good indicators.                          Past Medical History:   Diagnosis Date   • Anxiety    • Glioblastoma (CMS/HCC)    • History of hemiplegia    • S/P craniotomy    • Vertigo      Past Surgical History:   Procedure Laterality Date   • BRAIN SURGERY     • BREAST BIOPSY     • CRANIOTOMY FOR TUMOR Right 7/1/2020    Procedure: Right-sided craniotomy for excisional biopsy and, placement of lumbar drain;  Surgeon: Shai Licona MD;  Location: [facility];  Service: Neurosurgery;  Laterality: Right;   • DENTAL PROCEDURE     • TUBAL ABDOMINAL LIGATION     • WISDOM TOOTH EXTRACTION       Social History     Socioeconomic History   •  Marital status:      Spouse name: Malick   • Number of children: Not on file   • Years of education: Not on file   • Highest education level: Not on file   Tobacco Use   • Smoking status: Never Smoker   • Smokeless tobacco: Never Used   Substance and Sexual Activity   • Alcohol use: Yes     Comment: Socially   • Drug use: No   • Sexual activity: Yes     Partners: Male     Birth control/protection: Post-menopausal     Family History   Problem Relation Age of Onset   • Ovarian cancer Maternal Aunt    • Breast cancer Neg Hx    • Uterine cancer Neg Hx    • Colon cancer Neg Hx    • Deep vein thrombosis Neg Hx    • Pulmonary embolism Neg Hx    No Known Allergies   Current Outpatient Medications on File Prior to Visit   Medication Sig   • cephalexin (Keflex) 500 MG capsule Take 1 capsule by mouth 3 (Three) Times a Day.   • dexamethasone (DECADRON) 4 MG tablet Take 1 tablet by mouth Every 12 (Twelve) Hours.   • famotidine (PEPCID) 40 MG tablet Take 1 tablet by mouth 2 (Two) Times a Day.   • HYDROcodone-acetaminophen (HYCET) 7.5-325 MG/15ML solution GIVE HER 7.5 ML AT 9:30 PM BEFORE BED AS NEEDED FOR PAIN   • levETIRAcetam (KEPPRA) 500 MG tablet Take 1 tablet by mouth Every 12 (Twelve) Hours.   • ondansetron (ZOFRAN) 8 MG tablet Take 1 tablet by mouth Every 8 (Eight) Hours As Needed for Nausea or Vomiting.     No current facility-administered medications on file prior to visit.             There were no vitals filed for this visit.    EXAM:NONE                         ASSESSMENT/PLAN:This patient has a glioblastoma multiforme, World Health Organization, grade 4, in the chilango on the right side associated with brain edema. This triggers very dense body paralysis on the left side. On the other hand comparing this patient with how she was  weeks ago at the rehab unit at [facility] and how she is now, she is like day and night. Her speech has improved 90%. She is enjoying her food. She has been able to  swallow with no difficulties. She is controlling sphincters. She has not had any seizure activity. She has no pain. She has started to gain some movement in the left lower extremity, especially in the quadriceps. The movement in the left upper extremity is very limited. Most of the motion is triggered by shoulder activity more than anything else. The right body activity is normal. 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       The patient was further reviewed on 10/28/2020 since she completed 1 round of Temodar for the 42 days of the induction of radiation therapy and then 5 days of Temodar subsequently. Unfortunately the MRI that has been reported above and reviewed by me and agreeing with the radiologist’s interpretation shows very substantial increase in the size of edema and tumoral lesion in the chilango and in the protuberance. The patient has obvious neurological deterioration with worsening left body paralysis. Associated with this, she has deterioration of her voice and she has had more anxiety and depression. She decided that she wanted to take the depression medicine, Zyprexa, on p.r.n. basis and I pointed out to them that this medicine does not work in that way. This medicine will require continually taking 3 or 4 weeks before they can see some real benefit. On the other hand when she takes the medicine at bedtime she has very significant improvement in her ability to sleep and have a good night of sleep.       The patient was reviewed with the  by Telemedicine on 01/15/2021 after I have personally reviewed the MRI of the brain that shows very dramatic progression of her tumors in the chilango and in the  medulla. There is tremendous degree of edema, most of the anatomical  structure is compromised by tumor and obviously this explains the obvious clinical deterioration. Under the present circumstances life expectancy is measured in a few days. The patient is still drinking liquids okay; still eating some solids and she is able to take her medicines. She is not choking. She has not had any seizure activity. She is not in pain. She was able to go through her COVID infection with no turmoil and respiratory symptomatology. She remains on dexamethasone and seizure medication.     I discussed the facts with the patient’s . He realizes and he sees the changes clinically and he agrees with terminal care. They do not want to have Hospice care at this time, whatsoever and this was discussed with him and the patient’s 2 children. They will take care of her at home in a peaceful environment. I will ask for me to review them on Monday and Wednesday of next week for any other terminal care questions or needs. The medicines will remain ongoing for the time being until she is not able to swallow. When that stops it will be okay to stop the medicines and let things be. He realizes that. He is an  and he knows the logic of things.     She is DO NOT RESUSCITATE and they will not call the ambulance under any circumstances. We will be happy to provide death certificate at the time that events occur.       On 04/08/2021 the clinical situation has further deteriorated to the point that the patient requires total care. She is now under hospice care and the  states that they have done a great job.     I have advised him to continue monitoring the overall situation. He knows how to use the care package in case that it is necessary in regard pain control, anxiety and things of this nature. I asked him to call directly hospice to notify them that he is going to use any of these medicines.     He believes that she is not  going to be with us for more than 3-5 days. Probably that is the case. Under the present circumstances we will continue giving him support by telephone on weekly basis no matter what. He was very appreciative of this.     Duration of the telephonic visit 7 minutes.

## 2021-04-13 NOTE — PROGRESS NOTES
REASON FOR FOLLOWUP/CHIEF COMPLAINT:  1.Glioblastoma OF THE PROTUBERANCE DENSE LEFT BODY PARALYSIS, WHEEL CHAIR BOUND TOTAL CARE BY , UNDERWENT RADIATION AND TEMODAR; PROGRESSIVE DISEASE BY MRI MOVING INTO GEMZAR IV EVERY 2 WEEKS, NO FURTHER ROLE FOR SURGERY OR RADIATION THERAPY.        2. HYPERGLYCEMIA DU TO CHRONIC STEROID USE SUGARS IN  RANGE      HISTORY OF PRESENT ILLNESS:       I HAVE CALLED THIS PATIENT  ON THE PHONE TODAY AND VERBALLY HAS CONSENTED ABOUT TELEMEDICINE VISIT             I have talked with this patient’s  today.       This patient is further declining according to the  to the point that she is now bedridden continuously and she is barely able to eat anything. Her verbal communication is very much gone and she is still moving the right side of her body for certain tasks. There is no difficulty with breathing yet. Her vital signs have remained stable and checked by Hospice yesterday including normal oxygenation, normal blood pressure and normal pulse. She has not had any fever or infection. Her medicines remain ongoing including dexamethasone that he does not want to stop.                         Past Medical History:   Diagnosis Date   • Anxiety    • Glioblastoma (CMS/HCC)    • History of hemiplegia    • S/P craniotomy    • Vertigo      Past Surgical History:   Procedure Laterality Date   • BRAIN SURGERY     • BREAST BIOPSY     • CRANIOTOMY FOR TUMOR Right 7/1/2020    Procedure: Right-sided craniotomy for excisional biopsy and, placement of lumbar drain;  Surgeon: Shai Licona MD;  Location: [facility];  Service: Neurosurgery;  Laterality: Right;   • DENTAL PROCEDURE     • TUBAL ABDOMINAL LIGATION     • WISDOM TOOTH EXTRACTION       Social History     Socioeconomic History   • Marital status:      Spouse name: Malick   • Number of children: Not on file   • Years of education: Not on file   • Highest education level: Not on file    Tobacco Use   • Smoking status: Never Smoker   • Smokeless tobacco: Never Used   Substance and Sexual Activity   • Alcohol use: Yes     Comment: Socially   • Drug use: No   • Sexual activity: Yes     Partners: Male     Birth control/protection: Post-menopausal     Family History   Problem Relation Age of Onset   • Ovarian cancer Maternal Aunt    • Breast cancer Neg Hx    • Uterine cancer Neg Hx    • Colon cancer Neg Hx    • Deep vein thrombosis Neg Hx    • Pulmonary embolism Neg Hx    No Known Allergies   Current Outpatient Medications on File Prior to Visit   Medication Sig   • cephalexin (Keflex) 500 MG capsule Take 1 capsule by mouth 3 (Three) Times a Day.   • dexamethasone (DECADRON) 4 MG tablet Take 1 tablet by mouth Every 12 (Twelve) Hours.   • famotidine (PEPCID) 40 MG tablet Take 1 tablet by mouth 2 (Two) Times a Day.   • HYDROcodone-acetaminophen (HYCET) 7.5-325 MG/15ML solution GIVE HER 7.5 ML AT 9:30 PM BEFORE BED AS NEEDED FOR PAIN   • levETIRAcetam (KEPPRA) 500 MG tablet Take 1 tablet by mouth Every 12 (Twelve) Hours.   • ondansetron (ZOFRAN) 8 MG tablet Take 1 tablet by mouth Every 8 (Eight) Hours As Needed for Nausea or Vomiting.     No current facility-administered medications on file prior to visit.             There were no vitals filed for this visit.    EXAM:NONE                         ASSESSMENT/PLAN:This patient has a glioblastoma multiforme, World Health Organization, grade 4, in the chilango on the right side associated with brain edema. This triggers very dense body paralysis on the left side. On the other hand comparing this patient with how she was  weeks ago at the rehab unit at [facility] and how she is now, she is like day and night. Her speech has improved 90%. She is enjoying her food. She has been able to swallow with no difficulties. She is controlling sphincters. She has not had any seizure activity. She has no pain. She has started to gain some movement in the left  lower extremity, especially in the quadriceps. The movement in the left upper extremity is very limited. Most of the motion is triggered by shoulder activity more than anything else. The right body activity is normal. 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         The patient had Telemedicine visit and conversation with the  on 04/13/2021. She has further declined to the point that she is barely able to swallow anything or eat anything. Her breathing is still appropriate and her vital signs are stable according to Hospice visit yesterday. She has not had any fever. She is not in pain and she has no seizure activity. The  has been able to continue giving her her medicines including her dexamethasone, Pepcid and the Keppra. He knows that if he stops the dexamethasone the brain swelling is going to get worse and he wants to continue this medicine until the time that mother nature takes care of issues. He knows that things are very close and he is preparing himself. The patient's son is there with him at this time and he is taking care of her as well, switching back and forth and helping each other. The Hospice nurse is coming at least twice a week.     So far they have not had the need to use the urgent care package.     We will review her back in a week. Otherwise, I suggested to keep the same medicines going. If feeding and drinking is not an issue anymore it is okay for them to stop this whenever they feel comfortable. They know that her drinking and food are not nourishing her anymore and the only thing that will do is comfort of the care that they have provided to her if she has a bite of this or a sip of  that. They realize that.     Duration of the visit was 6 minutes.

## 2021-04-23 ENCOUNTER — TELEPHONE (OUTPATIENT)
Dept: ONCOLOGY | Facility: CLINIC | Age: 56
End: 2021-04-23

## 2021-06-11 NOTE — PROGRESS NOTES
"  Physician Weekly Management Note    Diagnosis:     No diagnosis found.   Reason for Visit: Tx: 28/30  Concurrent Chemo:   Yes - temodar    Notes on Treatment course, Films, Medical progress and Plan:  Continues doing fairly well. Met with  today as well. She is fatigued and with that, some behavioral and personality changes. Explained that, recovery as well. Using arm some, he says she can \"drive their gator\".      Has f/u mri scheduled for the 8th- seeing Dr. Licona on the 15th. Explained that's early but will review and arrange f/u as needed thereafter. Sees Dr. Trimble on the 25th as well. Since MRI is next week, will leave steroid as is and then can taper thereafter if all looks good.     Performance Status:  (2) Ambulatory and capable of self care, unable to carry out work activity, up and about > 50% or waking hours    ROS -  Other than those listed above, as follows:  Constitutional - Normal - no complaints of fatigue, denies lack of appetite, fever, night sweats or change in weight.  Neck - Normal - denies neck masses, muscle weakness, neck pain, decreased range of motion or swelling.  Cardiovascular - Normal - denies arrhythmias, chest pain, dyspnea, edema, orthopnea or palpitations.  Respiratory - Normal - denies cough, dyspnea, hemoptysis, hiccoughs, pleuritic chest pain or wheezing.  Gastrointestinal - Normal - no complaints of constipation, abdominal pain, diarrhea, heartburn/dyspepsia, hematemesis, hemorrhoids, melena or GI bleeding, nausea, pain or cramping or vomiting.  Neuro - Normal - no new complaints of vision change, headache, nausea, cranial nerve deficit, gait abnormality, syncope, seizure.    PHYSICAL EXAM - Other than changes listed above, as follows:  There were no vitals filed for this visit.    Constitutional - Normal - no evidence of impaired alertness, inadequate appearance, premature or advanced chronologic age, uncooperativeness, altered mood, affect or disorientation.  Neck - " "Normal - no evidence of tender or enlarged lymph nodes, neck abnormalities, restricted range of motion or enlarged thyroid.  Chest - Normal - no evidence of chest abnormalities, tender or enlarged lymph nodes.  Respiratory - Normal - no evidence of abnormal breat sounds, chest abnormalities on palpation and chest abnormalities on percussion and normal breath sounds.  Hematologic/Lymphatic - Normal - no evidence of tender or enlarged axillary lymph nodes nor tender or enlarged neck nodes.  Neuro - Normal - no evidence of cranial nerve deficit, gait abnormality.    Problem added:  No problems updated.  Medications added: No orders of the defined types were placed in this encounter.    Ancillary referrals made: No orders of the defined types were placed in this encounter.      Technical aspects reviewed:  Weekly OBI approved if applicable? Yes   Weekly port films approved?  Yes  Change requests noted if applicable?  Yes   Patient setup and plan reviewed?  Yes     Chart Reviewed:  Continue current treatment plan?  Yes  Treatment plan change requested? No    I have reviewed and marked as \"reviewed\" the current medications, allergies and problem list in the patients EMR.    I have reviewed the patient's medical, surgical  history in detail, reviewed any pertinent lab work  and updated the computerized patient record if needed.    Patient's Care Team:  Patient Care Team:  Paz Mera MD as PCP - General (Family Medicine)  Carlota Peacock MD as Consulting Physician (Radiation Oncology)  John Trimble MD as Consulting Physician (Hematology and Oncology)      " show

## 2021-06-30 ENCOUNTER — TELEPHONE (OUTPATIENT)
Dept: ONCOLOGY | Facility: CLINIC | Age: 56
End: 2021-06-30

## 2021-06-30 NOTE — TELEPHONE ENCOUNTER
Caller: DARION    Relationship: JULIA    Best call back number: [phone]    What is the best time to reach you: ANYTIME    What was the call regarding: DARION CALLED TO CHECK ON THE STATUS OF ORDER# [id number].  SHE'S JUST NEEDING A SIGNATURE, DATE, AND FOR IT TO BE FAXED BACK TO FAX# [phone]    Do you require a callback: IF NECESSARY

## 2022-04-04 NOTE — PROGRESS NOTES
REASON FOR FOLLOWUP/CHIEF COMPLAINT:  1.Glioblastoma OF THE PROTUBERANCE DENSE LEFT BODY PARALYSIS, WHEEL CHAIR BOUND TOTAL CARE BY , UNDERWENT RADIATION AND TEMODAR; PROGRESSIVE DISEASE BY MRI MOVING INTO GEMZAR IV EVERY 2 WEEKS, NO FURTHER ROLE FOR SURGERY OR RADIATION THERAPY.        2. HYPERGLYCEMIA DU TO CHRONIC STEROID USE SUGARS IN  RANGE      HISTORY OF PRESENT ILLNESS:       I HAVE CALLED THIS PATIENT  ON THE PHONE TODAY AND VERBALLY HAS CONSENTED ABOUT TELEMEDICINE VISIT         This patient's  has told me today on the telephone that she has gained back some mobility in the left lower extremity that has lost more mental ability in the last few days. She is spending more and more time sleeping and having occasional heartburn. She is having some trouble swallowing the foods getting stuck in her mouth and she needs to put her hand in her mouth to remove particles. So far she has not had any cough, choking or aspiration. She is still drinking liquids with no difficulties. She is having a little bit more incontinence of feces and urination and she is in a diaper all of the time. So far no bed sores. She has not had any fever or infections. She remains lying in bed and she is supported by the  who puts her in a wheelchair and carries her around the house. Other than that the patient's 2 sons are coming to help out in the best way that they can.                          Past Medical History:   Diagnosis Date   • Anxiety    • Glioblastoma (CMS/HCC)    • History of hemiplegia    • S/P craniotomy    • Vertigo      Past Surgical History:   Procedure Laterality Date   • BRAIN SURGERY     • BREAST BIOPSY     • CRANIOTOMY FOR TUMOR Right 7/1/2020    Procedure: Right-sided craniotomy for excisional biopsy and, placement of lumbar drain;  Surgeon: Shai Licona MD;  Location: [facility];  Service: Neurosurgery;  Laterality: Right;   • DENTAL PROCEDURE     • TUBAL  24.9 ABDOMINAL LIGATION     • WISDOM TOOTH EXTRACTION       Social History     Socioeconomic History   • Marital status:      Spouse name: Malick   • Number of children: Not on file   • Years of education: Not on file   • Highest education level: Not on file   Tobacco Use   • Smoking status: Never Smoker   • Smokeless tobacco: Never Used   Substance and Sexual Activity   • Alcohol use: Yes     Comment: Socially   • Drug use: No   • Sexual activity: Yes     Partners: Male     Birth control/protection: Post-menopausal     Family History   Problem Relation Age of Onset   • Ovarian cancer Maternal Aunt    • Breast cancer Neg Hx    • Uterine cancer Neg Hx    • Colon cancer Neg Hx    • Deep vein thrombosis Neg Hx    • Pulmonary embolism Neg Hx    No Known Allergies   Current Outpatient Medications on File Prior to Visit   Medication Sig   • dexamethasone (DECADRON) 4 MG tablet Take 1 tablet by mouth Every 12 (Twelve) Hours.   • famotidine (PEPCID) 40 MG tablet Take 1 tablet by mouth 2 (Two) Times a Day.   • HYDROcodone-acetaminophen (HYCET) 7.5-325 MG/15ML solution GIVE HER 7.5 ML AT 9:30 PM BEFORE BED AS NEEDED FOR PAIN   • levETIRAcetam (KEPPRA) 500 MG tablet Take 1 tablet by mouth Every 12 (Twelve) Hours.   • ondansetron (ZOFRAN) 8 MG tablet Take 1 tablet by mouth Every 8 (Eight) Hours As Needed for Nausea or Vomiting.   • sulfamethoxazole-trimethoprim (BACTRIM DS,SEPTRA DS) 800-160 MG per tablet Take 1 tablet by mouth Daily.     No current facility-administered medications on file prior to visit.             There were no vitals filed for this visit.    EXAM:NONE                         ASSESSMENT/PLAN:This patient has a glioblastoma multiforme, World Health Organization, grade 4, in the chilango on the right side associated with brain edema. This triggers very dense body paralysis on the left side. On the other hand comparing this patient with how she was  weeks ago at the rehab unit at [facility] and  how she is now, she is like day and night. Her speech has improved 90%. She is enjoying her food. She has been able to swallow with no difficulties. She is controlling sphincters. She has not had any seizure activity. She has no pain. She has started to gain some movement in the left lower extremity, especially in the quadriceps. The movement in the left upper extremity is very limited. Most of the motion is triggered by shoulder activity more than anything else. The right body activity is normal. 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       The patient was further reviewed on 10/28/2020 since she completed 1 round of Temodar for the 42 days of the induction of radiation therapy and then 5 days of Temodar subsequently. Unfortunately the MRI that has been reported above and reviewed by me and agreeing with the radiologist’s interpretation shows very substantial increase in the size of edema and tumoral lesion in the chilango and in the protuberance. The patient has obvious neurological deterioration with worsening left body paralysis. Associated with this, she has deterioration of her voice and she has had more anxiety and depression. She decided that she wanted to take the depression medicine, Zyprexa, on p.r.n. basis and I pointed out to them that this medicine does not work in that way. This medicine will require continually taking 3 or 4 weeks before they can see some real benefit. On the other hand when she takes the medicine at bedtime she has very significant improvement in her ability to sleep and have a good night of sleep.       The patient was reviewed with the  by Telemedicine on 01/15/2021 after I have  personally reviewed the MRI of the brain that shows very dramatic progression of her tumors in the chilango and in the medulla. There is tremendous degree of edema, most of the anatomical  structure is compromised by tumor and obviously this explains the obvious clinical deterioration. Under the present circumstances life expectancy is measured in a few days. The patient is still drinking liquids okay; still eating some solids and she is able to take her medicines. She is not choking. She has not had any seizure activity. She is not in pain. She was able to go through her COVID infection with no turmoil and respiratory symptomatology. She remains on dexamethasone and seizure medication.     I discussed the facts with the patient’s . He realizes and he sees the changes clinically and he agrees with terminal care. They do not want to have Hospice care at this time, whatsoever and this was discussed with him and the patient’s 2 children. They will take care of her at home in a peaceful environment. I will ask for me to review them on Monday and Wednesday of next week for any other terminal care questions or needs. The medicines will remain ongoing for the time being until she is not able to swallow. When that stops it will be okay to stop the medicines and let things be. He realizes that. He is an  and he knows the logic of things.     She is DO NOT RESUSCITATE and they will not call the ambulance under any circumstances. We will be happy to provide death certificate at the time that events occur.       I reviewed the situation by Telemedicine with the patient’s  on 03/10/2021.    The clinical situation is slowly deteriorating to the point that the patient has more and more fatigue, she is spending more time in bed, she has more nausea and this is an indirect sign to me of endocranial hypertension. I cannot explain easily why there is some improvement in movement in the left lower extremity.     She  also is experiencing more heartburn in spite of giving her Pepcid and I advised him to give her 2 Tums 2-3 times a day to control this symptom.     They do not want to stop her dexamethasone at this point.     She is not having any skin infection at this time and he is no longer giving her any Bactrim medication for the soft tissue infection in the right lower extremity.     They do not want to pursue any care through hospice or hire any other company to help them out. They are supporting each other in the best way possible.     We will continue doing weekly eVisit to support them and I asked them to call if any questions or needs anytime. The patient remains DNR.    Duration of the telephone conversation 12 minutes.

## (undated) DEVICE — DRN WND JP RND W TROC SIL 10F 1/8IN

## (undated) DEVICE — SUT NUROLON 4/0 TF18 CR8 I8IN C584D

## (undated) DEVICE — MARKR SKIN W/RULR AND LBL

## (undated) DEVICE — DRP MICROSCOPE 4 BINOCULAR CV 54X150IN

## (undated) DEVICE — ANTIBACTERIAL UNDYED BRAIDED (POLYGLACTIN 910), SYNTHETIC ABSORBABLE SUTURE: Brand: COATED VICRYL

## (undated) DEVICE — ADHS SKIN DERMABOND TOP ADVANCED

## (undated) DEVICE — Device

## (undated) DEVICE — 2.3MM TAPERED ROUTER

## (undated) DEVICE — 6.0MM PRECISION ROUND

## (undated) DEVICE — GLV SURG SENSICARE W/ALOE PF LF 7.5 STRL

## (undated) DEVICE — SPHR MARKR STEALTH STATION

## (undated) DEVICE — DRSNG TELFA PAD NONADH STR 1S 3X8IN

## (undated) DEVICE — DRSNG SURESITE123 6X8IN

## (undated) DEVICE — MAYFIELD® DISPOSABLE ADULT SKULL PIN (PLASTIC BASE): Brand: MAYFIELD®

## (undated) DEVICE — 1010 S-DRAPE TOWEL DRAPE 10/BX: Brand: STERI-DRAPE™

## (undated) DEVICE — PERFORATOR CDM WO/ DURAGUARD 14/11

## (undated) DEVICE — SMOKE EVACUATION TUBING WITH 7/8 IN TO 1/4 IN REDUCER: Brand: BUFFALO FILTER

## (undated) DEVICE — SEAL DURL ADHERUS/AUTOSPRAY HYDROGEL DS

## (undated) DEVICE — SUT SILK 2/0 FS BLK 18IN 685G

## (undated) DEVICE — SOL IRR PHYSIOSOL BTL 1000ML

## (undated) DEVICE — DISPOSABLE IRRIGATION BIPOLAR CORD, M1000 TYPE: Brand: KIRWAN

## (undated) DEVICE — CONN TBG Y 5 IN 1 LF STRL

## (undated) DEVICE — DURAHOOK 1/4HOOK PK/6

## (undated) DEVICE — CATH FOL SIMPLYLATEX SILELAST 14F 17IN

## (undated) DEVICE — CVR PROB 96IN LF STRL

## (undated) DEVICE — GLV SURG BIOGEL LTX PF 7

## (undated) DEVICE — PK CRANI 40

## (undated) DEVICE — TUBING, SUCTION, 1/4" X 20', STRAIGHT: Brand: MEDLINE INDUSTRIES, INC.

## (undated) DEVICE — CLIP RANEY

## (undated) DEVICE — PENCL E/S ULTRAVAC TELESCP NOSE HOLSTR 10FT

## (undated) DEVICE — TRAP FLD MINIVAC MEGADYNE 100ML